# Patient Record
Sex: MALE | Race: ASIAN | Employment: UNEMPLOYED | URBAN - METROPOLITAN AREA
[De-identification: names, ages, dates, MRNs, and addresses within clinical notes are randomized per-mention and may not be internally consistent; named-entity substitution may affect disease eponyms.]

---

## 2017-08-13 ENCOUNTER — HOSPITAL ENCOUNTER (EMERGENCY)
Facility: CLINIC | Age: 52
Discharge: HOME | End: 2017-08-13
Attending: EMERGENCY MEDICINE | Admitting: EMERGENCY MEDICINE
Payer: MEDICAID

## 2017-08-13 VITALS
RESPIRATION RATE: 12 BRPM | DIASTOLIC BLOOD PRESSURE: 75 MMHG | OXYGEN SATURATION: 100 % | TEMPERATURE: 98.3 F | HEART RATE: 75 BPM | SYSTOLIC BLOOD PRESSURE: 117 MMHG

## 2017-08-13 DIAGNOSIS — R01.1 HEART MURMUR: ICD-10-CM

## 2017-08-13 DIAGNOSIS — M62.81 GENERALIZED MUSCLE WEAKNESS: ICD-10-CM

## 2017-08-13 LAB
ALBUMIN UR-MCNC: NEGATIVE MG/DL
ANION GAP SERPL CALCULATED.3IONS-SCNC: 7 MMOL/L (ref 3–14)
APPEARANCE UR: CLEAR
BASOPHILS # BLD AUTO: 0.1 10E9/L (ref 0–0.2)
BASOPHILS NFR BLD AUTO: 0.7 %
BILIRUB UR QL STRIP: NEGATIVE
BUN SERPL-MCNC: 35 MG/DL (ref 7–30)
CALCIUM SERPL-MCNC: 8.4 MG/DL (ref 8.5–10.1)
CHLORIDE SERPL-SCNC: 111 MMOL/L (ref 94–109)
CO2 SERPL-SCNC: 28 MMOL/L (ref 20–32)
COLOR UR AUTO: ABNORMAL
CREAT SERPL-MCNC: 0.99 MG/DL (ref 0.66–1.25)
DIFFERENTIAL METHOD BLD: ABNORMAL
EOSINOPHIL # BLD AUTO: 0.2 10E9/L (ref 0–0.7)
EOSINOPHIL NFR BLD AUTO: 1.9 %
ERYTHROCYTE [DISTWIDTH] IN BLOOD BY AUTOMATED COUNT: 21.1 % (ref 10–15)
GFR SERPL CREATININE-BSD FRML MDRD: 80 ML/MIN/1.7M2
GLUCOSE SERPL-MCNC: 156 MG/DL (ref 70–99)
GLUCOSE UR STRIP-MCNC: NEGATIVE MG/DL
HCT VFR BLD AUTO: 29.8 % (ref 40–53)
HGB BLD-MCNC: 9.2 G/DL (ref 13.3–17.7)
HGB UR QL STRIP: NEGATIVE
IMM GRANULOCYTES # BLD: 0.1 10E9/L (ref 0–0.4)
IMM GRANULOCYTES NFR BLD: 1 %
KETONES UR STRIP-MCNC: NEGATIVE MG/DL
LACTATE BLD-SCNC: 1.2 MMOL/L (ref 0.7–2.1)
LEUKOCYTE ESTERASE UR QL STRIP: NEGATIVE
LYMPHOCYTES # BLD AUTO: 1.3 10E9/L (ref 0.8–5.3)
LYMPHOCYTES NFR BLD AUTO: 15.6 %
MCH RBC QN AUTO: 25.1 PG (ref 26.5–33)
MCHC RBC AUTO-ENTMCNC: 30.9 G/DL (ref 31.5–36.5)
MCV RBC AUTO: 81 FL (ref 78–100)
MONOCYTES # BLD AUTO: 0.3 10E9/L (ref 0–1.3)
MONOCYTES NFR BLD AUTO: 3.8 %
MUCOUS THREADS #/AREA URNS LPF: PRESENT /LPF
NEUTROPHILS # BLD AUTO: 6.3 10E9/L (ref 1.6–8.3)
NEUTROPHILS NFR BLD AUTO: 77 %
NITRATE UR QL: NEGATIVE
NRBC # BLD AUTO: 0 10*3/UL
NRBC BLD AUTO-RTO: 0 /100
PH UR STRIP: 5 PH (ref 5–7)
PLATELET # BLD AUTO: 283 10E9/L (ref 150–450)
POTASSIUM SERPL-SCNC: 3.7 MMOL/L (ref 3.4–5.3)
RBC # BLD AUTO: 3.67 10E12/L (ref 4.4–5.9)
RBC #/AREA URNS AUTO: 1 /HPF (ref 0–2)
SODIUM SERPL-SCNC: 146 MMOL/L (ref 133–144)
SP GR UR STRIP: 1.02 (ref 1–1.03)
SQUAMOUS #/AREA URNS AUTO: <1 /HPF (ref 0–1)
TROPONIN I SERPL-MCNC: NORMAL UG/L (ref 0–0.04)
TSH SERPL DL<=0.005 MIU/L-ACNC: 0.75 MU/L (ref 0.4–4)
URN SPEC COLLECT METH UR: ABNORMAL
UROBILINOGEN UR STRIP-MCNC: 0 MG/DL (ref 0–2)
VALPROATE SERPL-MCNC: <3 MG/L (ref 50–100)
WBC # BLD AUTO: 8.2 10E9/L (ref 4–11)
WBC #/AREA URNS AUTO: 1 /HPF (ref 0–2)

## 2017-08-13 PROCEDURE — 83605 ASSAY OF LACTIC ACID: CPT | Performed by: PHYSICIAN ASSISTANT

## 2017-08-13 PROCEDURE — 96361 HYDRATE IV INFUSION ADD-ON: CPT

## 2017-08-13 PROCEDURE — 84443 ASSAY THYROID STIM HORMONE: CPT | Performed by: PHYSICIAN ASSISTANT

## 2017-08-13 PROCEDURE — 99284 EMERGENCY DEPT VISIT MOD MDM: CPT | Mod: 25

## 2017-08-13 PROCEDURE — 81001 URINALYSIS AUTO W/SCOPE: CPT | Performed by: EMERGENCY MEDICINE

## 2017-08-13 PROCEDURE — 80164 ASSAY DIPROPYLACETIC ACD TOT: CPT | Performed by: PHYSICIAN ASSISTANT

## 2017-08-13 PROCEDURE — 25000128 H RX IP 250 OP 636: Performed by: EMERGENCY MEDICINE

## 2017-08-13 PROCEDURE — 80048 BASIC METABOLIC PNL TOTAL CA: CPT | Performed by: PHYSICIAN ASSISTANT

## 2017-08-13 PROCEDURE — 84484 ASSAY OF TROPONIN QUANT: CPT | Performed by: PHYSICIAN ASSISTANT

## 2017-08-13 PROCEDURE — 85025 COMPLETE CBC W/AUTO DIFF WBC: CPT | Performed by: PHYSICIAN ASSISTANT

## 2017-08-13 PROCEDURE — 96360 HYDRATION IV INFUSION INIT: CPT

## 2017-08-13 RX ADMIN — SODIUM CHLORIDE 1000 ML: 9 INJECTION, SOLUTION INTRAVENOUS at 19:51

## 2017-08-13 ASSESSMENT — ENCOUNTER SYMPTOMS
FEVER: 0
DIZZINESS: 0
ABDOMINAL PAIN: 0
VOMITING: 1
COUGH: 0
ACTIVITY CHANGE: 0
DIARRHEA: 0
SHORTNESS OF BREATH: 0
SPEECH DIFFICULTY: 0
WEAKNESS: 1
NAUSEA: 1
HEADACHES: 0
APPETITE CHANGE: 0

## 2017-08-13 NOTE — ED AVS SNAPSHOT
[facility] Emergency Department    [address]    [facility] [address]    Phone:  [phone]    Fax:  [phone]                                       Luz Marina Diallo   MRN: [id number]    Department:  [facility] Emergency Department   Date of Visit:  8/13/2017           Patient Information     Date Of Birth          1965        Your diagnoses for this visit were:     Generalized muscle weakness     Heart murmur        You were seen by Tay Patino MD.      Follow-up Information     Follow up with [facility] Emergency Department.    Specialty:  EMERGENCY MEDICINE    Why:  If symptoms worsen    Contact information:    [address]  [facility] [address] [phone]        Follow up with your doctor. Schedule an appointment as soon as possible for a visit in 3 days.    Why:  recheck of weakness and heart murmur        Discharge Instructions           Understanding a Heart Murmur    The heart makes sounds as it beats. These sounds occur as the heart valves open and close to allow blood to flow through the heart. A heart murmur is an extra noise heard during a heartbeat. The noise is caused when blood does not flow smoothly through the heart. Heart murmurs can be innocent (harmless) or abnormal (caused by a heart problem).  What causes a heart murmur?  An innocent heart murmur can be a normal finding for many people. It may also be caused by:    Fever    Exercise    Pregnancy    Anemia    Overactive thyroid gland  An abnormal heart murmur can be caused by heart problems such as:    A damaged or diseased valve. The valve may be too narrow for blood to flow through easily. Or it may have problems opening or closing, and may leak blood backward.    A hole in the heart (septal defect). This is a problem with the heart s structure that a person is born with (congenital). It causes blood to leak through the wall that normally  divides the left and right sides of the heart.  What are the symptoms of a heart murmur?  Heart murmurs do not usually cause symptoms. They tend to be found when your healthcare provider is listening to your heart for another reason. People with an abnormal heart murmur may have symptoms of the problem causing the murmur. Symptoms can include:    Feeling weak or tired    Shortness of breath, especially with exercise    Chest pain    Fast, pounding, or skipping heartbeat    Swollen ankles, feet, abdomen    Feeling dizzy or faint    Poor feeding and failing to grow normally (babies only)  How is a heart murmur treated?  An innocent heart murmur does not usually need treatment unless there is a clear cause, such as anemia. In such cases, treating the underlying cause should cure the murmur. In some cases, an innocent heart murmur may go away on its own.  Treatment for an abnormal heart murmur depends on the cause. Options may include:    Medicines to help relieve symptoms    Procedures or surgery to fix or replace a diseased or damaged heart valve    Procedures or surgery to fix a hole in the heart  What are the complications of a heart murmur?  An innocent heart murmur has no complications. Complications of an abnormal heart murmur will vary depending on the cause. Possible complications include:    Heart failure. This problem occurs when the heart is so weak it no longer pumps blood well.    Infection of the heart s valves or inner lining (infective endocarditis)    Blood clots and stroke    Fainting    Heart attack    Sudden cardiac arrest. This problem occurs when the heart suddenly stops beating.  When should I call my healthcare provider?  Call your healthcare provider right away if you have any of these:    Chest pain    Shortness of breath    Fever of 100.4 F (38 C) or higher, or as directed    Symptoms that don t get better with treatment, or symptoms that get worse    New symptoms   Date Last Reviewed:  5/1/2016 2000-2017 [employer]. [address] [place] [address]. All rights reserved. This information is not intended as a substitute for professional medical care. Always follow your healthcare professional's instructions.        Weakness (Uncertain Cause)  Based on your exam today, the exact cause of your weakness is not certain. However, your weakness does not seem to be a sign of a serious illness at this time. Keep an eye on your symptoms and get medical advice as instructed below.  Home care    Rest at home today. Do not over-exert yourself.    Take any medicine as prescribed.    For the next few days, drink extra fluids (unless your healthcare provider wants you to restrict fluids for other reasons). Do not skip meals.  Follow-up care  Follow up with your healthcare provider or as advised.  When to seek medical advice  Call your healthcare provider for any of the following    Worsening of your symptoms    Symptoms don't start getting better within 2 days    Fever of 100.4  F (38  C) or higher, or as directed by your healthcare provider     Call 911  Get emergency medical care for any of these:    Chest, arm, neck, jaw or upper back pain    Trouble breathing    Numbness or weakness of the face, one arm or one leg    Slurred speech, confusion, trouble speaking, walking or seeing    Blood in vomit or stool (black or red color)    Loss of consciousness  Date Last Reviewed: 6/10/2015    6796-2421 [employer]. [address] [place] [address]. All rights reserved. This information is not intended as a substitute for professional medical care. Always follow your healthcare professional's instructions.          24 Hour Appointment Hotline       To make an appointment at any [facility], call [phone] ([phone]). If you don't have a family doctor or clinic, we will help you find one. [facility] are conveniently located to serve the needs of you  and your family.             Review of your medicines      Notice     You have not been prescribed any medications.            Procedures and tests performed during your visit     Basic metabolic panel    CBC with platelets differential    Lactic acid whole blood    TSH with free T4 reflex    Troponin I    UA with Microscopic reflex to Culture    Valproic acid      Orders Needing Specimen Collection     None      Pending Results     No orders found from 8/11/2017 to 8/14/2017.            Pending Culture Results     No orders found from 8/11/2017 to 8/14/2017.            Pending Results Instructions     If you had any lab results that were not finalized at the time of your Discharge, you can call the ED Lab Result RN at [phone]. You will be contacted by this team for any positive Lab results or changes in treatment. The nurses are available 7 days a week from 10A to 6:30P.  You can leave a message 24 hours per day and they will return your call.        Test Results From Your Hospital Stay        8/13/2017  7:43 PM      Component Results     Component Value Ref Range & Units Status    Lactic Acid 1.2 0.7 - 2.1 mmol/L Final         8/13/2017  8:02 PM      Component Results     Component Value Ref Range & Units Status    Troponin I ES  0.000 - 0.045 ug/L Final    <0.015  The 99th percentile for upper reference range is 0.045 ug/L.  Troponin values in   the range of 0.045 - 0.120 ug/L may be associated with risks of adverse   clinical events.           8/13/2017  7:41 PM      Component Results     Component Value Ref Range & Units Status    WBC 8.2 4.0 - 11.0 10e9/L Final    RBC Count 3.67 (L) 4.4 - 5.9 10e12/L Final    Hemoglobin 9.2 (L) 13.3 - 17.7 g/dL Final    Hematocrit 29.8 (L) 40.0 - 53.0 % Final    MCV 81 78 - 100 fl Final    MCH 25.1 (L) 26.5 - 33.0 pg Final    MCHC 30.9 (L) 31.5 - 36.5 g/dL Final    RDW 21.1 (H) 10.0 - 15.0 % Final    Platelet Count 283 150 - 450 10e9/L Final    Diff Method Automated  Method  Final    % Neutrophils 77.0 % Final    % Lymphocytes 15.6 % Final    % Monocytes 3.8 % Final    % Eosinophils 1.9 % Final    % Basophils 0.7 % Final    % Immature Granulocytes 1.0 % Final    Nucleated RBCs 0 0 /100 Final    Absolute Neutrophil 6.3 1.6 - 8.3 10e9/L Final    Absolute Lymphocytes 1.3 0.8 - 5.3 10e9/L Final    Absolute Monocytes 0.3 0.0 - 1.3 10e9/L Final    Absolute Eosinophils 0.2 0.0 - 0.7 10e9/L Final    Absolute Basophils 0.1 0.0 - 0.2 10e9/L Final    Abs Immature Granulocytes 0.1 0 - 0.4 10e9/L Final    Absolute Nucleated RBC 0.0  Final         8/13/2017  8:02 PM      Component Results     Component Value Ref Range & Units Status    Sodium 146 (H) 133 - 144 mmol/L Final    Potassium 3.7 3.4 - 5.3 mmol/L Final    Chloride 111 (H) 94 - 109 mmol/L Final    Carbon Dioxide 28 20 - 32 mmol/L Final    Anion Gap 7 3 - 14 mmol/L Final    Glucose 156 (H) 70 - 99 mg/dL Final    Urea Nitrogen 35 (H) 7 - 30 mg/dL Final    Creatinine 0.99 0.66 - 1.25 mg/dL Final    GFR Estimate 80 >60 mL/min/1.7m2 Final    Non  GFR Calc    GFR Estimate If Black >90   GFR Calc   >60 mL/min/1.7m2 Final    Calcium 8.4 (L) 8.5 - 10.1 mg/dL Final         8/13/2017  9:05 PM      Component Results     Component Value Ref Range & Units Status    Color Urine Straw  Final    Appearance Urine Clear  Final    Glucose Urine Negative NEG mg/dL Final    Bilirubin Urine Negative NEG Final    Ketones Urine Negative NEG mg/dL Final    Specific Gravity Urine 1.016 1.003 - 1.035 Final    Blood Urine Negative NEG Final    pH Urine 5.0 5.0 - 7.0 pH Final    Protein Albumin Urine Negative NEG mg/dL Final    Urobilinogen mg/dL 0.0 0.0 - 2.0 mg/dL Final    Nitrite Urine Negative NEG Final    Leukocyte Esterase Urine Negative NEG Final    Source Midstream Urine  Final    WBC Urine 1 0 - 2 /HPF Final    RBC Urine 1 0 - 2 /HPF Final    Squamous Epithelial /HPF Urine <1 0 - 1 /HPF Final    Mucous Urine Present (A)  NEG /LPF Final         8/13/2017  9:15 PM      Component Results     Component Value Ref Range & Units Status    TSH 0.75 0.40 - 4.00 mU/L Final         8/13/2017  9:12 PM      Component Results     Component Value Ref Range & Units Status    Valproic Acid Level <3 (L) 50 - 100 mg/L Final                Clinical Quality Measure: Blood Pressure Screening     Your blood pressure was checked while you were in the emergency department today. The last reading we obtained was  BP: 117/75 . Please read the guidelines below about what these numbers mean and what you should do about them.  If your systolic blood pressure (the top number) is less than 120 and your diastolic blood pressure (the bottom number) is less than 80, then your blood pressure is normal. There is nothing more that you need to do about it.  If your systolic blood pressure (the top number) is 120-139 or your diastolic blood pressure (the bottom number) is 80-89, your blood pressure may be higher than it should be. You should have your blood pressure rechecked within a year by a primary care provider.  If your systolic blood pressure (the top number) is 140 or greater or your diastolic blood pressure (the bottom number) is 90 or greater, you may have high blood pressure. High blood pressure is treatable, but if left untreated over time it can put you at risk for heart attack, stroke, or kidney failure. You should have your blood pressure rechecked by a primary care provider within the next 4 weeks.  If your provider in the emergency department today gave you specific instructions to follow-up with your doctor or provider even sooner than that, you should follow that instruction and not wait for up to 4 weeks for your follow-up visit.        Thank you for choosing [facility]       Thank you for choosing [facility] for your care. Our goal is always to provide you with excellent care. Hearing back from our patients is one way we can continue to improve our  "services. Please take a few minutes to complete the written survey that you may receive in the mail after you visit with us. Thank you!        [employer] Information     [employer] lets you send messages to your doctor, view your test results, renew your prescriptions, schedule appointments and more. To sign up, go to [email or web address] . Click on \"Log in\" on the left side of the screen, which will take you to the Welcome page. Then click on \"Sign up Now\" on the right side of the page.     You will be asked to enter the access code listed below, as well as some personal information. Please follow the directions to create your username and password.     Your access code is: [email or web address]  Expires: 2017 10:27 PM     Your access code will  in 90 days. If you need help or a new code, please call your [facility] clinic or [phone].        Care EveryWhere ID     This is your Care EveryWhere ID. This could be used by other organizations to access your [facility] medical records  [id number]        Equal Access to Services     [facility] Center[facility] : Hadii jackson Hernandez, waangela henderson, qaekaterina medrano, checo palmer . So [facility] [phone].    ATENCIÓN: Si habla español, tiene a zepeda disposición servicios gratuitos de asistencia lingüística. Llame al [phone].    We comply with applicable federal civil rights laws and [place] laws. We do not discriminate on the basis of race, color, national origin, age, disability sex, sexual orientation or gender identity.            After Visit Summary       This is your record. Keep this with you and show to your community pharmacist(s) and doctor(s) at your next visit.                  "

## 2017-08-13 NOTE — ED AVS SNAPSHOT
[facility] Emergency Department    [address]    [facility] [address]    Phone:  [phone]    Fax:  [phone]                                       Luz Marina Diallo   MRN: [id number]    Department:  [facility] Emergency Department   Date of Visit:  8/13/2017           After Visit Summary Signature Page     I have received my discharge instructions, and my questions have been answered. I have discussed any challenges I see with this plan with the nurse or doctor.    ..........................................................................................................................................  Patient/Patient Representative Signature      ..........................................................................................................................................  Patient Representative Print Name and Relationship to Patient    ..................................................               ................................................  Date                                            Time    ..........................................................................................................................................  Reviewed by Signature/Title    ...................................................              ..............................................  Date                                                            Time

## 2017-08-14 LAB — INTERPRETATION ECG - MUSE: NORMAL

## 2017-08-14 NOTE — ED NOTES
Weakness x 1 hour. Difficulty walking because of weakness. ABC intact alert and no distress. Nausea.

## 2017-08-14 NOTE — ED PROVIDER NOTES
"  History     Chief Complaint:  Generalized Weakness    HPI   HPI limited secondary to language barrier. Information obtained through sister as :    Luz Marina Diallo is a 52 year old male with a history of mental retardation who presents to the Emergency Department for evaluation of generalized weakness. The patient presents after three episodes of vomiting this morning. Approximately one hour prior to arrival he noted the onset generalized weakness. He denies any one-sided weakness. Per sisters report, the patient is not able to ambulate more than a few steps without \"feeling very tired/weak\" and needing to sit down and rest. He reports feeling normal this morning and has been hydrating adequately. He was prescribed Depakote for behavior issues in the past, but the sister says he is no longer on Depakote. The patient denies any fever, cough, abdominal pain, chest pain, shortness of breath, headache, dizziness, urinary symptoms, speech difficulty, recent falls or head trauma. No cardiac history. No use of anticoagulants.    Allergies:  No known drug allergies    Medications:    The patient is not currently taking any prescribed medications.    Past Medical History:    Mental retardation    Past Surgical History:    The patient does not have any pertinent past surgical history.    Family History:    No past pertinent family history.    Social History:  The patient was accompanied to the ED by sister.  Smoking Status: never smoker  Smokeless Tobacco: never used  Alcohol Use: no  Marital Status:  Single [1]     Review of Systems   Constitutional: Negative for activity change, appetite change and fever.   Respiratory: Negative for cough and shortness of breath.    Cardiovascular: Negative for chest pain.   Gastrointestinal: Positive for nausea and vomiting. Negative for abdominal pain and diarrhea.   Genitourinary: Negative.    Neurological: Positive for weakness. Negative for dizziness, speech " difficulty and headaches.   All other systems reviewed and are negative.    Physical Exam   First Vitals:  Patient Vitals for the past 24 hrs:   BP Temp Temp src Pulse Resp SpO2   08/13/17 1908 126/80 98.3  F (36.8  C) Oral 79 18 100 %     Physical Exam  General: Resting comfortably.  Alert and oriented. Speaks in one work sentences.   Head:  The scalp, face, and head appear normal   Eyes:  The pupils are equal, round, and reactive to light     Extraocular muscles are intact    Conjunctivae and sclerae are normal    ENT:    The oropharynx is normal    Uvula is in the midline    CV:  Regular rate and rhythm     Normal S1/S2    Systolic ejection murmur.  Resp:  Lungs are clear to auscultation    Non-labored    No rales or wheezing   GI:  Abdomen is soft, non-distended    No rebound tenderness     Normal bowel sounds     No tenderness  MS:  Normal muscular tone   Skin:  No rash or acute skin lesions noted   Neuro: Speech is normal and fluent. Cranial nerves II through XII grossly intact.   strength is normal.  The patient is able to move all four extremities.  Finger-nose-finger      Emergency Department Course   ECG:  Indication: generalized weakness  Time: 1916  Vent. Rate 81 bpm. SD interval 156. QRS duration 92. QT/QTc 338/392. P-R-T axis 71 13 45.   Normal sinus rhythm. Abnormal ECG. Incomplete RBBB. Nonspecific STD's. Read time: 1720.    Laboratory:  CBC: WBC: 8.2, HGB: 9.2(L), PLT: 283  BMP: Glucose 156(H), BUN 35(H), (H), Chloride 111(H), Calcium 8.4(L), o/w WNL  Lactic acid: 1.2  Troponin: <0.015  TSH with free T4 reflex: 0.75  Valproic acid: <3 (L)    UA with Microscopic reflex to culture: mucous present, o/w WNL.    Interventions:  1951 NS 1 L IV    Emergency Department Course:  Nursing notes and vitals reviewed. I performed an exam of the patient as documented above.     EKG obtained in the ED, see results above.     Blood drawn. This was sent to the lab for further testing, results above.    The  patient provided a urine sample here in the emergency department. This was sent for laboratory testing, findings above.    2112 I reassessed the patient.     2150 I reassessed the patient. He was able to ambulate without difficulty here in the ED.     Findings and plan explained to the Patient. Patient discharged home with instructions regarding supportive care, medications, and reasons to return. The importance of close follow-up was reviewed.     Impression & Plan      Medical Decision Making:  Luz Marina Diallo is a 52 year old male with a history of mental retardation who presents to the Emergency Department for evaluation of generalized weakness. The patient presents vitally stable and afebrile. EKG demonstrated non-specific ST depressions, but troponin is normal. CBC remarkable for anemia, but I doubt this is contributing to his acute symptoms. BMP unremarkable. Lactic acid is normal. UA is normal. TSH is WNL. Depakote level is <3. The patient received 1 L of normal saline and felt much improved. He was ambulated in the hallways and was completely asymptomatic.  He would like to go home at this time. I think this is appropriate given his negative workup here and asymptomatic state.  He was asked to follow up with his primary doctor in 2-3 days. Return precautions were given and discussed with the patient and sister including development of fever, chest pain, shortness of breath, syncope, or one-sided weakness. All questions were answered prior to discharge. The patient understands and agrees to this plan.    Diagnosis:    ICD-10-CM    1. Generalized muscle weakness M62.81    2. Heart murmur R01.1      Disposition:  discharged to home    I, Serene Galaviz, am serving as a scribe on 8/13/2017 at 7:04 PM to personally document services performed by EPI Johnson  based on my observations and the provider's statements to me.   Serene Galaviz  8/13/2017   [facility] EMERGENCY  DEPARTMENT       Geeta Bailey PA-C  08/13/17 [id number]

## 2017-08-14 NOTE — DISCHARGE INSTRUCTIONS
Understanding a Heart Murmur    The heart makes sounds as it beats. These sounds occur as the heart valves open and close to allow blood to flow through the heart. A heart murmur is an extra noise heard during a heartbeat. The noise is caused when blood does not flow smoothly through the heart. Heart murmurs can be innocent (harmless) or abnormal (caused by a heart problem).  What causes a heart murmur?  An innocent heart murmur can be a normal finding for many people. It may also be caused by:    Fever    Exercise    Pregnancy    Anemia    Overactive thyroid gland  An abnormal heart murmur can be caused by heart problems such as:    A damaged or diseased valve. The valve may be too narrow for blood to flow through easily. Or it may have problems opening or closing, and may leak blood backward.    A hole in the heart (septal defect). This is a problem with the heart s structure that a person is born with (congenital). It causes blood to leak through the wall that normally divides the left and right sides of the heart.  What are the symptoms of a heart murmur?  Heart murmurs do not usually cause symptoms. They tend to be found when your healthcare provider is listening to your heart for another reason. People with an abnormal heart murmur may have symptoms of the problem causing the murmur. Symptoms can include:    Feeling weak or tired    Shortness of breath, especially with exercise    Chest pain    Fast, pounding, or skipping heartbeat    Swollen ankles, feet, abdomen    Feeling dizzy or faint    Poor feeding and failing to grow normally (babies only)  How is a heart murmur treated?  An innocent heart murmur does not usually need treatment unless there is a clear cause, such as anemia. In such cases, treating the underlying cause should cure the murmur. In some cases, an innocent heart murmur may go away on its own.  Treatment for an abnormal heart murmur depends on the cause. Options may include:    Medicines  to help relieve symptoms    Procedures or surgery to fix or replace a diseased or damaged heart valve    Procedures or surgery to fix a hole in the heart  What are the complications of a heart murmur?  An innocent heart murmur has no complications. Complications of an abnormal heart murmur will vary depending on the cause. Possible complications include:    Heart failure. This problem occurs when the heart is so weak it no longer pumps blood well.    Infection of the heart s valves or inner lining (infective endocarditis)    Blood clots and stroke    Fainting    Heart attack    Sudden cardiac arrest. This problem occurs when the heart suddenly stops beating.  When should I call my healthcare provider?  Call your healthcare provider right away if you have any of these:    Chest pain    Shortness of breath    Fever of 100.4 F (38 C) or higher, or as directed    Symptoms that don t get better with treatment, or symptoms that get worse    New symptoms   Date Last Reviewed: 5/1/2016 2000-2017 [employer]. [address] [place] [address]. All rights reserved. This information is not intended as a substitute for professional medical care. Always follow your healthcare professional's instructions.        Weakness (Uncertain Cause)  Based on your exam today, the exact cause of your weakness is not certain. However, your weakness does not seem to be a sign of a serious illness at this time. Keep an eye on your symptoms and get medical advice as instructed below.  Home care    Rest at home today. Do not over-exert yourself.    Take any medicine as prescribed.    For the next few days, drink extra fluids (unless your healthcare provider wants you to restrict fluids for other reasons). Do not skip meals.  Follow-up care  Follow up with your healthcare provider or as advised.  When to seek medical advice  Call your healthcare provider for any of the following    Worsening of your symptoms    Symptoms  don't start getting better within 2 days    Fever of 100.4  F (38  C) or higher, or as directed by your healthcare provider     Call 911  Get emergency medical care for any of these:    Chest, arm, neck, jaw or upper back pain    Trouble breathing    Numbness or weakness of the face, one arm or one leg    Slurred speech, confusion, trouble speaking, walking or seeing    Blood in vomit or stool (black or red color)    Loss of consciousness  Date Last Reviewed: 6/10/2015    8787-0952 [employer]. [address] [place], [place] [address]. All rights reserved. This information is not intended as a substitute for professional medical care. Always follow your healthcare professional's instructions.

## 2017-08-14 NOTE — ED NOTES
States feeling weak. Vomited x3 during the day. States unable to walk very far due top feeling weak.

## 2017-08-23 ENCOUNTER — OFFICE VISIT (OUTPATIENT)
Dept: INTERNAL MEDICINE | Facility: CLINIC | Age: 52
End: 2017-08-23
Payer: MEDICAID

## 2017-08-23 VITALS
BODY MASS INDEX: 18 KG/M2 | HEIGHT: 62 IN | OXYGEN SATURATION: 100 % | HEART RATE: 92 BPM | SYSTOLIC BLOOD PRESSURE: 90 MMHG | TEMPERATURE: 98.3 F | DIASTOLIC BLOOD PRESSURE: 50 MMHG | WEIGHT: 97.8 LBS

## 2017-08-23 DIAGNOSIS — D64.9 ANEMIA, UNSPECIFIED TYPE: Primary | ICD-10-CM

## 2017-08-23 DIAGNOSIS — R01.1 HEART MURMUR: ICD-10-CM

## 2017-08-23 PROCEDURE — 36415 COLL VENOUS BLD VENIPUNCTURE: CPT | Performed by: INTERNAL MEDICINE

## 2017-08-23 PROCEDURE — 99203 OFFICE O/P NEW LOW 30 MIN: CPT | Performed by: INTERNAL MEDICINE

## 2017-08-23 PROCEDURE — 83540 ASSAY OF IRON: CPT | Performed by: INTERNAL MEDICINE

## 2017-08-23 PROCEDURE — 83550 IRON BINDING TEST: CPT | Performed by: INTERNAL MEDICINE

## 2017-08-23 RX ORDER — FERROUS SULFATE 325(65) MG
325 TABLET ORAL 2 TIMES DAILY
Qty: 60 TABLET | Refills: 2 | Status: SHIPPED | OUTPATIENT
Start: 2017-08-23 | End: 2019-02-28

## 2017-08-23 NOTE — PROGRESS NOTES
SUBJECTIVE:   Luz Marina Diallo is a 52 year old male who presents to clinic today for the following health issues:      ED/UC Followup:    Facility:  Replaced by [facility]   Date of visit: 8/13/17  Reason for visit: Weakness, vomiting   Current Status: Not feeling much better.        Pt is a 52 year old male who is seen here to day to follow up on ER visit along with his sister. Patient was seen in ER on 8/13/2017 for nausea, vomiting and weakness, patient was hydrated with saline, had blood work done which showed hemoglobin of 9.2. Patient reports nausea and vomiting has resolved, no fever, no abdominal pain. But  the weakness continues. Pt has history of mental retardation and resides at home along with his family who cares for him. No shortness of breath, no chest pain, no blood in the stool. No family history of for colon cancer.      Patient Active Problem List   Diagnosis     Mental retardation     Anemia, unspecified type     History reviewed. No pertinent surgical history.    Social History   Substance Use Topics     Smoking status: Never Smoker     Smokeless tobacco: Never Used     Alcohol use No     Family History   Problem Relation Age of Onset     DIABETES Father          Current Outpatient Prescriptions   Medication Sig Dispense Refill     ferrous sulfate (IRON) 325 (65 FE) MG tablet Take 1 tablet (325 mg) by mouth 2 times daily 60 tablet 2         Reviewed and updated as needed this visit by clinical staffTobacco  Allergies  Meds  Med Hx  Surg Hx  Fam Hx  Soc Hx      Reviewed and updated as needed this visit by Provider         ROS:  C: has weakness, NEGATIVE for fever, chills,    E/M: NEGATIVE for ear, mouth and throat problems  R: NEGATIVE for significant cough or SOB  CV: NEGATIVE for chest pain, palpitations or peripheral edema  GI: NEGATIVE for nausea, abdominal pain, heartburn, or change in bowel habits at this time  MUSCULOSKELETAL: NEGATIVE for significant arthralgias or myalgia  Heme anemia  "  PSYCHIATRIC: mental retardation    OBJECTIVE:                                                    BP 90/50 (BP Location: Right arm, Patient Position: Sitting, Cuff Size: Adult Regular)  Pulse 92  Temp 98.3  F (36.8  C) (Oral)  Ht 5' 1.75\" (1.568 m)  Wt 97 lb 12.8 oz (44.4 kg)  SpO2 100%  BMI 18.03 kg/m2  Body mass index is 18.03 kg/(m^2).   GENERAL:   no distress  EYES: Eyes grossly normal to inspection, extraocular movements - intact, and PERRL  HENT:  Mouth- no ulcers, no lesions  NECK: no tenderness, no adenopathy, no asymmetry, no masses, no stiffness  RESP: lungs clear to auscultation - no rales, no rhonchi, no wheezes  CV: regular rates and rhythm, normal S1 S2, ? Systolic murmur at apex.-  ABDOMEN: soft, no tenderness, no  hepatosplenomegaly, no masses, normal bowel sounds  MS: extremities- no gross deformities noted, no edema         ASSESSMENT/PLAN:                                                      1. Anemia, unspecified type  - Iron and iron binding capacity  -- Fecal colorectal cancer screen FIT; Future  -- started on  ferrous sulfate (IRON) 325 (65 FE) MG tablet; Take 1 tablet (325 mg) by mouth 2 times daily explained clearly about the medication,insructions and side effects. Rpt CBC in 3 mths.      2. Heart murmur  - Echocardiogram Complete; Future.pt was told I will contact after results and proceed accordingly.       Jennyfer Cruz MD  [facility]    "

## 2017-08-23 NOTE — MR AVS SNAPSHOT
"              After Visit Summary   8/23/2017    Luz Marina Diallo    MRN: [id number]           Patient Information     Date Of Birth          1965        Visit Information        Provider Department      8/23/2017 10:40 AM Jennyfer Cruz MD [facility]        Today's Diagnoses     Anemia, unspecified type    -  1    Heart murmur           Follow-ups after your visit        Future tests that were ordered for you today     Open Future Orders        Priority Expected Expires Ordered    Echocardiogram Complete Routine  8/23/2018 8/23/2017    Fecal colorectal cancer screen FIT Routine 9/13/2017 11/15/2017 8/23/2017            Who to contact     If you have questions or need follow up information about today's clinic visit or your schedule please contact [facility] directly at [phone].  Normal or non-critical lab and imaging results will be communicated to you by MyChart, letter or phone within 4 business days after the clinic has received the results. If you do not hear from us within 7 days, please contact the clinic through MyChart or phone. If you have a critical or abnormal lab result, we will notify you by phone as soon as possible.  Submit refill requests through [employer] or call your pharmacy and they will forward the refill request to us. Please allow 3 business days for your refill to be completed.          Additional Information About Your Visit        MyChart Information     [employer] lets you send messages to your doctor, view your test results, renew your prescriptions, schedule appointments and more. To sign up, go to [email or web address] . Click on \"Log in\" on the left side of the screen, which will take you to the Welcome page. Then click on \"Sign up Now\" on the right side of the page.     You will be asked to enter the access code listed below, as well as some personal information. Please follow the directions to create your username and " "password.     Your access code is: [email or web address]  Expires: 2017 10:27 PM     Your access code will  in 90 days. If you need help or a new code, please call your [facility] or [phone].        Care EveryWhere ID     This is your Care EveryWhere ID. This could be used by other organizations to access your [facility] medical records  [id number]        Your Vitals Were     Pulse Temperature Height Pulse Oximetry BMI (Body Mass Index)       92 98.3  F (36.8  C) (Oral) 5' 1.75\" (1.568 m) 100% 18.03 kg/m2        Blood Pressure from Last 3 Encounters:   17 90/50   17 117/75    Weight from Last 3 Encounters:   17 97 lb 12.8 oz (44.4 kg)              We Performed the Following     Iron and iron binding capacity          Today's Medication Changes          These changes are accurate as of: 17 12:20 PM.  If you have any questions, ask your nurse or doctor.               Start taking these medicines.        Dose/Directions    ferrous sulfate 325 (65 FE) MG tablet   Commonly known as:  IRON   Used for:  Anemia, unspecified type   Started by:  Jennyfer Cruz MD        Dose:  325 mg   Take 1 tablet (325 mg) by mouth 2 times daily   Quantity:  60 tablet   Refills:  2            Where to get your medicines      These medications were sent to [place] [address] [place] [address] [address]     Phone:  [phone]     ferrous sulfate 325 (65 FE) MG tablet                Primary Care Provider Office Phone # Fax #    Jennyfer Cruz [phone]       [address] [facility] [address]        Equal Access to Services     NICK SANTIAGO : Bradley Hernandez, monisha henderson, elizabeth kaalcheco knight. So [facility] [phone].    ATENCIÓN: Si habla español, tiene a zepeda disposición servicios gratuitos de asistencia lingüística. Llame al " [phone].    We comply with applicable federal civil rights laws and [place] laws. We do not discriminate on the basis of race, color, national origin, age, disability sex, sexual orientation or gender identity.            Thank you!     Thank you for choosing [facility]  for your care. Our goal is always to provide you with excellent care. Hearing back from our patients is one way we can continue to improve our services. Please take a few minutes to complete the written survey that you may receive in the mail after your visit with us. Thank you!             Your Updated Medication List - Protect others around you: Learn how to safely use, store and throw away your medicines at www.disposemymeds.org.          This list is accurate as of: 8/23/17 12:20 PM.  Always use your most recent med list.                   Brand Name Dispense Instructions for use Diagnosis    ferrous sulfate 325 (65 FE) MG tablet    IRON    60 tablet    Take 1 tablet (325 mg) by mouth 2 times daily    Anemia, unspecified type

## 2017-08-23 NOTE — NURSING NOTE
"Chief Complaint   Patient presents with     RECHECK     [facility] ED 8/13/17 for feeling weak.       Initial BP 90/50 (BP Location: Right arm, Patient Position: Sitting, Cuff Size: Adult Regular)  Pulse 92  Temp 98.3  F (36.8  C) (Oral)  Ht 5' 1.75\" (1.568 m)  Wt 97 lb 12.8 oz (44.4 kg)  SpO2 100%  BMI 18.03 kg/m2 Estimated body mass index is 18.03 kg/(m^2) as calculated from the following:    Height as of this encounter: 5' 1.75\" (1.568 m).    Weight as of this encounter: 97 lb 12.8 oz (44.4 kg).  Medication Reconciliation: complete   Becki Mendez MA      "

## 2017-08-24 LAB
IRON SATN MFR SERPL: 4 % (ref 15–46)
IRON SERPL-MCNC: 12 UG/DL (ref 35–180)
TIBC SERPL-MCNC: 309 UG/DL (ref 240–430)

## 2017-08-31 DIAGNOSIS — D64.9 ANEMIA, UNSPECIFIED TYPE: Primary | ICD-10-CM

## 2018-02-13 ENCOUNTER — APPOINTMENT (OUTPATIENT)
Dept: OPTOMETRY | Facility: CLINIC | Age: 53
End: 2018-02-13
Payer: MEDICAID

## 2018-02-13 ENCOUNTER — OFFICE VISIT (OUTPATIENT)
Dept: OPTOMETRY | Facility: CLINIC | Age: 53
End: 2018-02-13
Payer: MEDICAID

## 2018-02-13 DIAGNOSIS — H52.03 HYPEROPIA OF BOTH EYES WITH ASTIGMATISM: Primary | ICD-10-CM

## 2018-02-13 DIAGNOSIS — H52.4 PRESBYOPIA: ICD-10-CM

## 2018-02-13 DIAGNOSIS — H52.203 HYPEROPIA OF BOTH EYES WITH ASTIGMATISM: Primary | ICD-10-CM

## 2018-02-13 PROCEDURE — 92341 FIT SPECTACLES BIFOCAL: CPT | Performed by: OPTOMETRIST

## 2018-02-13 PROCEDURE — 92015 DETERMINE REFRACTIVE STATE: CPT | Performed by: OPTOMETRIST

## 2018-02-13 PROCEDURE — 92004 COMPRE OPH EXAM NEW PT 1/>: CPT | Performed by: OPTOMETRIST

## 2018-02-13 ASSESSMENT — REFRACTION_MANIFEST
OD_SPHERE: -2.50
OD_ADD: +2.00
OS_SPHERE: -0.75
OD_AXIS: 015
OS_ADD: +2.00
OS_CYLINDER: +2.50
OD_CYLINDER: +3.50
OD_CYLINDER: +3.50
OS_SPHERE: -1.00
OS_CYLINDER: +2.75
METHOD_AUTOREFRACTION: 1
OS_AXIS: 167
OS_AXIS: 165
OD_AXIS: 016
OD_SPHERE: -2.25

## 2018-02-13 ASSESSMENT — CUP TO DISC RATIO
OS_RATIO: 0.25
OD_RATIO: 0.3

## 2018-02-13 ASSESSMENT — EXTERNAL EXAM - LEFT EYE: OS_EXAM: NORMAL

## 2018-02-13 ASSESSMENT — VISUAL ACUITY
OD_SC: 20/125
OD_SC: 20/125
OD_SC+: -1
METHOD: SNELLEN - LINEAR
OS_SC: 20/125
OS_SC: 20/150

## 2018-02-13 ASSESSMENT — CONF VISUAL FIELD
OD_NORMAL: 1
OS_NORMAL: 1

## 2018-02-13 ASSESSMENT — TONOMETRY
OS_IOP_MMHG: 16
OD_IOP_MMHG: 16
IOP_METHOD: APPLANATION

## 2018-02-13 ASSESSMENT — EXTERNAL EXAM - RIGHT EYE: OD_EXAM: NORMAL

## 2018-02-13 NOTE — MR AVS SNAPSHOT
"              After Visit Summary   2/13/2018    Luz Marina Diallo    MRN: [id number]           Patient Information     Date Of Birth          1965        Visit Information        Provider Department      2/13/2018 8:00 AM Laurie Alfred OD [facility]        Today's Diagnoses     Hyperopia of both eyes with astigmatism    -  1    Presbyopia          Care Instructions    Bifocal prescription   Good ocular health          Follow-ups after your visit        Follow-up notes from your care team     Return in about 1 year (around 2/13/2019).      Who to contact     If you have questions or need follow up information about today's clinic visit or your schedule please contact [facility] directly at [phone].  Normal or non-critical lab and imaging results will be communicated to you by MyChart, letter or phone within 4 business days after the clinic has received the results. If you do not hear from us within 7 days, please contact the clinic through MyChart or phone. If you have a critical or abnormal lab result, we will notify you by phone as soon as possible.  Submit refill requests through "[employer] or call your pharmacy and they will forward the refill request to us. Please allow 3 business days for your refill to be completed.          Additional Information About Your Visit        MyChart Information     "[employer] lets you send messages to your doctor, view your test results, renew your prescriptions, schedule appointments and more. To sign up, go to [email or web address] . Click on \"Log in\" on the left side of the screen, which will take you to the Welcome page. Then click on \"Sign up Now\" on the right side of the page.     You will be asked to enter the access code listed below, as well as some personal information. Please follow the directions to create your username and password.     Your access code is: [email or web address]  Expires: 5/14/2018  8:52 AM     Your access code " will  in 90 days. If you need help or a new code, please call your [place] clinic or [phone].        Care EveryWhere ID     This is your Care EveryWhere ID. This could be used by other organizations to access your [place] [facility] medical records  [id number]         Blood Pressure from Last 3 Encounters:   17 90/50   17 117/75    Weight from Last 3 Encounters:   17 44.4 kg (97 lb 12.8 oz)              We Performed the Following     EYE EXAM (SIMPLE-NONBILLABLE)     REFRACTION        Primary Care Provider Office Phone # Fax #    Jennyfer ASHTON MD Nancy [phone]       [address]  [facility] [address]        Equal Access to Services     NICK SANTIAGO : Hadii jackson ku hadasho Soomaali, waaxda luqadaha, qaybta kaalmada adeegyada, waxreina palmer . So [facility] [phone].    ATENCIÓN: Si habla español, tiene a zepeda disposición servicios gratuitos de asistencia lingüística. Llame al [phone].    We comply with applicable federal civil rights laws and [place] laws. We do not discriminate on the basis of race, color, national origin, age, disability, sex, sexual orientation, or gender identity.            Thank you!     Thank you for choosing [facility]] [facility]  for your care. Our goal is always to provide you with excellent care. Hearing back from our patients is one way we can continue to improve our services. Please take a few minutes to complete the written survey that you may receive in the mail after your visit with us. Thank you!             Your Updated Medication List - Protect others around you: Learn how to safely use, store and throw away your medicines at www.disposemymeds.org.          This list is accurate as of 18  8:52 AM.  Always use your most recent med list.                   Brand Name Dispense Instructions for use Diagnosis    ferrous sulfate 325 (65 FE) MG tablet    IRON    60 tablet    Take 1 tablet (325 mg) by  mouth 2 times daily    Anemia, unspecified type

## 2018-02-13 NOTE — LETTER
2/13/2018         RE: Luz Marina Diallo  [address] [place] [address]        Dear Colleague,    Thank you [facility] your patient[facility]lo[facility] Please see a copy of my visit note below.    Chief Complaint   Patient presents with     COMPREHENSIVE EYE EXAM     Lost Glasses        Last Eye Exam: 1yr  Dilated Previously: Yes    What are you currently using to see?  Glasses, glasses are broken, did not bring       Distance Vision Acuity: Noticed gradual change in both eyes    Near Vision Acuity: Not satisfied     Eye Comfort: good  Do you use eye drops? 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       OBJECTIVE: See Ophthalmology exam    ASSESSMENT:    ICD-10-CM    1. Hyperopia of both eyes with astigmatism H52.03 EYE EXAM (SIMPLE-NONBILLABLE)    H52.203 REFRACTION   2. Presbyopia H52.4       PLAN:     Laurie Alfred OD     Again, thank you for allowing me to participate in the care of your patient.        Sincerely,        Laurie Alfred, OD

## 2018-02-13 NOTE — PROGRESS NOTES
Chief Complaint   Patient presents with     COMPREHENSIVE EYE EXAM     Lost Glasses        Last Eye Exam: 1yr  Dilated Previously: Yes    What are you currently using to see?  Glasses, glasses are broken, did not bring       Distance Vision Acuity: Noticed gradual change in both eyes    Near Vision Acuity: Not satisfied     Eye Comfort: good  Do you use eye drops? 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       OBJECTIVE: See Ophthalmology exam    ASSESSMENT:    ICD-10-CM    1. Hyperopia of both eyes with astigmatism H52.03 EYE EXAM (SIMPLE-NONBILLABLE)    H52.203 REFRACTION   2. Presbyopia H52.4       PLAN:     Laurie Alfred OD

## 2019-02-28 ENCOUNTER — TRANSFERRED RECORDS (OUTPATIENT)
Dept: HEALTH INFORMATION MANAGEMENT | Facility: CLINIC | Age: 54
End: 2019-02-28

## 2019-02-28 ENCOUNTER — HOSPITAL ENCOUNTER (OUTPATIENT)
Facility: CLINIC | Age: 54
Setting detail: OBSERVATION
Discharge: HOME | End: 2019-03-01
Attending: EMERGENCY MEDICINE | Admitting: INTERNAL MEDICINE
Payer: MEDICAID

## 2019-02-28 DIAGNOSIS — F70 MILD MENTAL RETARDATION (I.Q. 50-70): ICD-10-CM

## 2019-02-28 DIAGNOSIS — K26.9 DUODENAL ULCER WITHOUT HEMORRHAGE OR PERFORATION AND WITHOUT OBSTRUCTION: Primary | ICD-10-CM

## 2019-02-28 DIAGNOSIS — K27.9 PEPTIC ULCER DISEASE: ICD-10-CM

## 2019-02-28 DIAGNOSIS — R19.5 STOOL GUAIAC POSITIVE: ICD-10-CM

## 2019-02-28 DIAGNOSIS — D64.9 SYMPTOMATIC ANEMIA: ICD-10-CM

## 2019-02-28 LAB
ABSTRACT IFOB-NO CHARGE: POSITIVE
ANION GAP SERPL CALCULATED.3IONS-SCNC: 5 MMOL/L (ref 3–14)
ANISOCYTOSIS BLD QL SMEAR: SLIGHT
BASOPHILS # BLD AUTO: 0 10E9/L (ref 0–0.2)
BASOPHILS NFR BLD AUTO: 0.3 %
BLD PROD TYP BPU: NORMAL
BLD PROD TYP BPU: NORMAL
BLD UNIT ID BPU: 0
BLD UNIT ID BPU: 0
BLOOD PRODUCT CODE: NORMAL
BLOOD PRODUCT CODE: NORMAL
BPU ID: NORMAL
BPU ID: NORMAL
BUN SERPL-MCNC: 22 MG/DL (ref 7–30)
CALCIUM SERPL-MCNC: 7.5 MG/DL (ref 8.5–10.1)
CHLORIDE SERPL-SCNC: 111 MMOL/L (ref 94–109)
CO2 SERPL-SCNC: 28 MMOL/L (ref 20–32)
CREAT SERPL-MCNC: 0.9 MG/DL (ref 0.66–1.25)
DIFFERENTIAL METHOD BLD: ABNORMAL
EOSINOPHIL # BLD AUTO: 0.1 10E9/L (ref 0–0.7)
EOSINOPHIL NFR BLD AUTO: 0.8 %
ERYTHROCYTE [DISTWIDTH] IN BLOOD BY AUTOMATED COUNT: 17.5 % (ref 10–15)
FERRITIN SERPL-MCNC: 8 NG/ML (ref 26–388)
GFR SERPL CREATININE-BSD FRML MDRD: >90 ML/MIN/{1.73_M2}
GLUCOSE SERPL-MCNC: 109 MG/DL (ref 70–99)
HCT VFR BLD AUTO: 14.3 % (ref 40–53)
HGB BLD-MCNC: 4.1 G/DL (ref 13.3–17.7)
HGB BLD-MCNC: 5.1 G/DL (ref 13.3–17.7)
HYPOCHROMIA BLD QL: PRESENT
IMM GRANULOCYTES # BLD: 0.1 10E9/L (ref 0–0.4)
IMM GRANULOCYTES NFR BLD: 0.9 %
IRON SATN MFR SERPL: 48 % (ref 15–46)
IRON SERPL-MCNC: 113 UG/DL (ref 35–180)
LYMPHOCYTES # BLD AUTO: 1 10E9/L (ref 0.8–5.3)
LYMPHOCYTES NFR BLD AUTO: 11.9 %
MACROCYTES BLD QL SMEAR: PRESENT
MCH RBC QN AUTO: 26.6 PG (ref 26.5–33)
MCHC RBC AUTO-ENTMCNC: 28.7 G/DL (ref 31.5–36.5)
MCV RBC AUTO: 93 FL (ref 78–100)
MICROCYTES BLD QL SMEAR: PRESENT
MONOCYTES # BLD AUTO: 0.8 10E9/L (ref 0–1.3)
MONOCYTES NFR BLD AUTO: 9.3 %
NEUTROPHILS # BLD AUTO: 6.6 10E9/L (ref 1.6–8.3)
NEUTROPHILS NFR BLD AUTO: 75.8 %
NRBC # BLD AUTO: 0.1 10*3/UL
NRBC BLD AUTO-RTO: 1 /100
PLATELET # BLD AUTO: 428 10E9/L (ref 150–450)
PLATELET # BLD EST: ABNORMAL 10*3/UL
POLYCHROMASIA BLD QL SMEAR: SLIGHT
POTASSIUM SERPL-SCNC: 3.7 MMOL/L (ref 3.4–5.3)
RBC # BLD AUTO: 1.54 10E12/L (ref 4.4–5.9)
SODIUM SERPL-SCNC: 144 MMOL/L (ref 133–144)
TIBC SERPL-MCNC: 237 UG/DL (ref 240–430)
TRANSFUSION STATUS PATIENT QL: NORMAL
TROPONIN I SERPL-MCNC: 0.02 UG/L (ref 0–0.04)
WBC # BLD AUTO: 8.6 10E9/L (ref 4–11)

## 2019-02-28 PROCEDURE — 25800030 ZZH RX IP 258 OP 636: Performed by: EMERGENCY MEDICINE

## 2019-02-28 PROCEDURE — 96374 THER/PROPH/DIAG INJ IV PUSH: CPT

## 2019-02-28 PROCEDURE — G0378 HOSPITAL OBSERVATION PER HR: HCPCS

## 2019-02-28 PROCEDURE — 80048 BASIC METABOLIC PNL TOTAL CA: CPT | Performed by: EMERGENCY MEDICINE

## 2019-02-28 PROCEDURE — 86923 COMPATIBILITY TEST ELECTRIC: CPT | Performed by: EMERGENCY MEDICINE

## 2019-02-28 PROCEDURE — C9113 INJ PANTOPRAZOLE SODIUM, VIA: HCPCS | Performed by: EMERGENCY MEDICINE

## 2019-02-28 PROCEDURE — 83550 IRON BINDING TEST: CPT | Performed by: EMERGENCY MEDICINE

## 2019-02-28 PROCEDURE — 82728 ASSAY OF FERRITIN: CPT | Performed by: EMERGENCY MEDICINE

## 2019-02-28 PROCEDURE — 99285 EMERGENCY DEPT VISIT HI MDM: CPT | Mod: 25

## 2019-02-28 PROCEDURE — 86901 BLOOD TYPING SEROLOGIC RH(D): CPT | Performed by: EMERGENCY MEDICINE

## 2019-02-28 PROCEDURE — 86900 BLOOD TYPING SEROLOGIC ABO: CPT | Performed by: EMERGENCY MEDICINE

## 2019-02-28 PROCEDURE — 85018 HEMOGLOBIN: CPT | Performed by: INTERNAL MEDICINE

## 2019-02-28 PROCEDURE — 99220 ZZC INITIAL OBSERVATION CARE,LEVL III: CPT | Performed by: INTERNAL MEDICINE

## 2019-02-28 PROCEDURE — 85025 COMPLETE CBC W/AUTO DIFF WBC: CPT | Performed by: EMERGENCY MEDICINE

## 2019-02-28 PROCEDURE — 86850 RBC ANTIBODY SCREEN: CPT | Performed by: EMERGENCY MEDICINE

## 2019-02-28 PROCEDURE — 25000128 H RX IP 250 OP 636: Performed by: EMERGENCY MEDICINE

## 2019-02-28 PROCEDURE — 36415 COLL VENOUS BLD VENIPUNCTURE: CPT | Performed by: INTERNAL MEDICINE

## 2019-02-28 PROCEDURE — 93005 ELECTROCARDIOGRAM TRACING: CPT

## 2019-02-28 PROCEDURE — 84484 ASSAY OF TROPONIN QUANT: CPT | Performed by: EMERGENCY MEDICINE

## 2019-02-28 PROCEDURE — P9016 RBC LEUKOCYTES REDUCED: HCPCS | Performed by: EMERGENCY MEDICINE

## 2019-02-28 PROCEDURE — 36430 TRANSFUSION BLD/BLD COMPNT: CPT

## 2019-02-28 PROCEDURE — 83540 ASSAY OF IRON: CPT | Performed by: EMERGENCY MEDICINE

## 2019-02-28 RX ORDER — NALOXONE HYDROCHLORIDE 0.4 MG/ML
.1-.4 INJECTION, SOLUTION INTRAMUSCULAR; INTRAVENOUS; SUBCUTANEOUS
Status: DISCONTINUED | OUTPATIENT
Start: 2019-02-28 | End: 2019-03-01 | Stop reason: HOSPADM

## 2019-02-28 RX ORDER — ONDANSETRON 4 MG/1
4 TABLET, ORALLY DISINTEGRATING ORAL EVERY 6 HOURS PRN
Status: DISCONTINUED | OUTPATIENT
Start: 2019-02-28 | End: 2019-03-01 | Stop reason: HOSPADM

## 2019-02-28 RX ORDER — ONDANSETRON 2 MG/ML
4 INJECTION INTRAMUSCULAR; INTRAVENOUS EVERY 6 HOURS PRN
Status: DISCONTINUED | OUTPATIENT
Start: 2019-02-28 | End: 2019-03-01 | Stop reason: HOSPADM

## 2019-02-28 RX ORDER — ACETAMINOPHEN 325 MG/1
650 TABLET ORAL EVERY 4 HOURS PRN
Status: DISCONTINUED | OUTPATIENT
Start: 2019-02-28 | End: 2019-03-01 | Stop reason: HOSPADM

## 2019-02-28 RX ADMIN — SODIUM CHLORIDE 80 MG: 9 INJECTION, SOLUTION INTRAVENOUS at 19:03

## 2019-02-28 ASSESSMENT — ENCOUNTER SYMPTOMS
DYSURIA: 0
HEADACHES: 0
FATIGUE: 1
NAUSEA: 0
RHINORRHEA: 0
SHORTNESS OF BREATH: 0
ABDOMINAL PAIN: 0
HEMATURIA: 0
DIARRHEA: 0
VOMITING: 0
BLOOD IN STOOL: 0

## 2019-02-28 ASSESSMENT — MIFFLIN-ST. JEOR: SCORE: 1300.79

## 2019-02-28 NOTE — ED PROVIDER NOTES
History     Chief Complaint:  Fatigue      HPI     His sister was used obtain the below history as well as to explain diagnosis, plan of treatment, reasons to return to the emergency department and appropriate follow up.    Luz Marina Diallo is a 53 year old male with history of anemia and mental retardation who presents with fatigue. Per sister, the patient has been complaining of feeling tired and generalized weakness for the past four days. This has been persistent until today so he was taken to urgent care where his hemoglobin was 4.1 so he was sent to this ED for further evaluation and potential blood transfusion. The patient notes he has been having light yellow stools which is unusual for him. Of note, the patient has not been taken iron medication because the sister states that he was put on this for short term use. He denies chest pain, shortness of breath, headache, abdominal pain, nausea, vomiting, diarrhea, black or bloody stools, dysuria, or hematuria. No recent fall or traumas. Of note, the patient has not had blood transfusion in the past.    Laboratories for urgent care 2/28/2018  Influenza A/B: Negative/Negative  Hemoglobin: 4.1(LL)  Stool Occult: Positive (A)    Allergies:  NKDA     Medications:    Iron     Past Medical History:    Mental Retardation  Anemia    Past Surgical History:    History reviewed. No pertinent past surgical history.    Family History:    Diabetes    Social History:  Marital Status:  Single [1]  Negative for tobacco use.  Alcohol use.      Review of Systems   Constitutional: Positive for fatigue.   HENT: Negative for congestion and rhinorrhea.    Respiratory: Negative for shortness of breath.    Cardiovascular: Negative for chest pain.   Gastrointestinal: Negative for abdominal pain, blood in stool, diarrhea, nausea and vomiting.   Genitourinary: Negative for dysuria and hematuria.   Neurological: Negative for headaches.   All other systems reviewed and are  "negative.      Physical Exam     Patient Vitals for the past 24 hrs:   BP Temp Temp src Pulse Heart Rate Resp SpO2 Height Weight   02/28/19 2331 94/58 98.2  F (36.8  C) Oral 61 -- 16 99 % -- --   02/28/19 2219 91/52 98.2  F (36.8  C) Oral 66 -- 20 99 % -- --   02/28/19 2142 96/54 99.2  F (37.3  C) Oral 71 -- 20 99 % -- --   02/28/19 2112 99/55 97.8  F (36.6  C) Oral 67 -- 18 100 % -- --   02/28/19 2054 98/54 99.2  F (37.3  C) Oral 68 -- 16 100 % -- --   02/28/19 1857 98/51 96.1  F (35.6  C) Axillary -- 81 22 98 % -- --   02/28/19 1839 99/58 95.9  F (35.5  C) Axillary -- 73 24 98 % 1.626 m (5' 4\") 53.5 kg (117 lb 14.4 oz)   02/28/19 1800 107/68 -- -- 77 84 26 100 % -- --   02/28/19 1750 -- -- -- -- 88 18 100 % -- --   02/28/19 1740 103/55 -- -- 78 76 21 100 % -- --   02/28/19 1735 114/56 99  F (37.2  C) -- -- -- -- -- -- --   02/28/19 1715 114/56 99.5  F (37.5  C) -- 90 -- 23 -- -- --   02/28/19 1610 123/62 98.3  F (36.8  C) Oral 75 -- -- 100 % -- --           Physical Exam  General: Alert, no acute distress; nontoxic appearing  Neuro:  PERRL.  EOMI.  no focal deficits  HEENT:  Moist mucous membranes.  Posterior oropharynx clear, no exudates.  Conjunctiva pale.   CV:  RRR, no m/r/g, skin warm and well perfused  Pulm:  CTAB, no wheezes/ronchi/rales.  No acute distress, breathing comfortably  GI:  Soft, nontender, nondistended.  No rebound or guarding.  Normal bowel sounds  MSK:  Moving all extremities.  No focal areas of edema, erythema, or tenderness  Skin:  WWP, no rashes, no lower extremity edema, skin color pale, no diaphoresis  Psych:  Well-appearing, normal affect, regular speech      Emergency Department Course   ECG:  Indication: Fatigue  Time: 1636  Vent. Rate 76 bpm. VT interval 150. QRS duration 94. QT/QTc 368/414. P-R-T axis 46 20 51.  Normal sinus rhythm. Normal ECG. Read time: 1640    Laboratory:  CBC: WBC: 8.6, HGB: 4.1(LL), PLT: 428  BMP: Glucose 109(H), Chloride 111(H), Calcium 7.5(L)o/w WNL " (Creatinine: 0.90)    1627 Troponin: 0.016    ABO/Rh Type: O Positive    Interventions:    1737 Protonix 80 mg IV    Please see MAR for full list of medications administered in the ED.    Emergency Department Course:  Nursing notes and vitals reviewed. (1609) I performed an exam of the patient as documented above.     IV inserted. Medicine administered as documented above. Blood drawn. This was sent to the lab for further testing, results above.    EKG was done, interpretation as above.    (1715) I rechecked the patient and discussed the results of his workup thus far.     (1722)  I consulted with Dr. Williamson of the hospitalist services. They are in agreement to accept the patient for admission.    Findings and plan explained to the Patient and sister who consents to admission. Discussed the patient with Dr. Williamson, who will admit the patient to a OBS bed for further monitoring, evaluation, and treatment.      Impression & Plan    Medical Decision Making:  Luz Marina Diallo is a 53 year old male who has history of mild MR and iron deficiency anemia present with sister who is helping provide history for evalaution of one week of fatigue and shortness of breath with exeriton. Was seen at urgent care and transferred to ED for evaluation fo hemoglobin of 4. The patient denies pain or fever. His physical exam was unremarkable expect for pale mucous membranes. He did have a positive stool occult test at urgent care. He is otherwise hemodynamically stable. Repeat blood work confirms hemoglobin of 4. The rest of the labs were unremarkable. EKG shows no signs of ischemia, troponin is detectable but within normal limits. The patient otherwise denies chest pain. After discussion of risk and benefits the sister consented to blood transfusion. We will start with one unit PRBC here. Will bring the patient for observation for evaluation of this severe anemia with positive stool occult. He did get one dose of IV Protonix  given positive stool occult for possibility of upper GI bleed. The patient admitted to medicine in stable condition.       Diagnosis:    ICD-10-CM    1. Symptomatic anemia D64.9 ABO/Rh type and screen     Blood component     Blood component     Hemoglobin     Iron and iron binding capacity     Iron and iron binding capacity     Ferritin     Ferritin     Blood component     Blood component     CANCELED: Iron and iron binding capacity     CANCELED: Ferritin   2. 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 Stool guaiac positive R19.5        Disposition:  Admitted to Dr. Williamson    Scribe Disclosure:  I, Baylee Brown, am serving as a scribe on 2/28/2019 at 4:09 PM to personally document services performed by Cecil Colón MD based on my observations and the provider's statements to me.       Baylee Brown  2/28/2019   [facility] EMERGENCY DEPARTMENT       Cecil Colón MD  03/01/19 0030

## 2019-02-28 NOTE — ED TRIAGE NOTES
Pt ambulatory with sister from urgent care, c/o fatigue for the last few days. Was sent here with HgB result of 4.1. Pt denies bleeding issues, states his stool is white/yellow color. Pt denies any pain. Has Iron prescribed but denies any significant medical history.    Sister at bedside, interpreting per pt request. Pt speaks Laotian. Sister states he has been confused since the fatigue started.

## 2019-02-28 NOTE — PHARMACY-ADMISSION MEDICATION HISTORY
Medication Reconciliation is completed.  Patient is currently not taking any medications prior to this admission per sister.                   February 28, 2019                    Shyam Wetzel

## 2019-03-01 VITALS
TEMPERATURE: 98.6 F | DIASTOLIC BLOOD PRESSURE: 62 MMHG | SYSTOLIC BLOOD PRESSURE: 116 MMHG | HEART RATE: 64 BPM | HEIGHT: 64 IN | OXYGEN SATURATION: 100 % | BODY MASS INDEX: 20.13 KG/M2 | WEIGHT: 117.9 LBS | RESPIRATION RATE: 16 BRPM

## 2019-03-01 LAB
ABO + RH BLD: NORMAL
ABO + RH BLD: NORMAL
ANION GAP SERPL CALCULATED.3IONS-SCNC: 3 MMOL/L (ref 3–14)
BLD GP AB SCN SERPL QL: NORMAL
BLD PROD TYP BPU: NORMAL
BLD PROD TYP BPU: NORMAL
BLD UNIT ID BPU: 0
BLOOD BANK CMNT PATIENT-IMP: NORMAL
BLOOD PRODUCT CODE: NORMAL
BPU ID: NORMAL
BUN SERPL-MCNC: 16 MG/DL (ref 7–30)
CALCIUM SERPL-MCNC: 7.4 MG/DL (ref 8.5–10.1)
CHLORIDE SERPL-SCNC: 111 MMOL/L (ref 94–109)
CO2 SERPL-SCNC: 29 MMOL/L (ref 20–32)
CREAT SERPL-MCNC: 0.93 MG/DL (ref 0.66–1.25)
ERYTHROCYTE [DISTWIDTH] IN BLOOD BY AUTOMATED COUNT: 16.1 % (ref 10–15)
GFR SERPL CREATININE-BSD FRML MDRD: >90 ML/MIN/{1.73_M2}
GLUCOSE SERPL-MCNC: 85 MG/DL (ref 70–99)
HCT VFR BLD AUTO: 24.3 % (ref 40–53)
HGB BLD-MCNC: 6.1 G/DL (ref 13.3–17.7)
HGB BLD-MCNC: 7.6 G/DL (ref 13.3–17.7)
INTERPRETATION ECG - MUSE: NORMAL
MCH RBC QN AUTO: 28.7 PG (ref 26.5–33)
MCHC RBC AUTO-ENTMCNC: 31.3 G/DL (ref 31.5–36.5)
MCV RBC AUTO: 92 FL (ref 78–100)
NUM BPU REQUESTED: 3
PLATELET # BLD AUTO: 320 10E9/L (ref 150–450)
POTASSIUM SERPL-SCNC: 3.9 MMOL/L (ref 3.4–5.3)
RBC # BLD AUTO: 2.65 10E12/L (ref 4.4–5.9)
SODIUM SERPL-SCNC: 143 MMOL/L (ref 133–144)
SPECIMEN EXP DATE BLD: NORMAL
TRANSFUSION STATUS PATIENT QL: NORMAL
TRANSFUSION STATUS PATIENT QL: NORMAL
UPPER GI ENDOSCOPY: NORMAL
WBC # BLD AUTO: 7 10E9/L (ref 4–11)

## 2019-03-01 PROCEDURE — 25000132 ZZH RX MED GY IP 250 OP 250 PS 637: Performed by: PHYSICIAN ASSISTANT

## 2019-03-01 PROCEDURE — 85018 HEMOGLOBIN: CPT | Performed by: INTERNAL MEDICINE

## 2019-03-01 PROCEDURE — P9040 RBC LEUKOREDUCED IRRADIATED: HCPCS | Performed by: EMERGENCY MEDICINE

## 2019-03-01 PROCEDURE — 40000104 ZZH STATISTIC MODERATE SEDATION < 10 MIN: Performed by: INTERNAL MEDICINE

## 2019-03-01 PROCEDURE — C9113 INJ PANTOPRAZOLE SODIUM, VIA: HCPCS | Performed by: INTERNAL MEDICINE

## 2019-03-01 PROCEDURE — 25000125 ZZHC RX 250: Performed by: INTERNAL MEDICINE

## 2019-03-01 PROCEDURE — 96376 TX/PRO/DX INJ SAME DRUG ADON: CPT | Mod: 59

## 2019-03-01 PROCEDURE — G0378 HOSPITAL OBSERVATION PER HR: HCPCS

## 2019-03-01 PROCEDURE — 25000128 H RX IP 250 OP 636: Performed by: INTERNAL MEDICINE

## 2019-03-01 PROCEDURE — 88305 TISSUE EXAM BY PATHOLOGIST: CPT | Mod: 26 | Performed by: INTERNAL MEDICINE

## 2019-03-01 PROCEDURE — 36415 COLL VENOUS BLD VENIPUNCTURE: CPT | Performed by: INTERNAL MEDICINE

## 2019-03-01 PROCEDURE — 99217 ZZC OBSERVATION CARE DISCHARGE: CPT | Performed by: PHYSICIAN ASSISTANT

## 2019-03-01 PROCEDURE — 43239 EGD BIOPSY SINGLE/MULTIPLE: CPT | Performed by: INTERNAL MEDICINE

## 2019-03-01 PROCEDURE — 36430 TRANSFUSION BLD/BLD COMPNT: CPT

## 2019-03-01 PROCEDURE — 88305 TISSUE EXAM BY PATHOLOGIST: CPT | Performed by: INTERNAL MEDICINE

## 2019-03-01 PROCEDURE — 85027 COMPLETE CBC AUTOMATED: CPT | Performed by: INTERNAL MEDICINE

## 2019-03-01 PROCEDURE — G0500 MOD SEDAT ENDO SERVICE >5YRS: HCPCS | Performed by: INTERNAL MEDICINE

## 2019-03-01 PROCEDURE — 80048 BASIC METABOLIC PNL TOTAL CA: CPT | Performed by: INTERNAL MEDICINE

## 2019-03-01 RX ORDER — LIDOCAINE 40 MG/G
CREAM TOPICAL
Status: DISCONTINUED | OUTPATIENT
Start: 2019-03-01 | End: 2019-03-01 | Stop reason: HOSPADM

## 2019-03-01 RX ORDER — NALOXONE HYDROCHLORIDE 0.4 MG/ML
.1-.4 INJECTION, SOLUTION INTRAMUSCULAR; INTRAVENOUS; SUBCUTANEOUS
Status: DISCONTINUED | OUTPATIENT
Start: 2019-03-01 | End: 2019-03-01 | Stop reason: HOSPADM

## 2019-03-01 RX ORDER — PANTOPRAZOLE SODIUM 40 MG/1
40 TABLET, DELAYED RELEASE ORAL
Status: DISCONTINUED | OUTPATIENT
Start: 2019-03-01 | End: 2019-03-01 | Stop reason: HOSPADM

## 2019-03-01 RX ORDER — FERROUS SULFATE 325(65) MG
325 TABLET ORAL
Qty: 30 TABLET | Refills: 0 | Status: SHIPPED | OUTPATIENT
Start: 2019-03-01

## 2019-03-01 RX ORDER — FLUMAZENIL 0.1 MG/ML
0.2 INJECTION, SOLUTION INTRAVENOUS
Status: DISCONTINUED | OUTPATIENT
Start: 2019-03-01 | End: 2019-03-01 | Stop reason: HOSPADM

## 2019-03-01 RX ORDER — PANTOPRAZOLE SODIUM 40 MG/1
40 TABLET, DELAYED RELEASE ORAL
Qty: 60 TABLET | Refills: 0 | Status: ON HOLD | OUTPATIENT
Start: 2019-03-01 | End: 2020-01-01

## 2019-03-01 RX ORDER — FENTANYL CITRATE 50 UG/ML
INJECTION, SOLUTION INTRAMUSCULAR; INTRAVENOUS PRN
Status: DISCONTINUED | OUTPATIENT
Start: 2019-03-01 | End: 2019-03-01 | Stop reason: HOSPADM

## 2019-03-01 RX ADMIN — PANTOPRAZOLE SODIUM 40 MG: 40 INJECTION, POWDER, FOR SOLUTION INTRAVENOUS at 08:04

## 2019-03-01 RX ADMIN — PANTOPRAZOLE SODIUM 40 MG: 40 TABLET, DELAYED RELEASE ORAL at 16:41

## 2019-03-01 NOTE — PLAN OF CARE
PRIMARY DIAGNOSIS: GI BLEED    OUTPATIENT/OBSERVATION GOALS TO BE MET BEFORE DISCHARGE  Orthostatic performed: No    Stable Hgb Yes.   Recent Labs   Lab Test 03/01/19  0615 03/01/19  0109 02/28/19 2006   HGB 7.6* 6.1* 5.1*       Appropriate testing complete: Yes    Cleared for discharge by consultants (if involved): Yes    Safe discharge environment identified: Yes    Temp: 97.7  F (36.5  C) Temp src: Oral BP: 114/69 Pulse: 64 Heart Rate: 61 Resp: 14 SpO2: 98 % O2 Device: None (Room air)     Patient was in endoscopy from approximately 11:30 AM to 14:00 today. Upon return to unit, vitals are stable. Denies abdominal pain or nausea. Tolerated clear liquids and sister is assisting him with ordering a regular diet for dinner. Laotian  present until 16:00. Patient may discharge this evening if he tolerates regular diet and does not have GI bleed symptoms.       Discharge Planner Nurse   Safe discharge environment identified: Yes  Barriers to discharge: No       Entered by: Arianna Prajapati 03/01/2019 2:03 PM     Please review provider order for any additional goals.   Nurse to notify provider when observation goals have been met and patient is ready for discharge.

## 2019-03-01 NOTE — PLAN OF CARE
PRIMARY DIAGNOSIS: GI BLEED    OUTPATIENT/OBSERVATION GOALS TO BE MET BEFORE DISCHARGE  Orthostatic performed: No    Stable Hgb: No  Recent Labs   Lab Test 03/01/19  0109 02/28/19 2006 02/28/19  1627   HGB 6.1* 5.1* 4.1*       Resolved or declined bleeding episodes: None observed    Appropriate testing complete: No    Cleared for discharge by consultants (if involved): No    Safe discharge environment identified: Yes    Pt alert, calm, cooperative. Loatian speaking. Sister at bedside interpreting. Per sister, pt denies pain or other complaints; reports to her that he felt improved when he was up to the bathroom last. 0100 hgb 6.1, MD notified, orders received to transfuse an additional unit of blood. NPO for GI consult in AM. Will continue to monitor.    Discharge Planner Nurse   Safe discharge environment identified: Yes  Barriers to discharge: Yes       Entered by: Kristel Dueñas 03/01/2019      Please review provider order for any additional goals.   Nurse to notify provider when observation goals have been met and patient is ready for discharge.

## 2019-03-01 NOTE — PROGRESS NOTES
Call received from lab of critical lab result. Hgb 5.1. MD paged with results. Will continue to monitor

## 2019-03-01 NOTE — DISCHARGE SUMMARY
"[facility]  Hospitalist Discharge Summary       Date of Admission:  2/28/2019  Date of Discharge:  3/1/2019  Discharging Provider: Meche Solano PA-C      Discharge Diagnoses   Esophagitis  Gastric ulcers (nonbleeding)  Duodenal ulcers (nonbleeding)  Large hiatal hernia    Follow-ups Needed After Discharge   Follow-up Appointments     Follow-up and recommended labs and tests       {[facility] will help arrange a repeat EGD in 3 weeks to   re-evaluate after being treated with medication. Make an appointment to   see your primary care doctor within 1 week for hospital follow up.             Unresulted Labs Ordered in the Past 30 Days of this Admission     Date and Time Order Name Status Description    3/1/2019 1250 Surgical pathology exam In process       These results will be followed up by [facility] team.    Discharge Disposition   Discharged to home  Condition at discharge: Stable    Hospital Course   Luz Marina Diallo is a 53 year old male originally from [facility] who immigrated in 1989 who has a PMH including mental retardation and iron deficiency anemia who presented initially to urgent care for fatigue and was found to have a hemoglobin of 4.1 and a positive Hemoccult stool test. He was  then referred to the emergency department.  He has a rather poor memory at baseline. He first said that he felt \"unwell and tired\" 4 days ago.  His family noticed that his skin color seem to be lighter over the past 1 week.  Other than feeling fatigued he did not freely provide any other symptoms.  When asked specifically about lightheadedness, dizziness, chest pain, shortness of breath, hematochezia, melena, abdominal pain, hematuria he denies all of these.  He does not take any medications that includes anything over-the-counter including NSAIDs.  He did have a history of iron deficiency anemia in the past and he was on iron pills for 2 months, but no longer takes these. No prior endoscopy or " colonoscopy.      In the ER, VSS. CBC confirmed hgb of 4.1. BMP unremarkable. Troponin 0.016. ECG showed NSR without ischemic changes. He was started on IV Protonix and 1 unit PRBCs and registered to observation with suspicion for slow GI bleed. GI was consulted and recommended EGD. EGD revealed esophagitis, non-bleeding gastric ulcers with no stigmata of bleeding, multiple non-bleeding duodenal ulcers with no stigmata of bleeding, and a large hiatal hernia. With no prior history of NSAID or ASA use, and no reported melena or hematochezia, gastric and duodenal tissue biopsies were taken to rule out H pylori. GI recommends BID PPI therapy and advancing diet, then ok to discharge if that went well with no evidence of bleeding. GI will arrange repeat outpatient EGD in 3 months for healing of the ulcers. [facility] will contact patient if biopsies return positive for H pylori infection.    Consultations This Hospital Stay   GASTROENTEROLOGY IP CONSULT    Code Status   Full Code    Time Spent on this Encounter   I, Meche Solano, personally saw the patient today and spent greater than 30 minutes discharging this patient.       Meche Solano PA-C  [facility]  ______________________________________________________________________    Physical Exam   Vital Signs: Temp: 97.7  F (36.5  C) Temp src: Oral BP: 114/69 Pulse: 64 Heart Rate: 61 Resp: 14 SpO2: 98 % O2 Device: None (Room air) Oxygen Delivery: 3 LPM  Weight: 117 lbs 14.4 oz     General: alert, oriented, NAD  SKIN: no suspicious lesions, rashes, jaundice, or spider angiomas  HEAD: Normocephalic. No masses, lesions, tenderness or abnormalities  NECK: Neck supple. No adenopathy. Thyroid symmetric, normal size.  EYES: No scleral icterus  ENT: ENT exam normal, no neck nodes or sinus tenderness  RESPIRATORY: negative, Good diaphragmatic excursion. Lungs clear  CARDIOVASCULAR: negative, PMI normal. No lifts, heaves, or thrills. RRR. No murmurs, clicks gallops or  rub  GASTROINTESTINAL: +BS, soft, NT, ND, no HSM, no masses/guarding/rebound  JOINT/EXTREMITIES: extremities normal- no gross deformities noted, gait normal and normal muscle tone  NEURO: Reflexes grossly normal and symmetric. Sensation grossly WNL.  PSYCH: no abnormal anxiety/depression  LYMPH: No anterior cervical, posterior cervical, or supraclavicular adenopathy      Primary Care Physician   [facility]    Immunizations       Discharge Orders      Reason for your hospital stay    You were evaluated in the hospital for generalized weakness, feeling lightheaded, and shortness of breath. Your hemoglobin was quite low at 4.1. You were transfused with 3 packed red blood cell units and your hemoglobin improved to 7.1. Gastroenterology was consulted and did an EGD (scope camera to visualized the esophagus, stomach, and first part of the small intestine). The EGD showed esophagitis (inflammation of the esophagus) as well as multiple stomach and duodenal ulcers, which is likely the cause of the low hemoglobin. It was recommended that you start on a medication to help prevent excessive acidity in the stomach (Protonix/pantoprazole) to take morning and evening daily for the next 3 weeks.  We will also start you on an iron supplement to take daily. [facility] will help arrange a repeat EGD in 3 weeks to re-evaluate after being treated with medication. Make an appointment to see your primary care doctor within 1 week for hospital follow up.     Follow-up and recommended labs and tests     {[facility] will help arrange a repeat EGD in 3 weeks to re-evaluate after being treated with medication. Make an appointment to see your primary care doctor within 1 week for hospital follow up.     Activity    Your activity upon discharge: activity as tolerated     When to contact your care team    Call your primary doctor if you have any of the following: temperature greater than 101 or  increased pain. You should be re-evaluated in the ER if you develop black tarry stools, bright red blood per rectum, bloody vomit, fainting, or return of your lightheadedness/weakness/fatigue/shortness of breath.     Diet    Follow this diet upon discharge: Orders Placed This Encounter      Regular Diet Adult       Significant Results and Procedures   Results for orders placed or performed during the hospital encounter of 02/28/19   UPPER GI ENDOSCOPY   Result Value Ref Range    Upper GI Endoscopy       [facility]  _______________________________________________________________________________  Patient Name: Luz Marina Diallo Procedure Date: 3/1/2019 12:09 PM  MRN: [id number]                       Account Number: [id number]  YOB: 1965              Admit Type: Inpatient  Age: 53                               Gender: Male  Attending MD: Susy Camara MD    Total Sedation Time: 13 minutes  Instrument Name: 209 - Gastroscope      _______________________________________________________________________________     Procedure:                Upper GI endoscopy  Indications:              Iron deficiency anemia  Providers:                Susy Camara MD (Doctor)  Referring MD:               Medicines:                Midazolam 2.5 mg IV, Fentanyl 100 micrograms IV  Complications:            No immediate complications.  _______________________________________________________________________________  Procedure:                Pre-Anesthesia Assessment:                             - Prior to the procedure, a History and Physical                             was performed, and patient medications and                             allergies were reviewed. The patient is competent.                             The risks and benefits of the procedure and the                             sedation options and risks were discussed with the                             patient. All questions were  answered and informed                             consent was obtained. Patient identification and                             proposed procedure were verified by the physician                             and the nurse in the procedure room. Mental Status                             Examination: alert and oriented. Airway                             Examination: normal oropharyngeal airway and neck                             mobility. Respiratory Examination: clear to                             auscultation. CV Examination: normal. Prophylactic                              Antibiotics: The patient does not require                             prophylactic antibiotics. Prior Anticoagulants: The                             patient has taken no previous anticoagulant or                             antiplatelet agents. ASA Grade Assessment: I - A                             normal, healthy patient. After reviewing the risks                             and benefits, the patient was deemed in                             satisfactory condition to undergo the procedure.                             The anesthesia plan was to use moderate sedation /                             analgesia (conscious sedation). Immediately prior                             to administration of medications, the patient was                             re-assessed for adequacy to receive sedatives. The                             heart rate, respiratory rate, oxygen saturations,                             blood pressure, adequacy of pulmonary ventilation,                              and response to care were monitored throughout the                             procedure. The physical status of the patient was                             re-assessed after the procedure.                            After obtaining informed consent, the endoscope was                             passed under direct vision. Throughout the                              procedure, the patient's blood pressure, pulse, and                             oxygen saturations were monitored continuously. The                             Olympus Gastroscope, Model # GIF-H190, [place]ora #                             209, SN # [id number] was introduced through the mouth,                             and advanced to the second part of duodenum. The                             upper GI endoscopy was accomplished without                             difficulty. The patient tolerated the procedure                             well.                                                                                    Findings:       The Z-line was irregular and was found 35 cm from the incisors.       LA Grade A (one or more mucosal breaks less than 5 mm, not extending        between tops of 2 mucosal folds) esophagitis with no bleeding was found.       Few non-bleeding superficial gastric ulcers with no stigmata of bleeding        were found in the gastric antrum. The largest lesion was 3 mm in largest        dimension. Random gastric biopsies were taken with a cold forceps for        Helicobacter pylori testing. Large hiatal hernia noted (z-line 35 cm,        gastric folds 35 cm, hiatus 40 cm).       Two non-bleeding cratered duodenal ulcers with no stigmata of bleeding        were found in the first portion of the duodenum. The folds were very        edematous around these ulcers. There was minimal blood from rubbing of        the scope on the ulcers, but no active bleeding. Both ulcers were 10 mm        in largest dimension. Random duodenal biopsies taken, not of the ulcers.                                                                                    Impression:               - Z-line irregular, 35 cm from the incisors.                            - LA Grade A esophagitis.                            - Non-bleeding gastric ulcers with no stigmata of                             bleeding.                             - Multiple non-bleeding duodenal ulcers with no                             stigmata of bleeding.                            - Large hiatal hernia  Recommendation:           - Await pathology results. The patient has                             cognitive dysfunction and is not a good historian.                             His sister was present and she reports that he does                             not take any NSAIDs. She also said that he myers                             reports his stools to be light brown and no reports                             of red or black color to them, but she is not sure                             if she fully trusts his  report on this. I have                             asked her to look at his stools for the next week                             and if he has large volumes of black or red stools                             that he should come back to the ER right away.                             Otherwise we will treat him with PPI BID x 3                             months. I will repeat his EGD in 3 months for                             healing of the ulcers. I will also do a screening                             colonoscopy at the same time. I took biopsies to                             rule out H.PYlori and I will contact him if that                             comes back positive to put him on treatment. No                             NSAIDs or ASA. Can have general diet today. Signing                             off, but please call with quetions or concerns.                                                                                     Electronically signed by JESUS Camara M.D.  __________ _________  Susy Camara MD  3/1/2019 1:10:43 PM  I was physically present for the entire viewing portion of the exam.  __________________________Susy Camara MD  Number of Addenda: 0    Note Initiated On: 3/1/2019 12:09 PM  MRN:                      [id number]  Procedure  Date:           3/1/2019 12:09:52 PM  Total Procedure Duration: 0 hours 7 minutes 28 seconds   Estimated Blood Loss:       Scope In: 12:39:34 PM  Scope Out: 12:47:02 PM     CBC + differential   Result Value Ref Range    WBC 8.6 4.0 - 11.0 10e9/L    RBC Count 1.54 (L) 4.4 - 5.9 10e12/L    Hemoglobin 4.1 (LL) 13.3 - 17.7 g/dL    Hematocrit 14.3 (L) 40.0 - 53.0 %    MCV 93 78 - 100 fl    MCH 26.6 26.5 - 33.0 pg    MCHC 28.7 (L) 31.5 - 36.5 g/dL    RDW 17.5 (H) 10.0 - 15.0 %    Platelet Count 428 150 - 450 10e9/L    Diff Method Automated Method     % Neutrophils 75.8 %    % Lymphocytes 11.9 %    % Monocytes 9.3 %    % Eosinophils 0.8 %    % Basophils 0.3 %    % Immature Granulocytes 0.9 %    Nucleated RBCs 1 (H) 0 /100    Absolute Neutrophil 6.6 1.6 - 8.3 10e9/L    Absolute Lymphocytes 1.0 0.8 - 5.3 10e9/L    Absolute Monocytes 0.8 0.0 - 1.3 10e9/L    Absolute Eosinophils 0.1 0.0 - 0.7 10e9/L    Absolute Basophils 0.0 0.0 - 0.2 10e9/L    Abs Immature Granulocytes 0.1 0 - 0.4 10e9/L    Absolute Nucleated RBC 0.1     Anisocytosis Slight     Polychromasia Slight     Microcytes Present     Macrocytes Present     Hypochromasia Present     Platelet Estimate       Automated count confirmed.  Platelet morphology is normal.   Basic metabolic panel (BMP)   Result Value Ref Range    Sodium 144 133 - 144 mmol/L    Potassium 3.7 3.4 - 5.3 mmol/L    Chloride 111 (H) 94 - 109 mmol/L    Carbon Dioxide 28 20 - 32 mmol/L    Anion Gap 5 3 - 14 mmol/L    Glucose 109 (H) 70 - 99 mg/dL    Urea Nitrogen 22 7 - 30 mg/dL    Creatinine 0.90 0.66 - 1.25 mg/dL    GFR Estimate >90 >60 mL/min/[1.73_m2]    GFR Estimate If Black >90 >60 mL/min/[1.73_m2]    Calcium 7.5 (L) 8.5 - 10.1 mg/dL   Troponin I   Result Value Ref Range    Troponin I ES 0.016 0.000 - 0.045 ug/L   Hemoglobin   Result Value Ref Range    Hemoglobin 5.1 (LL) 13.3 - 17.7 g/dL   Iron and iron binding capacity   Result Value Ref Range    Iron 113 35 - 180 ug/dL    Iron Binding Cap 237  (L) 240 - 430 ug/dL    Iron Saturation Index 48 (H) 15 - 46 %   Ferritin   Result Value Ref Range    Ferritin 8 (L) 26 - 388 ng/mL   Hemoglobin   Result Value Ref Range    Hemoglobin 6.1 (LL) 13.3 - 17.7 g/dL   Basic metabolic panel   Result Value Ref Range    Sodium 143 133 - 144 mmol/L    Potassium 3.9 3.4 - 5.3 mmol/L    Chloride 111 (H) 94 - 109 mmol/L    Carbon Dioxide 29 20 - 32 mmol/L    Anion Gap 3 3 - 14 mmol/L    Glucose 85 70 - 99 mg/dL    Urea Nitrogen 16 7 - 30 mg/dL    Creatinine 0.93 0.66 - 1.25 mg/dL    GFR Estimate >90 >60 mL/min/[1.73_m2]    GFR Estimate If Black >90 >60 mL/min/[1.73_m2]    Calcium 7.4 (L) 8.5 - 10.1 mg/dL   CBC with platelets   Result Value Ref Range    WBC 7.0 4.0 - 11.0 10e9/L    RBC Count 2.65 (L) 4.4 - 5.9 10e12/L    Hemoglobin 7.6 (L) 13.3 - 17.7 g/dL    Hematocrit 24.3 (L) 40.0 - 53.0 %    MCV 92 78 - 100 fl    MCH 28.7 26.5 - 33.0 pg    MCHC 31.3 (L) 31.5 - 36.5 g/dL    RDW 16.1 (H) 10.0 - 15.0 %    Platelet Count 320 150 - 450 10e9/L   EKG 12 lead   Result Value Ref Range    Interpretation ECG Click View Image link to view waveform and result    Gastroenterology IP Consult: symptomatic anemia, positive hemoccult testing at urgent care.  possible slow GI bleed; Consultant may enter orders: Yes; Patient to be seen: Routine - within 24 hours; Requesting provider? Hospitalist (if different fr...    Susy Patel MD     3/1/2019  1:13 PM  GASTROENTEROLOGY CONSULTATION      [place]  [address] [address] [facility] [place] [facility] [address]  53 year [facility] male     Admission Date/Time: 2/28/2019  Primary Care Provider: [facility], [facility]     We were asked to see the patient in consultation by Dr. Su   for evaluation of anemia.    CC: weakness, shortness of breath     HPI:  Attapeu Souphanthavong is a 53 year old Laotian speaking   male presenting to the hospital with 4 days of worsening   weakness, fatigue, and pallor. Family has declined .    Sister is at bedside and provides translation. Reportedly patient   was in fairly good health up until 4 days ago when he noticeably   was having more difficulty with daily activities due to   significant weakness and shortness of breath. Denies abdominal   pain, nausea, vomiting, rectal bleeding, melena, hematemesis,   weight loss. Patient does not take any NSAIDs and is not on any   blood thinners. Denies smoking or alcohol use. No prior EGD or   colonoscopy. No known family history of any GI related disorders.    He came [place] the [place] years ago from [facility]. Notes   indicate patient has some baseline cognitive dysfunction reported   by the family/poor memory. However, sister reports to me today   that he has only been this way over the last 4 days.     Initially went to Urgent Care where HGB noted to be low at 4.1   and Hemoccult stool testing was positive. He was directed to the   hospital where HGB was 4.1 with normal MCV, platelets, and WBC.   Iron studies showed low ferritin although normal serum iron and   elevated iron sat 48%, and low TIBC 237. Labs from 8/2017 showed   iron sat 4% with serum iron 12. Previously has been on iron   supplements but not taking for a number of months. He has   received 2 units of blood with improving HGB to 7.6. Vitals   stable. No overt active GI bleeding since admission.        PAST MEDICAL HISTORY:  Patient Active Problem List    Diagnosis Date Noted     Anemia 02/28/2019     Priority: Medium     Anemia, unspecified type 08/23/2017     Priority: Medium     Mental retardation      Priority: Medium          ROS: A comprehensive ten point review of systems was negative   aside from those in mentioned in the HPI.       MEDICATIONS:   No outpatient meds     ALLERGIES: No Known Allergies     SOCIAL HISTORY:  Social History     Tobacco Use     Smoking status: Never Smoker     Smokeless tobacco: Never Used   Substance Use Topics     Alcohol use: Yes     Alcohol/week:  "1.2 oz     Types: 1 Glasses of wine, 1 Cans of beer per week     Comment: once every month or two, social functions only     Drug use: No        FAMILY HISTORY:  Family History   Problem Relation Age of Onset     Diabetes Father      Glaucoma No family hx of      Macular Degeneration No family hx of         PHYSICAL EXAM:   BP 94/57 (BP Location: Right arm)   Pulse 60   Temp 97.8  F   (36.6  C) (Oral)   Resp 18   Ht 1.626 m (5' 4\")   Wt 53.5 kg   (117 lb 14.4 oz)   SpO2 99%   BMI 20.24 kg/m       PHYSICAL EXAM:  General: alert, oriented, NAD  SKIN: no suspicious lesions, rashes, jaundice, or spider angiomas  HEAD: Normocephalic. No masses, lesions, tenderness or   abnormalities  NECK: Neck supple. No adenopathy. Thyroid symmetric, normal size.  EYES: No scleral icterus  ENT: ENT exam normal, no neck nodes or sinus tenderness  RESPIRATORY: negative, Good diaphragmatic excursion. Lungs clear  CARDIOVASCULAR: negative, PMI normal. No lifts, heaves, or   thrills. RRR. No murmurs, clicks gallops or rub  GASTROINTESTINAL: +BS, soft, NT, ND, no HSM, no   masses/guarding/rebound  JOINT/EXTREMITIES: extremities normal- no gross deformities   noted, gait normal and normal muscle tone  NEURO: Reflexes grossly normal and symmetric. Sensation grossly   WNL.  PSYCH: no abnormal anxiety/depression  LYMPH: No anterior cervical, posterior cervical, or   supraclavicular adenopathy     LABS:  I reviewed the patient's new clinical lab test results.   Recent Labs   Lab Test 03/01/19 0615 03/01/19 0109 02/28/19 2006 02/28/19 1627 08/13/17 1933   WBC 7.0  --   --  8.6 8.2   HGB 7.6* 6.1* 5.1* 4.1* 9.2*   MCV 92  --   --  93 81     --   --  428 283     Recent Labs   Lab Test 03/01/19 0615 02/28/19 1627 08/13/17 1933    144 146*   POTASSIUM 3.9 3.7 3.7   CHLORIDE 111* 111* 111*   CO2 29 28 28   BUN 16 22 35*   ANIONGAP 3 5 7   IVANIA 7.4* 7.5* 8.4*     Recent Labs   Lab Test 08/13/17 2051   PROTEIN Negative      "   IMAGING  I personally reviewed the patient's new imaging results.  None     CONSULTATION ASSESSMENT AND PLAN:    53 year old Laotian speaking male presenting with weakness,   fatigue, and shortness of breath over the last 4 days. Labs   indicate severe anemia with low ferritin most consistent with   iron deficiency anemia.     1. Anemia, most consistent with iron deficiency. Suspect this is   a slow, chronic process given no active GI bleeding symptoms.   PUD/Hpylori ulcer disease likely possibility although patient   denying NSAID use and is not on any blood thinners. Cannot rule   out malignancy. No prior EGD or colonoscopy.   --EGD today. Working on arranging . Family ok'd for   procedure. Informed this may be difficult to find. Obs unit is   helping arrange Ipad  vs phone  available   in endoscopy. Will review with Dr. Camara.   --NPO for procedure.   --Monitor HGB and transfuse prn.   --Continue BID IV PPI.   --If EGD negative, likely set up outpatient colonoscopy as long   as HGB/Vitals remain stable.       Thank you for asking us to participate in the care of this   patient.    Yazmin Wilcox PA-C  [facility]    ---------------------  I agree with the assessment and plan of Yazmin Wilcox.  Patient   has cognitive delay and it is difficult to get a history from   him.  He denies melena, fresh red blood per rectum, nausea,   vomiting, abd pain.  Abd is benign on exam.  His hgb responded as   expected to 3 units of blood from 4 range to 7 range.  He reports   he feels better now that he received blood.  EGD today, further   recommendations to follow after EGD.    Priscilla Camara MD  [facility]     ABO/Rh type and screen   Result Value Ref Range    Units Ordered 3     ABO O     RH(D) Pos     Antibody Screen Neg     Test Valid Only At [facility]        Specimen Expires 03/03/2019     Crossmatch Red Blood Cells    Blood component   Result Value Ref  Range    Unit Number [id number]     Blood Component Type Red Blood Cells LeukoReduced (Part 2)     Division Number 00     Status of Unit Released to care unit     Blood Product Code [id number]     Unit Status ISS    Blood component   Result Value Ref Range    Unit Number [id number]     Blood Component Type Red Blood Cells Leukocyte Reduced     Division Number 00     Status of Unit Released to care unit     Blood Product Code [id number]     Unit Status ISS    Blood component   Result Value Ref Range    Unit Number [id number]     Blood Component Type Red Blood Cells LeukoReduced Irradiated     Division Number [id number]     Status of Unit Released to care unit 03/01/2019 0206     Blood Product Code [id number]     Unit Status ISS        Discharge Medications   Current Discharge Medication List      START taking these medications    Details   ferrous sulfate (FEROSUL) 325 (65 Fe) MG tablet Take 1 tablet (325 mg) by mouth daily (with breakfast)  Qty: 30 tablet, Refills: 0    Associated Diagnoses: Peptic ulcer disease      pantoprazole (PROTONIX) 40 MG EC tablet Take 1 tablet (40 mg) by mouth 2 times daily (before meals)  Qty: 60 tablet, Refills: 0    Associated Diagnoses: Peptic ulcer disease           Allergies   No Known Allergies

## 2019-03-01 NOTE — PLAN OF CARE
PRIMARY DIAGNOSIS: SYMPTOMATIC ANEMIA     OUTPATIENT/OBSERVATION GOALS TO BE MET BEFORE DISCHARGE  Orthostatic performed: nO     Stable Hgb Yes.   Recent Labs   Lab Test 03/01/19  0615 03/01/19  0109 02/28/19 2006   HGB 7.6* 6.1* 5.1*       Appropriate testing complete: No    Cleared for discharge by consultants (if involved): No    Safe discharge environment identified: Yes    Temp: 97.8  F (36.6  C) Temp src: Oral BP: 94/57 Pulse: 60 Heart Rate: 57 Resp: 18 SpO2: 99 % O2 Device: None (Room air)      Sister assisted with interpretation and assessment due to cognitive delay. Denies pain. Denies dizzy/lightheaded sensation. No appreciable pallor noted. Does not report visible blood in urine or stool when asked. IV Protonix given this AM. Is NPO at this time for GI consult. Possible EGD today.     Discharge Planner Nurse   Safe discharge environment identified: Yes  Barriers to discharge: No       Entered by: Arianna Prajapati 03/01/2019      Please review provider order for any additional goals.   Nurse to notify provider when observation goals have been met and patient is ready for discharge.

## 2019-03-01 NOTE — PROCEDURES
"PRE-PROCEDURE H&P    CHIEF COMPLAINT / REASON FOR PROCEDURE:  anemia    PERTINENT HISTORY :    Past Medical History:   Diagnosis Date     Mental retardation       Past Surgical History:   Procedure Laterality Date     ABDOMEN SURGERY      sister states he had an \"abdominal surgery 20 years ago\" unknown          Bleeding tendencies:  No    Relevant Family History:  NONE     Relevant Social History:  NONE      A relevant review of systems was performed and was negative      ALLERGIES/SENSITIVITIES: No Known Allergies    CURRENT MEDICATIONS:   No current outpatient medications on file.        PRE-SEDATION ASSESSMENT:    Lung Exam:  normal  Heart Exam:  normal  Airway Exam: normal  Previous reaction to anesthesia/sedation:   No  Sedation plan based on assessment: Moderate (conscious) sedation  ASA Classification:  1 - Healthy patient, no medical problems        IMPRESSION:  anemia    PLAN:  EGD    Susy Camara  [facility]  Office: [phone]  "

## 2019-03-01 NOTE — H&P
[facility]  Hospitalist Admission Note  Name: Luz Marina Diallo    MRN: [id number]  YOB: 1965    Age: 53 year old  Date of admission: 2/28/2019  Primary care provider: Rosie Segura    Chief Complaint: Fatigue    Assessment and Plan:   Symptomatic iron deficiency anemia, suspect slow GI bleed: Presenting with symptomatic anemia primarily fatigue and pallor.  Hemoglobin 4.1.  History of iron deficiency for which she took iron pills for 2 months in 2017 and then stopped.  Did not appear to have endoscopy or colonoscopy at that time to determine source.  Poor historian, but he denies any source of blood loss such as hematochezia or melena.  His fecal occult blood testing was positive at urgent care.  PUD certainly possible if he is ever been tested for H. pylori given he immigrated from [facility] although this is over 20 years ago.  Suspect more of a slow bleed he is vitally stable at this time.  Denies any NSAID use.  Rare alcohol use.  There is this history of a abdominal surgery 20 years summary [place], but family does not know what it was.  Certainly if there was any resection of small bowel this would be important to now.  They will work on getting more information on this.  -Add on TIBC and ferritin to confirm iron deficiency  -Receiving 1 unit RBCs now, repeat hemoglobin at 8 PM and then likely transfuse additional 1 unit  -Clear liquid diet and then n.p.o. at midnight.  GI consultation for EGD consideration  -Twice daily IV Protonix for now  -No NSAIDs  -Eventually needs colonoscopy as he is 53    Addendum: Ferritin is 8 consistent with iron deficiency anemia.  Repeat hemoglobin of 5.1.  Transfuse a second unit of RBCs now.  Repeat hemoglobin in 4 hours and in the morning    Mental retardation: He does communicate with family who he lives with.  He speaks minimal English.  Family informs that his recollection of symptoms may not be accurate as his memory is  "poor.          DVT Prophylaxis: Low Risk/Ambulatory with no VTE prophylaxis indicated  Code Status: Full Code  FEN: Clear liquid diet until midnight then n.p.o. for possible EGD  Discharge Dispo: Home with family  Estimated Disch Date / # of Days until Disch: Admit to observation for RBC transfusion and possible EGD tomorrow      History of Present Illness:  Luz Marina Diallo is a 53 year old male originally from [facility] who immigrated in 1989 who has a PMH including mental retardation and iron deficiency anemia who presented initially to urgent care for fatigue and was found to have a hemoglobin of 4.1 and a positive Hemoccult stool test who was then referred to the emergency department.  Patient's English is limited and his family member helps to interpret.  They informed that he has a rather poor memory at baseline so they are unclear what he saying is accurate or not.  He first said that he felt \"unwell and tired\" 4 days ago.  They noticed that his skin color seem to be lighter over the past 1 week.  Other than feeling fatigued he did not freely provide any other symptoms.  When asked specifically about lightheadedness, dizziness, chest pain, shortness of breath, hematochezia, melena, abdominal pain, hematuria he denies all of these.  He does not take any medications that includes anything over-the-counter including NSAIDs.  He did have a history of iron deficiency anemia in the past and he was on iron pills for 2 months, but no longer takes these.  Has not had endoscopy or colonoscopy.  His family member indicates that he did have an abdominal surgery approximately 20 years ago somewhere in [place] and they will find out more information regarding this and get back to us.    History obtained from patient, patient's family member, medical record, and from Dr. Colón in the emergency department.  Blood pressure is a little bit soft at 98/51 with a pulse of 77.  Initial temperature 99 down to 96.1.  He is not " "hypoxic on room air.  His CBC was repeated and he indeed has a hemoglobin of 4.1.  His BMP is unremarkable.  Troponin is 0.016.  EKG shows NSR without ischemic changes.  Suspect a slow GI bleed over time and that he is not having a rapid bleed at this time.  He did receive a dose of IV Protonix and he is receiving 1 unit RBCs transfusion.  As he does not seem to have a rapid bleed I will admit to the observation unit for serial hemoglobin and likely additional 1 unit RBC transfusion.  Will consult GI for likely EGD tomorrow.  Discussed need for procedure and n.p.o. status after midnight with the patient's family member who informed the patient and he was agreeable.     Past Medical History reviewed:  Mental retardation  Iron deficiency anemia    Past Surgical History reviewed:  Past Surgical History:   Procedure Laterality Date     ABDOMEN SURGERY      sister states he had an \"abdominal surgery 20 years ago\" unknown      Social History reviewed:  Rare alcohol  Never smoker  Lives with family    Family History reviewed:  Family History   Problem Relation Age of Onset     Diabetes Father      Glaucoma No family hx of      Macular Degeneration No family hx of      Allergies:  No Known Allergies  Medications:  Prior to Admission medications    Not on File     Review of Systems:  A Comprehensive greater than 10 system review of systems was carried out.  Pertinent positives and negatives are noted above.  Otherwise negative.     Physical Exam:  Blood pressure 98/51, pulse 77, temperature 96.1  F (35.6  C), temperature source Axillary, resp. rate 22, height 1.626 m (5' 4\"), weight 53.5 kg (117 lb 14.4 oz), SpO2 98 %.  Wt Readings from Last 1 Encounters:   02/28/19 53.5 kg (117 lb 14.4 oz)     Exam:  Constitutional: Awake, NAD  Eyes: sclera white   HEENT:   MMM  Respiratory:  lungs cta bilaterally, no crackles or wheeze  Cardiovascular: RRR.  No murmur   GI: non-tender to palpation in all quadrants, not distended, bowel " sounds present  Skin: Pale skin.  No rash  Musculoskeletal/extremities: atraumatic, no major deformities. No edema  Neurologic: Alert, follows commands  Psychiatric: calm, cooperative     Lab and imaging data personally reviewed:  Labs:  Recent Labs   Lab 02/28/19  1627   WBC 8.6   HGB 4.1*   HCT 14.3*   MCV 93        Recent Labs   Lab 02/28/19  1627      POTASSIUM 3.7   CHLORIDE 111*   CO2 28   ANIONGAP 5   *   BUN 22   CR 0.90   GFRESTIMATED >90   GFRESTBLACK >90   IVANIA 7.5*     Recent Labs   Lab 02/28/19  1627   TROPI 0.016       EKG: Without ST elevation or depression    No imaging obtained    Daniel Su MD  Hospitalist  [facility]

## 2019-03-01 NOTE — PROGRESS NOTES
Patient's demographics show no insurance. CM contacted Financial Counselor's office [phone]) to request follow up with patient re: insurance.

## 2019-03-01 NOTE — CONSULTS
GASTROENTEROLOGY CONSULTATION      Luz Marina Diallo  [id number]  [facility] [address]  53 year old male     Admission Date/Time: 2/28/2019  Primary Care Provider: Imelda, Rosie Cardenas     We were asked to see the patient in consultation by Dr. Su for evaluation of anemia.    CC: weakness, shortness of breath     HPI:  Luz Marina Diallo is a 53 year old Laotian speaking male presenting to the hospital with 4 days of worsening weakness, fatigue, and pallor. Family has declined . Sister is at bedside and provides translation. Reportedly patient was in fairly good health up until 4 days ago when he noticeably was having more difficulty with daily activities due to significant weakness and shortness of breath. Denies abdominal pain, nausea, vomiting, rectal bleeding, melena, hematemesis, weight loss. Patient does not take any NSAIDs and is not on any blood thinners. Denies smoking or alcohol use. No prior EGD or colonoscopy. No known family history of any GI related disorders.  He came [place] approximately [place]-30 years ago from [facility]. Notes indicate patient has some baseline cognitive dysfunction reported by the family/poor memory. However, sister reports to me today that he has only been this way over the last 4 days.     Initially went to Urgent Care where HGB noted to be low at 4.1 and Hemoccult stool testing was positive. He was directed to the hospital where HGB was 4.1 with normal MCV, platelets, and WBC. Iron studies showed low ferritin although normal serum iron and elevated iron sat 48%, and low TIBC 237. Labs from 8/2017 showed iron sat 4% with serum iron 12. Previously has been on iron supplements but not taking for a number of months. He has received 2 units of blood with improving HGB to 7.6. Vitals stable. No overt active GI bleeding since admission.        PAST MEDICAL HISTORY:  Patient Active Problem List    Diagnosis Date Noted     Anemia 02/28/2019     Priority:  "Medium     Anemia, unspecified type 08/23/2017     Priority: Medium     Mental retardation      Priority: Medium          ROS: A comprehensive ten point review of systems was negative aside from those in mentioned in the HPI.       MEDICATIONS:   No outpatient meds     ALLERGIES: No Known Allergies     SOCIAL HISTORY:  Social History     Tobacco Use     Smoking status: Never Smoker     Smokeless tobacco: Never Used   Substance Use Topics     Alcohol use: Yes     Alcohol/week: 1.2 oz     Types: 1 Glasses of wine, 1 Cans of beer per week     Comment: once every month or two, social functions only     Drug use: No        FAMILY HISTORY:  Family History   Problem Relation Age of Onset     Diabetes Father      Glaucoma No family hx of      Macular Degeneration No family hx of         PHYSICAL EXAM:   BP 94/57 (BP Location: Right arm)   Pulse 60   Temp 97.8  F (36.6  C) (Oral)   Resp 18   Ht 1.626 m (5' 4\")   Wt 53.5 kg (117 lb 14.4 oz)   SpO2 99%   BMI 20.24 kg/m       PHYSICAL EXAM:  General: alert, oriented, NAD  SKIN: no suspicious lesions, rashes, jaundice, or spider angiomas  HEAD: Normocephalic. No masses, lesions, tenderness or abnormalities  NECK: Neck supple. No adenopathy. Thyroid symmetric, normal size.  EYES: No scleral icterus  ENT: ENT exam normal, no neck nodes or sinus tenderness  RESPIRATORY: negative, Good diaphragmatic excursion. Lungs clear  CARDIOVASCULAR: negative, PMI normal. No lifts, heaves, or thrills. RRR. No murmurs, clicks gallops or rub  GASTROINTESTINAL: +BS, soft, NT, ND, no HSM, no masses/guarding/rebound  JOINT/EXTREMITIES: extremities normal- no gross deformities noted, gait normal and normal muscle tone  NEURO: Reflexes grossly normal and symmetric. Sensation grossly WNL.  PSYCH: no abnormal anxiety/depression  LYMPH: No anterior cervical, posterior cervical, or supraclavicular adenopathy     LABS:  I reviewed the patient's new clinical lab test results.   Recent Labs   Lab Test " 03/01/19  0615 03/01/19  0109 02/28/19 2006 02/28/19 1627 08/13/17 1933   WBC 7.0  --   --  8.6 8.2   HGB 7.6* 6.1* 5.1* 4.1* 9.2*   MCV 92  --   --  93 81     --   --  428 283     Recent Labs   Lab Test 03/01/19  0615 02/28/19 1627 08/13/17 1933    144 146*   POTASSIUM 3.9 3.7 3.7   CHLORIDE 111* 111* 111*   CO2 29 28 28   BUN 16 22 35*   ANIONGAP 3 5 7   IVANIA 7.4* 7.5* 8.4*     Recent Labs   Lab Test 08/13/17 2051   PROTEIN Negative        IMAGING  I personally reviewed the patient's new imaging results.  None     CONSULTATION ASSESSMENT AND PLAN:    53 year old Laotian speaking male presenting with weakness, fatigue, and shortness of breath over the last 4 days. Labs indicate severe anemia with low ferritin most consistent with iron deficiency anemia.     1. Anemia, most consistent with iron deficiency. Suspect this is a slow, chronic process given no active GI bleeding symptoms. PUD/Hpylori ulcer disease likely possibility although patient denying NSAID use and is not on any blood thinners. Cannot rule out malignancy. No prior EGD or colonoscopy.   --EGD today. Working on arranging . Family ok'd for procedure. Informed this may be difficult to find. Obs unit is helping arrange Ipad  vs phone  available in endoscopy. Will review with Dr. Camara.   --NPO for procedure.   --Monitor HGB and transfuse prn.   --Continue BID IV PPI.   --If EGD negative, likely set up outpatient colonoscopy as long as HGB/Vitals remain stable.       Thank you for asking us to participate in the care of this patient.    Yazmin Wilcox PA-C  [facility]    ---------------------  I agree with the assessment and plan of Yazmin Wilcox.  Patient has cognitive delay and it is difficult to get a history from him.  He denies melena, fresh red blood per rectum, nausea, vomiting, abd pain.  Abd is benign on exam.  His hgb responded as expected to 3 units of blood from 4 range to  7 range.  He reports he feels better now that he received blood.  EGD today, further recommendations to follow after EGD.    Priscilla Camara MD  [facility]

## 2019-03-01 NOTE — PLAN OF CARE
ROOM # 226    Living Situation (if not independent, order SW consult): Lives with family in town home  Facility name:N/A  : Jesus- [phone] (decision maker)    Activity level at baseline: Ind  Activity level on admit: SBA      Patient registered to observation; given Patient Bill of Rights; given the opportunity to ask questions about observation status and their plan of care.  Patient has been oriented to the observation room, bathroom and call light is in place.    Discussed discharge goals and expectations with patient/family.

## 2019-03-01 NOTE — PLAN OF CARE
PRIMARY DIAGNOSIS: GI BLEED    OUTPATIENT/OBSERVATION GOALS TO BE MET BEFORE DISCHARGE  Orthostatic performed: No    Stable Hgb No.   Recent Labs   Lab Test 02/28/19  2006 02/28/19  1627 08/13/17  1933   HGB 5.1* 4.1* 9.2*       Resolved or declined bleeding episodes: None observed     Appropriate testing complete: No    Cleared for discharge by consultants (if involved): No    Safe discharge environment identified: Yes    Discharge Planner Nurse   Safe discharge environment identified: Yes  Barriers to discharge: Yes       Entered by: Kristel Dueñas 03/01/2019      Please review provider order for any additional goals.   Nurse to notify provider when observation goals have been met and patient is ready for discharge.

## 2019-03-01 NOTE — DISCHARGE INSTRUCTIONS
"The patient has received a copy of the Provation  report the doctor has written and discharge instructions have been discussed with the patient and responsible adult.  All questions were addressed and answered prior to patient discharge.        Tips to Control Acid Reflux  To control acid reflux, you ll need to make some basic diet and lifestyle changes. The simple steps outlined below may be all you ll need to relieve discomfort.   Watch What You Eat      Avoid fatty foods and spicy foods.    Eat fewer acidic foods, such as citrus and tomato-based foods. These can increase symptoms.     Limit drinking alcohol, caffeine, and fizzy beverages. All increase acid reflux.    Try limiting chocolate, peppermint, and spearmint. These can worsen acid reflux in some people.    Watch When You Eat    Avoid lying down for 3 hours after eating.    Do not snack before going to bed.    Raise Your Head  Raising your head and upper body by 4\" to 6\" helps limit reflux when you re lying down. Put blocks under the head of the bed frame to raise it.                    [id number] [address], [place] [address]. All rights reserved. This information is not intended as a substitute for professional medical care. Always follow your healthcare professional's instructions.      Understanding H. pylori and Ulcers  Traditionally, ulcers, or sores in the lining of your digestive tract, were thought to be caused by too much spicy food, stress, or an anxious personality. We now know that most ulcers are probably due to infection with bacteria known as Helicobacter pylori (H. pylori).     H. pylori invade and disturb the lining of the digestive tract. Acid may weaken the area, causing an ulcer.      Common Ulcer Symptoms  Burning, cramping, or hunger-like pain in the stomach area, often one to three hours after a meal or in the middle of the night  Pain that gets better or worse with eating  Nausea or vomiting  Black, " tarry, or bloody stools (which means the ulcer is bleeding)  Or you may have no symptoms.     An ulcer can form in two areas of the digestive tract; the stomach and the duodenum (where the stomach meets the small intestine).      Your Evaluation  An evaluation by your doctor can show if you have an ulcer and determine whether it was caused by H. pylori. Your doctor may ask you questions, examine you, and possibly do some tests. These may include:  A special X-ray called a barium upper gastrointestinal series, to help locate an ulcer. During the test, you drink a chalky liquid. This liquid helps the ulcer show up on the X-ray.  An endoscopic exam, done with a long tube passed through your mouth into your stomach, to give the doctor a closer look at your ulcer. You will be lightly sedated for this procedure. Your doctor can also take a tissue sample to test for H. pylori.  Blood, stool, and breath tests are also available to show whether you have H. pylori in your digestive tract.  Your Treatment  To kill H. pylori so your ulcer can heal, your doctor will probably prescribe antibiotics. Other ulcer medications that help reduce stomach acid may also be prescribed as well. Testing after treatment is recommended to be sure the H. pylori infection is gone. Usually, killing H. pylori helps keep the ulcer from returning.    [id number] [facility], [address] [place] [address], [place] [address]. All rights reserved. This information is not intended as a substitute for professional medical care. Always follow your healthcare professional's instructions.

## 2019-03-01 NOTE — PLAN OF CARE
PRIMARY DIAGNOSIS: GI BLEED    OUTPATIENT/OBSERVATION GOALS TO BE MET BEFORE DISCHARGE  Orthostatic performed: No    Stable Hgb No.   Recent Labs   Lab Test 02/28/19  2006 02/28/19  1627 08/13/17  1933   HGB 5.1* 4.1* 9.2*         Resolved or declined bleeding episodes: No noted bleeding-occult positive at clinic earlier today    Appropriate testing complete: No    Cleared for discharge by consultants (if involved): No    Safe discharge environment identified: Yes    Discharge Planner Nurse   Safe discharge environment identified: Yes  Barriers to discharge: Yes-hgb       Entered by: Marielena Nicolas 02/28/2019 9:20 PM     Sister at bedside-per sister report, pt is mentally delayed.  Pt. laotian speaking-sister interpreting.  Receiving blood products. NO s/sx of reaction. Plan for hgb draws and GI consult in am. VSS.   Please review provider order for any additional goals.   Nurse to notify provider when observation goals have been met and patient is ready for discharge.

## 2019-03-02 NOTE — PLAN OF CARE
PRIMARY DIAGNOSIS: GI BLEED    OUTPATIENT/OBSERVATION GOALS TO BE MET BEFORE DISCHARGE  Orthostatic performed: No    Stable Hgb Yes.   Recent Labs   Lab Test 03/01/19  0615 03/01/19  0109 02/28/19 2006   HGB 7.6* 6.1* 5.1*         Resolved or declined bleeding episodes: Yes    Appropriate testing complete: Yes    Cleared for discharge by consultants (if involved): Yes    Safe discharge environment identified: Yes    Discharge Planner Nurse   Safe discharge environment identified: Yes  Barriers to discharge: No       Entered by: Rosalie Bailey 03/01/2019     Alert. RA. VSS, Up w/ Ax1. Denies SOB/dizziness w/ ambulation. CMS intact. Denies pain. Tolerating clear liquids. No GI symptoms since returning to floor from endoscopy. Voiding in adequate amts. Plan is to discharge to home at 8:00 PM. Will continue to monitor.      Please review provider order for any additional goals.   Nurse to notify provider when observation goals have been met and patient is ready for discharge.

## 2019-03-02 NOTE — PLAN OF CARE
PRIMARY DIAGNOSIS: GI BLEED     OUTPATIENT/OBSERVATION GOALS TO BE MET BEFORE DISCHARGE  Orthostatic performed: No     Stable Hgb Yes.         Recent Labs   Lab Test 03/01/19  0615 03/01/19  0109 02/28/19 2006   HGB 7.6* 6.1* 5.1*            Resolved or declined bleeding episodes: Yes     Appropriate testing complete: Yes     Cleared for discharge by consultants (if involved): Yes     Safe discharge environment identified: Yes     Discharge Planner Nurse   Safe discharge environment identified: Yes  Barriers to discharge: No       Entered by: Rosalie Bailey 03/01/2019      Alert. RA. VSS, Up w/ Ax1. Denies SOB/dizziness w/ ambulation. CMS intact. Denies pain. Tolerating clear liquids. No GI symptoms since returning to floor from endoscopy. Voiding in adequate amts. Denies nausea. Plan is to discharge to home at 8:00 PM when family arrives. Will continue to monitor.       Please review provider order for any additional goals.   Nurse to notify provider when observation goals have been met and patient is ready for discharge.

## 2019-03-02 NOTE — PLAN OF CARE
Patient's After Visit Summary was reviewed with patient and/or family.   Patient verbalized understanding of After Visit Summary, recommended follow up and was given an opportunity to ask questions.   Discharge medications sent home with patient/family: YES, meds x2    Discharged with sister.    IV access discontinued. Escorted to exit with sister. Declined wheelchair ride, escorted instead with family.       OBSERVATION patient END time: 8:45 PM

## 2019-03-04 LAB — COPATH REPORT: NORMAL

## 2019-03-05 ENCOUNTER — TELEPHONE (OUTPATIENT)
Dept: INTERNAL MEDICINE | Facility: CLINIC | Age: 54
End: 2019-03-05

## 2019-03-05 NOTE — TELEPHONE ENCOUNTER
IP F/U    Date: 03/01/19  Diagnosis: Duodenal Ulcer Without Hemorrhage or Perforation and Without Obstruction, Symptomatic Anemia  Is patient active in care coordination? No  Was patient in TCU? No

## 2019-07-03 ENCOUNTER — OFFICE VISIT (OUTPATIENT)
Dept: FAMILY MEDICINE | Facility: CLINIC | Age: 54
End: 2019-07-03
Payer: COMMERCIAL

## 2019-07-03 VITALS
RESPIRATION RATE: 16 BRPM | HEART RATE: 53 BPM | WEIGHT: 110 LBS | DIASTOLIC BLOOD PRESSURE: 70 MMHG | TEMPERATURE: 98.3 F | HEIGHT: 64 IN | BODY MASS INDEX: 18.78 KG/M2 | SYSTOLIC BLOOD PRESSURE: 127 MMHG | OXYGEN SATURATION: 99 %

## 2019-07-03 DIAGNOSIS — D50.9 IRON DEFICIENCY ANEMIA, UNSPECIFIED IRON DEFICIENCY ANEMIA TYPE: Primary | ICD-10-CM

## 2019-07-03 LAB
BASOPHILS # BLD AUTO: 0.1 10E9/L (ref 0–0.2)
BASOPHILS NFR BLD AUTO: 1.5 %
DIFFERENTIAL METHOD BLD: ABNORMAL
EOSINOPHIL # BLD AUTO: 0.4 10E9/L (ref 0–0.7)
EOSINOPHIL NFR BLD AUTO: 7.8 %
ERYTHROCYTE [DISTWIDTH] IN BLOOD BY AUTOMATED COUNT: 20.4 % (ref 10–15)
FERRITIN SERPL-MCNC: 81 NG/ML (ref 26–388)
HCT VFR BLD AUTO: 43.2 % (ref 40–53)
HGB BLD-MCNC: 13.9 G/DL (ref 13.3–17.7)
IRON SATN MFR SERPL: 27 % (ref 15–46)
IRON SERPL-MCNC: 75 UG/DL (ref 35–180)
LYMPHOCYTES # BLD AUTO: 1.4 10E9/L (ref 0.8–5.3)
LYMPHOCYTES NFR BLD AUTO: 26 %
MCH RBC QN AUTO: 25 PG (ref 26.5–33)
MCHC RBC AUTO-ENTMCNC: 32.2 G/DL (ref 31.5–36.5)
MCV RBC AUTO: 78 FL (ref 78–100)
MONOCYTES # BLD AUTO: 0.5 10E9/L (ref 0–1.3)
MONOCYTES NFR BLD AUTO: 9.9 %
NEUTROPHILS # BLD AUTO: 2.9 10E9/L (ref 1.6–8.3)
NEUTROPHILS NFR BLD AUTO: 54.8 %
PLATELET # BLD AUTO: 291 10E9/L (ref 150–450)
RBC # BLD AUTO: 5.57 10E12/L (ref 4.4–5.9)
TIBC SERPL-MCNC: 282 UG/DL (ref 240–430)
WBC # BLD AUTO: 5.3 10E9/L (ref 4–11)

## 2019-07-03 PROCEDURE — 36415 COLL VENOUS BLD VENIPUNCTURE: CPT | Performed by: FAMILY MEDICINE

## 2019-07-03 PROCEDURE — 99213 OFFICE O/P EST LOW 20 MIN: CPT | Performed by: FAMILY MEDICINE

## 2019-07-03 PROCEDURE — 85025 COMPLETE CBC W/AUTO DIFF WBC: CPT | Performed by: FAMILY MEDICINE

## 2019-07-03 PROCEDURE — 82728 ASSAY OF FERRITIN: CPT | Performed by: FAMILY MEDICINE

## 2019-07-03 PROCEDURE — 83540 ASSAY OF IRON: CPT | Performed by: FAMILY MEDICINE

## 2019-07-03 PROCEDURE — 83550 IRON BINDING TEST: CPT | Performed by: FAMILY MEDICINE

## 2019-07-03 ASSESSMENT — MIFFLIN-ST. JEOR: SCORE: 1259.96

## 2019-07-03 NOTE — LETTER
July 5, 2019      Luz Marina Diallo  [id number] [address] [address] [facility] [address]        Dear ,    We are writing to inform you of your test results.  Results are normal.       Resulted Orders   CBC with platelets and differential   Result Value Ref Range    WBC 5.3 4.0 - 11.0 10e9/L    RBC Count 5.57 4.4 - 5.9 10e12/L    Hemoglobin 13.9 13.3 - 17.7 g/dL    Hematocrit 43.2 40.0 - 53.0 %    MCV 78 78 - 100 fl    MCH 25.0 (L) 26.5 - 33.0 pg      Comment:      Results confirmed by repeat test    MCHC 32.2 31.5 - 36.5 g/dL    RDW 20.4 (H) 10.0 - 15.0 %    Platelet Count 291 150 - 450 10e9/L    % Neutrophils 54.8 %    % Lymphocytes 26.0 %    % Monocytes 9.9 %    % Eosinophils 7.8 %    % Basophils 1.5 %    Absolute Neutrophil 2.9 1.6 - 8.3 10e9/L    Absolute Lymphocytes 1.4 0.8 - 5.3 10e9/L    Absolute Monocytes 0.5 0.0 - 1.3 10e9/L    Absolute Eosinophils 0.4 0.0 - 0.7 10e9/L    Absolute Basophils 0.1 0.0 - 0.2 10e9/L    Diff Method Automated Method    Iron and iron binding capacity   Result Value Ref Range    Iron 75 35 - 180 ug/dL    Iron Binding Cap 282 240 - 430 ug/dL    Iron Saturation Index 27 15 - 46 %   Ferritin   Result Value Ref Range    Ferritin 81 26 - 388 ng/mL       If you have any questions or concerns, please call the clinic at the number listed above.       Sincerely,        Farrah Castillo MD

## 2019-07-03 NOTE — PROGRESS NOTES
"Lisa Diallo is a 54 year old male who presents to clinic today for the following health issues:    History of Present Illness        He eats 2-3 servings of fruits and vegetables daily.He consumes 1 sweetened beverage(s) daily.  He is taking medications regularly.     Patient here today with sister for follow up of anemia. In February he complained of fatigue and was seen at an Urgent care where his hemoglobin was noted to be 4.1. He also had a positive hemoccult stool test. EGD revealed esophagitis and non- bleeding gastric and duodenal ulcers.    No acute concerns at this time. Sister states he is due for lab recheck.     Reviewed and updated as needed this visit by Provider         Review of Systems   ROS COMP: Constitutional, HEENT, cardiovascular, pulmonary, gi and gu systems are negative, except as otherwise noted.      Objective    /70 (BP Location: Right arm, Patient Position: Chair, Cuff Size: Adult Regular)   Pulse 53   Temp 98.3  F (36.8  C) (Oral)   Resp 16   Ht 1.626 m (5' 4\")   Wt 49.9 kg (110 lb)   SpO2 99%   BMI 18.88 kg/m    Body mass index is 18.88 kg/m .  Physical Exam   GENERAL: healthy, alert and no distress  RESP: lungs clear to auscultation - no rales, rhonchi or wheezes  CV: regular rate and rhythm, normal S1 S2, no S3 or S4, no murmur, click or rub  MS: no edema  SKIN: no suspicious lesions or rashes    Diagnostic Test Results:  Labs reviewed in Epic  Results for orders placed or performed in visit on 07/03/19   CBC with platelets and differential   Result Value Ref Range    WBC 5.3 4.0 - 11.0 10e9/L    RBC Count 5.57 4.4 - 5.9 10e12/L    Hemoglobin 13.9 13.3 - 17.7 g/dL    Hematocrit 43.2 40.0 - 53.0 %    MCV 78 78 - 100 fl    MCH 25.0 (L) 26.5 - 33.0 pg    MCHC 32.2 31.5 - 36.5 g/dL    RDW 20.4 (H) 10.0 - 15.0 %    Platelet Count 291 150 - 450 10e9/L    % Neutrophils 54.8 %    % Lymphocytes 26.0 %    % Monocytes 9.9 %    % Eosinophils 7.8 %    % " Basophils 1.5 %    Absolute Neutrophil 2.9 1.6 - 8.3 10e9/L    Absolute Lymphocytes 1.4 0.8 - 5.3 10e9/L    Absolute Monocytes 0.5 0.0 - 1.3 10e9/L    Absolute Eosinophils 0.4 0.0 - 0.7 10e9/L    Absolute Basophils 0.1 0.0 - 0.2 10e9/L    Diff Method Automated Method    Iron and iron binding capacity   Result Value Ref Range    Iron 75 35 - 180 ug/dL    Iron Binding Cap 282 240 - 430 ug/dL    Iron Saturation Index 27 15 - 46 %   Ferritin   Result Value Ref Range    Ferritin 81 26 - 388 ng/mL           Assessment & Plan     1. Iron deficiency anemia, unspecified iron deficiency anemia type  - labs improved. Patient doing well.   - CBC with platelets and differential  - Iron and iron binding capacity  - Ferritin       See Patient Instructions    Return in about 6 months (around 1/3/2020).    Farrah Castillo MD  [facility]

## 2019-07-05 NOTE — RESULT ENCOUNTER NOTE
Please send patient a letter to let them know results are normal.    The rest of your labs are normal.  For further questions or concerns please let us know.      Dr. Nash

## 2020-01-01 ENCOUNTER — APPOINTMENT (OUTPATIENT)
Dept: GENERAL RADIOLOGY | Facility: CLINIC | Age: 55
End: 2020-01-01
Attending: EMERGENCY MEDICINE
Payer: COMMERCIAL

## 2020-01-01 ENCOUNTER — NURSE TRIAGE (OUTPATIENT)
Dept: NURSING | Facility: CLINIC | Age: 55
End: 2020-01-01

## 2020-01-01 ENCOUNTER — INFUSION THERAPY VISIT (OUTPATIENT)
Dept: INFUSION THERAPY | Facility: CLINIC | Age: 55
End: 2020-01-01
Attending: INTERNAL MEDICINE
Payer: COMMERCIAL

## 2020-01-01 ENCOUNTER — APPOINTMENT (OUTPATIENT)
Dept: ULTRASOUND IMAGING | Facility: CLINIC | Age: 55
End: 2020-01-01
Attending: STUDENT IN AN ORGANIZED HEALTH CARE EDUCATION/TRAINING PROGRAM
Payer: COMMERCIAL

## 2020-01-01 ENCOUNTER — APPOINTMENT (OUTPATIENT)
Dept: INTERVENTIONAL RADIOLOGY/VASCULAR | Facility: CLINIC | Age: 55
End: 2020-01-01
Attending: INTERNAL MEDICINE
Payer: COMMERCIAL

## 2020-01-01 ENCOUNTER — ANESTHESIA (OUTPATIENT)
Dept: INTENSIVE CARE | Facility: CLINIC | Age: 55
End: 2020-01-01
Payer: COMMERCIAL

## 2020-01-01 ENCOUNTER — ONCOLOGY VISIT (OUTPATIENT)
Dept: ONCOLOGY | Facility: CLINIC | Age: 55
End: 2020-01-01
Attending: PHYSICIAN ASSISTANT
Payer: COMMERCIAL

## 2020-01-01 ENCOUNTER — INFUSION THERAPY VISIT (OUTPATIENT)
Dept: INFUSION THERAPY | Facility: CLINIC | Age: 55
End: 2020-01-01
Attending: PHYSICIAN ASSISTANT
Payer: COMMERCIAL

## 2020-01-01 ENCOUNTER — APPOINTMENT (OUTPATIENT)
Dept: GENERAL RADIOLOGY | Facility: CLINIC | Age: 55
End: 2020-01-01
Attending: INTERNAL MEDICINE
Payer: COMMERCIAL

## 2020-01-01 ENCOUNTER — PREP FOR PROCEDURE (OUTPATIENT)
Dept: UROLOGY | Facility: CLINIC | Age: 55
End: 2020-01-01

## 2020-01-01 ENCOUNTER — APPOINTMENT (OUTPATIENT)
Dept: CT IMAGING | Facility: CLINIC | Age: 55
End: 2020-01-01
Attending: INTERNAL MEDICINE
Payer: COMMERCIAL

## 2020-01-01 ENCOUNTER — TELEPHONE (OUTPATIENT)
Dept: FAMILY MEDICINE | Facility: CLINIC | Age: 55
End: 2020-01-01

## 2020-01-01 ENCOUNTER — HOSPITAL ENCOUNTER (INPATIENT)
Facility: CLINIC | Age: 55
LOS: 5 days | Discharge: HOME | End: 2020-10-14
Attending: EMERGENCY MEDICINE | Admitting: INTERNAL MEDICINE
Payer: COMMERCIAL

## 2020-01-01 ENCOUNTER — HOSPITAL ENCOUNTER (EMERGENCY)
Facility: CLINIC | Age: 55
Discharge: HOME | End: 2020-10-19
Attending: EMERGENCY MEDICINE | Admitting: EMERGENCY MEDICINE
Payer: COMMERCIAL

## 2020-01-01 ENCOUNTER — ANESTHESIA EVENT (OUTPATIENT)
Dept: SURGERY | Facility: CLINIC | Age: 55
End: 2020-01-01
Payer: COMMERCIAL

## 2020-01-01 ENCOUNTER — HOSPITAL ENCOUNTER (INPATIENT)
Facility: CLINIC | Age: 55
LOS: 6 days | Discharge: HOME | End: 2020-11-01
Attending: EMERGENCY MEDICINE | Admitting: INTERNAL MEDICINE
Payer: COMMERCIAL

## 2020-01-01 ENCOUNTER — HOSPITAL ENCOUNTER (OUTPATIENT)
Facility: CLINIC | Age: 55
Setting detail: SPECIMEN
Discharge: HOME | End: 2020-11-09
Attending: INTERNAL MEDICINE | Admitting: PHYSICIAN ASSISTANT
Payer: COMMERCIAL

## 2020-01-01 ENCOUNTER — OFFICE VISIT (OUTPATIENT)
Dept: UROLOGY | Facility: CLINIC | Age: 55
End: 2020-01-01
Payer: COMMERCIAL

## 2020-01-01 ENCOUNTER — APPOINTMENT (OUTPATIENT)
Dept: CT IMAGING | Facility: CLINIC | Age: 55
End: 2020-01-01
Attending: HOSPITALIST
Payer: COMMERCIAL

## 2020-01-01 ENCOUNTER — HOSPITAL ENCOUNTER (INPATIENT)
Facility: CLINIC | Age: 55
LOS: 15 days | End: 2020-11-27
Attending: EMERGENCY MEDICINE | Admitting: INTERNAL MEDICINE
Payer: COMMERCIAL

## 2020-01-01 ENCOUNTER — ANESTHESIA EVENT (OUTPATIENT)
Dept: INTENSIVE CARE | Facility: CLINIC | Age: 55
End: 2020-01-01
Payer: COMMERCIAL

## 2020-01-01 ENCOUNTER — APPOINTMENT (OUTPATIENT)
Dept: GENERAL RADIOLOGY | Facility: CLINIC | Age: 55
End: 2020-01-01
Attending: HOSPITALIST
Payer: COMMERCIAL

## 2020-01-01 ENCOUNTER — ANESTHESIA (OUTPATIENT)
Dept: SURGERY | Facility: CLINIC | Age: 55
End: 2020-01-01
Payer: COMMERCIAL

## 2020-01-01 ENCOUNTER — APPOINTMENT (OUTPATIENT)
Dept: GENERAL RADIOLOGY | Facility: CLINIC | Age: 55
End: 2020-01-01
Attending: STUDENT IN AN ORGANIZED HEALTH CARE EDUCATION/TRAINING PROGRAM
Payer: COMMERCIAL

## 2020-01-01 ENCOUNTER — APPOINTMENT (OUTPATIENT)
Dept: CT IMAGING | Facility: CLINIC | Age: 55
End: 2020-01-01
Attending: EMERGENCY MEDICINE
Payer: COMMERCIAL

## 2020-01-01 ENCOUNTER — HOSPITAL ENCOUNTER (OUTPATIENT)
Facility: CLINIC | Age: 55
Setting detail: SPECIMEN
End: 2020-10-19
Attending: PHYSICIAN ASSISTANT
Payer: COMMERCIAL

## 2020-01-01 ENCOUNTER — APPOINTMENT (OUTPATIENT)
Dept: ULTRASOUND IMAGING | Facility: CLINIC | Age: 55
End: 2020-01-01
Attending: EMERGENCY MEDICINE
Payer: COMMERCIAL

## 2020-01-01 ENCOUNTER — APPOINTMENT (OUTPATIENT)
Dept: NUCLEAR MEDICINE | Facility: CLINIC | Age: 55
End: 2020-01-01
Attending: INTERNAL MEDICINE
Payer: COMMERCIAL

## 2020-01-01 ENCOUNTER — HOSPITAL ENCOUNTER (OUTPATIENT)
Facility: CLINIC | Age: 55
Setting detail: SPECIMEN
End: 2020-01-01
Attending: PHYSICIAN ASSISTANT
Payer: COMMERCIAL

## 2020-01-01 ENCOUNTER — HOSPITAL ENCOUNTER (OUTPATIENT)
Facility: CLINIC | Age: 55
Setting detail: SPECIMEN
Discharge: HOME | End: 2020-10-26
Attending: PHYSICIAN ASSISTANT | Admitting: PHYSICIAN ASSISTANT
Payer: COMMERCIAL

## 2020-01-01 ENCOUNTER — HOSPITAL ENCOUNTER (INPATIENT)
Facility: CLINIC | Age: 55
LOS: 11 days | Discharge: HOME | End: 2020-10-05
Attending: EMERGENCY MEDICINE | Admitting: INTERNAL MEDICINE
Payer: COMMERCIAL

## 2020-01-01 ENCOUNTER — OFFICE VISIT (OUTPATIENT)
Dept: FAMILY MEDICINE | Facility: CLINIC | Age: 55
End: 2020-01-01
Payer: COMMERCIAL

## 2020-01-01 ENCOUNTER — PATIENT OUTREACH (OUTPATIENT)
Dept: ONCOLOGY | Facility: CLINIC | Age: 55
End: 2020-01-01

## 2020-01-01 ENCOUNTER — APPOINTMENT (OUTPATIENT)
Dept: CARDIOLOGY | Facility: CLINIC | Age: 55
End: 2020-01-01
Attending: INTERNAL MEDICINE
Payer: COMMERCIAL

## 2020-01-01 ENCOUNTER — HOSPITAL ENCOUNTER (OUTPATIENT)
Facility: CLINIC | Age: 55
Setting detail: SPECIMEN
Discharge: HOME | End: 2020-11-02
Attending: PHYSICIAN ASSISTANT | Admitting: INTERNAL MEDICINE
Payer: COMMERCIAL

## 2020-01-01 ENCOUNTER — HOSPITAL ENCOUNTER (OUTPATIENT)
Facility: CLINIC | Age: 55
End: 2020-01-01
Attending: STUDENT IN AN ORGANIZED HEALTH CARE EDUCATION/TRAINING PROGRAM | Admitting: STUDENT IN AN ORGANIZED HEALTH CARE EDUCATION/TRAINING PROGRAM
Payer: COMMERCIAL

## 2020-01-01 ENCOUNTER — TELEPHONE (OUTPATIENT)
Dept: UROLOGY | Facility: CLINIC | Age: 55
End: 2020-01-01

## 2020-01-01 ENCOUNTER — APPOINTMENT (OUTPATIENT)
Dept: CT IMAGING | Facility: CLINIC | Age: 55
End: 2020-01-01
Attending: OBSTETRICS & GYNECOLOGY
Payer: COMMERCIAL

## 2020-01-01 ENCOUNTER — HOSPITAL ENCOUNTER (OUTPATIENT)
Facility: CLINIC | Age: 55
Setting detail: SPECIMEN
Discharge: HOME | End: 2020-11-06
Attending: INTERNAL MEDICINE | Admitting: INTERNAL MEDICINE
Payer: COMMERCIAL

## 2020-01-01 ENCOUNTER — ONCOLOGY VISIT (OUTPATIENT)
Dept: ONCOLOGY | Facility: CLINIC | Age: 55
End: 2020-01-01
Attending: INTERNAL MEDICINE
Payer: COMMERCIAL

## 2020-01-01 ENCOUNTER — APPOINTMENT (OUTPATIENT)
Dept: GENERAL RADIOLOGY | Facility: CLINIC | Age: 55
End: 2020-01-01
Attending: OBSTETRICS & GYNECOLOGY
Payer: COMMERCIAL

## 2020-01-01 VITALS
TEMPERATURE: 100.5 F | BODY MASS INDEX: 17.68 KG/M2 | HEART RATE: 119 BPM | DIASTOLIC BLOOD PRESSURE: 82 MMHG | SYSTOLIC BLOOD PRESSURE: 130 MMHG | RESPIRATION RATE: 26 BRPM | WEIGHT: 103 LBS

## 2020-01-01 VITALS
RESPIRATION RATE: 16 BRPM | DIASTOLIC BLOOD PRESSURE: 78 MMHG | WEIGHT: 122.2 LBS | TEMPERATURE: 97.6 F | OXYGEN SATURATION: 100 % | HEART RATE: 80 BPM | BODY MASS INDEX: 23.07 KG/M2 | SYSTOLIC BLOOD PRESSURE: 116 MMHG

## 2020-01-01 VITALS
HEIGHT: 61 IN | HEART RATE: 69 BPM | SYSTOLIC BLOOD PRESSURE: 106 MMHG | WEIGHT: 125.8 LBS | RESPIRATION RATE: 18 BRPM | TEMPERATURE: 96.5 F | DIASTOLIC BLOOD PRESSURE: 57 MMHG | OXYGEN SATURATION: 97 % | BODY MASS INDEX: 23.75 KG/M2

## 2020-01-01 VITALS
TEMPERATURE: 97 F | HEART RATE: 86 BPM | OXYGEN SATURATION: 100 % | WEIGHT: 112.9 LBS | RESPIRATION RATE: 16 BRPM | DIASTOLIC BLOOD PRESSURE: 73 MMHG | SYSTOLIC BLOOD PRESSURE: 110 MMHG | BODY MASS INDEX: 21.33 KG/M2

## 2020-01-01 VITALS
HEART RATE: 111 BPM | OXYGEN SATURATION: 100 % | RESPIRATION RATE: 16 BRPM | BODY MASS INDEX: 18.89 KG/M2 | WEIGHT: 100 LBS | DIASTOLIC BLOOD PRESSURE: 70 MMHG | SYSTOLIC BLOOD PRESSURE: 110 MMHG | TEMPERATURE: 97.2 F

## 2020-01-01 VITALS
HEART RATE: 79 BPM | DIASTOLIC BLOOD PRESSURE: 66 MMHG | OXYGEN SATURATION: 100 % | SYSTOLIC BLOOD PRESSURE: 106 MMHG | RESPIRATION RATE: 16 BRPM | TEMPERATURE: 97 F

## 2020-01-01 VITALS
RESPIRATION RATE: 16 BRPM | TEMPERATURE: 97.1 F | WEIGHT: 102 LBS | BODY MASS INDEX: 19.27 KG/M2 | SYSTOLIC BLOOD PRESSURE: 101 MMHG | OXYGEN SATURATION: 100 % | DIASTOLIC BLOOD PRESSURE: 66 MMHG | HEART RATE: 114 BPM

## 2020-01-01 VITALS
SYSTOLIC BLOOD PRESSURE: 97 MMHG | TEMPERATURE: 97.6 F | WEIGHT: 105.7 LBS | OXYGEN SATURATION: 98 % | DIASTOLIC BLOOD PRESSURE: 55 MMHG | BODY MASS INDEX: 19.97 KG/M2 | RESPIRATION RATE: 18 BRPM | HEART RATE: 126 BPM

## 2020-01-01 VITALS
OXYGEN SATURATION: 96 % | SYSTOLIC BLOOD PRESSURE: 127 MMHG | RESPIRATION RATE: 24 BRPM | TEMPERATURE: 102.4 F | HEART RATE: 138 BPM | DIASTOLIC BLOOD PRESSURE: 77 MMHG

## 2020-01-01 VITALS
DIASTOLIC BLOOD PRESSURE: 59 MMHG | TEMPERATURE: 97.4 F | OXYGEN SATURATION: 97 % | HEART RATE: 87 BPM | RESPIRATION RATE: 16 BRPM | SYSTOLIC BLOOD PRESSURE: 96 MMHG

## 2020-01-01 VITALS
RESPIRATION RATE: 16 BRPM | DIASTOLIC BLOOD PRESSURE: 60 MMHG | OXYGEN SATURATION: 98 % | TEMPERATURE: 100.2 F | HEART RATE: 115 BPM | SYSTOLIC BLOOD PRESSURE: 110 MMHG

## 2020-01-01 VITALS — TEMPERATURE: 99.7 F | HEART RATE: 104 BPM

## 2020-01-01 VITALS
SYSTOLIC BLOOD PRESSURE: 101 MMHG | RESPIRATION RATE: 16 BRPM | TEMPERATURE: 97 F | DIASTOLIC BLOOD PRESSURE: 65 MMHG | OXYGEN SATURATION: 99 % | HEART RATE: 74 BPM

## 2020-01-01 VITALS
HEART RATE: 111 BPM | WEIGHT: 102 LBS | DIASTOLIC BLOOD PRESSURE: 66 MMHG | SYSTOLIC BLOOD PRESSURE: 101 MMHG | RESPIRATION RATE: 16 BRPM | BODY MASS INDEX: 19.27 KG/M2 | OXYGEN SATURATION: 100 % | TEMPERATURE: 98.4 F

## 2020-01-01 VITALS
OXYGEN SATURATION: 100 % | TEMPERATURE: 99.2 F | WEIGHT: 102.8 LBS | RESPIRATION RATE: 18 BRPM | DIASTOLIC BLOOD PRESSURE: 70 MMHG | SYSTOLIC BLOOD PRESSURE: 118 MMHG | BODY MASS INDEX: 19.42 KG/M2 | HEART RATE: 116 BPM

## 2020-01-01 VITALS
HEART RATE: 96 BPM | TEMPERATURE: 98.2 F | RESPIRATION RATE: 19 BRPM | SYSTOLIC BLOOD PRESSURE: 101 MMHG | OXYGEN SATURATION: 99 % | DIASTOLIC BLOOD PRESSURE: 67 MMHG

## 2020-01-01 VITALS
OXYGEN SATURATION: 100 % | HEIGHT: 61 IN | WEIGHT: 113.1 LBS | BODY MASS INDEX: 21.35 KG/M2 | SYSTOLIC BLOOD PRESSURE: 101 MMHG | TEMPERATURE: 97.6 F | DIASTOLIC BLOOD PRESSURE: 65 MMHG | RESPIRATION RATE: 20 BRPM | HEART RATE: 78 BPM

## 2020-01-01 VITALS
TEMPERATURE: 100 F | HEART RATE: 101 BPM | OXYGEN SATURATION: 97 % | RESPIRATION RATE: 18 BRPM | SYSTOLIC BLOOD PRESSURE: 95 MMHG | DIASTOLIC BLOOD PRESSURE: 55 MMHG

## 2020-01-01 VITALS
OXYGEN SATURATION: 82 % | WEIGHT: 130.73 LBS | SYSTOLIC BLOOD PRESSURE: 90 MMHG | HEIGHT: 61 IN | BODY MASS INDEX: 24.68 KG/M2 | DIASTOLIC BLOOD PRESSURE: 62 MMHG | TEMPERATURE: 99.1 F

## 2020-01-01 DIAGNOSIS — C62.90 SEMINOMA (H): ICD-10-CM

## 2020-01-01 DIAGNOSIS — C62.90 SEMINOMA (H): Primary | ICD-10-CM

## 2020-01-01 DIAGNOSIS — N17.9 ACUTE RENAL FAILURE, UNSPECIFIED ACUTE RENAL FAILURE TYPE (H): Primary | ICD-10-CM

## 2020-01-01 DIAGNOSIS — R19.03 RLQ ABDOMINAL MASS: Primary | ICD-10-CM

## 2020-01-01 DIAGNOSIS — T45.1X5A CHEMOTHERAPY-INDUCED NEUTROPENIA (H): ICD-10-CM

## 2020-01-01 DIAGNOSIS — D70.1 CHEMOTHERAPY-INDUCED NEUTROPENIA (H): ICD-10-CM

## 2020-01-01 DIAGNOSIS — R33.9 URINARY RETENTION: Primary | ICD-10-CM

## 2020-01-01 DIAGNOSIS — N17.9 AKI (ACUTE KIDNEY INJURY) (H): ICD-10-CM

## 2020-01-01 DIAGNOSIS — R50.81 NEUTROPENIC FEVER (H): ICD-10-CM

## 2020-01-01 DIAGNOSIS — R50.81 FEBRILE NEUTROPENIA (H): ICD-10-CM

## 2020-01-01 DIAGNOSIS — A41.9 SEPSIS WITHOUT ACUTE ORGAN DYSFUNCTION, DUE TO UNSPECIFIED ORGANISM (H): ICD-10-CM

## 2020-01-01 DIAGNOSIS — R19.00 ABDOMINAL MASS, UNSPECIFIED ABDOMINAL LOCATION: ICD-10-CM

## 2020-01-01 DIAGNOSIS — D75.839 THROMBOCYTOSIS: ICD-10-CM

## 2020-01-01 DIAGNOSIS — D49.59 TESTICULAR NEOPLASM: Primary | ICD-10-CM

## 2020-01-01 DIAGNOSIS — D49.59 TESTICULAR NEOPLASM: ICD-10-CM

## 2020-01-01 DIAGNOSIS — T45.1X5A CHEMOTHERAPY-INDUCED NEUTROPENIA (H): Primary | ICD-10-CM

## 2020-01-01 DIAGNOSIS — D70.9 FEBRILE NEUTROPENIA (H): ICD-10-CM

## 2020-01-01 DIAGNOSIS — D64.9 ANEMIA, UNSPECIFIED TYPE: ICD-10-CM

## 2020-01-01 DIAGNOSIS — Z91.89 AT HIGH RISK FOR ADVERSE MEDICATION EVENT: ICD-10-CM

## 2020-01-01 DIAGNOSIS — D70.2 NEUTROPENIA, DRUG-INDUCED (H): Primary | ICD-10-CM

## 2020-01-01 DIAGNOSIS — E83.51 HYPOCALCEMIA: ICD-10-CM

## 2020-01-01 DIAGNOSIS — R50.9 FEVER, UNSPECIFIED FEVER CAUSE: ICD-10-CM

## 2020-01-01 DIAGNOSIS — Z11.59 ENCOUNTER FOR SCREENING FOR OTHER VIRAL DISEASES: Primary | ICD-10-CM

## 2020-01-01 DIAGNOSIS — N13.30 BILATERAL HYDRONEPHROSIS: ICD-10-CM

## 2020-01-01 DIAGNOSIS — N17.9 AKI (ACUTE KIDNEY INJURY) (H): Primary | ICD-10-CM

## 2020-01-01 DIAGNOSIS — D64.81 ANEMIA ASSOCIATED WITH CHEMOTHERAPY: ICD-10-CM

## 2020-01-01 DIAGNOSIS — E79.0 HYPERURICEMIA: ICD-10-CM

## 2020-01-01 DIAGNOSIS — E83.52 HYPERCALCEMIA: ICD-10-CM

## 2020-01-01 DIAGNOSIS — T80.92XA BLOOD TRANSFUSION REACTION, INITIAL ENCOUNTER: ICD-10-CM

## 2020-01-01 DIAGNOSIS — R00.0 SINUS TACHYCARDIA: ICD-10-CM

## 2020-01-01 DIAGNOSIS — R00.0 TACHYCARDIA: ICD-10-CM

## 2020-01-01 DIAGNOSIS — R33.9 URINARY RETENTION: ICD-10-CM

## 2020-01-01 DIAGNOSIS — R50.2 DRUG-INDUCED FEVER: ICD-10-CM

## 2020-01-01 DIAGNOSIS — N18.9 CHRONIC RENAL IMPAIRMENT, UNSPECIFIED CKD STAGE: ICD-10-CM

## 2020-01-01 DIAGNOSIS — R79.89 ELEVATED LFTS: ICD-10-CM

## 2020-01-01 DIAGNOSIS — K12.31 MUCOSITIS DUE TO CHEMOTHERAPY: ICD-10-CM

## 2020-01-01 DIAGNOSIS — D70.1 CHEMOTHERAPY-INDUCED NEUTROPENIA (H): Primary | ICD-10-CM

## 2020-01-01 DIAGNOSIS — N13.9 OBSTRUCTIVE UROPATHY: ICD-10-CM

## 2020-01-01 DIAGNOSIS — D70.2 NEUTROPENIA, DRUG-INDUCED (H): ICD-10-CM

## 2020-01-01 DIAGNOSIS — T45.1X5A ANEMIA ASSOCIATED WITH CHEMOTHERAPY: ICD-10-CM

## 2020-01-01 DIAGNOSIS — N13.30 BILATERAL HYDRONEPHROSIS: Primary | ICD-10-CM

## 2020-01-01 DIAGNOSIS — R63.4 WEIGHT LOSS: ICD-10-CM

## 2020-01-01 DIAGNOSIS — D70.9 NEUTROPENIC FEVER (H): ICD-10-CM

## 2020-01-01 DIAGNOSIS — N30.00 ACUTE CYSTITIS WITHOUT HEMATURIA: ICD-10-CM

## 2020-01-01 LAB
1,25(OH)2D SERPL-MCNC: >200 PG/ML (ref 19.9–79.3)
1,3 BETA GLUCAN SER-MCNC: <31 PG/ML
ABO + RH BLD: NORMAL
ACANTHOCYTES BLD QL SMEAR: SLIGHT
ACID FAST STN SPEC QL: NORMAL
ACTH PLAS-MCNC: 33 PG/ML
ACTH PLAS-MCNC: 40 PG/ML
AFP SERPL-MCNC: 1.5 UG/L (ref 0–8)
AFP SERPL-MCNC: 4.4 UG/L (ref 0–8)
ALBUMIN SERPL-MCNC: 1.4 G/DL (ref 3.4–5)
ALBUMIN SERPL-MCNC: 1.5 G/DL (ref 3.4–5)
ALBUMIN SERPL-MCNC: 1.6 G/DL (ref 3.4–5)
ALBUMIN SERPL-MCNC: 2 G/DL (ref 3.4–5)
ALBUMIN SERPL-MCNC: 2.1 G/DL (ref 3.4–5)
ALBUMIN SERPL-MCNC: 2.1 G/DL (ref 3.4–5)
ALBUMIN SERPL-MCNC: 2.2 G/DL (ref 3.4–5)
ALBUMIN SERPL-MCNC: 2.3 G/DL (ref 3.4–5)
ALBUMIN SERPL-MCNC: 2.4 G/DL (ref 3.4–5)
ALBUMIN SERPL-MCNC: 2.5 G/DL (ref 3.4–5)
ALBUMIN SERPL-MCNC: 2.6 G/DL (ref 3.4–5)
ALBUMIN SERPL-MCNC: 2.6 G/DL (ref 3.4–5)
ALBUMIN SERPL-MCNC: 2.7 G/DL (ref 3.4–5)
ALBUMIN SERPL-MCNC: 2.8 G/DL (ref 3.4–5)
ALBUMIN SERPL-MCNC: 2.8 G/DL (ref 3.4–5)
ALBUMIN SERPL-MCNC: 2.9 G/DL (ref 3.4–5)
ALBUMIN SERPL-MCNC: 3.7 G/DL (ref 3.4–5)
ALBUMIN UR-MCNC: 20 MG/DL
ALBUMIN UR-MCNC: 30 MG/DL
ALBUMIN UR-MCNC: 50 MG/DL
ALBUMIN UR-MCNC: 70 MG/DL
ALP BONE CFR SERPL: 125 U/L (ref 0–55)
ALP LIVER SERPL-CCNC: 710 U/L (ref 0–94)
ALP OTHER CFR SERPL: 0 U/L
ALP SERPL-CCNC: 1492 U/L (ref 40–150)
ALP SERPL-CCNC: 205 U/L (ref 40–150)
ALP SERPL-CCNC: 207 U/L (ref 40–150)
ALP SERPL-CCNC: 262 U/L (ref 40–150)
ALP SERPL-CCNC: 278 U/L (ref 40–150)
ALP SERPL-CCNC: 290 U/L (ref 40–150)
ALP SERPL-CCNC: 340 U/L (ref 40–150)
ALP SERPL-CCNC: 345 U/L (ref 40–150)
ALP SERPL-CCNC: 385 U/L (ref 40–150)
ALP SERPL-CCNC: 427 U/L (ref 40–150)
ALP SERPL-CCNC: 429 U/L (ref 40–150)
ALP SERPL-CCNC: 443 U/L (ref 40–150)
ALP SERPL-CCNC: 491 U/L (ref 40–150)
ALP SERPL-CCNC: 528 U/L (ref 40–150)
ALP SERPL-CCNC: 533 U/L (ref 40–150)
ALP SERPL-CCNC: 635 U/L (ref 40–150)
ALP SERPL-CCNC: 643 U/L (ref 40–150)
ALP SERPL-CCNC: 683 U/L (ref 40–150)
ALP SERPL-CCNC: 739 U/L (ref 40–150)
ALP SERPL-CCNC: 835 U/L (ref 40–120)
ALP SERPL-CCNC: 878 U/L (ref 40–150)
ALT SERPL W P-5'-P-CCNC: 10 U/L (ref 0–70)
ALT SERPL W P-5'-P-CCNC: 101 U/L (ref 0–70)
ALT SERPL W P-5'-P-CCNC: 11 U/L (ref 0–70)
ALT SERPL W P-5'-P-CCNC: 12 U/L (ref 0–70)
ALT SERPL W P-5'-P-CCNC: 123 U/L (ref 0–70)
ALT SERPL W P-5'-P-CCNC: 17 U/L (ref 0–70)
ALT SERPL W P-5'-P-CCNC: 19 U/L (ref 0–70)
ALT SERPL W P-5'-P-CCNC: 21 U/L (ref 0–70)
ALT SERPL W P-5'-P-CCNC: 23 U/L (ref 0–70)
ALT SERPL W P-5'-P-CCNC: 233 U/L (ref 0–70)
ALT SERPL W P-5'-P-CCNC: 24 U/L (ref 0–70)
ALT SERPL W P-5'-P-CCNC: 32 U/L (ref 0–70)
ALT SERPL W P-5'-P-CCNC: 33 U/L (ref 0–70)
ALT SERPL W P-5'-P-CCNC: 33 U/L (ref 0–70)
ALT SERPL W P-5'-P-CCNC: 37 U/L (ref 0–70)
ALT SERPL W P-5'-P-CCNC: 38 U/L (ref 0–70)
ALT SERPL W P-5'-P-CCNC: 40 U/L (ref 0–70)
ALT SERPL W P-5'-P-CCNC: 59 U/L (ref 0–70)
ALT SERPL W P-5'-P-CCNC: 68 U/L (ref 0–70)
ALT SERPL W P-5'-P-CCNC: 81 U/L (ref 0–70)
AMORPH CRY #/AREA URNS HPF: ABNORMAL /HPF
ANA SER QL IF: NEGATIVE
ANCA AB PATTERN SER IF-IMP: NORMAL
ANION GAP SERPL CALCULATED.3IONS-SCNC: 10 MMOL/L (ref 3–14)
ANION GAP SERPL CALCULATED.3IONS-SCNC: 3 MMOL/L (ref 3–14)
ANION GAP SERPL CALCULATED.3IONS-SCNC: 3 MMOL/L (ref 3–14)
ANION GAP SERPL CALCULATED.3IONS-SCNC: 4 MMOL/L (ref 3–14)
ANION GAP SERPL CALCULATED.3IONS-SCNC: 5 MMOL/L (ref 3–14)
ANION GAP SERPL CALCULATED.3IONS-SCNC: 6 MMOL/L (ref 3–14)
ANION GAP SERPL CALCULATED.3IONS-SCNC: 7 MMOL/L (ref 3–14)
ANION GAP SERPL CALCULATED.3IONS-SCNC: 8 MMOL/L (ref 3–14)
ANION GAP SERPL CALCULATED.3IONS-SCNC: 9 MMOL/L (ref 3–14)
ANISOCYTOSIS BLD QL SMEAR: ABNORMAL
ANISOCYTOSIS BLD QL SMEAR: SLIGHT
APPEARANCE FLD: CLEAR
APPEARANCE UR: ABNORMAL
APPEARANCE UR: ABNORMAL
APPEARANCE UR: CLEAR
APTT PPP: 35 SEC (ref 22–37)
APTT PPP: 35 SEC (ref 22–37)
APTT PPP: 38 SEC (ref 22–37)
ASPERGILLUS GALACTOMANNAN ANTIGEN BAL: NEGATIVE
AST SERPL W P-5'-P-CCNC: 152 U/L (ref 0–45)
AST SERPL W P-5'-P-CCNC: 172 U/L (ref 0–45)
AST SERPL W P-5'-P-CCNC: 18 U/L (ref 0–45)
AST SERPL W P-5'-P-CCNC: 18 U/L (ref 0–45)
AST SERPL W P-5'-P-CCNC: 20 U/L (ref 0–45)
AST SERPL W P-5'-P-CCNC: 25 U/L (ref 0–45)
AST SERPL W P-5'-P-CCNC: 28 U/L (ref 0–45)
AST SERPL W P-5'-P-CCNC: 35 U/L (ref 0–45)
AST SERPL W P-5'-P-CCNC: 39 U/L (ref 0–45)
AST SERPL W P-5'-P-CCNC: 39 U/L (ref 0–45)
AST SERPL W P-5'-P-CCNC: 40 U/L (ref 0–45)
AST SERPL W P-5'-P-CCNC: 44 U/L (ref 0–45)
AST SERPL W P-5'-P-CCNC: 47 U/L (ref 0–45)
AST SERPL W P-5'-P-CCNC: 48 U/L (ref 0–45)
AST SERPL W P-5'-P-CCNC: 48 U/L (ref 0–45)
AST SERPL W P-5'-P-CCNC: 57 U/L (ref 0–45)
AST SERPL W P-5'-P-CCNC: 65 U/L (ref 0–45)
AST SERPL W P-5'-P-CCNC: 68 U/L (ref 0–45)
B-D GLUCAN INTERPRETATION (1,3): NEGATIVE
BACTERIA #/AREA URNS HPF: ABNORMAL /HPF
BACTERIA SPEC CULT: ABNORMAL
BACTERIA SPEC CULT: NO GROWTH
BACTERIA SPEC CULT: NORMAL
BASE DEFICIT BLDA-SCNC: 0 MMOL/L
BASE DEFICIT BLDA-SCNC: 0.1 MMOL/L
BASE DEFICIT BLDA-SCNC: 0.4 MMOL/L
BASE DEFICIT BLDA-SCNC: 0.5 MMOL/L
BASE DEFICIT BLDA-SCNC: 0.8 MMOL/L
BASE DEFICIT BLDA-SCNC: 1.3 MMOL/L
BASE DEFICIT BLDA-SCNC: 1.6 MMOL/L
BASE DEFICIT BLDA-SCNC: 1.9 MMOL/L
BASE DEFICIT BLDA-SCNC: 1.9 MMOL/L
BASE DEFICIT BLDA-SCNC: 2.4 MMOL/L
BASE DEFICIT BLDA-SCNC: 2.4 MMOL/L
BASE DEFICIT BLDV-SCNC: 1.3 MMOL/L
BASE DEFICIT BLDV-SCNC: 1.5 MMOL/L
BASE DEFICIT BLDV-SCNC: 4.8 MMOL/L
BASE EXCESS BLDA CALC-SCNC: 0.4 MMOL/L
BASE EXCESS BLDA CALC-SCNC: 0.5 MMOL/L
BASE EXCESS BLDA CALC-SCNC: 0.6 MMOL/L
BASE EXCESS BLDA CALC-SCNC: 2.5 MMOL/L
BASO STIPL BLD QL SMEAR: PRESENT
BASOPHILS # BLD AUTO: 0 10E9/L (ref 0–0.2)
BASOPHILS # BLD AUTO: 0.1 10E9/L (ref 0–0.2)
BASOPHILS # BLD AUTO: 0.1 10E9/L (ref 0–0.2)
BASOPHILS # BLD AUTO: 0.4 10E9/L (ref 0–0.2)
BASOPHILS # BLD AUTO: 0.6 10E9/L (ref 0–0.2)
BASOPHILS # BLD AUTO: 0.6 10E9/L (ref 0–0.2)
BASOPHILS # BLD AUTO: 0.7 10E9/L (ref 0–0.2)
BASOPHILS NFR BLD AUTO: 0 %
BASOPHILS NFR BLD AUTO: 0.5 %
BASOPHILS NFR BLD AUTO: 1 %
BASOPHILS NFR BLD AUTO: 1.1 %
BASOPHILS NFR BLD AUTO: 2 %
BILIRUB DIRECT SERPL-MCNC: 0.1 MG/DL (ref 0–0.2)
BILIRUB DIRECT SERPL-MCNC: <0.1 MG/DL (ref 0–0.2)
BILIRUB DIRECT SERPL-MCNC: <0.1 MG/DL (ref 0–0.2)
BILIRUB SERPL-MCNC: 0.2 MG/DL (ref 0.2–1.3)
BILIRUB SERPL-MCNC: 0.3 MG/DL (ref 0.2–1.3)
BILIRUB SERPL-MCNC: 0.4 MG/DL (ref 0.2–1.3)
BILIRUB SERPL-MCNC: 0.5 MG/DL (ref 0.2–1.3)
BILIRUB UR QL STRIP: NEGATIVE
BLASTS # BLD: 0 10E9/L
BLASTS # BLD: 0.7 10E9/L
BLASTS BLD QL SMEAR: 1 %
BLASTS BLD QL SMEAR: 1 %
BLD GP AB SCN SERPL QL: NORMAL
BLD PROD TYP BPU: NORMAL
BLD UNIT ID BPU: 0
BLOOD BANK CMNT PATIENT-IMP: NORMAL
BLOOD PRODUCT CODE: NORMAL
BPU ID: NORMAL
BUN SERPL-MCNC: 11 MG/DL (ref 7–30)
BUN SERPL-MCNC: 12 MG/DL (ref 7–30)
BUN SERPL-MCNC: 14 MG/DL (ref 7–30)
BUN SERPL-MCNC: 14 MG/DL (ref 7–30)
BUN SERPL-MCNC: 16 MG/DL (ref 7–30)
BUN SERPL-MCNC: 16 MG/DL (ref 7–30)
BUN SERPL-MCNC: 18 MG/DL (ref 7–30)
BUN SERPL-MCNC: 18 MG/DL (ref 7–30)
BUN SERPL-MCNC: 19 MG/DL (ref 7–30)
BUN SERPL-MCNC: 19 MG/DL (ref 7–30)
BUN SERPL-MCNC: 21 MG/DL (ref 7–30)
BUN SERPL-MCNC: 24 MG/DL (ref 7–30)
BUN SERPL-MCNC: 25 MG/DL (ref 7–30)
BUN SERPL-MCNC: 26 MG/DL (ref 7–30)
BUN SERPL-MCNC: 27 MG/DL (ref 7–30)
BUN SERPL-MCNC: 28 MG/DL (ref 7–30)
BUN SERPL-MCNC: 29 MG/DL (ref 7–30)
BUN SERPL-MCNC: 29 MG/DL (ref 7–30)
BUN SERPL-MCNC: 31 MG/DL (ref 7–30)
BUN SERPL-MCNC: 31 MG/DL (ref 7–30)
BUN SERPL-MCNC: 32 MG/DL (ref 7–30)
BUN SERPL-MCNC: 33 MG/DL (ref 7–30)
BUN SERPL-MCNC: 34 MG/DL (ref 7–30)
BUN SERPL-MCNC: 35 MG/DL (ref 7–30)
BUN SERPL-MCNC: 36 MG/DL (ref 7–30)
BUN SERPL-MCNC: 37 MG/DL (ref 7–30)
BUN SERPL-MCNC: 39 MG/DL (ref 7–30)
BUN SERPL-MCNC: 42 MG/DL (ref 7–30)
BUN SERPL-MCNC: 43 MG/DL (ref 7–30)
BUN SERPL-MCNC: 43 MG/DL (ref 7–30)
BUN SERPL-MCNC: 47 MG/DL (ref 7–30)
BUN SERPL-MCNC: 47 MG/DL (ref 7–30)
BUN SERPL-MCNC: 48 MG/DL (ref 7–30)
BUN SERPL-MCNC: 50 MG/DL (ref 7–30)
BUN SERPL-MCNC: 60 MG/DL (ref 7–30)
BUN SERPL-MCNC: 63 MG/DL (ref 7–30)
BUN SERPL-MCNC: 66 MG/DL (ref 7–30)
BUN SERPL-MCNC: 67 MG/DL (ref 7–30)
BUN SERPL-MCNC: 69 MG/DL (ref 7–30)
BURR CELLS BLD QL SMEAR: SLIGHT
C DIFF TOX B STL QL: NEGATIVE
C DIFF TOX B STL QL: NEGATIVE
C PNEUM DNA SPEC QL NAA+PROBE: NOT DETECTED
C-ANCA TITR SER IF: NORMAL {TITER}
CA-I BLD-MCNC: 2.9 MG/DL (ref 4.4–5.2)
CA-I BLD-MCNC: 3.2 MG/DL (ref 4.4–5.2)
CA-I BLD-MCNC: 3.3 MG/DL (ref 4.4–5.2)
CA-I BLD-MCNC: 3.4 MG/DL (ref 4.4–5.2)
CA-I BLD-MCNC: 3.5 MG/DL (ref 4.4–5.2)
CA-I BLD-MCNC: 3.7 MG/DL (ref 4.4–5.2)
CA-I BLD-MCNC: 3.8 MG/DL (ref 4.4–5.2)
CA-I BLD-MCNC: 4.1 MG/DL (ref 4.4–5.2)
CA-I BLD-MCNC: 5.3 MG/DL (ref 4.4–5.2)
CA-I BLD-MCNC: 5.5 MG/DL (ref 4.4–5.2)
CA-I BLD-MCNC: 5.8 MG/DL (ref 4.4–5.2)
CA-I BLD-MCNC: 6.3 MG/DL (ref 4.4–5.2)
CA-I BLD-MCNC: 7.8 MG/DL (ref 4.4–5.2)
CA-I SERPL ISE-MCNC: 3.1 MG/DL (ref 4.4–5.2)
CA-I SERPL ISE-MCNC: 3.4 MG/DL (ref 4.4–5.2)
CA-I SERPL ISE-MCNC: 3.6 MG/DL (ref 4.4–5.2)
CA-I SERPL ISE-MCNC: 3.9 MG/DL (ref 4.4–5.2)
CA-I SERPL ISE-MCNC: 4 MG/DL (ref 4.4–5.2)
CA-I SERPL ISE-MCNC: 4 MG/DL (ref 4.4–5.2)
CA-I SERPL ISE-MCNC: 5.4 MG/DL (ref 4.4–5.2)
CALCIUM SERPL-MCNC: 10.1 MG/DL (ref 8.5–10.1)
CALCIUM SERPL-MCNC: 10.7 MG/DL (ref 8.5–10.1)
CALCIUM SERPL-MCNC: 13 MG/DL (ref 8.5–10.1)
CALCIUM SERPL-MCNC: 13.4 MG/DL (ref 8.5–10.1)
CALCIUM SERPL-MCNC: 13.8 MG/DL (ref 8.5–10.1)
CALCIUM SERPL-MCNC: 15.5 MG/DL (ref 8.5–10.1)
CALCIUM SERPL-MCNC: 5 MG/DL (ref 8.5–10.1)
CALCIUM SERPL-MCNC: 5.4 MG/DL (ref 8.5–10.1)
CALCIUM SERPL-MCNC: 5.5 MG/DL (ref 8.5–10.1)
CALCIUM SERPL-MCNC: 5.5 MG/DL (ref 8.5–10.1)
CALCIUM SERPL-MCNC: 5.6 MG/DL (ref 8.5–10.1)
CALCIUM SERPL-MCNC: 5.9 MG/DL (ref 8.5–10.1)
CALCIUM SERPL-MCNC: 6 MG/DL (ref 8.5–10.1)
CALCIUM SERPL-MCNC: 6.1 MG/DL (ref 8.5–10.1)
CALCIUM SERPL-MCNC: 6.2 MG/DL (ref 8.5–10.1)
CALCIUM SERPL-MCNC: 6.2 MG/DL (ref 8.5–10.1)
CALCIUM SERPL-MCNC: 6.3 MG/DL (ref 8.5–10.1)
CALCIUM SERPL-MCNC: 6.6 MG/DL (ref 8.5–10.1)
CALCIUM SERPL-MCNC: 6.8 MG/DL (ref 8.5–10.1)
CALCIUM SERPL-MCNC: 6.8 MG/DL (ref 8.5–10.1)
CALCIUM SERPL-MCNC: 6.9 MG/DL (ref 8.5–10.1)
CALCIUM SERPL-MCNC: 6.9 MG/DL (ref 8.5–10.1)
CALCIUM SERPL-MCNC: 7 MG/DL (ref 8.5–10.1)
CALCIUM SERPL-MCNC: 7.1 MG/DL (ref 8.5–10.1)
CALCIUM SERPL-MCNC: 7.1 MG/DL (ref 8.5–10.1)
CALCIUM SERPL-MCNC: 7.3 MG/DL (ref 8.5–10.1)
CALCIUM SERPL-MCNC: 7.3 MG/DL (ref 8.5–10.1)
CALCIUM SERPL-MCNC: 7.8 MG/DL (ref 8.5–10.1)
CALCIUM SERPL-MCNC: 8 MG/DL (ref 8.5–10.1)
CALCIUM SERPL-MCNC: 8.2 MG/DL (ref 8.5–10.1)
CALCIUM SERPL-MCNC: 8.3 MG/DL (ref 8.5–10.1)
CALCIUM SERPL-MCNC: 8.4 MG/DL (ref 8.5–10.1)
CALCIUM SERPL-MCNC: 8.7 MG/DL (ref 8.5–10.1)
CALCIUM SERPL-MCNC: 8.9 MG/DL (ref 8.5–10.1)
CALCIUM SERPL-MCNC: 9.1 MG/DL (ref 8.5–10.1)
CALCIUM SERPL-MCNC: 9.4 MG/DL (ref 8.5–10.1)
CALCIUM SERPL-MCNC: 9.4 MG/DL (ref 8.5–10.1)
CALCIUM SERPL-MCNC: 9.6 MG/DL (ref 8.5–10.1)
CALCIUM SERPL-MCNC: 9.7 MG/DL (ref 8.5–10.1)
CALCIUM SERPL-MCNC: 9.8 MG/DL (ref 8.5–10.1)
CALCIUM SERPL-MCNC: 9.8 MG/DL (ref 8.5–10.1)
CALCIUM SERPL-MCNC: <5 MG/DL (ref 8.5–10.1)
CD19 CELLS NFR BRONCH: 0 %
CD3 CELLS NFR BRONCH: 91 %
CD3+CD4+ CELLS NFR BRONCH: 16 %
CD3+CD4+ CELLS/CD3+CD8+ CLL BRONCH: 0.21 %
CD3+CD8+ CELLS NFR BRONCH: 75 %
CD3-CD16+CD56+ CELLS NFR SPEC: 9 %
CEA SERPL-MCNC: 0.5 UG/L (ref 0–2.5)
CHLORIDE SERPL-SCNC: 103 MMOL/L (ref 94–109)
CHLORIDE SERPL-SCNC: 105 MMOL/L (ref 94–109)
CHLORIDE SERPL-SCNC: 106 MMOL/L (ref 94–109)
CHLORIDE SERPL-SCNC: 106 MMOL/L (ref 94–109)
CHLORIDE SERPL-SCNC: 107 MMOL/L (ref 94–109)
CHLORIDE SERPL-SCNC: 108 MMOL/L (ref 94–109)
CHLORIDE SERPL-SCNC: 109 MMOL/L (ref 94–109)
CHLORIDE SERPL-SCNC: 110 MMOL/L (ref 94–109)
CHLORIDE SERPL-SCNC: 111 MMOL/L (ref 94–109)
CHLORIDE SERPL-SCNC: 112 MMOL/L (ref 94–109)
CHLORIDE SERPL-SCNC: 113 MMOL/L (ref 94–109)
CHLORIDE SERPL-SCNC: 114 MMOL/L (ref 94–109)
CHLORIDE SERPL-SCNC: 115 MMOL/L (ref 94–109)
CHLORIDE SERPL-SCNC: 116 MMOL/L (ref 94–109)
CHLORIDE SERPL-SCNC: 117 MMOL/L (ref 94–109)
CHLORIDE SERPL-SCNC: 117 MMOL/L (ref 94–109)
CHLORIDE SERPL-SCNC: 118 MMOL/L (ref 94–109)
CHLORIDE SERPL-SCNC: 119 MMOL/L (ref 94–109)
CHLORIDE SERPL-SCNC: 120 MMOL/L (ref 94–109)
CHLORIDE SERPL-SCNC: 121 MMOL/L (ref 94–109)
CK SERPL-CCNC: 53 U/L (ref 30–300)
CK SERPL-CCNC: 88 U/L (ref 30–300)
CMV DNA SPEC NAA+PROBE-ACNC: 1050 [IU]/ML
CMV DNA SPEC NAA+PROBE-LOG#: 3 {LOG_IU}/ML
CMV IGG SERPL QL IA: >8 AI (ref 0–0.8)
CMV IGM SERPL QL IA: <0.2 AI (ref 0–0.8)
CO2 SERPL-SCNC: 17 MMOL/L (ref 20–32)
CO2 SERPL-SCNC: 17 MMOL/L (ref 20–32)
CO2 SERPL-SCNC: 18 MMOL/L (ref 20–32)
CO2 SERPL-SCNC: 19 MMOL/L (ref 20–32)
CO2 SERPL-SCNC: 20 MMOL/L (ref 20–32)
CO2 SERPL-SCNC: 21 MMOL/L (ref 20–32)
CO2 SERPL-SCNC: 22 MMOL/L (ref 20–32)
CO2 SERPL-SCNC: 23 MMOL/L (ref 20–32)
CO2 SERPL-SCNC: 24 MMOL/L (ref 20–32)
CO2 SERPL-SCNC: 25 MMOL/L (ref 20–32)
CO2 SERPL-SCNC: 26 MMOL/L (ref 20–32)
CO2 SERPL-SCNC: 26 MMOL/L (ref 20–32)
CO2 SERPL-SCNC: 28 MMOL/L (ref 20–32)
CO2 SERPL-SCNC: 28 MMOL/L (ref 20–32)
CO2 SERPL-SCNC: 29 MMOL/L (ref 20–32)
COLOR FLD: COLORLESS
COLOR UR AUTO: ABNORMAL
COPATH REPORT: NORMAL
CORTIS SERPL-MCNC: 1.7 UG/DL (ref 4–22)
CORTIS SERPL-MCNC: 17.7 UG/DL (ref 4–22)
CORTIS SERPL-MCNC: 27 UG/DL (ref 4–22)
CREAT SERPL-MCNC: 1.14 MG/DL (ref 0.66–1.25)
CREAT SERPL-MCNC: 1.15 MG/DL (ref 0.66–1.25)
CREAT SERPL-MCNC: 1.26 MG/DL (ref 0.66–1.25)
CREAT SERPL-MCNC: 1.27 MG/DL (ref 0.66–1.25)
CREAT SERPL-MCNC: 1.28 MG/DL (ref 0.66–1.25)
CREAT SERPL-MCNC: 1.3 MG/DL (ref 0.66–1.25)
CREAT SERPL-MCNC: 1.32 MG/DL (ref 0.66–1.25)
CREAT SERPL-MCNC: 1.34 MG/DL (ref 0.66–1.25)
CREAT SERPL-MCNC: 1.35 MG/DL (ref 0.66–1.25)
CREAT SERPL-MCNC: 1.37 MG/DL (ref 0.66–1.25)
CREAT SERPL-MCNC: 1.37 MG/DL (ref 0.66–1.25)
CREAT SERPL-MCNC: 1.4 MG/DL (ref 0.66–1.25)
CREAT SERPL-MCNC: 1.4 MG/DL (ref 0.66–1.25)
CREAT SERPL-MCNC: 1.41 MG/DL (ref 0.66–1.25)
CREAT SERPL-MCNC: 1.42 MG/DL (ref 0.66–1.25)
CREAT SERPL-MCNC: 1.43 MG/DL (ref 0.66–1.25)
CREAT SERPL-MCNC: 1.44 MG/DL (ref 0.66–1.25)
CREAT SERPL-MCNC: 1.52 MG/DL (ref 0.66–1.25)
CREAT SERPL-MCNC: 1.54 MG/DL (ref 0.66–1.25)
CREAT SERPL-MCNC: 1.56 MG/DL (ref 0.66–1.25)
CREAT SERPL-MCNC: 1.57 MG/DL (ref 0.66–1.25)
CREAT SERPL-MCNC: 1.62 MG/DL (ref 0.66–1.25)
CREAT SERPL-MCNC: 1.67 MG/DL (ref 0.66–1.25)
CREAT SERPL-MCNC: 1.7 MG/DL (ref 0.66–1.25)
CREAT SERPL-MCNC: 1.76 MG/DL (ref 0.66–1.25)
CREAT SERPL-MCNC: 1.78 MG/DL (ref 0.66–1.25)
CREAT SERPL-MCNC: 1.82 MG/DL (ref 0.66–1.25)
CREAT SERPL-MCNC: 1.83 MG/DL (ref 0.66–1.25)
CREAT SERPL-MCNC: 1.84 MG/DL (ref 0.66–1.25)
CREAT SERPL-MCNC: 1.86 MG/DL (ref 0.66–1.25)
CREAT SERPL-MCNC: 1.88 MG/DL (ref 0.66–1.25)
CREAT SERPL-MCNC: 1.88 MG/DL (ref 0.66–1.25)
CREAT SERPL-MCNC: 1.93 MG/DL (ref 0.66–1.25)
CREAT SERPL-MCNC: 1.96 MG/DL (ref 0.66–1.25)
CREAT SERPL-MCNC: 2.01 MG/DL (ref 0.66–1.25)
CREAT SERPL-MCNC: 2.03 MG/DL (ref 0.66–1.25)
CREAT SERPL-MCNC: 2.21 MG/DL (ref 0.66–1.25)
CREAT SERPL-MCNC: 2.4 MG/DL (ref 0.66–1.25)
CREAT SERPL-MCNC: 2.56 MG/DL (ref 0.66–1.25)
CREAT SERPL-MCNC: 2.61 MG/DL (ref 0.66–1.25)
CREAT SERPL-MCNC: 3.17 MG/DL (ref 0.66–1.25)
CREAT SERPL-MCNC: 3.63 MG/DL (ref 0.66–1.25)
CREAT SERPL-MCNC: 4 MG/DL (ref 0.66–1.25)
CREAT SERPL-MCNC: 4.23 MG/DL (ref 0.66–1.25)
CREAT SERPL-MCNC: 4.3 MG/DL (ref 0.66–1.25)
CREAT SERPL-MCNC: 4.4 MG/DL (ref 0.66–1.25)
CREAT SERPL-MCNC: 4.45 MG/DL (ref 0.66–1.25)
CREAT SERPL-MCNC: 4.51 MG/DL (ref 0.66–1.25)
CRP SERPL-MCNC: 129 MG/L (ref 0–8)
D DIMER PPP FEU-MCNC: 8.7 UG/ML FEU (ref 0–0.5)
DACRYOCYTES BLD QL SMEAR: SLIGHT
DEPRECATED CALCIDIOL+CALCIFEROL SERPL-MC: 25 UG/L (ref 20–75)
DIFFERENTIAL METHOD BLD: ABNORMAL
DOHLE BOD BLD QL SMEAR: PRESENT
ELLIPTOCYTES BLD QL SMEAR: SLIGHT
EOSINOPHIL # BLD AUTO: 0 10E9/L (ref 0–0.7)
EOSINOPHIL # BLD AUTO: 0.2 10E9/L (ref 0–0.7)
EOSINOPHIL # BLD AUTO: 0.4 10E9/L (ref 0–0.7)
EOSINOPHIL # BLD AUTO: 0.5 10E9/L (ref 0–0.7)
EOSINOPHIL # BLD AUTO: 0.6 10E9/L (ref 0–0.7)
EOSINOPHIL # BLD AUTO: 0.7 10E9/L (ref 0–0.7)
EOSINOPHIL # BLD AUTO: 1.1 10E9/L (ref 0–0.7)
EOSINOPHIL # BLD AUTO: 2.3 10E9/L (ref 0–0.7)
EOSINOPHIL NFR BLD AUTO: 0 %
EOSINOPHIL NFR BLD AUTO: 0.1 %
EOSINOPHIL NFR BLD AUTO: 1 %
EOSINOPHIL NFR BLD AUTO: 2 %
EOSINOPHIL NFR BLD AUTO: 2.6 %
EOSINOPHIL NFR BLD AUTO: 3 %
EOSINOPHIL NFR BLD AUTO: 4 %
EOSINOPHIL NFR BLD AUTO: 4 %
EOSINOPHIL NFR BLD AUTO: 9 %
ERYTHROCYTE [DISTWIDTH] IN BLOOD BY AUTOMATED COUNT: 14.8 % (ref 10–15)
ERYTHROCYTE [DISTWIDTH] IN BLOOD BY AUTOMATED COUNT: 15 % (ref 10–15)
ERYTHROCYTE [DISTWIDTH] IN BLOOD BY AUTOMATED COUNT: 15.1 % (ref 10–15)
ERYTHROCYTE [DISTWIDTH] IN BLOOD BY AUTOMATED COUNT: 15.2 % (ref 10–15)
ERYTHROCYTE [DISTWIDTH] IN BLOOD BY AUTOMATED COUNT: 15.3 % (ref 10–15)
ERYTHROCYTE [DISTWIDTH] IN BLOOD BY AUTOMATED COUNT: 15.3 % (ref 10–15)
ERYTHROCYTE [DISTWIDTH] IN BLOOD BY AUTOMATED COUNT: 15.4 % (ref 10–15)
ERYTHROCYTE [DISTWIDTH] IN BLOOD BY AUTOMATED COUNT: 15.6 % (ref 10–15)
ERYTHROCYTE [DISTWIDTH] IN BLOOD BY AUTOMATED COUNT: 15.7 % (ref 10–15)
ERYTHROCYTE [DISTWIDTH] IN BLOOD BY AUTOMATED COUNT: 15.8 % (ref 10–15)
ERYTHROCYTE [DISTWIDTH] IN BLOOD BY AUTOMATED COUNT: 15.9 % (ref 10–15)
ERYTHROCYTE [DISTWIDTH] IN BLOOD BY AUTOMATED COUNT: 16.2 % (ref 10–15)
ERYTHROCYTE [DISTWIDTH] IN BLOOD BY AUTOMATED COUNT: 16.2 % (ref 10–15)
ERYTHROCYTE [DISTWIDTH] IN BLOOD BY AUTOMATED COUNT: 16.3 % (ref 10–15)
ERYTHROCYTE [DISTWIDTH] IN BLOOD BY AUTOMATED COUNT: 16.4 % (ref 10–15)
ERYTHROCYTE [DISTWIDTH] IN BLOOD BY AUTOMATED COUNT: 16.5 % (ref 10–15)
ERYTHROCYTE [DISTWIDTH] IN BLOOD BY AUTOMATED COUNT: 16.5 % (ref 10–15)
ERYTHROCYTE [DISTWIDTH] IN BLOOD BY AUTOMATED COUNT: 16.6 % (ref 10–15)
ERYTHROCYTE [DISTWIDTH] IN BLOOD BY AUTOMATED COUNT: 16.6 % (ref 10–15)
ERYTHROCYTE [DISTWIDTH] IN BLOOD BY AUTOMATED COUNT: 16.7 % (ref 10–15)
ERYTHROCYTE [DISTWIDTH] IN BLOOD BY AUTOMATED COUNT: 17 % (ref 10–15)
ERYTHROCYTE [DISTWIDTH] IN BLOOD BY AUTOMATED COUNT: 17.2 % (ref 10–15)
ERYTHROCYTE [DISTWIDTH] IN BLOOD BY AUTOMATED COUNT: 17.2 % (ref 10–15)
ERYTHROCYTE [DISTWIDTH] IN BLOOD BY AUTOMATED COUNT: 17.4 % (ref 10–15)
ERYTHROCYTE [DISTWIDTH] IN BLOOD BY AUTOMATED COUNT: 17.5 % (ref 10–15)
ERYTHROCYTE [DISTWIDTH] IN BLOOD BY AUTOMATED COUNT: 17.7 % (ref 10–15)
ERYTHROCYTE [DISTWIDTH] IN BLOOD BY AUTOMATED COUNT: 17.8 % (ref 10–15)
ERYTHROCYTE [DISTWIDTH] IN BLOOD BY AUTOMATED COUNT: 18 % (ref 10–15)
ERYTHROCYTE [DISTWIDTH] IN BLOOD BY AUTOMATED COUNT: 18.1 % (ref 10–15)
ERYTHROCYTE [DISTWIDTH] IN BLOOD BY AUTOMATED COUNT: 18.2 % (ref 10–15)
ERYTHROCYTE [DISTWIDTH] IN BLOOD BY AUTOMATED COUNT: 18.3 % (ref 10–15)
ERYTHROCYTE [DISTWIDTH] IN BLOOD BY AUTOMATED COUNT: 18.6 % (ref 10–15)
ERYTHROCYTE [DISTWIDTH] IN BLOOD BY AUTOMATED COUNT: 18.6 % (ref 10–15)
ERYTHROCYTE [DISTWIDTH] IN BLOOD BY AUTOMATED COUNT: 19.5 % (ref 10–15)
ERYTHROCYTE [DISTWIDTH] IN BLOOD BY AUTOMATED COUNT: 19.6 % (ref 10–15)
ERYTHROCYTE [DISTWIDTH] IN BLOOD BY AUTOMATED COUNT: 20.2 % (ref 10–15)
ERYTHROCYTE [SEDIMENTATION RATE] IN BLOOD BY WESTERGREN METHOD: 18 MM/H (ref 0–20)
EXPTIME-PRE: 0.99 SEC
FEF2575-%PRED-PRE: 158 %
FEF2575-PRE: 4.07 L/SEC
FEF2575-PRED: 2.56 L/SEC
FEFMAX-%PRED-PRE: 86 %
FEFMAX-PRE: 6.75 L/SEC
FEFMAX-PRED: 7.77 L/SEC
FERRITIN SERPL-MCNC: 1005 NG/ML (ref 26–388)
FEV1-%PRED-PRE: 61 %
FEV1-PRE: 1.61 L
FEV1FEV6-PRE: 100 %
FEV1FEV6-PRED: 80 %
FEV1FVC-PRE: 99 %
FEV1FVC-PRED: 81 %
FIFMAX-PRE: 1.27 L/SEC
FLUAV H1 2009 PAND RNA SPEC QL NAA+PROBE: NEGATIVE
FLUAV H1 RNA SPEC QL NAA+PROBE: NEGATIVE
FLUAV H3 RNA SPEC QL NAA+PROBE: NEGATIVE
FLUAV RNA SPEC QL NAA+PROBE: NEGATIVE
FLUAV+FLUBV AG SPEC QL: NEGATIVE
FLUAV+FLUBV AG SPEC QL: NEGATIVE
FLUBV RNA SPEC QL NAA+PROBE: NEGATIVE
FOLATE SERPL-MCNC: 27.8 NG/ML
FOLATE SERPL-MCNC: 37.6 NG/ML
FVC-%PRED-PRE: 50 %
FVC-PRE: 1.63 L
FVC-PRED: 3.23 L
GALACTOMANNAN AG SERPL QL IA: NEGATIVE
GALACTOMANNAN AG SERPL-ACNC: 0.05
GALACTOMANNAN AG SERPL-ACNC: 0.09
GAMMA INTERFERON BACKGROUND BLD IA-ACNC: 0.69 IU/ML
GBM IGG SER IA-ACNC: <0.2 AI (ref 0–0.9)
GFR SERPL CREATININE-BSD FRML MDRD: 14 ML/MIN/{1.73_M2}
GFR SERPL CREATININE-BSD FRML MDRD: 15 ML/MIN/{1.73_M2}
GFR SERPL CREATININE-BSD FRML MDRD: 15 ML/MIN/{1.73_M2}
GFR SERPL CREATININE-BSD FRML MDRD: 16 ML/MIN/{1.73_M2}
GFR SERPL CREATININE-BSD FRML MDRD: 18 ML/MIN/{1.73_M2}
GFR SERPL CREATININE-BSD FRML MDRD: 21 ML/MIN/{1.73_M2}
GFR SERPL CREATININE-BSD FRML MDRD: 27 ML/MIN/{1.73_M2}
GFR SERPL CREATININE-BSD FRML MDRD: 27 ML/MIN/{1.73_M2}
GFR SERPL CREATININE-BSD FRML MDRD: 30 ML/MIN/{1.73_M2}
GFR SERPL CREATININE-BSD FRML MDRD: 33 ML/MIN/{1.73_M2}
GFR SERPL CREATININE-BSD FRML MDRD: 36 ML/MIN/{1.73_M2}
GFR SERPL CREATININE-BSD FRML MDRD: 37 ML/MIN/{1.73_M2}
GFR SERPL CREATININE-BSD FRML MDRD: 38 ML/MIN/{1.73_M2}
GFR SERPL CREATININE-BSD FRML MDRD: 39 ML/MIN/{1.73_M2}
GFR SERPL CREATININE-BSD FRML MDRD: 40 ML/MIN/{1.73_M2}
GFR SERPL CREATININE-BSD FRML MDRD: 41 ML/MIN/{1.73_M2}
GFR SERPL CREATININE-BSD FRML MDRD: 43 ML/MIN/{1.73_M2}
GFR SERPL CREATININE-BSD FRML MDRD: 43 ML/MIN/{1.73_M2}
GFR SERPL CREATININE-BSD FRML MDRD: 45 ML/MIN/{1.73_M2}
GFR SERPL CREATININE-BSD FRML MDRD: 46 ML/MIN/{1.73_M2}
GFR SERPL CREATININE-BSD FRML MDRD: 48 ML/MIN/{1.73_M2}
GFR SERPL CREATININE-BSD FRML MDRD: 49 ML/MIN/{1.73_M2}
GFR SERPL CREATININE-BSD FRML MDRD: 50 ML/MIN/{1.73_M2}
GFR SERPL CREATININE-BSD FRML MDRD: 51 ML/MIN/{1.73_M2}
GFR SERPL CREATININE-BSD FRML MDRD: 51 ML/MIN/{1.73_M2}
GFR SERPL CREATININE-BSD FRML MDRD: 55 ML/MIN/{1.73_M2}
GFR SERPL CREATININE-BSD FRML MDRD: 56 ML/MIN/{1.73_M2}
GFR SERPL CREATININE-BSD FRML MDRD: 56 ML/MIN/{1.73_M2}
GFR SERPL CREATININE-BSD FRML MDRD: 57 ML/MIN/{1.73_M2}
GFR SERPL CREATININE-BSD FRML MDRD: 57 ML/MIN/{1.73_M2}
GFR SERPL CREATININE-BSD FRML MDRD: 58 ML/MIN/{1.73_M2}
GFR SERPL CREATININE-BSD FRML MDRD: 58 ML/MIN/{1.73_M2}
GFR SERPL CREATININE-BSD FRML MDRD: 59 ML/MIN/{1.73_M2}
GFR SERPL CREATININE-BSD FRML MDRD: 60 ML/MIN/{1.73_M2}
GFR SERPL CREATININE-BSD FRML MDRD: 61 ML/MIN/{1.73_M2}
GFR SERPL CREATININE-BSD FRML MDRD: 62 ML/MIN/{1.73_M2}
GFR SERPL CREATININE-BSD FRML MDRD: 63 ML/MIN/{1.73_M2}
GFR SERPL CREATININE-BSD FRML MDRD: 64 ML/MIN/{1.73_M2}
GFR SERPL CREATININE-BSD FRML MDRD: 65 ML/MIN/{1.73_M2}
GFR SERPL CREATININE-BSD FRML MDRD: 72 ML/MIN/{1.73_M2}
GFR SERPL CREATININE-BSD FRML MDRD: 73 ML/MIN/{1.73_M2}
GLUCOSE BLDC GLUCOMTR-MCNC: 100 MG/DL (ref 70–99)
GLUCOSE BLDC GLUCOMTR-MCNC: 103 MG/DL (ref 70–99)
GLUCOSE BLDC GLUCOMTR-MCNC: 104 MG/DL (ref 70–99)
GLUCOSE BLDC GLUCOMTR-MCNC: 106 MG/DL (ref 70–99)
GLUCOSE BLDC GLUCOMTR-MCNC: 108 MG/DL (ref 70–99)
GLUCOSE BLDC GLUCOMTR-MCNC: 108 MG/DL (ref 70–99)
GLUCOSE BLDC GLUCOMTR-MCNC: 111 MG/DL (ref 70–99)
GLUCOSE BLDC GLUCOMTR-MCNC: 112 MG/DL (ref 70–99)
GLUCOSE BLDC GLUCOMTR-MCNC: 114 MG/DL (ref 70–99)
GLUCOSE BLDC GLUCOMTR-MCNC: 115 MG/DL (ref 70–99)
GLUCOSE BLDC GLUCOMTR-MCNC: 116 MG/DL (ref 70–99)
GLUCOSE BLDC GLUCOMTR-MCNC: 117 MG/DL (ref 70–99)
GLUCOSE BLDC GLUCOMTR-MCNC: 118 MG/DL (ref 70–99)
GLUCOSE BLDC GLUCOMTR-MCNC: 118 MG/DL (ref 70–99)
GLUCOSE BLDC GLUCOMTR-MCNC: 120 MG/DL (ref 70–99)
GLUCOSE BLDC GLUCOMTR-MCNC: 120 MG/DL (ref 70–99)
GLUCOSE BLDC GLUCOMTR-MCNC: 121 MG/DL (ref 70–99)
GLUCOSE BLDC GLUCOMTR-MCNC: 122 MG/DL (ref 70–99)
GLUCOSE BLDC GLUCOMTR-MCNC: 124 MG/DL (ref 70–99)
GLUCOSE BLDC GLUCOMTR-MCNC: 125 MG/DL (ref 70–99)
GLUCOSE BLDC GLUCOMTR-MCNC: 126 MG/DL (ref 70–99)
GLUCOSE BLDC GLUCOMTR-MCNC: 127 MG/DL (ref 70–99)
GLUCOSE BLDC GLUCOMTR-MCNC: 127 MG/DL (ref 70–99)
GLUCOSE BLDC GLUCOMTR-MCNC: 128 MG/DL (ref 70–99)
GLUCOSE BLDC GLUCOMTR-MCNC: 129 MG/DL (ref 70–99)
GLUCOSE BLDC GLUCOMTR-MCNC: 130 MG/DL (ref 70–99)
GLUCOSE BLDC GLUCOMTR-MCNC: 131 MG/DL (ref 70–99)
GLUCOSE BLDC GLUCOMTR-MCNC: 131 MG/DL (ref 70–99)
GLUCOSE BLDC GLUCOMTR-MCNC: 133 MG/DL (ref 70–99)
GLUCOSE BLDC GLUCOMTR-MCNC: 133 MG/DL (ref 70–99)
GLUCOSE BLDC GLUCOMTR-MCNC: 136 MG/DL (ref 70–99)
GLUCOSE BLDC GLUCOMTR-MCNC: 137 MG/DL (ref 70–99)
GLUCOSE BLDC GLUCOMTR-MCNC: 138 MG/DL (ref 70–99)
GLUCOSE BLDC GLUCOMTR-MCNC: 139 MG/DL (ref 70–99)
GLUCOSE BLDC GLUCOMTR-MCNC: 140 MG/DL (ref 70–99)
GLUCOSE BLDC GLUCOMTR-MCNC: 141 MG/DL (ref 70–99)
GLUCOSE BLDC GLUCOMTR-MCNC: 142 MG/DL (ref 70–99)
GLUCOSE BLDC GLUCOMTR-MCNC: 144 MG/DL (ref 70–99)
GLUCOSE BLDC GLUCOMTR-MCNC: 145 MG/DL (ref 70–99)
GLUCOSE BLDC GLUCOMTR-MCNC: 148 MG/DL (ref 70–99)
GLUCOSE BLDC GLUCOMTR-MCNC: 149 MG/DL (ref 70–99)
GLUCOSE BLDC GLUCOMTR-MCNC: 152 MG/DL (ref 70–99)
GLUCOSE BLDC GLUCOMTR-MCNC: 153 MG/DL (ref 70–99)
GLUCOSE BLDC GLUCOMTR-MCNC: 155 MG/DL (ref 70–99)
GLUCOSE BLDC GLUCOMTR-MCNC: 157 MG/DL (ref 70–99)
GLUCOSE BLDC GLUCOMTR-MCNC: 158 MG/DL (ref 70–99)
GLUCOSE BLDC GLUCOMTR-MCNC: 161 MG/DL (ref 70–99)
GLUCOSE BLDC GLUCOMTR-MCNC: 169 MG/DL (ref 70–99)
GLUCOSE BLDC GLUCOMTR-MCNC: 169 MG/DL (ref 70–99)
GLUCOSE BLDC GLUCOMTR-MCNC: 170 MG/DL (ref 70–99)
GLUCOSE BLDC GLUCOMTR-MCNC: 171 MG/DL (ref 70–99)
GLUCOSE BLDC GLUCOMTR-MCNC: 173 MG/DL (ref 70–99)
GLUCOSE BLDC GLUCOMTR-MCNC: 174 MG/DL (ref 70–99)
GLUCOSE BLDC GLUCOMTR-MCNC: 174 MG/DL (ref 70–99)
GLUCOSE BLDC GLUCOMTR-MCNC: 175 MG/DL (ref 70–99)
GLUCOSE BLDC GLUCOMTR-MCNC: 176 MG/DL (ref 70–99)
GLUCOSE BLDC GLUCOMTR-MCNC: 180 MG/DL (ref 70–99)
GLUCOSE BLDC GLUCOMTR-MCNC: 181 MG/DL (ref 70–99)
GLUCOSE BLDC GLUCOMTR-MCNC: 182 MG/DL (ref 70–99)
GLUCOSE BLDC GLUCOMTR-MCNC: 62 MG/DL (ref 70–99)
GLUCOSE BLDC GLUCOMTR-MCNC: 66 MG/DL (ref 70–99)
GLUCOSE BLDC GLUCOMTR-MCNC: 72 MG/DL (ref 70–99)
GLUCOSE BLDC GLUCOMTR-MCNC: 85 MG/DL (ref 70–99)
GLUCOSE BLDC GLUCOMTR-MCNC: 87 MG/DL (ref 70–99)
GLUCOSE BLDC GLUCOMTR-MCNC: 87 MG/DL (ref 70–99)
GLUCOSE BLDC GLUCOMTR-MCNC: 88 MG/DL (ref 70–99)
GLUCOSE BLDC GLUCOMTR-MCNC: 91 MG/DL (ref 70–99)
GLUCOSE BLDC GLUCOMTR-MCNC: 92 MG/DL (ref 70–99)
GLUCOSE BLDC GLUCOMTR-MCNC: 94 MG/DL (ref 70–99)
GLUCOSE BLDC GLUCOMTR-MCNC: 95 MG/DL (ref 70–99)
GLUCOSE BLDC GLUCOMTR-MCNC: 96 MG/DL (ref 70–99)
GLUCOSE BLDC GLUCOMTR-MCNC: 96 MG/DL (ref 70–99)
GLUCOSE SERPL-MCNC: 102 MG/DL (ref 70–99)
GLUCOSE SERPL-MCNC: 102 MG/DL (ref 70–99)
GLUCOSE SERPL-MCNC: 108 MG/DL (ref 70–99)
GLUCOSE SERPL-MCNC: 111 MG/DL (ref 70–99)
GLUCOSE SERPL-MCNC: 112 MG/DL (ref 70–99)
GLUCOSE SERPL-MCNC: 114 MG/DL (ref 70–99)
GLUCOSE SERPL-MCNC: 116 MG/DL (ref 70–99)
GLUCOSE SERPL-MCNC: 117 MG/DL (ref 70–99)
GLUCOSE SERPL-MCNC: 119 MG/DL (ref 70–99)
GLUCOSE SERPL-MCNC: 121 MG/DL (ref 70–99)
GLUCOSE SERPL-MCNC: 121 MG/DL (ref 70–99)
GLUCOSE SERPL-MCNC: 125 MG/DL (ref 70–99)
GLUCOSE SERPL-MCNC: 125 MG/DL (ref 70–99)
GLUCOSE SERPL-MCNC: 126 MG/DL (ref 70–99)
GLUCOSE SERPL-MCNC: 126 MG/DL (ref 70–99)
GLUCOSE SERPL-MCNC: 127 MG/DL (ref 70–99)
GLUCOSE SERPL-MCNC: 127 MG/DL (ref 70–99)
GLUCOSE SERPL-MCNC: 129 MG/DL (ref 70–99)
GLUCOSE SERPL-MCNC: 130 MG/DL (ref 70–99)
GLUCOSE SERPL-MCNC: 131 MG/DL (ref 70–99)
GLUCOSE SERPL-MCNC: 132 MG/DL (ref 70–99)
GLUCOSE SERPL-MCNC: 134 MG/DL (ref 70–99)
GLUCOSE SERPL-MCNC: 134 MG/DL (ref 70–99)
GLUCOSE SERPL-MCNC: 136 MG/DL (ref 70–99)
GLUCOSE SERPL-MCNC: 138 MG/DL (ref 70–99)
GLUCOSE SERPL-MCNC: 138 MG/DL (ref 70–99)
GLUCOSE SERPL-MCNC: 147 MG/DL (ref 70–99)
GLUCOSE SERPL-MCNC: 150 MG/DL (ref 70–99)
GLUCOSE SERPL-MCNC: 151 MG/DL (ref 70–99)
GLUCOSE SERPL-MCNC: 152 MG/DL (ref 70–99)
GLUCOSE SERPL-MCNC: 162 MG/DL (ref 70–99)
GLUCOSE SERPL-MCNC: 168 MG/DL (ref 70–99)
GLUCOSE SERPL-MCNC: 184 MG/DL (ref 70–99)
GLUCOSE SERPL-MCNC: 194 MG/DL (ref 70–99)
GLUCOSE SERPL-MCNC: 199 MG/DL (ref 70–99)
GLUCOSE SERPL-MCNC: 204 MG/DL (ref 70–99)
GLUCOSE SERPL-MCNC: 66 MG/DL (ref 70–99)
GLUCOSE SERPL-MCNC: 81 MG/DL (ref 70–99)
GLUCOSE SERPL-MCNC: 82 MG/DL (ref 70–99)
GLUCOSE SERPL-MCNC: 84 MG/DL (ref 70–99)
GLUCOSE SERPL-MCNC: 84 MG/DL (ref 70–99)
GLUCOSE SERPL-MCNC: 85 MG/DL (ref 70–99)
GLUCOSE SERPL-MCNC: 88 MG/DL (ref 70–99)
GLUCOSE SERPL-MCNC: 88 MG/DL (ref 70–99)
GLUCOSE SERPL-MCNC: 89 MG/DL (ref 70–99)
GLUCOSE SERPL-MCNC: 90 MG/DL (ref 70–99)
GLUCOSE SERPL-MCNC: 91 MG/DL (ref 70–99)
GLUCOSE SERPL-MCNC: 91 MG/DL (ref 70–99)
GLUCOSE SERPL-MCNC: 93 MG/DL (ref 70–99)
GLUCOSE SERPL-MCNC: 94 MG/DL (ref 70–99)
GLUCOSE SERPL-MCNC: 94 MG/DL (ref 70–99)
GLUCOSE SERPL-MCNC: 95 MG/DL (ref 70–99)
GLUCOSE SERPL-MCNC: 98 MG/DL (ref 70–99)
GLUCOSE UR STRIP-MCNC: 100 MG/DL
GLUCOSE UR STRIP-MCNC: 100 MG/DL
GLUCOSE UR STRIP-MCNC: 70 MG/DL
GLUCOSE UR STRIP-MCNC: NEGATIVE MG/DL
GRAM STN SPEC: NORMAL
HADV DNA SPEC QL NAA+PROBE: NEGATIVE
HADV DNA SPEC QL NAA+PROBE: NEGATIVE
HADV DNA SPEC QL NAA+PROBE: NOT DETECTED
HBA1C MFR BLD: 5.8 % (ref 0–5.6)
HCG-TM SERPL-ACNC: 6 IU/L
HCG-TM SERPL-ACNC: 700 IU/L
HCO3 BLD-SCNC: 20 MMOL/L (ref 21–28)
HCO3 BLD-SCNC: 22 MMOL/L (ref 21–28)
HCO3 BLD-SCNC: 23 MMOL/L (ref 21–28)
HCO3 BLD-SCNC: 24 MMOL/L (ref 21–28)
HCO3 BLD-SCNC: 24 MMOL/L (ref 21–28)
HCO3 BLD-SCNC: 25 MMOL/L (ref 21–28)
HCO3 BLD-SCNC: 26 MMOL/L (ref 21–28)
HCO3 BLD-SCNC: 27 MMOL/L (ref 21–28)
HCO3 BLDV-SCNC: 22 MMOL/L (ref 21–28)
HCO3 BLDV-SCNC: 23 MMOL/L (ref 21–28)
HCT VFR BLD AUTO: 17.7 % (ref 40–53)
HCT VFR BLD AUTO: 18.3 % (ref 40–53)
HCT VFR BLD AUTO: 18.4 % (ref 40–53)
HCT VFR BLD AUTO: 18.7 % (ref 40–53)
HCT VFR BLD AUTO: 20.8 % (ref 40–53)
HCT VFR BLD AUTO: 21.3 % (ref 40–53)
HCT VFR BLD AUTO: 21.5 % (ref 40–53)
HCT VFR BLD AUTO: 21.7 % (ref 40–53)
HCT VFR BLD AUTO: 21.9 % (ref 40–53)
HCT VFR BLD AUTO: 22.3 % (ref 40–53)
HCT VFR BLD AUTO: 22.3 % (ref 40–53)
HCT VFR BLD AUTO: 23.1 % (ref 40–53)
HCT VFR BLD AUTO: 23.1 % (ref 40–53)
HCT VFR BLD AUTO: 23.3 % (ref 40–53)
HCT VFR BLD AUTO: 23.4 % (ref 40–53)
HCT VFR BLD AUTO: 24 % (ref 40–53)
HCT VFR BLD AUTO: 24.1 % (ref 40–53)
HCT VFR BLD AUTO: 24.3 % (ref 40–53)
HCT VFR BLD AUTO: 24.4 % (ref 40–53)
HCT VFR BLD AUTO: 24.5 % (ref 40–53)
HCT VFR BLD AUTO: 24.5 % (ref 40–53)
HCT VFR BLD AUTO: 24.8 % (ref 40–53)
HCT VFR BLD AUTO: 25.4 % (ref 40–53)
HCT VFR BLD AUTO: 25.5 % (ref 40–53)
HCT VFR BLD AUTO: 25.6 % (ref 40–53)
HCT VFR BLD AUTO: 25.7 % (ref 40–53)
HCT VFR BLD AUTO: 25.8 % (ref 40–53)
HCT VFR BLD AUTO: 25.8 % (ref 40–53)
HCT VFR BLD AUTO: 26.1 % (ref 40–53)
HCT VFR BLD AUTO: 26.1 % (ref 40–53)
HCT VFR BLD AUTO: 26.5 % (ref 40–53)
HCT VFR BLD AUTO: 27.1 % (ref 40–53)
HCT VFR BLD AUTO: 27.4 % (ref 40–53)
HCT VFR BLD AUTO: 27.7 % (ref 40–53)
HCT VFR BLD AUTO: 27.8 % (ref 40–53)
HCT VFR BLD AUTO: 28.4 % (ref 40–53)
HCT VFR BLD AUTO: 28.9 % (ref 40–53)
HCT VFR BLD AUTO: 29.2 % (ref 40–53)
HCT VFR BLD AUTO: 30.3 % (ref 40–53)
HCT VFR BLD AUTO: 30.5 % (ref 40–53)
HCT VFR BLD AUTO: 32.1 % (ref 40–53)
HCT VFR BLD AUTO: 32.5 % (ref 40–53)
HCT VFR BLD AUTO: 34.4 % (ref 40–53)
HGB BLD-MCNC: 10 G/DL (ref 13.3–17.7)
HGB BLD-MCNC: 10.2 G/DL (ref 13.3–17.7)
HGB BLD-MCNC: 10.9 G/DL (ref 13.3–17.7)
HGB BLD-MCNC: 10.9 G/DL (ref 13.3–17.7)
HGB BLD-MCNC: 11.5 G/DL (ref 13.3–17.7)
HGB BLD-MCNC: 5.1 G/DL (ref 13.3–17.7)
HGB BLD-MCNC: 5.3 G/DL (ref 13.3–17.7)
HGB BLD-MCNC: 5.7 G/DL (ref 13.3–17.7)
HGB BLD-MCNC: 5.7 G/DL (ref 13.3–17.7)
HGB BLD-MCNC: 5.9 G/DL (ref 13.3–17.7)
HGB BLD-MCNC: 6.2 G/DL (ref 13.3–17.7)
HGB BLD-MCNC: 6.3 G/DL (ref 13.3–17.7)
HGB BLD-MCNC: 6.6 G/DL (ref 13.3–17.7)
HGB BLD-MCNC: 6.6 G/DL (ref 13.3–17.7)
HGB BLD-MCNC: 6.7 G/DL (ref 13.3–17.7)
HGB BLD-MCNC: 6.9 G/DL (ref 13.3–17.7)
HGB BLD-MCNC: 7 G/DL (ref 13.3–17.7)
HGB BLD-MCNC: 7.1 G/DL (ref 13.3–17.7)
HGB BLD-MCNC: 7.2 G/DL (ref 13.3–17.7)
HGB BLD-MCNC: 7.3 G/DL (ref 13.3–17.7)
HGB BLD-MCNC: 7.3 G/DL (ref 13.3–17.7)
HGB BLD-MCNC: 7.4 G/DL (ref 13.3–17.7)
HGB BLD-MCNC: 7.4 G/DL (ref 13.3–17.7)
HGB BLD-MCNC: 7.5 G/DL (ref 13.3–17.7)
HGB BLD-MCNC: 7.6 G/DL (ref 13.3–17.7)
HGB BLD-MCNC: 7.7 G/DL (ref 13.3–17.7)
HGB BLD-MCNC: 7.8 G/DL (ref 13.3–17.7)
HGB BLD-MCNC: 7.9 G/DL (ref 13.3–17.7)
HGB BLD-MCNC: 8 G/DL (ref 13.3–17.7)
HGB BLD-MCNC: 8.1 G/DL (ref 13.3–17.7)
HGB BLD-MCNC: 8.2 G/DL (ref 13.3–17.7)
HGB BLD-MCNC: 8.3 G/DL (ref 13.3–17.7)
HGB BLD-MCNC: 8.3 G/DL (ref 13.3–17.7)
HGB BLD-MCNC: 8.4 G/DL (ref 13.3–17.7)
HGB BLD-MCNC: 8.4 G/DL (ref 13.3–17.7)
HGB BLD-MCNC: 8.5 G/DL (ref 13.3–17.7)
HGB BLD-MCNC: 8.6 G/DL (ref 13.3–17.7)
HGB BLD-MCNC: 8.7 G/DL (ref 13.3–17.7)
HGB BLD-MCNC: 9 G/DL (ref 13.3–17.7)
HGB BLD-MCNC: 9 G/DL (ref 13.3–17.7)
HGB BLD-MCNC: 9.1 G/DL (ref 13.3–17.7)
HGB BLD-MCNC: 9.2 G/DL (ref 13.3–17.7)
HGB BLD-MCNC: 9.2 G/DL (ref 13.3–17.7)
HGB BLD-MCNC: 9.4 G/DL (ref 13.3–17.7)
HGB BLD-MCNC: 9.8 G/DL (ref 13.3–17.7)
HGB BLD-MCNC: 9.9 G/DL (ref 13.3–17.7)
HGB UR QL STRIP: ABNORMAL
HGB UR QL STRIP: NEGATIVE
HGB UR QL STRIP: NEGATIVE
HMPV RNA SPEC QL NAA+PROBE: NEGATIVE
HPIV1 RNA SPEC QL NAA+PROBE: NEGATIVE
HPIV2 RNA SPEC QL NAA+PROBE: NEGATIVE
HPIV3 RNA SPEC QL NAA+PROBE: NEGATIVE
HSV1 DNA SPEC QL NAA+PROBE: NEGATIVE
HSV1 DNA SPEC QL NAA+PROBE: POSITIVE
HSV2 DNA SPEC QL NAA+PROBE: NEGATIVE
HSV2 DNA SPEC QL NAA+PROBE: NEGATIVE
HYPOCHROMIA BLD QL: PRESENT
IFC SPECIMEN: NORMAL
IMM GRANULOCYTES # BLD: 0.3 10E9/L (ref 0–0.4)
IMM GRANULOCYTES # BLD: 0.4 10E9/L (ref 0–0.4)
IMM GRANULOCYTES NFR BLD: 3.4 %
IMM GRANULOCYTES NFR BLD: 3.8 %
INR PPP: 0.96 (ref 0.86–1.14)
INR PPP: 0.97 (ref 0.86–1.14)
INR PPP: 1 (ref 0.86–1.14)
INR PPP: 1.03 (ref 0.86–1.14)
INR PPP: 1.08 (ref 0.86–1.14)
INR PPP: 1.12 (ref 0.86–1.14)
INR PPP: 1.13 (ref 0.86–1.14)
INTERPRETATION ECG - MUSE: NORMAL
IRON SATN MFR SERPL: 13 % (ref 15–46)
IRON SERPL-MCNC: 18 UG/DL (ref 35–180)
KETONES UR STRIP-MCNC: NEGATIVE MG/DL
KOH PREP SPEC: NORMAL
L PNEUMO DNA SPEC QL NAA+PROBE: NOT DETECTED
L PNEUMO1 AG UR QL IA: NORMAL
LAB SCANNED RESULT: NORMAL
LABORATORY COMMENT REPORT: NORMAL
LACTATE BLD-SCNC: 0.4 MMOL/L (ref 0.7–2)
LACTATE BLD-SCNC: 0.5 MMOL/L (ref 0.7–2)
LACTATE BLD-SCNC: 0.8 MMOL/L (ref 0.7–2)
LACTATE BLD-SCNC: 0.9 MMOL/L (ref 0.7–2)
LACTATE BLD-SCNC: 1 MMOL/L (ref 0.7–2)
LACTATE BLD-SCNC: 1 MMOL/L (ref 0.7–2)
LACTATE BLD-SCNC: 1.1 MMOL/L (ref 0.7–2)
LACTATE BLD-SCNC: 1.1 MMOL/L (ref 0.7–2)
LACTATE BLD-SCNC: 1.2 MMOL/L (ref 0.7–2)
LACTATE BLD-SCNC: 1.3 MMOL/L (ref 0.7–2)
LACTATE BLD-SCNC: 1.8 MMOL/L (ref 0.7–2)
LACTATE BLD-SCNC: 2.4 MMOL/L (ref 0.7–2)
LDH SERPL L TO P-CCNC: 578 U/L (ref 85–227)
LDH SERPL L TO P-CCNC: 710 U/L (ref 85–227)
LEGIONELLA DNA SPEC NAA+PROBE: NOT DETECTED
LEUKOCYTE ESTERASE UR QL STRIP: ABNORMAL
LEUKOCYTE ESTERASE UR QL STRIP: ABNORMAL
LEUKOCYTE ESTERASE UR QL STRIP: NEGATIVE
LIPASE SERPL-CCNC: 115 U/L (ref 73–393)
LYMPHOCYTES # BLD AUTO: 0 10E9/L (ref 0.8–5.3)
LYMPHOCYTES # BLD AUTO: 0 10E9/L (ref 0.8–5.3)
LYMPHOCYTES # BLD AUTO: 0.1 10E9/L (ref 0.8–5.3)
LYMPHOCYTES # BLD AUTO: 0.1 10E9/L (ref 0.8–5.3)
LYMPHOCYTES # BLD AUTO: 0.2 10E9/L (ref 0.8–5.3)
LYMPHOCYTES # BLD AUTO: 0.4 10E9/L (ref 0.8–5.3)
LYMPHOCYTES # BLD AUTO: 0.5 10E9/L (ref 0.8–5.3)
LYMPHOCYTES # BLD AUTO: 0.7 10E9/L (ref 0.8–5.3)
LYMPHOCYTES # BLD AUTO: 1.6 10E9/L (ref 0.8–5.3)
LYMPHOCYTES # BLD AUTO: 1.7 10E9/L (ref 0.8–5.3)
LYMPHOCYTES # BLD AUTO: 1.8 10E9/L (ref 0.8–5.3)
LYMPHOCYTES # BLD AUTO: 2.5 10E9/L (ref 0.8–5.3)
LYMPHOCYTES # BLD AUTO: 2.6 10E9/L (ref 0.8–5.3)
LYMPHOCYTES # BLD AUTO: 2.9 10E9/L (ref 0.8–5.3)
LYMPHOCYTES # BLD AUTO: 3.1 10E9/L (ref 0.8–5.3)
LYMPHOCYTES # BLD AUTO: 3.4 10E9/L (ref 0.8–5.3)
LYMPHOCYTES # BLD AUTO: 4.8 10E9/L (ref 0.8–5.3)
LYMPHOCYTES # BLD AUTO: 7.6 10E9/L (ref 0.8–5.3)
LYMPHOCYTES NFR BLD AUTO: 0 %
LYMPHOCYTES NFR BLD AUTO: 0 %
LYMPHOCYTES NFR BLD AUTO: 1 %
LYMPHOCYTES NFR BLD AUTO: 13 %
LYMPHOCYTES NFR BLD AUTO: 14 %
LYMPHOCYTES NFR BLD AUTO: 19 %
LYMPHOCYTES NFR BLD AUTO: 2 %
LYMPHOCYTES NFR BLD AUTO: 22 %
LYMPHOCYTES NFR BLD AUTO: 3 %
LYMPHOCYTES NFR BLD AUTO: 4 %
LYMPHOCYTES NFR BLD AUTO: 4 %
LYMPHOCYTES NFR BLD AUTO: 5 %
LYMPHOCYTES NFR BLD AUTO: 5 %
LYMPHOCYTES NFR BLD AUTO: 5.3 %
LYMPHOCYTES NFR BLD AUTO: 6 %
LYMPHOCYTES NFR BLD AUTO: 6 %
LYMPHOCYTES NFR BLD AUTO: 7 %
LYMPHOCYTES NFR BLD AUTO: 8.3 %
LYMPHOCYTES NFR FLD MANUAL: 13 %
Lab: NORMAL
M PNEUMO DNA SPEC QL NAA+PROBE: NOT DETECTED
M TB IFN-G CD4+ BCKGRND COR BLD-ACNC: 1.2 IU/ML
M TB TUBERC IFN-G BLD QL: POSITIVE
MACROCYTES BLD QL SMEAR: PRESENT
MAGNESIUM SERPL-MCNC: 1.3 MG/DL (ref 1.6–2.3)
MAGNESIUM SERPL-MCNC: 1.3 MG/DL (ref 1.6–2.3)
MAGNESIUM SERPL-MCNC: 1.5 MG/DL (ref 1.6–2.3)
MAGNESIUM SERPL-MCNC: 1.6 MG/DL (ref 1.6–2.3)
MAGNESIUM SERPL-MCNC: 1.7 MG/DL (ref 1.6–2.3)
MAGNESIUM SERPL-MCNC: 1.8 MG/DL (ref 1.6–2.3)
MAGNESIUM SERPL-MCNC: 1.9 MG/DL (ref 1.6–2.3)
MAGNESIUM SERPL-MCNC: 2 MG/DL (ref 1.6–2.3)
MAGNESIUM SERPL-MCNC: 2 MG/DL (ref 1.6–2.3)
MAGNESIUM SERPL-MCNC: 3.1 MG/DL (ref 1.6–2.3)
MCH RBC QN AUTO: 26.3 PG (ref 26.5–33)
MCH RBC QN AUTO: 26.4 PG (ref 26.5–33)
MCH RBC QN AUTO: 26.5 PG (ref 26.5–33)
MCH RBC QN AUTO: 26.6 PG (ref 26.5–33)
MCH RBC QN AUTO: 26.7 PG (ref 26.5–33)
MCH RBC QN AUTO: 26.7 PG (ref 26.5–33)
MCH RBC QN AUTO: 27 PG (ref 26.5–33)
MCH RBC QN AUTO: 27 PG (ref 26.5–33)
MCH RBC QN AUTO: 27.1 PG (ref 26.5–33)
MCH RBC QN AUTO: 27.1 PG (ref 26.5–33)
MCH RBC QN AUTO: 27.8 PG (ref 26.5–33)
MCH RBC QN AUTO: 27.9 PG (ref 26.5–33)
MCH RBC QN AUTO: 28.3 PG (ref 26.5–33)
MCH RBC QN AUTO: 28.4 PG (ref 26.5–33)
MCH RBC QN AUTO: 28.5 PG (ref 26.5–33)
MCH RBC QN AUTO: 28.6 PG (ref 26.5–33)
MCH RBC QN AUTO: 28.8 PG (ref 26.5–33)
MCH RBC QN AUTO: 28.8 PG (ref 26.5–33)
MCH RBC QN AUTO: 28.9 PG (ref 26.5–33)
MCH RBC QN AUTO: 28.9 PG (ref 26.5–33)
MCH RBC QN AUTO: 29 PG (ref 26.5–33)
MCH RBC QN AUTO: 29.1 PG (ref 26.5–33)
MCH RBC QN AUTO: 29.1 PG (ref 26.5–33)
MCH RBC QN AUTO: 29.2 PG (ref 26.5–33)
MCH RBC QN AUTO: 29.3 PG (ref 26.5–33)
MCH RBC QN AUTO: 29.4 PG (ref 26.5–33)
MCH RBC QN AUTO: 29.5 PG (ref 26.5–33)
MCH RBC QN AUTO: 29.7 PG (ref 26.5–33)
MCH RBC QN AUTO: 30 PG (ref 26.5–33)
MCH RBC QN AUTO: 30.2 PG (ref 26.5–33)
MCH RBC QN AUTO: 30.4 PG (ref 26.5–33)
MCH RBC QN AUTO: 30.4 PG (ref 26.5–33)
MCH RBC QN AUTO: 30.5 PG (ref 26.5–33)
MCH RBC QN AUTO: 30.5 PG (ref 26.5–33)
MCH RBC QN AUTO: 30.6 PG (ref 26.5–33)
MCH RBC QN AUTO: 31 PG (ref 26.5–33)
MCH RBC QN AUTO: 31.1 PG (ref 26.5–33)
MCH RBC QN AUTO: 31.2 PG (ref 26.5–33)
MCH RBC QN AUTO: 32.3 PG (ref 26.5–33)
MCHC RBC AUTO-ENTMCNC: 27.7 G/DL (ref 31.5–36.5)
MCHC RBC AUTO-ENTMCNC: 28.3 G/DL (ref 31.5–36.5)
MCHC RBC AUTO-ENTMCNC: 29.4 G/DL (ref 31.5–36.5)
MCHC RBC AUTO-ENTMCNC: 29.5 G/DL (ref 31.5–36.5)
MCHC RBC AUTO-ENTMCNC: 29.6 G/DL (ref 31.5–36.5)
MCHC RBC AUTO-ENTMCNC: 29.8 G/DL (ref 31.5–36.5)
MCHC RBC AUTO-ENTMCNC: 29.9 G/DL (ref 31.5–36.5)
MCHC RBC AUTO-ENTMCNC: 30 G/DL (ref 31.5–36.5)
MCHC RBC AUTO-ENTMCNC: 30 G/DL (ref 31.5–36.5)
MCHC RBC AUTO-ENTMCNC: 30.3 G/DL (ref 31.5–36.5)
MCHC RBC AUTO-ENTMCNC: 30.6 G/DL (ref 31.5–36.5)
MCHC RBC AUTO-ENTMCNC: 31 G/DL (ref 31.5–36.5)
MCHC RBC AUTO-ENTMCNC: 31.1 G/DL (ref 31.5–36.5)
MCHC RBC AUTO-ENTMCNC: 31.2 G/DL (ref 31.5–36.5)
MCHC RBC AUTO-ENTMCNC: 31.4 G/DL (ref 31.5–36.5)
MCHC RBC AUTO-ENTMCNC: 31.5 G/DL (ref 31.5–36.5)
MCHC RBC AUTO-ENTMCNC: 31.6 G/DL (ref 31.5–36.5)
MCHC RBC AUTO-ENTMCNC: 31.6 G/DL (ref 31.5–36.5)
MCHC RBC AUTO-ENTMCNC: 31.7 G/DL (ref 31.5–36.5)
MCHC RBC AUTO-ENTMCNC: 31.8 G/DL (ref 31.5–36.5)
MCHC RBC AUTO-ENTMCNC: 31.8 G/DL (ref 31.5–36.5)
MCHC RBC AUTO-ENTMCNC: 32.1 G/DL (ref 31.5–36.5)
MCHC RBC AUTO-ENTMCNC: 32.2 G/DL (ref 31.5–36.5)
MCHC RBC AUTO-ENTMCNC: 32.3 G/DL (ref 31.5–36.5)
MCHC RBC AUTO-ENTMCNC: 32.5 G/DL (ref 31.5–36.5)
MCHC RBC AUTO-ENTMCNC: 32.7 G/DL (ref 31.5–36.5)
MCHC RBC AUTO-ENTMCNC: 32.8 G/DL (ref 31.5–36.5)
MCHC RBC AUTO-ENTMCNC: 32.8 G/DL (ref 31.5–36.5)
MCHC RBC AUTO-ENTMCNC: 32.9 G/DL (ref 31.5–36.5)
MCHC RBC AUTO-ENTMCNC: 33.4 G/DL (ref 31.5–36.5)
MCHC RBC AUTO-ENTMCNC: 33.5 G/DL (ref 31.5–36.5)
MCHC RBC AUTO-ENTMCNC: 34 G/DL (ref 31.5–36.5)
MCHC RBC AUTO-ENTMCNC: 34.1 G/DL (ref 31.5–36.5)
MCV RBC AUTO: 86 FL (ref 78–100)
MCV RBC AUTO: 88 FL (ref 78–100)
MCV RBC AUTO: 88 FL (ref 78–100)
MCV RBC AUTO: 89 FL (ref 78–100)
MCV RBC AUTO: 90 FL (ref 78–100)
MCV RBC AUTO: 91 FL (ref 78–100)
MCV RBC AUTO: 92 FL (ref 78–100)
MCV RBC AUTO: 93 FL (ref 78–100)
MCV RBC AUTO: 94 FL (ref 78–100)
MCV RBC AUTO: 95 FL (ref 78–100)
MCV RBC AUTO: 96 FL (ref 78–100)
MCV RBC AUTO: 97 FL (ref 78–100)
METAMYELOCYTES # BLD: 0.2 10E9/L
METAMYELOCYTES # BLD: 0.5 10E9/L
METAMYELOCYTES # BLD: 0.8 10E9/L
METAMYELOCYTES # BLD: 1.1 10E9/L
METAMYELOCYTES # BLD: 2.3 10E9/L
METAMYELOCYTES # BLD: 2.9 10E9/L
METAMYELOCYTES # BLD: 3.1 10E9/L
METAMYELOCYTES # BLD: 3.5 10E9/L
METAMYELOCYTES # BLD: 3.6 10E9/L
METAMYELOCYTES # BLD: 3.7 10E9/L
METAMYELOCYTES # BLD: 4.1 10E9/L
METAMYELOCYTES # BLD: 4.8 10E9/L
METAMYELOCYTES # BLD: 5.4 10E9/L
METAMYELOCYTES # BLD: 5.8 10E9/L
METAMYELOCYTES # BLD: 6.1 10E9/L
METAMYELOCYTES NFR BLD MANUAL: 10 %
METAMYELOCYTES NFR BLD MANUAL: 11 %
METAMYELOCYTES NFR BLD MANUAL: 3 %
METAMYELOCYTES NFR BLD MANUAL: 4 %
METAMYELOCYTES NFR BLD MANUAL: 4 %
METAMYELOCYTES NFR BLD MANUAL: 5 %
METAMYELOCYTES NFR BLD MANUAL: 6 %
METAMYELOCYTES NFR BLD MANUAL: 6 %
METAMYELOCYTES NFR BLD MANUAL: 7 %
METAMYELOCYTES NFR BLD MANUAL: 7 %
METAMYELOCYTES NFR BLD MANUAL: 8 %
METAMYELOCYTES NFR BLD MANUAL: 9 %
METAMYELOCYTES NFR BLD MANUAL: 9 %
MICROBIOLOGIST REVIEW: NORMAL
MICROCYTES BLD QL SMEAR: PRESENT
MITOGEN IGNF BCKGRD COR BLD-ACNC: 0.39 IU/ML
MITOGEN IGNF BCKGRD COR BLD-ACNC: 0.83 IU/ML
MONOCYTES # BLD AUTO: 0 10E9/L (ref 0–1.3)
MONOCYTES # BLD AUTO: 0.1 10E9/L (ref 0–1.3)
MONOCYTES # BLD AUTO: 0.3 10E9/L (ref 0–1.3)
MONOCYTES # BLD AUTO: 0.3 10E9/L (ref 0–1.3)
MONOCYTES # BLD AUTO: 0.4 10E9/L (ref 0–1.3)
MONOCYTES # BLD AUTO: 0.4 10E9/L (ref 0–1.3)
MONOCYTES # BLD AUTO: 0.8 10E9/L (ref 0–1.3)
MONOCYTES # BLD AUTO: 0.8 10E9/L (ref 0–1.3)
MONOCYTES # BLD AUTO: 1 10E9/L (ref 0–1.3)
MONOCYTES # BLD AUTO: 1.1 10E9/L (ref 0–1.3)
MONOCYTES # BLD AUTO: 1.4 10E9/L (ref 0–1.3)
MONOCYTES # BLD AUTO: 1.7 10E9/L (ref 0–1.3)
MONOCYTES # BLD AUTO: 2.3 10E9/L (ref 0–1.3)
MONOCYTES # BLD AUTO: 2.5 10E9/L (ref 0–1.3)
MONOCYTES # BLD AUTO: 2.6 10E9/L (ref 0–1.3)
MONOCYTES # BLD AUTO: 3.7 10E9/L (ref 0–1.3)
MONOCYTES # BLD AUTO: 4.1 10E9/L (ref 0–1.3)
MONOCYTES # BLD AUTO: 4.1 10E9/L (ref 0–1.3)
MONOCYTES # BLD AUTO: 4.4 10E9/L (ref 0–1.3)
MONOCYTES NFR BLD AUTO: 0 %
MONOCYTES NFR BLD AUTO: 1 %
MONOCYTES NFR BLD AUTO: 1 %
MONOCYTES NFR BLD AUTO: 10 %
MONOCYTES NFR BLD AUTO: 18 %
MONOCYTES NFR BLD AUTO: 3 %
MONOCYTES NFR BLD AUTO: 4 %
MONOCYTES NFR BLD AUTO: 5 %
MONOCYTES NFR BLD AUTO: 6 %
MONOCYTES NFR BLD AUTO: 7 %
MONOCYTES NFR BLD AUTO: 8.2 %
MONOCYTES NFR BLD AUTO: 9.5 %
MONOS+MACROS NFR FLD MANUAL: 5 %
MUCOUS THREADS #/AREA URNS LPF: PRESENT /LPF
MYELOCYTES # BLD: 0.7 10E9/L
MYELOCYTES # BLD: 1.5 10E9/L
MYELOCYTES # BLD: 1.6 10E9/L
MYELOCYTES # BLD: 1.6 10E9/L
MYELOCYTES # BLD: 2 10E9/L
MYELOCYTES # BLD: 2.3 10E9/L
MYELOCYTES # BLD: 2.6 10E9/L
MYELOCYTES # BLD: 3.6 10E9/L
MYELOCYTES # BLD: 3.7 10E9/L
MYELOCYTES # BLD: 6.1 10E9/L
MYELOCYTES # BLD: 6.2 10E9/L
MYELOCYTES # BLD: 7.2 10E9/L
MYELOCYTES NFR BLD MANUAL: 11 %
MYELOCYTES NFR BLD MANUAL: 12 %
MYELOCYTES NFR BLD MANUAL: 2 %
MYELOCYTES NFR BLD MANUAL: 3 %
MYELOCYTES NFR BLD MANUAL: 4 %
MYELOCYTES NFR BLD MANUAL: 4 %
MYELOCYTES NFR BLD MANUAL: 5 %
MYELOCYTES NFR BLD MANUAL: 6 %
MYELOCYTES NFR BLD MANUAL: 6 %
MYELOCYTES NFR BLD MANUAL: 7 %
MYELOCYTES NFR BLD MANUAL: 7 %
MYELOCYTES NFR BLD MANUAL: 9 %
NEUTROPHILS # BLD AUTO: 0.3 10E9/L (ref 1.6–8.3)
NEUTROPHILS # BLD AUTO: 0.7 10E9/L (ref 1.6–8.3)
NEUTROPHILS # BLD AUTO: 18.3 10E9/L (ref 1.6–8.3)
NEUTROPHILS # BLD AUTO: 2.6 10E9/L (ref 1.6–8.3)
NEUTROPHILS # BLD AUTO: 2.8 10E9/L (ref 1.6–8.3)
NEUTROPHILS # BLD AUTO: 21.8 10E9/L (ref 1.6–8.3)
NEUTROPHILS # BLD AUTO: 24.7 10E9/L (ref 1.6–8.3)
NEUTROPHILS # BLD AUTO: 28.4 10E9/L (ref 1.6–8.3)
NEUTROPHILS # BLD AUTO: 28.5 10E9/L (ref 1.6–8.3)
NEUTROPHILS # BLD AUTO: 29.1 10E9/L (ref 1.6–8.3)
NEUTROPHILS # BLD AUTO: 31.9 10E9/L (ref 1.6–8.3)
NEUTROPHILS # BLD AUTO: 37.2 10E9/L (ref 1.6–8.3)
NEUTROPHILS # BLD AUTO: 42.5 10E9/L (ref 1.6–8.3)
NEUTROPHILS # BLD AUTO: 44.1 10E9/L (ref 1.6–8.3)
NEUTROPHILS # BLD AUTO: 47 10E9/L (ref 1.6–8.3)
NEUTROPHILS # BLD AUTO: 47.1 10E9/L (ref 1.6–8.3)
NEUTROPHILS # BLD AUTO: 51.2 10E9/L (ref 1.6–8.3)
NEUTROPHILS # BLD AUTO: 54.5 10E9/L (ref 1.6–8.3)
NEUTROPHILS # BLD AUTO: 6.3 10E9/L (ref 1.6–8.3)
NEUTROPHILS # BLD AUTO: 7.1 10E9/L (ref 1.6–8.3)
NEUTROPHILS # BLD AUTO: 8.2 10E9/L (ref 1.6–8.3)
NEUTROPHILS NFR BLD AUTO: 58 %
NEUTROPHILS NFR BLD AUTO: 66 %
NEUTROPHILS NFR BLD AUTO: 68 %
NEUTROPHILS NFR BLD AUTO: 69 %
NEUTROPHILS NFR BLD AUTO: 71 %
NEUTROPHILS NFR BLD AUTO: 71 %
NEUTROPHILS NFR BLD AUTO: 73 %
NEUTROPHILS NFR BLD AUTO: 73 %
NEUTROPHILS NFR BLD AUTO: 75 %
NEUTROPHILS NFR BLD AUTO: 75 %
NEUTROPHILS NFR BLD AUTO: 75.1 %
NEUTROPHILS NFR BLD AUTO: 77 %
NEUTROPHILS NFR BLD AUTO: 78 %
NEUTROPHILS NFR BLD AUTO: 80 %
NEUTROPHILS NFR BLD AUTO: 81 %
NEUTROPHILS NFR BLD AUTO: 82 %
NEUTROPHILS NFR BLD AUTO: 82.1 %
NEUTROPHILS NFR BLD AUTO: 90 %
NEUTROPHILS NFR BLD AUTO: 97 %
NEUTS BAND NFR FLD MANUAL: 82 %
NITRATE UR QL: NEGATIVE
NITRATE UR QL: POSITIVE
NRBC # BLD AUTO: 0 10*3/UL
NRBC # BLD AUTO: 0 10*3/UL
NRBC # BLD AUTO: 0.3 10*3/UL
NRBC # BLD AUTO: 0.4 10*3/UL
NRBC # BLD AUTO: 0.5 10*3/UL
NRBC # BLD AUTO: 0.5 10*3/UL
NRBC # BLD AUTO: 0.6 10*3/UL
NRBC # BLD AUTO: 0.7 10*3/UL
NRBC # BLD AUTO: 0.8 10*3/UL
NRBC # BLD AUTO: 1.1 10*3/UL
NRBC # BLD AUTO: 1.7 10*3/UL
NRBC # BLD AUTO: 2.7 10*3/UL
NRBC # BLD AUTO: 5.5 10*3/UL
NRBC BLD AUTO-RTO: 0 /100
NRBC BLD AUTO-RTO: 0 /100
NRBC BLD AUTO-RTO: 1 /100
NRBC BLD AUTO-RTO: 2 /100
NRBC BLD AUTO-RTO: 2 /100
NRBC BLD AUTO-RTO: 3 /100
NRBC BLD AUTO-RTO: 4 /100
NRBC BLD AUTO-RTO: 8 /100
NT-PROBNP SERPL-MCNC: 276 PG/ML (ref 0–900)
NUM BPU REQUESTED: 1
NUM BPU REQUESTED: 2
NUM BPU REQUESTED: 6
NUM BPU REQUESTED: 9
O2/TOTAL GAS SETTING VFR VENT: ABNORMAL %
O2/TOTAL GAS SETTING VFR VENT: NORMAL %
OVALOCYTES BLD QL SMEAR: SLIGHT
OVALOCYTES BLD QL SMEAR: SLIGHT
OXYHGB MFR BLD: 85 % (ref 92–100)
OXYHGB MFR BLD: 87 % (ref 92–100)
OXYHGB MFR BLD: 88 % (ref 92–100)
OXYHGB MFR BLD: 93 % (ref 92–100)
OXYHGB MFR BLD: 93 % (ref 92–100)
OXYHGB MFR BLD: 94 % (ref 92–100)
OXYHGB MFR BLD: 95 % (ref 92–100)
OXYHGB MFR BLD: 96 % (ref 92–100)
OXYHGB MFR BLD: 97 % (ref 92–100)
OXYHGB MFR BLD: 97 % (ref 92–100)
OXYHGB MFR BLD: 98 % (ref 92–100)
OXYHGB MFR BLD: 98 % (ref 92–100)
OXYHGB MFR BLD: 99 % (ref 92–100)
PCO2 BLD: 29 MM HG (ref 35–45)
PCO2 BLD: 34 MM HG (ref 35–45)
PCO2 BLD: 38 MM HG (ref 35–45)
PCO2 BLD: 39 MM HG (ref 35–45)
PCO2 BLD: 40 MM HG (ref 35–45)
PCO2 BLD: 41 MM HG (ref 35–45)
PCO2 BLD: 43 MM HG (ref 35–45)
PCO2 BLD: 44 MM HG (ref 35–45)
PCO2 BLD: 45 MM HG (ref 35–45)
PCO2 BLD: 45 MM HG (ref 35–45)
PCO2 BLD: 48 MM HG (ref 35–45)
PCO2 BLD: 50 MM HG (ref 35–45)
PCO2 BLD: 50 MM HG (ref 35–45)
PCO2 BLDV: 37 MM HG (ref 40–50)
PCO2 BLDV: 39 MM HG (ref 40–50)
PCO2 BLDV: 43 MM HG (ref 40–50)
PCO2 BLDV: 59 MM HG (ref 40–50)
PH BLD: 7.3 PH (ref 7.35–7.45)
PH BLD: 7.31 PH (ref 7.35–7.45)
PH BLD: 7.32 PH (ref 7.35–7.45)
PH BLD: 7.37 PH (ref 7.35–7.45)
PH BLD: 7.38 PH (ref 7.35–7.45)
PH BLD: 7.39 PH (ref 7.35–7.45)
PH BLD: 7.39 PH (ref 7.35–7.45)
PH BLD: 7.41 PH (ref 7.35–7.45)
PH BLD: 7.41 PH (ref 7.35–7.45)
PH BLD: 7.43 PH (ref 7.35–7.45)
PH BLD: 7.46 PH (ref 7.35–7.45)
PH BLDV: 7.21 PH (ref 7.32–7.43)
PH BLDV: 7.31 PH (ref 7.32–7.43)
PH BLDV: 7.39 PH (ref 7.32–7.43)
PH BLDV: 7.41 PH (ref 7.32–7.43)
PH UR STRIP: 5.5 PH (ref 5–7)
PH UR STRIP: 6 PH (ref 5–7)
PH UR STRIP: 6 PH (ref 5–7)
PH UR STRIP: 7.5 PH (ref 5–7)
PHOSPHATE SERPL-MCNC: 1.2 MG/DL (ref 2.5–4.5)
PHOSPHATE SERPL-MCNC: 1.2 MG/DL (ref 2.5–4.5)
PHOSPHATE SERPL-MCNC: 1.3 MG/DL (ref 2.5–4.5)
PHOSPHATE SERPL-MCNC: 1.5 MG/DL (ref 2.5–4.5)
PHOSPHATE SERPL-MCNC: 1.6 MG/DL (ref 2.5–4.5)
PHOSPHATE SERPL-MCNC: 1.7 MG/DL (ref 2.5–4.5)
PHOSPHATE SERPL-MCNC: 1.8 MG/DL (ref 2.5–4.5)
PHOSPHATE SERPL-MCNC: 1.8 MG/DL (ref 2.5–4.5)
PHOSPHATE SERPL-MCNC: 1.9 MG/DL (ref 2.5–4.5)
PHOSPHATE SERPL-MCNC: 2.1 MG/DL (ref 2.5–4.5)
PHOSPHATE SERPL-MCNC: 2.4 MG/DL (ref 2.5–4.5)
PHOSPHATE SERPL-MCNC: 2.5 MG/DL (ref 2.5–4.5)
PHOSPHATE SERPL-MCNC: 2.6 MG/DL (ref 2.5–4.5)
PHOSPHATE SERPL-MCNC: 2.7 MG/DL (ref 2.5–4.5)
PHOSPHATE SERPL-MCNC: 2.9 MG/DL (ref 2.5–4.5)
PHOSPHATE SERPL-MCNC: 3.1 MG/DL (ref 2.5–4.5)
PHOSPHATE SERPL-MCNC: 3.2 MG/DL (ref 2.5–4.5)
PHOSPHATE SERPL-MCNC: 4.3 MG/DL (ref 2.5–4.5)
PHOSPHATE SERPL-MCNC: 4.6 MG/DL (ref 2.5–4.5)
PHOSPHATE SERPL-MCNC: 5.1 MG/DL (ref 2.5–4.5)
PHOSPHATE SERPL-MCNC: 6.3 MG/DL (ref 2.5–4.5)
PHOSPHATE SERPL-MCNC: 6.8 MG/DL (ref 2.5–4.5)
PLATELET # BLD AUTO: 101 10E9/L (ref 150–450)
PLATELET # BLD AUTO: 105 10E9/L (ref 150–450)
PLATELET # BLD AUTO: 111 10E9/L (ref 150–450)
PLATELET # BLD AUTO: 136 10E9/L (ref 150–450)
PLATELET # BLD AUTO: 176 10E9/L (ref 150–450)
PLATELET # BLD AUTO: 196 10E9/L (ref 150–450)
PLATELET # BLD AUTO: 234 10E9/L (ref 150–450)
PLATELET # BLD AUTO: 262 10E9/L (ref 150–450)
PLATELET # BLD AUTO: 276 10E9/L (ref 150–450)
PLATELET # BLD AUTO: 277 10E9/L (ref 150–450)
PLATELET # BLD AUTO: 284 10E9/L (ref 150–450)
PLATELET # BLD AUTO: 286 10E9/L (ref 150–450)
PLATELET # BLD AUTO: 311 10E9/L (ref 150–450)
PLATELET # BLD AUTO: 311 10E9/L (ref 150–450)
PLATELET # BLD AUTO: 333 10E9/L (ref 150–450)
PLATELET # BLD AUTO: 339 10E9/L (ref 150–450)
PLATELET # BLD AUTO: 345 10E9/L (ref 150–450)
PLATELET # BLD AUTO: 365 10E9/L (ref 150–450)
PLATELET # BLD AUTO: 382 10E9/L (ref 150–450)
PLATELET # BLD AUTO: 386 10E9/L (ref 150–450)
PLATELET # BLD AUTO: 394 10E9/L (ref 150–450)
PLATELET # BLD AUTO: 418 10E9/L (ref 150–450)
PLATELET # BLD AUTO: 427 10E9/L (ref 150–450)
PLATELET # BLD AUTO: 441 10E9/L (ref 150–450)
PLATELET # BLD AUTO: 449 10E9/L (ref 150–450)
PLATELET # BLD AUTO: 46 10E9/L (ref 150–450)
PLATELET # BLD AUTO: 465 10E9/L (ref 150–450)
PLATELET # BLD AUTO: 485 10E9/L (ref 150–450)
PLATELET # BLD AUTO: 519 10E9/L (ref 150–450)
PLATELET # BLD AUTO: 52 10E9/L (ref 150–450)
PLATELET # BLD AUTO: 54 10E9/L (ref 150–450)
PLATELET # BLD AUTO: 551 10E9/L (ref 150–450)
PLATELET # BLD AUTO: 56 10E9/L (ref 150–450)
PLATELET # BLD AUTO: 57 10E9/L (ref 150–450)
PLATELET # BLD AUTO: 58 10E9/L (ref 150–450)
PLATELET # BLD AUTO: 58 10E9/L (ref 150–450)
PLATELET # BLD AUTO: 59 10E9/L (ref 150–450)
PLATELET # BLD AUTO: 61 10E9/L (ref 150–450)
PLATELET # BLD AUTO: 628 10E9/L (ref 150–450)
PLATELET # BLD AUTO: 63 10E9/L (ref 150–450)
PLATELET # BLD AUTO: 63 10E9/L (ref 150–450)
PLATELET # BLD AUTO: 633 10E9/L (ref 150–450)
PLATELET # BLD AUTO: 655 10E9/L (ref 150–450)
PLATELET # BLD AUTO: 667 10E9/L (ref 150–450)
PLATELET # BLD AUTO: 669 10E9/L (ref 150–450)
PLATELET # BLD AUTO: 67 10E9/L (ref 150–450)
PLATELET # BLD AUTO: 68 10E9/L (ref 150–450)
PLATELET # BLD AUTO: 79 10E9/L (ref 150–450)
PLATELET # BLD AUTO: 832 10E9/L (ref 150–450)
PLATELET # BLD AUTO: 88 10E9/L (ref 150–450)
PLATELET # BLD AUTO: 89 10E9/L (ref 150–450)
PLATELET # BLD AUTO: 908 10E9/L (ref 150–450)
PLATELET # BLD EST: ABNORMAL 10*3/UL
PO2 BLD: 109 MM HG (ref 80–105)
PO2 BLD: 119 MM HG (ref 80–105)
PO2 BLD: 130 MM HG (ref 80–105)
PO2 BLD: 133 MM HG (ref 80–105)
PO2 BLD: 313 MM HG (ref 80–105)
PO2 BLD: 47 MM HG (ref 80–105)
PO2 BLD: 48 MM HG (ref 80–105)
PO2 BLD: 51 MM HG (ref 80–105)
PO2 BLD: 52 MM HG (ref 80–105)
PO2 BLD: 64 MM HG (ref 80–105)
PO2 BLD: 66 MM HG (ref 80–105)
PO2 BLD: 68 MM HG (ref 80–105)
PO2 BLD: 74 MM HG (ref 80–105)
PO2 BLD: 76 MM HG (ref 80–105)
PO2 BLD: 90 MM HG (ref 80–105)
PO2 BLDV: 27 MM HG (ref 25–47)
PO2 BLDV: 38 MM HG (ref 25–47)
PO2 BLDV: 48 MM HG (ref 25–47)
PO2 BLDV: 50 MM HG (ref 25–47)
POIKILOCYTOSIS BLD QL SMEAR: SLIGHT
POLYCHROMASIA BLD QL SMEAR: SLIGHT
POTASSIUM SERPL-SCNC: 2.8 MMOL/L (ref 3.4–5.3)
POTASSIUM SERPL-SCNC: 2.9 MMOL/L (ref 3.4–5.3)
POTASSIUM SERPL-SCNC: 2.9 MMOL/L (ref 3.4–5.3)
POTASSIUM SERPL-SCNC: 3.1 MMOL/L (ref 3.4–5.3)
POTASSIUM SERPL-SCNC: 3.2 MMOL/L (ref 3.4–5.3)
POTASSIUM SERPL-SCNC: 3.3 MMOL/L (ref 3.4–5.3)
POTASSIUM SERPL-SCNC: 3.4 MMOL/L (ref 3.4–5.3)
POTASSIUM SERPL-SCNC: 3.4 MMOL/L (ref 3.4–5.3)
POTASSIUM SERPL-SCNC: 3.5 MMOL/L (ref 3.4–5.3)
POTASSIUM SERPL-SCNC: 3.5 MMOL/L (ref 3.4–5.3)
POTASSIUM SERPL-SCNC: 3.7 MMOL/L (ref 3.4–5.3)
POTASSIUM SERPL-SCNC: 3.8 MMOL/L (ref 3.4–5.3)
POTASSIUM SERPL-SCNC: 3.9 MMOL/L (ref 3.4–5.3)
POTASSIUM SERPL-SCNC: 4 MMOL/L (ref 3.4–5.3)
POTASSIUM SERPL-SCNC: 4.1 MMOL/L (ref 3.4–5.3)
POTASSIUM SERPL-SCNC: 4.2 MMOL/L (ref 3.4–5.3)
POTASSIUM SERPL-SCNC: 4.3 MMOL/L (ref 3.4–5.3)
POTASSIUM SERPL-SCNC: 4.4 MMOL/L (ref 3.4–5.3)
POTASSIUM SERPL-SCNC: 4.5 MMOL/L (ref 3.4–5.3)
POTASSIUM SERPL-SCNC: 4.5 MMOL/L (ref 3.4–5.3)
POTASSIUM SERPL-SCNC: 4.6 MMOL/L (ref 3.4–5.3)
POTASSIUM SERPL-SCNC: 4.6 MMOL/L (ref 3.4–5.3)
POTASSIUM SERPL-SCNC: 4.9 MMOL/L (ref 3.4–5.3)
POTASSIUM SERPL-SCNC: 5 MMOL/L (ref 3.4–5.3)
POTASSIUM SERPL-SCNC: 5.1 MMOL/L (ref 3.4–5.3)
POTASSIUM SERPL-SCNC: 5.2 MMOL/L (ref 3.4–5.3)
POTASSIUM SERPL-SCNC: 5.3 MMOL/L (ref 3.4–5.3)
POTASSIUM SERPL-SCNC: 5.5 MMOL/L (ref 3.4–5.3)
POTASSIUM SERPL-SCNC: 5.6 MMOL/L (ref 3.4–5.3)
POTASSIUM SERPL-SCNC: 5.8 MMOL/L (ref 3.4–5.3)
PREALB SERPL IA-MCNC: 13 MG/DL (ref 15–45)
PREALB SERPL IA-MCNC: 7 MG/DL (ref 15–45)
PROCALCITONIN SERPL-MCNC: 3.22 NG/ML
PROCALCITONIN SERPL-MCNC: 96.64 NG/ML
PROMYELOCYTES # BLD MANUAL: 0.5 10E9/L
PROMYELOCYTES # BLD MANUAL: 0.7 10E9/L
PROMYELOCYTES # BLD MANUAL: 1.4 10E9/L
PROMYELOCYTES # BLD MANUAL: 1.5 10E9/L
PROMYELOCYTES NFR BLD MANUAL: 1 %
PROMYELOCYTES NFR BLD MANUAL: 1 %
PROMYELOCYTES NFR BLD MANUAL: 2 %
PROMYELOCYTES NFR BLD MANUAL: 2 %
PROT SERPL-MCNC: 4.3 G/DL (ref 6.8–8.8)
PROT SERPL-MCNC: 4.5 G/DL (ref 6.8–8.8)
PROT SERPL-MCNC: 4.7 G/DL (ref 6.8–8.8)
PROT SERPL-MCNC: 4.8 G/DL (ref 6.8–8.8)
PROT SERPL-MCNC: 5.3 G/DL (ref 6.8–8.8)
PROT SERPL-MCNC: 5.4 G/DL (ref 6.8–8.8)
PROT SERPL-MCNC: 6.2 G/DL (ref 6.8–8.8)
PROT SERPL-MCNC: 6.2 G/DL (ref 6.8–8.8)
PROT SERPL-MCNC: 6.3 G/DL (ref 6.8–8.8)
PROT SERPL-MCNC: 6.8 G/DL (ref 6.8–8.8)
PROT SERPL-MCNC: 6.9 G/DL (ref 6.8–8.8)
PROT SERPL-MCNC: 7 G/DL (ref 6.8–8.8)
PROT SERPL-MCNC: 7.1 G/DL (ref 6.8–8.8)
PROT SERPL-MCNC: 7.3 G/DL (ref 6.8–8.8)
PROT SERPL-MCNC: 7.4 G/DL (ref 6.8–8.8)
PROT SERPL-MCNC: 7.6 G/DL (ref 6.8–8.8)
PROT SERPL-MCNC: 7.8 G/DL (ref 6.8–8.8)
PROT SERPL-MCNC: 9.7 G/DL (ref 6.8–8.8)
PTH RELATED PROT SERPL-SCNC: 13.9 PMOL/L (ref 0–2.3)
PTH-INTACT SERPL-MCNC: <7 PG/ML (ref 18–80)
RBC # BLD AUTO: 1.82 10E12/L (ref 4.4–5.9)
RBC # BLD AUTO: 1.93 10E12/L (ref 4.4–5.9)
RBC # BLD AUTO: 1.93 10E12/L (ref 4.4–5.9)
RBC # BLD AUTO: 2.14 10E12/L (ref 4.4–5.9)
RBC # BLD AUTO: 2.26 10E12/L (ref 4.4–5.9)
RBC # BLD AUTO: 2.28 10E12/L (ref 4.4–5.9)
RBC # BLD AUTO: 2.29 10E12/L (ref 4.4–5.9)
RBC # BLD AUTO: 2.32 10E12/L (ref 4.4–5.9)
RBC # BLD AUTO: 2.33 10E12/L (ref 4.4–5.9)
RBC # BLD AUTO: 2.36 10E12/L (ref 4.4–5.9)
RBC # BLD AUTO: 2.4 10E12/L (ref 4.4–5.9)
RBC # BLD AUTO: 2.49 10E12/L (ref 4.4–5.9)
RBC # BLD AUTO: 2.58 10E12/L (ref 4.4–5.9)
RBC # BLD AUTO: 2.62 10E12/L (ref 4.4–5.9)
RBC # BLD AUTO: 2.63 10E12/L (ref 4.4–5.9)
RBC # BLD AUTO: 2.63 10E12/L (ref 4.4–5.9)
RBC # BLD AUTO: 2.64 10E12/L (ref 4.4–5.9)
RBC # BLD AUTO: 2.64 10E12/L (ref 4.4–5.9)
RBC # BLD AUTO: 2.66 10E12/L (ref 4.4–5.9)
RBC # BLD AUTO: 2.67 10E12/L (ref 4.4–5.9)
RBC # BLD AUTO: 2.67 10E12/L (ref 4.4–5.9)
RBC # BLD AUTO: 2.68 10E12/L (ref 4.4–5.9)
RBC # BLD AUTO: 2.68 10E12/L (ref 4.4–5.9)
RBC # BLD AUTO: 2.69 10E12/L (ref 4.4–5.9)
RBC # BLD AUTO: 2.71 10E12/L (ref 4.4–5.9)
RBC # BLD AUTO: 2.71 10E12/L (ref 4.4–5.9)
RBC # BLD AUTO: 2.72 10E12/L (ref 4.4–5.9)
RBC # BLD AUTO: 2.74 10E12/L (ref 4.4–5.9)
RBC # BLD AUTO: 2.75 10E12/L (ref 4.4–5.9)
RBC # BLD AUTO: 2.76 10E12/L (ref 4.4–5.9)
RBC # BLD AUTO: 2.79 10E12/L (ref 4.4–5.9)
RBC # BLD AUTO: 2.84 10E12/L (ref 4.4–5.9)
RBC # BLD AUTO: 2.84 10E12/L (ref 4.4–5.9)
RBC # BLD AUTO: 2.91 10E12/L (ref 4.4–5.9)
RBC # BLD AUTO: 2.94 10E12/L (ref 4.4–5.9)
RBC # BLD AUTO: 2.95 10E12/L (ref 4.4–5.9)
RBC # BLD AUTO: 2.96 10E12/L (ref 4.4–5.9)
RBC # BLD AUTO: 2.97 10E12/L (ref 4.4–5.9)
RBC # BLD AUTO: 2.99 10E12/L (ref 4.4–5.9)
RBC # BLD AUTO: 3.05 10E12/L (ref 4.4–5.9)
RBC # BLD AUTO: 3.12 10E12/L (ref 4.4–5.9)
RBC # BLD AUTO: 3.24 10E12/L (ref 4.4–5.9)
RBC # BLD AUTO: 3.28 10E12/L (ref 4.4–5.9)
RBC # BLD AUTO: 3.32 10E12/L (ref 4.4–5.9)
RBC # BLD AUTO: 3.51 10E12/L (ref 4.4–5.9)
RBC # BLD AUTO: 3.52 10E12/L (ref 4.4–5.9)
RBC # BLD AUTO: 3.77 10E12/L (ref 4.4–5.9)
RBC # FLD: 400 /UL
RBC #/AREA URNS AUTO: 15 /HPF (ref 0–2)
RBC #/AREA URNS AUTO: 3 /HPF (ref 0–2)
RBC #/AREA URNS AUTO: 4 /HPF (ref 0–2)
RBC #/AREA URNS AUTO: 92 /HPF (ref 0–2)
RBC #/AREA URNS AUTO: <1 /HPF (ref 0–2)
RBC #/AREA URNS AUTO: >182 /HPF (ref 0–2)
RBC INCLUSIONS BLD: SLIGHT
RBC MORPH BLD: ABNORMAL
RHINOVIRUS RNA SPEC QL NAA+PROBE: NEGATIVE
RSV RNA SPEC QL NAA+PROBE: NEGATIVE
RSV RNA SPEC QL NAA+PROBE: NEGATIVE
S PNEUM AG SPEC QL: NORMAL
SARS-COV-2 RNA SPEC QL NAA+PROBE: NEGATIVE
SARS-COV-2 RNA SPEC QL NAA+PROBE: NORMAL
SARS-COV-2 RNA SPEC QL NAA+PROBE: NOT DETECTED
SMUDGE CELLS BLD QL SMEAR: PRESENT
SODIUM SERPL-SCNC: 134 MMOL/L (ref 133–144)
SODIUM SERPL-SCNC: 135 MMOL/L (ref 133–144)
SODIUM SERPL-SCNC: 136 MMOL/L (ref 133–144)
SODIUM SERPL-SCNC: 137 MMOL/L (ref 133–144)
SODIUM SERPL-SCNC: 138 MMOL/L (ref 133–144)
SODIUM SERPL-SCNC: 138 MMOL/L (ref 133–144)
SODIUM SERPL-SCNC: 139 MMOL/L (ref 133–144)
SODIUM SERPL-SCNC: 140 MMOL/L (ref 133–144)
SODIUM SERPL-SCNC: 141 MMOL/L (ref 133–144)
SODIUM SERPL-SCNC: 141 MMOL/L (ref 133–144)
SODIUM SERPL-SCNC: 142 MMOL/L (ref 133–144)
SODIUM SERPL-SCNC: 143 MMOL/L (ref 133–144)
SODIUM SERPL-SCNC: 143 MMOL/L (ref 133–144)
SODIUM SERPL-SCNC: 144 MMOL/L (ref 133–144)
SODIUM SERPL-SCNC: 145 MMOL/L (ref 133–144)
SODIUM SERPL-SCNC: 146 MMOL/L (ref 133–144)
SODIUM SERPL-SCNC: 147 MMOL/L (ref 133–144)
SODIUM SERPL-SCNC: 148 MMOL/L (ref 133–144)
SODIUM SERPL-SCNC: 149 MMOL/L (ref 133–144)
SOURCE: ABNORMAL
SP GR UR STRIP: 1.01 (ref 1–1.03)
SPECIMEN EXP DATE BLD: NORMAL
SPECIMEN SOURCE FLD: NORMAL
SPECIMEN SOURCE: ABNORMAL
SPECIMEN SOURCE: NORMAL
SPHEROCYTES BLD QL SMEAR: SLIGHT
SPHEROCYTES BLD QL SMEAR: SLIGHT
SQUAMOUS #/AREA URNS AUTO: <1 /HPF (ref 0–1)
STRONGYLOIDES IGG SER IA-ACNC: 0.3 IV
T-CELL SUBSET INTERPRETATION: NORMAL
TARGETS BLD QL SMEAR: SLIGHT
TIBC SERPL-MCNC: 135 UG/DL (ref 240–430)
TOXIC GRANULES BLD QL SMEAR: PRESENT
TRANS CELLS #/AREA URNS HPF: 1 /HPF (ref 0–1)
TRANSF REACT PLASRBC-IMP: NORMAL
TRANSF REACT PLASRBC-IMP: NORMAL
TRANSFUSION STATUS PATIENT QL: NORMAL
TRIGL SERPL-MCNC: 198 MG/DL
TRIGL SERPL-MCNC: 258 MG/DL
TRIGL SERPL-MCNC: 442 MG/DL
TROPONIN I SERPL-MCNC: 0.02 UG/L (ref 0–0.04)
TSH SERPL DL<=0.005 MIU/L-ACNC: 2.74 MU/L (ref 0.4–4)
UPPER GI ENDOSCOPY: NORMAL
URATE SERPL-MCNC: 2.5 MG/DL (ref 3.5–7.2)
URATE SERPL-MCNC: 8.3 MG/DL (ref 3.5–7.2)
UROBILINOGEN UR STRIP-MCNC: NORMAL MG/DL (ref 0–2)
VANCOMYCIN SERPL-MCNC: 10.7 MG/L
VANCOMYCIN SERPL-MCNC: 11 MG/L
VANCOMYCIN SERPL-MCNC: 8.1 MG/L
VIT B12 SERPL-MCNC: 2218 PG/ML (ref 193–986)
VIT B12 SERPL-MCNC: 302 PG/ML (ref 193–986)
WBC # BLD AUTO: 0.1 10E9/L (ref 4–11)
WBC # BLD AUTO: 0.2 10E9/L (ref 4–11)
WBC # BLD AUTO: 0.4 10E9/L (ref 4–11)
WBC # BLD AUTO: 0.4 10E9/L (ref 4–11)
WBC # BLD AUTO: 0.6 10E9/L (ref 4–11)
WBC # BLD AUTO: 1.1 10E9/L (ref 4–11)
WBC # BLD AUTO: 10 10E9/L (ref 4–11)
WBC # BLD AUTO: 12.6 10E9/L (ref 4–11)
WBC # BLD AUTO: 14.5 10E9/L (ref 4–11)
WBC # BLD AUTO: 15.7 10E9/L (ref 4–11)
WBC # BLD AUTO: 17.7 10E9/L (ref 4–11)
WBC # BLD AUTO: 18.1 10E9/L (ref 4–11)
WBC # BLD AUTO: 18.8 10E9/L (ref 4–11)
WBC # BLD AUTO: 19.9 10E9/L (ref 4–11)
WBC # BLD AUTO: 2.9 10E9/L (ref 4–11)
WBC # BLD AUTO: 25.1 10E9/L (ref 4–11)
WBC # BLD AUTO: 25.8 10E9/L (ref 4–11)
WBC # BLD AUTO: 25.9 10E9/L (ref 4–11)
WBC # BLD AUTO: 27.2 10E9/L (ref 4–11)
WBC # BLD AUTO: 29.3 10E9/L (ref 4–11)
WBC # BLD AUTO: 3.8 10E9/L (ref 4–11)
WBC # BLD AUTO: 31.4 10E9/L (ref 4–11)
WBC # BLD AUTO: 35.9 10E9/L (ref 4–11)
WBC # BLD AUTO: 36.4 10E9/L (ref 4–11)
WBC # BLD AUTO: 37.4 10E9/L (ref 4–11)
WBC # BLD AUTO: 38.9 10E9/L (ref 4–11)
WBC # BLD AUTO: 49.5 10E9/L (ref 4–11)
WBC # BLD AUTO: 52.4 10E9/L (ref 4–11)
WBC # BLD AUTO: 56.5 10E9/L (ref 4–11)
WBC # BLD AUTO: 58.2 10E9/L (ref 4–11)
WBC # BLD AUTO: 6.1 10E9/L (ref 4–11)
WBC # BLD AUTO: 6.6 10E9/L (ref 4–11)
WBC # BLD AUTO: 60.3 10E9/L (ref 4–11)
WBC # BLD AUTO: 62.1 10E9/L (ref 4–11)
WBC # BLD AUTO: 63.1 10E9/L (ref 4–11)
WBC # BLD AUTO: 68.2 10E9/L (ref 4–11)
WBC # BLD AUTO: 68.3 10E9/L (ref 4–11)
WBC # BLD AUTO: 7.1 10E9/L (ref 4–11)
WBC # BLD AUTO: 7.6 10E9/L (ref 4–11)
WBC # BLD AUTO: 72.7 10E9/L (ref 4–11)
WBC # BLD AUTO: 8.4 10E9/L (ref 4–11)
WBC # BLD AUTO: 8.6 10E9/L (ref 4–11)
WBC # BLD AUTO: 8.9 10E9/L (ref 4–11)
WBC # BLD AUTO: 8.9 10E9/L (ref 4–11)
WBC # FLD AUTO: 463 /UL
WBC #/AREA URNS AUTO: 1 /HPF (ref 0–5)
WBC #/AREA URNS AUTO: 2 /HPF (ref 0–5)
WBC #/AREA URNS AUTO: 3 /HPF (ref 0–5)
WBC #/AREA URNS AUTO: 4 /HPF (ref 0–5)
WBC #/AREA URNS AUTO: 44 /HPF (ref 0–5)
WBC #/AREA URNS AUTO: 50 /HPF (ref 0–5)

## 2020-01-01 PROCEDURE — 258N000003 HC RX IP 258 OP 636: Performed by: RADIOLOGY

## 2020-01-01 PROCEDURE — 250N000011 HC RX IP 250 OP 636: Performed by: INTERNAL MEDICINE

## 2020-01-01 PROCEDURE — 12000000 ZZH R&B MED SURG/OB

## 2020-01-01 PROCEDURE — 84478 ASSAY OF TRIGLYCERIDES: CPT | Performed by: INTERNAL MEDICINE

## 2020-01-01 PROCEDURE — 85730 THROMBOPLASTIN TIME PARTIAL: CPT | Performed by: EMERGENCY MEDICINE

## 2020-01-01 PROCEDURE — 76942 ECHO GUIDE FOR BIOPSY: CPT

## 2020-01-01 PROCEDURE — 87633 RESP VIRUS 12-25 TARGETS: CPT | Performed by: OBSTETRICS & GYNECOLOGY

## 2020-01-01 PROCEDURE — 80053 COMPREHEN METABOLIC PANEL: CPT | Performed by: INTERNAL MEDICINE

## 2020-01-01 PROCEDURE — 85379 FIBRIN DEGRADATION QUANT: CPT | Performed by: HOSPITALIST

## 2020-01-01 PROCEDURE — 99285 EMERGENCY DEPT VISIT HI MDM: CPT | Mod: 25

## 2020-01-01 PROCEDURE — 80053 COMPREHEN METABOLIC PANEL: CPT | Performed by: HOSPITALIST

## 2020-01-01 PROCEDURE — 250N000013 HC RX MED GY IP 250 OP 250 PS 637: Performed by: INTERNAL MEDICINE

## 2020-01-01 PROCEDURE — 96361 HYDRATE IV INFUSION ADD-ON: CPT

## 2020-01-01 PROCEDURE — 99233 SBSQ HOSP IP/OBS HIGH 50: CPT | Performed by: INTERNAL MEDICINE

## 2020-01-01 PROCEDURE — 99232 SBSQ HOSP IP/OBS MODERATE 35: CPT | Performed by: INTERNAL MEDICINE

## 2020-01-01 PROCEDURE — 258N000003 HC RX IP 258 OP 636: Performed by: INTERNAL MEDICINE

## 2020-01-01 PROCEDURE — 250N000011 HC RX IP 250 OP 636: Performed by: NURSE PRACTITIONER

## 2020-01-01 PROCEDURE — 87040 BLOOD CULTURE FOR BACTERIA: CPT | Performed by: EMERGENCY MEDICINE

## 2020-01-01 PROCEDURE — 99291 CRITICAL CARE FIRST HOUR: CPT | Performed by: INTERNAL MEDICINE

## 2020-01-01 PROCEDURE — 250N000011 HC RX IP 250 OP 636: Performed by: ANESTHESIOLOGY

## 2020-01-01 PROCEDURE — 25000128 H RX IP 250 OP 636: Performed by: INTERNAL MEDICINE

## 2020-01-01 PROCEDURE — 250N000009 HC RX 250: Performed by: INTERNAL MEDICINE

## 2020-01-01 PROCEDURE — 83735 ASSAY OF MAGNESIUM: CPT | Performed by: HOSPITALIST

## 2020-01-01 PROCEDURE — 85018 HEMOGLOBIN: CPT

## 2020-01-01 PROCEDURE — 86900 BLOOD TYPING SEROLOGIC ABO: CPT | Performed by: PHYSICIAN ASSISTANT

## 2020-01-01 PROCEDURE — 83970 ASSAY OF PARATHORMONE: CPT | Performed by: INTERNAL MEDICINE

## 2020-01-01 PROCEDURE — 258N000003 HC RX IP 258 OP 636

## 2020-01-01 PROCEDURE — 272N000078 HC NUTRITION PRODUCT INTERMEDIATE LITER

## 2020-01-01 PROCEDURE — 82803 BLOOD GASES ANY COMBINATION: CPT | Performed by: INTERNAL MEDICINE

## 2020-01-01 PROCEDURE — 80048 BASIC METABOLIC PNL TOTAL CA: CPT | Performed by: HOSPITALIST

## 2020-01-01 PROCEDURE — C9113 INJ PANTOPRAZOLE SODIUM, VIA: HCPCS | Performed by: INTERNAL MEDICINE

## 2020-01-01 PROCEDURE — 258N000003 HC RX IP 258 OP 636: Performed by: HOSPITALIST

## 2020-01-01 PROCEDURE — 81001 URINALYSIS AUTO W/SCOPE: CPT | Performed by: EMERGENCY MEDICINE

## 2020-01-01 PROCEDURE — 25800029 ZZH RX IP 258 OP 250: Performed by: INTERNAL MEDICINE

## 2020-01-01 PROCEDURE — 84132 ASSAY OF SERUM POTASSIUM: CPT | Performed by: HOSPITALIST

## 2020-01-01 PROCEDURE — 85025 COMPLETE CBC W/AUTO DIFF WBC: CPT | Performed by: INTERNAL MEDICINE

## 2020-01-01 PROCEDURE — 99239 HOSP IP/OBS DSCHRG MGMT >30: CPT | Performed by: INTERNAL MEDICINE

## 2020-01-01 PROCEDURE — 200N000001 HC R&B ICU

## 2020-01-01 PROCEDURE — 250N000009 HC RX 250: Performed by: HOSPITALIST

## 2020-01-01 PROCEDURE — 99291 CRITICAL CARE FIRST HOUR: CPT | Performed by: OBSTETRICS & GYNECOLOGY

## 2020-01-01 PROCEDURE — 40001005 ZZHCL STATISTIC FLOW >15 ABY TC 88189: Performed by: INTERNAL MEDICINE

## 2020-01-01 PROCEDURE — 99238 HOSP IP/OBS DSCHRG MGMT 30/<: CPT | Performed by: INTERNAL MEDICINE

## 2020-01-01 PROCEDURE — 80048 BASIC METABOLIC PNL TOTAL CA: CPT | Performed by: INTERNAL MEDICINE

## 2020-01-01 PROCEDURE — 999N000040 HC STATISTIC CONSULT NO CHARGE VASC ACCESS

## 2020-01-01 PROCEDURE — 96375 TX/PRO/DX INJ NEW DRUG ADDON: CPT

## 2020-01-01 PROCEDURE — 96413 CHEMO IV INFUSION 1 HR: CPT

## 2020-01-01 PROCEDURE — 93010 ELECTROCARDIOGRAM REPORT: CPT | Performed by: INTERNAL MEDICINE

## 2020-01-01 PROCEDURE — 85049 AUTOMATED PLATELET COUNT: CPT | Performed by: INTERNAL MEDICINE

## 2020-01-01 PROCEDURE — 96367 TX/PROPH/DG ADDL SEQ IV INF: CPT

## 2020-01-01 PROCEDURE — 82330 ASSAY OF CALCIUM: CPT | Performed by: INTERNAL MEDICINE

## 2020-01-01 PROCEDURE — 75726 ARTERY X-RAYS ABDOMEN: CPT

## 2020-01-01 PROCEDURE — 83550 IRON BINDING TEST: CPT | Performed by: INTERNAL MEDICINE

## 2020-01-01 PROCEDURE — 85027 COMPLETE CBC AUTOMATED: CPT

## 2020-01-01 PROCEDURE — 82533 TOTAL CORTISOL: CPT | Performed by: OBSTETRICS & GYNECOLOGY

## 2020-01-01 PROCEDURE — 93005 ELECTROCARDIOGRAM TRACING: CPT

## 2020-01-01 PROCEDURE — 88184 FLOWCYTOMETRY/ TC 1 MARKER: CPT | Mod: TC | Performed by: OBSTETRICS & GYNECOLOGY

## 2020-01-01 PROCEDURE — 86850 RBC ANTIBODY SCREEN: CPT | Performed by: INTERNAL MEDICINE

## 2020-01-01 PROCEDURE — 99221 1ST HOSP IP/OBS SF/LOW 40: CPT | Performed by: STUDENT IN AN ORGANIZED HEALTH CARE EDUCATION/TRAINING PROGRAM

## 2020-01-01 PROCEDURE — 82378 CARCINOEMBRYONIC ANTIGEN: CPT | Performed by: INTERNAL MEDICINE

## 2020-01-01 PROCEDURE — 250N000011 HC RX IP 250 OP 636: Performed by: HOSPITALIST

## 2020-01-01 PROCEDURE — 250N000011 HC RX IP 250 OP 636: Performed by: EMERGENCY MEDICINE

## 2020-01-01 PROCEDURE — 87070 CULTURE OTHR SPECIMN AEROBIC: CPT | Performed by: HOSPITALIST

## 2020-01-01 PROCEDURE — 99222 1ST HOSP IP/OBS MODERATE 55: CPT | Performed by: NURSE PRACTITIONER

## 2020-01-01 PROCEDURE — 84100 ASSAY OF PHOSPHORUS: CPT | Performed by: INTERNAL MEDICINE

## 2020-01-01 PROCEDURE — 87206 SMEAR FLUORESCENT/ACID STAI: CPT | Performed by: HOSPITALIST

## 2020-01-01 PROCEDURE — 36415 COLL VENOUS BLD VENIPUNCTURE: CPT | Performed by: INTERNAL MEDICINE

## 2020-01-01 PROCEDURE — 94640 AIRWAY INHALATION TREATMENT: CPT | Mod: 76

## 2020-01-01 PROCEDURE — 87070 CULTURE OTHR SPECIMN AEROBIC: CPT | Performed by: OBSTETRICS & GYNECOLOGY

## 2020-01-01 PROCEDURE — 85004 AUTOMATED DIFF WBC COUNT: CPT | Performed by: HOSPITALIST

## 2020-01-01 PROCEDURE — 85018 HEMOGLOBIN: CPT | Performed by: HOSPITALIST

## 2020-01-01 PROCEDURE — 85027 COMPLETE CBC AUTOMATED: CPT | Performed by: INTERNAL MEDICINE

## 2020-01-01 PROCEDURE — 272N000433 HC KIT CATH IV 18 OR 20G CM, POWERGLIDE W MAX BARRIER

## 2020-01-01 PROCEDURE — 87040 BLOOD CULTURE FOR BACTERIA: CPT | Performed by: HOSPITALIST

## 2020-01-01 PROCEDURE — 96372 THER/PROPH/DIAG INJ SC/IM: CPT | Performed by: INTERNAL MEDICINE

## 2020-01-01 PROCEDURE — 999N000157 HC STATISTIC RCP TIME EA 10 MIN

## 2020-01-01 PROCEDURE — 83605 ASSAY OF LACTIC ACID: CPT | Performed by: EMERGENCY MEDICINE

## 2020-01-01 PROCEDURE — 999N001017 HC STATISTIC GLUCOSE BY METER IP

## 2020-01-01 PROCEDURE — 86900 BLOOD TYPING SEROLOGIC ABO: CPT | Performed by: INTERNAL MEDICINE

## 2020-01-01 PROCEDURE — 80069 RENAL FUNCTION PANEL: CPT | Performed by: INTERNAL MEDICINE

## 2020-01-01 PROCEDURE — 250N000013 HC RX MED GY IP 250 OP 250 PS 637

## 2020-01-01 PROCEDURE — 25000132 ZZH RX MED GY IP 250 OP 250 PS 637: Performed by: INTERNAL MEDICINE

## 2020-01-01 PROCEDURE — 84080 ASSAY ALKALINE PHOSPHATASES: CPT | Performed by: INTERNAL MEDICINE

## 2020-01-01 PROCEDURE — 85018 HEMOGLOBIN: CPT | Performed by: INTERNAL MEDICINE

## 2020-01-01 PROCEDURE — 82652 VIT D 1 25-DIHYDROXY: CPT | Performed by: INTERNAL MEDICINE

## 2020-01-01 PROCEDURE — 87015 SPECIMEN INFECT AGNT CONCNTJ: CPT | Performed by: OBSTETRICS & GYNECOLOGY

## 2020-01-01 PROCEDURE — 258N000003 HC RX IP 258 OP 636: Performed by: NURSE ANESTHETIST, CERTIFIED REGISTERED

## 2020-01-01 PROCEDURE — 999N000215 HC STATISTIC HFNC ADULT NON-CPAP

## 2020-01-01 PROCEDURE — 25000125 ZZHC RX 250: Performed by: INTERNAL MEDICINE

## 2020-01-01 PROCEDURE — 84155 ASSAY OF PROTEIN SERUM: CPT | Performed by: INTERNAL MEDICINE

## 2020-01-01 PROCEDURE — 87102 FUNGUS ISOLATION CULTURE: CPT | Performed by: OBSTETRICS & GYNECOLOGY

## 2020-01-01 PROCEDURE — 86850 RBC ANTIBODY SCREEN: CPT | Performed by: HOSPITALIST

## 2020-01-01 PROCEDURE — 85610 PROTHROMBIN TIME: CPT | Performed by: INTERNAL MEDICINE

## 2020-01-01 PROCEDURE — 87899 AGENT NOS ASSAY W/OPTIC: CPT | Performed by: HOSPITALIST

## 2020-01-01 PROCEDURE — 87015 SPECIMEN INFECT AGNT CONCNTJ: CPT | Performed by: HOSPITALIST

## 2020-01-01 PROCEDURE — 36000060 ZZH SURGERY LEVEL 3 W FLUORO 1ST 30 MIN: Performed by: STUDENT IN AN ORGANIZED HEALTH CARE EDUCATION/TRAINING PROGRAM

## 2020-01-01 PROCEDURE — 120N000001 HC R&B MED SURG/OB

## 2020-01-01 PROCEDURE — 84132 ASSAY OF SERUM POTASSIUM: CPT | Performed by: INTERNAL MEDICINE

## 2020-01-01 PROCEDURE — G0463 HOSPITAL OUTPT CLINIC VISIT: HCPCS

## 2020-01-01 PROCEDURE — 83735 ASSAY OF MAGNESIUM: CPT | Performed by: INTERNAL MEDICINE

## 2020-01-01 PROCEDURE — 99233 SBSQ HOSP IP/OBS HIGH 50: CPT | Performed by: NURSE PRACTITIONER

## 2020-01-01 PROCEDURE — P9016 RBC LEUKOCYTES REDUCED: HCPCS

## 2020-01-01 PROCEDURE — 25000128 H RX IP 250 OP 636: Performed by: EMERGENCY MEDICINE

## 2020-01-01 PROCEDURE — 71045 X-RAY EXAM CHEST 1 VIEW: CPT

## 2020-01-01 PROCEDURE — 86078 PHYS BLOOD BANK SERV REACTJ: CPT | Performed by: PHYSICIAN ASSISTANT

## 2020-01-01 PROCEDURE — 250N000011 HC RX IP 250 OP 636: Performed by: PHYSICIAN ASSISTANT

## 2020-01-01 PROCEDURE — P9016 RBC LEUKOCYTES REDUCED: HCPCS | Performed by: INTERNAL MEDICINE

## 2020-01-01 PROCEDURE — 99222 1ST HOSP IP/OBS MODERATE 55: CPT | Performed by: INTERNAL MEDICINE

## 2020-01-01 PROCEDURE — 82330 ASSAY OF CALCIUM: CPT | Performed by: EMERGENCY MEDICINE

## 2020-01-01 PROCEDURE — 51702 INSERT TEMP BLADDER CATH: CPT

## 2020-01-01 PROCEDURE — 86923 COMPATIBILITY TEST ELECTRIC: CPT | Performed by: HOSPITALIST

## 2020-01-01 PROCEDURE — 36600 WITHDRAWAL OF ARTERIAL BLOOD: CPT

## 2020-01-01 PROCEDURE — 87186 SC STD MICRODIL/AGAR DIL: CPT | Performed by: EMERGENCY MEDICINE

## 2020-01-01 PROCEDURE — U0003 INFECTIOUS AGENT DETECTION BY NUCLEIC ACID (DNA OR RNA); SEVERE ACUTE RESPIRATORY SYNDROME CORONAVIRUS 2 (SARS-COV-2) (CORONAVIRUS DISEASE [COVID-19]), AMPLIFIED PROBE TECHNIQUE, MAKING USE OF HIGH THROUGHPUT TECHNOLOGIES AS DESCRIBED BY CMS-2020-01-R: HCPCS | Performed by: EMERGENCY MEDICINE

## 2020-01-01 PROCEDURE — 36430 TRANSFUSION BLD/BLD COMPNT: CPT

## 2020-01-01 PROCEDURE — C1769 GUIDE WIRE: HCPCS

## 2020-01-01 PROCEDURE — 88305 TISSUE EXAM BY PATHOLOGIST: CPT | Mod: 26 | Performed by: STUDENT IN AN ORGANIZED HEALTH CARE EDUCATION/TRAINING PROGRAM

## 2020-01-01 PROCEDURE — 99207 PR CDG-CODE INCORRECT PER BILLING BASED ON TIME: CPT | Performed by: INTERNAL MEDICINE

## 2020-01-01 PROCEDURE — G0463 HOSPITAL OUTPT CLINIC VISIT: HCPCS | Mod: 25

## 2020-01-01 PROCEDURE — 36415 COLL VENOUS BLD VENIPUNCTURE: CPT | Performed by: HOSPITALIST

## 2020-01-01 PROCEDURE — 00000160 ZZHCL STATISTIC H-SPECIAL HANDLING: Performed by: STUDENT IN AN ORGANIZED HEALTH CARE EDUCATION/TRAINING PROGRAM

## 2020-01-01 PROCEDURE — 84478 ASSAY OF TRIGLYCERIDES: CPT | Performed by: HOSPITALIST

## 2020-01-01 PROCEDURE — 250N000011 HC RX IP 250 OP 636: Performed by: NURSE ANESTHETIST, CERTIFIED REGISTERED

## 2020-01-01 PROCEDURE — 83516 IMMUNOASSAY NONANTIBODY: CPT | Performed by: HOSPITALIST

## 2020-01-01 PROCEDURE — 83605 ASSAY OF LACTIC ACID: CPT | Performed by: HOSPITALIST

## 2020-01-01 PROCEDURE — 250N000009 HC RX 250: Performed by: NURSE PRACTITIONER

## 2020-01-01 PROCEDURE — 999N000105 HC STATISTIC NO DOCUMENTATION TO SUPPORT CHARGE

## 2020-01-01 PROCEDURE — 99233 SBSQ HOSP IP/OBS HIGH 50: CPT | Performed by: HOSPITALIST

## 2020-01-01 PROCEDURE — 25800030 ZZH RX IP 258 OP 636: Performed by: NURSE ANESTHETIST, CERTIFIED REGISTERED

## 2020-01-01 PROCEDURE — 86901 BLOOD TYPING SEROLOGIC RH(D): CPT | Performed by: PHYSICIAN ASSISTANT

## 2020-01-01 PROCEDURE — 83605 ASSAY OF LACTIC ACID: CPT | Performed by: INTERNAL MEDICINE

## 2020-01-01 PROCEDURE — 86644 CMV ANTIBODY: CPT | Performed by: OBSTETRICS & GYNECOLOGY

## 2020-01-01 PROCEDURE — 2Y41X5Z PACKING OF NASAL REGION USING PACKING MATERIAL: ICD-10-PCS | Performed by: INTERNAL MEDICINE

## 2020-01-01 PROCEDURE — 80076 HEPATIC FUNCTION PANEL: CPT | Performed by: HOSPITALIST

## 2020-01-01 PROCEDURE — 82330 ASSAY OF CALCIUM: CPT | Performed by: HOSPITALIST

## 2020-01-01 PROCEDURE — 99223 1ST HOSP IP/OBS HIGH 75: CPT | Performed by: INTERNAL MEDICINE

## 2020-01-01 PROCEDURE — 86900 BLOOD TYPING SEROLOGIC ABO: CPT | Performed by: ANESTHESIOLOGY

## 2020-01-01 PROCEDURE — 99207 PR CONSULT E&M CHANGED TO INITIAL LEVEL: CPT | Performed by: NURSE PRACTITIONER

## 2020-01-01 PROCEDURE — 82805 BLOOD GASES W/O2 SATURATION: CPT | Performed by: ANESTHESIOLOGY

## 2020-01-01 PROCEDURE — P9016 RBC LEUKOCYTES REDUCED: HCPCS | Performed by: ANESTHESIOLOGY

## 2020-01-01 PROCEDURE — 86901 BLOOD TYPING SEROLOGIC RH(D): CPT | Performed by: INTERNAL MEDICINE

## 2020-01-01 PROCEDURE — 84145 PROCALCITONIN (PCT): CPT | Performed by: HOSPITALIST

## 2020-01-01 PROCEDURE — 272N000054 HC CANNULA HIGH FLOW, ADULT

## 2020-01-01 PROCEDURE — 25800030 ZZH RX IP 258 OP 636: Performed by: EMERGENCY MEDICINE

## 2020-01-01 PROCEDURE — 250N000011 HC RX IP 250 OP 636

## 2020-01-01 PROCEDURE — 87040 BLOOD CULTURE FOR BACTERIA: CPT | Performed by: INTERNAL MEDICINE

## 2020-01-01 PROCEDURE — 84100 ASSAY OF PHOSPHORUS: CPT | Performed by: HOSPITALIST

## 2020-01-01 PROCEDURE — 87086 URINE CULTURE/COLONY COUNT: CPT | Performed by: EMERGENCY MEDICINE

## 2020-01-01 PROCEDURE — 82805 BLOOD GASES W/O2 SATURATION: CPT | Performed by: INTERNAL MEDICINE

## 2020-01-01 PROCEDURE — 36246 INS CATH ABD/L-EXT ART 2ND: CPT

## 2020-01-01 PROCEDURE — 0WBF3ZX EXCISION OF ABDOMINAL WALL, PERCUTANEOUS APPROACH, DIAGNOSTIC: ICD-10-PCS | Performed by: RADIOLOGY

## 2020-01-01 PROCEDURE — 88185 FLOWCYTOMETRY/TC ADD-ON: CPT | Mod: TC | Performed by: OBSTETRICS & GYNECOLOGY

## 2020-01-01 PROCEDURE — 999N001060 HC STATISTIC BB TRANSF RXN INVEST: Performed by: PHYSICIAN ASSISTANT

## 2020-01-01 PROCEDURE — 81001 URINALYSIS AUTO W/SCOPE: CPT

## 2020-01-01 PROCEDURE — 34300033 ZZH RX 343: Performed by: INTERNAL MEDICINE

## 2020-01-01 PROCEDURE — 99223 1ST HOSP IP/OBS HIGH 75: CPT | Mod: AI | Performed by: INTERNAL MEDICINE

## 2020-01-01 PROCEDURE — 99207 PR NO CHARGE LOS: CPT | Performed by: PHYSICIAN ASSISTANT

## 2020-01-01 PROCEDURE — 94003 VENT MGMT INPAT SUBQ DAY: CPT

## 2020-01-01 PROCEDURE — 80202 ASSAY OF VANCOMYCIN: CPT | Performed by: INTERNAL MEDICINE

## 2020-01-01 PROCEDURE — 71250 CT THORAX DX C-: CPT

## 2020-01-01 PROCEDURE — 86850 RBC ANTIBODY SCREEN: CPT | Performed by: ANESTHESIOLOGY

## 2020-01-01 PROCEDURE — 43255 EGD CONTROL BLEEDING ANY: CPT | Performed by: INTERNAL MEDICINE

## 2020-01-01 PROCEDURE — 250N000012 HC RX MED GY IP 250 OP 636 PS 637: Performed by: INTERNAL MEDICINE

## 2020-01-01 PROCEDURE — 96365 THER/PROPH/DIAG IV INF INIT: CPT

## 2020-01-01 PROCEDURE — 250N000013 HC RX MED GY IP 250 OP 250 PS 637: Performed by: EMERGENCY MEDICINE

## 2020-01-01 PROCEDURE — U0003 INFECTIOUS AGENT DETECTION BY NUCLEIC ACID (DNA OR RNA); SEVERE ACUTE RESPIRATORY SYNDROME CORONAVIRUS 2 (SARS-COV-2) (CORONAVIRUS DISEASE [COVID-19]), AMPLIFIED PROBE TECHNIQUE, MAKING USE OF HIGH THROUGHPUT TECHNOLOGIES AS DESCRIBED BY CMS-2020-01-R: HCPCS | Performed by: HOSPITALIST

## 2020-01-01 PROCEDURE — 93976 VASCULAR STUDY: CPT

## 2020-01-01 PROCEDURE — 99207 PR NO CHARGE LOS: CPT

## 2020-01-01 PROCEDURE — 25800030 ZZH RX IP 258 OP 636: Performed by: INTERNAL MEDICINE

## 2020-01-01 PROCEDURE — 999N000185 HC STATISTIC TRANSPORT TIME EA 15 MIN

## 2020-01-01 PROCEDURE — 86850 RBC ANTIBODY SCREEN: CPT

## 2020-01-01 PROCEDURE — 258N000003 HC RX IP 258 OP 636: Performed by: PHYSICIAN ASSISTANT

## 2020-01-01 PROCEDURE — 87800 DETECT AGNT MULT DNA DIREC: CPT | Performed by: EMERGENCY MEDICINE

## 2020-01-01 PROCEDURE — 88312 SPECIAL STAINS GROUP 1: CPT | Mod: 26 | Performed by: PATHOLOGY

## 2020-01-01 PROCEDURE — 45330 DIAGNOSTIC SIGMOIDOSCOPY: CPT | Performed by: INTERNAL MEDICINE

## 2020-01-01 PROCEDURE — 96409 CHEMO IV PUSH SNGL DRUG: CPT

## 2020-01-01 PROCEDURE — 86255 FLUORESCENT ANTIBODY SCREEN: CPT | Performed by: HOSPITALIST

## 2020-01-01 PROCEDURE — 87804 INFLUENZA ASSAY W/OPTIC: CPT | Performed by: HOSPITALIST

## 2020-01-01 PROCEDURE — 25000128 H RX IP 250 OP 636: Performed by: STUDENT IN AN ORGANIZED HEALTH CARE EDUCATION/TRAINING PROGRAM

## 2020-01-01 PROCEDURE — 94645 CONT INHLJ TX EACH ADDL HOUR: CPT

## 2020-01-01 PROCEDURE — 87798 DETECT AGENT NOS DNA AMP: CPT | Performed by: OBSTETRICS & GYNECOLOGY

## 2020-01-01 PROCEDURE — 87081 CULTURE SCREEN ONLY: CPT | Performed by: OBSTETRICS & GYNECOLOGY

## 2020-01-01 PROCEDURE — 86923 COMPATIBILITY TEST ELECTRIC: CPT | Performed by: ANESTHESIOLOGY

## 2020-01-01 PROCEDURE — 86038 ANTINUCLEAR ANTIBODIES: CPT | Performed by: HOSPITALIST

## 2020-01-01 PROCEDURE — 36000058 ZZH SURGERY LEVEL 3 EA 15 ADDTL MIN: Performed by: STUDENT IN AN ORGANIZED HEALTH CARE EDUCATION/TRAINING PROGRAM

## 2020-01-01 PROCEDURE — 85045 AUTOMATED RETICULOCYTE COUNT: CPT | Performed by: EMERGENCY MEDICINE

## 2020-01-01 PROCEDURE — 99232 SBSQ HOSP IP/OBS MODERATE 35: CPT | Performed by: PHYSICIAN ASSISTANT

## 2020-01-01 PROCEDURE — 250N000013 HC RX MED GY IP 250 OP 250 PS 637: Performed by: PHYSICIAN ASSISTANT

## 2020-01-01 PROCEDURE — 37244 VASC EMBOLIZE/OCCLUDE BLEED: CPT

## 2020-01-01 PROCEDURE — 85027 COMPLETE CBC AUTOMATED: CPT | Performed by: HOSPITALIST

## 2020-01-01 PROCEDURE — 86900 BLOOD TYPING SEROLOGIC ABO: CPT

## 2020-01-01 PROCEDURE — 250N000009 HC RX 250: Performed by: NURSE ANESTHETIST, CERTIFIED REGISTERED

## 2020-01-01 PROCEDURE — 0T788DZ DILATION OF BILATERAL URETERS WITH INTRALUMINAL DEVICE, VIA NATURAL OR ARTIFICIAL OPENING ENDOSCOPIC: ICD-10-PCS | Performed by: STUDENT IN AN ORGANIZED HEALTH CARE EDUCATION/TRAINING PROGRAM

## 2020-01-01 PROCEDURE — 80053 COMPREHEN METABOLIC PANEL: CPT | Performed by: EMERGENCY MEDICINE

## 2020-01-01 PROCEDURE — 0W3P4ZZ CONTROL BLEEDING IN GASTROINTESTINAL TRACT, PERCUTANEOUS ENDOSCOPIC APPROACH: ICD-10-PCS | Performed by: INTERNAL MEDICINE

## 2020-01-01 PROCEDURE — 94644 CONT INHLJ TX 1ST HOUR: CPT

## 2020-01-01 PROCEDURE — 84450 TRANSFERASE (AST) (SGOT): CPT | Performed by: INTERNAL MEDICINE

## 2020-01-01 PROCEDURE — 82105 ALPHA-FETOPROTEIN SERUM: CPT | Performed by: INTERNAL MEDICINE

## 2020-01-01 PROCEDURE — 96417 CHEMO IV INFUS EACH ADDL SEQ: CPT

## 2020-01-01 PROCEDURE — 87116 MYCOBACTERIA CULTURE: CPT | Performed by: OBSTETRICS & GYNECOLOGY

## 2020-01-01 PROCEDURE — 272N000272 HC CONTINUOUS NEBULIZER MICRO PUMP

## 2020-01-01 PROCEDURE — 84484 ASSAY OF TROPONIN QUANT: CPT | Performed by: HOSPITALIST

## 2020-01-01 PROCEDURE — P9041 ALBUMIN (HUMAN),5%, 50ML: HCPCS | Performed by: INTERNAL MEDICINE

## 2020-01-01 PROCEDURE — 999N001137 HC STATISTIC FLOW >15 ABY TC 88189: Performed by: OBSTETRICS & GYNECOLOGY

## 2020-01-01 PROCEDURE — 80048 BASIC METABOLIC PNL TOTAL CA: CPT | Performed by: EMERGENCY MEDICINE

## 2020-01-01 PROCEDURE — 99214 OFFICE O/P EST MOD 30 MIN: CPT | Performed by: PHYSICIAN ASSISTANT

## 2020-01-01 PROCEDURE — 86901 BLOOD TYPING SEROLOGIC RH(D): CPT | Performed by: HOSPITALIST

## 2020-01-01 PROCEDURE — 82248 BILIRUBIN DIRECT: CPT | Performed by: INTERNAL MEDICINE

## 2020-01-01 PROCEDURE — 74176 CT ABD & PELVIS W/O CONTRAST: CPT

## 2020-01-01 PROCEDURE — 272N000105 HC DEVICE CLIP QUICK: Performed by: INTERNAL MEDICINE

## 2020-01-01 PROCEDURE — 71275 CT ANGIOGRAPHY CHEST: CPT

## 2020-01-01 PROCEDURE — 99215 OFFICE O/P EST HI 40 MIN: CPT | Performed by: PHYSICIAN ASSISTANT

## 2020-01-01 PROCEDURE — 258N000003 HC RX IP 258 OP 636: Performed by: EMERGENCY MEDICINE

## 2020-01-01 PROCEDURE — 86901 BLOOD TYPING SEROLOGIC RH(D): CPT | Performed by: ANESTHESIOLOGY

## 2020-01-01 PROCEDURE — 86850 RBC ANTIBODY SCREEN: CPT | Performed by: PHYSICIAN ASSISTANT

## 2020-01-01 PROCEDURE — 85025 COMPLETE CBC W/AUTO DIFF WBC: CPT | Performed by: HOSPITALIST

## 2020-01-01 PROCEDURE — 250N000013 HC RX MED GY IP 250 OP 250 PS 637: Performed by: HOSPITALIST

## 2020-01-01 PROCEDURE — 87210 SMEAR WET MOUNT SALINE/INK: CPT | Performed by: OBSTETRICS & GYNECOLOGY

## 2020-01-01 PROCEDURE — 93306 TTE W/DOPPLER COMPLETE: CPT

## 2020-01-01 PROCEDURE — 88185 FLOWCYTOMETRY/TC ADD-ON: CPT | Performed by: INTERNAL MEDICINE

## 2020-01-01 PROCEDURE — 87088 URINE BACTERIA CULTURE: CPT | Performed by: EMERGENCY MEDICINE

## 2020-01-01 PROCEDURE — 99291 CRITICAL CARE FIRST HOUR: CPT | Mod: 25 | Performed by: INTERNAL MEDICINE

## 2020-01-01 PROCEDURE — C9113 INJ PANTOPRAZOLE SODIUM, VIA: HCPCS | Performed by: HOSPITALIST

## 2020-01-01 PROCEDURE — 96411 CHEMO IV PUSH ADDL DRUG: CPT

## 2020-01-01 PROCEDURE — 85025 COMPLETE CBC W/AUTO DIFF WBC: CPT | Performed by: EMERGENCY MEDICINE

## 2020-01-01 PROCEDURE — 88305 TISSUE EXAM BY PATHOLOGIST: CPT | Performed by: STUDENT IN AN ORGANIZED HEALTH CARE EDUCATION/TRAINING PROGRAM

## 2020-01-01 PROCEDURE — 99231 SBSQ HOSP IP/OBS SF/LOW 25: CPT | Performed by: PHYSICIAN ASSISTANT

## 2020-01-01 PROCEDURE — 82746 ASSAY OF FOLIC ACID SERUM: CPT | Performed by: INTERNAL MEDICINE

## 2020-01-01 PROCEDURE — 83615 LACTATE (LD) (LDH) ENZYME: CPT | Performed by: INTERNAL MEDICINE

## 2020-01-01 PROCEDURE — 3E043XZ INTRODUCTION OF VASOPRESSOR INTO CENTRAL VEIN, PERCUTANEOUS APPROACH: ICD-10-PCS | Performed by: OBSTETRICS & GYNECOLOGY

## 2020-01-01 PROCEDURE — 87077 CULTURE AEROBIC IDENTIFY: CPT | Performed by: EMERGENCY MEDICINE

## 2020-01-01 PROCEDURE — 84134 ASSAY OF PREALBUMIN: CPT | Performed by: INTERNAL MEDICINE

## 2020-01-01 PROCEDURE — 99358 PROLONG SERVICE W/O CONTACT: CPT | Performed by: NURSE PRACTITIONER

## 2020-01-01 PROCEDURE — 94640 AIRWAY INHALATION TREATMENT: CPT

## 2020-01-01 PROCEDURE — 80053 COMPREHEN METABOLIC PANEL: CPT | Performed by: PHYSICIAN ASSISTANT

## 2020-01-01 PROCEDURE — 71000014 ZZH RECOVERY PHASE 1 LEVEL 2 FIRST HR: Performed by: STUDENT IN AN ORGANIZED HEALTH CARE EDUCATION/TRAINING PROGRAM

## 2020-01-01 PROCEDURE — 37000009 ZZH ANESTHESIA TECHNICAL FEE, EACH ADDTL 15 MIN: Performed by: STUDENT IN AN ORGANIZED HEALTH CARE EDUCATION/TRAINING PROGRAM

## 2020-01-01 PROCEDURE — 999N000065 XR CHEST PORT 1 VW

## 2020-01-01 PROCEDURE — 999N000127 HC STATISTIC PERIPHERAL IV START W US GUIDANCE

## 2020-01-01 PROCEDURE — 36416 COLLJ CAPILLARY BLOOD SPEC: CPT | Performed by: INTERNAL MEDICINE

## 2020-01-01 PROCEDURE — 96376 TX/PRO/DX INJ SAME DRUG ADON: CPT

## 2020-01-01 PROCEDURE — 88341 IMHCHEM/IMCYTCHM EA ADD ANTB: CPT | Mod: 26 | Performed by: STUDENT IN AN ORGANIZED HEALTH CARE EDUCATION/TRAINING PROGRAM

## 2020-01-01 PROCEDURE — 25000128 H RX IP 250 OP 636: Performed by: ANESTHESIOLOGY

## 2020-01-01 PROCEDURE — 86481 TB AG RESPONSE T-CELL SUSP: CPT | Performed by: INTERNAL MEDICINE

## 2020-01-01 PROCEDURE — 99233 SBSQ HOSP IP/OBS HIGH 50: CPT | Performed by: PHYSICIAN ASSISTANT

## 2020-01-01 PROCEDURE — 36569 INSJ PICC 5 YR+ W/O IMAGING: CPT

## 2020-01-01 PROCEDURE — 83036 HEMOGLOBIN GLYCOSYLATED A1C: CPT | Performed by: INTERNAL MEDICINE

## 2020-01-01 PROCEDURE — 86923 COMPATIBILITY TEST ELECTRIC: CPT | Performed by: PHYSICIAN ASSISTANT

## 2020-01-01 PROCEDURE — 00000159 ZZHCL STATISTIC H-SEND OUTS PREP: Performed by: STUDENT IN AN ORGANIZED HEALTH CARE EDUCATION/TRAINING PROGRAM

## 2020-01-01 PROCEDURE — 258N000003 HC RX IP 258 OP 636: Performed by: NURSE PRACTITIONER

## 2020-01-01 PROCEDURE — 88108 CYTOPATH CONCENTRATE TECH: CPT | Mod: 26 | Performed by: PATHOLOGY

## 2020-01-01 PROCEDURE — 96377 APPLICATON ON-BODY INJECTOR: CPT | Performed by: INTERNAL MEDICINE

## 2020-01-01 PROCEDURE — 82607 VITAMIN B-12: CPT | Performed by: INTERNAL MEDICINE

## 2020-01-01 PROCEDURE — 82947 ASSAY GLUCOSE BLOOD QUANT: CPT | Performed by: HOSPITALIST

## 2020-01-01 PROCEDURE — 99214 OFFICE O/P EST MOD 30 MIN: CPT | Performed by: INTERNAL MEDICINE

## 2020-01-01 PROCEDURE — C9803 HOPD COVID-19 SPEC COLLECT: HCPCS

## 2020-01-01 PROCEDURE — 87493 C DIFF AMPLIFIED PROBE: CPT | Performed by: HOSPITALIST

## 2020-01-01 PROCEDURE — 85610 PROTHROMBIN TIME: CPT | Performed by: EMERGENCY MEDICINE

## 2020-01-01 PROCEDURE — 86140 C-REACTIVE PROTEIN: CPT | Performed by: HOSPITALIST

## 2020-01-01 PROCEDURE — 31624 DX BRONCHOSCOPE/LAVAGE: CPT | Performed by: OBSTETRICS & GYNECOLOGY

## 2020-01-01 PROCEDURE — 87581 M.PNEUMON DNA AMP PROBE: CPT | Performed by: OBSTETRICS & GYNECOLOGY

## 2020-01-01 PROCEDURE — 82542 COL CHROMOTOGRAPHY QUAL/QUAN: CPT | Performed by: INTERNAL MEDICINE

## 2020-01-01 PROCEDURE — C1769 GUIDE WIRE: HCPCS | Performed by: STUDENT IN AN ORGANIZED HEALTH CARE EDUCATION/TRAINING PROGRAM

## 2020-01-01 PROCEDURE — 27210794 ZZH OR GENERAL SUPPLY STERILE: Performed by: STUDENT IN AN ORGANIZED HEALTH CARE EDUCATION/TRAINING PROGRAM

## 2020-01-01 PROCEDURE — 258N000003 HC RX IP 258 OP 636: Performed by: OBSTETRICS & GYNECOLOGY

## 2020-01-01 PROCEDURE — 999N000065 XR ABDOMEN PORT 1 VW

## 2020-01-01 PROCEDURE — 86923 COMPATIBILITY TEST ELECTRIC: CPT | Performed by: INTERNAL MEDICINE

## 2020-01-01 PROCEDURE — 84702 CHORIONIC GONADOTROPIN TEST: CPT | Performed by: INTERNAL MEDICINE

## 2020-01-01 PROCEDURE — 96360 HYDRATION IV INFUSION INIT: CPT

## 2020-01-01 PROCEDURE — 86682 HELMINTH ANTIBODY: CPT | Performed by: INTERNAL MEDICINE

## 2020-01-01 PROCEDURE — 250N000011 HC RX IP 250 OP 636: Performed by: OBSTETRICS & GYNECOLOGY

## 2020-01-01 PROCEDURE — 84145 PROCALCITONIN (PCT): CPT | Performed by: INTERNAL MEDICINE

## 2020-01-01 PROCEDURE — 40000306 ZZH STATISTIC PRE PROC ASSESS II: Performed by: STUDENT IN AN ORGANIZED HEALTH CARE EDUCATION/TRAINING PROGRAM

## 2020-01-01 PROCEDURE — 85018 HEMOGLOBIN: CPT | Performed by: OBSTETRICS & GYNECOLOGY

## 2020-01-01 PROCEDURE — 83540 ASSAY OF IRON: CPT | Performed by: INTERNAL MEDICINE

## 2020-01-01 PROCEDURE — 88184 FLOWCYTOMETRY/ TC 1 MARKER: CPT | Performed by: INTERNAL MEDICINE

## 2020-01-01 PROCEDURE — 82247 BILIRUBIN TOTAL: CPT | Performed by: INTERNAL MEDICINE

## 2020-01-01 PROCEDURE — 0VJDXZZ INSPECTION OF TESTIS, EXTERNAL APPROACH: ICD-10-PCS | Performed by: STUDENT IN AN ORGANIZED HEALTH CARE EDUCATION/TRAINING PROGRAM

## 2020-01-01 PROCEDURE — 84134 ASSAY OF PREALBUMIN: CPT | Performed by: HOSPITALIST

## 2020-01-01 PROCEDURE — 37000008 ZZH ANESTHESIA TECHNICAL FEE, 1ST 30 MIN: Performed by: STUDENT IN AN ORGANIZED HEALTH CARE EDUCATION/TRAINING PROGRAM

## 2020-01-01 PROCEDURE — 25000128 H RX IP 250 OP 636: Performed by: NURSE ANESTHETIST, CERTIFIED REGISTERED

## 2020-01-01 PROCEDURE — P9016 RBC LEUKOCYTES REDUCED: HCPCS | Performed by: HOSPITALIST

## 2020-01-01 PROCEDURE — 86900 BLOOD TYPING SEROLOGIC ABO: CPT | Performed by: HOSPITALIST

## 2020-01-01 PROCEDURE — 51700 IRRIGATION OF BLADDER: CPT

## 2020-01-01 PROCEDURE — 87529 HSV DNA AMP PROBE: CPT | Performed by: OBSTETRICS & GYNECOLOGY

## 2020-01-01 PROCEDURE — 84460 ALANINE AMINO (ALT) (SGPT): CPT | Performed by: INTERNAL MEDICINE

## 2020-01-01 PROCEDURE — 5A1955Z RESPIRATORY VENTILATION, GREATER THAN 96 CONSECUTIVE HOURS: ICD-10-PCS | Performed by: INTERNAL MEDICINE

## 2020-01-01 PROCEDURE — 84550 ASSAY OF BLOOD/URIC ACID: CPT | Performed by: INTERNAL MEDICINE

## 2020-01-01 PROCEDURE — 36620 INSERTION CATHETER ARTERY: CPT | Performed by: INTERNAL MEDICINE

## 2020-01-01 PROCEDURE — 84100 ASSAY OF PHOSPHORUS: CPT | Performed by: OBSTETRICS & GYNECOLOGY

## 2020-01-01 PROCEDURE — 82803 BLOOD GASES ANY COMBINATION: CPT | Performed by: EMERGENCY MEDICINE

## 2020-01-01 PROCEDURE — 82565 ASSAY OF CREATININE: CPT | Performed by: HOSPITALIST

## 2020-01-01 PROCEDURE — 82306 VITAMIN D 25 HYDROXY: CPT | Performed by: INTERNAL MEDICINE

## 2020-01-01 PROCEDURE — 87205 SMEAR GRAM STAIN: CPT | Performed by: OBSTETRICS & GYNECOLOGY

## 2020-01-01 PROCEDURE — 250N000009 HC RX 250

## 2020-01-01 PROCEDURE — 87116 MYCOBACTERIA CULTURE: CPT | Performed by: HOSPITALIST

## 2020-01-01 PROCEDURE — 88312 SPECIAL STAINS GROUP 1: CPT | Mod: TC | Performed by: OBSTETRICS & GYNECOLOGY

## 2020-01-01 PROCEDURE — 82728 ASSAY OF FERRITIN: CPT | Performed by: INTERNAL MEDICINE

## 2020-01-01 PROCEDURE — 99231 SBSQ HOSP IP/OBS SF/LOW 25: CPT | Performed by: INTERNAL MEDICINE

## 2020-01-01 PROCEDURE — 82330 ASSAY OF CALCIUM: CPT | Performed by: OBSTETRICS & GYNECOLOGY

## 2020-01-01 PROCEDURE — 40000277 XR SURGERY CARM FLUORO LESS THAN 5 MIN W STILLS: Mod: TC

## 2020-01-01 PROCEDURE — 272N000116 HC CATH CR1

## 2020-01-01 PROCEDURE — 99291 CRITICAL CARE FIRST HOUR: CPT

## 2020-01-01 PROCEDURE — 84443 ASSAY THYROID STIM HORMONE: CPT | Performed by: HOSPITALIST

## 2020-01-01 PROCEDURE — 40000275 ZZH STATISTIC RCP TIME EA 10 MIN

## 2020-01-01 PROCEDURE — G0500 MOD SEDAT ENDO SERVICE >5YRS: HCPCS | Performed by: OBSTETRICS & GYNECOLOGY

## 2020-01-01 PROCEDURE — 83690 ASSAY OF LIPASE: CPT | Performed by: EMERGENCY MEDICINE

## 2020-01-01 PROCEDURE — 87086 URINE CULTURE/COLONY COUNT: CPT | Performed by: INTERNAL MEDICINE

## 2020-01-01 PROCEDURE — A9561 TC99M OXIDRONATE: HCPCS | Performed by: INTERNAL MEDICINE

## 2020-01-01 PROCEDURE — 99291 CRITICAL CARE FIRST HOUR: CPT | Performed by: HOSPITALIST

## 2020-01-01 PROCEDURE — 81001 URINALYSIS AUTO W/SCOPE: CPT | Performed by: PHYSICIAN ASSISTANT

## 2020-01-01 PROCEDURE — 82248 BILIRUBIN DIRECT: CPT | Performed by: HOSPITALIST

## 2020-01-01 PROCEDURE — 88342 IMHCHEM/IMCYTCHM 1ST ANTB: CPT | Mod: 26 | Performed by: STUDENT IN AN ORGANIZED HEALTH CARE EDUCATION/TRAINING PROGRAM

## 2020-01-01 PROCEDURE — 80202 ASSAY OF VANCOMYCIN: CPT | Performed by: HOSPITALIST

## 2020-01-01 PROCEDURE — 87541 LEGION PNEUMO DNA AMP PROB: CPT | Performed by: OBSTETRICS & GYNECOLOGY

## 2020-01-01 PROCEDURE — 94010 BREATHING CAPACITY TEST: CPT

## 2020-01-01 PROCEDURE — 87529 HSV DNA AMP PROBE: CPT | Performed by: INTERNAL MEDICINE

## 2020-01-01 PROCEDURE — 86901 BLOOD TYPING SEROLOGIC RH(D): CPT

## 2020-01-01 PROCEDURE — 272N000629

## 2020-01-01 PROCEDURE — 93306 TTE W/DOPPLER COMPLETE: CPT | Mod: 26 | Performed by: INTERNAL MEDICINE

## 2020-01-01 PROCEDURE — 25000125 ZZHC RX 250: Performed by: RADIOLOGY

## 2020-01-01 PROCEDURE — 82550 ASSAY OF CK (CPK): CPT | Performed by: INTERNAL MEDICINE

## 2020-01-01 PROCEDURE — 88108 CYTOPATH CONCENTRATE TECH: CPT | Mod: TC | Performed by: OBSTETRICS & GYNECOLOGY

## 2020-01-01 PROCEDURE — 999N000011 HC STATISTIC ANESTHESIA CASE

## 2020-01-01 PROCEDURE — 250N000009 HC RX 250: Performed by: ANESTHESIOLOGY

## 2020-01-01 PROCEDURE — 94002 VENT MGMT INPAT INIT DAY: CPT

## 2020-01-01 PROCEDURE — 0B9C8ZX DRAINAGE OF RIGHT UPPER LUNG LOBE, VIA NATURAL OR ARTIFICIAL OPENING ENDOSCOPIC, DIAGNOSTIC: ICD-10-PCS | Performed by: OBSTETRICS & GYNECOLOGY

## 2020-01-01 PROCEDURE — 82024 ASSAY OF ACTH: CPT | Performed by: OBSTETRICS & GYNECOLOGY

## 2020-01-01 PROCEDURE — 86356 MONONUCLEAR CELL ANTIGEN: CPT | Performed by: OBSTETRICS & GYNECOLOGY

## 2020-01-01 PROCEDURE — 99214 OFFICE O/P EST MOD 30 MIN: CPT | Performed by: FAMILY MEDICINE

## 2020-01-01 PROCEDURE — 86645 CMV ANTIBODY IGM: CPT | Performed by: OBSTETRICS & GYNECOLOGY

## 2020-01-01 PROCEDURE — 88341 IMHCHEM/IMCYTCHM EA ADD ANTB: CPT | Performed by: STUDENT IN AN ORGANIZED HEALTH CARE EDUCATION/TRAINING PROGRAM

## 2020-01-01 PROCEDURE — 71046 X-RAY EXAM CHEST 2 VIEWS: CPT

## 2020-01-01 PROCEDURE — 00000146 ZZHCL STATISTIC GLUCOSE BY METER IP

## 2020-01-01 PROCEDURE — 272N000124 HC CATH CR11

## 2020-01-01 PROCEDURE — 87449 NOS EACH ORGANISM AG IA: CPT | Performed by: HOSPITALIST

## 2020-01-01 PROCEDURE — 87305 ASPERGILLUS AG IA: CPT | Performed by: HOSPITALIST

## 2020-01-01 PROCEDURE — 0VJSXZZ INSPECTION OF PENIS, EXTERNAL APPROACH: ICD-10-PCS | Performed by: STUDENT IN AN ORGANIZED HEALTH CARE EDUCATION/TRAINING PROGRAM

## 2020-01-01 PROCEDURE — 250N000009 HC RX 250: Performed by: OBSTETRICS & GYNECOLOGY

## 2020-01-01 PROCEDURE — 87206 SMEAR FLUORESCENT/ACID STAI: CPT | Performed by: OBSTETRICS & GYNECOLOGY

## 2020-01-01 PROCEDURE — 82805 BLOOD GASES W/O2 SATURATION: CPT | Performed by: HOSPITALIST

## 2020-01-01 PROCEDURE — 87486 CHLMYD PNEUM DNA AMP PROBE: CPT | Performed by: OBSTETRICS & GYNECOLOGY

## 2020-01-01 PROCEDURE — 87493 C DIFF AMPLIFIED PROBE: CPT | Performed by: OBSTETRICS & GYNECOLOGY

## 2020-01-01 PROCEDURE — 75774 ARTERY X-RAY EACH VESSEL: CPT

## 2020-01-01 PROCEDURE — 99207 PR APP CREDIT; MD BILLING SHARED VISIT: CPT | Performed by: INTERNAL MEDICINE

## 2020-01-01 PROCEDURE — 87071 CULTURE AEROBIC QUANT OTHER: CPT | Performed by: OBSTETRICS & GYNECOLOGY

## 2020-01-01 PROCEDURE — 99292 CRITICAL CARE ADDL 30 MIN: CPT

## 2020-01-01 PROCEDURE — 25000125 ZZHC RX 250: Performed by: STUDENT IN AN ORGANIZED HEALTH CARE EDUCATION/TRAINING PROGRAM

## 2020-01-01 PROCEDURE — 87799 DETECT AGENT NOS DNA QUANT: CPT | Performed by: OBSTETRICS & GYNECOLOGY

## 2020-01-01 PROCEDURE — 99221 1ST HOSP IP/OBS SF/LOW 40: CPT | Performed by: INTERNAL MEDICINE

## 2020-01-01 PROCEDURE — 85652 RBC SED RATE AUTOMATED: CPT | Performed by: HOSPITALIST

## 2020-01-01 PROCEDURE — 88189 FLOWCYTOMETRY/READ 16 & >: CPT | Performed by: PATHOLOGY

## 2020-01-01 PROCEDURE — 36415 COLL VENOUS BLD VENIPUNCTURE: CPT

## 2020-01-01 PROCEDURE — 272N000188 HC ACCESSORY CR12

## 2020-01-01 PROCEDURE — 86923 COMPATIBILITY TEST ELECTRIC: CPT

## 2020-01-01 PROCEDURE — 78306 BONE IMAGING WHOLE BODY: CPT

## 2020-01-01 PROCEDURE — 88342 IMHCHEM/IMCYTCHM 1ST ANTB: CPT | Performed by: STUDENT IN AN ORGANIZED HEALTH CARE EDUCATION/TRAINING PROGRAM

## 2020-01-01 PROCEDURE — 83880 ASSAY OF NATRIURETIC PEPTIDE: CPT | Performed by: HOSPITALIST

## 2020-01-01 PROCEDURE — 258N000001 HC RX 258: Performed by: INTERNAL MEDICINE

## 2020-01-01 PROCEDURE — 74019 RADEX ABDOMEN 2 VIEWS: CPT

## 2020-01-01 PROCEDURE — 87205 SMEAR GRAM STAIN: CPT | Performed by: HOSPITALIST

## 2020-01-01 PROCEDURE — 85610 PROTHROMBIN TIME: CPT | Performed by: HOSPITALIST

## 2020-01-01 PROCEDURE — C2617 STENT, NON-COR, TEM W/O DEL: HCPCS | Performed by: STUDENT IN AN ORGANIZED HEALTH CARE EDUCATION/TRAINING PROGRAM

## 2020-01-01 PROCEDURE — 89051 BODY FLUID CELL COUNT: CPT | Performed by: OBSTETRICS & GYNECOLOGY

## 2020-01-01 PROCEDURE — 25000125 ZZHC RX 250: Performed by: NURSE ANESTHETIST, CERTIFIED REGISTERED

## 2020-01-01 PROCEDURE — 31500 INSERT EMERGENCY AIRWAY: CPT

## 2020-01-01 PROCEDURE — 87305 ASPERGILLUS AG IA: CPT | Performed by: OBSTETRICS & GYNECOLOGY

## 2020-01-01 PROCEDURE — 80076 HEPATIC FUNCTION PANEL: CPT | Performed by: EMERGENCY MEDICINE

## 2020-01-01 PROCEDURE — 85025 COMPLETE CBC W/AUTO DIFF WBC: CPT | Performed by: PHYSICIAN ASSISTANT

## 2020-01-01 PROCEDURE — 85049 AUTOMATED PLATELET COUNT: CPT | Performed by: HOSPITALIST

## 2020-01-01 PROCEDURE — 84075 ASSAY ALKALINE PHOSPHATASE: CPT | Performed by: INTERNAL MEDICINE

## 2020-01-01 DEVICE — STENT URETERAL POLARIS ULTRA 7FRX24CM M0061921420: Type: IMPLANTABLE DEVICE | Site: URETER | Status: FUNCTIONAL

## 2020-01-01 DEVICE — STENT URETERAL POLARIS ULTRA 6FRX28CM M0061921340: Type: IMPLANTABLE DEVICE | Site: URETER | Status: FUNCTIONAL

## 2020-01-01 RX ORDER — NALOXONE HYDROCHLORIDE 0.4 MG/ML
0.4 INJECTION, SOLUTION INTRAMUSCULAR; INTRAVENOUS; SUBCUTANEOUS
Status: ACTIVE | OUTPATIENT
Start: 2020-01-01 | End: 2020-01-01

## 2020-01-01 RX ORDER — PROPOFOL 10 MG/ML
5-75 INJECTION, EMULSION INTRAVENOUS CONTINUOUS
Status: DISCONTINUED | OUTPATIENT
Start: 2020-01-01 | End: 2020-01-01

## 2020-01-01 RX ORDER — ALBUTEROL SULFATE 0.83 MG/ML
2.5 SOLUTION RESPIRATORY (INHALATION)
Status: CANCELLED | OUTPATIENT
Start: 2020-01-01

## 2020-01-01 RX ORDER — NALOXONE HYDROCHLORIDE 0.4 MG/ML
.1-.4 INJECTION, SOLUTION INTRAMUSCULAR; INTRAVENOUS; SUBCUTANEOUS
Status: CANCELLED | OUTPATIENT
Start: 2020-01-01

## 2020-01-01 RX ORDER — ALBUTEROL SULFATE 0.83 MG/ML
2.5 SOLUTION RESPIRATORY (INHALATION)
Status: DISCONTINUED | OUTPATIENT
Start: 2020-01-01 | End: 2020-01-01 | Stop reason: HOSPADM

## 2020-01-01 RX ORDER — ALBUTEROL SULFATE 90 UG/1
1-2 AEROSOL, METERED RESPIRATORY (INHALATION)
Status: CANCELLED
Start: 2020-01-01

## 2020-01-01 RX ORDER — VITAMIN B COMPLEX
25 TABLET ORAL 2 TIMES DAILY
Status: DISCONTINUED | OUTPATIENT
Start: 2020-01-01 | End: 2020-01-01

## 2020-01-01 RX ORDER — DEXTROSE MONOHYDRATE 100 MG/ML
INJECTION, SOLUTION INTRAVENOUS CONTINUOUS PRN
Status: DISCONTINUED | OUTPATIENT
Start: 2020-01-01 | End: 2020-01-01

## 2020-01-01 RX ORDER — LIDOCAINE 40 MG/G
CREAM TOPICAL
Status: DISCONTINUED | OUTPATIENT
Start: 2020-01-01 | End: 2020-01-01

## 2020-01-01 RX ORDER — GUAIFENESIN/DEXTROMETHORPHAN 100-10MG/5
5 SYRUP ORAL EVERY 4 HOURS PRN
Status: DISCONTINUED | OUTPATIENT
Start: 2020-01-01 | End: 2020-01-01

## 2020-01-01 RX ORDER — SODIUM CHLORIDE, SODIUM LACTATE, POTASSIUM CHLORIDE, CALCIUM CHLORIDE 600; 310; 30; 20 MG/100ML; MG/100ML; MG/100ML; MG/100ML
INJECTION, SOLUTION INTRAVENOUS CONTINUOUS
Status: DISCONTINUED | OUTPATIENT
Start: 2020-01-01 | End: 2020-01-01 | Stop reason: HOSPADM

## 2020-01-01 RX ORDER — MAGNESIUM SULFATE HEPTAHYDRATE 40 MG/ML
2 INJECTION, SOLUTION INTRAVENOUS ONCE
Status: COMPLETED | OUTPATIENT
Start: 2020-01-01 | End: 2020-01-01

## 2020-01-01 RX ORDER — LORAZEPAM 2 MG/ML
0.5 INJECTION INTRAMUSCULAR EVERY 4 HOURS PRN
Status: CANCELLED
Start: 2020-01-01

## 2020-01-01 RX ORDER — SODIUM CHLORIDE 9 MG/ML
INJECTION, SOLUTION INTRAVENOUS CONTINUOUS
Status: DISCONTINUED | OUTPATIENT
Start: 2020-01-01 | End: 2020-01-01

## 2020-01-01 RX ORDER — PROCHLORPERAZINE 25 MG
25 SUPPOSITORY, RECTAL RECTAL EVERY 12 HOURS PRN
Status: DISCONTINUED | OUTPATIENT
Start: 2020-01-01 | End: 2020-01-01 | Stop reason: HOSPADM

## 2020-01-01 RX ORDER — DIPHENHYDRAMINE HYDROCHLORIDE 50 MG/ML
50 INJECTION INTRAMUSCULAR; INTRAVENOUS
Status: CANCELLED
Start: 2020-01-01

## 2020-01-01 RX ORDER — PROPOFOL 10 MG/ML
INJECTION, EMULSION INTRAVENOUS PRN
Status: DISCONTINUED | OUTPATIENT
Start: 2020-01-01 | End: 2020-01-01

## 2020-01-01 RX ORDER — POTASSIUM CHLORIDE 1500 MG/1
20 TABLET, EXTENDED RELEASE ORAL ONCE
Status: COMPLETED | OUTPATIENT
Start: 2020-01-01 | End: 2020-01-01

## 2020-01-01 RX ORDER — HEPARIN SODIUM (PORCINE) LOCK FLUSH IV SOLN 100 UNIT/ML 100 UNIT/ML
5 SOLUTION INTRAVENOUS
Status: CANCELLED | OUTPATIENT
Start: 2020-01-01

## 2020-01-01 RX ORDER — MEPERIDINE HYDROCHLORIDE 25 MG/ML
25 INJECTION INTRAMUSCULAR; INTRAVENOUS; SUBCUTANEOUS EVERY 30 MIN PRN
Status: CANCELLED | OUTPATIENT
Start: 2020-01-01

## 2020-01-01 RX ORDER — DEXAMETHASONE SODIUM PHOSPHATE 4 MG/ML
INJECTION, SOLUTION INTRA-ARTICULAR; INTRALESIONAL; INTRAMUSCULAR; INTRAVENOUS; SOFT TISSUE PRN
Status: DISCONTINUED | OUTPATIENT
Start: 2020-01-01 | End: 2020-01-01

## 2020-01-01 RX ORDER — HEPARIN SODIUM,PORCINE 10 UNIT/ML
5 VIAL (ML) INTRAVENOUS
Status: CANCELLED | OUTPATIENT
Start: 2020-01-01

## 2020-01-01 RX ORDER — FENTANYL CITRATE 50 UG/ML
50 INJECTION, SOLUTION INTRAMUSCULAR; INTRAVENOUS
Status: ACTIVE | OUTPATIENT
Start: 2020-01-01 | End: 2020-01-01

## 2020-01-01 RX ORDER — FUROSEMIDE 10 MG/ML
40 INJECTION INTRAMUSCULAR; INTRAVENOUS ONCE
Status: COMPLETED | OUTPATIENT
Start: 2020-01-01 | End: 2020-01-01

## 2020-01-01 RX ORDER — EPINEPHRINE 1 MG/ML
0.3 INJECTION, SOLUTION INTRAMUSCULAR; SUBCUTANEOUS EVERY 5 MIN PRN
Status: CANCELLED | OUTPATIENT
Start: 2020-01-01

## 2020-01-01 RX ORDER — LIDOCAINE HYDROCHLORIDE 20 MG/ML
20 JELLY TOPICAL
Status: DISCONTINUED | OUTPATIENT
Start: 2020-01-01 | End: 2020-01-01

## 2020-01-01 RX ORDER — NALOXONE HYDROCHLORIDE 0.4 MG/ML
.1-.4 INJECTION, SOLUTION INTRAMUSCULAR; INTRAVENOUS; SUBCUTANEOUS
Status: CANCELLED | OUTPATIENT
Start: 2020-12-07

## 2020-01-01 RX ORDER — METOCLOPRAMIDE HYDROCHLORIDE 5 MG/ML
10 INJECTION INTRAMUSCULAR; INTRAVENOUS EVERY 6 HOURS PRN
Status: DISCONTINUED | OUTPATIENT
Start: 2020-01-01 | End: 2020-01-01 | Stop reason: HOSPADM

## 2020-01-01 RX ORDER — PANTOPRAZOLE SODIUM 40 MG/1
40 TABLET, DELAYED RELEASE ORAL
Status: DISCONTINUED | OUTPATIENT
Start: 2020-01-01 | End: 2020-01-01 | Stop reason: HOSPADM

## 2020-01-01 RX ORDER — HEPARIN SODIUM,PORCINE 10 UNIT/ML
2 VIAL (ML) INTRAVENOUS
Status: CANCELLED | OUTPATIENT
Start: 2020-01-01

## 2020-01-01 RX ORDER — CARBOXYMETHYLCELLULOSE SODIUM 5 MG/ML
1-2 SOLUTION/ DROPS OPHTHALMIC EVERY 8 HOURS PRN
Status: DISCONTINUED | OUTPATIENT
Start: 2020-01-01 | End: 2020-01-01 | Stop reason: HOSPADM

## 2020-01-01 RX ORDER — MIDAZOLAM (PF) 1 MG/ML IN 0.9 % SODIUM CHLORIDE INTRAVENOUS SOLUTION
1-8 CONTINUOUS
Status: DISPENSED | OUTPATIENT
Start: 2020-01-01 | End: 2020-01-01

## 2020-01-01 RX ORDER — DEXAMETHASONE SODIUM PHOSPHATE 4 MG/ML
0.5 INJECTION, SOLUTION INTRA-ARTICULAR; INTRALESIONAL; INTRAMUSCULAR; INTRAVENOUS; SOFT TISSUE EVERY 8 HOURS
Status: DISCONTINUED | OUTPATIENT
Start: 2020-01-01 | End: 2020-01-01

## 2020-01-01 RX ORDER — EPINEPHRINE 1 MG/ML
0.3 INJECTION, SOLUTION, CONCENTRATE INTRAVENOUS EVERY 5 MIN PRN
Status: DISCONTINUED | OUTPATIENT
Start: 2020-01-01 | End: 2020-01-01 | Stop reason: HOSPADM

## 2020-01-01 RX ORDER — CALCIUM CARBONATE 500(1250)
500 TABLET ORAL 2 TIMES DAILY WITH MEALS
Status: DISCONTINUED | OUTPATIENT
Start: 2020-01-01 | End: 2020-01-01 | Stop reason: HOSPADM

## 2020-01-01 RX ORDER — PROPOFOL 10 MG/ML
35 INJECTION, EMULSION INTRAVENOUS CONTINUOUS
Status: DISCONTINUED | OUTPATIENT
Start: 2020-01-01 | End: 2020-01-01 | Stop reason: HOSPADM

## 2020-01-01 RX ORDER — NALOXONE HYDROCHLORIDE 0.4 MG/ML
.1-.4 INJECTION, SOLUTION INTRAMUSCULAR; INTRAVENOUS; SUBCUTANEOUS
Status: DISCONTINUED | OUTPATIENT
Start: 2020-01-01 | End: 2020-01-01 | Stop reason: HOSPADM

## 2020-01-01 RX ORDER — POLYETHYLENE GLYCOL 3350 17 G/17G
17 POWDER, FOR SOLUTION ORAL DAILY PRN
Status: DISCONTINUED | OUTPATIENT
Start: 2020-01-01 | End: 2020-01-01 | Stop reason: HOSPADM

## 2020-01-01 RX ORDER — HEPARIN SODIUM (PORCINE) LOCK FLUSH IV SOLN 100 UNIT/ML 100 UNIT/ML
5 SOLUTION INTRAVENOUS
Status: CANCELLED | OUTPATIENT
Start: 2020-11-30

## 2020-01-01 RX ORDER — SODIUM CHLORIDE 9 MG/ML
1000 INJECTION, SOLUTION INTRAVENOUS CONTINUOUS PRN
Status: CANCELLED
Start: 2020-01-01

## 2020-01-01 RX ORDER — ALBUTEROL SULFATE 90 UG/1
1-2 AEROSOL, METERED RESPIRATORY (INHALATION)
Status: CANCELLED
Start: 2020-12-07

## 2020-01-01 RX ORDER — ONDANSETRON 2 MG/ML
4 INJECTION INTRAMUSCULAR; INTRAVENOUS EVERY 6 HOURS PRN
Status: DISCONTINUED | OUTPATIENT
Start: 2020-01-01 | End: 2020-01-01 | Stop reason: HOSPADM

## 2020-01-01 RX ORDER — PROCHLORPERAZINE MALEATE 5 MG
10 TABLET ORAL EVERY 6 HOURS PRN
Status: DISCONTINUED | OUTPATIENT
Start: 2020-01-01 | End: 2020-01-01

## 2020-01-01 RX ORDER — LORAZEPAM 0.5 MG/1
.5-1 TABLET ORAL EVERY 6 HOURS PRN
Status: DISCONTINUED | OUTPATIENT
Start: 2020-01-01 | End: 2020-01-01 | Stop reason: HOSPADM

## 2020-01-01 RX ORDER — HEPARIN SODIUM,PORCINE 10 UNIT/ML
5 VIAL (ML) INTRAVENOUS
Status: CANCELLED | OUTPATIENT
Start: 2020-12-07

## 2020-01-01 RX ORDER — IBUPROFEN 200 MG
400 TABLET ORAL ONCE
Status: COMPLETED | OUTPATIENT
Start: 2020-01-01 | End: 2020-01-01

## 2020-01-01 RX ORDER — MEPERIDINE HYDROCHLORIDE 25 MG/ML
25 INJECTION INTRAMUSCULAR; INTRAVENOUS; SUBCUTANEOUS EVERY 30 MIN PRN
Status: CANCELLED | OUTPATIENT
Start: 2020-12-07

## 2020-01-01 RX ORDER — MIDAZOLAM IN 0.9 % SOD.CHLORID 1 MG/ML
1-8 PLASTIC BAG, INJECTION (ML) INTRAVENOUS CONTINUOUS
Status: DISCONTINUED | OUTPATIENT
Start: 2020-01-01 | End: 2020-01-01 | Stop reason: HOSPADM

## 2020-01-01 RX ORDER — MEPERIDINE HYDROCHLORIDE 25 MG/ML
25 INJECTION INTRAMUSCULAR; INTRAVENOUS; SUBCUTANEOUS EVERY 30 MIN PRN
Status: DISCONTINUED | OUTPATIENT
Start: 2020-01-01 | End: 2020-01-01 | Stop reason: HOSPADM

## 2020-01-01 RX ORDER — FENTANYL CITRATE 50 UG/ML
25 INJECTION, SOLUTION INTRAMUSCULAR; INTRAVENOUS EVERY 5 MIN PRN
Status: ACTIVE | OUTPATIENT
Start: 2020-01-01 | End: 2020-01-01

## 2020-01-01 RX ORDER — SODIUM CHLORIDE 9 MG/ML
1000 INJECTION, SOLUTION INTRAVENOUS ONCE
Status: CANCELLED
Start: 2020-01-01

## 2020-01-01 RX ORDER — DEXTROSE MONOHYDRATE 25 G/50ML
25-50 INJECTION, SOLUTION INTRAVENOUS
Status: DISCONTINUED | OUTPATIENT
Start: 2020-01-01 | End: 2020-01-01

## 2020-01-01 RX ORDER — DIPHENHYDRAMINE HYDROCHLORIDE AND LIDOCAINE HYDROCHLORIDE AND ALUMINUM HYDROXIDE AND MAGNESIUM HYDRO
10 KIT EVERY 6 HOURS PRN
Status: DISCONTINUED | OUTPATIENT
Start: 2020-01-01 | End: 2020-01-01 | Stop reason: HOSPADM

## 2020-01-01 RX ORDER — METHYLPREDNISOLONE SODIUM SUCCINATE 125 MG/2ML
125 INJECTION, POWDER, LYOPHILIZED, FOR SOLUTION INTRAMUSCULAR; INTRAVENOUS
Status: CANCELLED
Start: 2020-01-01

## 2020-01-01 RX ORDER — FENTANYL CITRATE 50 UG/ML
50-100 INJECTION, SOLUTION INTRAMUSCULAR; INTRAVENOUS EVERY 30 MIN PRN
Status: DISCONTINUED | OUTPATIENT
Start: 2020-01-01 | End: 2020-01-01

## 2020-01-01 RX ORDER — OXYCODONE HCL 5 MG/5 ML
5 SOLUTION, ORAL ORAL EVERY 4 HOURS PRN
Status: DISCONTINUED | OUTPATIENT
Start: 2020-01-01 | End: 2020-01-01

## 2020-01-01 RX ORDER — ONDANSETRON 2 MG/ML
4 INJECTION INTRAMUSCULAR; INTRAVENOUS EVERY 30 MIN PRN
Status: DISCONTINUED | OUTPATIENT
Start: 2020-01-01 | End: 2020-01-01 | Stop reason: HOSPADM

## 2020-01-01 RX ORDER — PALONOSETRON 0.05 MG/ML
0.25 INJECTION, SOLUTION INTRAVENOUS ONCE
Status: CANCELLED
Start: 2020-01-01

## 2020-01-01 RX ORDER — ACETAMINOPHEN 500 MG
500 TABLET ORAL EVERY 6 HOURS PRN
Status: DISCONTINUED | OUTPATIENT
Start: 2020-01-01 | End: 2020-01-01

## 2020-01-01 RX ORDER — ACETAMINOPHEN 650 MG/1
650 SUPPOSITORY RECTAL EVERY 4 HOURS PRN
Status: DISCONTINUED | OUTPATIENT
Start: 2020-01-01 | End: 2020-01-01

## 2020-01-01 RX ORDER — ACETAMINOPHEN 325 MG/1
650 TABLET ORAL ONCE
Status: COMPLETED | OUTPATIENT
Start: 2020-01-01 | End: 2020-01-01

## 2020-01-01 RX ORDER — NALOXONE HYDROCHLORIDE 0.4 MG/ML
0.4 INJECTION, SOLUTION INTRAMUSCULAR; INTRAVENOUS; SUBCUTANEOUS
Status: DISCONTINUED | OUTPATIENT
Start: 2020-01-01 | End: 2020-01-01

## 2020-01-01 RX ORDER — SODIUM CHLORIDE 9 MG/ML
1000 INJECTION, SOLUTION INTRAVENOUS ONCE
Status: COMPLETED | OUTPATIENT
Start: 2020-01-01 | End: 2020-01-01

## 2020-01-01 RX ORDER — POT SOR/HE-CELLULOS/POV/HYALUR
10 GEL IN PACKET (ML) MUCOUS MEMBRANE EVERY 6 HOURS PRN
Status: DISCONTINUED | OUTPATIENT
Start: 2020-01-01 | End: 2020-01-01

## 2020-01-01 RX ORDER — DIPHENHYDRAMINE HYDROCHLORIDE 50 MG/ML
25 INJECTION INTRAMUSCULAR; INTRAVENOUS EVERY 6 HOURS PRN
Status: DISCONTINUED | OUTPATIENT
Start: 2020-01-01 | End: 2020-01-01 | Stop reason: HOSPADM

## 2020-01-01 RX ORDER — ONDANSETRON 4 MG/1
4 TABLET, ORALLY DISINTEGRATING ORAL EVERY 6 HOURS PRN
Status: DISCONTINUED | OUTPATIENT
Start: 2020-01-01 | End: 2020-01-01 | Stop reason: HOSPADM

## 2020-01-01 RX ORDER — ACETAMINOPHEN 325 MG/1
975 TABLET ORAL ONCE
Status: COMPLETED | OUTPATIENT
Start: 2020-01-01 | End: 2020-01-01

## 2020-01-01 RX ORDER — NALOXONE HYDROCHLORIDE 0.4 MG/ML
0.2 INJECTION, SOLUTION INTRAMUSCULAR; INTRAVENOUS; SUBCUTANEOUS
Status: DISCONTINUED | OUTPATIENT
Start: 2020-01-01 | End: 2020-01-01

## 2020-01-01 RX ORDER — ONDANSETRON 4 MG/1
16 TABLET, ORALLY DISINTEGRATING ORAL EVERY 24 HOURS
Status: COMPLETED | OUTPATIENT
Start: 2020-01-01 | End: 2020-01-01

## 2020-01-01 RX ORDER — ACETAMINOPHEN 325 MG/1
650 TABLET ORAL EVERY 4 HOURS PRN
Status: DISCONTINUED | OUTPATIENT
Start: 2020-01-01 | End: 2020-01-01

## 2020-01-01 RX ORDER — ACETAMINOPHEN 650 MG/1
650 SUPPOSITORY RECTAL EVERY 6 HOURS PRN
Status: DISCONTINUED | OUTPATIENT
Start: 2020-01-01 | End: 2020-01-01 | Stop reason: HOSPADM

## 2020-01-01 RX ORDER — POTASSIUM CHLORIDE 7.45 MG/ML
10 INJECTION INTRAVENOUS
Status: COMPLETED | OUTPATIENT
Start: 2020-01-01 | End: 2020-01-01

## 2020-01-01 RX ORDER — CEFTRIAXONE 1 G/1
1 INJECTION, POWDER, FOR SOLUTION INTRAMUSCULAR; INTRAVENOUS ONCE
Status: DISCONTINUED | OUTPATIENT
Start: 2020-01-01 | End: 2020-01-01

## 2020-01-01 RX ORDER — POTASSIUM CHLORIDE 1500 MG/1
20-40 TABLET, EXTENDED RELEASE ORAL
Status: DISCONTINUED | OUTPATIENT
Start: 2020-01-01 | End: 2020-01-01

## 2020-01-01 RX ORDER — FUROSEMIDE 10 MG/ML
40 INJECTION INTRAMUSCULAR; INTRAVENOUS ONCE
Status: DISCONTINUED | OUTPATIENT
Start: 2020-01-01 | End: 2020-01-01

## 2020-01-01 RX ORDER — FLUMAZENIL 0.1 MG/ML
0.2 INJECTION, SOLUTION INTRAVENOUS
Status: DISCONTINUED | OUTPATIENT
Start: 2020-01-01 | End: 2020-01-01

## 2020-01-01 RX ORDER — ALLOPURINOL 300 MG/1
300 TABLET ORAL DAILY
Status: DISCONTINUED | OUTPATIENT
Start: 2020-01-01 | End: 2020-01-01

## 2020-01-01 RX ORDER — POTASSIUM CHLORIDE 1.5 G/1.58G
40 POWDER, FOR SOLUTION ORAL ONCE
Status: DISCONTINUED | OUTPATIENT
Start: 2020-01-01 | End: 2020-01-01 | Stop reason: CLARIF

## 2020-01-01 RX ORDER — FUROSEMIDE 10 MG/ML
20 INJECTION INTRAMUSCULAR; INTRAVENOUS ONCE
Status: COMPLETED | OUTPATIENT
Start: 2020-01-01 | End: 2020-01-01

## 2020-01-01 RX ORDER — NOREPINEPHRINE BITARTRATE 0.06 MG/ML
0.03-0.4 INJECTION, SOLUTION INTRAVENOUS CONTINUOUS
Status: DISCONTINUED | OUTPATIENT
Start: 2020-01-01 | End: 2020-01-01 | Stop reason: HOSPADM

## 2020-01-01 RX ORDER — POTASSIUM CHLORIDE 20MEQ/15ML
20 LIQUID (ML) ORAL ONCE
Status: COMPLETED | OUTPATIENT
Start: 2020-01-01 | End: 2020-01-01

## 2020-01-01 RX ORDER — POTASSIUM CHLORIDE 7.45 MG/ML
10 INJECTION INTRAVENOUS
Status: DISCONTINUED | OUTPATIENT
Start: 2020-01-01 | End: 2020-01-01

## 2020-01-01 RX ORDER — LORAZEPAM 2 MG/ML
0.5 INJECTION INTRAMUSCULAR EVERY 4 HOURS PRN
Status: CANCELLED
Start: 2020-12-07

## 2020-01-01 RX ORDER — VANCOMYCIN HYDROCHLORIDE 1 G/200ML
1000 INJECTION, SOLUTION INTRAVENOUS ONCE
Status: COMPLETED | OUTPATIENT
Start: 2020-01-01 | End: 2020-01-01

## 2020-01-01 RX ORDER — SODIUM CHLORIDE 9 MG/ML
1000 INJECTION, SOLUTION INTRAVENOUS CONTINUOUS PRN
Status: DISCONTINUED | OUTPATIENT
Start: 2020-01-01 | End: 2020-01-01 | Stop reason: HOSPADM

## 2020-01-01 RX ORDER — DEXTROSE MONOHYDRATE 50 MG/ML
10-20 INJECTION, SOLUTION INTRAVENOUS
Status: CANCELLED | OUTPATIENT
Start: 2020-01-01

## 2020-01-01 RX ORDER — NALOXONE HYDROCHLORIDE 0.4 MG/ML
.1-.4 INJECTION, SOLUTION INTRAMUSCULAR; INTRAVENOUS; SUBCUTANEOUS
Status: CANCELLED | OUTPATIENT
Start: 2020-11-30

## 2020-01-01 RX ORDER — PANTOPRAZOLE SODIUM 40 MG/1
40 TABLET, DELAYED RELEASE ORAL
Status: DISCONTINUED | OUTPATIENT
Start: 2020-01-01 | End: 2020-01-01 | Stop reason: CLARIF

## 2020-01-01 RX ORDER — METHYLPREDNISOLONE SODIUM SUCCINATE 125 MG/2ML
125 INJECTION, POWDER, LYOPHILIZED, FOR SOLUTION INTRAMUSCULAR; INTRAVENOUS
Status: COMPLETED | OUTPATIENT
Start: 2020-01-01 | End: 2020-01-01

## 2020-01-01 RX ORDER — NICOTINE POLACRILEX 4 MG
15-30 LOZENGE BUCCAL
Status: DISCONTINUED | OUTPATIENT
Start: 2020-01-01 | End: 2020-01-01

## 2020-01-01 RX ORDER — HEPARIN SODIUM 5000 [USP'U]/.5ML
5000 INJECTION, SOLUTION INTRAVENOUS; SUBCUTANEOUS EVERY 12 HOURS
Status: DISCONTINUED | OUTPATIENT
Start: 2020-01-01 | End: 2020-01-01 | Stop reason: HOSPADM

## 2020-01-01 RX ORDER — MULTIPLE VITAMINS W/ MINERALS TAB 9MG-400MCG
1 TAB ORAL DAILY
Status: DISCONTINUED | OUTPATIENT
Start: 2020-01-01 | End: 2020-01-01

## 2020-01-01 RX ORDER — FENTANYL CITRATE 50 UG/ML
50-100 INJECTION, SOLUTION INTRAMUSCULAR; INTRAVENOUS EVERY 10 MIN PRN
Status: DISCONTINUED | OUTPATIENT
Start: 2020-01-01 | End: 2020-01-01

## 2020-01-01 RX ORDER — METOCLOPRAMIDE 5 MG/1
10 TABLET ORAL EVERY 6 HOURS PRN
Status: DISCONTINUED | OUTPATIENT
Start: 2020-01-01 | End: 2020-01-01 | Stop reason: HOSPADM

## 2020-01-01 RX ORDER — DEXTROSE MONOHYDRATE 25 G/50ML
25-50 INJECTION, SOLUTION INTRAVENOUS
Status: DISCONTINUED | OUTPATIENT
Start: 2020-01-01 | End: 2020-01-01 | Stop reason: HOSPADM

## 2020-01-01 RX ORDER — AMOXICILLIN 250 MG
2 CAPSULE ORAL 2 TIMES DAILY
Status: DISCONTINUED | OUTPATIENT
Start: 2020-01-01 | End: 2020-01-01

## 2020-01-01 RX ORDER — METHYLPREDNISOLONE SODIUM SUCCINATE 40 MG/ML
40 INJECTION, POWDER, LYOPHILIZED, FOR SOLUTION INTRAMUSCULAR; INTRAVENOUS EVERY 24 HOURS
Status: DISCONTINUED | OUTPATIENT
Start: 2020-01-01 | End: 2020-01-01

## 2020-01-01 RX ORDER — ACETAMINOPHEN 325 MG/1
TABLET ORAL
Status: COMPLETED
Start: 2020-01-01 | End: 2020-01-01

## 2020-01-01 RX ORDER — ACETYLCYSTEINE 100 MG/ML
4 SOLUTION ORAL; RESPIRATORY (INHALATION) EVERY 6 HOURS
Status: DISCONTINUED | OUTPATIENT
Start: 2020-01-01 | End: 2020-01-01

## 2020-01-01 RX ORDER — VITAMIN B COMPLEX
50 TABLET ORAL DAILY
Status: DISCONTINUED | OUTPATIENT
Start: 2020-01-01 | End: 2020-01-01 | Stop reason: HOSPADM

## 2020-01-01 RX ORDER — PROCHLORPERAZINE MALEATE 5 MG
10 TABLET ORAL EVERY 6 HOURS PRN
Status: DISCONTINUED | OUTPATIENT
Start: 2020-01-01 | End: 2020-01-01 | Stop reason: HOSPADM

## 2020-01-01 RX ORDER — LORAZEPAM 2 MG/ML
0.5 INJECTION INTRAMUSCULAR EVERY 4 HOURS PRN
Status: CANCELLED
Start: 2020-11-30

## 2020-01-01 RX ORDER — LIDOCAINE 40 MG/G
CREAM TOPICAL
Status: DISCONTINUED | OUTPATIENT
Start: 2020-01-01 | End: 2020-01-01 | Stop reason: HOSPADM

## 2020-01-01 RX ORDER — HEPARIN SODIUM (PORCINE) LOCK FLUSH IV SOLN 100 UNIT/ML 100 UNIT/ML
5 SOLUTION INTRAVENOUS
Status: CANCELLED | OUTPATIENT
Start: 2020-12-07

## 2020-01-01 RX ORDER — ALLOPURINOL 300 MG/1
300 TABLET ORAL DAILY
Qty: 30 TABLET | Refills: 0 | Status: SHIPPED | OUTPATIENT
Start: 2020-01-01

## 2020-01-01 RX ORDER — ACETAMINOPHEN 650 MG/1
650 SUPPOSITORY RECTAL EVERY 4 HOURS PRN
Status: DISCONTINUED | OUTPATIENT
Start: 2020-01-01 | End: 2020-01-01 | Stop reason: HOSPADM

## 2020-01-01 RX ORDER — HEPARIN SODIUM,PORCINE 10 UNIT/ML
2-5 VIAL (ML) INTRAVENOUS
Status: DISCONTINUED | OUTPATIENT
Start: 2020-01-01 | End: 2020-01-01

## 2020-01-01 RX ORDER — SODIUM CHLORIDE 9 MG/ML
INJECTION, SOLUTION INTRAVENOUS CONTINUOUS
Status: DISCONTINUED | OUTPATIENT
Start: 2020-01-01 | End: 2020-01-01 | Stop reason: HOSPADM

## 2020-01-01 RX ORDER — LORATADINE 10 MG/1
10 TABLET ORAL DAILY
Status: DISCONTINUED | OUTPATIENT
Start: 2020-01-01 | End: 2020-01-01

## 2020-01-01 RX ORDER — ALBUTEROL SULFATE 0.83 MG/ML
2.5 SOLUTION RESPIRATORY (INHALATION)
Status: CANCELLED | OUTPATIENT
Start: 2020-11-30

## 2020-01-01 RX ORDER — VORICONAZOLE 200 MG/1
200 TABLET, FILM COATED ORAL EVERY 12 HOURS SCHEDULED
Status: DISCONTINUED | OUTPATIENT
Start: 2020-01-01 | End: 2020-01-01

## 2020-01-01 RX ORDER — MULTIPLE VITAMINS W/ MINERALS TAB 9MG-400MCG
1 TAB ORAL DAILY
COMMUNITY

## 2020-01-01 RX ORDER — ACETAMINOPHEN 325 MG/1
650 TABLET ORAL EVERY 4 HOURS PRN
Status: DISCONTINUED | OUTPATIENT
Start: 2020-01-01 | End: 2020-01-01 | Stop reason: HOSPADM

## 2020-01-01 RX ORDER — ONDANSETRON 2 MG/ML
4 INJECTION INTRAMUSCULAR; INTRAVENOUS EVERY 30 MIN PRN
Status: DISCONTINUED | OUTPATIENT
Start: 2020-01-01 | End: 2020-01-01

## 2020-01-01 RX ORDER — CEFAZOLIN SODIUM 2 G/100ML
2 INJECTION, SOLUTION INTRAVENOUS
Status: COMPLETED | OUTPATIENT
Start: 2020-01-01 | End: 2020-01-01

## 2020-01-01 RX ORDER — ALLOPURINOL 300 MG/1
300 TABLET ORAL DAILY
Status: DISCONTINUED | OUTPATIENT
Start: 2020-01-01 | End: 2020-01-01 | Stop reason: HOSPADM

## 2020-01-01 RX ORDER — VORICONAZOLE 40 MG/ML
200 POWDER, FOR SUSPENSION ORAL EVERY 12 HOURS SCHEDULED
Status: DISCONTINUED | OUTPATIENT
Start: 2020-01-01 | End: 2020-01-01 | Stop reason: ALTCHOICE

## 2020-01-01 RX ORDER — CEFDINIR 300 MG/1
300 CAPSULE ORAL 2 TIMES DAILY
Qty: 20 CAPSULE | Refills: 0 | Status: SHIPPED | OUTPATIENT
Start: 2020-01-01 | End: 2020-01-01

## 2020-01-01 RX ORDER — VANCOMYCIN HYDROCHLORIDE 1 G/200ML
1000 INJECTION, SOLUTION INTRAVENOUS EVERY 24 HOURS
Status: DISCONTINUED | OUTPATIENT
Start: 2020-01-01 | End: 2020-01-01

## 2020-01-01 RX ORDER — LORATADINE 10 MG/1
10 TABLET ORAL DAILY
Status: DISCONTINUED | OUTPATIENT
Start: 2020-01-01 | End: 2020-01-01 | Stop reason: HOSPADM

## 2020-01-01 RX ORDER — MEPERIDINE HYDROCHLORIDE 25 MG/ML
25 INJECTION INTRAMUSCULAR; INTRAVENOUS; SUBCUTANEOUS EVERY 30 MIN PRN
Status: DISCONTINUED | OUTPATIENT
Start: 2020-01-01 | End: 2020-01-01 | Stop reason: CLARIF

## 2020-01-01 RX ORDER — CIPROFLOXACIN 500 MG/1
500 TABLET, FILM COATED ORAL 2 TIMES DAILY
Qty: 14 TABLET | Refills: 0 | Status: SHIPPED | OUTPATIENT
Start: 2020-01-01 | End: 2020-01-01

## 2020-01-01 RX ORDER — IOPAMIDOL 755 MG/ML
500 INJECTION, SOLUTION INTRAVASCULAR ONCE
Status: COMPLETED | OUTPATIENT
Start: 2020-01-01 | End: 2020-01-01

## 2020-01-01 RX ORDER — FLUCONAZOLE 150 MG/1
150 TABLET ORAL ONCE
Qty: 1 TABLET | Refills: 0 | Status: SHIPPED | OUTPATIENT
Start: 2020-01-01 | End: 2020-01-01

## 2020-01-01 RX ORDER — METHYLPREDNISOLONE SODIUM SUCCINATE 125 MG/2ML
125 INJECTION, POWDER, LYOPHILIZED, FOR SOLUTION INTRAMUSCULAR; INTRAVENOUS
Status: DISCONTINUED | OUTPATIENT
Start: 2020-01-01 | End: 2020-01-01 | Stop reason: HOSPADM

## 2020-01-01 RX ORDER — DEXAMETHASONE 4 MG/1
8 TABLET ORAL DAILY
Status: CANCELLED
Start: 2020-01-01

## 2020-01-01 RX ORDER — MEPERIDINE HYDROCHLORIDE 25 MG/ML
25 INJECTION INTRAMUSCULAR; INTRAVENOUS; SUBCUTANEOUS ONCE
Status: COMPLETED | OUTPATIENT
Start: 2020-01-01 | End: 2020-01-01

## 2020-01-01 RX ORDER — LIDOCAINE HYDROCHLORIDE 10 MG/ML
INJECTION, SOLUTION EPIDURAL; INFILTRATION; INTRACAUDAL; PERINEURAL
Status: COMPLETED
Start: 2020-01-01 | End: 2020-01-01

## 2020-01-01 RX ORDER — FERROUS SULFATE 325(65) MG
325 TABLET ORAL
Status: DISCONTINUED | OUTPATIENT
Start: 2020-01-01 | End: 2020-01-01

## 2020-01-01 RX ORDER — ACETAMINOPHEN 325 MG/1
650 TABLET ORAL EVERY 6 HOURS PRN
Status: DISCONTINUED | OUTPATIENT
Start: 2020-01-01 | End: 2020-01-01 | Stop reason: HOSPADM

## 2020-01-01 RX ORDER — LIDOCAINE HYDROCHLORIDE 10 MG/ML
INJECTION, SOLUTION INFILTRATION; PERINEURAL PRN
Status: DISCONTINUED | OUTPATIENT
Start: 2020-01-01 | End: 2020-01-01

## 2020-01-01 RX ORDER — ACETAMINOPHEN 325 MG/10.15ML
650 LIQUID ORAL EVERY 4 HOURS PRN
Status: DISCONTINUED | OUTPATIENT
Start: 2020-01-01 | End: 2020-01-01

## 2020-01-01 RX ORDER — ACETAMINOPHEN 325 MG/1
650 TABLET ORAL EVERY 4 HOURS PRN
Status: CANCELLED
Start: 2020-01-01

## 2020-01-01 RX ORDER — PHENOL 1.4 %
600 AEROSOL, SPRAY (ML) MUCOUS MEMBRANE 2 TIMES DAILY WITH MEALS
Status: DISCONTINUED | OUTPATIENT
Start: 2020-01-01 | End: 2020-01-01

## 2020-01-01 RX ORDER — NALOXONE HYDROCHLORIDE 0.4 MG/ML
.1-.4 INJECTION, SOLUTION INTRAMUSCULAR; INTRAVENOUS; SUBCUTANEOUS
Status: DISCONTINUED | OUTPATIENT
Start: 2020-01-01 | End: 2020-01-01

## 2020-01-01 RX ORDER — DIPHENHYDRAMINE HYDROCHLORIDE 50 MG/ML
50 INJECTION INTRAMUSCULAR; INTRAVENOUS
Status: CANCELLED
Start: 2020-11-30

## 2020-01-01 RX ORDER — TAMSULOSIN HYDROCHLORIDE 0.4 MG/1
0.4 CAPSULE ORAL DAILY
Status: DISCONTINUED | OUTPATIENT
Start: 2020-01-01 | End: 2020-01-01 | Stop reason: HOSPADM

## 2020-01-01 RX ORDER — MULTIPLE VITAMINS W/ MINERALS TAB 9MG-400MCG
1 TAB ORAL DAILY
Status: DISCONTINUED | OUTPATIENT
Start: 2020-01-01 | End: 2020-01-01 | Stop reason: HOSPADM

## 2020-01-01 RX ORDER — HEPARIN SODIUM (PORCINE) LOCK FLUSH IV SOLN 100 UNIT/ML 100 UNIT/ML
5 SOLUTION INTRAVENOUS
Status: DISCONTINUED | OUTPATIENT
Start: 2020-01-01 | End: 2020-01-01 | Stop reason: HOSPADM

## 2020-01-01 RX ORDER — DEXTROSE MONOHYDRATE, SODIUM CHLORIDE, AND POTASSIUM CHLORIDE 50; 1.49; 4.5 G/1000ML; G/1000ML; G/1000ML
INJECTION, SOLUTION INTRAVENOUS CONTINUOUS
Status: DISCONTINUED | OUTPATIENT
Start: 2020-01-01 | End: 2020-01-01

## 2020-01-01 RX ORDER — POTASSIUM CL/LIDO/0.9 % NACL 10MEQ/0.1L
10 INTRAVENOUS SOLUTION, PIGGYBACK (ML) INTRAVENOUS
Status: DISCONTINUED | OUTPATIENT
Start: 2020-01-01 | End: 2020-01-01

## 2020-01-01 RX ORDER — FERROUS SULFATE 325(65) MG
325 TABLET ORAL
Status: DISCONTINUED | OUTPATIENT
Start: 2020-01-01 | End: 2020-01-01 | Stop reason: HOSPADM

## 2020-01-01 RX ORDER — OXYCODONE HYDROCHLORIDE 5 MG/1
5-10 TABLET ORAL EVERY 4 HOURS PRN
Status: DISCONTINUED | OUTPATIENT
Start: 2020-01-01 | End: 2020-01-01 | Stop reason: HOSPADM

## 2020-01-01 RX ORDER — HYDROMORPHONE HYDROCHLORIDE 1 MG/ML
.3-.5 INJECTION, SOLUTION INTRAMUSCULAR; INTRAVENOUS; SUBCUTANEOUS
Status: DISCONTINUED | OUTPATIENT
Start: 2020-01-01 | End: 2020-01-01 | Stop reason: HOSPADM

## 2020-01-01 RX ORDER — FAMOTIDINE 20 MG/1
20 TABLET, FILM COATED ORAL DAILY
Status: DISCONTINUED | OUTPATIENT
Start: 2020-01-01 | End: 2020-01-01 | Stop reason: HOSPADM

## 2020-01-01 RX ORDER — SODIUM CHLORIDE 9 MG/ML
INJECTION, SOLUTION INTRAVENOUS CONTINUOUS PRN
Status: DISCONTINUED | OUTPATIENT
Start: 2020-01-01 | End: 2020-01-01 | Stop reason: HOSPADM

## 2020-01-01 RX ORDER — FUROSEMIDE 10 MG/ML
10 INJECTION INTRAMUSCULAR; INTRAVENOUS ONCE
Status: COMPLETED | OUTPATIENT
Start: 2020-01-01 | End: 2020-01-01

## 2020-01-01 RX ORDER — POLYETHYLENE GLYCOL 3350 17 G/17G
17 POWDER, FOR SOLUTION ORAL DAILY
Status: DISCONTINUED | OUTPATIENT
Start: 2020-01-01 | End: 2020-01-01

## 2020-01-01 RX ORDER — ALBUTEROL SULFATE 90 UG/1
1-2 AEROSOL, METERED RESPIRATORY (INHALATION)
Status: CANCELLED
Start: 2020-11-30

## 2020-01-01 RX ORDER — CALCIUM CARBONATE 1250 MG/5ML
2500 SUSPENSION ORAL 2 TIMES DAILY
Status: DISCONTINUED | OUTPATIENT
Start: 2020-01-01 | End: 2020-01-01

## 2020-01-01 RX ORDER — CEFAZOLIN SODIUM 1 G/3ML
1 INJECTION, POWDER, FOR SOLUTION INTRAMUSCULAR; INTRAVENOUS SEE ADMIN INSTRUCTIONS
Status: DISCONTINUED | OUTPATIENT
Start: 2020-01-01 | End: 2020-01-01 | Stop reason: HOSPADM

## 2020-01-01 RX ORDER — MEROPENEM 1 G/1
1 INJECTION, POWDER, FOR SOLUTION INTRAVENOUS EVERY 12 HOURS
Status: DISCONTINUED | OUTPATIENT
Start: 2020-01-01 | End: 2020-01-01

## 2020-01-01 RX ORDER — TAMSULOSIN HYDROCHLORIDE 0.4 MG/1
0.4 CAPSULE ORAL DAILY
Qty: 30 CAPSULE | Refills: 0 | Status: SHIPPED | OUTPATIENT
Start: 2020-01-01 | End: 2020-01-01

## 2020-01-01 RX ORDER — ONDANSETRON 4 MG/1
4 TABLET, ORALLY DISINTEGRATING ORAL EVERY 6 HOURS PRN
Status: DISCONTINUED | OUTPATIENT
Start: 2020-01-01 | End: 2020-01-01

## 2020-01-01 RX ORDER — POTASSIUM CHLORIDE 29.8 MG/ML
20 INJECTION INTRAVENOUS
Status: DISCONTINUED | OUTPATIENT
Start: 2020-01-01 | End: 2020-01-01

## 2020-01-01 RX ORDER — DEXAMETHASONE 4 MG/1
8 TABLET ORAL DAILY
Status: DISCONTINUED | OUTPATIENT
Start: 2020-01-01 | End: 2020-01-01 | Stop reason: HOSPADM

## 2020-01-01 RX ORDER — SODIUM CHLORIDE 9 MG/ML
INJECTION, SOLUTION INTRAVENOUS CONTINUOUS
Status: DISCONTINUED | OUTPATIENT
Start: 2020-01-01 | End: 2020-01-01 | Stop reason: CLARIF

## 2020-01-01 RX ORDER — NALOXONE HYDROCHLORIDE 0.4 MG/ML
.1-.4 INJECTION, SOLUTION INTRAMUSCULAR; INTRAVENOUS; SUBCUTANEOUS
Status: ACTIVE | OUTPATIENT
Start: 2020-01-01 | End: 2020-01-01

## 2020-01-01 RX ORDER — GLYCOPYRROLATE 0.2 MG/ML
.1-.2 INJECTION, SOLUTION INTRAMUSCULAR; INTRAVENOUS EVERY 4 HOURS PRN
Status: DISCONTINUED | OUTPATIENT
Start: 2020-01-01 | End: 2020-01-01 | Stop reason: HOSPADM

## 2020-01-01 RX ORDER — PROPOFOL 10 MG/ML
INJECTION, EMULSION INTRAVENOUS
Status: COMPLETED
Start: 2020-01-01 | End: 2020-01-01

## 2020-01-01 RX ORDER — NOREPINEPHRINE BITARTRATE 0.06 MG/ML
0.03-0.4 INJECTION, SOLUTION INTRAVENOUS CONTINUOUS
Status: DISCONTINUED | OUTPATIENT
Start: 2020-01-01 | End: 2020-01-01

## 2020-01-01 RX ORDER — COSYNTROPIN 0.25 MG/ML
250 INJECTION, POWDER, FOR SOLUTION INTRAMUSCULAR; INTRAVENOUS ONCE
Status: COMPLETED | OUTPATIENT
Start: 2020-01-01 | End: 2020-01-01

## 2020-01-01 RX ORDER — ALBUTEROL SULFATE 90 UG/1
1-2 AEROSOL, METERED RESPIRATORY (INHALATION)
Status: DISCONTINUED | OUTPATIENT
Start: 2020-01-01 | End: 2020-01-01 | Stop reason: HOSPADM

## 2020-01-01 RX ORDER — METHYLPREDNISOLONE SODIUM SUCCINATE 125 MG/2ML
125 INJECTION, POWDER, LYOPHILIZED, FOR SOLUTION INTRAMUSCULAR; INTRAVENOUS
Status: CANCELLED
Start: 2020-12-07

## 2020-01-01 RX ORDER — HEPARIN SODIUM 5000 [USP'U]/.5ML
5000 INJECTION, SOLUTION INTRAVENOUS; SUBCUTANEOUS EVERY 12 HOURS
Status: DISCONTINUED | OUTPATIENT
Start: 2020-01-01 | End: 2020-01-01

## 2020-01-01 RX ORDER — AMOXICILLIN 250 MG
2 CAPSULE ORAL 2 TIMES DAILY PRN
Status: DISCONTINUED | OUTPATIENT
Start: 2020-01-01 | End: 2020-01-01 | Stop reason: HOSPADM

## 2020-01-01 RX ORDER — CALCIUM CARBONATE 500(1250)
500 TABLET ORAL 2 TIMES DAILY WITH MEALS
Status: DISCONTINUED | OUTPATIENT
Start: 2020-01-01 | End: 2020-01-01

## 2020-01-01 RX ORDER — SODIUM CHLORIDE, SODIUM LACTATE, POTASSIUM CHLORIDE, CALCIUM CHLORIDE 600; 310; 30; 20 MG/100ML; MG/100ML; MG/100ML; MG/100ML
INJECTION, SOLUTION INTRAVENOUS CONTINUOUS
Status: ACTIVE | OUTPATIENT
Start: 2020-01-01 | End: 2020-01-01

## 2020-01-01 RX ORDER — POTASSIUM CHLORIDE 29.8 MG/ML
20 INJECTION INTRAVENOUS ONCE
Status: COMPLETED | OUTPATIENT
Start: 2020-01-01 | End: 2020-01-01

## 2020-01-01 RX ORDER — LORAZEPAM 0.5 MG/1
0.5 TABLET ORAL EVERY 4 HOURS PRN
Status: DISCONTINUED | OUTPATIENT
Start: 2020-01-01 | End: 2020-01-01

## 2020-01-01 RX ORDER — HEPARIN SODIUM,PORCINE 10 UNIT/ML
5 VIAL (ML) INTRAVENOUS
Status: CANCELLED | OUTPATIENT
Start: 2020-11-30

## 2020-01-01 RX ORDER — LORAZEPAM 2 MG/ML
1 INJECTION INTRAMUSCULAR ONCE
Status: COMPLETED | OUTPATIENT
Start: 2020-01-01 | End: 2020-01-01

## 2020-01-01 RX ORDER — NYSTATIN 100000/ML
500000 SUSPENSION, ORAL (FINAL DOSE FORM) ORAL 4 TIMES DAILY
Status: DISCONTINUED | OUTPATIENT
Start: 2020-01-01 | End: 2020-01-01

## 2020-01-01 RX ORDER — LORAZEPAM 2 MG/ML
.5-1 INJECTION INTRAMUSCULAR EVERY 6 HOURS PRN
Status: DISCONTINUED | OUTPATIENT
Start: 2020-01-01 | End: 2020-01-01 | Stop reason: HOSPADM

## 2020-01-01 RX ORDER — NALOXONE HYDROCHLORIDE 0.4 MG/ML
0.2 INJECTION, SOLUTION INTRAMUSCULAR; INTRAVENOUS; SUBCUTANEOUS
Status: ACTIVE | OUTPATIENT
Start: 2020-01-01 | End: 2020-01-01

## 2020-01-01 RX ORDER — EPINEPHRINE 1 MG/ML
0.3 INJECTION, SOLUTION INTRAMUSCULAR; SUBCUTANEOUS EVERY 5 MIN PRN
Status: CANCELLED | OUTPATIENT
Start: 2020-12-07

## 2020-01-01 RX ORDER — METHYLPREDNISOLONE SODIUM SUCCINATE 125 MG/2ML
125 INJECTION, POWDER, LYOPHILIZED, FOR SOLUTION INTRAMUSCULAR; INTRAVENOUS
Status: CANCELLED
Start: 2020-11-30

## 2020-01-01 RX ORDER — IOPAMIDOL 755 MG/ML
53 INJECTION, SOLUTION INTRAVASCULAR ONCE
Status: COMPLETED | OUTPATIENT
Start: 2020-01-01 | End: 2020-01-01

## 2020-01-01 RX ORDER — DEXTROSE MONOHYDRATE 50 MG/ML
INJECTION, SOLUTION INTRAVENOUS CONTINUOUS
Status: DISCONTINUED | OUTPATIENT
Start: 2020-01-01 | End: 2020-01-01 | Stop reason: CLARIF

## 2020-01-01 RX ORDER — NICOTINE POLACRILEX 4 MG
15-30 LOZENGE BUCCAL
Status: DISCONTINUED | OUTPATIENT
Start: 2020-01-01 | End: 2020-01-01 | Stop reason: HOSPADM

## 2020-01-01 RX ORDER — MEPERIDINE HYDROCHLORIDE 25 MG/ML
25 INJECTION INTRAMUSCULAR; INTRAVENOUS; SUBCUTANEOUS EVERY 30 MIN PRN
Status: CANCELLED | OUTPATIENT
Start: 2020-11-30

## 2020-01-01 RX ORDER — LIDOCAINE HYDROCHLORIDE 20 MG/ML
5 SOLUTION OROPHARYNGEAL ONCE
Status: DISCONTINUED | OUTPATIENT
Start: 2020-01-01 | End: 2020-01-01 | Stop reason: HOSPADM

## 2020-01-01 RX ORDER — DIPHENHYDRAMINE HYDROCHLORIDE 50 MG/ML
50 INJECTION INTRAMUSCULAR; INTRAVENOUS
Status: CANCELLED
Start: 2020-12-07

## 2020-01-01 RX ORDER — ONDANSETRON 2 MG/ML
8 INJECTION INTRAMUSCULAR; INTRAVENOUS ONCE
Status: COMPLETED | OUTPATIENT
Start: 2020-01-01 | End: 2020-01-01

## 2020-01-01 RX ORDER — ALLOPURINOL 100 MG/1
100 TABLET ORAL DAILY
Status: DISCONTINUED | OUTPATIENT
Start: 2020-01-01 | End: 2020-01-01

## 2020-01-01 RX ORDER — FAMOTIDINE 20 MG/1
20 TABLET, FILM COATED ORAL 2 TIMES DAILY
Status: DISCONTINUED | OUTPATIENT
Start: 2020-01-01 | End: 2020-01-01

## 2020-01-01 RX ORDER — METHYLPREDNISOLONE SODIUM SUCCINATE 125 MG/2ML
125 INJECTION, POWDER, LYOPHILIZED, FOR SOLUTION INTRAMUSCULAR; INTRAVENOUS ONCE
Status: COMPLETED | OUTPATIENT
Start: 2020-01-01 | End: 2020-01-01

## 2020-01-01 RX ORDER — TAMSULOSIN HYDROCHLORIDE 0.4 MG/1
0.4 CAPSULE ORAL DAILY
Status: DISCONTINUED | OUTPATIENT
Start: 2020-01-01 | End: 2020-01-01

## 2020-01-01 RX ORDER — ONDANSETRON 4 MG/1
4 TABLET, ORALLY DISINTEGRATING ORAL EVERY 30 MIN PRN
Status: DISCONTINUED | OUTPATIENT
Start: 2020-01-01 | End: 2020-01-01 | Stop reason: HOSPADM

## 2020-01-01 RX ORDER — KETAMINE HYDROCHLORIDE 10 MG/ML
INJECTION INTRAMUSCULAR; INTRAVENOUS PRN
Status: DISCONTINUED | OUTPATIENT
Start: 2020-01-01 | End: 2020-01-01

## 2020-01-01 RX ORDER — ALBUMIN, HUMAN INJ 5% 5 %
500 SOLUTION INTRAVENOUS ONCE
Status: COMPLETED | OUTPATIENT
Start: 2020-01-01 | End: 2020-01-01

## 2020-01-01 RX ORDER — FENTANYL CITRATE 50 UG/ML
25-50 INJECTION, SOLUTION INTRAMUSCULAR; INTRAVENOUS
Status: DISCONTINUED | OUTPATIENT
Start: 2020-01-01 | End: 2020-01-01 | Stop reason: HOSPADM

## 2020-01-01 RX ORDER — CALCIUM CARBONATE 500 MG/1
500 TABLET, CHEWABLE ORAL 2 TIMES DAILY WITH MEALS
Status: DISCONTINUED | OUTPATIENT
Start: 2020-01-01 | End: 2020-01-01 | Stop reason: ALTCHOICE

## 2020-01-01 RX ORDER — ACETAMINOPHEN 500 MG
1000 TABLET ORAL ONCE
Status: COMPLETED | OUTPATIENT
Start: 2020-01-01 | End: 2020-01-01

## 2020-01-01 RX ORDER — GLYCOPYRROLATE 0.2 MG/ML
0.2 INJECTION, SOLUTION INTRAMUSCULAR; INTRAVENOUS ONCE
Status: COMPLETED | OUTPATIENT
Start: 2020-01-01 | End: 2020-01-01

## 2020-01-01 RX ORDER — SODIUM CHLORIDE, SODIUM LACTATE, POTASSIUM CHLORIDE, CALCIUM CHLORIDE 600; 310; 30; 20 MG/100ML; MG/100ML; MG/100ML; MG/100ML
INJECTION, SOLUTION INTRAVENOUS CONTINUOUS
Status: DISCONTINUED | OUTPATIENT
Start: 2020-01-01 | End: 2020-01-01

## 2020-01-01 RX ORDER — DIPHENHYDRAMINE HYDROCHLORIDE 50 MG/ML
50 INJECTION INTRAMUSCULAR; INTRAVENOUS
Status: COMPLETED | OUTPATIENT
Start: 2020-01-01 | End: 2020-01-01

## 2020-01-01 RX ORDER — PROCHLORPERAZINE MALEATE 10 MG
10 TABLET ORAL EVERY 6 HOURS PRN
Status: CANCELLED
Start: 2020-01-01

## 2020-01-01 RX ORDER — ALBUTEROL SULFATE 0.83 MG/ML
2.5 SOLUTION RESPIRATORY (INHALATION)
Status: CANCELLED | OUTPATIENT
Start: 2020-12-07

## 2020-01-01 RX ORDER — POTASSIUM CHLORIDE 1.5 G/1.58G
20-40 POWDER, FOR SOLUTION ORAL
Status: DISCONTINUED | OUTPATIENT
Start: 2020-01-01 | End: 2020-01-01

## 2020-01-01 RX ORDER — EPINEPHRINE 1 MG/ML
0.3 INJECTION, SOLUTION INTRAMUSCULAR; SUBCUTANEOUS EVERY 5 MIN PRN
Status: CANCELLED | OUTPATIENT
Start: 2020-11-30

## 2020-01-01 RX ORDER — HEPARIN SODIUM,PORCINE 10 UNIT/ML
2-5 VIAL (ML) INTRAVENOUS
Status: ACTIVE | OUTPATIENT
Start: 2020-01-01 | End: 2020-01-01

## 2020-01-01 RX ORDER — DEXAMETHASONE 4 MG/1
8 TABLET ORAL
Qty: 6 TABLET | Refills: 0 | Status: ON HOLD | OUTPATIENT
Start: 2020-01-01 | End: 2020-01-01

## 2020-01-01 RX ORDER — CEFAZOLIN SODIUM 1 G
1 VIAL (EA) INJECTION SEE ADMIN INSTRUCTIONS
Status: CANCELLED | OUTPATIENT
Start: 2020-01-01

## 2020-01-01 RX ORDER — LORATADINE 10 MG/1
TABLET ORAL
Qty: 30 TABLET | Refills: 0 | Status: ON HOLD | OUTPATIENT
Start: 2020-01-01 | End: 2020-01-01

## 2020-01-01 RX ORDER — DEXAMETHASONE 4 MG/1
12 TABLET ORAL EVERY 24 HOURS
Status: COMPLETED | OUTPATIENT
Start: 2020-01-01 | End: 2020-01-01

## 2020-01-01 RX ORDER — ALBUTEROL SULFATE 0.83 MG/ML
2.5 SOLUTION RESPIRATORY (INHALATION)
Status: DISCONTINUED | OUTPATIENT
Start: 2020-01-01 | End: 2020-01-01

## 2020-01-01 RX ORDER — SODIUM CHLORIDE 9 MG/ML
1000 INJECTION, SOLUTION INTRAVENOUS CONTINUOUS PRN
Status: CANCELLED
Start: 2020-11-30

## 2020-01-01 RX ORDER — PROCHLORPERAZINE MALEATE 10 MG
10 TABLET ORAL EVERY 6 HOURS PRN
Qty: 30 TABLET | Refills: 3 | Status: SHIPPED | OUTPATIENT
Start: 2020-01-01

## 2020-01-01 RX ORDER — MAGNESIUM SULFATE HEPTAHYDRATE 40 MG/ML
4 INJECTION, SOLUTION INTRAVENOUS EVERY 4 HOURS PRN
Status: DISCONTINUED | OUTPATIENT
Start: 2020-01-01 | End: 2020-01-01

## 2020-01-01 RX ORDER — FLUCONAZOLE 2 MG/ML
200 INJECTION, SOLUTION INTRAVENOUS EVERY 24 HOURS
Status: DISCONTINUED | OUTPATIENT
Start: 2020-01-01 | End: 2020-01-01

## 2020-01-01 RX ORDER — DIPHENHYDRAMINE HYDROCHLORIDE 50 MG/ML
50 INJECTION INTRAMUSCULAR; INTRAVENOUS
Status: DISCONTINUED | OUTPATIENT
Start: 2020-01-01 | End: 2020-01-01 | Stop reason: HOSPADM

## 2020-01-01 RX ORDER — FUROSEMIDE 10 MG/ML
20 INJECTION INTRAMUSCULAR; INTRAVENOUS ONCE
Status: DISCONTINUED | OUTPATIENT
Start: 2020-01-01 | End: 2020-01-01

## 2020-01-01 RX ORDER — LORAZEPAM 2 MG/ML
.5-1 INJECTION INTRAMUSCULAR EVERY 4 HOURS PRN
Status: DISCONTINUED | OUTPATIENT
Start: 2020-01-01 | End: 2020-01-01

## 2020-01-01 RX ORDER — AMOXICILLIN 250 MG
1 CAPSULE ORAL 2 TIMES DAILY PRN
Status: DISCONTINUED | OUTPATIENT
Start: 2020-01-01 | End: 2020-01-01 | Stop reason: HOSPADM

## 2020-01-01 RX ORDER — LORATADINE 10 MG/1
10 TABLET ORAL DAILY
COMMUNITY

## 2020-01-01 RX ORDER — EPINEPHRINE 1 MG/ML
0.3 INJECTION, SOLUTION, CONCENTRATE INTRAVENOUS EVERY 5 MIN PRN
Status: CANCELLED | OUTPATIENT
Start: 2020-01-01

## 2020-01-01 RX ORDER — SODIUM CHLORIDE 9 MG/ML
1000 INJECTION, SOLUTION INTRAVENOUS CONTINUOUS PRN
Status: CANCELLED
Start: 2020-12-07

## 2020-01-01 RX ORDER — DEXAMETHASONE 4 MG/1
12 TABLET ORAL EVERY 24 HOURS
Status: CANCELLED
Start: 2020-01-01

## 2020-01-01 RX ORDER — LORAZEPAM 2 MG/ML
1 INJECTION INTRAMUSCULAR ONCE
Status: CANCELLED | OUTPATIENT
Start: 2020-01-01 | End: 2020-01-01

## 2020-01-01 RX ORDER — AMOXICILLIN 250 MG
1 CAPSULE ORAL 2 TIMES DAILY
Status: DISCONTINUED | OUTPATIENT
Start: 2020-01-01 | End: 2020-01-01

## 2020-01-01 RX ORDER — DOXYCYCLINE 100 MG/10ML
100 INJECTION, POWDER, LYOPHILIZED, FOR SOLUTION INTRAVENOUS EVERY 12 HOURS
Status: DISCONTINUED | OUTPATIENT
Start: 2020-01-01 | End: 2020-01-01

## 2020-01-01 RX ORDER — LORAZEPAM 0.5 MG/1
0.5 TABLET ORAL EVERY 4 HOURS PRN
Qty: 30 TABLET | Refills: 3 | Status: SHIPPED | OUTPATIENT
Start: 2020-01-01

## 2020-01-01 RX ORDER — PALONOSETRON 0.05 MG/ML
0.25 INJECTION, SOLUTION INTRAVENOUS ONCE
Status: COMPLETED | OUTPATIENT
Start: 2020-01-01 | End: 2020-01-01

## 2020-01-01 RX ORDER — LORAZEPAM 0.5 MG/1
.5-1 TABLET ORAL EVERY 6 HOURS PRN
Status: CANCELLED
Start: 2020-01-01

## 2020-01-01 RX ORDER — LORAZEPAM 2 MG/ML
.5-1 INJECTION INTRAMUSCULAR EVERY 6 HOURS PRN
Status: CANCELLED | OUTPATIENT
Start: 2020-01-01

## 2020-01-01 RX ORDER — ONDANSETRON 4 MG/1
16 TABLET, FILM COATED ORAL EVERY 24 HOURS
Status: CANCELLED | OUTPATIENT
Start: 2020-01-01

## 2020-01-01 RX ORDER — ALBUTEROL SULFATE 0.83 MG/ML
SOLUTION RESPIRATORY (INHALATION)
Status: DISPENSED
Start: 2020-01-01 | End: 2020-01-01

## 2020-01-01 RX ORDER — CALCIUM GLUCONATE 94 MG/ML
2 INJECTION, SOLUTION INTRAVENOUS ONCE
Status: DISCONTINUED | OUTPATIENT
Start: 2020-01-01 | End: 2020-01-01

## 2020-01-01 RX ORDER — CIPROFLOXACIN 500 MG/1
500 TABLET, FILM COATED ORAL 2 TIMES DAILY
Qty: 60 TABLET | Refills: 1 | Status: SHIPPED | OUTPATIENT
Start: 2020-01-01 | End: 2021-01-08

## 2020-01-01 RX ADMIN — DEXAMETHASONE 12 MG: 4 TABLET ORAL at 16:01

## 2020-01-01 RX ADMIN — HEPARIN SODIUM 5000 UNITS: 10000 INJECTION, SOLUTION INTRAVENOUS; SUBCUTANEOUS at 05:39

## 2020-01-01 RX ADMIN — ACETAMINOPHEN 650 MG: 650 SUPPOSITORY RECTAL at 18:57

## 2020-01-01 RX ADMIN — DEXMEDETOMIDINE HYDROCHLORIDE 8 MCG: 100 INJECTION, SOLUTION INTRAVENOUS at 02:40

## 2020-01-01 RX ADMIN — SODIUM CHLORIDE: 9 INJECTION, SOLUTION INTRAVENOUS at 20:24

## 2020-01-01 RX ADMIN — Medication 25 MCG: at 20:30

## 2020-01-01 RX ADMIN — SODIUM CHLORIDE 200 MG: 9 INJECTION, SOLUTION INTRAVENOUS at 18:26

## 2020-01-01 RX ADMIN — SODIUM CHLORIDE, POTASSIUM CHLORIDE, SODIUM LACTATE AND CALCIUM CHLORIDE: 600; 310; 30; 20 INJECTION, SOLUTION INTRAVENOUS at 22:12

## 2020-01-01 RX ADMIN — VANCOMYCIN HYDROCHLORIDE 1000 MG: 1 INJECTION, SOLUTION INTRAVENOUS at 18:00

## 2020-01-01 RX ADMIN — METHYLPREDNISOLONE SODIUM SUCCINATE 125 MG: 125 INJECTION, POWDER, FOR SOLUTION INTRAMUSCULAR; INTRAVENOUS at 14:36

## 2020-01-01 RX ADMIN — MINERAL OIL AND PETROLATUM: 150; 830 OINTMENT OPHTHALMIC at 09:59

## 2020-01-01 RX ADMIN — LORAZEPAM 1 MG: 2 INJECTION INTRAMUSCULAR; INTRAVENOUS at 00:57

## 2020-01-01 RX ADMIN — OYSTER SHELL CALCIUM WITH VITAMIN D 1 TABLET: 500; 200 TABLET, FILM COATED ORAL at 08:16

## 2020-01-01 RX ADMIN — SODIUM CHLORIDE 8 MG/HR: 9 INJECTION, SOLUTION INTRAVENOUS at 20:21

## 2020-01-01 RX ADMIN — LORATADINE 10 MG: 10 TABLET ORAL at 08:05

## 2020-01-01 RX ADMIN — DEXAMETHASONE SODIUM PHOSPHATE 4 MG: 4 INJECTION, SOLUTION INTRA-ARTICULAR; INTRALESIONAL; INTRAMUSCULAR; INTRAVENOUS; SOFT TISSUE at 16:38

## 2020-01-01 RX ADMIN — ALLOPURINOL 300 MG: 300 TABLET ORAL at 09:45

## 2020-01-01 RX ADMIN — LORATADINE 10 MG: 10 TABLET ORAL at 09:53

## 2020-01-01 RX ADMIN — MEROPENEM 1 G: 1 INJECTION, POWDER, FOR SOLUTION INTRAVENOUS at 06:29

## 2020-01-01 RX ADMIN — OYSTER SHELL CALCIUM WITH VITAMIN D 1 TABLET: 500; 200 TABLET, FILM COATED ORAL at 08:52

## 2020-01-01 RX ADMIN — MICAFUNGIN SODIUM 100 MG: 100 INJECTION, POWDER, LYOPHILIZED, FOR SOLUTION INTRAVENOUS at 14:18

## 2020-01-01 RX ADMIN — ACYCLOVIR SODIUM 250 MG: 50 INJECTION, SOLUTION INTRAVENOUS at 16:57

## 2020-01-01 RX ADMIN — SODIUM CHLORIDE 1000 ML: 9 INJECTION, SOLUTION INTRAVENOUS at 11:57

## 2020-01-01 RX ADMIN — CEFEPIME HYDROCHLORIDE 2 G: 2 INJECTION, POWDER, FOR SOLUTION INTRAVENOUS at 09:33

## 2020-01-01 RX ADMIN — FERROUS SULFATE TAB 325 MG (65 MG ELEMENTAL FE) 325 MG: 325 (65 FE) TAB at 09:34

## 2020-01-01 RX ADMIN — MAGNESIUM SULFATE HEPTAHYDRATE 2 G: 40 INJECTION, SOLUTION INTRAVENOUS at 05:35

## 2020-01-01 RX ADMIN — MINERAL OIL AND PETROLATUM: 150; 830 OINTMENT OPHTHALMIC at 18:28

## 2020-01-01 RX ADMIN — ONDANSETRON 4 MG: 2 INJECTION INTRAMUSCULAR; INTRAVENOUS at 20:57

## 2020-01-01 RX ADMIN — CEFEPIME 2 G: 2 INJECTION, POWDER, FOR SOLUTION INTRAVENOUS at 01:46

## 2020-01-01 RX ADMIN — SODIUM CHLORIDE: 9 INJECTION, SOLUTION INTRAVENOUS at 01:59

## 2020-01-01 RX ADMIN — DEXTROSE MONOHYDRATE: 50 INJECTION, SOLUTION INTRAVENOUS at 09:41

## 2020-01-01 RX ADMIN — FUROSEMIDE 40 MG: 10 INJECTION, SOLUTION INTRAMUSCULAR; INTRAVENOUS at 00:47

## 2020-01-01 RX ADMIN — LORATADINE 10 MG: 10 TABLET ORAL at 09:55

## 2020-01-01 RX ADMIN — Medication 0.2 MCG/KG/MIN: at 19:32

## 2020-01-01 RX ADMIN — CALCIUM GLUCONATE 1 G: 98 INJECTION, SOLUTION INTRAVENOUS at 12:29

## 2020-01-01 RX ADMIN — OYSTER SHELL CALCIUM WITH VITAMIN D 1 TABLET: 500; 200 TABLET, FILM COATED ORAL at 09:53

## 2020-01-01 RX ADMIN — CEFEPIME 2 G: 2 INJECTION, POWDER, FOR SOLUTION INTRAVENOUS at 02:07

## 2020-01-01 RX ADMIN — SODIUM CHLORIDE: 9 INJECTION, SOLUTION INTRAVENOUS at 02:16

## 2020-01-01 RX ADMIN — DOXYCYCLINE 100 MG: 100 INJECTION, POWDER, LYOPHILIZED, FOR SOLUTION INTRAVENOUS at 05:35

## 2020-01-01 RX ADMIN — FERROUS SULFATE TAB 325 MG (65 MG ELEMENTAL FE) 325 MG: 325 (65 FE) TAB at 09:53

## 2020-01-01 RX ADMIN — FAMOTIDINE 20 MG: 20 TABLET, FILM COATED ORAL at 08:55

## 2020-01-01 RX ADMIN — MULTIPLE VITAMINS W/ MINERALS TAB 1 TABLET: TAB at 09:53

## 2020-01-01 RX ADMIN — OYSTER SHELL CALCIUM WITH VITAMIN D 1 TABLET: 500; 200 TABLET, FILM COATED ORAL at 18:08

## 2020-01-01 RX ADMIN — TAMSULOSIN HYDROCHLORIDE 0.4 MG: 0.4 CAPSULE ORAL at 08:38

## 2020-01-01 RX ADMIN — DIPHENHYDRAMINE HYDROCHLORIDE AND LIDOCAINE HYDROCHLORIDE AND ALUMINUM HYDROXIDE AND MAGNESIUM HYDRO 10 ML: KIT at 05:03

## 2020-01-01 RX ADMIN — EPOPROSTENOL 20 NG/KG/MIN: 1.5 INJECTION, POWDER, LYOPHILIZED, FOR SOLUTION INTRAVENOUS at 18:02

## 2020-01-01 RX ADMIN — CEFEPIME HYDROCHLORIDE 2 G: 2 INJECTION, POWDER, FOR SOLUTION INTRAVENOUS at 05:58

## 2020-01-01 RX ADMIN — SODIUM CHLORIDE 250 MG: 9 INJECTION, SOLUTION INTRAVENOUS at 20:49

## 2020-01-01 RX ADMIN — Medication 0.5 MG: at 23:35

## 2020-01-01 RX ADMIN — CEFEPIME HYDROCHLORIDE 2 G: 2 INJECTION, POWDER, FOR SOLUTION INTRAVENOUS at 17:27

## 2020-01-01 RX ADMIN — VANCOMYCIN HYDROCHLORIDE 1000 MG: 1 INJECTION, SOLUTION INTRAVENOUS at 18:54

## 2020-01-01 RX ADMIN — METHYLPREDNISOLONE SODIUM SUCCINATE 125 MG: 125 INJECTION, POWDER, FOR SOLUTION INTRAMUSCULAR; INTRAVENOUS at 14:29

## 2020-01-01 RX ADMIN — NYSTATIN 500000 UNITS: 100000 SUSPENSION ORAL at 22:53

## 2020-01-01 RX ADMIN — PANTOPRAZOLE SODIUM 40 MG: 40 TABLET, DELAYED RELEASE ORAL at 19:54

## 2020-01-01 RX ADMIN — SODIUM BICARBONATE: 84 INJECTION, SOLUTION INTRAVENOUS at 18:18

## 2020-01-01 RX ADMIN — ACETAMINOPHEN 650 MG: 325 TABLET, FILM COATED ORAL at 02:08

## 2020-01-01 RX ADMIN — ACETYLCYSTEINE 4 ML: 100 INHALANT RESPIRATORY (INHALATION) at 02:36

## 2020-01-01 RX ADMIN — CEFEPIME 2 G: 2 INJECTION, POWDER, FOR SOLUTION INTRAVENOUS at 01:50

## 2020-01-01 RX ADMIN — POTASSIUM CHLORIDE 10 MEQ: 7.46 INJECTION, SOLUTION INTRAVENOUS at 10:35

## 2020-01-01 RX ADMIN — SODIUM CHLORIDE, POTASSIUM CHLORIDE, SODIUM LACTATE AND CALCIUM CHLORIDE 1398 ML: 600; 310; 30; 20 INJECTION, SOLUTION INTRAVENOUS at 15:01

## 2020-01-01 RX ADMIN — OYSTER SHELL CALCIUM WITH VITAMIN D 1 TABLET: 500; 200 TABLET, FILM COATED ORAL at 18:29

## 2020-01-01 RX ADMIN — ACETAMINOPHEN 650 MG: 325 TABLET, FILM COATED ORAL at 19:26

## 2020-01-01 RX ADMIN — SODIUM CHLORIDE 1000 ML: 9 INJECTION, SOLUTION INTRAVENOUS at 20:20

## 2020-01-01 RX ADMIN — ONDANSETRON 16 MG: 4 TABLET, ORALLY DISINTEGRATING ORAL at 11:11

## 2020-01-01 RX ADMIN — ACETAMINOPHEN 650 MG: 325 TABLET, FILM COATED ORAL at 12:47

## 2020-01-01 RX ADMIN — CALCIUM GLUCONATE 1 G: 98 INJECTION, SOLUTION INTRAVENOUS at 11:40

## 2020-01-01 RX ADMIN — PROPOFOL 35 MCG/KG/MIN: 10 INJECTION, EMULSION INTRAVENOUS at 23:04

## 2020-01-01 RX ADMIN — DOXYCYCLINE 100 MG: 100 INJECTION, POWDER, LYOPHILIZED, FOR SOLUTION INTRAVENOUS at 05:38

## 2020-01-01 RX ADMIN — VORICONAZOLE 200 MG: 40 POWDER, FOR SUSPENSION ORAL at 08:55

## 2020-01-01 RX ADMIN — ACETAMINOPHEN 650 MG: 325 TABLET, FILM COATED ORAL at 22:52

## 2020-01-01 RX ADMIN — Medication 25 MCG: at 08:16

## 2020-01-01 RX ADMIN — EPOPROSTENOL 20 NG/KG/MIN: 1.5 INJECTION, POWDER, LYOPHILIZED, FOR SOLUTION INTRAVENOUS at 22:59

## 2020-01-01 RX ADMIN — Medication 0.5 MG: at 12:24

## 2020-01-01 RX ADMIN — NYSTATIN 500000 UNITS: 100000 SUSPENSION ORAL at 13:53

## 2020-01-01 RX ADMIN — Medication 25 MCG: at 20:12

## 2020-01-01 RX ADMIN — ACYCLOVIR SODIUM 250 MG: 50 INJECTION, SOLUTION INTRAVENOUS at 06:31

## 2020-01-01 RX ADMIN — ALLOPURINOL 300 MG: 300 TABLET ORAL at 09:24

## 2020-01-01 RX ADMIN — SODIUM CHLORIDE 250 MG: 9 INJECTION, SOLUTION INTRAVENOUS at 09:38

## 2020-01-01 RX ADMIN — SODIUM PHOSPHATE, MONOBASIC, MONOHYDRATE 9 MMOL: 276; 142 INJECTION, SOLUTION INTRAVENOUS at 05:13

## 2020-01-01 RX ADMIN — MEROPENEM 1 G: 1 INJECTION, POWDER, FOR SOLUTION INTRAVENOUS at 18:17

## 2020-01-01 RX ADMIN — ALLOPURINOL 300 MG: 300 TABLET ORAL at 08:28

## 2020-01-01 RX ADMIN — ACYCLOVIR SODIUM 250 MG: 50 INJECTION, SOLUTION INTRAVENOUS at 18:50

## 2020-01-01 RX ADMIN — SODIUM CHLORIDE: 9 INJECTION, SOLUTION INTRAVENOUS at 15:00

## 2020-01-01 RX ADMIN — MINERAL OIL AND PETROLATUM: 150; 830 OINTMENT OPHTHALMIC at 01:49

## 2020-01-01 RX ADMIN — NYSTATIN 500000 UNITS: 100000 SUSPENSION ORAL at 21:34

## 2020-01-01 RX ADMIN — ACETAMINOPHEN 650 MG: 325 TABLET, FILM COATED ORAL at 18:07

## 2020-01-01 RX ADMIN — SODIUM CHLORIDE, POTASSIUM CHLORIDE, SODIUM LACTATE AND CALCIUM CHLORIDE 1000 ML: 600; 310; 30; 20 INJECTION, SOLUTION INTRAVENOUS at 00:47

## 2020-01-01 RX ADMIN — Medication 0.1 MCG/KG/MIN: at 12:24

## 2020-01-01 RX ADMIN — ACYCLOVIR SODIUM 250 MG: 50 INJECTION, SOLUTION INTRAVENOUS at 16:18

## 2020-01-01 RX ADMIN — ALLOPURINOL 300 MG: 300 TABLET ORAL at 09:51

## 2020-01-01 RX ADMIN — TAZOBACTAM SODIUM AND PIPERACILLIN SODIUM 2.25 G: 250; 2 INJECTION, SOLUTION INTRAVENOUS at 11:12

## 2020-01-01 RX ADMIN — PEGFILGRASTIM-CBQV 6 MG: 6 INJECTION, SOLUTION SUBCUTANEOUS at 14:42

## 2020-01-01 RX ADMIN — ACETAMINOPHEN 650 MG: 325 TABLET, FILM COATED ORAL at 22:53

## 2020-01-01 RX ADMIN — SODIUM CHLORIDE 1000 ML: 9 INJECTION, SOLUTION INTRAVENOUS at 21:25

## 2020-01-01 RX ADMIN — VANCOMYCIN HYDROCHLORIDE 1000 MG: 1 INJECTION, SOLUTION INTRAVENOUS at 17:24

## 2020-01-01 RX ADMIN — DIPHENHYDRAMINE HYDROCHLORIDE AND LIDOCAINE HYDROCHLORIDE AND ALUMINUM HYDROXIDE AND MAGNESIUM HYDRO 10 ML: KIT at 13:52

## 2020-01-01 RX ADMIN — DEXTROSE 15 G: 15 GEL ORAL at 09:28

## 2020-01-01 RX ADMIN — ALLOPURINOL 300 MG: 300 TABLET ORAL at 09:53

## 2020-01-01 RX ADMIN — MULTIPLE VITAMINS W/ MINERALS TAB 1 TABLET: TAB at 08:38

## 2020-01-01 RX ADMIN — Medication 100 MCG/HR: at 04:06

## 2020-01-01 RX ADMIN — ETOPOSIDE 150 MG: 20 INJECTION INTRAVENOUS at 12:27

## 2020-01-01 RX ADMIN — ACETAMINOPHEN 650 MG: 325 SOLUTION ORAL at 02:26

## 2020-01-01 RX ADMIN — DEXAMETHASONE 12 MG: 4 TABLET ORAL at 13:15

## 2020-01-01 RX ADMIN — CALCIUM GLUCONATE 1 G: 98 INJECTION, SOLUTION INTRAVENOUS at 11:22

## 2020-01-01 RX ADMIN — GANCICLOVIR SODIUM 125 MG: 500 INJECTION, POWDER, LYOPHILIZED, FOR SOLUTION INTRAVENOUS at 17:47

## 2020-01-01 RX ADMIN — POTASSIUM CHLORIDE 10 MEQ: 7.46 INJECTION, SOLUTION INTRAVENOUS at 02:45

## 2020-01-01 RX ADMIN — ONDANSETRON 4 MG: 2 INJECTION INTRAMUSCULAR; INTRAVENOUS at 17:07

## 2020-01-01 RX ADMIN — ONDANSETRON 4 MG: 2 INJECTION INTRAMUSCULAR; INTRAVENOUS at 09:16

## 2020-01-01 RX ADMIN — CALCIUM GLUCONATE 1 G: 94 INJECTION, SOLUTION INTRAVENOUS at 05:04

## 2020-01-01 RX ADMIN — ONDANSETRON 16 MG: 4 TABLET, ORALLY DISINTEGRATING ORAL at 13:15

## 2020-01-01 RX ADMIN — CEFEPIME HYDROCHLORIDE 2 G: 2 INJECTION, POWDER, FOR SOLUTION INTRAVENOUS at 11:26

## 2020-01-01 RX ADMIN — INSULIN GLARGINE 40 UNITS: 100 INJECTION, SOLUTION SUBCUTANEOUS at 14:04

## 2020-01-01 RX ADMIN — FILGRASTIM 300 MCG: 300 INJECTION, SOLUTION INTRAVENOUS; SUBCUTANEOUS at 20:08

## 2020-01-01 RX ADMIN — VORICONAZOLE 200 MG: 40 POWDER, FOR SUSPENSION ORAL at 20:35

## 2020-01-01 RX ADMIN — DOXYCYCLINE 100 MG: 100 INJECTION, POWDER, LYOPHILIZED, FOR SOLUTION INTRAVENOUS at 19:01

## 2020-01-01 RX ADMIN — CEFEPIME HYDROCHLORIDE 2 G: 2 INJECTION, POWDER, FOR SOLUTION INTRAVENOUS at 11:17

## 2020-01-01 RX ADMIN — Medication 0.5 MG: at 11:17

## 2020-01-01 RX ADMIN — NYSTATIN 500000 UNITS: 100000 SUSPENSION ORAL at 13:22

## 2020-01-01 RX ADMIN — SODIUM CHLORIDE 8 MG/HR: 9 INJECTION, SOLUTION INTRAVENOUS at 14:56

## 2020-01-01 RX ADMIN — PANTOPRAZOLE SODIUM 40 MG: 40 INJECTION, POWDER, FOR SOLUTION INTRAVENOUS at 08:13

## 2020-01-01 RX ADMIN — ACYCLOVIR SODIUM 250 MG: 50 INJECTION, SOLUTION INTRAVENOUS at 19:36

## 2020-01-01 RX ADMIN — ACETAMINOPHEN 1000 MG: 500 TABLET, FILM COATED ORAL at 17:53

## 2020-01-01 RX ADMIN — TAMSULOSIN HYDROCHLORIDE 0.4 MG: 0.4 CAPSULE ORAL at 08:16

## 2020-01-01 RX ADMIN — ACYCLOVIR SODIUM 250 MG: 50 INJECTION, SOLUTION INTRAVENOUS at 03:21

## 2020-01-01 RX ADMIN — DEXTROSE 15 G: 15 GEL ORAL at 09:01

## 2020-01-01 RX ADMIN — ACETAMINOPHEN 650 MG: 325 TABLET ORAL at 14:36

## 2020-01-01 RX ADMIN — VASOPRESSIN 2.4 UNITS/HR: 20 INJECTION INTRAVENOUS at 23:32

## 2020-01-01 RX ADMIN — TAMSULOSIN HYDROCHLORIDE 0.4 MG: 0.4 CAPSULE ORAL at 18:25

## 2020-01-01 RX ADMIN — DEXAMETHASONE SODIUM PHOSPHATE 0.5 MG: 4 INJECTION, SOLUTION INTRAMUSCULAR; INTRAVENOUS at 00:06

## 2020-01-01 RX ADMIN — SODIUM CHLORIDE 1000 ML: 9 INJECTION, SOLUTION INTRAVENOUS at 17:53

## 2020-01-01 RX ADMIN — DEXTROSE 15 G: 15 GEL ORAL at 08:25

## 2020-01-01 RX ADMIN — SODIUM CHLORIDE: 9 INJECTION, SOLUTION INTRAVENOUS at 11:48

## 2020-01-01 RX ADMIN — I.V. FAT EMULSION 250 ML: 20 EMULSION INTRAVENOUS at 20:17

## 2020-01-01 RX ADMIN — ALLOPURINOL 300 MG: 300 TABLET ORAL at 09:55

## 2020-01-01 RX ADMIN — SODIUM CHLORIDE 8 MG/HR: 9 INJECTION, SOLUTION INTRAVENOUS at 05:35

## 2020-01-01 RX ADMIN — DIPHENHYDRAMINE HYDROCHLORIDE AND LIDOCAINE HYDROCHLORIDE AND ALUMINUM HYDROXIDE AND MAGNESIUM HYDRO 10 ML: KIT at 09:57

## 2020-01-01 RX ADMIN — FAMOTIDINE 20 MG: 20 TABLET, FILM COATED ORAL at 20:22

## 2020-01-01 RX ADMIN — FAMOTIDINE 20 MG: 20 TABLET, FILM COATED ORAL at 10:34

## 2020-01-01 RX ADMIN — HEPARIN SODIUM 5000 UNITS: 10000 INJECTION, SOLUTION INTRAVENOUS; SUBCUTANEOUS at 09:13

## 2020-01-01 RX ADMIN — I.V. FAT EMULSION 250 ML: 20 EMULSION INTRAVENOUS at 20:24

## 2020-01-01 RX ADMIN — CALCIUM CARBONATE (ANTACID) CHEW TAB 500 MG 500 MG: 500 CHEW TAB at 08:38

## 2020-01-01 RX ADMIN — SODIUM CHLORIDE 200 MG: 9 INJECTION, SOLUTION INTRAVENOUS at 02:45

## 2020-01-01 RX ADMIN — HEPARIN SODIUM 5000 UNITS: 10000 INJECTION, SOLUTION INTRAVENOUS; SUBCUTANEOUS at 18:48

## 2020-01-01 RX ADMIN — SODIUM CHLORIDE: 9 INJECTION, SOLUTION INTRAVENOUS at 10:54

## 2020-01-01 RX ADMIN — I.V. FAT EMULSION 250 ML: 20 EMULSION INTRAVENOUS at 19:55

## 2020-01-01 RX ADMIN — ONDANSETRON 4 MG: 2 INJECTION INTRAMUSCULAR; INTRAVENOUS at 20:59

## 2020-01-01 RX ADMIN — CALCIUM GLUCONATE 1 G: 98 INJECTION, SOLUTION INTRAVENOUS at 08:03

## 2020-01-01 RX ADMIN — FERROUS SULFATE TAB 325 MG (65 MG ELEMENTAL FE) 325 MG: 325 (65 FE) TAB at 09:45

## 2020-01-01 RX ADMIN — GANCICLOVIR SODIUM 125 MG: 500 INJECTION, POWDER, LYOPHILIZED, FOR SOLUTION INTRAVENOUS at 21:00

## 2020-01-01 RX ADMIN — PROPOFOL 10 MCG/KG/MIN: 10 INJECTION, EMULSION INTRAVENOUS at 02:51

## 2020-01-01 RX ADMIN — IBUPROFEN 400 MG: 200 TABLET, FILM COATED ORAL at 16:22

## 2020-01-01 RX ADMIN — INSULIN ASPART 1 UNITS: 100 INJECTION, SOLUTION INTRAVENOUS; SUBCUTANEOUS at 07:40

## 2020-01-01 RX ADMIN — ASCORBIC ACID, VITAMIN A PALMITATE, CHOLECALCIFEROL, THIAMINE HYDROCHLORIDE, RIBOFLAVIN-5 PHOSPHATE SODIUM, PYRIDOXINE HYDROCHLORIDE, NIACINAMIDE, DEXPANTHENOL, ALPHA-TOCOPHEROL ACETATE, VITAMIN K1, FOLIC ACID, BIOTIN, CYANOCOBALAMIN: 200; 3300; 200; 6; 3.6; 6; 40; 15; 10; 150; 600; 60; 5 INJECTION, SOLUTION INTRAVENOUS at 17:24

## 2020-01-01 RX ADMIN — NYSTATIN 500000 UNITS: 100000 SUSPENSION ORAL at 14:34

## 2020-01-01 RX ADMIN — TAMSULOSIN HYDROCHLORIDE 0.4 MG: 0.4 CAPSULE ORAL at 09:48

## 2020-01-01 RX ADMIN — VORICONAZOLE 200 MG: 40 POWDER, FOR SUSPENSION ORAL at 08:06

## 2020-01-01 RX ADMIN — ONDANSETRON 16 MG: 4 TABLET, ORALLY DISINTEGRATING ORAL at 16:02

## 2020-01-01 RX ADMIN — SODIUM CHLORIDE: 9 INJECTION, SOLUTION INTRAVENOUS at 03:43

## 2020-01-01 RX ADMIN — ALLOPURINOL 300 MG: 300 TABLET ORAL at 09:48

## 2020-01-01 RX ADMIN — SODIUM CHLORIDE, POTASSIUM CHLORIDE, SODIUM LACTATE AND CALCIUM CHLORIDE: 600; 310; 30; 20 INJECTION, SOLUTION INTRAVENOUS at 09:42

## 2020-01-01 RX ADMIN — SODIUM CHLORIDE 1000 ML: 9 INJECTION, SOLUTION INTRAVENOUS at 04:49

## 2020-01-01 RX ADMIN — DEXAMETHASONE SODIUM PHOSPHATE: 10 INJECTION, SOLUTION INTRAMUSCULAR; INTRAVENOUS at 12:53

## 2020-01-01 RX ADMIN — MEROPENEM 1 G: 1 INJECTION, POWDER, FOR SOLUTION INTRAVENOUS at 16:35

## 2020-01-01 RX ADMIN — MEROPENEM 1 G: 1 INJECTION, POWDER, FOR SOLUTION INTRAVENOUS at 17:48

## 2020-01-01 RX ADMIN — HEPARIN SODIUM 5000 UNITS: 10000 INJECTION, SOLUTION INTRAVENOUS; SUBCUTANEOUS at 09:51

## 2020-01-01 RX ADMIN — BLEOMYCIN 30 UNITS: 30 INJECTION, POWDER, LYOPHILIZED, FOR SOLUTION INTRAMUSCULAR; INTRAPLEURAL; INTRAVENOUS; SUBCUTANEOUS at 10:46

## 2020-01-01 RX ADMIN — Medication 6 MG/HR: at 11:39

## 2020-01-01 RX ADMIN — PROPOFOL 15 MCG/KG/MIN: 10 INJECTION, EMULSION INTRAVENOUS at 04:03

## 2020-01-01 RX ADMIN — POTASSIUM CHLORIDE 20 MEQ: 29.8 INJECTION, SOLUTION INTRAVENOUS at 21:56

## 2020-01-01 RX ADMIN — SODIUM CHLORIDE, POTASSIUM CHLORIDE, SODIUM LACTATE AND CALCIUM CHLORIDE: 600; 310; 30; 20 INJECTION, SOLUTION INTRAVENOUS at 09:32

## 2020-01-01 RX ADMIN — MEROPENEM 1 G: 1 INJECTION, POWDER, FOR SOLUTION INTRAVENOUS at 04:18

## 2020-01-01 RX ADMIN — FAMOTIDINE 20 MG: 20 TABLET, FILM COATED ORAL at 09:01

## 2020-01-01 RX ADMIN — DOXYCYCLINE 100 MG: 100 INJECTION, POWDER, LYOPHILIZED, FOR SOLUTION INTRAVENOUS at 05:50

## 2020-01-01 RX ADMIN — LORATADINE 10 MG: 10 TABLET ORAL at 09:51

## 2020-01-01 RX ADMIN — Medication 500 MG: at 08:54

## 2020-01-01 RX ADMIN — SODIUM CHLORIDE: 9 INJECTION, SOLUTION INTRAVENOUS at 07:44

## 2020-01-01 RX ADMIN — Medication 500 MG: at 08:05

## 2020-01-01 RX ADMIN — ACETAMINOPHEN 650 MG: 325 TABLET, FILM COATED ORAL at 20:08

## 2020-01-01 RX ADMIN — DIPHENHYDRAMINE HYDROCHLORIDE 25 MG: 50 INJECTION, SOLUTION INTRAMUSCULAR; INTRAVENOUS at 20:49

## 2020-01-01 RX ADMIN — EPOPROSTENOL 20 NG/KG/MIN: 1.5 INJECTION, POWDER, LYOPHILIZED, FOR SOLUTION INTRAVENOUS at 10:41

## 2020-01-01 RX ADMIN — ACETAMINOPHEN 650 MG: 325 TABLET, FILM COATED ORAL at 06:47

## 2020-01-01 RX ADMIN — Medication 50 MCG: at 08:05

## 2020-01-01 RX ADMIN — Medication 0.5 MG: at 03:58

## 2020-01-01 RX ADMIN — EPOPROSTENOL 20 NG/KG/MIN: 1.5 INJECTION, POWDER, LYOPHILIZED, FOR SOLUTION INTRAVENOUS at 10:11

## 2020-01-01 RX ADMIN — MAGNESIUM SULFATE HEPTAHYDRATE 2 G: 40 INJECTION, SOLUTION INTRAVENOUS at 09:00

## 2020-01-01 RX ADMIN — PANTOPRAZOLE SODIUM 40 MG: 40 INJECTION, POWDER, FOR SOLUTION INTRAVENOUS at 08:23

## 2020-01-01 RX ADMIN — ACETAMINOPHEN 650 MG: 325 TABLET, FILM COATED ORAL at 20:52

## 2020-01-01 RX ADMIN — FERROUS SULFATE TAB 325 MG (65 MG ELEMENTAL FE) 325 MG: 325 (65 FE) TAB at 09:51

## 2020-01-01 RX ADMIN — ONDANSETRON 4 MG: 2 INJECTION INTRAMUSCULAR; INTRAVENOUS at 02:26

## 2020-01-01 RX ADMIN — FUROSEMIDE 20 MG: 10 INJECTION, SOLUTION INTRAMUSCULAR; INTRAVENOUS at 09:30

## 2020-01-01 RX ADMIN — CISPLATIN 30 MG: 100 INJECTION, SOLUTION INTRAVENOUS at 13:32

## 2020-01-01 RX ADMIN — Medication 25 MCG: at 08:53

## 2020-01-01 RX ADMIN — ACYCLOVIR SODIUM 250 MG: 50 INJECTION, SOLUTION INTRAVENOUS at 07:02

## 2020-01-01 RX ADMIN — PROPOFOL 45 MCG/KG/MIN: 10 INJECTION, EMULSION INTRAVENOUS at 14:59

## 2020-01-01 RX ADMIN — ALBUTEROL SULFATE 2.5 MG: 2.5 SOLUTION RESPIRATORY (INHALATION) at 02:35

## 2020-01-01 RX ADMIN — CALCIUM CARBONATE 2500 MG: 1250 SUSPENSION ORAL at 08:06

## 2020-01-01 RX ADMIN — ACETAMINOPHEN 650 MG: 325 TABLET, FILM COATED ORAL at 14:21

## 2020-01-01 RX ADMIN — FERROUS SULFATE TAB 325 MG (65 MG ELEMENTAL FE) 325 MG: 325 (65 FE) TAB at 09:01

## 2020-01-01 RX ADMIN — LORATADINE 10 MG: 10 TABLET ORAL at 08:16

## 2020-01-01 RX ADMIN — POTASSIUM PHOSPHATE, MONOBASIC AND POTASSIUM PHOSPHATE, DIBASIC 9 MMOL: 224; 236 INJECTION, SOLUTION, CONCENTRATE INTRAVENOUS at 09:53

## 2020-01-01 RX ADMIN — SODIUM CHLORIDE 1000 ML: 9 INJECTION, SOLUTION INTRAVENOUS at 11:46

## 2020-01-01 RX ADMIN — ALLOPURINOL 100 MG: 100 TABLET ORAL at 10:34

## 2020-01-01 RX ADMIN — FILGRASTIM 300 MCG: 300 INJECTION, SOLUTION INTRAVENOUS; SUBCUTANEOUS at 20:15

## 2020-01-01 RX ADMIN — MEROPENEM 1 G: 1 INJECTION, POWDER, FOR SOLUTION INTRAVENOUS at 16:52

## 2020-01-01 RX ADMIN — HEPARIN SODIUM 5000 UNITS: 10000 INJECTION, SOLUTION INTRAVENOUS; SUBCUTANEOUS at 05:37

## 2020-01-01 RX ADMIN — PANTOPRAZOLE SODIUM 40 MG: 40 INJECTION, POWDER, FOR SOLUTION INTRAVENOUS at 10:48

## 2020-01-01 RX ADMIN — ONDANSETRON 16 MG: 4 TABLET, ORALLY DISINTEGRATING ORAL at 09:33

## 2020-01-01 RX ADMIN — CISPLATIN 30 MG: 100 INJECTION, SOLUTION INTRAVENOUS at 13:40

## 2020-01-01 RX ADMIN — ACETAMINOPHEN 975 MG: 325 TABLET, FILM COATED ORAL at 18:03

## 2020-01-01 RX ADMIN — MEROPENEM 1 G: 1 INJECTION, POWDER, FOR SOLUTION INTRAVENOUS at 05:06

## 2020-01-01 RX ADMIN — ACETAMINOPHEN 650 MG: 325 TABLET, FILM COATED ORAL at 17:54

## 2020-01-01 RX ADMIN — TAMSULOSIN HYDROCHLORIDE 0.4 MG: 0.4 CAPSULE ORAL at 08:39

## 2020-01-01 RX ADMIN — FAMOTIDINE 20 MG: 20 TABLET, FILM COATED ORAL at 10:30

## 2020-01-01 RX ADMIN — CEFEPIME 2 G: 2 INJECTION, POWDER, FOR SOLUTION INTRAVENOUS at 14:35

## 2020-01-01 RX ADMIN — SODIUM CHLORIDE, POTASSIUM CHLORIDE, SODIUM LACTATE AND CALCIUM CHLORIDE: 600; 310; 30; 20 INJECTION, SOLUTION INTRAVENOUS at 02:13

## 2020-01-01 RX ADMIN — Medication 50 MCG: at 18:01

## 2020-01-01 RX ADMIN — FERROUS SULFATE TAB 325 MG (65 MG ELEMENTAL FE) 325 MG: 325 (65 FE) TAB at 09:07

## 2020-01-01 RX ADMIN — EPOPROSTENOL 20 NG/KG/MIN: 1.5 INJECTION, POWDER, LYOPHILIZED, FOR SOLUTION INTRAVENOUS at 21:59

## 2020-01-01 RX ADMIN — DIPHENHYDRAMINE HYDROCHLORIDE AND LIDOCAINE HYDROCHLORIDE AND ALUMINUM HYDROXIDE AND MAGNESIUM HYDRO 10 ML: KIT at 17:02

## 2020-01-01 RX ADMIN — TAMSULOSIN HYDROCHLORIDE 0.4 MG: 0.4 CAPSULE ORAL at 09:54

## 2020-01-01 RX ADMIN — CEFEPIME HYDROCHLORIDE 2 G: 2 INJECTION, POWDER, FOR SOLUTION INTRAVENOUS at 06:48

## 2020-01-01 RX ADMIN — MEROPENEM 1 G: 1 INJECTION, POWDER, FOR SOLUTION INTRAVENOUS at 04:02

## 2020-01-01 RX ADMIN — LORATADINE 10 MG: 10 TABLET ORAL at 09:07

## 2020-01-01 RX ADMIN — ACETAMINOPHEN 650 MG: 325 TABLET, FILM COATED ORAL at 14:32

## 2020-01-01 RX ADMIN — EPOPROSTENOL 20 NG/KG/MIN: 1.5 INJECTION, POWDER, LYOPHILIZED, FOR SOLUTION INTRAVENOUS at 02:25

## 2020-01-01 RX ADMIN — COSYNTROPIN 250 MCG: 0.25 INJECTION, POWDER, LYOPHILIZED, FOR SOLUTION INTRAMUSCULAR; INTRAVENOUS at 07:09

## 2020-01-01 RX ADMIN — SODIUM CHLORIDE 1000 ML: 9 INJECTION, SOLUTION INTRAVENOUS at 11:25

## 2020-01-01 RX ADMIN — Medication 6 MG/HR: at 03:29

## 2020-01-01 RX ADMIN — TAMSULOSIN HYDROCHLORIDE 0.4 MG: 0.4 CAPSULE ORAL at 09:53

## 2020-01-01 RX ADMIN — NYSTATIN 500000 UNITS: 100000 SUSPENSION ORAL at 09:54

## 2020-01-01 RX ADMIN — GLYCOPYRROLATE 0.2 MG: 0.2 INJECTION, SOLUTION INTRAMUSCULAR; INTRAVENOUS at 14:01

## 2020-01-01 RX ADMIN — SODIUM CHLORIDE 250 ML: 9 INJECTION, SOLUTION INTRAVENOUS at 10:45

## 2020-01-01 RX ADMIN — MAGNESIUM SULFATE HEPTAHYDRATE 2 G: 40 INJECTION, SOLUTION INTRAVENOUS at 13:13

## 2020-01-01 RX ADMIN — Medication 500 MG: at 18:29

## 2020-01-01 RX ADMIN — Medication 0.5 MG: at 16:54

## 2020-01-01 RX ADMIN — ACETAMINOPHEN 650 MG: 325 TABLET, FILM COATED ORAL at 08:38

## 2020-01-01 RX ADMIN — VORICONAZOLE 200 MG: 200 TABLET, COATED ORAL at 09:53

## 2020-01-01 RX ADMIN — PROPOFOL 50 MCG/KG/MIN: 10 INJECTION, EMULSION INTRAVENOUS at 06:48

## 2020-01-01 RX ADMIN — CALCIUM CARBONATE 2500 MG: 1250 SUSPENSION ORAL at 12:29

## 2020-01-01 RX ADMIN — PROPOFOL 45 MCG/KG/MIN: 10 INJECTION, EMULSION INTRAVENOUS at 04:18

## 2020-01-01 RX ADMIN — ONDANSETRON 8 MG: 2 INJECTION INTRAMUSCULAR; INTRAVENOUS at 10:37

## 2020-01-01 RX ADMIN — Medication 100 MCG/HR: at 06:11

## 2020-01-01 RX ADMIN — MEROPENEM 1 G: 1 INJECTION, POWDER, FOR SOLUTION INTRAVENOUS at 05:32

## 2020-01-01 RX ADMIN — SODIUM CHLORIDE: 9 INJECTION, SOLUTION INTRAVENOUS at 08:20

## 2020-01-01 RX ADMIN — PANTOPRAZOLE SODIUM 40 MG: 40 TABLET, DELAYED RELEASE ORAL at 20:22

## 2020-01-01 RX ADMIN — ACETAMINOPHEN 650 MG: 325 SOLUTION ORAL at 07:01

## 2020-01-01 RX ADMIN — SODIUM CHLORIDE 1000 ML: 9 INJECTION, SOLUTION INTRAVENOUS at 00:53

## 2020-01-01 RX ADMIN — SODIUM CHLORIDE, POTASSIUM CHLORIDE, SODIUM LACTATE AND CALCIUM CHLORIDE 1000 ML: 600; 310; 30; 20 INJECTION, SOLUTION INTRAVENOUS at 16:05

## 2020-01-01 RX ADMIN — ETOPOSIDE 115 MG: 20 INJECTION INTRAVENOUS at 14:19

## 2020-01-01 RX ADMIN — POTASSIUM CHLORIDE: 2 INJECTION, SOLUTION, CONCENTRATE INTRAVENOUS at 04:24

## 2020-01-01 RX ADMIN — NYSTATIN 500000 UNITS: 100000 SUSPENSION ORAL at 14:18

## 2020-01-01 RX ADMIN — DIPHENHYDRAMINE HYDROCHLORIDE AND LIDOCAINE HYDROCHLORIDE AND ALUMINUM HYDROXIDE AND MAGNESIUM HYDRO 10 ML: KIT at 06:19

## 2020-01-01 RX ADMIN — ACYCLOVIR SODIUM 250 MG: 50 INJECTION, SOLUTION INTRAVENOUS at 18:09

## 2020-01-01 RX ADMIN — DOXYCYCLINE 100 MG: 100 INJECTION, POWDER, LYOPHILIZED, FOR SOLUTION INTRAVENOUS at 18:56

## 2020-01-01 RX ADMIN — TAZOBACTAM SODIUM AND PIPERACILLIN SODIUM 2.25 G: 250; 2 INJECTION, SOLUTION INTRAVENOUS at 09:34

## 2020-01-01 RX ADMIN — FILGRASTIM 300 MCG: 300 INJECTION, SOLUTION INTRAVENOUS; SUBCUTANEOUS at 20:17

## 2020-01-01 RX ADMIN — Medication 100 MCG/HR: at 04:02

## 2020-01-01 RX ADMIN — MULTIPLE VITAMINS W/ MINERALS TAB 1 TABLET: TAB at 09:45

## 2020-01-01 RX ADMIN — HEPARIN SODIUM 5000 UNITS: 10000 INJECTION, SOLUTION INTRAVENOUS; SUBCUTANEOUS at 10:10

## 2020-01-01 RX ADMIN — SODIUM PHOSPHATE, MONOBASIC, MONOHYDRATE AND SODIUM PHOSPHATE, DIBASIC, ANHYDROUS 9 MMOL: 276; 142 INJECTION, SOLUTION INTRAVENOUS at 23:56

## 2020-01-01 RX ADMIN — FAMOTIDINE 20 MG: 20 TABLET, FILM COATED ORAL at 09:10

## 2020-01-01 RX ADMIN — HEPARIN SODIUM 5000 UNITS: 10000 INJECTION, SOLUTION INTRAVENOUS; SUBCUTANEOUS at 18:58

## 2020-01-01 RX ADMIN — ACETAMINOPHEN 650 MG: 325 TABLET ORAL at 13:39

## 2020-01-01 RX ADMIN — MULTIPLE VITAMINS W/ MINERALS TAB 1 TABLET: TAB at 08:29

## 2020-01-01 RX ADMIN — CEFEPIME HYDROCHLORIDE 2 G: 2 INJECTION, POWDER, FOR SOLUTION INTRAVENOUS at 09:32

## 2020-01-01 RX ADMIN — CEFEPIME HYDROCHLORIDE 2 G: 2 INJECTION, POWDER, FOR SOLUTION INTRAVENOUS at 19:59

## 2020-01-01 RX ADMIN — DEXAMETHASONE 12 MG: 4 TABLET ORAL at 11:11

## 2020-01-01 RX ADMIN — Medication 0.5 MG: at 03:28

## 2020-01-01 RX ADMIN — DIPHENHYDRAMINE HYDROCHLORIDE 50 MG: 50 INJECTION INTRAMUSCULAR; INTRAVENOUS at 14:36

## 2020-01-01 RX ADMIN — SODIUM CHLORIDE 150 MG: 9 INJECTION, SOLUTION INTRAVENOUS at 11:09

## 2020-01-01 RX ADMIN — SODIUM CHLORIDE 1000 ML: 9 INJECTION, SOLUTION INTRAVENOUS at 22:49

## 2020-01-01 RX ADMIN — ACYCLOVIR SODIUM 250 MG: 50 INJECTION, SOLUTION INTRAVENOUS at 16:49

## 2020-01-01 RX ADMIN — SODIUM CHLORIDE: 9 INJECTION, SOLUTION INTRAVENOUS at 19:51

## 2020-01-01 RX ADMIN — POTASSIUM CHLORIDE 20 MEQ: 1500 TABLET, EXTENDED RELEASE ORAL at 20:15

## 2020-01-01 RX ADMIN — SODIUM CHLORIDE 150 MG: 9 INJECTION, SOLUTION INTRAVENOUS at 16:05

## 2020-01-01 RX ADMIN — CEFEPIME HYDROCHLORIDE 2 G: 2 INJECTION, POWDER, FOR SOLUTION INTRAVENOUS at 07:31

## 2020-01-01 RX ADMIN — SODIUM CHLORIDE 1000 ML: 9 INJECTION, SOLUTION INTRAVENOUS at 11:51

## 2020-01-01 RX ADMIN — PROPOFOL 55 MCG/KG/MIN: 10 INJECTION, EMULSION INTRAVENOUS at 14:01

## 2020-01-01 RX ADMIN — Medication 500 MG: at 09:53

## 2020-01-01 RX ADMIN — ALLOPURINOL 300 MG: 300 TABLET ORAL at 09:32

## 2020-01-01 RX ADMIN — Medication 500 MG: at 18:44

## 2020-01-01 RX ADMIN — SODIUM CHLORIDE: 9 INJECTION, SOLUTION INTRAVENOUS at 12:55

## 2020-01-01 RX ADMIN — MULTIPLE VITAMINS W/ MINERALS TAB 1 TABLET: TAB at 09:07

## 2020-01-01 RX ADMIN — Medication 0.5 MG: at 16:47

## 2020-01-01 RX ADMIN — ACYCLOVIR SODIUM 250 MG: 50 INJECTION, SOLUTION INTRAVENOUS at 13:50

## 2020-01-01 RX ADMIN — FERROUS SULFATE TAB 325 MG (65 MG ELEMENTAL FE) 325 MG: 325 (65 FE) TAB at 09:55

## 2020-01-01 RX ADMIN — OYSTER SHELL CALCIUM WITH VITAMIN D 1 TABLET: 500; 200 TABLET, FILM COATED ORAL at 18:04

## 2020-01-01 RX ADMIN — CALCIUM CARBONATE 2500 MG: 1250 SUSPENSION ORAL at 19:54

## 2020-01-01 RX ADMIN — HEPARIN SODIUM 5000 UNITS: 10000 INJECTION, SOLUTION INTRAVENOUS; SUBCUTANEOUS at 02:53

## 2020-01-01 RX ADMIN — SODIUM CHLORIDE: 9 INJECTION, SOLUTION INTRAVENOUS at 02:54

## 2020-01-01 RX ADMIN — DIPHENHYDRAMINE HYDROCHLORIDE AND LIDOCAINE HYDROCHLORIDE AND ALUMINUM HYDROXIDE AND MAGNESIUM HYDRO 10 ML: KIT at 20:16

## 2020-01-01 RX ADMIN — DOCUSATE SODIUM 50 MG AND SENNOSIDES 8.6 MG 1 TABLET: 8.6; 5 TABLET, FILM COATED ORAL at 23:46

## 2020-01-01 RX ADMIN — HEPARIN SODIUM 5000 UNITS: 10000 INJECTION, SOLUTION INTRAVENOUS; SUBCUTANEOUS at 11:34

## 2020-01-01 RX ADMIN — SODIUM BICARBONATE: 84 INJECTION, SOLUTION INTRAVENOUS at 00:51

## 2020-01-01 RX ADMIN — Medication 100 MCG: at 14:15

## 2020-01-01 RX ADMIN — ZOLEDRONIC ACID 4 MG: 4 INJECTION, SOLUTION, CONCENTRATE INTRAVENOUS at 05:00

## 2020-01-01 RX ADMIN — PANTOPRAZOLE SODIUM 40 MG: 40 TABLET, DELAYED RELEASE ORAL at 20:35

## 2020-01-01 RX ADMIN — CALCIUM GLUCONATE 1 G: 98 INJECTION, SOLUTION INTRAVENOUS at 17:11

## 2020-01-01 RX ADMIN — NYSTATIN 500000 UNITS: 100000 SUSPENSION ORAL at 21:58

## 2020-01-01 RX ADMIN — HEPARIN SODIUM 5000 UNITS: 10000 INJECTION, SOLUTION INTRAVENOUS; SUBCUTANEOUS at 22:14

## 2020-01-01 RX ADMIN — HEPARIN SODIUM 5000 UNITS: 10000 INJECTION, SOLUTION INTRAVENOUS; SUBCUTANEOUS at 19:58

## 2020-01-01 RX ADMIN — SODIUM BICARBONATE: 84 INJECTION, SOLUTION INTRAVENOUS at 08:01

## 2020-01-01 RX ADMIN — POTASSIUM CHLORIDE 10 MEQ: 7.46 INJECTION, SOLUTION INTRAVENOUS at 06:11

## 2020-01-01 RX ADMIN — MAGNESIUM SULFATE 2 G: 2 INJECTION INTRAVENOUS at 20:46

## 2020-01-01 RX ADMIN — METHYLPREDNISOLONE SODIUM SUCCINATE 40 MG: 40 INJECTION, POWDER, FOR SOLUTION INTRAMUSCULAR; INTRAVENOUS at 16:31

## 2020-01-01 RX ADMIN — CEFTAZIDIME 2 G: 2 INJECTION, POWDER, FOR SOLUTION INTRAVENOUS at 23:06

## 2020-01-01 RX ADMIN — GUAIFENESIN AND DEXTROMETHORPHAN 5 ML: 100; 10 SYRUP ORAL at 01:56

## 2020-01-01 RX ADMIN — FENTANYL CITRATE 50 MCG: 50 INJECTION, SOLUTION INTRAMUSCULAR; INTRAVENOUS at 16:59

## 2020-01-01 RX ADMIN — ACETAMINOPHEN 650 MG: 325 TABLET, FILM COATED ORAL at 15:28

## 2020-01-01 RX ADMIN — HEPARIN SODIUM 5000 UNITS: 10000 INJECTION, SOLUTION INTRAVENOUS; SUBCUTANEOUS at 18:23

## 2020-01-01 RX ADMIN — FERROUS SULFATE TAB 325 MG (65 MG ELEMENTAL FE) 325 MG: 325 (65 FE) TAB at 10:14

## 2020-01-01 RX ADMIN — Medication 25 MCG: at 09:54

## 2020-01-01 RX ADMIN — ASCORBIC ACID, VITAMIN A PALMITATE, CHOLECALCIFEROL, THIAMINE HYDROCHLORIDE, RIBOFLAVIN-5 PHOSPHATE SODIUM, PYRIDOXINE HYDROCHLORIDE, NIACINAMIDE, DEXPANTHENOL, ALPHA-TOCOPHEROL ACETATE, VITAMIN K1, FOLIC ACID, BIOTIN, CYANOCOBALAMIN: 200; 3300; 200; 6; 3.6; 6; 40; 15; 10; 150; 600; 60; 5 INJECTION, SOLUTION INTRAVENOUS at 16:27

## 2020-01-01 RX ADMIN — DOXYCYCLINE 100 MG: 100 INJECTION, POWDER, LYOPHILIZED, FOR SOLUTION INTRAVENOUS at 17:22

## 2020-01-01 RX ADMIN — HEPARIN SODIUM 5000 UNITS: 10000 INJECTION, SOLUTION INTRAVENOUS; SUBCUTANEOUS at 15:03

## 2020-01-01 RX ADMIN — Medication 100 MCG/HR: at 05:10

## 2020-01-01 RX ADMIN — HEPARIN SODIUM 5000 UNITS: 10000 INJECTION, SOLUTION INTRAVENOUS; SUBCUTANEOUS at 07:02

## 2020-01-01 RX ADMIN — ACETAMINOPHEN 650 MG: 325 TABLET, FILM COATED ORAL at 14:35

## 2020-01-01 RX ADMIN — FERROUS SULFATE TAB 325 MG (65 MG ELEMENTAL FE) 325 MG: 325 (65 FE) TAB at 10:04

## 2020-01-01 RX ADMIN — MAGNESIUM SULFATE HEPTAHYDRATE 4 G: 40 INJECTION, SOLUTION INTRAVENOUS at 06:15

## 2020-01-01 RX ADMIN — MEPERIDINE HYDROCHLORIDE 25 MG: 25 INJECTION INTRAMUSCULAR; INTRAVENOUS; SUBCUTANEOUS at 13:31

## 2020-01-01 RX ADMIN — Medication 10 ML: at 20:31

## 2020-01-01 RX ADMIN — PROPOFOL 45 MCG/KG/MIN: 10 INJECTION, EMULSION INTRAVENOUS at 22:43

## 2020-01-01 RX ADMIN — FERROUS SULFATE TAB 325 MG (65 MG ELEMENTAL FE) 325 MG: 325 (65 FE) TAB at 08:38

## 2020-01-01 RX ADMIN — PROPOFOL 50 MCG/KG/MIN: 10 INJECTION, EMULSION INTRAVENOUS at 19:31

## 2020-01-01 RX ADMIN — FENTANYL CITRATE 100 MCG: 50 INJECTION, SOLUTION INTRAMUSCULAR; INTRAVENOUS at 16:38

## 2020-01-01 RX ADMIN — Medication 0.5 MG: at 20:22

## 2020-01-01 RX ADMIN — VANCOMYCIN HYDROCHLORIDE 1000 MG: 1 INJECTION, SOLUTION INTRAVENOUS at 16:22

## 2020-01-01 RX ADMIN — LORATADINE 10 MG: 10 TABLET ORAL at 09:45

## 2020-01-01 RX ADMIN — METHYLPREDNISOLONE SODIUM SUCCINATE 40 MG: 40 INJECTION, POWDER, FOR SOLUTION INTRAMUSCULAR; INTRAVENOUS at 16:30

## 2020-01-01 RX ADMIN — Medication 500 MG: at 18:04

## 2020-01-01 RX ADMIN — LORATADINE 10 MG: 10 TABLET ORAL at 08:28

## 2020-01-01 RX ADMIN — TAMSULOSIN HYDROCHLORIDE 0.4 MG: 0.4 CAPSULE ORAL at 09:07

## 2020-01-01 RX ADMIN — Medication 500 MG: at 18:10

## 2020-01-01 RX ADMIN — GANCICLOVIR SODIUM 125 MG: 500 INJECTION, POWDER, LYOPHILIZED, FOR SOLUTION INTRAVENOUS at 19:07

## 2020-01-01 RX ADMIN — POTASSIUM CHLORIDE 10 MEQ: 7.46 INJECTION, SOLUTION INTRAVENOUS at 07:26

## 2020-01-01 RX ADMIN — PROPOFOL 45 MCG/KG/MIN: 10 INJECTION, EMULSION INTRAVENOUS at 12:17

## 2020-01-01 RX ADMIN — Medication 500 MG: at 09:55

## 2020-01-01 RX ADMIN — MULTIPLE VITAMINS W/ MINERALS TAB 1 TABLET: TAB at 08:39

## 2020-01-01 RX ADMIN — FAMOTIDINE 20 MG: 10 INJECTION INTRAVENOUS at 14:40

## 2020-01-01 RX ADMIN — Medication 26 MILLICURIE: at 12:05

## 2020-01-01 RX ADMIN — MIDAZOLAM (PF) 1 MG/ML IN 0.9 % SODIUM CHLORIDE INTRAVENOUS SOLUTION 1 MG/HR: at 20:29

## 2020-01-01 RX ADMIN — CARBOPLATIN 315 MG: 10 INJECTION, SOLUTION INTRAVENOUS at 16:48

## 2020-01-01 RX ADMIN — LIDOCAINE HYDROCHLORIDE 30 MG: 10 INJECTION, SOLUTION INFILTRATION; PERINEURAL at 16:38

## 2020-01-01 RX ADMIN — PROCHLORPERAZINE EDISYLATE 5 MG: 5 INJECTION INTRAMUSCULAR; INTRAVENOUS at 18:55

## 2020-01-01 RX ADMIN — SODIUM CHLORIDE: 9 INJECTION, SOLUTION INTRAVENOUS at 16:34

## 2020-01-01 RX ADMIN — ACYCLOVIR SODIUM 250 MG: 50 INJECTION, SOLUTION INTRAVENOUS at 03:59

## 2020-01-01 RX ADMIN — DOXYCYCLINE 100 MG: 100 INJECTION, POWDER, LYOPHILIZED, FOR SOLUTION INTRAVENOUS at 18:25

## 2020-01-01 RX ADMIN — FLUCONAZOLE, SODIUM CHLORIDE 200 MG: 2 INJECTION INTRAVENOUS at 18:30

## 2020-01-01 RX ADMIN — ALLOPURINOL 300 MG: 300 TABLET ORAL at 09:01

## 2020-01-01 RX ADMIN — LORATADINE 10 MG: 10 TABLET ORAL at 08:40

## 2020-01-01 RX ADMIN — CALCIUM GLUCONATE 2 G: 98 INJECTION, SOLUTION INTRAVENOUS at 04:17

## 2020-01-01 RX ADMIN — FERROUS SULFATE TAB 325 MG (65 MG ELEMENTAL FE) 325 MG: 325 (65 FE) TAB at 09:02

## 2020-01-01 RX ADMIN — Medication 50 MCG/HR: at 03:08

## 2020-01-01 RX ADMIN — FERROUS SULFATE TAB 325 MG (65 MG ELEMENTAL FE) 325 MG: 325 (65 FE) TAB at 11:11

## 2020-01-01 RX ADMIN — CEFEPIME 2 G: 2 INJECTION, POWDER, FOR SOLUTION INTRAVENOUS at 02:05

## 2020-01-01 RX ADMIN — PANTOPRAZOLE SODIUM 40 MG: 40 INJECTION, POWDER, FOR SOLUTION INTRAVENOUS at 09:46

## 2020-01-01 RX ADMIN — ETOPOSIDE 115 MG: 20 INJECTION INTRAVENOUS at 18:28

## 2020-01-01 RX ADMIN — PANTOPRAZOLE SODIUM 40 MG: 40 TABLET, DELAYED RELEASE ORAL at 08:21

## 2020-01-01 RX ADMIN — LIDOCAINE HYDROCHLORIDE 1 ML: 10 INJECTION, SOLUTION EPIDURAL; INFILTRATION; INTRACAUDAL; PERINEURAL at 01:55

## 2020-01-01 RX ADMIN — PANTOPRAZOLE SODIUM 40 MG: 40 INJECTION, POWDER, FOR SOLUTION INTRAVENOUS at 20:19

## 2020-01-01 RX ADMIN — ONDANSETRON 4 MG: 2 INJECTION INTRAMUSCULAR; INTRAVENOUS at 13:45

## 2020-01-01 RX ADMIN — SODIUM CHLORIDE: 9 INJECTION, SOLUTION INTRAVENOUS at 19:59

## 2020-01-01 RX ADMIN — PANTOPRAZOLE SODIUM 40 MG: 40 INJECTION, POWDER, FOR SOLUTION INTRAVENOUS at 21:16

## 2020-01-01 RX ADMIN — ACETYLCYSTEINE 4 ML: 100 INHALANT RESPIRATORY (INHALATION) at 21:07

## 2020-01-01 RX ADMIN — ACETAMINOPHEN 650 MG: 325 TABLET, FILM COATED ORAL at 07:24

## 2020-01-01 RX ADMIN — CALCIUM GLUCONATE 1 G: 98 INJECTION, SOLUTION INTRAVENOUS at 01:01

## 2020-01-01 RX ADMIN — Medication 500 MG: at 18:08

## 2020-01-01 RX ADMIN — SODIUM CHLORIDE 1000 ML: 9 INJECTION, SOLUTION INTRAVENOUS at 13:00

## 2020-01-01 RX ADMIN — ALLOPURINOL 300 MG: 300 TABLET ORAL at 08:06

## 2020-01-01 RX ADMIN — VORICONAZOLE 200 MG: 40 POWDER, FOR SUSPENSION ORAL at 21:09

## 2020-01-01 RX ADMIN — Medication 7 UNITS/HR: at 09:11

## 2020-01-01 RX ADMIN — SODIUM CHLORIDE 500 ML: 4.5 INJECTION, SOLUTION INTRAVENOUS at 05:44

## 2020-01-01 RX ADMIN — DOXYCYCLINE 100 MG: 100 INJECTION, POWDER, LYOPHILIZED, FOR SOLUTION INTRAVENOUS at 19:58

## 2020-01-01 RX ADMIN — CALCIUM GLUCONATE 1 G: 98 INJECTION, SOLUTION INTRAVENOUS at 11:48

## 2020-01-01 RX ADMIN — FUROSEMIDE 40 MG: 10 INJECTION, SOLUTION INTRAMUSCULAR; INTRAVENOUS at 14:51

## 2020-01-01 RX ADMIN — PROPOFOL 30 MCG/KG/MIN: 10 INJECTION, EMULSION INTRAVENOUS at 12:54

## 2020-01-01 RX ADMIN — Medication 0.5 MG: at 03:26

## 2020-01-01 RX ADMIN — MEROPENEM 1 G: 1 INJECTION, POWDER, FOR SOLUTION INTRAVENOUS at 16:32

## 2020-01-01 RX ADMIN — Medication 500 MG: at 17:53

## 2020-01-01 RX ADMIN — ONDANSETRON 4 MG: 2 INJECTION INTRAMUSCULAR; INTRAVENOUS at 08:09

## 2020-01-01 RX ADMIN — DEXAMETHASONE SODIUM PHOSPHATE: 10 INJECTION, SOLUTION INTRAMUSCULAR; INTRAVENOUS at 11:47

## 2020-01-01 RX ADMIN — FERROUS SULFATE TAB 325 MG (65 MG ELEMENTAL FE) 325 MG: 325 (65 FE) TAB at 08:55

## 2020-01-01 RX ADMIN — NYSTATIN 500000 UNITS: 100000 SUSPENSION ORAL at 17:53

## 2020-01-01 RX ADMIN — SODIUM CHLORIDE, POTASSIUM CHLORIDE, SODIUM LACTATE AND CALCIUM CHLORIDE: 600; 310; 30; 20 INJECTION, SOLUTION INTRAVENOUS at 17:21

## 2020-01-01 RX ADMIN — ONDANSETRON 4 MG: 2 INJECTION INTRAMUSCULAR; INTRAVENOUS at 18:15

## 2020-01-01 RX ADMIN — FOSAPREPITANT 150 MG: 150 INJECTION, POWDER, LYOPHILIZED, FOR SOLUTION INTRAVENOUS at 12:07

## 2020-01-01 RX ADMIN — POTASSIUM CHLORIDE: 2 INJECTION, SOLUTION, CONCENTRATE INTRAVENOUS at 03:33

## 2020-01-01 RX ADMIN — Medication 0.5 MG: at 20:16

## 2020-01-01 RX ADMIN — CEFEPIME 2 G: 2 INJECTION, POWDER, FOR SOLUTION INTRAVENOUS at 14:32

## 2020-01-01 RX ADMIN — PROPOFOL 50 MCG/KG/MIN: 10 INJECTION, EMULSION INTRAVENOUS at 01:19

## 2020-01-01 RX ADMIN — ACYCLOVIR SODIUM 250 MG: 50 INJECTION, SOLUTION INTRAVENOUS at 08:29

## 2020-01-01 RX ADMIN — SODIUM CHLORIDE 250 ML: 9 INJECTION, SOLUTION INTRAVENOUS at 11:00

## 2020-01-01 RX ADMIN — Medication 0.12 MCG/KG/MIN: at 20:23

## 2020-01-01 RX ADMIN — FOSAPREPITANT 150 MG: 150 INJECTION, POWDER, LYOPHILIZED, FOR SOLUTION INTRAVENOUS at 10:40

## 2020-01-01 RX ADMIN — SODIUM CHLORIDE 1000 ML: 9 INJECTION, SOLUTION INTRAVENOUS at 15:29

## 2020-01-01 RX ADMIN — HEPARIN SODIUM 5000 UNITS: 10000 INJECTION, SOLUTION INTRAVENOUS; SUBCUTANEOUS at 21:35

## 2020-01-01 RX ADMIN — GUAIFENESIN AND DEXTROMETHORPHAN 5 ML: 100; 10 SYRUP ORAL at 13:38

## 2020-01-01 RX ADMIN — ACETAMINOPHEN 650 MG: 325 TABLET, FILM COATED ORAL at 01:38

## 2020-01-01 RX ADMIN — CEFEPIME HYDROCHLORIDE 2 G: 2 INJECTION, POWDER, FOR SOLUTION INTRAVENOUS at 23:01

## 2020-01-01 RX ADMIN — Medication 500 MG: at 08:16

## 2020-01-01 RX ADMIN — Medication 500 MG: at 18:30

## 2020-01-01 RX ADMIN — Medication 500 MG: at 08:39

## 2020-01-01 RX ADMIN — Medication 0.1 MCG/KG/MIN: at 19:55

## 2020-01-01 RX ADMIN — OYSTER SHELL CALCIUM WITH VITAMIN D 1 TABLET: 500; 200 TABLET, FILM COATED ORAL at 09:45

## 2020-01-01 RX ADMIN — POTASSIUM CHLORIDE 10 MEQ: 7.46 INJECTION, SOLUTION INTRAVENOUS at 06:44

## 2020-01-01 RX ADMIN — FUROSEMIDE 20 MG: 10 INJECTION, SOLUTION INTRAMUSCULAR; INTRAVENOUS at 16:50

## 2020-01-01 RX ADMIN — LORAZEPAM 0.5 MG: 2 INJECTION INTRAMUSCULAR; INTRAVENOUS at 23:38

## 2020-01-01 RX ADMIN — ENOXAPARIN SODIUM 40 MG: 40 INJECTION SUBCUTANEOUS at 13:50

## 2020-01-01 RX ADMIN — Medication 100 MCG/HR: at 05:25

## 2020-01-01 RX ADMIN — NYSTATIN 500000 UNITS: 100000 SUSPENSION ORAL at 16:31

## 2020-01-01 RX ADMIN — SODIUM CHLORIDE 8 MG/HR: 9 INJECTION, SOLUTION INTRAVENOUS at 09:55

## 2020-01-01 RX ADMIN — LORATADINE 10 MG: 10 TABLET ORAL at 08:38

## 2020-01-01 RX ADMIN — CEFEPIME HYDROCHLORIDE 2 G: 2 INJECTION, POWDER, FOR SOLUTION INTRAVENOUS at 22:29

## 2020-01-01 RX ADMIN — MEROPENEM 1 G: 1 INJECTION, POWDER, FOR SOLUTION INTRAVENOUS at 05:23

## 2020-01-01 RX ADMIN — POTASSIUM CHLORIDE: 2 INJECTION, SOLUTION, CONCENTRATE INTRAVENOUS at 16:44

## 2020-01-01 RX ADMIN — OYSTER SHELL CALCIUM WITH VITAMIN D 1 TABLET: 500; 200 TABLET, FILM COATED ORAL at 17:53

## 2020-01-01 RX ADMIN — CEFEPIME 2 G: 2 INJECTION, POWDER, FOR SOLUTION INTRAVENOUS at 13:44

## 2020-01-01 RX ADMIN — EPOPROSTENOL 20 NG/KG/MIN: 1.5 INJECTION, POWDER, LYOPHILIZED, FOR SOLUTION INTRAVENOUS at 01:25

## 2020-01-01 RX ADMIN — TAMSULOSIN HYDROCHLORIDE 0.4 MG: 0.4 CAPSULE ORAL at 08:04

## 2020-01-01 RX ADMIN — EPOPROSTENOL 20 NG/KG/MIN: 1.5 INJECTION, POWDER, LYOPHILIZED, FOR SOLUTION INTRAVENOUS at 15:17

## 2020-01-01 RX ADMIN — VANCOMYCIN HYDROCHLORIDE 1000 MG: 1 INJECTION, SOLUTION INTRAVENOUS at 18:15

## 2020-01-01 RX ADMIN — Medication 0.5 MG: at 19:30

## 2020-01-01 RX ADMIN — HEPARIN SODIUM 5000 UNITS: 10000 INJECTION, SOLUTION INTRAVENOUS; SUBCUTANEOUS at 06:26

## 2020-01-01 RX ADMIN — POTASSIUM CHLORIDE 20 MEQ: 29.8 INJECTION, SOLUTION INTRAVENOUS at 20:59

## 2020-01-01 RX ADMIN — ETOPOSIDE 115 MG: 20 INJECTION INTRAVENOUS at 12:22

## 2020-01-01 RX ADMIN — MEROPENEM 1 G: 1 INJECTION, POWDER, FOR SOLUTION INTRAVENOUS at 17:40

## 2020-01-01 RX ADMIN — MEPERIDINE HYDROCHLORIDE 25 MG: 25 INJECTION INTRAMUSCULAR; INTRAVENOUS; SUBCUTANEOUS at 14:56

## 2020-01-01 RX ADMIN — TAZOBACTAM SODIUM AND PIPERACILLIN SODIUM 2.25 G: 250; 2 INJECTION, SOLUTION INTRAVENOUS at 05:23

## 2020-01-01 RX ADMIN — MEROPENEM 1 G: 1 INJECTION, POWDER, FOR SOLUTION INTRAVENOUS at 04:43

## 2020-01-01 RX ADMIN — CALCIUM CHLORIDE 1 G: 100 INJECTION, SOLUTION INTRAVENOUS at 10:34

## 2020-01-01 RX ADMIN — ACETAMINOPHEN 650 MG: 325 TABLET, FILM COATED ORAL at 11:11

## 2020-01-01 RX ADMIN — EPOPROSTENOL 20 NG/KG/MIN: 1.5 INJECTION, POWDER, LYOPHILIZED, FOR SOLUTION INTRAVENOUS at 06:55

## 2020-01-01 RX ADMIN — PANTOPRAZOLE SODIUM 40 MG: 40 TABLET, DELAYED RELEASE ORAL at 21:09

## 2020-01-01 RX ADMIN — POTASSIUM CHLORIDE: 2 INJECTION, SOLUTION, CONCENTRATE INTRAVENOUS at 02:07

## 2020-01-01 RX ADMIN — Medication 100 MG: at 02:28

## 2020-01-01 RX ADMIN — CALCIUM GLUCONATE 1 G: 98 INJECTION, SOLUTION INTRAVENOUS at 09:38

## 2020-01-01 RX ADMIN — CEFEPIME HYDROCHLORIDE 2 G: 2 INJECTION, POWDER, FOR SOLUTION INTRAVENOUS at 15:40

## 2020-01-01 RX ADMIN — Medication 0.5 MG: at 09:56

## 2020-01-01 RX ADMIN — PROPOFOL 35 MCG/KG/MIN: 10 INJECTION, EMULSION INTRAVENOUS at 08:40

## 2020-01-01 RX ADMIN — CALCIUM CARBONATE 2500 MG: 1250 SUSPENSION ORAL at 21:08

## 2020-01-01 RX ADMIN — DEXMEDETOMIDINE HYDROCHLORIDE 12 MCG: 100 INJECTION, SOLUTION INTRAVENOUS at 02:35

## 2020-01-01 RX ADMIN — Medication 25 MCG: at 20:15

## 2020-01-01 RX ADMIN — Medication 4 UNITS/HR: at 14:12

## 2020-01-01 RX ADMIN — PANTOPRAZOLE SODIUM 40 MG: 40 INJECTION, POWDER, FOR SOLUTION INTRAVENOUS at 20:30

## 2020-01-01 RX ADMIN — MINERAL OIL AND PETROLATUM: 150; 830 OINTMENT OPHTHALMIC at 19:07

## 2020-01-01 RX ADMIN — ACETAMINOPHEN 650 MG: 650 SUPPOSITORY RECTAL at 09:52

## 2020-01-01 RX ADMIN — DIPHENHYDRAMINE HYDROCHLORIDE AND LIDOCAINE HYDROCHLORIDE AND ALUMINUM HYDROXIDE AND MAGNESIUM HYDRO 10 ML: KIT at 22:11

## 2020-01-01 RX ADMIN — ALLOPURINOL 300 MG: 300 TABLET ORAL at 08:16

## 2020-01-01 RX ADMIN — VANCOMYCIN HYDROCHLORIDE 1000 MG: 1 INJECTION, SOLUTION INTRAVENOUS at 18:08

## 2020-01-01 RX ADMIN — CISPLATIN 30 MG: 100 INJECTION, SOLUTION INTRAVENOUS at 12:16

## 2020-01-01 RX ADMIN — ACETAMINOPHEN 650 MG: 325 TABLET, FILM COATED ORAL at 19:32

## 2020-01-01 RX ADMIN — CEFEPIME HYDROCHLORIDE 2 G: 2 INJECTION, POWDER, FOR SOLUTION INTRAVENOUS at 22:49

## 2020-01-01 RX ADMIN — DOXYCYCLINE 100 MG: 100 INJECTION, POWDER, LYOPHILIZED, FOR SOLUTION INTRAVENOUS at 17:43

## 2020-01-01 RX ADMIN — VASOPRESSIN 2.4 UNITS/HR: 20 INJECTION INTRAVENOUS at 13:50

## 2020-01-01 RX ADMIN — Medication 6 MG/HR: at 12:34

## 2020-01-01 RX ADMIN — ALLOPURINOL 300 MG: 300 TABLET ORAL at 08:23

## 2020-01-01 RX ADMIN — Medication 30 MG: at 02:32

## 2020-01-01 RX ADMIN — GUAIFENESIN AND DEXTROMETHORPHAN 5 ML: 100; 10 SYRUP ORAL at 18:37

## 2020-01-01 RX ADMIN — LORAZEPAM 0.5 MG: 2 INJECTION INTRAMUSCULAR; INTRAVENOUS at 00:56

## 2020-01-01 RX ADMIN — FERROUS SULFATE TAB 325 MG (65 MG ELEMENTAL FE) 325 MG: 325 (65 FE) TAB at 08:05

## 2020-01-01 RX ADMIN — ACETAMINOPHEN 650 MG: 325 TABLET, FILM COATED ORAL at 09:01

## 2020-01-01 RX ADMIN — PANTOPRAZOLE SODIUM 40 MG: 40 TABLET, DELAYED RELEASE ORAL at 08:39

## 2020-01-01 RX ADMIN — MULTIPLE VITAMINS W/ MINERALS TAB 1 TABLET: TAB at 09:55

## 2020-01-01 RX ADMIN — Medication 6 MG/HR: at 04:36

## 2020-01-01 RX ADMIN — MINERAL OIL AND PETROLATUM: 150; 830 OINTMENT OPHTHALMIC at 02:32

## 2020-01-01 RX ADMIN — FERROUS SULFATE TAB 325 MG (65 MG ELEMENTAL FE) 325 MG: 325 (65 FE) TAB at 08:16

## 2020-01-01 RX ADMIN — NYSTATIN 500000 UNITS: 100000 SUSPENSION ORAL at 18:31

## 2020-01-01 RX ADMIN — PROPOFOL 65 MCG/KG/MIN: 10 INJECTION, EMULSION INTRAVENOUS at 00:26

## 2020-01-01 RX ADMIN — VANCOMYCIN HYDROCHLORIDE 1000 MG: 1 INJECTION, SOLUTION INTRAVENOUS at 15:42

## 2020-01-01 RX ADMIN — Medication 100 MCG/HR: at 01:43

## 2020-01-01 RX ADMIN — FUROSEMIDE 10 MG: 10 INJECTION, SOLUTION INTRAMUSCULAR; INTRAVENOUS at 22:39

## 2020-01-01 RX ADMIN — NYSTATIN 500000 UNITS: 100000 SUSPENSION ORAL at 09:52

## 2020-01-01 RX ADMIN — HEPARIN SODIUM 5000 UNITS: 10000 INJECTION, SOLUTION INTRAVENOUS; SUBCUTANEOUS at 22:22

## 2020-01-01 RX ADMIN — EPOPROSTENOL 20 NG/KG/MIN: 1.5 INJECTION, POWDER, LYOPHILIZED, FOR SOLUTION INTRAVENOUS at 17:38

## 2020-01-01 RX ADMIN — CALCIUM CARBONATE 2500 MG: 1250 SUSPENSION ORAL at 09:03

## 2020-01-01 RX ADMIN — ALLOPURINOL 300 MG: 300 TABLET ORAL at 08:53

## 2020-01-01 RX ADMIN — ALLOPURINOL 300 MG: 300 TABLET ORAL at 08:39

## 2020-01-01 RX ADMIN — FAMOTIDINE 20 MG: 20 TABLET, FILM COATED ORAL at 20:03

## 2020-01-01 RX ADMIN — FERROUS SULFATE TAB 325 MG (65 MG ELEMENTAL FE) 325 MG: 325 (65 FE) TAB at 10:31

## 2020-01-01 RX ADMIN — POTASSIUM CHLORIDE 20 MEQ: 20 SOLUTION ORAL at 05:35

## 2020-01-01 RX ADMIN — Medication 0.27 MCG/KG/MIN: at 09:49

## 2020-01-01 RX ADMIN — ONDANSETRON 8 MG: 2 INJECTION INTRAMUSCULAR; INTRAVENOUS at 15:33

## 2020-01-01 RX ADMIN — VECURONIUM BROMIDE 0.8 MCG/KG/MIN: 1 INJECTION, POWDER, LYOPHILIZED, FOR SOLUTION INTRAVENOUS at 23:20

## 2020-01-01 RX ADMIN — IOPAMIDOL 50 ML: 755 INJECTION, SOLUTION INTRAVENOUS at 19:20

## 2020-01-01 RX ADMIN — VORICONAZOLE 200 MG: 40 POWDER, FOR SUSPENSION ORAL at 09:03

## 2020-01-01 RX ADMIN — EPOPROSTENOL 20 NG/KG/MIN: 1.5 INJECTION, POWDER, LYOPHILIZED, FOR SOLUTION INTRAVENOUS at 10:06

## 2020-01-01 RX ADMIN — ALBUTEROL SULFATE 2.5 MG: 2.5 SOLUTION RESPIRATORY (INHALATION) at 21:06

## 2020-01-01 RX ADMIN — SODIUM CHLORIDE 1000 ML: 9 INJECTION, SOLUTION INTRAVENOUS at 09:45

## 2020-01-01 RX ADMIN — GUAIFENESIN AND DEXTROMETHORPHAN 5 ML: 100; 10 SYRUP ORAL at 10:19

## 2020-01-01 RX ADMIN — PANTOPRAZOLE SODIUM 40 MG: 40 TABLET, DELAYED RELEASE ORAL at 09:03

## 2020-01-01 RX ADMIN — Medication 25 MCG: at 22:32

## 2020-01-01 RX ADMIN — ACETAMINOPHEN 975 MG: 325 TABLET ORAL at 18:03

## 2020-01-01 RX ADMIN — CALCIUM GLUCONATE 3 G: 98 INJECTION, SOLUTION INTRAVENOUS at 09:57

## 2020-01-01 RX ADMIN — DOXYCYCLINE 100 MG: 100 INJECTION, POWDER, LYOPHILIZED, FOR SOLUTION INTRAVENOUS at 08:53

## 2020-01-01 RX ADMIN — SODIUM CHLORIDE 500 ML: 9 INJECTION, SOLUTION INTRAVENOUS at 14:17

## 2020-01-01 RX ADMIN — MINERAL OIL AND PETROLATUM: 150; 830 OINTMENT OPHTHALMIC at 09:45

## 2020-01-01 RX ADMIN — CALCIUM GLUCONATE 3 G: 98 INJECTION, SOLUTION INTRAVENOUS at 08:46

## 2020-01-01 RX ADMIN — PANTOPRAZOLE SODIUM 40 MG: 40 TABLET, DELAYED RELEASE ORAL at 08:54

## 2020-01-01 RX ADMIN — FAMOTIDINE 20 MG: 20 TABLET, FILM COATED ORAL at 09:38

## 2020-01-01 RX ADMIN — MINERAL OIL AND PETROLATUM: 150; 830 OINTMENT OPHTHALMIC at 09:55

## 2020-01-01 RX ADMIN — Medication 6 MG/HR: at 20:03

## 2020-01-01 RX ADMIN — GUAIFENESIN AND DEXTROMETHORPHAN 5 ML: 100; 10 SYRUP ORAL at 18:04

## 2020-01-01 RX ADMIN — TAMSULOSIN HYDROCHLORIDE 0.4 MG: 0.4 CAPSULE ORAL at 09:51

## 2020-01-01 RX ADMIN — MINERAL OIL AND PETROLATUM: 150; 830 OINTMENT OPHTHALMIC at 03:15

## 2020-01-01 RX ADMIN — DEXAMETHASONE SODIUM PHOSPHATE 0.5 MG: 4 INJECTION, SOLUTION INTRAMUSCULAR; INTRAVENOUS at 08:53

## 2020-01-01 RX ADMIN — PANTOPRAZOLE SODIUM 40 MG: 40 TABLET, DELAYED RELEASE ORAL at 08:06

## 2020-01-01 RX ADMIN — MINERAL OIL AND PETROLATUM: 150; 830 OINTMENT OPHTHALMIC at 18:53

## 2020-01-01 RX ADMIN — CEFEPIME HYDROCHLORIDE 2 G: 2 INJECTION, POWDER, FOR SOLUTION INTRAVENOUS at 23:19

## 2020-01-01 RX ADMIN — HEPARIN SODIUM 5000 UNITS: 10000 INJECTION, SOLUTION INTRAVENOUS; SUBCUTANEOUS at 21:33

## 2020-01-01 RX ADMIN — MINERAL OIL AND PETROLATUM: 150; 830 OINTMENT OPHTHALMIC at 02:08

## 2020-01-01 RX ADMIN — POTASSIUM CHLORIDE 20 MEQ: 1500 TABLET, EXTENDED RELEASE ORAL at 13:20

## 2020-01-01 RX ADMIN — PROCHLORPERAZINE EDISYLATE 5 MG: 5 INJECTION INTRAMUSCULAR; INTRAVENOUS at 18:34

## 2020-01-01 RX ADMIN — BLEOMYCIN SULFATE 30 UNITS: 30 INJECTION, POWDER, LYOPHILIZED, FOR SOLUTION INTRAMUSCULAR; INTRAPLEURAL; INTRAVENOUS; SUBCUTANEOUS at 11:01

## 2020-01-01 RX ADMIN — SODIUM CHLORIDE: 9 INJECTION, SOLUTION INTRAVENOUS at 15:08

## 2020-01-01 RX ADMIN — ACYCLOVIR SODIUM 250 MG: 50 INJECTION, SOLUTION INTRAVENOUS at 15:17

## 2020-01-01 RX ADMIN — CALCIUM CARBONATE 2500 MG: 1250 SUSPENSION ORAL at 20:22

## 2020-01-01 RX ADMIN — TAZOBACTAM SODIUM AND PIPERACILLIN SODIUM 2.25 G: 250; 2 INJECTION, SOLUTION INTRAVENOUS at 01:37

## 2020-01-01 RX ADMIN — SODIUM CHLORIDE: 9 INJECTION, SOLUTION INTRAVENOUS at 21:32

## 2020-01-01 RX ADMIN — SODIUM CHLORIDE: 9 INJECTION, SOLUTION INTRAVENOUS at 09:10

## 2020-01-01 RX ADMIN — ALLOPURINOL 300 MG: 300 TABLET ORAL at 08:38

## 2020-01-01 RX ADMIN — Medication 0.5 MG: at 20:40

## 2020-01-01 RX ADMIN — FAMOTIDINE 20 MG: 20 TABLET, FILM COATED ORAL at 09:02

## 2020-01-01 RX ADMIN — VECURONIUM BROMIDE 1.1 MCG/KG/MIN: 1 INJECTION, POWDER, LYOPHILIZED, FOR SOLUTION INTRAVENOUS at 09:03

## 2020-01-01 RX ADMIN — PROPOFOL 60 MG: 10 INJECTION, EMULSION INTRAVENOUS at 02:28

## 2020-01-01 RX ADMIN — CALCIUM CARBONATE 2500 MG: 1250 SUSPENSION ORAL at 20:35

## 2020-01-01 RX ADMIN — HEPARIN SODIUM 5000 UNITS: 10000 INJECTION, SOLUTION INTRAVENOUS; SUBCUTANEOUS at 02:08

## 2020-01-01 RX ADMIN — CISPLATIN 30 MG: 100 INJECTION, SOLUTION INTRAVENOUS at 14:15

## 2020-01-01 RX ADMIN — ACETAMINOPHEN 650 MG: 325 TABLET, FILM COATED ORAL at 17:03

## 2020-01-01 RX ADMIN — FLUCONAZOLE, SODIUM CHLORIDE 200 MG: 2 INJECTION INTRAVENOUS at 17:53

## 2020-01-01 RX ADMIN — OYSTER SHELL CALCIUM WITH VITAMIN D 1 TABLET: 500; 200 TABLET, FILM COATED ORAL at 18:30

## 2020-01-01 RX ADMIN — SODIUM CHLORIDE: 9 INJECTION, SOLUTION INTRAVENOUS at 21:34

## 2020-01-01 RX ADMIN — PANTOPRAZOLE SODIUM 40 MG: 40 INJECTION, POWDER, FOR SOLUTION INTRAVENOUS at 09:25

## 2020-01-01 RX ADMIN — CEFEPIME HYDROCHLORIDE 2 G: 2 INJECTION, POWDER, FOR SOLUTION INTRAVENOUS at 19:43

## 2020-01-01 RX ADMIN — FAMOTIDINE 20 MG: 20 TABLET, FILM COATED ORAL at 09:55

## 2020-01-01 RX ADMIN — CEFEPIME 2 G: 2 INJECTION, POWDER, FOR SOLUTION INTRAVENOUS at 14:12

## 2020-01-01 RX ADMIN — POLYETHYLENE GLYCOL 3350 17 G: 17 POWDER, FOR SOLUTION ORAL at 09:45

## 2020-01-01 RX ADMIN — LORATADINE 10 MG: 10 TABLET ORAL at 08:53

## 2020-01-01 RX ADMIN — ONDANSETRON 4 MG: 2 INJECTION INTRAMUSCULAR; INTRAVENOUS at 09:37

## 2020-01-01 RX ADMIN — PROPOFOL 40 MG: 10 INJECTION, EMULSION INTRAVENOUS at 17:09

## 2020-01-01 RX ADMIN — PALONOSETRON HYDROCHLORIDE 0.25 MG: 0.25 INJECTION, SOLUTION INTRAVENOUS at 12:04

## 2020-01-01 RX ADMIN — ONDANSETRON 4 MG: 2 INJECTION INTRAMUSCULAR; INTRAVENOUS at 20:06

## 2020-01-01 RX ADMIN — PANTOPRAZOLE SODIUM 40 MG: 40 TABLET, DELAYED RELEASE ORAL at 06:12

## 2020-01-01 RX ADMIN — METHYLPREDNISOLONE SODIUM SUCCINATE 40 MG: 40 INJECTION, POWDER, FOR SOLUTION INTRAMUSCULAR; INTRAVENOUS at 18:10

## 2020-01-01 RX ADMIN — VORICONAZOLE 200 MG: 40 POWDER, FOR SUSPENSION ORAL at 21:25

## 2020-01-01 RX ADMIN — ACETAMINOPHEN 650 MG: 650 SUPPOSITORY RECTAL at 09:54

## 2020-01-01 RX ADMIN — SODIUM CHLORIDE, POTASSIUM CHLORIDE, SODIUM LACTATE AND CALCIUM CHLORIDE 500 ML: 600; 310; 30; 20 INJECTION, SOLUTION INTRAVENOUS at 14:52

## 2020-01-01 RX ADMIN — PROPOFOL 45 MCG/KG/MIN: 10 INJECTION, EMULSION INTRAVENOUS at 15:50

## 2020-01-01 RX ADMIN — DIPHENHYDRAMINE HYDROCHLORIDE AND LIDOCAINE HYDROCHLORIDE AND ALUMINUM HYDROXIDE AND MAGNESIUM HYDRO 10 ML: KIT at 21:56

## 2020-01-01 RX ADMIN — VORICONAZOLE 200 MG: 200 TABLET, COATED ORAL at 08:54

## 2020-01-01 RX ADMIN — ACYCLOVIR SODIUM 250 MG: 50 INJECTION, SOLUTION INTRAVENOUS at 02:14

## 2020-01-01 RX ADMIN — PANTOPRAZOLE SODIUM 40 MG: 40 TABLET, DELAYED RELEASE ORAL at 08:38

## 2020-01-01 RX ADMIN — ACYCLOVIR SODIUM 250 MG: 50 INJECTION, SOLUTION INTRAVENOUS at 16:38

## 2020-01-01 RX ADMIN — SODIUM CHLORIDE: 9 INJECTION, SOLUTION INTRAVENOUS at 17:28

## 2020-01-01 RX ADMIN — FAMOTIDINE 20 MG: 20 TABLET, FILM COATED ORAL at 10:14

## 2020-01-01 RX ADMIN — DOXYCYCLINE 100 MG: 100 INJECTION, POWDER, LYOPHILIZED, FOR SOLUTION INTRAVENOUS at 05:24

## 2020-01-01 RX ADMIN — PANTOPRAZOLE SODIUM 40 MG: 40 TABLET, DELAYED RELEASE ORAL at 06:10

## 2020-01-01 RX ADMIN — POTASSIUM CHLORIDE: 2 INJECTION, SOLUTION, CONCENTRATE INTRAVENOUS at 19:48

## 2020-01-01 RX ADMIN — PANTOPRAZOLE SODIUM 40 MG: 40 TABLET, DELAYED RELEASE ORAL at 09:48

## 2020-01-01 RX ADMIN — FAMOTIDINE 20 MG: 20 TABLET, FILM COATED ORAL at 10:04

## 2020-01-01 RX ADMIN — DOXYCYCLINE 100 MG: 100 INJECTION, POWDER, LYOPHILIZED, FOR SOLUTION INTRAVENOUS at 05:27

## 2020-01-01 RX ADMIN — SODIUM CHLORIDE: 9 INJECTION, SOLUTION INTRAVENOUS at 16:02

## 2020-01-01 RX ADMIN — ALLOPURINOL 300 MG: 300 TABLET ORAL at 08:05

## 2020-01-01 RX ADMIN — POTASSIUM CHLORIDE, DEXTROSE MONOHYDRATE AND SODIUM CHLORIDE: 150; 5; 450 INJECTION, SOLUTION INTRAVENOUS at 08:23

## 2020-01-01 RX ADMIN — CEFEPIME HYDROCHLORIDE 2 G: 2 INJECTION, POWDER, FOR SOLUTION INTRAVENOUS at 18:38

## 2020-01-01 RX ADMIN — IOPAMIDOL 53 ML: 755 INJECTION, SOLUTION INTRAVENOUS at 15:12

## 2020-01-01 RX ADMIN — PROPOFOL 120 MG: 10 INJECTION, EMULSION INTRAVENOUS at 16:38

## 2020-01-01 RX ADMIN — PHENYLEPHRINE HYDROCHLORIDE 100 MCG: 10 INJECTION INTRAVENOUS at 16:54

## 2020-01-01 RX ADMIN — TAZOBACTAM SODIUM AND PIPERACILLIN SODIUM 2.25 G: 250; 2 INJECTION, SOLUTION INTRAVENOUS at 01:59

## 2020-01-01 RX ADMIN — POTASSIUM CHLORIDE 10 MEQ: 7.46 INJECTION, SOLUTION INTRAVENOUS at 01:11

## 2020-01-01 RX ADMIN — VANCOMYCIN HYDROCHLORIDE 1000 MG: 1 INJECTION, SOLUTION INTRAVENOUS at 16:21

## 2020-01-01 RX ADMIN — CALCIUM GLUCONATE 3 G: 98 INJECTION, SOLUTION INTRAVENOUS at 12:43

## 2020-01-01 RX ADMIN — SODIUM CHLORIDE: 9 INJECTION, SOLUTION INTRAVENOUS at 20:09

## 2020-01-01 RX ADMIN — NYSTATIN 500000 UNITS: 100000 SUSPENSION ORAL at 18:13

## 2020-01-01 RX ADMIN — SODIUM CHLORIDE: 9 INJECTION, SOLUTION INTRAVENOUS at 07:04

## 2020-01-01 RX ADMIN — LIDOCAINE HYDROCHLORIDE 6 ML: 10 INJECTION, SOLUTION EPIDURAL; INFILTRATION; INTRACAUDAL; PERINEURAL at 15:59

## 2020-01-01 RX ADMIN — SODIUM PHOSPHATE, MONOBASIC, MONOHYDRATE AND SODIUM PHOSPHATE, DIBASIC, ANHYDROUS 9 MMOL: 276; 142 INJECTION, SOLUTION INTRAVENOUS at 12:36

## 2020-01-01 RX ADMIN — PANTOPRAZOLE SODIUM 40 MG: 40 INJECTION, POWDER, FOR SOLUTION INTRAVENOUS at 08:53

## 2020-01-01 RX ADMIN — SODIUM CHLORIDE: 9 INJECTION, SOLUTION INTRAVENOUS at 08:21

## 2020-01-01 RX ADMIN — PANTOPRAZOLE SODIUM 40 MG: 40 TABLET, DELAYED RELEASE ORAL at 06:03

## 2020-01-01 RX ADMIN — GUAIFENESIN AND DEXTROMETHORPHAN 5 ML: 100; 10 SYRUP ORAL at 09:56

## 2020-01-01 RX ADMIN — GANCICLOVIR SODIUM 125 MG: 500 INJECTION, POWDER, LYOPHILIZED, FOR SOLUTION INTRAVENOUS at 17:20

## 2020-01-01 RX ADMIN — PROPOFOL 40 MCG/KG/MIN: 10 INJECTION, EMULSION INTRAVENOUS at 07:26

## 2020-01-01 RX ADMIN — ALBUTEROL SULFATE 2.5 MG: 2.5 SOLUTION RESPIRATORY (INHALATION) at 15:40

## 2020-01-01 RX ADMIN — MAGNESIUM SULFATE 2 G: 2 INJECTION INTRAVENOUS at 10:57

## 2020-01-01 RX ADMIN — SODIUM PHOSPHATE, MONOBASIC, MONOHYDRATE 9 MMOL: 276; 142 INJECTION, SOLUTION INTRAVENOUS at 09:55

## 2020-01-01 RX ADMIN — POTASSIUM PHOSPHATE, MONOBASIC AND POTASSIUM PHOSPHATE, DIBASIC 9 MMOL: 224; 236 INJECTION, SOLUTION, CONCENTRATE INTRAVENOUS at 10:02

## 2020-01-01 RX ADMIN — GANCICLOVIR SODIUM 125 MG: 500 INJECTION, POWDER, LYOPHILIZED, FOR SOLUTION INTRAVENOUS at 18:30

## 2020-01-01 RX ADMIN — ALLOPURINOL 300 MG: 300 TABLET ORAL at 09:08

## 2020-01-01 RX ADMIN — DIPHENHYDRAMINE HYDROCHLORIDE 25 MG: 50 INJECTION, SOLUTION INTRAMUSCULAR; INTRAVENOUS at 14:19

## 2020-01-01 RX ADMIN — ALLOPURINOL 300 MG: 300 TABLET ORAL at 09:54

## 2020-01-01 RX ADMIN — CEFEPIME HYDROCHLORIDE 2 G: 2 INJECTION, POWDER, FOR SOLUTION INTRAVENOUS at 05:43

## 2020-01-01 RX ADMIN — PROPOFOL 40 MG: 10 INJECTION, EMULSION INTRAVENOUS at 17:06

## 2020-01-01 RX ADMIN — CALCIUM GLUCONATE 1 G: 98 INJECTION, SOLUTION INTRAVENOUS at 15:25

## 2020-01-01 RX ADMIN — LORAZEPAM 0.5 MG: 2 INJECTION INTRAMUSCULAR; INTRAVENOUS at 23:58

## 2020-01-01 RX ADMIN — Medication 500 MG: at 09:07

## 2020-01-01 RX ADMIN — CALCIUM GLUCONATE 2 G: 98 INJECTION, SOLUTION INTRAVENOUS at 07:49

## 2020-01-01 RX ADMIN — DEXAMETHASONE SODIUM PHOSPHATE 12 MG: 10 INJECTION, SOLUTION INTRAMUSCULAR; INTRAVENOUS at 11:57

## 2020-01-01 RX ADMIN — NYSTATIN 500000 UNITS: 100000 SUSPENSION ORAL at 22:00

## 2020-01-01 RX ADMIN — DEXAMETHASONE 12 MG: 4 TABLET ORAL at 09:34

## 2020-01-01 RX ADMIN — SODIUM CHLORIDE 250 ML: 9 INJECTION, SOLUTION INTRAVENOUS at 12:52

## 2020-01-01 RX ADMIN — HEPARIN SODIUM 5000 UNITS: 10000 INJECTION, SOLUTION INTRAVENOUS; SUBCUTANEOUS at 17:27

## 2020-01-01 RX ADMIN — MULTIPLE VITAMINS W/ MINERALS TAB 1 TABLET: TAB at 08:16

## 2020-01-01 RX ADMIN — CALCIUM CHLORIDE 1 G: 100 INJECTION, SOLUTION INTRAVENOUS at 07:46

## 2020-01-01 RX ADMIN — PANTOPRAZOLE SODIUM 40 MG: 40 INJECTION, POWDER, FOR SOLUTION INTRAVENOUS at 22:27

## 2020-01-01 RX ADMIN — TAMSULOSIN HYDROCHLORIDE 0.4 MG: 0.4 CAPSULE ORAL at 09:24

## 2020-01-01 RX ADMIN — DOXYCYCLINE 100 MG: 100 INJECTION, POWDER, LYOPHILIZED, FOR SOLUTION INTRAVENOUS at 18:52

## 2020-01-01 RX ADMIN — I.V. FAT EMULSION 250 ML: 20 EMULSION INTRAVENOUS at 20:30

## 2020-01-01 RX ADMIN — HEPARIN SODIUM 5000 UNITS: 10000 INJECTION, SOLUTION INTRAVENOUS; SUBCUTANEOUS at 22:15

## 2020-01-01 RX ADMIN — TAMSULOSIN HYDROCHLORIDE 0.4 MG: 0.4 CAPSULE ORAL at 09:55

## 2020-01-01 RX ADMIN — SODIUM CHLORIDE: 9 INJECTION, SOLUTION INTRAVENOUS at 09:23

## 2020-01-01 RX ADMIN — ROCURONIUM BROMIDE 30 MG: 10 INJECTION INTRAVENOUS at 02:40

## 2020-01-01 RX ADMIN — MIDAZOLAM (PF) 1 MG/ML IN 0.9 % SODIUM CHLORIDE INTRAVENOUS SOLUTION 6 MG/HR: at 12:01

## 2020-01-01 RX ADMIN — FAMOTIDINE 20 MG: 20 TABLET, FILM COATED ORAL at 21:03

## 2020-01-01 RX ADMIN — HEPARIN SODIUM 5000 UNITS: 10000 INJECTION, SOLUTION INTRAVENOUS; SUBCUTANEOUS at 11:39

## 2020-01-01 RX ADMIN — ACETAMINOPHEN 650 MG: 325 SOLUTION ORAL at 19:53

## 2020-01-01 RX ADMIN — Medication 500 MG: at 08:28

## 2020-01-01 RX ADMIN — ETOPOSIDE 115 MG: 20 INJECTION INTRAVENOUS at 16:27

## 2020-01-01 RX ADMIN — MULTIPLE VITAMINS W/ MINERALS TAB 1 TABLET: TAB at 09:51

## 2020-01-01 RX ADMIN — DIPHENHYDRAMINE HYDROCHLORIDE 25 MG: 50 INJECTION, SOLUTION INTRAMUSCULAR; INTRAVENOUS at 02:21

## 2020-01-01 RX ADMIN — Medication 6 MG/HR: at 19:31

## 2020-01-01 RX ADMIN — ACETAMINOPHEN 650 MG: 325 TABLET, FILM COATED ORAL at 01:58

## 2020-01-01 RX ADMIN — PROPOFOL 45 MCG/KG/MIN: 10 INJECTION, EMULSION INTRAVENOUS at 01:43

## 2020-01-01 RX ADMIN — FAMOTIDINE 20 MG: 20 TABLET, FILM COATED ORAL at 09:34

## 2020-01-01 RX ADMIN — CEFEPIME 2 G: 2 INJECTION, POWDER, FOR SOLUTION INTRAVENOUS at 14:13

## 2020-01-01 RX ADMIN — Medication 500 MG: at 18:01

## 2020-01-01 RX ADMIN — TAMSULOSIN HYDROCHLORIDE 0.4 MG: 0.4 CAPSULE ORAL at 08:28

## 2020-01-01 RX ADMIN — SODIUM CHLORIDE 1000 ML: 9 INJECTION, SOLUTION INTRAVENOUS at 14:29

## 2020-01-01 RX ADMIN — HEPARIN SODIUM 5000 UNITS: 10000 INJECTION, SOLUTION INTRAVENOUS; SUBCUTANEOUS at 19:34

## 2020-01-01 RX ADMIN — BLEOMYCIN SULFATE 20 UNITS: 30 INJECTION, POWDER, LYOPHILIZED, FOR SOLUTION INTRAMUSCULAR; INTRAPLEURAL; INTRAVENOUS; SUBCUTANEOUS at 17:53

## 2020-01-01 RX ADMIN — MEROPENEM 1 G: 1 INJECTION, POWDER, FOR SOLUTION INTRAVENOUS at 04:27

## 2020-01-01 RX ADMIN — Medication 25 MCG: at 20:59

## 2020-01-01 RX ADMIN — CEFEPIME HYDROCHLORIDE 2 G: 2 INJECTION, POWDER, FOR SOLUTION INTRAVENOUS at 20:00

## 2020-01-01 RX ADMIN — ACETAMINOPHEN 650 MG: 325 TABLET, FILM COATED ORAL at 15:35

## 2020-01-01 RX ADMIN — POTASSIUM CHLORIDE 10 MEQ: 7.46 INJECTION, SOLUTION INTRAVENOUS at 05:50

## 2020-01-01 RX ADMIN — DOXYCYCLINE 100 MG: 100 INJECTION, POWDER, LYOPHILIZED, FOR SOLUTION INTRAVENOUS at 19:31

## 2020-01-01 RX ADMIN — FERROUS SULFATE TAB 325 MG (65 MG ELEMENTAL FE) 325 MG: 325 (65 FE) TAB at 08:29

## 2020-01-01 RX ADMIN — VORICONAZOLE 200 MG: 40 POWDER, FOR SUSPENSION ORAL at 08:22

## 2020-01-01 RX ADMIN — ETOPOSIDE 150 MG: 20 INJECTION INTRAVENOUS at 12:47

## 2020-01-01 RX ADMIN — SODIUM CHLORIDE 150 MG: 9 INJECTION, SOLUTION INTRAVENOUS at 13:07

## 2020-01-01 RX ADMIN — FUROSEMIDE 10 MG: 10 INJECTION, SOLUTION INTRAMUSCULAR; INTRAVENOUS at 21:34

## 2020-01-01 RX ADMIN — DIPHENHYDRAMINE HYDROCHLORIDE 25 MG: 50 INJECTION, SOLUTION INTRAMUSCULAR; INTRAVENOUS at 21:56

## 2020-01-01 RX ADMIN — TAZOBACTAM SODIUM AND PIPERACILLIN SODIUM 2.25 G: 250; 2 INJECTION, SOLUTION INTRAVENOUS at 23:20

## 2020-01-01 RX ADMIN — SODIUM CHLORIDE 78 ML: 9 INJECTION, SOLUTION INTRAVENOUS at 15:12

## 2020-01-01 RX ADMIN — ACETAMINOPHEN 650 MG: 650 SUPPOSITORY RECTAL at 03:42

## 2020-01-01 RX ADMIN — PANTOPRAZOLE SODIUM 40 MG: 40 INJECTION, POWDER, FOR SOLUTION INTRAVENOUS at 20:57

## 2020-01-01 RX ADMIN — OYSTER SHELL CALCIUM WITH VITAMIN D 1 TABLET: 500; 200 TABLET, FILM COATED ORAL at 09:55

## 2020-01-01 RX ADMIN — NYSTATIN 500000 UNITS: 100000 SUSPENSION ORAL at 09:46

## 2020-01-01 RX ADMIN — NYSTATIN 500000 UNITS: 100000 SUSPENSION ORAL at 22:33

## 2020-01-01 RX ADMIN — TAMSULOSIN HYDROCHLORIDE 0.4 MG: 0.4 CAPSULE ORAL at 09:45

## 2020-01-01 RX ADMIN — SODIUM CHLORIDE 1000 ML: 9 INJECTION, SOLUTION INTRAVENOUS at 18:05

## 2020-01-01 RX ADMIN — ACYCLOVIR SODIUM 250 MG: 50 INJECTION, SOLUTION INTRAVENOUS at 04:12

## 2020-01-01 RX ADMIN — ETOPOSIDE 115 MG: 20 INJECTION INTRAVENOUS at 11:05

## 2020-01-01 RX ADMIN — SODIUM CHLORIDE, POTASSIUM CHLORIDE, SODIUM LACTATE AND CALCIUM CHLORIDE 500 ML: 600; 310; 30; 20 INJECTION, SOLUTION INTRAVENOUS at 04:51

## 2020-01-01 RX ADMIN — MIDAZOLAM 2 MG: 1 INJECTION INTRAMUSCULAR; INTRAVENOUS at 16:34

## 2020-01-01 RX ADMIN — FERROUS SULFATE TAB 325 MG (65 MG ELEMENTAL FE) 325 MG: 325 (65 FE) TAB at 09:22

## 2020-01-01 RX ADMIN — VORICONAZOLE 200 MG: 200 TABLET, COATED ORAL at 20:30

## 2020-01-01 RX ADMIN — DEXAMETHASONE 12 MG: 4 TABLET ORAL at 14:56

## 2020-01-01 RX ADMIN — SODIUM CHLORIDE: 9 INJECTION, SOLUTION INTRAVENOUS at 05:36

## 2020-01-01 RX ADMIN — MIDAZOLAM (PF) 1 MG/ML IN 0.9 % SODIUM CHLORIDE INTRAVENOUS SOLUTION 6 MG/HR: at 19:29

## 2020-01-01 RX ADMIN — METHYLPREDNISOLONE SODIUM SUCCINATE 40 MG: 40 INJECTION, POWDER, FOR SOLUTION INTRAMUSCULAR; INTRAVENOUS at 16:54

## 2020-01-01 RX ADMIN — CISPLATIN 30 MG: 100 INJECTION, SOLUTION INTRAVENOUS at 13:53

## 2020-01-01 RX ADMIN — CEFEPIME HYDROCHLORIDE 2 G: 2 INJECTION, POWDER, FOR SOLUTION INTRAVENOUS at 11:48

## 2020-01-01 RX ADMIN — Medication 6 MG/HR: at 04:06

## 2020-01-01 RX ADMIN — HEPARIN SODIUM 5000 UNITS: 10000 INJECTION, SOLUTION INTRAVENOUS; SUBCUTANEOUS at 06:42

## 2020-01-01 RX ADMIN — FAMOTIDINE 20 MG: 20 TABLET, FILM COATED ORAL at 21:12

## 2020-01-01 RX ADMIN — MINERAL OIL AND PETROLATUM: 150; 830 OINTMENT OPHTHALMIC at 18:21

## 2020-01-01 RX ADMIN — Medication 0.5 MG: at 10:02

## 2020-01-01 RX ADMIN — EPOPROSTENOL 20 NG/KG/MIN: 1.5 INJECTION, POWDER, LYOPHILIZED, FOR SOLUTION INTRAVENOUS at 07:51

## 2020-01-01 RX ADMIN — PROPOFOL 40 MG: 10 INJECTION, EMULSION INTRAVENOUS at 17:22

## 2020-01-01 RX ADMIN — PROPOFOL 45 MCG/KG/MIN: 10 INJECTION, EMULSION INTRAVENOUS at 18:30

## 2020-01-01 RX ADMIN — ACETAMINOPHEN 650 MG: 325 TABLET, FILM COATED ORAL at 08:28

## 2020-01-01 RX ADMIN — FLUCONAZOLE, SODIUM CHLORIDE 200 MG: 2 INJECTION INTRAVENOUS at 16:35

## 2020-01-01 RX ADMIN — SODIUM CHLORIDE: 9 INJECTION, SOLUTION INTRAVENOUS at 14:38

## 2020-01-01 RX ADMIN — ACYCLOVIR SODIUM 250 MG: 50 INJECTION, SOLUTION INTRAVENOUS at 01:52

## 2020-01-01 RX ADMIN — NYSTATIN 500000 UNITS: 100000 SUSPENSION ORAL at 18:10

## 2020-01-01 RX ADMIN — CALCIUM GLUCONATE 2 G: 98 INJECTION, SOLUTION INTRAVENOUS at 02:38

## 2020-01-01 RX ADMIN — DOXYCYCLINE 100 MG: 100 INJECTION, POWDER, LYOPHILIZED, FOR SOLUTION INTRAVENOUS at 05:55

## 2020-01-01 RX ADMIN — INSULIN ASPART 1 UNITS: 100 INJECTION, SOLUTION INTRAVENOUS; SUBCUTANEOUS at 04:12

## 2020-01-01 RX ADMIN — ACYCLOVIR SODIUM 250 MG: 50 INJECTION, SOLUTION INTRAVENOUS at 17:02

## 2020-01-01 RX ADMIN — DOXYCYCLINE 100 MG: 100 INJECTION, POWDER, LYOPHILIZED, FOR SOLUTION INTRAVENOUS at 18:39

## 2020-01-01 RX ADMIN — SODIUM PHOSPHATE, MONOBASIC, MONOHYDRATE AND SODIUM PHOSPHATE, DIBASIC, ANHYDROUS 9 MMOL: 276; 142 INJECTION, SOLUTION INTRAVENOUS at 22:51

## 2020-01-01 RX ADMIN — SODIUM PHOSPHATE, MONOBASIC, MONOHYDRATE 9 MMOL: 276; 142 INJECTION, SOLUTION INTRAVENOUS at 22:48

## 2020-01-01 RX ADMIN — PROPOFOL 50 MCG/KG/MIN: 10 INJECTION, EMULSION INTRAVENOUS at 12:36

## 2020-01-01 RX ADMIN — HEPARIN SODIUM 5000 UNITS: 10000 INJECTION, SOLUTION INTRAVENOUS; SUBCUTANEOUS at 06:37

## 2020-01-01 RX ADMIN — SODIUM CHLORIDE: 9 INJECTION, SOLUTION INTRAVENOUS at 23:55

## 2020-01-01 RX ADMIN — POTASSIUM CHLORIDE 10 MEQ: 7.46 INJECTION, SOLUTION INTRAVENOUS at 12:38

## 2020-01-01 RX ADMIN — TAMSULOSIN HYDROCHLORIDE 0.4 MG: 0.4 CAPSULE ORAL at 09:43

## 2020-01-01 RX ADMIN — Medication 1.5 UNITS/HR: at 02:46

## 2020-01-01 RX ADMIN — DIPHENHYDRAMINE HYDROCHLORIDE 25 MG: 50 INJECTION, SOLUTION INTRAMUSCULAR; INTRAVENOUS at 20:06

## 2020-01-01 RX ADMIN — ALLOPURINOL 300 MG: 300 TABLET ORAL at 08:22

## 2020-01-01 RX ADMIN — ACYCLOVIR SODIUM 250 MG: 50 INJECTION, SOLUTION INTRAVENOUS at 06:57

## 2020-01-01 RX ADMIN — CALCIUM CARBONATE 2500 MG: 1250 SUSPENSION ORAL at 08:54

## 2020-01-01 RX ADMIN — CEFEPIME HYDROCHLORIDE 2 G: 2 INJECTION, POWDER, FOR SOLUTION INTRAVENOUS at 11:09

## 2020-01-01 RX ADMIN — VECURONIUM BROMIDE 0.8 MCG/KG/MIN: 1 INJECTION, POWDER, LYOPHILIZED, FOR SOLUTION INTRAVENOUS at 04:05

## 2020-01-01 RX ADMIN — PROPOFOL 65 MCG/KG/MIN: 10 INJECTION, EMULSION INTRAVENOUS at 20:28

## 2020-01-01 RX ADMIN — LIDOCAINE HYDROCHLORIDE 4 ML: 10 INJECTION, SOLUTION EPIDURAL; INFILTRATION; INTRACAUDAL; PERINEURAL at 01:56

## 2020-01-01 RX ADMIN — ACETYLCYSTEINE 4 ML: 100 INHALANT RESPIRATORY (INHALATION) at 15:40

## 2020-01-01 RX ADMIN — OXYCODONE HYDROCHLORIDE 5 MG: 5 SOLUTION ORAL at 11:55

## 2020-01-01 RX ADMIN — SODIUM CHLORIDE: 9 INJECTION, SOLUTION INTRAVENOUS at 18:55

## 2020-01-01 RX ADMIN — Medication 25 MCG: at 09:45

## 2020-01-01 RX ADMIN — BLEOMYCIN SULFATE 30 UNITS: 30 INJECTION, POWDER, LYOPHILIZED, FOR SOLUTION INTRAMUSCULAR; INTRAPLEURAL; INTRAVENOUS; SUBCUTANEOUS at 13:20

## 2020-01-01 RX ADMIN — Medication 10 ML: at 00:10

## 2020-01-01 RX ADMIN — FLUCONAZOLE, SODIUM CHLORIDE 200 MG: 2 INJECTION INTRAVENOUS at 15:50

## 2020-01-01 RX ADMIN — FERROUS SULFATE TAB 325 MG (65 MG ELEMENTAL FE) 325 MG: 325 (65 FE) TAB at 10:34

## 2020-01-01 RX ADMIN — Medication 50 MCG: at 09:08

## 2020-01-01 RX ADMIN — FAMOTIDINE 20 MG: 20 TABLET, FILM COATED ORAL at 09:22

## 2020-01-01 RX ADMIN — ACETAMINOPHEN 650 MG: 325 TABLET, FILM COATED ORAL at 20:46

## 2020-01-01 RX ADMIN — NYSTATIN 500000 UNITS: 100000 SUSPENSION ORAL at 18:39

## 2020-01-01 RX ADMIN — BLEOMYCIN SULFATE 30 UNITS: 30 INJECTION, POWDER, LYOPHILIZED, FOR SOLUTION INTRAMUSCULAR; INTRAPLEURAL; INTRAVENOUS; SUBCUTANEOUS at 11:28

## 2020-01-01 RX ADMIN — MEROPENEM 1 G: 1 INJECTION, POWDER, FOR SOLUTION INTRAVENOUS at 04:07

## 2020-01-01 RX ADMIN — POTASSIUM CHLORIDE 10 MEQ: 7.46 INJECTION, SOLUTION INTRAVENOUS at 22:47

## 2020-01-01 RX ADMIN — FERROUS SULFATE TAB 325 MG (65 MG ELEMENTAL FE) 325 MG: 325 (65 FE) TAB at 09:10

## 2020-01-01 RX ADMIN — CALCIUM CARBONATE 2500 MG: 1250 SUSPENSION ORAL at 08:21

## 2020-01-01 RX ADMIN — MEROPENEM 1 G: 1 INJECTION, POWDER, FOR SOLUTION INTRAVENOUS at 17:01

## 2020-01-01 RX ADMIN — CALCIUM CHLORIDE 2 G: 100 INJECTION, SOLUTION INTRAVENOUS at 14:49

## 2020-01-01 RX ADMIN — FERROUS SULFATE TAB 325 MG (65 MG ELEMENTAL FE) 325 MG: 325 (65 FE) TAB at 08:39

## 2020-01-01 RX ADMIN — CEFAZOLIN SODIUM 2 G: 2 INJECTION, SOLUTION INTRAVENOUS at 16:34

## 2020-01-01 RX ADMIN — CALCIUM CHLORIDE 2 G: 100 INJECTION, SOLUTION INTRAVENOUS at 07:27

## 2020-01-01 RX ADMIN — MULTIPLE VITAMINS W/ MINERALS TAB 1 TABLET: TAB at 08:05

## 2020-01-01 RX ADMIN — ACETAMINOPHEN 650 MG: 325 TABLET, FILM COATED ORAL at 20:35

## 2020-01-01 RX ADMIN — VASOPRESSIN 2.4 UNITS/HR: 20 INJECTION INTRAVENOUS at 05:27

## 2020-01-01 RX ADMIN — ASCORBIC ACID, VITAMIN A PALMITATE, CHOLECALCIFEROL, THIAMINE HYDROCHLORIDE, RIBOFLAVIN-5 PHOSPHATE SODIUM, PYRIDOXINE HYDROCHLORIDE, NIACINAMIDE, DEXPANTHENOL, ALPHA-TOCOPHEROL ACETATE, VITAMIN K1, FOLIC ACID, BIOTIN, CYANOCOBALAMIN: 200; 3300; 200; 6; 3.6; 6; 40; 15; 10; 150; 600; 60; 5 INJECTION, SOLUTION INTRAVENOUS at 16:25

## 2020-01-01 RX ADMIN — ALLOPURINOL 300 MG: 300 TABLET ORAL at 09:03

## 2020-01-01 RX ADMIN — TAZOBACTAM SODIUM AND PIPERACILLIN SODIUM 2.25 G: 250; 2 INJECTION, SOLUTION INTRAVENOUS at 17:55

## 2020-01-01 RX ADMIN — VORICONAZOLE 200 MG: 200 TABLET, COATED ORAL at 19:55

## 2020-01-01 RX ADMIN — PEGFILGRASTIM 6 MG: KIT SUBCUTANEOUS at 14:51

## 2020-01-01 RX ADMIN — PANTOPRAZOLE SODIUM 40 MG: 40 INJECTION, POWDER, FOR SOLUTION INTRAVENOUS at 20:14

## 2020-01-01 RX ADMIN — MINERAL OIL AND PETROLATUM: 150; 830 OINTMENT OPHTHALMIC at 10:13

## 2020-01-01 RX ADMIN — CEFEPIME HYDROCHLORIDE 2 G: 2 INJECTION, POWDER, FOR SOLUTION INTRAVENOUS at 22:28

## 2020-01-01 RX ADMIN — ONDANSETRON 16 MG: 4 TABLET, ORALLY DISINTEGRATING ORAL at 14:56

## 2020-01-01 RX ADMIN — PROPOFOL 10 MCG/KG/MIN: 10 INJECTION, EMULSION INTRAVENOUS at 18:25

## 2020-01-01 RX ADMIN — SODIUM CHLORIDE: 9 INJECTION, SOLUTION INTRAVENOUS at 19:46

## 2020-01-01 RX ADMIN — HEPARIN SODIUM 5000 UNITS: 10000 INJECTION, SOLUTION INTRAVENOUS; SUBCUTANEOUS at 14:30

## 2020-01-01 RX ADMIN — PANTOPRAZOLE SODIUM 40 MG: 40 INJECTION, POWDER, FOR SOLUTION INTRAVENOUS at 09:53

## 2020-01-01 RX ADMIN — SODIUM CHLORIDE 150 MG: 9 INJECTION, SOLUTION INTRAVENOUS at 12:29

## 2020-01-01 RX ADMIN — Medication 500 MG: at 17:57

## 2020-01-01 RX ADMIN — ALLOPURINOL 300 MG: 300 TABLET ORAL at 15:48

## 2020-01-01 RX ADMIN — ALBUMIN HUMAN 500 ML: 0.05 INJECTION, SOLUTION INTRAVENOUS at 01:08

## 2020-01-01 RX ADMIN — PANTOPRAZOLE SODIUM 40 MG: 40 INJECTION, POWDER, FOR SOLUTION INTRAVENOUS at 13:50

## 2020-01-01 RX ADMIN — ACYCLOVIR SODIUM 250 MG: 50 INJECTION, SOLUTION INTRAVENOUS at 03:27

## 2020-01-01 RX ADMIN — SODIUM CHLORIDE 250 ML: 9 INJECTION, SOLUTION INTRAVENOUS at 11:28

## 2020-01-01 RX ADMIN — PROPOFOL 45 MCG/KG/MIN: 10 INJECTION, EMULSION INTRAVENOUS at 09:08

## 2020-01-01 RX ADMIN — MULTIPLE VITAMINS W/ MINERALS TAB 1 TABLET: TAB at 09:54

## 2020-01-01 RX ADMIN — PANTOPRAZOLE SODIUM 40 MG: 40 INJECTION, POWDER, FOR SOLUTION INTRAVENOUS at 20:17

## 2020-01-01 RX ADMIN — POTASSIUM CHLORIDE: 2 INJECTION, SOLUTION, CONCENTRATE INTRAVENOUS at 13:15

## 2020-01-01 RX ADMIN — CALCIUM GLUCONATE 2 G: 98 INJECTION, SOLUTION INTRAVENOUS at 10:48

## 2020-01-01 RX ADMIN — LIDOCAINE HYDROCHLORIDE 10 ML: 10 INJECTION, SOLUTION EPIDURAL; INFILTRATION; INTRACAUDAL; PERINEURAL at 13:26

## 2020-01-01 RX ADMIN — CEFEPIME 2 G: 2 INJECTION, POWDER, FOR SOLUTION INTRAVENOUS at 14:38

## 2020-01-01 RX ADMIN — DOXYCYCLINE 100 MG: 100 INJECTION, POWDER, LYOPHILIZED, FOR SOLUTION INTRAVENOUS at 05:02

## 2020-01-01 RX ADMIN — NYSTATIN 500000 UNITS: 100000 SUSPENSION ORAL at 09:48

## 2020-01-01 RX ADMIN — PANTOPRAZOLE SODIUM 40 MG: 40 TABLET, DELAYED RELEASE ORAL at 09:24

## 2020-01-01 RX ADMIN — Medication 20 MG: at 02:25

## 2020-01-01 RX ADMIN — HEPARIN SODIUM 5000 UNITS: 10000 INJECTION, SOLUTION INTRAVENOUS; SUBCUTANEOUS at 09:57

## 2020-01-01 RX ADMIN — MEROPENEM 1 G: 1 INJECTION, POWDER, FOR SOLUTION INTRAVENOUS at 18:20

## 2020-01-01 RX ADMIN — Medication 0.16 MCG/KG/MIN: at 00:18

## 2020-01-01 RX ADMIN — Medication 500 MG: at 09:45

## 2020-01-01 RX ADMIN — SODIUM CHLORIDE 8 MG/HR: 9 INJECTION, SOLUTION INTRAVENOUS at 01:02

## 2020-01-01 RX ADMIN — CEFEPIME 2 G: 2 INJECTION, POWDER, FOR SOLUTION INTRAVENOUS at 01:44

## 2020-01-01 RX ADMIN — Medication 25 MCG: at 09:55

## 2020-01-01 RX ADMIN — VORICONAZOLE 200 MG: 40 POWDER, FOR SUSPENSION ORAL at 19:54

## 2020-01-01 RX ADMIN — Medication 25 MCG: at 09:53

## 2020-01-01 RX ADMIN — MIDAZOLAM 2 MG: 1 INJECTION INTRAMUSCULAR; INTRAVENOUS at 13:08

## 2020-01-01 RX ADMIN — POTASSIUM CHLORIDE 10 MEQ: 7.46 INJECTION, SOLUTION INTRAVENOUS at 23:56

## 2020-01-01 RX ADMIN — Medication 0.5 MG: at 11:00

## 2020-01-01 RX ADMIN — CALCIUM GLUCONATE 1 G: 98 INJECTION, SOLUTION INTRAVENOUS at 06:06

## 2020-01-01 ASSESSMENT — ACTIVITIES OF DAILY LIVING (ADL)
ADLS_ACUITY_SCORE: 14
ADLS_ACUITY_SCORE: 21
ADLS_ACUITY_SCORE: 19
ADLS_ACUITY_SCORE: 19
ADLS_ACUITY_SCORE: 14
ADLS_ACUITY_SCORE: 14
ADLS_ACUITY_SCORE: 19
FALL_HISTORY_WITHIN_LAST_SIX_MONTHS: NO
COGNITION: 1 - ATTENTION OR MEMORY DEFICITS
ADLS_ACUITY_SCORE: 15
ADLS_ACUITY_SCORE: 17
ADLS_ACUITY_SCORE: 14
ADLS_ACUITY_SCORE: 21
ADLS_ACUITY_SCORE: 15
ADLS_ACUITY_SCORE: 14
ADLS_ACUITY_SCORE: 23
BATHING: 0-->INDEPENDENT
ADLS_ACUITY_SCORE: 14
ADLS_ACUITY_SCORE: 14
ADLS_ACUITY_SCORE: 16
ADLS_ACUITY_SCORE: 19
ADLS_ACUITY_SCORE: 19
RETIRED_COMMUNICATION: 0-->UNDERSTANDS/COMMUNICATES WITHOUT DIFFICULTY
ADLS_ACUITY_SCORE: 16
ADLS_ACUITY_SCORE: 14
ADLS_ACUITY_SCORE: 16
ADLS_ACUITY_SCORE: 19
ADLS_ACUITY_SCORE: 16
ADLS_ACUITY_SCORE: 19
ADLS_ACUITY_SCORE: 21
ADLS_ACUITY_SCORE: 16
ADLS_ACUITY_SCORE: 21
ADLS_ACUITY_SCORE: 14
ADLS_ACUITY_SCORE: 21
ADLS_ACUITY_SCORE: 16
ADLS_ACUITY_SCORE: 23
ADLS_ACUITY_SCORE: 15
ADLS_ACUITY_SCORE: 16
ADLS_ACUITY_SCORE: 14
ADLS_ACUITY_SCORE: 15
ADLS_ACUITY_SCORE: 15
ADLS_ACUITY_SCORE: 16
ADLS_ACUITY_SCORE: 15
ADLS_ACUITY_SCORE: 14
ADLS_ACUITY_SCORE: 14
ADLS_ACUITY_SCORE: 15
ADLS_ACUITY_SCORE: 16
ADLS_ACUITY_SCORE: 15
ADLS_ACUITY_SCORE: 17
ADLS_ACUITY_SCORE: 19
ADLS_ACUITY_SCORE: 23
ADLS_ACUITY_SCORE: 19
ADLS_ACUITY_SCORE: 14
ADLS_ACUITY_SCORE: 16
ADLS_ACUITY_SCORE: 15
ADLS_ACUITY_SCORE: 19
ADLS_ACUITY_SCORE: 16
ADLS_ACUITY_SCORE: 16
AMBULATION: 0-->INDEPENDENT
ADLS_ACUITY_SCORE: 14
ADLS_ACUITY_SCORE: 14
ADLS_ACUITY_SCORE: 19
ADLS_ACUITY_SCORE: 19
ADLS_ACUITY_SCORE: 21
ADLS_ACUITY_SCORE: 17
ADLS_ACUITY_SCORE: 16
ADLS_ACUITY_SCORE: 18
ADLS_ACUITY_SCORE: 16
ADLS_ACUITY_SCORE: 16
ADLS_ACUITY_SCORE: 15
ADLS_ACUITY_SCORE: 19
ADLS_ACUITY_SCORE: 16
ADLS_ACUITY_SCORE: 15
ADLS_ACUITY_SCORE: 16
ADLS_ACUITY_SCORE: 17
ADLS_ACUITY_SCORE: 16
ADLS_ACUITY_SCORE: 16
SWALLOWING: 2-->DIFFICULTY SWALLOWING LIQUIDS
ADLS_ACUITY_SCORE: 15
ADLS_ACUITY_SCORE: 18
ADLS_ACUITY_SCORE: 14
ADLS_ACUITY_SCORE: 16
ADLS_ACUITY_SCORE: 15
ADLS_ACUITY_SCORE: 21
ADLS_ACUITY_SCORE: 15
ADLS_ACUITY_SCORE: 16
ADLS_ACUITY_SCORE: 16
ADLS_ACUITY_SCORE: 15
ADLS_ACUITY_SCORE: 16
ADLS_ACUITY_SCORE: 16
ADLS_ACUITY_SCORE: 15
ADLS_ACUITY_SCORE: 16
ADLS_ACUITY_SCORE: 15
ADLS_ACUITY_SCORE: 21
ADLS_ACUITY_SCORE: 17
ADLS_ACUITY_SCORE: 19
ADLS_ACUITY_SCORE: 19
ADLS_ACUITY_SCORE: 16
ADLS_ACUITY_SCORE: 15
ADLS_ACUITY_SCORE: 14
ADLS_ACUITY_SCORE: 21
ADLS_ACUITY_SCORE: 14
ADLS_ACUITY_SCORE: 16
ADLS_ACUITY_SCORE: 19
ADLS_ACUITY_SCORE: 15
ADLS_ACUITY_SCORE: 15
ADLS_ACUITY_SCORE: 14
ADLS_ACUITY_SCORE: 19
ADLS_ACUITY_SCORE: 16
ADLS_ACUITY_SCORE: 15
ADLS_ACUITY_SCORE: 14
ADLS_ACUITY_SCORE: 16
ADLS_ACUITY_SCORE: 23
ADLS_ACUITY_SCORE: 14
ADLS_ACUITY_SCORE: 16
ADLS_ACUITY_SCORE: 16
ADLS_ACUITY_SCORE: 15
ADLS_ACUITY_SCORE: 18
ADLS_ACUITY_SCORE: 17
ADLS_ACUITY_SCORE: 19
ADLS_ACUITY_SCORE: 16
ADLS_ACUITY_SCORE: 16
ADLS_ACUITY_SCORE: 15
ADLS_ACUITY_SCORE: 14
ADLS_ACUITY_SCORE: 14
ADLS_ACUITY_SCORE: 19
ADLS_ACUITY_SCORE: 16
ADLS_ACUITY_SCORE: 16
ADLS_ACUITY_SCORE: 21
ADLS_ACUITY_SCORE: 14
ADLS_ACUITY_SCORE: 14
ADLS_ACUITY_SCORE: 17
ADLS_ACUITY_SCORE: 15
ADLS_ACUITY_SCORE: 21
RETIRED_EATING: 0-->INDEPENDENT
ADLS_ACUITY_SCORE: 16
ADLS_ACUITY_SCORE: 18
ADLS_ACUITY_SCORE: 16
ADLS_ACUITY_SCORE: 16
ADLS_ACUITY_SCORE: 14
ADLS_ACUITY_SCORE: 14
ADLS_ACUITY_SCORE: 16
ADLS_ACUITY_SCORE: 14
ADLS_ACUITY_SCORE: 21
ADLS_ACUITY_SCORE: 16
ADLS_ACUITY_SCORE: 16
ADLS_ACUITY_SCORE: 15
ADLS_ACUITY_SCORE: 16
ADLS_ACUITY_SCORE: 15
DRESS: 0-->INDEPENDENT
ADLS_ACUITY_SCORE: 17
ADLS_ACUITY_SCORE: 19
ADLS_ACUITY_SCORE: 16
ADLS_ACUITY_SCORE: 14
ADLS_ACUITY_SCORE: 16
ADLS_ACUITY_SCORE: 19
ADLS_ACUITY_SCORE: 14
ADLS_ACUITY_SCORE: 16
TOILETING: 0-->INDEPENDENT
ADLS_ACUITY_SCORE: 17
ADLS_ACUITY_SCORE: 16
ADLS_ACUITY_SCORE: 15
ADLS_ACUITY_SCORE: 14
ADLS_ACUITY_SCORE: 21
ADLS_ACUITY_SCORE: 21
ADLS_ACUITY_SCORE: 16
ADLS_ACUITY_SCORE: 16
ADLS_ACUITY_SCORE: 14
ADLS_ACUITY_SCORE: 19
ADLS_ACUITY_SCORE: 15
ADLS_ACUITY_SCORE: 15
ADLS_ACUITY_SCORE: 16
ADLS_ACUITY_SCORE: 18
ADLS_ACUITY_SCORE: 19
ADLS_ACUITY_SCORE: 17
ADLS_ACUITY_SCORE: 19
ADLS_ACUITY_SCORE: 16
ADLS_ACUITY_SCORE: 14
ADLS_ACUITY_SCORE: 16
ADLS_ACUITY_SCORE: 16
ADLS_ACUITY_SCORE: 14
ADLS_ACUITY_SCORE: 14
ADLS_ACUITY_SCORE: 15
ADLS_ACUITY_SCORE: 14
ADLS_ACUITY_SCORE: 16
ADLS_ACUITY_SCORE: 15
ADLS_ACUITY_SCORE: 19
ADLS_ACUITY_SCORE: 19
ADLS_ACUITY_SCORE: 14
ADLS_ACUITY_SCORE: 14
ADLS_ACUITY_SCORE: 21
ADLS_ACUITY_SCORE: 21
ADLS_ACUITY_SCORE: 19
ADLS_ACUITY_SCORE: 17
ADLS_ACUITY_SCORE: 16
ADLS_ACUITY_SCORE: 17
ADLS_ACUITY_SCORE: 16
ADLS_ACUITY_SCORE: 23
ADLS_ACUITY_SCORE: 21
ADLS_ACUITY_SCORE: 14
ADLS_ACUITY_SCORE: 16
ADLS_ACUITY_SCORE: 14
ADLS_ACUITY_SCORE: 21
ADLS_ACUITY_SCORE: 16
TRANSFERRING: 0-->INDEPENDENT
ADLS_ACUITY_SCORE: 15
ADLS_ACUITY_SCORE: 14
ADLS_ACUITY_SCORE: 16
ADLS_ACUITY_SCORE: 16
ADLS_ACUITY_SCORE: 14
ADLS_ACUITY_SCORE: 21
ADLS_ACUITY_SCORE: 15
ADLS_ACUITY_SCORE: 16
ADLS_ACUITY_SCORE: 14
ADLS_ACUITY_SCORE: 19
ADLS_ACUITY_SCORE: 15
ADLS_ACUITY_SCORE: 17
ADLS_ACUITY_SCORE: 15
ADLS_ACUITY_SCORE: 16
ADLS_ACUITY_SCORE: 14
ADLS_ACUITY_SCORE: 17
ADLS_ACUITY_SCORE: 14

## 2020-01-01 ASSESSMENT — MIFFLIN-ST. JEOR
SCORE: 1163.79
SCORE: 1248.99
SCORE: 1255.38
SCORE: 1205.38
SCORE: 1279.39
SCORE: 1276.66
SCORE: 1184.66
SCORE: 1180.5
SCORE: 1237.65
SCORE: 1274.4
SCORE: 1203.38
SCORE: 1266.68
SCORE: 1226.31
SCORE: 1248.54
SCORE: 1259.43
SCORE: 1269.86
SCORE: 1247.63
SCORE: 1183.38
SCORE: 1301.38
SCORE: 1259.43
SCORE: 1221.38

## 2020-01-01 ASSESSMENT — PAIN SCALES - GENERAL
PAINLEVEL: NO PAIN (0)

## 2020-01-01 ASSESSMENT — ENCOUNTER SYMPTOMS
FEVER: 0
DYSRHYTHMIAS: 0
VOMITING: 1
FEVER: 1
DIZZINESS: 1
VOMITING: 0
NUMBNESS: 1
HEADACHES: 0
HEMATURIA: 0
COUGH: 0
SHORTNESS OF BREATH: 0
UNEXPECTED WEIGHT CHANGE: 1
ABDOMINAL PAIN: 0
ABDOMINAL PAIN: 0
COUGH: 0
FREQUENCY: 1
MYALGIAS: 0
FEVER: 1
FATIGUE: 1
DYSURIA: 0
FEVER: 1
DIARRHEA: 0
SHORTNESS OF BREATH: 0
APPETITE CHANGE: 1
CHILLS: 1
FATIGUE: 1
COUGH: 0
NECK PAIN: 0

## 2020-01-01 ASSESSMENT — LIFESTYLE VARIABLES: TOBACCO_USE: 0

## 2020-02-04 NOTE — LETTER
[facility]  [address] [address] [id number] [phone]  February 4, 2020    Luz Marina Diallo  [address] [address] [facility] [address]    Dear Luz Marina,    I care about your health and have reviewed your health plan. I have reviewed your medical conditions, medication list, and lab results and am making recommendations based on this review, to better manage your health.    You are in particular need of attention regarding:  -Colon Cancer Screening  -Wellness (Physical) Visit     I am recommending that you:  {recommendations:-schedule a WELLNESS (Physical) APPOINTMENT with me.      -schedule a COLONOSCOPY to look for colon cancer (due every 10 years or 5 years in higher risk situations.)   Colon cancer is now the second leading cause of death in the [place] for both men and women and there are over 130,000 new cases and 50,000 deaths per year from colon cancer.  Colonoscopies can prevent 90-95% of these deaths.  Problem lesions can be removed before they ever become cancer.  This test is not only looking for cancer, but also getting rid of precancerious lesions.  If you do not wish to do a colonoscopy or cannot afford to do one, at this time, there is another option. It is called a FIT test or Fecal Immunochemical Occult Blood Test (take home stool sample kit).  It does not replace the colonoscopy for colorectal cancer screening, but it can detect hidden bleeding in the lower colon.  It does need to be repeated every year and if a positive result is obtained, you would be referred for a colonoscopy.  If you have completed either one of these tests at another facility, please have the records sent to our clinic so that we can best coordinate your care.    Here is a list of Health Maintenance topics that are due now or due soon:  Health Maintenance Due   Topic Date Due     PREVENTIVE CARE VISIT  1965     ANNUAL REVIEW OF HM ORDERS   1965     COLONOSCOPY  06/26/1977     HIV SCREENING  06/26/1982     LIPID  06/26/2002     ZOSTER IMMUNIZATION (1 of 2) 06/26/2017     INFLUENZA VACCINE (1) 09/01/2019     PHQ-2  01/01/2020       Please call us at [phone] (or use [employer]) to address the above recommendations.     Thank you for trusting [facility] and we appreciate the opportunity to serve you.  We look forward to supporting your healthcare needs in the future.    Healthy Regards,    Farrah Castillo MD

## 2020-02-04 NOTE — TELEPHONE ENCOUNTER
Summary:    Patient is due/failing the following:   COLONOSCOPY and PHYSICAL    Reviewed:  [x] CARE EVERYWHERE  [x] LAST OV NOTE INCLUDING ENDO  [x] FYI TAB  [x] LAST PANEL ENCOUNTER  [x] FUTURE APTS  [x] MYCHART STATUS   [x] IMMUNIZATIONS  Action needed:   Patient needs office visit for physical. and Patient needs referral/order: colonoscopy    Type of outreach:    Sent letter.                                                                               Thomas Ingram CMA

## 2020-09-23 NOTE — PATIENT INSTRUCTIONS
Go directly to [facility] emergency department, whom I have contacted and they are expecting your arrival this evening.  I feel evaluation in the hospital is needed because of his fever, rapid heart rate, and prior history of anemia.  We will continue evaluation and management.

## 2020-09-23 NOTE — PROGRESS NOTES
Subjective     Luz Marina Diallo is a 55 year old male who presents to clinic today for the following health issues:    HPI       Abdominal/Flank Pain  Onset/Duration:  X 4 weeks  Description:   Character: Patient states that he has no pain.   Location: mishel-umbilical region pelvic region  Radiation: None  Intensity: Patient states there is no pain  Progression of Symptoms:  improving and constant  Accompanying Signs & Symptoms:  Fever/Chills: YES  Gas/Bloating: YES bloating  Nausea: no  Vomitting: no  Diarrhea: no  Constipation: no  Dysuria or Hematuria: no  History:   Trauma: no  Previous similar pain: no  Previous tests done: none  Precipitating factors:   Does the pain change with:     Food: Patient's sister states that he doesn't have much of an appetite     Bowel Movement: no    Urination: increased urination and some incontinence    Other factors:  no  Therapies tried and outcome: None    Review of Systems   Physician review of systems: Patient is seen for exam, with the assistance of his sister who acts as .    The primary concern of sister is recent weight loss, and increasing lower abdominal distention.    Patient started to seem to loose weight with poor appetite starting 4 weeks ago. No fever noted before today. He seems to be choking more when drinking liquids the past month. No recent shortness of breath. He has had a chronic intermittent cough for years per sister, with no recent change.     Patient had an upper GI evaluation done last year by [facility] at  [facility] which I was able to review.  Per discharge summary which I reviewed, patient had esophagitis, nonbleeding gastric ulcers, nonbleeding duodenal ulcers, and a large hiatal hernia.  Patient has a prior history of significant anemia with a hemoglobin of 4.1 associated with positive Hemoccult positive stools.  Patient sister tell me that her brother had a colonoscopy done last year when  a polyp was removed.  I did not have access to this study report during this exam.        Objective    /82 (BP Location: Right arm, Patient Position: Sitting, Cuff Size: Adult Regular)   Pulse 119   Temp 100.5  F (38.1  C) (Oral)   Resp 26   Wt 46.7 kg (103 lb)   BMI 17.68 kg/m    Body mass index is 17.68 kg/m .  Physical Exam   Vital signs reviewed.  Patient is in no acute appearing distress.  Breathing appears nonlabored.  Patient is alert and oriented to his surroundings.  He is able to get up and down from exam room chair and table without visible difficulty.  He makes good eye contact, and speaks very little, usually to his sister.  He did say goodbye to me.  Eyes: No scleral icterus.  Heart: Heart rate is regular without murmur.  Lungs: Lungs are clear to auscultation with good airflow bilaterally.  Back: No tenderness.  Abdominal exam: Patient has a large firm mass measuring approximately 20 cm diameter in his right lower quadrant, with no associated tenderness.  There is no other areas of abdominal tenderness or distention.  No hepatosplenomegaly palpated.  Bowel sounds are normal.  Skin/extremities: Warm and dry, with no edema noted.    Patient sister was agreeable to my suggestion that he be further evaluated in the hospital this evening.  I spoke with the triage nurse at [facility] about patient's exam findings, and plan was made for patient to get further evaluation in the emergency department this evening.          Assessment & Plan     RLQ abdominal mass  New exam finding for patient.    Tachycardia  Patient's heart rate is significantly above his normal baseline per chart review.    Fever, unspecified fever cause  No clear etiology on exam.    Weight loss  No clear etiology, though with exam finding of right lower abdominal mass, malignancy needs to be ruled out.         Patient Instructions   Go directly to [facility] emergency department, whom I have contacted and they  are expecting your arrival this evening.  I feel evaluation in the hospital is needed because of his fever, rapid heart rate, and prior history of anemia.  We will continue evaluation and management.      Return for Go directly to [facility] ER this evening..    Farshad Fuentes, DO  [facility]

## 2020-09-24 PROBLEM — D49.59 TESTICULAR NEOPLASM: Status: ACTIVE | Noted: 2020-01-01

## 2020-09-24 PROBLEM — N17.9 ARF (ACUTE RENAL FAILURE) (H): Status: ACTIVE | Noted: 2020-01-01

## 2020-09-24 PROBLEM — N17.9 AKI (ACUTE KIDNEY INJURY) (H): Status: ACTIVE | Noted: 2020-01-01

## 2020-09-24 NOTE — CONSULTS
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 surgery 20 years ago\" unknown        ESOPHAGOSCOPY, GASTROSCOPY, DUODENOSCOPY (EGD), COMBINED N/A   3/1/2019    Procedure: ESOPHAGOSCOPY, GASTROSCOPY, DUODENOSCOPY (EGD) Rm   226;  Surgeon: Susy Camara MD;  Location:  GI               Social History:     Social History     Tobacco Use     Smoking status: Never Smoker     Smokeless tobacco: Never Used   Substance Use Topics     Alcohol use: Yes     Alcohol/week: 2.0 standard drinks     Types: 1 Glasses of wine, 1 Cans of beer per week     Comment: once every month or two, social functions only             Family History:     Family History   Problem Relation Age of Onset     Diabetes Father      Glaucoma No family hx of      Macular Degeneration No family hx of              Allergies:   No Known Allergies          Medications:       famotidine  20 mg Oral BID     ferrous sulfate  325 mg Oral Daily with breakfast     pantoprazole  40 mg Oral BID AC     piperacillin-tazobactam  2.25 g Intravenous Q6H     sodium chloride (PF)  3 mL " "Intracatheter Q8H             Review of Systems:   A comprehensive greater than 10 system review of systems was   carried out.  Pertinent positives and negatives are noted above.    Otherwise negative for contributory info.            Physical Exam:     Blood pressure 120/79, pulse 79, temperature 96.6  F (35.9  C),   temperature source Oral, resp. rate 20, height 1.55 m (5' 1.02\"),   weight 47.2 kg (104 lb), SpO2 99 %.    Intake/Output Summary (Last 24 hours) at 9/24/2020 0921  Last data filed at 9/24/2020 0451  Gross per 24 hour   Intake 1000 ml   Output 200 ml   Net 800 ml       EXAM:  GEN: Awake alert and oriented, appears stated age  EYES: pupils equal and round, sclerae anicteric   PULM: Non-labored breathing with normal respiratory effort  CVS: reg rate and rhythm, no peripheral edema  ABD: Soft, non tender, large palpable mass in inferior abdomen   extending above the umbilicus, soft abdomen above the palpable   mass  NEURO: CN II-XII grossly intact  MSK: extremeties with no clubbing, cyanosis or edema  PSYCH: responsive, alert, cooperative  EXT/SKIN: inspection reveals no rashes, lesions or ulcers, normal   coloring         Data Reviewed:     Recent Results (from the past 24 hour(s))   Abd/pelvis CT no contrast - [facility] Protocol    Narrative    EXAM: CT ABDOMEN PELVIS W/O CONTRAST  LOCATION: [facility]  DATE/TIME: 9/23/2020 10:38 PM    INDICATION: Renal failure. Hypercalcemia. Abdominal pain. Fever.   Abdominal mass. History of anemia.  COMPARISON: None.  TECHNIQUE: CT scan of the abdomen and pelvis was performed   without IV contrast. Multiplanar reformats were obtained. Dose   reduction techniques were used.  CONTRAST: None.    FINDINGS:   LOWER CHEST: Normal.    HEPATOBILIARY: Normal.  PANCREAS: Normal.  SPLEEN: Normal.  ADRENAL GLANDS: Normal.  KIDNEYS/BLADDER: Mild bilateral hydronephrosis and hydroureter,   left worse than right secondary to large pelvic mass    BOWEL: There is a large " lobulated low abdominal/pelvic mass with   maximal dimensions of approximately 17 x 17 x 13.5 cm. There are   numerous calcifications distributed throughout the mass. There is   also bulky confluent retroperitoneal adenopathy measuring   up to 12 x 8 cm at the level of the renal rupinder that also   demonstrate the same calcification pattern. Masses along the left   hemipelvis are confluent with the dominant mass though may   represent separate foci of adenopathy. In addition, there are   numerous   smaller intra-abdominal or mesenteric masses which do not   demonstrate calcifications on likely represent metastatic   peritoneal implants or additional sites of adenopathy.    In a male patient mucinous colon carcinoma with be the most to   result in this appearance. Testicular malignancies could also   result in this appearance.    VASCULATURE: Unremarkable.    PELVIC ORGANS: Normal size prostate. Bladder unremarkable.  Ovoid density within left inguinal canal likely relates to a   retracted or possibly undescended testicle. Right testicle is not   identified though the entire scrotum is not included on this   exam.    MUSCULOSKELETAL: No suspicious lesions.      Impression    IMPRESSION:   1.  Huge lobulated, partially calcified mass present within the   low abdominal cavity favored to represent colon carcinoma with   associated carcinomatosis and bulky retroperitoneal/pelvic   adenopathy. Testicular malignancy and adenopathy could've a   similar   appearance. Possible undescended testicles.  2.  Hydronephrosis, left worse than right, secondary to mass   effect upon ureters.     US Testicular & Scrotum w Doppler Ltd    Narrative    EXAM: ULTRASOUND SCROTUM WITH DOPPLER  LOCATION: [facility]  DATE/TIME: 9/24/2020 1:46 AM    INDICATION: Abnormal CT scan. Question undescended testicle.  COMPARISON: 09/23/2020 - CT abdomen and pelvis.    TECHNIQUE: Ultrasound of scrotum with color flow and spectral   Doppler  with waveform analysis performed.    FINDINGS:    RIGHT: The testicle is not visualized. No hydrocele or   varicocele.    LEFT: A mildly lobulated 5 cm mass-like structure containing a   few tiny echogenicities is present in the left inguinal canal.   This surrounds a 3.5 x 1.9 x 1.5 cm relatively homogeneous   structure. This most likely represents a lobulated soft tissue   mass   surrounding a normal left testicle. Blood flow is visualized in   the presumed testicle. No hydrocele or varicocele.      Impression    IMPRESSION:   1. The left testicle is not well-defined. There is a 5 cm   lobulated structure in the left inguinal region that likely   represents a lobulated soft tissue mass surrounding a normal left   testicle. The mass is suspicious for neoplasm.  2. Nonvisualization of the right testicle. The testicle may be   undescended or absent.       Recent Labs   Lab 09/24/20  0454 09/23/20 2112   WBC 6.6 8.4   HGB 7.1* 8.2*   HCT 25.6* 27.8*   MCV 96 89    441     Recent Labs   Lab 09/24/20  0601 09/24/20 0454 09/23/20 2112    140 137   POTASSIUM 4.3 4.4 4.6   CHLORIDE 110* 110* 103   CO2 24 24 28   ANIONGAP 7 6 6   GLC 91 88 121*   BUN 63* 67* 69*   CR 4.45* 4.40* 4.51*   GFRESTIMATED 14* 14* 14*   GFRESTBLACK 16* 17* 16*   IVANIA 13.8* 13.4* 15.5*   PROTTOTAL  --   --  9.7*   ALBUMIN  --   --  3.7   BILITOTAL  --   --  0.2   ALKPHOS  --   --  528*   AST  --   --  40   ALT  --   --  21         Assessment and Plan:   Attvitaly is a 55 year old male with cognitive delay admitted with   a large pelvic mass, concerning for malignancy of   urologic/testicular origin.  He is not obstructed.  There is no   evidence of GI bleeding.  Last colonoscopy was just over a year   ago and revealed a single hamartomatous polyp.  Given that there   is no evidence of intestinal obstruction due to this large   abdominal mass, and concern for a tumor of testicular or urologic   origin, will defer to urology service and  oncology service for   work-up and management of this large abdominal mass.  Labs and   imaging have been reviewed.         Patient specific identified risk factors considered as part of   today s evaluation include: none    Additional history obtained from Care Everywhere.  Time spent on   consultation: 35 minutes, greater than 50 percent of the total   encounter time is spent in counseling and/or coordination of care          Eve Kinsey MD  [facility]  Phone:  [phone]     Basic metabolic panel   Result Value Ref Range    Sodium 140 133 - 144 mmol/L    Potassium 4.4 3.4 - 5.3 mmol/L    Chloride 110 (H) 94 - 109 mmol/L    Carbon Dioxide 24 20 - 32 mmol/L    Anion Gap 6 3 - 14 mmol/L    Glucose 88 70 - 99 mg/dL    Urea Nitrogen 67 (H) 7 - 30 mg/dL    Creatinine 4.40 (H) 0.66 - 1.25 mg/dL    GFR Estimate 14 (L) >60 mL/min/[1.73_m2]    GFR Estimate If Black 17 (L) >60 mL/min/[1.73_m2]    Calcium 13.4 (H) 8.5 - 10.1 mg/dL   CBC with platelets   Result Value Ref Range    WBC 6.6 4.0 - 11.0 10e9/L    RBC Count 2.68 (L) 4.4 - 5.9 10e12/L    Hemoglobin 7.1 (L) 13.3 - 17.7 g/dL    Hematocrit 25.6 (L) 40.0 - 53.0 %    MCV 96 78 - 100 fl    MCH 26.5 26.5 - 33.0 pg    MCHC 27.7 (L) 31.5 - 36.5 g/dL    RDW 16.5 (H) 10.0 - 15.0 %    Platelet Count 345 150 - 450 10e9/L   Parathyroid Hormone Intact   Result Value Ref Range    Parathyroid Hormone Intact <7 (L) 18 - 80 pg/mL   INR   Result Value Ref Range    INR 0.96 0.86 - 1.14   Basic metabolic panel   Result Value Ref Range    Sodium 141 133 - 144 mmol/L    Potassium 4.3 3.4 - 5.3 mmol/L    Chloride 110 (H) 94 - 109 mmol/L    Carbon Dioxide 24 20 - 32 mmol/L    Anion Gap 7 3 - 14 mmol/L    Glucose 91 70 - 99 mg/dL    Urea Nitrogen 63 (H) 7 - 30 mg/dL    Creatinine 4.45 (H) 0.66 - 1.25 mg/dL    GFR Estimate 14 (L) >60 mL/min/[1.73_m2]    GFR Estimate If Black 16 (L) >60 mL/min/[1.73_m2]    Calcium 13.8 (H) 8.5 - 10.1 mg/dL   Lactate Dehydrogenase    Result Value Ref Range    Lactate Dehydrogenase 710 (H) 85 - 227 U/L   ABO/Rh type and screen   Result Value Ref Range    ABO O     RH(D) Pos     Antibody Screen Neg     Test Valid Only At [facility]        Specimen Expires 09/27/2020    AFP tumor marker   Result Value Ref Range    Alpha Fetoprotein 1.5 0 - 8 ug/L   HCG tumor marker   Result Value Ref Range    Beta-HCG Tumor Marker 700 (H) <5 IU/L   Renal panel   Result Value Ref Range    Sodium 140 133 - 144 mmol/L    Potassium 4.5 3.4 - 5.3 mmol/L    Chloride 111 (H) 94 - 109 mmol/L    Carbon Dioxide 22 20 - 32 mmol/L    Anion Gap 7 3 - 14 mmol/L    Glucose 85 70 - 99 mg/dL    Urea Nitrogen 63 (H) 7 - 30 mg/dL    Creatinine 4.30 (H) 0.66 - 1.25 mg/dL    GFR Estimate 15 (L) >60 mL/min/[1.73_m2]    GFR Estimate If Black 17 (L) >60 mL/min/[1.73_m2]    Calcium 13.0 (H) 8.5 - 10.1 mg/dL    Phosphorus 4.6 (H) 2.5 - 4.5 mg/dL    Albumin 2.9 (L) 3.4 - 5.0 g/dL   XR Surgery GLORIA L/T 5 Min Fluoro w Stills    Narrative    This exam was marked as non-reportable because it will not be read by a   radiologist or a [place] non-radiologist provider.           All imaging studies reviewed by me.    CT abd/pelvis with massive abdominal/pelvic mass with huge retroperitoneal adenopathy causing severe bilateral hydro L>R    Testicular ultrasound with solitary left testicle which appears to have a testicular mass     Attestation:  I have reviewed today's vital signs, notes, medications, labs and imaging.  Amount of time performed on this very complicated consult: 60 minutes including counseling and coordination of care between services    Ralf Chan MD

## 2020-09-24 NOTE — ANESTHESIA PREPROCEDURE EVALUATION
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             ROS/MED HX    ENT/Pulmonary:      (-) tobacco use and sleep apnea   Neurologic:     (+)Developmental delay      Cardiovascular:        (-) hypertension, CAD, CHF, arrhythmias, pulmonary hypertension and dyslipidemia   METS/Exercise Tolerance:     Hematologic:     (+) Anemia, -      Musculoskeletal:        (-) arthritis   GI/Hepatic:        (-) GERD and hepatitis   Renal/Genitourinary: Comment: Hypercalcemia due to tumor    (+) chronic renal disease, type: ARF, Pt does not require dialysis, Pt has no history of transplant, Other Renal/ Genitourinary, Testicular mass      Endo:      (-) Type I DM, Type II DM, thyroid disease, chronic steroid usage, other endocrine disorder and obesity   Psychiatric:        (-) psychiatric history   Infectious Disease:  - neg infectious disease ROS       Malignancy:   (+) Malignancy History of Other  Other CA Active status post         Other:    - neg other ROS                      Physical Exam      Airway   Mallampati: II  TM distance: >3 FB  Neck ROM: full    Dental     Cardiovascular   Rhythm and rate: regular and normal  (-) no murmur    Pulmonary    breath sounds clear to auscultation    Other " findings: Lab Test        09/24/20 09/23/20 07/03/19                       0454          2112          1105          WBC          6.6          8.4          5.3           HGB          7.1*         8.2*         13.9          MCV          96           89           78            PLT          345          441          291           INR          0.96          --           --            Lab Test        09/24/20 09/24/20 09/24/20                       1309          0601          0454          NA           140          141          140           POTASSIUM    4.5          4.3          4.4           CHLORIDE     111*         110*         110*          CO2          22 24 24            BUN          63*          63*          67*           CR           4.30*        4.45*        4.40*         ANIONGAP     7            7            6             IVANIA          13.0*        13.8*        13.4*         GLC          85           91           88                  Lab Results   Component Value Date    WBC 6.6 09/24/2020    HGB 7.1 (L) 09/24/2020    HCT 25.6 (L) 09/24/2020     09/24/2020     09/24/2020    POTASSIUM 4.5 09/24/2020    CHLORIDE 111 (H) 09/24/2020    CO2 22 09/24/2020    BUN 63 (H) 09/24/2020    CR 4.30 (H) 09/24/2020    GLC 85 09/24/2020    IVANIA 13.0 (H) 09/24/2020    PHOS 4.6 (H) 09/24/2020    ALBUMIN 2.9 (L) 09/24/2020    PROTTOTAL 9.7 (H) 09/23/2020    ALT 21 09/23/2020    AST 40 09/23/2020    ALKPHOS 528 (H) 09/23/2020    BILITOTAL 0.2 09/23/2020    LIPASE 115 09/23/2020    INR 0.96 09/24/2020    TSH 0.75 08/13/2017       Preop Vitals  BP Readings from Last 3 Encounters:   09/24/20 120/79   09/23/20 130/82   07/03/19 127/70    Pulse Readings from Last 3 Encounters:   09/24/20 79   09/23/20 119   07/03/19 53      Resp Readings from Last 3 Encounters:   09/24/20 20   09/23/20 26   07/03/19 16    SpO2 Readings from Last 3 Encounters:   09/24/20 99%   07/03/19 99%   03/01/19 100%  "     Temp Readings from Last 1 Encounters:   09/24/20 96.6  F (35.9  C) (Oral)    Ht Readings from Last 1 Encounters:   09/23/20 1.55 m (5' 1.02\")      Wt Readings from Last 1 Encounters:   09/24/20 47.2 kg (104 lb)    Estimated body mass index is 19.64 kg/m  as calculated from the following:    Height as of this encounter: 1.55 m (5' 1.02\").    Weight as of this encounter: 47.2 kg (104 lb).       Anesthesia Plan      History & Physical Review  History and physical reviewed and following examination; no interval change.    ASA Status:  4 emergent.    NPO Status:  > 8 hours    Plan for General with Propofol induction. Maintenance will be Balanced.    PONV prophylaxis:  Ondansetron (or other 5HT-3)  Normal Saline IVF. Lasix if stents placed. Consider albumin.        Postoperative Care  Postoperative pain management:  IV analgesics and Oral pain medications.      Consents  Anesthetic plan, risks, benefits and alternatives discussed with:  Patient..                 Shyam Soto MD                    .  "

## 2020-09-24 NOTE — CONSULTS
Nephrology Initial Consult  September 24, 2020      Luz Marina Diallo MRN:[id number] YOB: 1965  Date of Admission:9/23/2020  Primary care provider: Rosie Segura  Requesting physician: Carmen att. providers found    ASSESSMENT AND RECOMMENDATIONS:   1 acute kidney injury-bilateral severe obstructive uropathy likely the main cause.  Further compounded by severe hypercalcemia and hypovolemia.  Baseline creatinine of 0.9.  Nonoliguric.    2 large retroperitoneal mass/testicular mass-being evaluated by colorectal and urology as well as heme-onc.  Concerning for testicular primary with carcinomatosis.    3 critical hypercalcemia-PTH repeatedly suppressed.  Secondary to malignancy.  Got 1 dose of Zometa this morning.  Not PTH dependent hypercalcemia . ? Bony mets. Check PTHrP    4 severe anemia-related to malignancy.  Transfusion PRN per primary.    Recommendations  -increase normal saline to 200 cc an hour.  Target urine volume of at least 100 cc an hour.  -Got 1 dose of Zometa this morning.  -If calcium not trending down, can give couple doses of calcitonin.  - Check PTHrP and 1,25 Vit D.   -Relief of obstructive uropathy per urology today.  -Transfusion PRN.      Thank you for the consult. Will continue to follow along with you .          Zeke Mott MD  [facility] - Nephrology   [phone]        REASON FOR CONSULT: Acute kidney injury, severe hypercalcemia    HISTORY OF PRESENT ILLNESS:  Luz Marina Diallo is a 55 year old [place] gentleman with past medical history of mental retardation, duodenal ulcer only on multivitamins outpatient who presented with 4 months history of progressive weight loss, poor appetite and was found to have severe renal failure with creatinine up to 4.5 compared to baseline of 0.9 and calcium of more than 15.  Further evaluation showed large retroperitoneal mass with bilateral hydronephrosis, left more than right along with solitary left  testicular testicular mass concerning for advanced renal cell tumor from testicular primary.  Overnight, he has been treated with IV fluid.  Currently getting normal saline at 125 cc an hour.  He got 1 dose of Zometa this morning.  Calcium is slightly down to 13.8.  He is nonoliguric.  Significant anemic with hemoglobin of 7.1.  Plan is for urologic intervention in the form of pyelogram and stenting versus bilateral nephrostomy tube today.  Plans for chemotherapy and orchiectomy pending this time.  Discussed with patient and his sister who acted as .  Confirmed the history above.  Currently afebrile, in no distress.  Getting IV fluid.  No nausea vomiting.  Denies oral calcium or vitamin D intake.  Denies NSAID intake.    PAST MEDICAL HISTORY:  Reviewed with patient on 2020  and is as listed in HPI.       MEDICATIONS:  PTA Meds  Prior to Admission medications    Medication Sig Last Dose Taking? Auth Provider   ferrous sulfate (FEROSUL) 325 (65 Fe) MG tablet Take 1 tablet (325 mg) by mouth daily (with breakfast) 2020 at Unknown time Yes Meche Solano PA-C   multivitamin w/minerals (THERA-VIT-M) tablet Take 1 tablet by mouth daily 2020 at Unknown time Yes Unknown, Entered By History      Current Meds    famotidine  20 mg Oral BID     ferrous sulfate  325 mg Oral Daily with breakfast     piperacillin-tazobactam  2.25 g Intravenous Q6H     sodium chloride (PF)  3 mL Intracatheter Q8H     Infusion Meds    sodium chloride 125 mL/hr at 20 0744     sodium chloride         ALLERGIES:    No Known Allergies    REVIEW OF SYSTEMS:  A comprehensive of systems was negative except as noted above.    SOCIAL HISTORY:   Reviewed with patient on 2020     FAMILY MEDICAL HISTORY:   Family History   Problem Relation Age of Onset     Diabetes Father      Glaucoma No family hx of      Macular Degeneration No family hx of      Reviewed with patient on 2020     PHYSICAL EXAM:   Temp  Av  " F (37.2  C)  Min: 96.6  F (35.9  C)  Max: 100.5  F (38.1  C)      Pulse  Av.9  Min: 79  Max: 121 Resp  Av  Min: 18  Max: 32  SpO2  Av.2 %  Min: 96 %  Max: 100 %       /79 (BP Location: Left arm)   Pulse 79   Temp 96.6  F (35.9  C) (Oral)   Resp 20   Ht 1.55 m (5' 1.02\")   Wt 47.2 kg (104 lb)   SpO2 99%   BMI 19.64 kg/m     Date 20 0700 - 20 0659   Shift 8285-0092 4769-8834 9978-0776 24 Hour Total   INTAKE   Shift Total(mL/kg)       OUTPUT   Urine 400   400   Shift Total(mL/kg) 400(8.48)   400(8.48)   Weight (kg) 47.17 47.17 47.17 47.17      Admit Weight: 47.2 kg (104 lb)     GENERAL APPEARANCE: no distress,  awake  EYES: no scleral icterus, pupils equal  HENT: NC/AT,  mouth  without ulcers or lesions  Lymphatics: no cervical or supraclavicular LAD  Endo: no moon facies, no goiter  Pulmonary: lungs clear to auscultation with equal breath sounds bilaterally, no clubbing  CV: regular rhythm, normal rate, no rub   - JVP -   - Edema-  GI: hard large mass on rt side of the abdomen  MS: no evidence of inflammation in joints  : no tran  SKIN: no rash, warm, dry, no cyanosis  NEURO: face symmetric, no asterixis     LABS:   CMP  Recent Labs   Lab 20  0601 20  0454 20  2112    140 137   POTASSIUM 4.3 4.4 4.6   CHLORIDE 110* 110* 103   CO2 24 24 28   ANIONGAP 7 6 6   GLC 91 88 121*   BUN 63* 67* 69*   CR 4.45* 4.40* 4.51*   GFRESTIMATED 14* 14* 14*   GFRESTBLACK 16* 17* 16*   IVANIA 13.8* 13.4* 15.5*   PROTTOTAL  --   --  9.7*   ALBUMIN  --   --  3.7   BILITOTAL  --   --  0.2   ALKPHOS  --   --  528*   AST  --   --  40   ALT  --   --  21     CBC  Recent Labs   Lab 20  0454 20  2112   HGB 7.1* 8.2*   WBC 6.6 8.4   RBC 2.68* 3.12*   HCT 25.6* 27.8*   MCV 96 89   MCH 26.5 26.3*   MCHC 27.7* 29.5*   RDW 16.5* 16.2*    441     INR  Recent Labs   Lab 20  0454   INR 0.96     ABGNo lab results found in last 7 days.   URINE STUDIES  Recent Labs "   Lab Test 09/23/20  2228 08/13/17  2051   COLOR Light Yellow Straw   APPEARANCE Clear Clear   URINEGLC Negative Negative   URINEBILI Negative Negative   URINEKETONE Negative Negative   SG 1.012 1.016   UBLD Trace* Negative   URINEPH 6.0 5.0   PROTEIN 30* Negative   NITRITE Negative Negative   LEUKEST Negative Negative   RBCU <1 1   WBCU 1 1     PTH  Recent Labs   Lab Test 09/24/20  0454   PTHI <7*     IRON STUDIES  Recent Labs   Lab Test 07/03/19  1105 02/28/19  1627 08/23/17  1110   IRON 75 113 12*    237* 309   IRONSAT 27 48* 4*   SLIME 81 8*  --        IMAGING:  Personally reviewed the images and findings are as listed in HPI     Zeke Mott MD

## 2020-09-24 NOTE — PHARMACY-ADMISSION MEDICATION HISTORY
Admission medication history interview status for this patient is complete. See [facility] admission navigator for allergy information, prior to admission medications and immunization status.     Medication history interview done via telephone during Covid-19 pandemic, indicate source(s): Family  Medication history resources (including written lists, pill bottles, clinic record):None  Ph[facility]: [facility] [place]    Changes made to PTA medication list:  Added: MVI  Deleted: protonix  Changed:     Actions taken by pharmacist (provider contacted, etc):sticky note for md     Additional medication history information:None    Medication reconciliation/reorder completed by provider prior to medication history?  y   (Y/N)         Prior to Admission medications    Medication Sig Last Dose Taking? Auth Provider   ferrous sulfate (FEROSUL) 325 (65 Fe) MG tablet Take 1 tablet (325 mg) by mouth daily (with breakfast) 9/23/2020 at Unknown time Yes Meche Solano PA-C   multivitamin w/minerals (THERA-VIT-M) tablet Take 1 tablet by mouth daily 9/23/2020 at Unknown time Yes Unknown, Entered By History

## 2020-09-24 NOTE — PROGRESS NOTES
pt seen and examined today.  Agree with admit H&P per Dr. Win from earlier this AM.    Admit for multiple new diagnoses including ARF, hypercalcemia, pelvic mass, and testicular mass.  Presumed diagnosis is metastatic testicular ca leading to obstructive uropathy causing ARF.  Hypercalcemia likely due to suspected malignancy.      I spoke with urology today.  Urology (Dr. Chan) recommending orchiectomy but is interested in oncology input prior to surgery to see if oncology would like orchiectomy put on hold for now prior to starting chemo for suspected metastatic testicular malignancy to avoid delay in starting chemo due to wound healing after surgery. Oncology consult has been placed.    Patient has received a dose of Zometa and calcium has improved with IVF hydration.  If improvement stalls may need to consider starting Calcitonin as well.      Appreciate input from all consultants on this complex patient

## 2020-09-24 NOTE — H&P
Admitted:     09/23/2020      CHIEF COMPLAINT:  Abdominal mass.      History is provided by the patient partly and from ED physician.  No family member available at this point.      HISTORY OF PRESENT ILLNESS:  Luz Marina Diallo is a 55-year-old gentleman from [facility] who immigrated here in 1989 with a past medical history of mental retardation, iron deficiency anemia, gastric and duodenal ulcer and a hiatal hernia, who presents to the emergency room today with abdominal swelling and progressive weight loss, urinary frequency and decreased appetite.  The symptoms have been progressing over the last few months.  The patient lives with his sister and was brought to a clinic and noted he had a low-grade fever without any cough or chills or any other symptoms.  The patient complained of abdominal discomfort  and was sent to the emergency room.  Reported progressively increasing mass in the lower abdomen and he was evaluated in the emergency room.  CT abdomen and pelvis done showed a huge lobulated partially calcified mass presenting within the low abdominal cavity, felt to represent colon carcinoma with associated carcinomatosis or testicular origin, with hydronephrosis left worse than right, and also was found to have renal failure and hypercalcemia.  I discussed with the ED physician, Dr. Cantu, and requested to discuss with his urologist and also with Colorectal Surgery prior to admitting this patient here.  He stated he discussed with nephrologist, Dr. Dr. Sue, Colorectal Surgery, Dr. Bueno and also with urologist Dr. Arvizu.  At this point, per Colorectal Surgery there is no evidence of obstruction or perforation to suggest immediate intervention.  Urology will evaluate the patient this morning for recommendation to relieve obstruction with  ureteral stent placement versus nephrostomy tube placement.      PAST MEDICAL HISTORY:   1. Iron deficiency anemia.   2. Mental retardation.   3. Gastric and duodenal  ulcer.   4. Hiatal hernia.      PAST SURGICAL HISTORY:   Abdominal surgery.      SOCIAL HISTORY:  He lives with his sister.  He does not smoke, does not drink alcohol.  He does not use illicit drugs.      REVIEW OF SYSTEMS:  Tried to review 10 points.  Negative except those mentioned in history of present illness.     FAMILY HISTORY: Reviewed noncontributory    HOME MEDICATIONS:   Prior to Admission Medications   Prescriptions Last Dose Informant Patient Reported? Taking?   ferrous sulfate (FEROSUL) 325 (65 Fe) MG tablet   No No   Sig: Take 1 tablet (325 mg) by mouth daily (with breakfast)   pantoprazole (PROTONIX) 40 MG EC tablet   No No   Sig: Take 1 tablet (40 mg) by mouth 2 times daily (before meals)      Facility-Administered Medications: None      ALLERGIES:  NO KNOWN DRUG ALLERGIES.      PHYSICAL EXAMINATION:   GENERAL:  The patient is awake, alert, oriented, slow to respond to questions.  Chronically sick looking  VITAL SIGNS:  Blood pressure 124/72, pulse rate 83, temperature 99.6, oxygen saturation 100%.   HEENT:  Pale, nonicteric.  Extraocular muscle movement intact.   NECK:  Supple, no JVD, no thyromegaly.   CHEST:  Good air entry bilaterally.  No wheezing, crackles or rales.   CARDIOVASCULAR:  S1 and S2 were heard, no gallop or murmur.   ABDOMEN:  Distended lower abdomen below the umbilicus to the pelvis, firm and somewhat irregular mass noted originating from the pelvic area up to the umbilical area, more to the right side.  Positive bowel sounds.  No organomegaly.  Nontender, no guarding or rebound.  EXTREMITIES:  No edema, cyanosis, or clubbing.   NEUROLOGIC:  No focal neurologic deficits.  Moves all his extremities.  Does not follow full instruction.  PSYCHIATRIC:  Calm and cooperative.      DIAGNOSTIC TESTS OF INTEREST:  Labs:  Sodium 137, potassium 4.6, BUN 69, creatinine 4.51, calcium 15.5, ionized calcium 7.8.  Lactic acid 2.4, lipase 115, anion gap 6, bilirubin 0.2, alkaline phosphatase 528,  ALT 24, AST 40.  Glucose 121.  WBC 8.4, hemoglobin 8.2, platelets 441,000  urinalysis is negative.  Blood culture is done.  Ultrasound of the testicles showed left testicle is not well-defined.  There is a 5 mm lobulated structure in the left inguinal region, likely represents lobulated soft tissue mass surrounding normal left testicle.  This mass is suspicious for neoplasm.  Nonvisualization of the right testicle; may be undescended or absent.  CT scan of the abdomen and pelvis showed a huge lobulated partially calcified mass present within low abdominal cavity, favored to represent colon carcinoma with associated carcinomatosis and bulky retroperitoneal/pelvic adenopathy.  Testicular malignancy and adenopathy could be similar appearance, possible undescended testis, hydronephrosis, left worse than right.      ASSESSMENT AND PLAN:  Luz Marina Diallo is a 55-year-old gentleman with a past medical history of mental retardation, anemia, peptic ulcer disease, hiatal hernia, who came in with progressively worsening weight loss, urinary frequency, decreased appetite, and progressive fatigue and abdominal discomfort over the last few months and found to have abdominal mass, hypercalcemia and acute renal failure and being admitted to the hospital.   1. Abdominal mass:  Per CT report, this could be colon malignancy versus testicular as primary.  There was no bowel obstruction.  Patient is able to move his bowel.  No nausea or vomiting.  Colorectal Surgery is consulted.  This is a new diagnosis and needs a tissue biopsy for definitive diagnosis and, would like Colorectal Surgery to evaluate and recommend if he needs laparoscopic or Interventional Radiology to do a tissue biopsy.     2. Acute renal failure:  Baseline creatinine in 03/2019 was 0.93.  This is likely acute in setting of his malignancy with obstruction and hydronephrosis and hypercalcemia contributing.  Urology is consulted.  Nephrology is consulted.  The  patient is getting IV fluids.  We will check ultrasound of the bladder for postvoid residual volume, if is above 300 we will catheterize her Place Palafox catheter.  The patient is able to urinate, his frequency is increased but not clear if he voids completely due to the mass. We will continue IV fluid hydration.  Check BMP BMP every 6 hours x2 then will order depending on the levels.  .   3. Hypercalcemia:  This could be due to underlying mass and possible malignancy, pending tissue diagnosis.  We will hydrate the patient with IV fluid, getting third liter.  He will get zoledronic acid 4 mg IV after hydration.  We will repeat calcium level.  Nephrology is consulted and will follow up for further recommendations.     4. Chronic anemia:  Hemoglobin was 8.2 earlier.  This is likely hemoconcentrated.  I expect the hemoglobin level to drop with hydration.  Transfuse for hemoglobin less than 7.    5. Lactic acidemia:  Lactic acid is 2.4.  I suspect this is due to dehydration, not infection.   I discussed with the patient the plan of care.  Further discussion needs to be made when the patient's family are available in the morning.  The patient has cognitive delay and does not give detailed history.         MARY ALMONTE MD             D: 2020   T: 2020   MT: SHE      Name:     RAJWINDER OSULLIVAN   MRN:      [id number]        Account:      [id number]   :      1965        Admitted:     2020                   Document: [id number]       cc: [facility]

## 2020-09-24 NOTE — PROGRESS NOTES
Pt admitted to room #355 around 0415 from ED. Pt is AOx4, Croatian speaking, VSS, ambulating SBA. Pt's sister was present in ED and at admission to assist with interpreting, sister went home and will return later today, will need to use Ipad for interpreting.     Pt educated on call-light use and oriented to the room. Denied the need to urinate at this time. Tele placed on pt.     RENE ISRAEL.

## 2020-09-24 NOTE — ANESTHESIA POSTPROCEDURE EVALUATION
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    Anesthesia Type:  General    Note:  Anesthesia Post Evaluation    Patient location during evaluation: PACU  Patient participation: Able to fully participate in evaluation  Level of consciousness: awake  Pain management: adequate  Airway patency: patent  Cardiovascular status: acceptable  Respiratory status: acceptable  Hydration status: euvolemic  PONV: controlled     Anesthetic complications: None          Last vitals:  Vitals:    09/24/20 1745 09/24/20 1750 09/24/20 1755   BP: 110/81 117/81 117/89   Pulse: 78 73 80   Resp: 22 21 29   Temp:      SpO2: 100% 100% 99%         Electronically Signed By: Shyam Soto MD  September 24, 2020  6:01 PM

## 2020-09-24 NOTE — PLAN OF CARE
A/O. Denies pain. [place] speaking. Sister at bedside to interpret. NPO. IVF infusing at 200 ml/hr. Plan for cystoscopy at 1530. Report given to pre-op RN. Up with SBA. Will continue with POC.

## 2020-09-24 NOTE — ANESTHESIA CARE TRANSFER NOTE
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    Anesthesia Type:   General     Note:  Airway :LMA  Patient transferred to:PACU  Comments: VSS. Spontaneous respirations.  QJ=709's. R=12. To PACU with LMA. Report to RN.      Vitals: (Last set prior to Anesthesia Care Transfer)    CRNA VITALS  9/24/2020 1704 - 9/24/2020 1740      9/24/2020             NIBP:  102/66    NIBP Mean:  80                Electronically Signed By: PONCE Tejeda CRNA  September 24, 2020  5:40 PM

## 2020-09-24 NOTE — PROGRESS NOTES
Hematology oncology note.     Unable to see patient at present as he is down in the ER.  Will do a formal consult in the morning.     55-year-old gentleman with history of mental retardation, presented with history of progressive weight loss, abdominal swelling decreased appetite, symptoms have been progressive over the last few months.     CT scan showed large lobulated mass with internal calcification along with bulky abdominal adenopathy.  Differential diagnosis include testicular malignancy.  Along with that patient was found to have hypercalcemia acute kidney injury and anemia.   LDH and beta hCG are elevated, 710 and 700 respectively.  Alpha-fetoprotein was normal.  So far patient has received hydration and 1 dose of Zometa.  Patient is down in the OR to have left orchiectomy, bilateral ureteral stent placement.     Plan:  Agree with aggressive hydration, Zometa may be repeated in 1 week if persistent hypercalcemia.   We will get a CT scan of chest without contrast to rule out pulmonary metastatic disease.  Await tissue diagnosis.    D/w Dr Tristin Navarro  [facility]  [phone]

## 2020-09-24 NOTE — CONSULTS
[facility]  Colon and Rectal Surgery Consult Note  Name: Luz Marina Diallo    MRN: [id number]  YOB: 1965    Age: 55 year old  Date of admission: 9/23/2020  Primary care provider: [facility]     Requesting Physician:  Dr. Win  Reason for consult:  Abdominal mass of unknown origin           History of Present Illness:   Luz Marina Diallo is a 55 year old male, seen at the request of Dr. Win, with past medical history of mental retardation, who presents with weight loss, decreased appetite and large abdominal mass.  His sister helps interpret and give the history.  He has had a poor appetite for the past 4 weeks with associated weight loss.  He has also been having urinary frequency, progressive fatigue, and worsening abdominal distention.  He initially presented to Urgent Care yesterday and was then sent to the ER.  His sister reported a fever of 100.5 yesterday but Tmax in the hospital has been 99.6.  He continues to have bowel movements and denies abdominal pain. No nausea or emesis with PO intake, just poor appetite.     Creatinine was 4.51 yesterday, 4.4 today  Calcium was 15.5; ionized calcium 7.8  Alk phos was 528  Lactate was 2.4  Hemoglobin was 8.2 yesterday, 7.1 this morning    He had a CT of the abdomen and pelvis which showed a large lobulated partially calcified mass measuring 17 x 17 x 13.5cm in the low abdomen with possible carcinomatosis.  Hydronephrosis was also present, left worse than right.     A scrotal ultrasound showed that the left testicle was not well defined and there was a 5cm lobulated structure in the left inguinal region likely representing a soft tissue mass surrounding a normal testicle suspicious for neoplasm.  There was nonvisualization of the right testicle which may be undescended or absent      Colonoscopy History:  7/2019 with [facility] - single hamartomatous polyp in sigmoid colon    Surgical History: A possible abdominal  "surgery 20 years ago            Past Medical History:     Past Medical History:   Diagnosis Date     Mental retardation              Past Surgical History:     Past Surgical History:   Procedure Laterality Date     ABDOMEN SURGERY      sister states he had an \"abdominal surgery 20 years ago\" unknown      ESOPHAGOSCOPY, GASTROSCOPY, DUODENOSCOPY (EGD), COMBINED N/A 3/1/2019    Procedure: ESOPHAGOSCOPY, GASTROSCOPY, DUODENOSCOPY (EGD) Rm 226;  Surgeon: Susy Camara MD;  Location:  GI               Social History:     Social History     Tobacco Use     Smoking status: Never Smoker     Smokeless tobacco: Never Used   Substance Use Topics     Alcohol use: Yes     Alcohol/week: 2.0 standard drinks     Types: 1 Glasses of wine, 1 Cans of beer per week     Comment: once every month or two, social functions only             Family History:     Family History   Problem Relation Age of Onset     Diabetes Father      Glaucoma No family hx of      Macular Degeneration No family hx of              Allergies:   No Known Allergies          Medications:       famotidine  20 mg Oral BID     ferrous sulfate  325 mg Oral Daily with breakfast     pantoprazole  40 mg Oral BID AC     piperacillin-tazobactam  2.25 g Intravenous Q6H     sodium chloride (PF)  3 mL Intracatheter Q8H             Review of Systems:   A comprehensive greater than 10 system review of systems was carried out.  Pertinent positives and negatives are noted above.  Otherwise negative for contributory info.            Physical Exam:     Blood pressure 120/79, pulse 79, temperature 96.6  F (35.9  C), temperature source Oral, resp. rate 20, height 1.55 m (5' 1.02\"), weight 47.2 kg (104 lb), SpO2 99 %.    Intake/Output Summary (Last 24 hours) at 9/24/2020 0921  Last data filed at 9/24/2020 0451  Gross per 24 hour   Intake 1000 ml   Output 200 ml   Net 800 ml       EXAM:  GEN: Awake alert and oriented, appears stated age  EYES: pupils equal and round, sclerae " anicteric   PULM: Non-labored breathing with normal respiratory effort  CVS: reg rate and rhythm, no peripheral edema  ABD: Soft, non tender, large palpable mass in inferior abdomen extending above the umbilicus, soft abdomen above the palpable mass  NEURO: CN II-XII grossly intact  MSK: extremeties with no clubbing, cyanosis or edema  PSYCH: responsive, alert, cooperative  EXT/SKIN: inspection reveals no rashes, lesions or ulcers, normal coloring         Data Reviewed:     Recent Results (from the past 24 hour(s))   Abd/pelvis CT no contrast - [facility] Protocol    Narrative    EXAM: CT ABDOMEN PELVIS W/O CONTRAST  LOCATION: [facility]  DATE/TIME: 9/23/2020 10:38 PM    INDICATION: Renal failure. Hypercalcemia. Abdominal pain. Fever. Abdominal mass. History of anemia.  COMPARISON: None.  TECHNIQUE: CT scan of the abdomen and pelvis was performed without IV contrast. Multiplanar reformats were obtained. Dose reduction techniques were used.  CONTRAST: None.    FINDINGS:   LOWER CHEST: Normal.    HEPATOBILIARY: Normal.  PANCREAS: Normal.  SPLEEN: Normal.  ADRENAL GLANDS: Normal.  KIDNEYS/BLADDER: Mild bilateral hydronephrosis and hydroureter, left worse than right secondary to large pelvic mass    BOWEL: There is a large lobulated low abdominal/pelvic mass with maximal dimensions of approximately 17 x 17 x 13.5 cm. There are numerous calcifications distributed throughout the mass. There is also bulky confluent retroperitoneal adenopathy measuring   up to 12 x 8 cm at the level of the renal rupinder that also demonstrate the same calcification pattern. Masses along the left hemipelvis are confluent with the dominant mass though may represent separate foci of adenopathy. In addition, there are numerous   smaller intra-abdominal or mesenteric masses which do not demonstrate calcifications on likely represent metastatic peritoneal implants or additional sites of adenopathy.    In a male patient mucinous colon  carcinoma with be the most to result in this appearance. Testicular malignancies could also result in this appearance.    VASCULATURE: Unremarkable.    PELVIC ORGANS: Normal size prostate. Bladder unremarkable.  Ovoid density within left inguinal canal likely relates to a retracted or possibly undescended testicle. Right testicle is not identified though the entire scrotum is not included on this exam.    MUSCULOSKELETAL: No suspicious lesions.      Impression    IMPRESSION:   1.  Huge lobulated, partially calcified mass present within the low abdominal cavity favored to represent colon carcinoma with associated carcinomatosis and bulky retroperitoneal/pelvic adenopathy. Testicular malignancy and adenopathy could've a similar   appearance. Possible undescended testicles.  2.  Hydronephrosis, left worse than right, secondary to mass effect upon ureters.     US Testicular & Scrotum w Doppler Ltd    Narrative    EXAM: ULTRASOUND SCROTUM WITH DOPPLER  LOCATION: [facility]  DATE/TIME: 9/24/2020 1:46 AM    INDICATION: Abnormal CT scan. Question undescended testicle.  COMPARISON: 09/23/2020 - CT abdomen and pelvis.    TECHNIQUE: Ultrasound of scrotum with color flow and spectral Doppler with waveform analysis performed.    FINDINGS:    RIGHT: The testicle is not visualized. No hydrocele or varicocele.    LEFT: A mildly lobulated 5 cm mass-like structure containing a few tiny echogenicities is present in the left inguinal canal. This surrounds a 3.5 x 1.9 x 1.5 cm relatively homogeneous structure. This most likely represents a lobulated soft tissue mass   surrounding a normal left testicle. Blood flow is visualized in the presumed testicle. No hydrocele or varicocele.      Impression    IMPRESSION:   1. The left testicle is not well-defined. There is a 5 cm lobulated structure in the left inguinal region that likely represents a lobulated soft tissue mass surrounding a normal left testicle. The mass is  suspicious for neoplasm.  2. Nonvisualization of the right testicle. The testicle may be undescended or absent.       Recent Labs   Lab 09/24/20  0454 09/23/20 2112   WBC 6.6 8.4   HGB 7.1* 8.2*   HCT 25.6* 27.8*   MCV 96 89    441     Recent Labs   Lab 09/24/20  0601 09/24/20  0454 09/23/20 2112    140 137   POTASSIUM 4.3 4.4 4.6   CHLORIDE 110* 110* 103   CO2 24 24 28   ANIONGAP 7 6 6   GLC 91 88 121*   BUN 63* 67* 69*   CR 4.45* 4.40* 4.51*   GFRESTIMATED 14* 14* 14*   GFRESTBLACK 16* 17* 16*   IVANIA 13.8* 13.4* 15.5*   PROTTOTAL  --   --  9.7*   ALBUMIN  --   --  3.7   BILITOTAL  --   --  0.2   ALKPHOS  --   --  528*   AST  --   --  40   ALT  --   --  21         Assessment and Plan:   Attapeu is a 55 year old male with cognitive delay admitted with a large pelvic mass, concerning for malignancy of urologic/testicular origin.  He is not obstructed.  There is no evidence of GI bleeding.  Last colonoscopy was just over a year ago and revealed a single hamartomatous polyp.  Given that there is no evidence of intestinal obstruction due to this large abdominal mass, and concern for a tumor of testicular or urologic origin, will defer to urology service and oncology service for work-up and management of this large abdominal mass.  Labs and imaging have been reviewed.         Patient specific identified risk factors considered as part of today s evaluation include: none    Additional history obtained from Care Everywhere.  Time spent on consultation: 35 minutes, greater than 50 percent of the total encounter time is spent in counseling and/or coordination of care          Eve Kinsey MD  [facility]  Phone:  [phone]

## 2020-09-24 NOTE — PLAN OF CARE
Urology    Formal consult note to follow    Long discussion >50 minutes with [place] phone  and patient's sister Mamta Diallo on the line. Patient with very large retroperitoneal mass and severe YAYA with bilateral hydronephrosis, solitary left testicle with testicular mass. 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  Discussed with patient that he needs renal drainage, options including cystoscopy and bilateral stent placement vs. Bilateral nephrostomy tube placement, and that in this setting I believe that an attempt to place stents is reasonable. I also discussed that the likelihood that this is testicular germ cell tumor, and that usually we would pursue orchiectomy up front for tissue diagnosis. However given his advanced retroperitoneal adenopathy and likely urgent need for chemo, we may have to delay the orchiectomy to start chemo. For now he is consented for cystoscopy, bilateral retrograde pyelogram and stent placement, and left inguinal orchiectomy to happen this afternoon, but we may decide to forgo the orch at this time. Need further discussion with primary and med onc    Ralf Chan MD   [facility]  [phone] clinic phone

## 2020-09-24 NOTE — ED TRIAGE NOTES
Here for abdominal bloating and right lower abdominal mass. Went to clinic and was refer to come to ED due to fever 100.5F, elevated HR. Losing weight last four weeks. Family here for translation. ABCs intact.

## 2020-09-24 NOTE — ED NOTES
"[facility]  ED Nurse Handoff Report    Luz Marina Diallo is a 55 year old male   ED Chief complaint: Abdominal Mass  . ED Diagnosis:   Final diagnoses:   Hypercalcemia   YAYA (acute kidney injury) (H)   Abdominal mass, unspecified abdominal location   Obstructive uropathy     Allergies: No Known Allergies    Code Status: Full Code  Activity level - Baseline/Home:  Independent. Activity Level - Current:   Assist X 1. Lift room needed: No. Bariatric: No   Needed: No   Isolation: No. Infection: Not Applicable.     Vital Signs:   Vitals:    09/23/20 1923 09/23/20 2330 09/23/20 2345 09/24/20 0000   BP: (!) 149/103 132/89 130/89 131/89   Pulse: 121 93 93 93   Resp: 18      Temp: 99.6  F (37.6  C)      TempSrc: Oral      SpO2: 97% 97% 98% 97%   Height: 1.55 m (5' 1.02\")          Cardiac Rhythm:  ,      Pain level: 0-10 Pain Scale: 0  Patient confused: No. Patient Falls Risk: Yes.   Elimination Status: Not in ED  Patient Report - Initial Complaint: Abdominal Mass . Focused Assessment: Luz Marina Diallo is a 55 year old male with a history of cognitive delay who presents for evaluation of progressive weight loss, urinary frequency, decreased appetite, progressive fatigue over the past few months.  Patient lives with his sister who brought him to the clinic today and they noticed that he had a fever 100.5  F.  He has not had cough or vomiting or diarrhea or trouble breathing or abdominal pain.  Family has noticed that he has had a progressive mass in the lower abdomen.  Has a history of anemia per sister but no other known medical problems.   Tests Performed: EKG, Labs, Imaging. Abnormal Results:   Labs Ordered and Resulted from Time of ED Arrival Up to the Time of Departure from the ED   BASIC METABOLIC PANEL - Abnormal; Notable for the following components:       Result Value    Glucose 121 (*)     Urea Nitrogen 69 (*)     Creatinine 4.51 (*)     GFR Estimate 14 (*)     GFR Estimate If " Black 16 (*)     Calcium 15.5 (*)     All other components within normal limits   CBC WITH PLATELETS DIFFERENTIAL - Abnormal; Notable for the following components:    RBC Count 3.12 (*)     Hemoglobin 8.2 (*)     Hematocrit 27.8 (*)     MCH 26.3 (*)     MCHC 29.5 (*)     RDW 16.2 (*)     Absolute Lymphocytes 0.7 (*)     All other components within normal limits   LACTIC ACID WHOLE BLOOD - Abnormal; Notable for the following components:    Lactic Acid 2.4 (*)     All other components within normal limits   ROUTINE UA WITH MICROSCOPIC - Abnormal; Notable for the following components:    Blood Urine Trace (*)     Protein Albumin Urine 30 (*)     All other components within normal limits   HEPATIC PANEL - Abnormal; Notable for the following components:    Protein Total 9.7 (*)     Alkaline Phosphatase 528 (*)     All other components within normal limits   CALCIUM IONIZED WHOLE BLOOD - Abnormal; Notable for the following components:    Calcium Ionized Whole Blood 7.8 (*)     All other components within normal limits   LIPASE   COVID-19 VIRUS (CORONAVIRUS) BY PCR   MAY SALINE LOCK IV   CARDIAC CONTINUOUS MONITORING   BLADDER SCAN   BLOOD CULTURE   BLOOD CULTURE     Abd/pelvis CT no contrast - Stone Protocol   Final Result   IMPRESSION:    1.  Huge lobulated, partially calcified mass present within the low abdominal cavity favored to represent colon carcinoma with associated carcinomatosis and bulky retroperitoneal/pelvic adenopathy. Testicular malignancy and adenopathy could've a similar    appearance. Possible undescended testicles.   2.  Hydronephrosis, left worse than right, secondary to mass effect upon ureters.             Treatments provided: See MAR  Family Comments: N/A  OBS brochure/video discussed/provided to patient:  No  ED Medications:   Medications   0.9% sodium chloride BOLUS (0 mLs Intravenous Stopped 9/24/20 0012)     Followed by   0.9% sodium chloride BOLUS (has no administration in time range)      Followed by   sodium chloride 0.9% infusion (has no administration in time range)   piperacillin-tazobactam (ZOSYN) infusion 2.25 g (0 g Intravenous Stopped 9/24/20 0012)     Drips infusing:  No  For the majority of the shift, the patient's behavior Green. Interventions performed were N/A.    Sepsis treatment initiated: No     Patient tested for COVID 19 prior to admission: YES    ED Nurse Name/Phone Number: Helen Ortega RN,   12:48 AM    RECEIVING UNIT ED HANDOFF REVIEW    Above ED Nurse Handoff Report was reviewed: Yes  Reviewed by: Destinee Martines on September 24, 2020 at 3:45 AM

## 2020-09-24 NOTE — OP NOTE
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]  Bilateral hydronephrosis   Post-operative diagnosis Same as preop   Procedure: Procedure(s):  CYSTOSCOPY, WITH BILATERAL RETROGRADE PYELOGRAM AND BILATERAL URETERAL STENT INSERTION - complex due to severe bilateral hydroureteronephrosis with ureteral tortuosity  Examination under anesthesia  Fluoroscopic interpretation > 1 hour physician time   Surgeon: Ralf Chan MD   Assistants(s): none   Anesthesia: General    Estimated blood loss: None    Total IV fluids: (See anesthesia record)   Blood transfusion: No transfusion was given during surgery   Total urine output: Not measured   Drains: Bilateral ureteral stents  18 Fr Palafox catheter   Specimens: None   Implants: 7 Fr x 24 cm right ureteral stent  6 Fr x 28 cm left ureteral stent     Findings: Severe bilateral hydroureteronephrosis L>R with ureteral tortuosity. Small capacity trabeculated bladder with asymmetrical trigone.   Complications: None.   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  Risk and benefits were discussed and the patient opts to undergo cystoscopy bilateral stent placement for kidney drainage.  There was some thought as to doing a concurrent left orchiectomy at the time of the stent placement for diagnosis and treatment, however for now we will focus on improving his kidney function in anticipation of initiation of chemotherapy.  Extensive discussion was held with the patient and his sister with  the use of [place] language speaking . Informed consent was obtained.    Patient was brought to the OR #4.  Weight and renal appropriate dose of antibiotics was given prior to the procedure.  General anesthesia was induced, patient was placed in dorsolithotomy position. Examination revealed a circumcised phallus with orthotopic and patent meatus. The right hemiscrotum was empty. The left testicle was noted to be very firm, about 4 cm diameter, and located in the upper left hemiscrotum near the inguinal canal. There was a very prominent firm lower abdominal mass corresponding to the 17 cm mass seen on CT scan. He was prepped and draped in standard sterile fashion.  A timeout was performed.    A 22 Panamanian Storz cystoscope was assembled and passed through the urethra into the bladder.  The bladder was noted to have a somewhat small capacity with some moderate trabeculation. No concerning bladder lesions were seen. The trigone was asymmetrical with the left ureteral orifice appearing to be farther from the bladder neck than the right ureteral orifice.  The right ureteral orifice appeared to be wide open and refluxing.  The right ureteral orifice was cannulated with a 5 Panamanian tiger tail catheter and a retrograde pyelogram was performed which showed a tortuous ureter with a cutoff of contrast in the mid ureter.  A 0.035 inch stiff shaft straight Glidewire was not able to navigate this cut off of contrast.  The tiger tail catheter was advanced up to this region and further contrast was instilled which showed a kink in the ureter.  Subsequently a 0.035 inch angled Glidewire was used to pass this obstruction.  The wire was then passed up to the renal pelvis under fluoroscopic guidance.  The tiger tail catheter was passed over the wire up to the renal pelvis and the wire was removed.  Retrograde pyelogram showed moderate to severe hydroureteronephrosis with a moderate amount of ureteral tortuosity.  The stiff shaft  Glidewire was replaced back up to the renal pelvis.  The ureter was measured and the length was noted to be about 24 cm.  The tiger tail catheter was removed.  A 7 Malawian by 24 cm right ureteral stent was placed under fluoroscopic and cystoscopic guidance with good coils noted proximally and distally.    Attention was turned to the left ureteral orifice. The left UO was noted to be tight appearing.  A 5 Malawian tiger tail catheter was passed up the left UO.  A left retrograde pyelogram was performed which showed a cutoff of contrast over the pelvic brim.  5 Malawian tiger tail catheter was advanced up to the cutoff of contrast and an angled Glidewire was used to navigate this kink with significant difficulty and manipulation of the ureter.  Tiger tail catheter was then passed over the wire into the proximal ureter and up into the renal pelvis.  The wire was removed.  A gentle left retrograde pyelogram showed severe left hydroureteronephrosis with significant blunting of the calyces.  The ureter was measured up to the dilated upper pole calyx and was noted to be about 28 cm in length.  A 6 Malawian by 28 cm left ureteral stent was placed in the usual fashion under cystoscopic and fluoroscopic guidance with good coils noted proximally distally.  After placement of the stents, clear urine efflux was seen bilaterally.  The scope was removed.  An 18 Malawian Palafox catheter was placed in left to gravity drainage.  A belladonna opium suppository was placed per rectum, and a digital rectal exam revealed a 40 g prostate, benign, a nodular, symmetric.  Patient was cleaned up, put back in the supine position, awoken from anesthesia and brought to PACU in stable condition    Ralf Chan MD   [facility]  [phone] clinic phone

## 2020-09-24 NOTE — ED PROVIDER NOTES
History     Chief Complaint:  Abdominal Mass       The history is provided by the patient and a relative.     Luz Marina Diallo is a 55 year old male with a history of cognitive delay who presents for evaluation of progressive weight loss, urinary frequency, decreased appetite, progressive fatigue over the past few months.  Patient lives with his sister who brought him to the clinic today and they noticed that he had a fever 100.5  F.  He has not had cough or vomiting or diarrhea or trouble breathing or abdominal pain.  Family has noticed that he has had a progressive mass in the lower abdomen.  Has a history of anemia per sister but no other known medical problems.      Allergies:  No Known Drug Allergies    Medications:   Protonix  Ferrous sulfate    Medical History:   Mental retardation  Anemia    Surgical History   Abdominal surgery  EGD, combined     Family History:   Father -  diabetes    Social History:  The patient was accompanied to the ED by a family member.  Smoking Status: Never  Smokeless Tobacco: Never  Alcohol Use: Yes  Drug Use: No       Review of Systems   Constitutional: Positive for appetite change, fatigue, fever and unexpected weight change.   Respiratory: Negative for cough.         Negative for difficulty breathing   Gastrointestinal: Negative for abdominal pain, diarrhea and vomiting.   Genitourinary: Positive for frequency.   hx limited by cognitive delay      Physical Exam     Patient Vitals for the past 24 hrs:   BP Temp Temp src Pulse Resp SpO2   09/23/20 1923 (!) 149/103 99.6  F (37.6  C) Oral 121 18 97 %          Physical Exam  VS: Reviewed per above  HENT: Mucous membranes moist  EYES: sclera anicteric  CV: Rate as noted, regular rhythm.   RESP: Effort normal. Breath sounds are normal bilaterally.  GI: no tenderness/rebound/guarding, firm mass to the right lower abdomen.  No overlying skin changes.  Mass is not tender.  No scrotal masses appreciated  NEURO: Alert, moving all  extremities  MSK: No deformity of the extremities  SKIN: Warm and dry    Emergency Department Course     ECG:  ECG taken at 2257  Sinus rhythm  Abnormal ECG  When compared with ECG of 28-FEB-2019 16:36,  No significant change was found  Rate 90 bpm. LA interval 158 ms. QRS duration 90 ms. QT/QTc 316/386 ms. P-R-T axes 57 17 47.    Imaging:  Radiology results were communicated with the patient and family who voiced understanding of the findings.    Abd/pelvis CT no contrast - stone protocol:  IMPRESSION:   1.  Huge lobulated, partially calcified mass present within the low abdominal cavity favored to represent colon carcinoma with associated carcinomatosis and bulky retroperitoneal/pelvic adenopathy. Testicular malignancy and adenopathy could have a similar   appearance. Possible undescended testicles.   2.  Hydronephrosis, left worse than right, secondary to mass effect upon ureters.   Reading per radiology.    US Abdomen or Pelvis Doppler limited:  IMPRESSION:   1. The left testicle is not well-defined. There is a 5 cm lobulated structure in the left inguinal region that likely represents a lobulated soft tissue mass surrounding a normal left testicle. The mass is suspicious for neoplasm.   2. Nonvisualization of the right testicle. The testicle may be undescended or absent.   Reading per radiology.    Laboratory:  Laboratory findings were communicated with the patient and family who voiced understanding of the findings.    CBC: WBC 8.4, HGB 8.2 (L),   BMP: Glucose 121 (H), BUN 69 (H), Creatinine 4.51 (H), GFR 14 (L), Calcium 15.5 (HH), o/w WNL   Lactic Acid (Resulted 2120): 2.4 (H)   Lipase: 115   Hepatic Panel: Protein total 9.7 (H), Alkphos 528 (H), o/w WNL  Calcium Ionized whole blood: 7.8 (HH)   Blood Culture x2: Pending     UA with Microscopic: Blood trace (A), Protein albumin 30 (A), o/w WNL     Asymptomatic COVID-19 (Coronavirus) PCR by Nasopharyngeal Swab: Pending     Interventions:   6750 Normal  Saline 1000 mL IV   2320 Zosyn 2.25 g IV  0053 Normal Saline 1000 mL IV      Emergency Department Course:    2112 IV was inserted and blood was drawn for laboratory testing, results above.     2223 Nursing notes and vitals reviewed.    2225 I performed an exam of the patient as documented above.     2228 The patient provided a urine sample here in the emergency department. This was sent for laboratory testing, findings above.     2238 The patient was sent for CT while in the emergency department, results above.     The patient's nose was swabbed and this sample was sent for COVID testing, findings above.      2344 I spoke with Dr. Sue of the nephrology service regarding patient's presentation, findings, and plan of care.     2355 I spoke with the Urology service from [facility] regarding patient's presentation, findings, and plan of care.    0118 I spoke with Dr. Elmore of [facility] regarding patient's presentation, findings, and plan of care.      0130  I consulted with Dr. Win of the hospitalist services. They are in agreement to accept the patient for admission. Findings and plan explained to the Patient who consents to admission. Discussed the patient with Dr. Win, who will admit the patient to a cardiac telemetry bed for further monitoring, evaluation, and treatment.    Impression & Plan       Medical Decision Making:  Patient presents to the ER for evaluation of progressive weight loss, decreased appetite, abdominal mass, urinary frequency.  Apparently had a fever of 100.5  F in the clinic today and was referred to the ER.  Here he is afebrile but tachycardic.  He has an abdominal mass that is nontender.    Initial labs show evidence of acute kidney injury with a creatinine of 4.5, hypercalcemia of 15.5 with ionized calcium of 7.8, lactic acidosis of 2.4 but no leukocytosis.  Given reports of fever and tachycardia and lactic acidosis, he was given Zosyn empirically.    There is no  evidence of UTI, no infectious respiratory symptoms, no signs of skin or soft tissue infection, no clinical signs of meningitis.  I suspect his lactic acidosis is due to dehydration and kidney injury.  CT of the abdomen suggests no infectious process but there is a large calcified pelvic abdominal mass with bilateral hydronephrosis secondary to compression from this mass.  No evidence of urinary retention on bedside ultrasound or CT.    I spoke with nephrology, Dr Sue, who recommended fluids and if hypercalcemia is not improving, could consider other adjunctive treatments once hospitalized.  I spoke with urology, Dr Arvizu, who recommended patient be n.p.o. for possible percutaneous nephrostomy versus ureteral stent placement tomorrow.  They also recommended scrotal ultrasound due to concern that this abdominal mass could represent testicular neoplasm.  I spoke with colorectal surgery, Dr Bueno, who reviewed the images and did not see evidence of obstruction or perforation to suggest patient will need emergent laparoscopy; however she mentioned that patient should not have definitive surgery here at [facility], rather somewhere with surgical oncology.  Unfortunately there was no bed availability at [facility].  Plan to admit to [facility] for treatment of hypercalcemia, ureteral obstruction and then consider more definitive management of this abdominal mass later on.    Covid-19  Luz Marina Diallo was evaluated during a global COVID-19 pandemic, which necessitated consideration that the patient might be at risk for infection with the SARS-CoV-2 virus that causes COVID-19.   Applicable protocols for evaluation were followed during the patient's care.   COVID-19 was considered as part of the patient's evaluation. The plan for testing is:  a test was obtained during this visit.    Diagnosis:     ICD-10-CM    1. Hypercalcemia  E83.52    2. YAYA (acute kidney injury) (H)  N17.9    3. Abdominal mass, unspecified  abdominal location  R19.00    4. Obstructive uropathy  N13.9         Disposition:  Admitted to Dr. Win.     Scribe Disclosure:  I, Manjula Ly, am serving as a scribe at 10:26 PM on 9/23/2020 to document services personally performed by Cl Cantu MD based on my observations and the provider's statements to me.      Cl Cantu MD  09/24/20 0427

## 2020-09-24 NOTE — PLAN OF CARE
AOx4 (per  assistance), VSS on room air, up SBA, steady on feet, tele on; sinus rhythm. Azeri speaking, needs  if family not present.     Denies pain/nausea/SOB. Afebrile.     Abdomen has an obvious RLQ firm mass protrusion. Colorectal surgery, nephrology, and urology consults in place for today. Hospitalist reports likelihood of surgery to remove mass.     Pt was able to void 200 ml for me (bright yellow, frothy) with a post-void bladder scan of 25 ml.     Multiple IV antibiotics started per orders as well as IV bolus of 1,000ml.     Pt is comfortable, has yet to use call-light but appears to be understanding of how to use.

## 2020-09-25 NOTE — PROGRESS NOTES
[facility]  Hospitalist Progress Note  Jameson Medina MD 09/25/2020    Reason for Stay        Abdominal mass    Acute renal failure    Hyperglycemia    Lactic acidemia    Testicular mass         Assessment and Plan:        Summary of Stay:     Luz Marina Diallo is a 55-year-old gentleman with a past medical history of mental retardation, anemia, peptic ulcer disease, hiatal hernia, who came in with progressively worsening weight loss, urinary frequency, decreased appetite, and progressive fatigue and abdominal discomfort over the last few months and found to have abdominal mass, hypercalcemia and acute renal failure and being admitted to the hospital.        Patient progress during stay:    Patient was found to have very large retroperitoneal mass, pelvic mass along with a left testicular mass on the imaging study.  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  He also has bilateral severe hydronephrosis with acute kidney injury and hypercalcemia.  Consultations obtained with urology and patient had cystoscopy and bilateral ureteral stent placement on September 24, 2020, Palafox catheter was placed to gravity drainage.  Nephrology was consulted for monitoring of acute kidney injury.    Patient was treated with intravenous fluids as well as a dose of Zometa.  Oncology service consulted to assist with further management recommendations as well.      Problem List with Assessment and Plan      1. Acute kidney injury: Secondary to obstructive uropathy    S/p stent placement.  Nephrology and urology following.    Serum creatinine 4.23 trending down from 4.3 yesterday.    Continue intravenous fluid per nephrology recommendation, continue to monitor intake and output, daily weight.    2. Abdominal mass with large retroperitoneal mass, pelvic mass and left testicular mass concerning for testicular malignancy with metastatic  "disease.      Hematology oncology and urology is following.  Patient understands placed yesterday and Palafox catheter is in place.    CT directed biopsy is pending and further evaluation per oncology, urology and nephrology service.  Input from consultants highly appreciated    3. Hypercalcemia: Patient's calcium is 10.7, down from 13 yesterday.      Patient had is admitted, IV fluid with normal saline at 200 cc an hour, nephrology following.    Care discussed with nephrology.    4.  Anemia: Hemoglobin 7.2    Suspect anemia chronic disease, continue to monitor and transfuse as needed      Plan for today :    Continue IV fluid    CT-guided biopsy    Reevaluate if he needs Zosyn    Continue to monitor        LDA     Access : Peripheral     Palafox Catheter: in place, indication: /GI/GYN Pelvic Procedure        FEN :    Orders Placed This Encounter      NPO per Anesthesia Guidelines for Procedure/Surgery Except for: Meds           Intake/Output Summary (Last 24 hours) at 9/25/2020 1135  Last data filed at 9/25/2020 0957  Gross per 24 hour   Intake 3901 ml   Output 1300 ml   Net 2601 ml          DVT Prophylaxis: Pneumatic Compression Devices    Code Status:  Full Code    Estimated discharge  disposition and plan: Likely in the next 2 days if okay with consultants        Interval History (Subjective):        Patient is seen and examined by me today and medical record reviewed.Overnight events noted and care discussed with nursing staff.  Patient caesar  and sister is at bedside and helping with communication with the patient.  He denies any pain, fever or discomfort.                  Physical Exam:        Last Vital Signs:  /74 (BP Location: Left arm)   Pulse 82   Temp 98.6  F (37  C) (Oral)   Resp 25   Ht 1.55 m (5' 1.02\")   Wt 52.9 kg (116 lb 9.6 oz)   SpO2 97%   BMI 22.01 kg/m      I/O last 3 completed shifts:  In: 3781 [P.O.:60; I.V.:3721]  Out: 1900 [Urine:1900]  Vitals:    09/24/20 0425 09/24/20 1855 " 09/25/20 0514   Weight: 47.2 kg (104 lb) 51.8 kg (114 lb 1.6 oz) 52.9 kg (116 lb 9.6 oz)       Wt Readings from Last 5 Encounters:   09/25/20 52.9 kg (116 lb 9.6 oz)   09/23/20 46.7 kg (103 lb)   07/03/19 49.9 kg (110 lb)   02/28/19 53.5 kg (117 lb 14.4 oz)   08/23/17 44.4 kg (97 lb 12.8 oz)        Constitutional: Awake, alert, cooperative, no apparent distress     Respiratory: Clear to auscultation bilaterally, no crackles or wheezing   Cardiovascular: Regular rate and rhythm, normal S1 and S2, and no murmur noted   Abdomen:  Large palpable abdominal mass   Skin: No rashes, no cyanosis, dry to touch   Neuro: Alert with  no weakness,   Extremities: No edema, normal range of motion   Other(s):        All other systems: Negative          Medications:        All current medications were reviewed with changes reflected in problem list.         Data:      All new lab and imaging data was reviewed.      Data reviewed today: I reviewed all new labs and imaging results over the last 24 hours. I personally reviewed no images or EKG's today      Recent Labs   Lab 09/25/20  0705 09/24/20  0454 09/23/20  2112   WBC 10.0 6.6 8.4   HGB 7.2* 7.1* 8.2*   HCT 25.4* 25.6* 27.8*   MCV 93 96 89    345 441     Recent Labs   Lab 09/23/20  2329   CULT No growth after 1 day  No growth after 1 day     Recent Labs   Lab 09/25/20  0705 09/24/20  1309 09/24/20  0601  09/23/20  2112    140 141   < > 137   POTASSIUM 5.3 4.5 4.3   < > 4.6   CHLORIDE 115* 111* 110*   < > 103   CO2 18* 22 24   < > 28   ANIONGAP 9 7 7   < > 6   GLC 66* 85 91   < > 121*   BUN 63* 63* 63*   < > 69*   CR 4.23* 4.30* 4.45*   < > 4.51*   GFRESTIMATED 15* 15* 14*   < > 14*   GFRESTBLACK 17* 17* 16*   < > 16*   IVANIA 10.7* 13.0* 13.8*   < > 15.5*   PHOS 6.8* 4.6*  --   --   --    PROTTOTAL 7.4  --   --   --  9.7*   ALBUMIN 2.6* 2.9*  --   --  3.7   BILITOTAL 0.2  --   --   --  0.2   ALKPHOS 491*  --   --   --  528*   AST 57*  --   --   --  40   ALT 17  --   --    --  21    < > = values in this interval not displayed.       Recent Labs   Lab 09/25/20  0946 09/25/20  0921 09/25/20  0846 09/25/20  0821 09/25/20  0705 09/24/20  1309 09/24/20  0601 09/24/20  0454 09/23/20  2112   GLC  --   --   --   --  66* 85 91 88 121*   * 85 66* 62*  --   --   --   --   --        Recent Labs   Lab 09/24/20  0454   INR 0.96         No results for input(s): TROPONIN, TROPI, TROPR in the last 168 hours.    Invalid input(s): TROP, TROPONINIES    Recent Results (from the past 48 hour(s))   Abd/pelvis CT no contrast - Stone Protocol    Narrative    EXAM: CT ABDOMEN PELVIS W/O CONTRAST  LOCATION: [facility]  DATE/TIME: 9/23/2020 10:38 PM    INDICATION: Renal failure. Hypercalcemia. Abdominal pain. Fever. Abdominal mass. History of anemia.  COMPARISON: None.  TECHNIQUE: CT scan of the abdomen and pelvis was performed without IV contrast. Multiplanar reformats were obtained. Dose reduction techniques were used.  CONTRAST: None.    FINDINGS:   LOWER CHEST: Normal.    HEPATOBILIARY: Normal.  PANCREAS: Normal.  SPLEEN: Normal.  ADRENAL GLANDS: Normal.  KIDNEYS/BLADDER: Mild bilateral hydronephrosis and hydroureter, left worse than right secondary to large pelvic mass    BOWEL: There is a large lobulated low abdominal/pelvic mass with maximal dimensions of approximately 17 x 17 x 13.5 cm. There are numerous calcifications distributed throughout the mass. There is also bulky confluent retroperitoneal adenopathy measuring   up to 12 x 8 cm at the level of the renal rupinder that also demonstrate the same calcification pattern. Masses along the left hemipelvis are confluent with the dominant mass though may represent separate foci of adenopathy. In addition, there are numerous   smaller intra-abdominal or mesenteric masses which do not demonstrate calcifications on likely represent metastatic peritoneal implants or additional sites of adenopathy.    In a male patient mucinous colon carcinoma  with be the most to result in this appearance. Testicular malignancies could also result in this appearance.    VASCULATURE: Unremarkable.    PELVIC ORGANS: Normal size prostate. Bladder unremarkable.  Ovoid density within left inguinal canal likely relates to a retracted or possibly undescended testicle. Right testicle is not identified though the entire scrotum is not included on this exam.    MUSCULOSKELETAL: No suspicious lesions.      Impression    IMPRESSION:   1.  Huge lobulated, partially calcified mass present within the low abdominal cavity favored to represent colon carcinoma with associated carcinomatosis and bulky retroperitoneal/pelvic adenopathy. Testicular malignancy and adenopathy could've a similar   appearance. Possible undescended testicles.  2.  Hydronephrosis, left worse than right, secondary to mass effect upon ureters.     US Testicular & Scrotum w Doppler Ltd    Narrative    EXAM: ULTRASOUND SCROTUM WITH DOPPLER  LOCATION: [facility]  DATE/TIME: 9/24/2020 1:46 AM    INDICATION: Abnormal CT scan. Question undescended testicle.  COMPARISON: 09/23/2020 - CT abdomen and pelvis.    TECHNIQUE: Ultrasound of scrotum with color flow and spectral Doppler with waveform analysis performed.    FINDINGS:    RIGHT: The testicle is not visualized. No hydrocele or varicocele.    LEFT: A mildly lobulated 5 cm mass-like structure containing a few tiny echogenicities is present in the left inguinal canal. This surrounds a 3.5 x 1.9 x 1.5 cm relatively homogeneous structure. This most likely represents a lobulated soft tissue mass   surrounding a normal left testicle. Blood flow is visualized in the presumed testicle. No hydrocele or varicocele.      Impression    IMPRESSION:   1. The left testicle is not well-defined. There is a 5 cm lobulated structure in the left inguinal region that likely represents a lobulated soft tissue mass surrounding a normal left testicle. The mass is suspicious for  neoplasm.  2. Nonvisualization of the right testicle. The testicle may be undescended or absent.   XR Surgery GLORIA L/T 5 Min Fluoro w Stills    Narrative    This exam was marked as non-reportable because it will not be read by a   radiologist or a [facility] non-radiologist provider.               Disclaimer: This note consists of symbols derived from keyboarding, dictation and/or voice recognition software. As a result, there may be errors in the script that have gone undetected. Please consider this when interpreting information found in this chart.

## 2020-09-25 NOTE — PROGRESS NOTES
Urology    POD#1 s/p cystoscopy and bilateral ureteral stent placement    Clear yellow urine no clots. Underwent percutaneous biopsy today, oncology following    SCr stable from yesterday, calcium downtrending.    - leave tran catheter in place until SCr nadirs     Ralf Chan MD   [facility]  [phone] clinic phone

## 2020-09-25 NOTE — SIGNIFICANT EVENT
Bss 66 on labs, check sugar at 0825, 62, 15 grams of glucose gel provided.  0846: Recheck of 66, additional 15 grams of glucose gel provided.  0921- BS of 85, Additional 15 grams of glucose gel provided.  0946- BS of 116

## 2020-09-25 NOTE — PLAN OF CARE
End of Shift Summary  For vital signs and complete assessments, please see documentation flowsheets.     Pertinent assessments: Sister interpreting. A&Ox4. Up ax1. Denies pain. Only complains of tran burning. Urine clear light pink, no clots. Adequate output. VSS. RA. Abdomen distended. BS hypo.     Major Shift Events: Cysto with stent placement.   Triggered sepsis, lactic 1.2 Uneventful night.    Treatment Plan: IVF, tran, zosyn, abdominal biopsy in the am.     Bedside Nurse: Lolly Eden RN

## 2020-09-25 NOTE — CONSULTS
[facility]    Hematology / Oncology Consultation     Date of Admission:  9/23/2020  Date of Consult (When I saw the patient): 09/25/20    Assessment & Plan   Luz Marina Diallo is a 55 year old male who was admitted on 9/23/2020. 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     Luz Marina was in the room with his sister who did the translation and also provided with most of the history. She notes that Luz Marina expressed of some abdominal discomfort for more than a month though he had been downplaying it. He has lost weight over the past few months. He eventually agreed that to have it checked and was referred to ED for evaluation. He has a large mass on CT scan which is covering most of his abdomen. He was noted to be in acute renal failure with bilateral hydronephrosis and hypercalcemia.     I have discussed his case with Dr. Dowling and suspect this to be testicular germ cell tumor. His bHCG has come back elevated at 700 international unit(s)/L and LDH at 710. His AFP is normal.     I did discuss the need for a biopsy of the retroperitoneal mass which has been done this afternoon. We should have at least preliminary results by Monday or Tuesday. I reviewed chemotherapy with BLEOMYCIN, ETOPOSIDE AND CISPLATINUM (BEP) as the next step. We would have to wait for his creatinine to improve before we can administer cisplatin which is the most important part of this regimen.  I did review side effects of the individual agents in the Cisplatin, Etoposide, Bleomycin regimen and also combined toxicity. Cisplatin causes, nausea, vomiting, tinnitus, hearing loss, renal dysfunction, peripheral neuropathy. Etoposide can cause secondary leukemias though very rarely. Bleomycin can cause fevers with infusion, skin rash, pulmonary fibrosis. The  combination can cause mucositis, alopecia, marked myelosuppression and risk for neutropenic fevers. Most of these side effects are rare or can be easily managed with current supportive care strategies and majority of patients do not have a lot of difficulty with this chemotherapy regimen (this is also because most patients are very young). I have reviewed the regimen where cisplatin and etoposide are given every 3 weeks, with the dose of both these drugs split over 5 days to decrease toxicity. Bleomycin is given weekly throughout the course. I will use neulasta on day 6 to limit duration and severity of neutropenia.    We would get a PFT and audiogram done soon - if possible this admission.     Recommendations:  - Agree with management of acute renal failure and hypercalcemia as per Dr. Mott. I have reviewed Stevierayshawn's case with him.   - Large abdominal mass biopsied earlier today and path pending  - Likely testicular germ cell tumor  - Recommend chemotherapy with BLEOMYCIN, ETOPOSIDE AND CISPLATINUM (BEP) regimen for 4 cycles. He at least has intermediate risk disease with LDH greater than 1.5 times the normal value. He likely could have poor risk disease if there is bony metastasis    (he has elevated alkaline phosphatase and hypercalcemia suggestive of bony metastasis).    - I will try to get a bone scan, PFT and audiogram soon.   - We will continue to follow during this admission, though he would not be able to start chemotherapy at least until Monday or Tuesday due to his current renal function.     Over 60 min of time spent with more than 50% time spent in counseling and coordinating care.      Jarvis Ramos MD    Code Status    Full Code    Reason for Consult   Reason for consult: I was asked by Dr. Crow to evaluate this patient for abdominal mass.    Primary Care Physician   [facility]    Chief Complaint   Abdominal pain    History is obtained from the patient and mostly his  "sister    History of Present Illness   Luz Marina Diallo is a 55 year old male who presents with abdominal discomfort as above.     Luz Marina lives with his sister who notes that he has been doing poorly over a month though he refuses to admit that he has a problem. She noticed that he had abdminal discomfort. He had been losing weight. He finally allowed her to examine his belly and she noted that it was hard. She took him to urgent care on 9/23/20 from where he was referred to ED and has been admitted.     Past Medical History   I have reviewed this patient's medical history and updated it with pertinent information if needed.   Past Medical History:   Diagnosis Date     Mental retardation        Past Surgical History   I have reviewed this patient's surgical history and updated it with pertinent information if needed.  Past Surgical History:   Procedure Laterality Date     ABDOMEN SURGERY      sister states he had an \"abdominal surgery 20 years ago\" unknown      CYSTOSCOPY, RETROGRADES, INSERT STENT URETER(S), COMBINED Bilateral 9/24/2020    Procedure: Cystoscopy with bilateral retrograde pyelogram and bilateral ureteral stent insertiona-complex due to severe bilateral hydroureteronephrosis with ureteral tortuosity, examination under anesthesia and fluoroscopic interpretation > 1 hour physician time;  Surgeon: Ralf Chan MD;  Location:  OR     ESOPHAGOSCOPY, GASTROSCOPY, DUODENOSCOPY (EGD), COMBINED N/A 3/1/2019    Procedure: ESOPHAGOSCOPY, GASTROSCOPY, DUODENOSCOPY (EGD) Rm 226;  Surgeon: Suys Camara MD;  Location:  GI       Prior to Admission Medications   Prior to Admission Medications   Prescriptions Last Dose Informant Patient Reported? Taking?   ferrous sulfate (FEROSUL) 325 (65 Fe) MG tablet 9/23/2020 at Unknown time  No Yes   Sig: Take 1 tablet (325 mg) by mouth daily (with breakfast)   multivitamin w/minerals (THERA-VIT-M) tablet 9/23/2020 at Unknown time  Yes Yes   Sig: Take 1 " tablet by mouth daily      Facility-Administered Medications: None     Allergies   No Known Allergies    Social History   I have reviewed this patient's social history and updated it with pertinent information if needed. Luz Marina Diallo  reports that he has never smoked. He has never used smokeless tobacco. He reports current alcohol use of about 2.0 standard drinks of alcohol per week. He reports that he does not use drugs.    Family History   I have reviewed this patient's family history and updated it with pertinent information if needed.   Family History   Problem Relation Age of Onset     Diabetes Father      Glaucoma No family hx of      Macular Degeneration No family hx of        Review of Systems   The 10 point Review of Systems is negative other than noted in the HPI or here.      Physical Exam   Temp: 98.6  F (37  C) Temp src: Oral BP: 125/79 Pulse: 82   Resp: 14 SpO2: 100 % O2 Device: None (Room air) Oxygen Delivery: 10 LPM  Vital Signs with Ranges  Temp:  [97.1  F (36.2  C)-98.6  F (37  C)] 98.6  F (37  C)  Pulse:  [73-97] 82  Resp:  [14-29] 14  BP: (105-140)/(72-93) 125/79  SpO2:  [96 %-100 %] 100 %  116 lbs 9.6 oz    Middle aged male in no acute distress  Detailed physical exam not done.     Data   Recent Labs   Lab Test 09/25/20  0705 09/24/20  1309 09/24/20  0601 09/24/20  0454 09/23/20 2112    140 141 140 137   POTASSIUM 5.3 4.5 4.3 4.4 4.6   CHLORIDE 115* 111* 110* 110* 103   CO2 18* 22 24 24 28   ANIONGAP 9 7 7 6 6   BUN 63* 63* 63* 67* 69*   CR 4.23* 4.30* 4.45* 4.40* 4.51*   GLC 66* 85 91 88 121*   IVANIA 10.7* 13.0* 13.8* 13.4* 15.5*     Recent Labs   Lab Test 09/25/20  0705 09/24/20  1309   PHOS 6.8* 4.6*     Recent Labs   Lab Test 09/25/20  0705 09/24/20  0454 09/23/20  2112 07/03/19  1105 03/01/19  0615  02/28/19  1627 08/13/17  1933   WBC 10.0 6.6 8.4 5.3 7.0  --  8.6 8.2   HGB 7.2* 7.1* 8.2* 13.9 7.6*   < > 4.1* 9.2*    345 441 291 320  --  428 283   MCV 93 96 89 78 92   --  93 81   NEUTROPHIL 82.1  --  75.1 54.8  --   --  75.8 77.0    < > = values in this interval not displayed.     Recent Labs   Lab Test 09/25/20  0705 09/24/20  1309 09/24/20  0601 09/23/20  2112   BILITOTAL 0.2  --   --  0.2   ALKPHOS 491*  --   --  528*   ALT 17  --   --  21   AST 57*  --   --  40   ALBUMIN 2.6* 2.9*  --  3.7   LDH  --   --  710*  --      TSH   Date Value Ref Range Status   08/13/2017 0.75 0.40 - 4.00 mU/L Final     Recent Labs   Lab Test 09/25/20  0705   CEA 0.5     Results for orders placed or performed during the hospital encounter of 09/23/20   Abd/pelvis CT no contrast - Stone Protocol    Narrative    EXAM: CT ABDOMEN PELVIS W/O CONTRAST  LOCATION: [facility]  DATE/TIME: 9/23/2020 10:38 PM    INDICATION: Renal failure. Hypercalcemia. Abdominal pain. Fever. Abdominal mass. History of anemia.  COMPARISON: None.  TECHNIQUE: CT scan of the abdomen and pelvis was performed without IV contrast. Multiplanar reformats were obtained. Dose reduction techniques were used.  CONTRAST: None.    FINDINGS:   LOWER CHEST: Normal.    HEPATOBILIARY: Normal.  PANCREAS: Normal.  SPLEEN: Normal.  ADRENAL GLANDS: Normal.  KIDNEYS/BLADDER: Mild bilateral hydronephrosis and hydroureter, left worse than right secondary to large pelvic mass    BOWEL: There is a large lobulated low abdominal/pelvic mass with maximal dimensions of approximately 17 x 17 x 13.5 cm. There are numerous calcifications distributed throughout the mass. There is also bulky confluent retroperitoneal adenopathy measuring   up to 12 x 8 cm at the level of the renal rupinder that also demonstrate the same calcification pattern. Masses along the left hemipelvis are confluent with the dominant mass though may represent separate foci of adenopathy. In addition, there are numerous   smaller intra-abdominal or mesenteric masses which do not demonstrate calcifications on likely represent metastatic peritoneal implants or additional sites of  adenopathy.    In a male patient mucinous colon carcinoma with be the most to result in this appearance. Testicular malignancies could also result in this appearance.    VASCULATURE: Unremarkable.    PELVIC ORGANS: Normal size prostate. Bladder unremarkable.  Ovoid density within left inguinal canal likely relates to a retracted or possibly undescended testicle. Right testicle is not identified though the entire scrotum is not included on this exam.    MUSCULOSKELETAL: No suspicious lesions.      Impression    IMPRESSION:   1.  Huge lobulated, partially calcified mass present within the low abdominal cavity favored to represent colon carcinoma with associated carcinomatosis and bulky retroperitoneal/pelvic adenopathy. Testicular malignancy and adenopathy could've a similar   appearance. Possible undescended testicles.  2.  Hydronephrosis, left worse than right, secondary to mass effect upon ureters.     US Testicular & Scrotum w Doppler Ltd    Narrative    EXAM: ULTRASOUND SCROTUM WITH DOPPLER  LOCATION: [facility]  DATE/TIME: 9/24/2020 1:46 AM    INDICATION: Abnormal CT scan. Question undescended testicle.  COMPARISON: 09/23/2020 - CT abdomen and pelvis.    TECHNIQUE: Ultrasound of scrotum with color flow and spectral Doppler with waveform analysis performed.    FINDINGS:    RIGHT: The testicle is not visualized. No hydrocele or varicocele.    LEFT: A mildly lobulated 5 cm mass-like structure containing a few tiny echogenicities is present in the left inguinal canal. This surrounds a 3.5 x 1.9 x 1.5 cm relatively homogeneous structure. This most likely represents a lobulated soft tissue mass   surrounding a normal left testicle. Blood flow is visualized in the presumed testicle. No hydrocele or varicocele.      Impression    IMPRESSION:   1. The left testicle is not well-defined. There is a 5 cm lobulated structure in the left inguinal region that likely represents a lobulated soft tissue mass  surrounding a normal left testicle. The mass is suspicious for neoplasm.  2. Nonvisualization of the right testicle. The testicle may be undescended or absent.   XR Surgery GLORIA L/T 5 Min Fluoro w Stills    Narrative    This exam was marked as non-reportable because it will not be read by a   radiologist or a [facility] non-radiologist provider.           Recent Labs   Lab Test 09/24/20  0830 09/24/20  0601   FETO 1.5  --    HCGTM 700*  --    LDH  --  710*

## 2020-09-25 NOTE — PROVIDER NOTIFICATION
Pt's bs was 66 w/ labs, not diabetic, currently NPO w/ no PRN orders . Requesting prn orders or IV fluids w/ dextrose.

## 2020-09-25 NOTE — PROGRESS NOTES
Patient tolerated biopsy of abdomen/ retoperitonal mass well by Dr Brooks.  NO sedation was used.  Samples obtained were brought to lab.  Dressing clean dry and intact at time of transfer to CT.  Report called to receiving RN.  Ton Javed RN, BSN

## 2020-09-25 NOTE — PROGRESS NOTES
Nephrology Progress Note  09/25/2020         ASSESSMENT AND RECOMMENDATIONS:   1 acute kidney injury-bilateral severe obstructive uropathy likely the main cause.  Further compounded by severe hypercalcemia and hypovolemia.  Baseline creatinine of 0.9.  Nonoliguric.     2 large retroperitoneal mass/testicular mass-being evaluated by colorectal and urology as well as heme-onc.  Concerning for testicular primary with carcinomatosis.  -Plan for biopsy today.     3 critical hypercalcemia-PTH repeatedly suppressed.  Secondary to malignancy.  Got 1 dose of Zometa 9/24.  Non PTH dependent hypercalcemia . ? Bony mets. PTHrP and 1.25 Vit D pending.      4 severe anemia-related to malignancy.  Transfusion PRN per primary.     Recommendations  -Continue IV isotonic fluid at 150 cc an hour.  Target urine volume of at least 100 cc an hour.  -If any signs of volume overload appears, can give Lasix 40 mg IV.  --Transfusion PRN.  -Monitor bicarb.  If continues to worsen, switch fluid to half-normal saline +75 mEq of bicarb.  Expect it to improve with improvement of kidney function and resolution of obstruction.    Zeke Mott MD  [facility] - Nephrology   [phone]      Interval History :   Seen / examined.   Feels okay.  No nausea vomiting.  No shortness of breath.  Laying flat.  N.p.o. for abdominal mass biopsy today.  Status post bilateral stenting to his ureters yesterday.  2 L urine output.  Creatinine trending down.  Calcium slightly down to 10.7.    Review of Systems:   A 4 point review of systems was negative except as noted above.  Notably: poor appetite. no nausea or vomiting or diarrhea.  no confusion,  no fever or chills    Physical Exam:   I/O last 3 completed shifts:  In: 3781 [P.O.:60; I.V.:3721]  Out: 1900 [Urine:1900]    GENERAL APPEARANCE: no distress,  awake  Pulmonary: lungs clear to auscultation with equal breath sounds bilaterally, no clubbing  CV: regular rhythm, normal rate, no rub   - JVP -    - Edema-  GI: hard large mass on rt side of the abdomen  MS: no evidence of inflammation in joints  : no tran  SKIN: no rash, warm, dry, no cyanosis  NEURO: face symmetric, no asterixis     Labs:   All labs reviewed by me  Electrolytes/Renal -   Recent Labs   Lab Test 09/25/20  0705 09/24/20  1309 09/24/20  0601    140 141   POTASSIUM 5.3 4.5 4.3   CHLORIDE 115* 111* 110*   CO2 18* 22 24   BUN 63* 63* 63*   CR 4.23* 4.30* 4.45*   GLC 66* 85 91   IVANIA 10.7* 13.0* 13.8*   PHOS 6.8* 4.6*  --        CBC -   Recent Labs   Lab Test 09/25/20  0705 09/24/20  0454 09/23/20  2112   WBC 10.0 6.6 8.4   HGB 7.2* 7.1* 8.2*    345 441       LFTs -   Recent Labs   Lab Test 09/25/20  0705 09/24/20  1309 09/23/20  2112   ALKPHOS 491*  --  528*   BILITOTAL 0.2  --  0.2   ALT 17  --  21   AST 57*  --  40   PROTTOTAL 7.4  --  9.7*   ALBUMIN 2.6* 2.9* 3.7       Iron Panel -   Recent Labs   Lab Test 07/03/19  1105 02/28/19  1627  08/23/17  1110   IRON 75 113  --  12*   IRONSAT 27 48*  --  4*   SLIME 81 8*   < >  --     < > = values in this interval not displayed.         Current Medications:    famotidine  20 mg Oral BID     ferrous sulfate  325 mg Oral Daily with breakfast     piperacillin-tazobactam  2.25 g Intravenous Q8H     sodium chloride (PF)  3 mL Intracatheter Q8H       sodium chloride 150 mL/hr at 09/25/20 1255     Zeke Mott MD

## 2020-09-26 NOTE — PROGRESS NOTES
[facility]  Hospitalist Progress Note  Jameson Medina MD 09/26/2020    Reason for Stay        Abdominal mass    Acute renal failure    Hyperglycemia    Lactic acidemia    Testicular mass         Assessment and Plan:        Summary of Stay:     Luz Marina Diallo is a 55-year-old gentleman with a past medical history of mental retardation, anemia, peptic ulcer disease, hiatal hernia, who came in with progressively worsening weight loss, urinary frequency, decreased appetite, and progressive fatigue and abdominal discomfort over the last few months and found to have abdominal mass, hypercalcemia and acute renal failure and being admitted to the hospital.        Patient progress during stay:    Patient was found to have very large retroperitoneal mass, pelvic mass along with a left testicular mass on the imaging study.  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  He also has bilateral severe hydronephrosis with acute kidney injury and hypercalcemia.  Consultations obtained with urology and patient had cystoscopy and bilateral ureteral stent placement on September 24, 2020, Tran catheter was placed to gravity drainage.  Nephrology was consulted for monitoring of acute kidney injury.    Patient was treated with intravenous fluids as well as a dose of Zometa.  Oncology service consulted to assist with further management recommendations as well.      Problem List with Assessment and Plan      1. Acute kidney injury: Secondary to obstructive uropathy    S/p stent placement.  Nephrology and urology following.    Serum creatinine 4.0 trending down from 4.23 yesterday.    Continue  IVF at lower rate     POD#1 s/p cystoscopy and bilateral ureteral stent placement- plan to leave tran catheter in place until SCr nadirs     2. Abdominal mass with large retroperitoneal mass, pelvic mass and left testicular mass concerning for testicular  "malignancy with metastatic disease.      Hematology oncology and urology is following.     Palafox catheter is in place.    CT directed biopsy  Done and path pending   Input from consultants highly appreciated    3. Hypercalcemia: Patient's calcium is 9.8 .      Patient had is admitted, IV fluid with normal saline at 150 cc an hour, nephrology following.    Will decrease IVF     4.  Anemia: Hemoglobin 7.1    Suspect anemia chronic disease, continue to monitor and transfuse as needed      Plan for today :    Reduce  IV fluid    discharge IV zosyn         LDA     Access : Peripheral     Palafox Catheter: in place, indication: /GI/GYN Pelvic Procedure        FEN :    Orders Placed This Encounter      Regular Diet Adult           Intake/Output Summary (Last 24 hours) at 9/25/2020 1135  Last data filed at 9/25/2020 0957  Gross per 24 hour   Intake 3901 ml   Output 1300 ml   Net 2601 ml          DVT Prophylaxis: Pneumatic Compression Devices    Code Status:  Full Code    Estimated discharge  disposition and plan: Likely in the next 1-2 days if okay with consultants        Interval History (Subjective):        Patient is seen and examined by me today and medical record reviewed.Overnight events noted and care discussed with nursing staff.  Patient caesar  and sister is at bedside and helping with communication with the patient.  He denies any pain, fever or discomfort.                  Physical Exam:        Last Vital Signs:  /78 (BP Location: Left arm)   Pulse 87   Temp 99  F (37.2  C) (Oral)   Resp 16   Ht 1.55 m (5' 1.02\")   Wt 54 kg (119 lb 1.6 oz)   SpO2 100%   BMI 22.49 kg/m      I/O last 3 completed shifts:  In: 4284 [P.O.:1200; I.V.:3084]  Out: 4075 [Urine:4075]  Vitals:    09/24/20 0425 09/24/20 1855 09/25/20 0514 09/26/20 0546   Weight: 47.2 kg (104 lb) 51.8 kg (114 lb 1.6 oz) 52.9 kg (116 lb 9.6 oz) 54 kg (119 lb 1.6 oz)       Wt Readings from Last 5 Encounters:   09/26/20 54 kg (119 lb 1.6 oz) "   09/23/20 46.7 kg (103 lb)   07/03/19 49.9 kg (110 lb)   02/28/19 53.5 kg (117 lb 14.4 oz)   08/23/17 44.4 kg (97 lb 12.8 oz)        Constitutional: Awake, alert, cooperative, no apparent distress     Respiratory: Clear to auscultation bilaterally, no crackles or wheezing   Cardiovascular: Regular rate and rhythm, normal S1 and S2, and no murmur noted   Abdomen:  Large palpable abdominal mass   Skin: No rashes, no cyanosis, dry to touch   Neuro: Alert with  no weakness,   Extremities: No edema, normal range of motion   Other(s):        All other systems: Negative          Medications:        All current medications were reviewed with changes reflected in problem list.         Data:      All new lab and imaging data was reviewed.      Data reviewed today: I reviewed all new labs and imaging results over the last 24 hours. I personally reviewed no images or EKG's today      Recent Labs   Lab 09/26/20  0756 09/25/20  0705 09/24/20  0454   WBC 8.9 10.0 6.6   HGB 7.1* 7.2* 7.1*   HCT 24.0* 25.4* 25.6*   MCV 91 93 96    382 345     Recent Labs   Lab 09/23/20  2329   CULT No growth after 2 days  No growth after 2 days     Recent Labs   Lab 09/26/20  0756 09/25/20  0705 09/24/20  1309  09/23/20  2112    142 140   < > 137   POTASSIUM 4.4 5.3 4.5   < > 4.6   CHLORIDE 111* 115* 111*   < > 103   CO2 18* 18* 22   < > 28   ANIONGAP 7 9 7   < > 6   GLC 81 66* 85   < > 121*   BUN 60* 63* 63*   < > 69*   CR 4.00* 4.23* 4.30*   < > 4.51*   GFRESTIMATED 16* 15* 15*   < > 14*   GFRESTBLACK 19* 17* 17*   < > 16*   IVANIA 9.8 10.7* 13.0*   < > 15.5*   PHOS 3.2 6.8* 4.6*  --   --    PROTTOTAL 7.3 7.4  --   --  9.7*   ALBUMIN 2.5* 2.6* 2.9*  --  3.7   BILITOTAL 0.4 0.2  --   --  0.2   ALKPHOS 739* 491*  --   --  528*   AST 68* 57*  --   --  40   ALT 11 17  --   --  21    < > = values in this interval not displayed.       Recent Labs   Lab 09/26/20  0756 09/25/20  0946 09/25/20  0921 09/25/20  0846 09/25/20  0821 09/25/20  0705  09/24/20  1309 09/24/20  0601 09/24/20  0454   GLC 81  --   --   --   --  66* 85 91 88   BGM  --  116* 85 66* 62*  --   --   --   --        Recent Labs   Lab 09/24/20  0454   INR 0.96         No results for input(s): TROPONIN, TROPI, TROPR in the last 168 hours.    Invalid input(s): TROP, TROPONINIES    Recent Results (from the past 48 hour(s))   Abd/pelvis CT no contrast - Stone Protocol    Narrative    EXAM: CT ABDOMEN PELVIS W/O CONTRAST  LOCATION: [facility]  DATE/TIME: 9/23/2020 10:38 PM    INDICATION: Renal failure. Hypercalcemia. Abdominal pain. Fever. Abdominal mass. History of anemia.  COMPARISON: None.  TECHNIQUE: CT scan of the abdomen and pelvis was performed without IV contrast. Multiplanar reformats were obtained. Dose reduction techniques were used.  CONTRAST: None.    FINDINGS:   LOWER CHEST: Normal.    HEPATOBILIARY: Normal.  PANCREAS: Normal.  SPLEEN: Normal.  ADRENAL GLANDS: Normal.  KIDNEYS/BLADDER: Mild bilateral hydronephrosis and hydroureter, left worse than right secondary to large pelvic mass    BOWEL: There is a large lobulated low abdominal/pelvic mass with maximal dimensions of approximately 17 x 17 x 13.5 cm. There are numerous calcifications distributed throughout the mass. There is also bulky confluent retroperitoneal adenopathy measuring   up to 12 x 8 cm at the level of the renal rupinder that also demonstrate the same calcification pattern. Masses along the left hemipelvis are confluent with the dominant mass though may represent separate foci of adenopathy. In addition, there are numerous   smaller intra-abdominal or mesenteric masses which do not demonstrate calcifications on likely represent metastatic peritoneal implants or additional sites of adenopathy.    In a male patient mucinous colon carcinoma with be the most to result in this appearance. Testicular malignancies could also result in this appearance.    VASCULATURE: Unremarkable.    PELVIC ORGANS: Normal size  prostate. Bladder unremarkable.  Ovoid density within left inguinal canal likely relates to a retracted or possibly undescended testicle. Right testicle is not identified though the entire scrotum is not included on this exam.    MUSCULOSKELETAL: No suspicious lesions.      Impression    IMPRESSION:   1.  Huge lobulated, partially calcified mass present within the low abdominal cavity favored to represent colon carcinoma with associated carcinomatosis and bulky retroperitoneal/pelvic adenopathy. Testicular malignancy and adenopathy could've a similar   appearance. Possible undescended testicles.  2.  Hydronephrosis, left worse than right, secondary to mass effect upon ureters.     US Testicular & Scrotum w Doppler Ltd    Narrative    EXAM: ULTRASOUND SCROTUM WITH DOPPLER  LOCATION: [facility]  DATE/TIME: 9/24/2020 1:46 AM    INDICATION: Abnormal CT scan. Question undescended testicle.  COMPARISON: 09/23/2020 - CT abdomen and pelvis.    TECHNIQUE: Ultrasound of scrotum with color flow and spectral Doppler with waveform analysis performed.    FINDINGS:    RIGHT: The testicle is not visualized. No hydrocele or varicocele.    LEFT: A mildly lobulated 5 cm mass-like structure containing a few tiny echogenicities is present in the left inguinal canal. This surrounds a 3.5 x 1.9 x 1.5 cm relatively homogeneous structure. This most likely represents a lobulated soft tissue mass   surrounding a normal left testicle. Blood flow is visualized in the presumed testicle. No hydrocele or varicocele.      Impression    IMPRESSION:   1. The left testicle is not well-defined. There is a 5 cm lobulated structure in the left inguinal region that likely represents a lobulated soft tissue mass surrounding a normal left testicle. The mass is suspicious for neoplasm.  2. Nonvisualization of the right testicle. The testicle may be undescended or absent.   XR Surgery GLORIA L/T 5 Min Fluoro w Stills    Narrative    This exam was  marked as non-reportable because it will not be read by a   radiologist or a [facility] non-radiologist provider.               Disclaimer: This note consists of symbols derived from keyboarding, dictation and/or voice recognition software. As a result, there may be errors in the script that have gone undetected. Please consider this when interpreting information found in this chart.

## 2020-09-26 NOTE — PLAN OF CARE
To Do:  End of Shift Summary  For vital signs and complete assessments, please see documentation flowsheets.     Pertinent assessments: A & O x4. T-max 99.6. Denies pain and nausea. Up SBA. Palafox in place. Biopsy site CDI. Abdomen firm. BS+, passing flatus.    Major Shift Events: No significant changes.     Treatment Plan: IVF hydration, IV zosyn, and monitor labs. Start chemo pending labs. Bone scan to be completed.     Bedside Nurse: Eileen Cosby RN

## 2020-09-26 NOTE — PLAN OF CARE
To Do:  End of Shift Summary  For vital signs and complete assessments, please see documentation flowsheets.     Pertinent assessments: A & O x4. Denies pain and nausea. Up SBA. Palafox in place, good output. Biopsy site CDI. Abdomen firm. BS hyperactive, passing flatus.     Major Shift Events: No significant changes.     Treatment Plan: IVF hydration, IV zosyn, and monitor labs. Start chemo pending labs. Bone scan to be completed.     Bedside Nurse: Eileen Cosby RN

## 2020-09-26 NOTE — PROGRESS NOTES
[facility] [place]    Nephrology Progress Note     Assessment & Plan     1 acute kidney injury-bilateral severe obstructive uropathy likely the main cause.  Further compounded by severe hypercalcemia and hypovolemia.  Baseline creatinine of 0.9.  Nonoliguric.     2 large retroperitoneal mass/testicular mass-being evaluated by colorectal and urology as well as heme-onc.  Concerning for testicular primary with carcinomatosis.  -S/P biopsy.     3 critical hypercalcemia-PTH repeatedly suppressed.  Secondary to malignancy.  Got 1 dose of Zometa 9/24.  Non PTH dependent hypercalcemia . ? Bony mets. PTHrP and 1.25 Vit D pending.      4 severe anemia-related to malignancy.  Transfusion PRN per primary.    5. Met acidosis with hyperchloremia     Recommendations  -change IV fluid to one containing bicarb    Following.          Thomas Valle MD  [facility] - Nephrology  [phone]    Interval History   Feeling OK.  Not SOB.  Pain controlled.    Physical Exam   Temp: 99  F (37.2  C) Temp src: Oral BP: 133/78 Pulse: 87   Resp: 16 SpO2: 100 % O2 Device: None (Room air)    Vitals:    09/24/20 1855 09/25/20 0514 09/26/20 0546   Weight: 51.8 kg (114 lb 1.6 oz) 52.9 kg (116 lb 9.6 oz) 54 kg (119 lb 1.6 oz)     Vital Signs with Ranges  Temp:  [98.7  F (37.1  C)-102.2  F (39  C)] 99  F (37.2  C)  Pulse:  [83-87] 87  Resp:  [16] 16  BP: (116-133)/(77-78) 133/78  SpO2:  [96 %-100 %] 100 %  I/O last 3 completed shifts:  In: 4284 [P.O.:1200; I.V.:3084]  Out: 4075 [Urine:4075]    GENERAL APPEARANCE: pleasant, NAD, a & o  HEENT:  Eyes/ears/nose/neck grossly normal  RESP: lungs cta b c good efforts, no crackles, rhonchi or wheezes  CV: RRR, nl S1/S2, no m/r/g   ABDOMEN: distended, mass R abdomen.    EXTREMITIES/SKIN: no rashes/lesions; tr BLE edema    Medications     sodium chloride 150 mL/hr at 09/26/20 1602       famotidine  20 mg Oral BID     ferrous sulfate  325 mg Oral Daily with breakfast     sodium chloride (PF)  3  mL Intracatheter Q8H       Data   BMP  Recent Labs   Lab 09/26/20  0756 09/25/20  0705 09/24/20  1309 09/24/20  0601    142 140 141   POTASSIUM 4.4 5.3 4.5 4.3   CHLORIDE 111* 115* 111* 110*   IVANIA 9.8 10.7* 13.0* 13.8*   CO2 18* 18* 22 24   BUN 60* 63* 63* 63*   CR 4.00* 4.23* 4.30* 4.45*   GLC 81 66* 85 91     Phos@[email or web address](phos:4)  CBC)  Recent Labs   Lab 09/26/20  0756 09/25/20  0705 09/24/20  0454 09/23/20  2112   WBC 8.9 10.0 6.6 8.4   HGB 7.1* 7.2* 7.1* 8.2*   HCT 24.0* 25.4* 25.6* 27.8*   MCV 91 93 96 89    382 345 441     Recent Labs   Lab 09/26/20  0756   AST 68*   ALT 11   ALKPHOS 739*   BILITOTAL 0.4     Recent Labs   Lab 09/24/20  0454   INR 0.96     No results found for: D2VIT, D3VIT, DTOT  Recent Labs   Lab 09/26/20  0756   HGB 7.1*   HCT 24.0*   MCV 91     Recent Labs   Lab 09/24/20  0454   PTHI <7*       Attestation:   I have reviewed today's relevant vital signs, notes, medications, labs and imaging.

## 2020-09-26 NOTE — PLAN OF CARE
To Do:  End of Shift Summary  For vital signs and complete assessments, please see documentation flowsheets.     Pertinent assessments: A&O. Tmax 102.2, prn tylenol provided-99.5, otherwise vss. Denied Pain. Up W/ SBA. Sister interpreted for assessment and shift. LS clear, bowel sounds cative, passing gas w/ BM. Biopsy completed, progressed to regular diet, tolerated. BS of 66 beginning of shift, see notes, last rechek of 114.  Major Shift Events: Biopsy      Treatment Plan: IVF, tran, zosyn, Await biospy result, Chemo pending improved creat.    Bedside Nurse: Alphonse Corrales RN

## 2020-09-27 NOTE — PROGRESS NOTES
09/26/20 1932   Vitals   Temp 102.2  F (39  C)   625mg PO Acetaminophen given at 7:32pm. Will check Pt's temperature in 30 minutes.

## 2020-09-27 NOTE — PROVIDER NOTIFICATION
Hospitalist paged at 8011:  Pt's Temperature 102.2 Oral at 1932. 625mg Acetaminophen PO given. Will recheck in 30 minutes. Please call if there are any questions or new recommendations.  Temperature at 2000 99.9

## 2020-09-27 NOTE — PHARMACY-PHARMACOTHERAPY NOTE
September 27, 2020  In consultation with kelly-amy FAUST, Dr. John it was recommended that Zometa ordered today was to soon after initial dose given on 9/24/20 per order note stating repeat dose in dose 1 week if needed.

## 2020-09-27 NOTE — PROGRESS NOTES
Service Date: 09/27/2020      SUBJECTIVE:  Sister was in the room.      Mr. Diallo is a gentleman with cognitive delay.  The patient has abdominal mass and abdominal with pelvic/retroperitoneal lymphadenopathy.  This is either lymphoma or testicular tumor.  Biopsy has been done.  Results are awaited.  He had bilateral obstructive uropathy.  He had ureteral stent placed.      The patient is doing well.  No headache or dizziness.  No chest pain or shortness of breath.  Occasional cough.  No coughing of blood.  No vomiting.  Appetite is fairly good. Abdominal discomfort has improved after stent placement.  No abnormal bleeding.      He has been having intermittent fever.  This is likely tumor fever.  The patient is feeling good.  He is not septic.  He is not neutropenic.  UA has not revealed any infection.  At this time, plan is to monitor him and not start on any antibiotics as it is likely tumor fever.  All the cultures are negative.      PHYSICAL EXAMINATION:   GENERAL:  He is alert.  Not in any distress.     VITALS:  Reviewed.   The rest of the systems not examined.      LABORATORY DATA:  Reviewed.      ASSESSMENT:   1.  A 55-year-old gentleman with abdominal mass and bulky retroperitoneal/pelvic lymphadenopathy.   2.  Bilateral hydronephrosis, status post stent placement.   3.  Intermittent fever.  Likely tumor fever.   4.  Iron deficiency.   5.  Normocytic anemia secondary to renal disease and iron deficiency.   6.  Hypercalcemia, improving.      PLAN:   1.  The patient clinically is stable.  Biopsy report is awaited.  Further management once pathology is available.   2.  He has obstructive nephropathy.  Creatinine is improving with stent placement.   3.  He has iron deficiency.  He is on oral iron.  No clinical suspicion of any GI bleed.  He had EGD done in 03/2019, which had revealed multiple nonbleeding duodenal ulcers, nonbleeding gastric ulcer and esophagitis.  If his anemia gets worse, we will  consider EGD. As per the sister, he had colonoscopy in 2018.   4.  His hypercalcemia is improving.  He is on IV hydration, which will be continued.  He received Zometa on .   5.  CT chest reveals a 0.5 cm right lung nodule.  That will be monitored.  Could be metastatic disease.    6.  Sister had a few questions, which were all answered.  Oncology will continue to follow him.      Total time spent, 25 minutes.         MOSHE PELAYO MD             D: 2020   T: 2020   MT: SHE      Name:     RAJWINDER OSULLIVAN   MRN:      [id number]        Account:      [id number]   :      1965           Service Date: 2020      Document: [id number]

## 2020-09-27 NOTE — PROGRESS NOTES
[facility]  Hospitalist Progress Note  Jameson Medina MD 09/27/2020    Reason for Stay        Abdominal mass    Acute renal failure    Hyperglycemia    Lactic acidemia    Testicular Patient care is assumed and I have seen patient and reviewed pe           Assessment and Plan:        Summary of Stay:     Luz Marina Diallo is a 55-year-old gentleman with a past medical history of mental retardation, anemia, peptic ulcer disease, hiatal hernia, who came in with progressively worsening weight loss, urinary frequency, decreased appetite, and progressive fatigue and abdominal discomfort over the last few months and found to have abdominal mass, hypercalcemia and acute renal failure and being admitted to the hospital.        Patient progress during stay:    Patient was found to have very large retroperitoneal mass, pelvic mass along with a left testicular mass on the imaging study.  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  He also has bilateral severe hydronephrosis with acute kidney injury and hypercalcemia.  Consultations obtained with urology and patient had cystoscopy and bilateral ureteral stent placement on September 24, 2020, Tran catheter was placed to gravity drainage.  Nephrology was consulted for monitoring of acute kidney injury.    Patient was treated with intravenous fluids as well as a dose of Zometa.  Oncology service consulted to assist with further management recommendations as well.      Problem List with Assessment and Plan      1. Acute kidney injury: Secondary to obstructive uropathy    S/p stent placement.  Nephrology and urology following.    Serum creatinine improving.  Much better 3.63 down from 4 yesterday    Continue  IVF at lower rate     POD#2 s/p cystoscopy and bilateral ureteral stent placement- plan to leave tran catheter in place until SCr nadirs     2. Abdominal mass with large retroperitoneal  mass, pelvic mass and left testicular mass concerning for testicular malignancy with metastatic disease.      Hematology oncology and urology is following.     Palafox catheter is in place.    CT directed biopsy  Done and path pending   Input from consultants highly appreciated    3. Hypercalcemia: Patient's calcium is 9.6.  Ionized calcium is 5.5 down from 5.8 yesterday.      Patient had is admitted, IV fluid with normal saline at 150 cc an hour, nephrology following.    Will decrease IVF     4.  Anemia: Hemoglobin 7.1 on September 26.  Will check another one  today.    Suspect anemia chronic disease, continue to monitor and transfuse as needed    5.  Lower extremity edema: Suspect due to fluid, decrease fluid.  No evidence of respiratory distress.    6.  Fever: Temperature 102.2 on 9/26/2020.  No cough or shortness of breath.  Patient was on Zosyn initially which was discontinued due to lack of infection focus.  Will get 2 sets of blood culture, urine culture and chest x-ray ,.  Hold off antibiotic for now.    Plan for today :    Blood culture, urine culture, chest x-ray    Decrease IV fluid rate per nephrology team    Monitor weight daily    Continue to monitor patient.        LDA     Access : Peripheral     Palafox Catheter: in place, indication: /GI/GYN Pelvic Procedure        FEN :    Orders Placed This Encounter      Regular Diet Adult           Intake/Output Summary (Last 24 hours) at 9/25/2020 1135  Last data filed at 9/25/2020 0957  Gross per 24 hour   Intake 3901 ml   Output 1300 ml   Net 2601 ml          DVT Prophylaxis: Pneumatic Compression Devices    Code Status:  Full Code    Estimated discharge  disposition and plan: May need to stay in the hospital for the next few days        Interval History (Subjective):        Patient is seen and examined by me today and medical record reviewed.Overnight events noted and care discussed with nursing staff.  Patient feels comfortable without any complaints.  No  "fever this morning.  No chills.  No shortness of breath.  Scrotal and lower extremity swelling noted.                  Physical Exam:        Last Vital Signs:  /77 (BP Location: Left arm)   Pulse 72   Temp 99  F (37.2  C) (Oral)   Resp 16   Ht 1.55 m (5' 1.02\")   Wt 56.1 kg (123 lb 11.2 oz)   SpO2 100%   BMI 23.35 kg/m      I/O last 3 completed shifts:  In: 2702.5 [P.O.:2000; I.V.:702.5]  Out: 3475 [Urine:3475]  Vitals:    09/24/20 0425 09/24/20 1855 09/25/20 0514 09/26/20 0546   Weight: 47.2 kg (104 lb) 51.8 kg (114 lb 1.6 oz) 52.9 kg (116 lb 9.6 oz) 54 kg (119 lb 1.6 oz)    09/27/20 0614   Weight: 56.1 kg (123 lb 11.2 oz)       Wt Readings from Last 5 Encounters:   09/27/20 56.1 kg (123 lb 11.2 oz)   09/23/20 46.7 kg (103 lb)   07/03/19 49.9 kg (110 lb)   02/28/19 53.5 kg (117 lb 14.4 oz)   08/23/17 44.4 kg (97 lb 12.8 oz)        Constitutional: Awake, alert, cooperative, no apparent distress     Respiratory: Clear to auscultation bilaterally, no crackles or wheezing   Cardiovascular: Regular rate and rhythm, normal S1 and S2, and no murmur noted   Abdomen:  Large palpable abdominal mass   Skin: No rashes, no cyanosis, dry to touch   Neuro: Alert with  no weakness,   Extremities:  bilateral lower extremity swelling, scrotal swelling.  Palafox catheter in place   Other(s):        All other systems: Negative          Medications:        All current medications were reviewed with changes reflected in problem list.         Data:      All new lab and imaging data was reviewed.      Data reviewed today: I reviewed all new labs and imaging results over the last 24 hours. I personally reviewed no images or EKG's today      Recent Labs   Lab 09/26/20  0756 09/25/20  0705 09/24/20  0454   WBC 8.9 10.0 6.6   HGB 7.1* 7.2* 7.1*   HCT 24.0* 25.4* 25.6*   MCV 91 93 96    382 345     Recent Labs   Lab 09/23/20  2329   CULT No growth after 3 days  No growth after 3 days     Recent Labs   Lab 09/27/20  0719 " 09/26/20  0756 09/25/20  0705  09/23/20  2112    136 142   < > 137   POTASSIUM 4.0 4.4 5.3   < > 4.6   CHLORIDE 108 111* 115*   < > 103   CO2 21 18* 18*   < > 28   ANIONGAP 8 7 9   < > 6   GLC 82 81 66*   < > 121*   BUN 43* 60* 63*   < > 69*   CR 3.63* 4.00* 4.23*   < > 4.51*   GFRESTIMATED 18* 16* 15*   < > 14*   GFRESTBLACK 21* 19* 17*   < > 16*   IVANIA 9.6 9.8 10.7*   < > 15.5*   PHOS 2.9 3.2 6.8*   < >  --    PROTTOTAL  --  7.3 7.4  --  9.7*   ALBUMIN 2.3* 2.5* 2.6*   < > 3.7   BILITOTAL  --  0.4 0.2  --  0.2   ALKPHOS  --  739* 491*  --  528*   AST  --  68* 57*  --  40   ALT  --  11 17  --  21    < > = values in this interval not displayed.       Recent Labs   Lab 09/27/20  0719 09/26/20  0756 09/25/20  0946 09/25/20  0921 09/25/20  0846 09/25/20  0821 09/25/20  0705 09/24/20  1309 09/24/20  0601   GLC 82 81  --   --   --   --  66* 85 91   BGM  --   --  116* 85 66* 62*  --   --   --        Recent Labs   Lab 09/24/20  0454   INR 0.96         No results for input(s): TROPONIN, TROPI, TROPR in the last 168 hours.    Invalid input(s): TROP, TROPONINIES    Recent Results (from the past 48 hour(s))   Abd/pelvis CT no contrast - Stone Protocol    Narrative    EXAM: CT ABDOMEN PELVIS W/O CONTRAST  LOCATION: [facility]  DATE/TIME: 9/23/2020 10:38 PM    INDICATION: Renal failure. Hypercalcemia. Abdominal pain. Fever. Abdominal mass. History of anemia.  COMPARISON: None.  TECHNIQUE: CT scan of the abdomen and pelvis was performed without IV contrast. Multiplanar reformats were obtained. Dose reduction techniques were used.  CONTRAST: None.    FINDINGS:   LOWER CHEST: Normal.    HEPATOBILIARY: Normal.  PANCREAS: Normal.  SPLEEN: Normal.  ADRENAL GLANDS: Normal.  KIDNEYS/BLADDER: Mild bilateral hydronephrosis and hydroureter, left worse than right secondary to large pelvic mass    BOWEL: There is a large lobulated low abdominal/pelvic mass with maximal dimensions of approximately 17 x 17 x 13.5 cm. There  are numerous calcifications distributed throughout the mass. There is also bulky confluent retroperitoneal adenopathy measuring   up to 12 x 8 cm at the level of the renal rupinder that also demonstrate the same calcification pattern. Masses along the left hemipelvis are confluent with the dominant mass though may represent separate foci of adenopathy. In addition, there are numerous   smaller intra-abdominal or mesenteric masses which do not demonstrate calcifications on likely represent metastatic peritoneal implants or additional sites of adenopathy.    In a male patient mucinous colon carcinoma with be the most to result in this appearance. Testicular malignancies could also result in this appearance.    VASCULATURE: Unremarkable.    PELVIC ORGANS: Normal size prostate. Bladder unremarkable.  Ovoid density within left inguinal canal likely relates to a retracted or possibly undescended testicle. Right testicle is not identified though the entire scrotum is not included on this exam.    MUSCULOSKELETAL: No suspicious lesions.      Impression    IMPRESSION:   1.  Huge lobulated, partially calcified mass present within the low abdominal cavity favored to represent colon carcinoma with associated carcinomatosis and bulky retroperitoneal/pelvic adenopathy. Testicular malignancy and adenopathy could've a similar   appearance. Possible undescended testicles.  2.  Hydronephrosis, left worse than right, secondary to mass effect upon ureters.     US Testicular & Scrotum w Doppler Ltd    Narrative    EXAM: ULTRASOUND SCROTUM WITH DOPPLER  LOCATION: [facility]  DATE/TIME: 9/24/2020 1:46 AM    INDICATION: Abnormal CT scan. Question undescended testicle.  COMPARISON: 09/23/2020 - CT abdomen and pelvis.    TECHNIQUE: Ultrasound of scrotum with color flow and spectral Doppler with waveform analysis performed.    FINDINGS:    RIGHT: The testicle is not visualized. No hydrocele or varicocele.    LEFT: A mildly lobulated  5 cm mass-like structure containing a few tiny echogenicities is present in the left inguinal canal. This surrounds a 3.5 x 1.9 x 1.5 cm relatively homogeneous structure. This most likely represents a lobulated soft tissue mass   surrounding a normal left testicle. Blood flow is visualized in the presumed testicle. No hydrocele or varicocele.      Impression    IMPRESSION:   1. The left testicle is not well-defined. There is a 5 cm lobulated structure in the left inguinal region that likely represents a lobulated soft tissue mass surrounding a normal left testicle. The mass is suspicious for neoplasm.  2. Nonvisualization of the right testicle. The testicle may be undescended or absent.   XR Surgery GLORIA L/T 5 Min Fluoro w Stills    Narrative    This exam was marked as non-reportable because it will not be read by a   radiologist or a [facility] non-radiologist provider.               Disclaimer: This note consists of symbols derived from keyboarding, dictation and/or voice recognition software. As a result, there may be errors in the script that have gone undetected. Please consider this when interpreting information found in this chart.

## 2020-09-27 NOTE — PROGRESS NOTES
[facility]     Renal Progress Note       SHORTHAND KEY FOR MY NOTES:  c = with, s = without, p = after, a = before, x = except, asx = asymptomatic, tx = transplant or treatment, sx = symptoms or symptomatic, cx = canceled or culture, rxn = reaction, yday = yesterday, nl = normal, abx = antibiotics, fxn = function, dx = diagnosis, dz = disease, m/h = melena/hematochezia, c/d/l/ha = cramping/dizziness/lightheadedness/headache, d/c = discharge or diarrhea/constipation, f/c/n/v = fevers/chills/nausea/vomiting, cp/sob = chest pain/shortness of breath, tbv = total body volume, rxn = reaction, tdc = tunneled dialysis catheter, pta = prior to admission, hd = hemodialysis, pd = peritoneal dialysis, hhd = home hemodialysis         Assessment/Plan:     1.  YAYA 2 obst.  Pt's cr continues to improve steadily c fluids and he is non-oliguric.  No uremic sx.  TBV is ok.  A.  Continue fluids.  B.  Follow labs, uo, sx.    2.  Hypercalcemia.  Pt's Ca is declining steadily, but is still corrects high for the low alb.  He is on IVF and rec'd zoledronic acid.  A.  Continue IVF.  B.  Follow ICa.    3.  Abd mass c obst s/p stents.  H/O is following, as well as Urology.  Bx pending.  A.  Per H/O, Urology.  B.  Keep tran in place.    4.  Fe def anemia.  Pt's fe sats are low, but ferritin is high. He is on oral feso4.  Hb is in the low 7s.    A.  H/O following.  B.  Transfuse prn.    5.  FEN.  Electrolytes are ok x phos is steadily trending down.  A.  Check electrolytes in AM and replace prn.        Interval History:     Pt is feeling better.  He is urinating quite a bit via tran.  No f/c/n/v.  Breathing ok, no cp.  No significant abd pain.  No d/l.              Medications and Allergies:       famotidine  20 mg Oral BID     ferrous sulfate  325 mg Oral Daily with breakfast     sodium chloride (PF)  3 mL Intracatheter Q8H     zoledronic acid  4 mg Intravenous Once     No Known Allergies       Physical Exam:     Vitals were  "reviewed     , Blood pressure 126/77, pulse 72, temperature 99  F (37.2  C), temperature source Oral, resp. rate 16, height 1.55 m (5' 1.02\"), weight 56.1 kg (123 lb 11.2 oz), SpO2 100 %.  Wt Readings from Last 3 Encounters:   09/27/20 56.1 kg (123 lb 11.2 oz)   09/23/20 46.7 kg (103 lb)   07/03/19 49.9 kg (110 lb)     Intake/Output Summary (Last 24 hours) at 9/27/2020 1406  Last data filed at 9/27/2020 1054  Gross per 24 hour   Intake 2902.5 ml   Output 2825 ml   Net 77.5 ml     GENERAL APPEARANCE: pleasant, NAD, alert  HEENT:  eyes/ears/nose/neck grossly normal  RESP: lungs cta b c good efforts, no crackles  CV: RRR, nl S1/S2  ABDOMEN: o/s/nt/nd  EXTREMITIES/SKIN: no ble edema  OTHER:  + tran c clear urine         Data:     CBC RESULTS:     Recent Labs   Lab 09/26/20  0756 09/25/20  0705 09/24/20  0454 09/23/20  2112   WBC 8.9 10.0 6.6 8.4   RBC 2.63* 2.72* 2.68* 3.12*   HGB 7.1* 7.2* 7.1* 8.2*   HCT 24.0* 25.4* 25.6* 27.8*    382 345 441     Basic Metabolic Panel:  Recent Labs   Lab 09/27/20  0719 09/26/20  0756 09/25/20  0705 09/24/20  1309 09/24/20  0601 09/24/20  0454    136 142 140 141 140   POTASSIUM 4.0 4.4 5.3 4.5 4.3 4.4   CHLORIDE 108 111* 115* 111* 110* 110*   CO2 21 18* 18* 22 24 24   BUN 43* 60* 63* 63* 63* 67*   CR 3.63* 4.00* 4.23* 4.30* 4.45* 4.40*   GLC 82 81 66* 85 91 88   IVANIA 9.6 9.8 10.7* 13.0* 13.8* 13.4*     INR  Recent Labs   Lab 09/24/20  0454   INR 0.96      Attestation:   I have reviewed today's relevant vital signs, notes, medications, labs and imaging.    Navjot Vargas MD  [facility] - Nephrology  [phone]  "

## 2020-09-27 NOTE — PLAN OF CARE
Pertinent assessments: Pt A&O to self and situation. Pt's sister available for translation.   Major Shift Events Pt's temp at 1932 was 102.2. After giving PO Tylenol and waiting 30 min, temp down to 99.9. Will continue to monitor. Palafox draining with adequate output, cath cares completed.  Treatment Plan: IV fluids, chemo to start pending bone scan, complete bone scan Monday.

## 2020-09-27 NOTE — PLAN OF CARE
To Do:  End of Shift Summary  For vital signs and complete assessments, please see documentation flowsheets.     Pertinent assessments: A&O, VSS. Denied pain. Sister utilized for interpreting. Up SBA, ambulated in suresh. Tolerating regular diet. Bowel sounds hyperactive, passing gas, abd distention due to mass. Palafox patent w/ good op.    Major Shift Events: Bone scan pending, will occur on Monday 9/28.     Treatment Plan: IVF hydration, IV zosyn, and monitor labs. Start chemo pending labs. Bone scan to be completed.     Bedside Nurse: Alphonse Corrales RN

## 2020-09-27 NOTE — PLAN OF CARE
End of Shift Summary  For vital signs and complete assessments, please see documentation flowsheets.     Pertinent assessments: VSS, denies pain. Palafox catheter patent. Foreskin on penis was found to be swollen during catheter cares and in the retracted position. Penis elevated, ice applied, and foreskin lubricated in order to attempt to pull skin forward. Oncoming staff aware and will followup.      Treatment Plan: IVF NaCl 0.45% with NaBicarb, monitor labs, bone scan Monday, possible start chemo Mon/Tues if renal function improved.

## 2020-09-27 NOTE — PROGRESS NOTES
Service Date: 2020      SUBJECTIVE:  Sister was at the bedside.      Mr. Diallo is a 55-year-old gentleman with a large abdominal mass with bulky retroperitoneal/pelvic lymphadenopathy.  This is likely lymphoma or testicular malignancy.  There is also hydronephrosis.  The patient had bilateral ureteral stents placed.  The patient had ultrasound-guided abdominal mass biopsy.      The patient has cognitive delay.  The patient overall is better.  He is not in pain.  As per the sister also, patient is better since the stent placement.  He is not in pain.  Appetite is improving.  No fever or chills.  No nausea or vomiting.      PHYSICAL EXAMINATION:   GENERAL:  The patient is alert.  He is not in any distress.  No cough.  No labored breathing.   VITAL SIGNS:  Reviewed.   The rest of the systems not examined.      LABORATORY DATA:  Labs reviewed.      ASSESSMENT:   1.  A 55-year-old gentleman with abdominal mass and bulky retroperitoneal/pelvic lymphadenopathy.   2.  Bilateral hydronephrosis, status post stent placement.   3.  Obstructive uropathy.   4.  Normocytic anemia.   5.  Hypercalcemia of malignancy.      PLAN:   1.  The patient clinically is improving.  Stent placement helps. We will wait for the biopsy result.  Once malignancy is confirmed, patient will be started on chemotherapy.   2.  The patient's calcium is high from malignancy.  Alkaline phosphatase is also elevated.  Bone scan is pending.   3.  For hypercalcemia, will give him 1 dose of Zometa.   4.  The patient is anemic.  For workup we will get some labs tomorrow, including iron studies.   5.  Sister had a few questions, which were all answered.  Oncology will continue to follow.  Case discussed with Dr. Medina.         MOSHE PELAYO MD             D: 2020   T: 2020   MT: RAMONA      Name:     RAJWINDER DIALLO   MRN:      [id number]        Account:      [id number]   :      1965           Service Date:  09/26/2020      Document: [id number]

## 2020-09-28 NOTE — PLAN OF CARE
To Do:  End of Shift Summary  For vital signs and complete assessments, please see documentation flowsheets.     Pertinent assessments: translation provided by sister. Denies pain. Penis swollen, massaged/ice/elevation. No improvement noted to area. Palafox with good UO, clear/yellow. Amb in hallway. +BM. Chronic dry cough.  Major Shift Events: Bone scan. PFTs scheduled for 17:30  Treatment Plan: monitoring swollen penis, intermittent icing, elevating penis. IVF, Biopsy pending.  Bedside Nurse: Ana Perales RN

## 2020-09-28 NOTE — PLAN OF CARE
End of Shift Summary  For vital signs and complete assessments, please see documentation flowsheets.     Pertinent assessments: + 2 BLE edema, occasional cough. Lungs clear. Up SBA to BR. Palafox patent. A&O. Penile edema (foreskin) unchanged overnight.   Major Shift Events none  Treatment Plan: monitoring swollen penis, intermittent icing and elevating penis. IVF, monitor labs.

## 2020-09-28 NOTE — PLAN OF CARE
To Do:  End of Shift Summary  For vital signs and complete assessments, please see documentation flowsheets.     Pertinent assessments: VSS, denies pain. Palafox catheter patent. Foreskin in place, Swelling continues, Urology aware, plan to Ice elevate, and apply pressure, see pt care order. A&O. Up w/ SBA, incontinent of sm BM. Bowel sounds active, tolerating diet. 2+ edema BLE.     Major Shift Events: IV fluid changed, Monitoring of Swollen penis.  Treatment Plan: IVF NS at 100, monitor labs, bone scan Monday, possible start chemo Mon/Tues if renal function improved.  Bedside Nurse: Alphonse Corrales RN

## 2020-09-28 NOTE — PROGRESS NOTES
PFT Note:        Pt completed spirometry testing;  tablet used. Good Pt effort and cooperation but was made more difficult due to mental disability. Testing meets all ATS recommendations except expiration did not last 6 seconds and no obvious plateau on all volume time curves.     September 28, 2020.5:46 PM  [place], RT

## 2020-09-28 NOTE — PROGRESS NOTES
[facility]  Hospitalist Progress Note  Jameson Medina MD 09/28/2020    Reason for Stay        Abdominal mass    Acute renal failure due to obstructive uropathy    Hypercalcemia    Lactic acidemia    Testicular mass             Assessment and Plan:        Summary of Stay:     Luz Marina Diallo is a 55-year-old gentleman with a past medical history of mental retardation, anemia, peptic ulcer disease, hiatal hernia, who came in with progressively worsening weight loss, urinary frequency, decreased appetite, and progressive fatigue and abdominal discomfort over the last few months and found to have abdominal mass, hypercalcemia and acute renal failure and being admitted to the hospital.    Patient progress during stay:    Patient was found to have very large retroperitoneal mass, pelvic mass along with a left testicular mass on the imaging study.  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  He also has bilateral severe hydronephrosis with acute kidney injury and hypercalcemia.  Consultations obtained with urology and patient had cystoscopy and bilateral ureteral stent placement on September 24, 2020, Palafox catheter was placed to gravity drainage.  Nephrology was consulted for monitoring of acute kidney injury.    Patient was treated with intravenous fluids as well as a dose of Zometa.  Oncology service consulted to assist with further management recommendations as well.  Nephrology service is also following.    Overall, his kidney function is gradually improving and he is hypercalcemia is improving as well  CT-guided biopsy was obtained and pathology is pending but it is suspected that patient has testicular malignancy which needs further work-up and treatment with chemotherapy this admission.      Problem List with Assessment and Plan      1. Acute kidney injury: Secondary to obstructive uropathy    S/p stent placement.   Nephrology and urology following.    Serum creatinine improving.  Much better at 3.17 (4.51 on admission)    Continue  IVF per nephrology service     POD#3 s/p cystoscopy and bilateral ureteral stent placement- plan to leave tran catheter in place until SCr nadirs     2. Abdominal mass with large retroperitoneal mass, pelvic mass and left testicular mass concerning for testicular malignancy with metastatic disease.      Hematology oncology and urology is following.     Tran catheter is in place.    CT directed biopsy  Done and path pending   Input from consultants highly appreciated    Bladder scan was done today and further care plan per oncology team    3. Hypercalcemia: Patient's calcium is 9.4.  Ionized calcium is 5.3 down from 5.5 yesterday.      Patient h is on  IV fluid with normal saline at 100 cc an hour, nephrology following.    Monitor serum calcium    4.  Anemia: Hemoglobin 7.0 today.  He has iron deficiency anemia as well as anemia chronic disease.  Monitor and transfuse as needed    5.  Lower extremity and scrotal edema: Suspect due to fluid, decrease fluid.  No evidence of respiratory distress.    6.  Fever: Temperature 102.2 on 9/26/2020.  No cough or shortness of breath.  Patient was on Zosyn initially which was discontinued due to lack of infection focus.  Blood culture and urine culture negative so far.  We will continue to hold off antibiotic for now.    Plan for today :    Continue IV fluid, monitor culture and calcium.  Oncology service directing the care for further treatment.      LDA     Access : Peripheral     Tran Catheter: in place, indication: /GI/GYN Pelvic Procedure        FEN :    Orders Placed This Encounter      Regular Diet Adult           Intake/Output Summary (Last 24 hours) at 9/25/2020 1135  Last data filed at 9/25/2020 0957  Gross per 24 hour   Intake 3901 ml   Output 1300 ml   Net 2601 ml          DVT Prophylaxis: Pneumatic Compression Devices    Code Status:  Full  "Code    Estimated discharge  disposition and plan: May likely stay in the hospital for the next few days per oncology team        Interval History (Subjective):        Patient is seen and examined by me today and medical record reviewed.Overnight events noted and care discussed with nursing staff.  Patient has no new complaints.  No fever or chills.  No shortness of breath or chest pain.  His sister is always at his bedside and helps with communication since he has limited English fluency                  Physical Exam:        Last Vital Signs:  BP (!) 147/87 (BP Location: Left arm)   Pulse 69   Temp 98.8  F (37.1  C) (Oral)   Resp 16   Ht 1.55 m (5' 1.02\")   Wt 55.8 kg (123 lb)   SpO2 100%   BMI 23.22 kg/m      I/O last 3 completed shifts:  In: 5159 [P.O.:1070; I.V.:4089]  Out: 3950 [Urine:3950]  Vitals:    09/24/20 1855 09/25/20 0514 09/26/20 0546 09/27/20 0614   Weight: 51.8 kg (114 lb 1.6 oz) 52.9 kg (116 lb 9.6 oz) 54 kg (119 lb 1.6 oz) 56.1 kg (123 lb 11.2 oz)    09/28/20 0610   Weight: 55.8 kg (123 lb)       Wt Readings from Last 5 Encounters:   09/28/20 55.8 kg (123 lb)   09/23/20 46.7 kg (103 lb)   07/03/19 49.9 kg (110 lb)   02/28/19 53.5 kg (117 lb 14.4 oz)   08/23/17 44.4 kg (97 lb 12.8 oz)        Constitutional: Awake, alert, cooperative, no apparent distress     Respiratory: Clear to auscultation bilaterally, no crackles or wheezing   Cardiovascular: Regular rate and rhythm, normal S1 and S2, and no murmur noted   Abdomen:  Large palpable abdominal mass   Skin: No rashes, no cyanosis, dry to touch   Neuro: Alert with  no weakness,   Extremities:  bilateral lower extremity swelling, scrotal swelling.  Palafox catheter in place   Other(s):        All other systems: Negative          Medications:        All current medications were reviewed with changes reflected in problem list.         Data:      All new lab and imaging data was reviewed.      Data reviewed today: I reviewed all new labs and " imaging results over the last 24 hours. I personally reviewed no images or EKG's today      Recent Labs   Lab 09/28/20  0650 09/26/20  0756 09/25/20  0705   WBC 6.1 8.9 10.0   HGB 7.0* 7.1* 7.2*   HCT 23.4* 24.0* 25.4*   MCV 89 91 93    333 382     Recent Labs   Lab 09/27/20  1335 09/27/20  1130 09/27/20  1124 09/23/20  2329   CULT No growth No growth after 1 day No growth after 1 day No growth after 4 days  No growth after 4 days     Recent Labs   Lab 09/28/20  0650 09/27/20  0719 09/26/20  0756 09/25/20  0705    137 136 142   POTASSIUM 3.8 4.0 4.4 5.3   CHLORIDE 110* 108 111* 115*   CO2 21 21 18* 18*   ANIONGAP 8 8 7 9   GLC 94 82 81 66*   BUN 39* 43* 60* 63*   CR 3.17* 3.63* 4.00* 4.23*   GFRESTIMATED 21* 18* 16* 15*   GFRESTBLACK 25* 21* 19* 17*   IVANIA 9.4 9.6 9.8 10.7*   MAG 1.7  --   --   --    PHOS 3.1 2.9 3.2 6.8*   PROTTOTAL 7.0  --  7.3 7.4   ALBUMIN 2.3* 2.3* 2.5* 2.6*   BILITOTAL 0.2  --  0.4 0.2   ALKPHOS 683*  --  739* 491*   AST 48*  --  68* 57*   ALT 10  --  11 17       Recent Labs   Lab 09/28/20  0650 09/27/20  0719 09/26/20  0756 09/25/20  0946 09/25/20  0921 09/25/20  0846 09/25/20  0821 09/25/20  0705 09/24/20  1309   GLC 94 82 81  --   --   --   --  66* 85   BGM  --   --   --  116* 85 66* 62*  --   --        Recent Labs   Lab 09/24/20  0454   INR 0.96         No results for input(s): TROPONIN, TROPI, TROPR in the last 168 hours.    Invalid input(s): TROP, TROPONINIES    Recent Results (from the past 48 hour(s))   Abd/pelvis CT no contrast - Stone Protocol    Narrative    EXAM: CT ABDOMEN PELVIS W/O CONTRAST  LOCATION: [facility]  DATE/TIME: 9/23/2020 10:38 PM    INDICATION: Renal failure. Hypercalcemia. Abdominal pain. Fever. Abdominal mass. History of anemia.  COMPARISON: None.  TECHNIQUE: CT scan of the abdomen and pelvis was performed without IV contrast. Multiplanar reformats were obtained. Dose reduction techniques were used.  CONTRAST: None.    FINDINGS:   LOWER  CHEST: Normal.    HEPATOBILIARY: Normal.  PANCREAS: Normal.  SPLEEN: Normal.  ADRENAL GLANDS: Normal.  KIDNEYS/BLADDER: Mild bilateral hydronephrosis and hydroureter, left worse than right secondary to large pelvic mass    BOWEL: There is a large lobulated low abdominal/pelvic mass with maximal dimensions of approximately 17 x 17 x 13.5 cm. There are numerous calcifications distributed throughout the mass. There is also bulky confluent retroperitoneal adenopathy measuring   up to 12 x 8 cm at the level of the renal rupinder that also demonstrate the same calcification pattern. Masses along the left hemipelvis are confluent with the dominant mass though may represent separate foci of adenopathy. In addition, there are numerous   smaller intra-abdominal or mesenteric masses which do not demonstrate calcifications on likely represent metastatic peritoneal implants or additional sites of adenopathy.    In a male patient mucinous colon carcinoma with be the most to result in this appearance. Testicular malignancies could also result in this appearance.    VASCULATURE: Unremarkable.    PELVIC ORGANS: Normal size prostate. Bladder unremarkable.  Ovoid density within left inguinal canal likely relates to a retracted or possibly undescended testicle. Right testicle is not identified though the entire scrotum is not included on this exam.    MUSCULOSKELETAL: No suspicious lesions.      Impression    IMPRESSION:   1.  Huge lobulated, partially calcified mass present within the low abdominal cavity favored to represent colon carcinoma with associated carcinomatosis and bulky retroperitoneal/pelvic adenopathy. Testicular malignancy and adenopathy could've a similar   appearance. Possible undescended testicles.  2.  Hydronephrosis, left worse than right, secondary to mass effect upon ureters.     US Testicular & Scrotum w Doppler Ltd    Narrative    EXAM: ULTRASOUND SCROTUM WITH DOPPLER  LOCATION: [facility]  DATE/TIME:  9/24/2020 1:46 AM    INDICATION: Abnormal CT scan. Question undescended testicle.  COMPARISON: 09/23/2020 - CT abdomen and pelvis.    TECHNIQUE: Ultrasound of scrotum with color flow and spectral Doppler with waveform analysis performed.    FINDINGS:    RIGHT: The testicle is not visualized. No hydrocele or varicocele.    LEFT: A mildly lobulated 5 cm mass-like structure containing a few tiny echogenicities is present in the left inguinal canal. This surrounds a 3.5 x 1.9 x 1.5 cm relatively homogeneous structure. This most likely represents a lobulated soft tissue mass   surrounding a normal left testicle. Blood flow is visualized in the presumed testicle. No hydrocele or varicocele.      Impression    IMPRESSION:   1. The left testicle is not well-defined. There is a 5 cm lobulated structure in the left inguinal region that likely represents a lobulated soft tissue mass surrounding a normal left testicle. The mass is suspicious for neoplasm.  2. Nonvisualization of the right testicle. The testicle may be undescended or absent.   XR Surgery GLORIA L/T 5 Min Fluoro w Stills    Narrative    This exam was marked as non-reportable because it will not be read by a   radiologist or a [place] non-radiologist provider.               Disclaimer: This note consists of symbols derived from keyboarding, dictation and/or voice recognition software. As a result, there may be errors in the script that have gone undetected. Please consider this when interpreting information found in this chart.

## 2020-09-28 NOTE — PROGRESS NOTES
[facility]     Renal Progress Note       SHORTHAND KEY FOR MY NOTES:  c = with, s = without, p = after, a = before, x = except, asx = asymptomatic, tx = transplant or treatment, sx = symptoms or symptomatic, cx = canceled or culture, rxn = reaction, yday = yesterday, nl = normal, abx = antibiotics, fxn = function, dx = diagnosis, dz = disease, m/h = melena/hematochezia, c/d/l/ha = cramping/dizziness/lightheadedness/headache, d/c = discharge or diarrhea/constipation, f/c/n/v = fevers/chills/nausea/vomiting, cp/sob = chest pain/shortness of breath, tbv = total body volume, rxn = reaction, tdc = tunneled dialysis catheter, pta = prior to admission, hd = hemodialysis, pd = peritoneal dialysis, hhd = home hemodialysis         Assessment/Plan:     1.  YAYA 2 obst.  Pt's cr is declining steadily.  No uremic sx.  TBV is fine.  He has good uo.  A.  Follow labs, uo, sx.  B.  Continue fluids.      2.  Hypercalcemia.  Pt's Ca is going down, but is still high.  He rec'd zoledronic acid and is still on IVF.    A.  Continue IVF.  B.  Follow ICa.    3.  Abd mass c obst s/p stents.  H/O and Urology are following.  Bx pending.  A.  Per H/O, Urology.  B.  Keep tran in place.    4.  Fe def anemia.  Pt's hb remains in the low 7s.  No ongoing bleeding.  He is fe def, but ferritin is high.  Taking oral fe.      A.  Follow hb, clinically.  B.  Transfuse prn.    5.  FEN.  Electrolytes are ok today.  Phos is stable/nl.  Ca high (as above).  A.  Check ICa, Mg, K in AM and replace prn.        Interval History:     Pt is feeling ok.  Good uo via tran.  No f/c/n/v.  Breathing ok, no cp.  No issues c abd pain.  .              Medications and Allergies:       [START ON 9/29/2020] famotidine  20 mg Oral Daily     ferrous sulfate  325 mg Oral Daily with breakfast     sodium chloride (PF)  3 mL Intracatheter Q8H     No Known Allergies       Physical Exam:     Vitals were reviewed     , Blood pressure (!) 147/87, pulse 69, temperature  "98.8  F (37.1  C), temperature source Oral, resp. rate 16, height 1.55 m (5' 1.02\"), weight 55.8 kg (123 lb), SpO2 100 %.  Wt Readings from Last 3 Encounters:   09/28/20 55.8 kg (123 lb)   09/23/20 46.7 kg (103 lb)   07/03/19 49.9 kg (110 lb)     Intake/Output Summary (Last 24 hours) at 9/28/2020 1510  Last data filed at 9/28/2020 1317  Gross per 24 hour   Intake 5159 ml   Output 3950 ml   Net 1209 ml     GENERAL APPEARANCE: pleasant, NAD, alert  HEENT:  eyes/ears/nose/neck grossly normal  RESP: lungs cta b c good efforts, no crackles  CV: RRR, nl S1/S2  ABDOMEN: distended, soft, nt  EXTREMITIES/SKIN: 1+ ble edema  OTHER:  + tran c clear urine         Data:     CBC RESULTS:     Recent Labs   Lab 09/28/20  0650 09/26/20  0756 09/25/20  0705 09/24/20  0454 09/23/20  2112   WBC 6.1 8.9 10.0 6.6 8.4   RBC 2.62* 2.63* 2.72* 2.68* 3.12*   HGB 7.0* 7.1* 7.2* 7.1* 8.2*   HCT 23.4* 24.0* 25.4* 25.6* 27.8*    333 382 345 441     Basic Metabolic Panel:  Recent Labs   Lab 09/28/20  0650 09/27/20  0719 09/26/20  0756 09/25/20  0705 09/24/20  1309 09/24/20  0601    137 136 142 140 141   POTASSIUM 3.8 4.0 4.4 5.3 4.5 4.3   CHLORIDE 110* 108 111* 115* 111* 110*   CO2 21 21 18* 18* 22 24   BUN 39* 43* 60* 63* 63* 63*   CR 3.17* 3.63* 4.00* 4.23* 4.30* 4.45*   GLC 94 82 81 66* 85 91   IVANIA 9.4 9.6 9.8 10.7* 13.0* 13.8*     INR  Recent Labs   Lab 09/24/20  0454   INR 0.96      Attestation:   I have reviewed today's relevant vital signs, notes, medications, labs and imaging.    Navjot Vargas MD  [facility] - Nephrology  [phone]  "

## 2020-09-28 NOTE — PROGRESS NOTES
Oncology:     Chart check: Biopsy still pending and further management when path results are available. Creatinine improving.       Araceli Pacheco PA-C  Hematology/Oncology  [facility]

## 2020-09-29 NOTE — PROGRESS NOTES
[facility]  Hospitalist Progress Note  Rosalba Flor MD 09/29/2020    Reason for Stay (Diagnosis): unexplained weight loss         Assessment and Plan:      Summary of Stay: Luz Marina Diallo is a 55 year old Laotian speaking male with a history of developmental delay,PUD, and hiatal hernia.     He lives with his sister and is independent in all ADLs.  He was brought in for evaluation with abdominal swelling, weight loss with decreased appetite, and urinary frequency which have been accelerating over the past few months.  He went to the clinic with these sx and was sent over to the ED ultimately admitted on 9/23/2020 for further evaluation.      eval notable for YAYA with creat 4.5, hypercalcemia of 15.5, CT showing large abdominal mass complicated by extrinsic compression of his ureter.      He underwent bilateral ureteral stent placement on 9/24.  His abdominal mass has been biopsied with results pending, although based on the information at hand seems most likely of testicular origin.  Awaiting path report to determine course of action re: orchiectomy/chemo although sounds like neoadjuvant chemo would be recommended.    His renal function has been improving with IVF/bilateral ureteral stent/and tran placement.  He now, however, appears to have significant volume overload with le edema/penile swelling.     Hypercalcemia has normalized with zoledronic acid and IVF      Problem List:   Abdominal mass  Likely testicular in origin.  Tentative plan is neoadjuvant chemo to be started in house followed by orchiectomy    YAYA  2/2 obstructive uropathy/hypercalcemia.  Improving.  -volume overloaded now so will stop IVF, trial a dose of furosemide  -appreciate Dr Vargas's input    Hypercalcemia of malignancy   Resolved with IVF and Zoledronic acid x 1.  -? If his tumor may be high risk for tumor lysis, does he need allopurinol        DVT Prophylaxis: Pneumatic Compression Devices, will add in heparin as I would  "imagine he's high risk for clot formation   Code Status: Full Code  Functional Status:  Diet:  Palafox:  Access    Disposition Plan   Expected discharge in tbd days to prior living arrangement once above sorted through .     Entered: Rosalba Flor 09/29/2020, 4:48 PM               Interval History (Subjective):      Feeling well, no pain. No sob. No n/v                  Physical Exam:      Last Vital Signs:  /68 (BP Location: Left arm)   Pulse 84   Temp 98.8  F (37.1  C) (Oral)   Resp 16   Ht 1.55 m (5' 1.02\")   Wt 56.6 kg (124 lb 11.2 oz)   SpO2 100%   BMI 23.54 kg/m      I/O:  Net positive 4 L, UO good at 2400    Pleasant nad looks younger than stated age head nc/at sclera clear lungs ctab nl effort rrr no mrg 2 + le edema belly is moderately large with firm mass palpable non tender affect is pleasant nad cooperative.  He's alert.  Skin w/d no c/c            Medications:      All current medications were reviewed with changes reflected in problem list.         Data:      All new lab and imaging data was reviewed.   Labs:  Recent Labs   Lab 09/29/20  0626      POTASSIUM 4.1   CHLORIDE 110*   CO2 21   ANIONGAP 6   GLC 93   BUN 31*   CR 2.61*   GFRESTIMATED 27*   GFRESTBLACK 31*   IVANIA 8.9     Recent Labs   Lab 09/29/20  0626   WBC 7.1   HGB 6.9*   HCT 23.3*   MCV 90         Imaging:     CT abdomen and pelvis 9/26/20  IMPRESSION:   1.  Huge lobulated, partially calcified mass present within the low abdominal cavity favored to represent colon carcinoma with associated carcinomatosis and bulky retroperitoneal/pelvic adenopathy. Testicular malignancy and adenopathy could've a similar   appearance. Possible undescended testicles.  2.  Hydronephrosis, left worse than right, secondary to mass effect upon ureters.    "

## 2020-09-29 NOTE — PLAN OF CARE
To Do:  End of Shift Summary  For vital signs and complete assessments, please see documentation flowsheets.     Pertinent assessments: Good UO from tran, denies pain, VSS, afebrile. Low hgb - asymptomatic. Ambulated in hallway and room. Elevated, ice penis due to swelling. (denies pain, no redness) Swelling in BLE/penis worsening. MD made aware - IVF decreased. Good appetite. +BM today. Dry cough.   Major Shift Events: none  Treatment Plan:  IVFs, monitor of swollen penis. Pending biopsy results for further plan.     Bedside Nurse: Ana Perales RN

## 2020-09-29 NOTE — PLAN OF CARE
Patient alert and oriented x4. Palafox in place, good output. Penis grossly swollen, elevated and ice applied. Denies pain. Discharge pending.

## 2020-09-29 NOTE — PROGRESS NOTES
Cr is lower.  Good uo.  D/w Dr. Flor.  Will stop fluids and give 1 dose of furos 20 mg iv and assess response.

## 2020-09-29 NOTE — PLAN OF CARE
To Do:  End of Shift Summary  For vital signs and complete assessments, please see documentation flowsheets.     Pertinent assessments: Patient denies having any pain or discomfort. Penis swollen, assessed Q4. Ice and elevation utilized. Palafox has good urine output, clear yellow in color. Abdomen is firm. Chronic dry cough noted.     Major Shift Events: Pulmonary function test done.     Treatment Plan: Continue with IVFs and monitoring of swollen penis. Pending biopsy results for further plan.     Bedside Nurse: Eve Mederos RN

## 2020-09-30 PROBLEM — C62.90 SEMINOMA (H): Status: ACTIVE | Noted: 2020-01-01

## 2020-09-30 NOTE — PROGRESS NOTES
[facility]  Hospitalist Progress Note  Rosalba Flor MD 09/30/2020    Reason for Stay (Diagnosis): unexplained weight loss         Assessment and Plan:      Summary of Stay: Luz Marina Diallo is a 55 year old Laotian speaking male with a history of developmental delay,PUD, and hiatal hernia.     He lives with his sister and is independent in all ADLs.  He was brought in for evaluation with abdominal swelling, weight loss with decreased appetite, and urinary frequency which have been accelerating over the past few months.  He went to the clinic with these sx and was sent over to the ED ultimately admitted on 9/23/2020 for further evaluation.      eval notable for YAYA with creat 4.5, hypercalcemia of 15.5, CT showing large abdominal mass complicated by extrinsic compression of his ureters.      He underwent bilateral ureteral stent placement on 9/24.  His abdominal mass has been biopsied with results consistent with a seminoma, defer to oncology/urology course of action re: orchiectomy/chemo although sounds like neoadjuvant chemo would be recommended.    His renal function has been improving with IVF/bilateral ureteral stent/and tran placement.  This complicated by volume overload which responded well to single dose of IV furosemide and renal function tolerated it.     Hypercalcemia has normalized with zoledronic acid and IVF      Problem List:   Abdominal mass  Seminoma, await onc/urology recommendations re: timing of chemo/orchiectomy     YAYA  2/2 obstructive uropathy/hypercalcemia.  Improving.  -will live off IVF and no more furosemide today after 5.6 L out yesterday     Hypercalcemia of malignancy   Resolved with IVF and Zoledronic acid x 1.  -? If his tumor may be high risk for tumor lysis, does he need allopurinol    Right knee pain:  No erythema/swelling/warmth.  No limitation in movement.  ? MSK.  Able to bear weight so will just monitor for now.     DVT Prophylaxis: Pneumatic Compression  "Devices, added in heparin as I would imagine he's high risk for clot formation   Code Status: Full Code  Functional Status: independent  Diet: regular texture  Palafox:present  AccessPIV    Disposition Plan   Expected discharge in tbd days to prior living arrangement once above sorted through .     Entered: Rosalba Flor 09/30/2020, 4:21 PM               Interval History (Subjective):      Feeling well, no pain. No sob. No n/v.  Complains of right knee pain, denies trauma, feels tight.  But can bear weight                   Physical Exam:      Last Vital Signs:  /61 (BP Location: Left arm)   Pulse 89   Temp 98.4  F (36.9  C) (Oral)   Resp 16   Ht 1.55 m (5' 1.02\")   Wt 55.1 kg (121 lb 6.4 oz)   SpO2 100%   BMI 22.92 kg/m      I/O:  Net positive 4 L, UO 5.6 L after single dose of furosemide yesterday     Pleasant nad looks younger than stated age head nc/at sclera clear lungs ctab nl effort rrr no mrg 2 + le edema belly is moderately large with firm mass palpable non tender affect is pleasant nad cooperative.  He's alert.  Skin w/d no c/c            Medications:      All current medications were reviewed with changes reflected in problem list.         Data:      All new lab and imaging data was reviewed.   Labs:  Recent Labs   Lab 09/30/20  0625      POTASSIUM 4.1   CHLORIDE 108   CO2 22   ANIONGAP 6   GLC 90   BUN 28   CR 2.56*   GFRESTIMATED 27*   GFRESTBLACK 32*   IVANIA 10.1     Recent Labs   Lab 09/30/20  0625   WBC 7.6   HGB 7.2*   HCT 24.3*   MCV 90   *      Imaging:     CT abdomen and pelvis 9/26/20  IMPRESSION:   1.  Huge lobulated, partially calcified mass present within the low abdominal cavity favored to represent colon carcinoma with associated carcinomatosis and bulky retroperitoneal/pelvic adenopathy. Testicular malignancy and adenopathy could've a similar   appearance. Possible undescended testicles.  2.  Hydronephrosis, left worse than right, secondary to mass effect upon " ureters.

## 2020-09-30 NOTE — PROGRESS NOTES
" Renal Medicine Progress Note            Assessment/Plan:     # Acute kidney injury 2/2 obstructive uropathy due to cancer s/p ureteral stents. Improved but not back to baseline.   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 Oncology to discuss next step with patient.   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 2+ pitting edema.     Plan:  # Cont to monitor kidney function daily. Excellent urine output.         Interval History:     Afebrile. VSS. He had 5 liters of urine last 24 hrs. He does not have any particular complaints. No SOB, chest or abdominal pain. No nausea or vomting.           Medications and Allergies:       famotidine  20 mg Oral Daily     ferrous sulfate  325 mg Oral Daily with breakfast     heparin ANTICOAGULANT  5,000 Units Subcutaneous Q12H     sodium chloride (PF)  3 mL Intracatheter Q8H      No Known Allergies         Physical Exam:   Vitals were reviewed   , Blood pressure 122/74, pulse 76, temperature 98.6  F (37  C), temperature source Oral, resp. rate 16, height 1.55 m (5' 1.02\"), weight 55.1 kg (121 lb 6.4 oz), SpO2 100 %.    Wt Readings from Last 3 Encounters:   09/30/20 55.1 kg (121 lb 6.4 oz)   09/23/20 46.7 kg (103 lb)   07/03/19 49.9 kg (110 lb)       Intake/Output Summary (Last 24 hours) at 9/30/2020 1148  Last data filed at 9/30/2020 1013  Gross per 24 hour   Intake 1212 ml   Output 5600 ml   Net -4388 ml       GENERAL APPEARANCE: pleasant, NAD, alert  HEENT:  Eyes/ears/nose/neck grossly normal  RESP: lungs cta b c good efforts, no crackles, rhonchi or wheezes  CV: RRR, nl S1/S2,  m/r/g   ABDOMEN: Soft, NT  EXTREMITIES/SKIN: no rashes/lesions on observed skin; 2+ edema  NEURO: A\O x 3. Nonfocal  PSYCH: Does not speak English  Palafox in place           Data:     CBC RESULTS:     Recent Labs   Lab 09/30/20  0625 09/29/20  0626 09/28/20  0650 09/26/20  0756 09/25/20  0705 09/24/20  0454   WBC 7.6 7.1 6.1 8.9 " 10.0 6.6   RBC 2.71* 2.58* 2.62* 2.63* 2.72* 2.68*   HGB 7.2* 6.9* 7.0* 7.1* 7.2* 7.1*   HCT 24.3* 23.3* 23.4* 24.0* 25.4* 25.6*   * 427 339 333 382 345       Basic Metabolic Panel:  Recent Labs   Lab 09/30/20  0625 09/29/20  0626 09/28/20  0650 09/27/20  0719 09/26/20  0756 09/25/20  0705    137 139 137 136 142   POTASSIUM 4.1 4.1 3.8 4.0 4.4 5.3   CHLORIDE 108 110* 110* 108 111* 115*   CO2 22 21 21 21 18* 18*   BUN 28 31* 39* 43* 60* 63*   CR 2.56* 2.61* 3.17* 3.63* 4.00* 4.23*   GLC 90 93 94 82 81 66*   IVANIA 10.1 8.9 9.4 9.6 9.8 10.7*       INR  Recent Labs   Lab 09/24/20  0454   INR 0.96      Attestation:   I have reviewed today's relevant vital signs, notes, medications, labs and imaging.    Chay Sue MD  [facility] - Nephrology  Office phone :[phone]  Pager: [phone]

## 2020-09-30 NOTE — CONSULTS
"CLINICAL NUTRITION SERVICES  -  ASSESSMENT NOTE      MALNUTRITION:  % Weight Loss:  > 5% in 1 month (severe malnutrition) --> potential PTA based on sister's reported wt trending  % Intake:  Decreased intake does not meet criteria for malnutrition   Subcutaneous Fat Loss:  None observed   Muscle Loss:  Temporal region mild to moderate depletion, Clavicle bone region moderate depletion and Acromion bone region moderate to severe depletion --> LEs masked by fluid  Fluid Retention: Trace to mild, LEs --> potential to mask true wt trending    Malnutrition Diagnosis: Non-Severe malnutrition  In Context of:  Acute illness or injury on chronic illness or disease        REASON FOR ASSESSMENT  Luz Marina Diallo is a 55 year old male seen by Registered Dietitian for LOS.    PMH of: Developmental delay, gastric and duodenal ulcer, hiatal hernia.      Admit 2/2: New retroperitoneal, pelvic, and testicular masses.      NUTRITION HISTORY  - Information obtained from patient's sister in room and chart.  - Resides with sister.    - Diet at home: Regular.  - Usual intakes: Meals TID + snacks in-between.  - Barriers to PO intakes: Sister describes poor appetite/intakes with skipping of snacks and smaller meals for a period of months PTA (\"since July\").   - Use of oral supplements: None.  - Chewing/swallowing issues: Denied.  - Meal preparation/grocery shopping: Family.  - Allergies: NKFA.      CURRENT NUTRITION ORDERS  Diet Order:     Regular    Current Intake/Tolerance:  Diet advancement 9/25/2020.  % intakes with diet advancement.  Ordering/receiving meals BID per meal system review.        NUTRITION FOCUSED PHYSICAL ASSESSMENT FOR DIAGNOSING MALNUTRITION)  Yes          Observed:    Muscle wasting (refer to documentation in Malnutrition section)    Obtained from Chart/Interdisciplinary Team:  - Urology previously following with BL ureteral stent insertion 9/24/2020  - Oncology following with biopsy pending, testicular or " "lymphoma primary  - Nephrology following for ARF   - No documentation of PI  - Stooling patterns reviewed    ANTHROPOMETRICS  Height: 5' 1.024\"  Weight: 121 lbs 6.4 oz  Body mass index is 22.92 kg/m .  Weight Status:  Normal BMI  Weight History:  Wt Readings from Last 10 Encounters:   09/30/20 55.1 kg (121 lb 6.4 oz)   09/23/20 46.7 kg (103 lb)   07/03/19 49.9 kg (110 lb)   02/28/19 53.5 kg (117 lb 14.4 oz)   08/23/17 44.4 kg (97 lb 12.8 oz)     - Mild to trace, generalized edema.  Potential to impact true wt trending. Sister had reported UBW of 110# and noticed a severe decline in wt since August 2020.  Potential for 6% wt loss in a period of ~1 month with current masking by fluid retention.      LABS  Labs reviewed:  Electrolytes  Potassium (mmol/L)   Date Value   09/30/2020 4.1   09/29/2020 4.1   09/28/2020 3.8     Phosphorus (mg/dL)   Date Value   09/28/2020 3.1   09/27/2020 2.9   09/26/2020 3.2   09/25/2020 6.8 (H)   09/24/2020 4.6 (H)    Blood Glucose  Glucose (mg/dL)   Date Value   09/30/2020 90   09/29/2020 93   09/28/2020 94   09/27/2020 82   09/26/2020 81    Inflammatory Markers  WBC (10e9/L)   Date Value   09/30/2020 7.6   09/29/2020 7.1   09/28/2020 6.1     Albumin (g/dL)   Date Value   09/29/2020 2.2 (L)   09/28/2020 2.3 (L)   09/27/2020 2.3 (L)      Magnesium (mg/dL)   Date Value   09/29/2020 1.6   09/28/2020 1.7     Sodium (mmol/L)   Date Value   09/30/2020 136   09/29/2020 137   09/28/2020 139    Renal  Urea Nitrogen (mg/dL)   Date Value   09/30/2020 28   09/29/2020 31 (H)   09/28/2020 39 (H)     Creatinine (mg/dL)   Date Value   09/30/2020 2.56 (H)   09/29/2020 2.61 (H)   09/28/2020 3.17 (H)     Additional  Ketones Urine (mg/dL)   Date Value   09/23/2020 Negative        B/P: 124/73, T: 98.5, P: 82, R: 16      MEDICATIONS  Medications reviewed:    famotidine  20 mg Oral Daily     ferrous sulfate  325 mg Oral Daily with breakfast     heparin ANTICOAGULANT  5,000 Units Subcutaneous Q12H     sodium " chloride (PF)  3 mL Intracatheter Q8H             ASSESSED NUTRITION NEEDS PER APPROVED PRACTICE GUIDELINES:    Dosing Weight 53 kg - driest wt during admit (47 kg reported?)  Estimated Energy Needs: 2273-8894 kcals (25-30 Kcal/Kg)  Justification: maintenance  Estimated Protein Needs: >/=1.2 grams protein (>/=1.2 g pro/Kg)  Justification: preservation of lean body mass  Estimated Fluid Needs: per MD      NUTRITION DIAGNOSIS:  Predicted inadequate nutrient intake (energy/protein) related to previous decline in PO intakes and reported wt loss, coding of malnutrition with overt muscle loss, though has since improved during admit and now consuming 100% of meals, potential for decline pending LOS and treatment options pursued.     NUTRITION INTERVENTIONS  Recommendations / Nutrition Prescription  Diet per MD.      Offer strawberry Boost at 10 am.        Implementation  Nutrition education: Provided education on Boost option with sister/patient.     Medical Food Supplement: As above.    Collaboration and Referral of Nutrition care: Discussed POC with team during rounds.     Nutrition Goals  Patient to consume 75% of meals or supplements TID.       MONITORING AND EVALUATION:  Progress towards goals will be monitored and evaluated per protocol and Practice Guidelines          Razia Aranda RDN, LD  Clinical Dietitian  3rd floor/ICU: [phone]  All other floors: [phone]  Weekend/holiday: [phone]

## 2020-09-30 NOTE — PLAN OF CARE
End of Shift Summary  For vital signs and complete assessments, please see documentation flowsheets.     Pertinent assessments: Patient is Aox4, denies having any pain, nausea and SOB. VSS. Elevation and ice utilized for penile swelling. Palafox in place.  Abdomen rigid, active bowel sounds. Mild edema to BLE, penis, and hips.     Major Shift Events: uneventful night    Treatment Plan: Continue to monitor swelling in penis. Waiting for biopsy results to decide further plan.     Bedside Nurse: Mamta Fernandez RN

## 2020-09-30 NOTE — PROGRESS NOTES
Med Onc       Patient seen and examined     Discussion re findings     Path shows seminoma   Will start chemo inpatient per Dr Ramos   Bone scan negative     abd pain is less now     Explained rationale for chemo and what to expect      Present with family member who speaks english        All questions answered       Time spent 25 mins       Ovidio Dennis MD

## 2020-09-30 NOTE — PLAN OF CARE
To Do:  End of Shift Summary  For vital signs and complete assessments, please see documentation flowsheets.     Pertinent assessments: Patient is Aox4, denies having any pain. Ambulating through room. VSS. Elevation and ice utilized for penile swelling. Abdomen rigid, active bowel sounds. Frequent non productive dry cough. Mild edema to BLE, penis, and hips. Urinary catheter in place with good output.     Major Shift Events: IV fluids discontinued due to edema. 20 mg of Lasix IVP given. Output for shift 2,850. Started on subQ heparin.     Treatment Plan: Continue to monitor swelling in penis. Waiting for biopsy results to decide further plan.     Bedside Nurse: Eve Mederos RN

## 2020-09-30 NOTE — PLAN OF CARE
To Do:  End of Shift Summary  For vital signs and complete assessments, please see documentation flowsheets.     Pertinent assessments: good UOP, AAOx4, Guicho speaking. Up ambulating in hallway. Ice/elevate penis. +2 edema hips down. Good oral intake.  Major Shift Events: BX results back. Pt and family aware    Treatment Plan: Continue to monitor swelling in penis. Await current onco plan.     Bedside Nurse: Ana Perales RN

## 2020-09-30 NOTE — RESULT ENCOUNTER NOTE
Percutaneous biopsy of mass consistent with metastatic germ cell tumor as expected (seminoma). Appreciate oncology management with plan for chemotherapy once kidney function improves. Will refer to Dr. Stanton after completion of chemotherapy for consideration of further surgical management.     Ralf Chan MD   [facility]  [phone] clinic phone

## 2020-10-01 PROBLEM — D70.1 CHEMOTHERAPY-INDUCED NEUTROPENIA (H): Status: ACTIVE | Noted: 2020-01-01

## 2020-10-01 PROBLEM — T45.1X5A CHEMOTHERAPY-INDUCED NEUTROPENIA (H): Status: ACTIVE | Noted: 2020-01-01

## 2020-10-01 PROBLEM — D70.2 NEUTROPENIA, DRUG-INDUCED (H): Status: ACTIVE | Noted: 2020-01-01

## 2020-10-01 NOTE — DOWNTIME EVENT NOTE
The EMR was down for 2 hours on 10/1/2020.    Padma Hutchison RN was responsible for completing the paper charting during this time period.     The following information was re-entered into the system by Padma Hutchison RN: N/A    The following information will remain in the paper chart: N/A    Padma Hutchison RN  10/1/2020

## 2020-10-01 NOTE — PLAN OF CARE
Pertinent assessments: VSS on room air. Afebrile. Up SBA Denies pain. Tolerating a regular diet. Ice applied to penis for swelling. Palafox patent with good urine output. Hgb: 7.2. Oncology following.  Major Shift Events: None  Treatment Plan: Palafox, Heparin, Labs, Ice and elevate to penis. Awaiting plan from oncology team.

## 2020-10-01 NOTE — PROGRESS NOTES
[facility]  Hospitalist Progress Note  Rosalba Flor MD 10/01/2020    Reason for Stay (Diagnosis): unexplained weight loss         Assessment and Plan:      Summary of Stay: Luz Marina Diallo is a 55 year old Laotian speaking male with a history of developmental delay,PUD, and hiatal hernia.     He lives with his sister and is independent in all ADLs.  He was brought in for evaluation with abdominal swelling, weight loss with decreased appetite, and urinary frequency which have been accelerating over the past few months.  He went to the clinic with these sx and was sent over to the ED ultimately admitted on 9/23/2020 for further evaluation.      eval notable for YAYA with creat 4.5, hypercalcemia of 15.5, CT showing large abdominal mass complicated by extrinsic compression of his ureters.      He underwent bilateral ureteral stent placement on 9/24.  His abdominal mass has been biopsied with results consistent with a seminoma, defer to oncology/urology course of action re: orchiectomy/chemo although sounds like neoadjuvant chemo would be recommended.    His renal function has been improving with IVF/bilateral ureteral stent/and tran placement.  This complicated by volume overload which responded well to single dose of IV furosemide and renal function tolerated it.     Hypercalcemia has normalized with zoledronic acid and IVF      Problem List:   Abdominal mass  Seminoma  -appreciate oncology input, plan to start chemo today.    -he'll been inpatient for the first 5 days  -still unclear to me if and when orchiectomy to be performed    YAYA  2/2 obstructive uropathy/hypercalcemia.  Improving.  -will live off IVF and no more furosemide today after 5.6 L out yesterday     Hypercalcemia of malignancy   Resolved with IVF and Zoledronic acid x 1.  -? If his tumor may be high risk for tumor lysis, does he need allopurinol    Right knee pain:  No erythema/swelling/warmth.  No limitation in movement.  ? MSK.   "Able to bear weight so will just monitor for now. Feels better today     DVT Prophylaxis: Pneumatic Compression Devices, added in heparin as I would imagine he's high risk for clot formation   Code Status: Full Code  Functional Status: independent  Diet: regular texture  Palafox:present  AccessPIV    Disposition Plan   Expected discharge in tbd days to prior living arrangement once above sorted through .     Entered: Rosalba Flor 10/01/2020, 5:10 PM               Interval History (Subjective):      Feeling well, no pain. No sob. No n/v.  Right knee pain is better today   But can bear weight                   Physical Exam:      Last Vital Signs:  /80 (BP Location: Right arm)   Pulse 105   Temp 98.9  F (37.2  C) (Oral)   Resp 20   Ht 1.55 m (5' 1.02\")   Wt 54 kg (119 lb)   SpO2 100%   BMI 22.47 kg/m      I/O:  Net positive 4 L, UO 5.6 L after single dose of furosemide yesterday     Pleasant nad looks younger than stated age head nc/at sclera clear lungs ctab nl effort rrr no mrg 2 + le edema belly is moderately large with firm mass palpable non tender affect is pleasant nad cooperative.  He's alert.  Skin w/d no c/c            Medications:      All current medications were reviewed with changes reflected in problem list.         Data:      All new lab and imaging data was reviewed.   Labs:  Recent Labs   Lab 10/01/20  0631      POTASSIUM 4.6   CHLORIDE 106   CO2 20   ANIONGAP 8   GLC 84   BUN 32*   CR 2.40*   GFRESTIMATED 30*   GFRESTBLACK 34*   IVANIA 9.8     Recent Labs   Lab 10/01/20  0631   WBC 8.6   HGB 7.2*   HCT 24.4*   MCV 90   *      Imaging:     CT abdomen and pelvis 9/26/20  IMPRESSION:   1.  Huge lobulated, partially calcified mass present within the low abdominal cavity favored to represent colon carcinoma with associated carcinomatosis and bulky retroperitoneal/pelvic adenopathy. Testicular malignancy and adenopathy could've a similar   appearance. Possible undescended " testicles.  2.  Hydronephrosis, left worse than right, secondary to mass effect upon ureters.

## 2020-10-01 NOTE — PROGRESS NOTES
Notes, labs, vitals reviewed.    Fluids stopped.  Good uo perisists.  Cr is down to 2.4.    1.  YAYA.  Follow renal fxn.  If it does NOT return to nl, then he should f/u in our clinic for a hosp f/u visit with Argelia Blake CNP or Meche Barbosa PA-C in 1 month.    Thank you for this consultation.  I will sign off.  Please call if any questions.

## 2020-10-01 NOTE — PLAN OF CARE
Pertinent assessments: VSS on room air. Afebrile. Up independently. Denies pain. Tolerating a regular diet. Sister is at bedside. Ice applied to penis for swelling. Palafox patent with good urine output. Hgb: 7.2. Oncology following.  Major Shift Events: None  Treatment Plan: Palafox, Heparin, Labs, Ice and elevate to penis. Awaiting plan from oncology team.

## 2020-10-01 NOTE — PROGRESS NOTES
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  Bone scan negative for bony metastasis. Spoke to Dr. Ramos yesterday and plan for chemotherapy with bleomycin, etoposide and carboplatin (due to renal function).  PFTs were normal and plan for cycle 1, day 1 inpatient today.  Discussed with the patient and his sister, who is interpreting.  All other questions were answered.  Plan for inpatient days 1-5 with Neulasta on day 6 (outpatient) on 10/6. Chemotherapy every 3 weeks for total of 4 cycles.    --We will follow him closely.    YAYA: Likely due to obstructive uropathy with testicular mass and retroperitoneal adenopathy.  Creatinine as high as 4.5 on 9/23 and urinating normally. Nephrology was closely following creatinine slowly improving.      INTERIM HISTORY: Patient was in his room, resting in bed.  Continues to have some abdominal pain and lower extremity swelling, but urinary output is normal.  No fever chills, vomiting or diarrhea.  Tonya cummings is doing good review great       MEDICATIONS:  Current Facility-Administered Medications   Medication     0.9% sodium chloride BOLUS     acetaminophen (TYLENOL) tablet 650 mg     albuterol (PROAIR HFA/PROVENTIL HFA/VENTOLIN HFA) 108 (90 Base) MCG/ACT inhaler 1-2 puff     albuterol (PROVENTIL) neb solution 2.5 mg     bleomycin (BLENOXANE) 20 Units in sodium chloride 0.9 % 56.67 mL infusion     CARBOplatin 315 mg in sodium chloride 0.9 % 31.5 mL infusion     dexamethasone (DECADRON) tablet 12 mg     [START ON 10/6/2020] dexamethasone (DECADRON) tablet 8 mg     glucose gel 15-30 g    Or     dextrose 50 % injection 25-50 mL    Or     glucagon injection 1  "mg     diphenhydrAMINE (BENADRYL) injection 50 mg     EPINEPHrine PF (ADRENALIN) injection 0.3 mg     etoposide (TOPOSAR) 115 mg in sodium chloride 0.9 % 505.75 mL infusion     famotidine (PEPCID) tablet 20 mg     ferrous sulfate (FEROSUL) tablet 325 mg     fosaprepitant (EMEND) 150 mg in sodium chloride 0.9 % intermittent infusion     heparin ANTICOAGULANT injection 5,000 Units     lidocaine (LMX4) cream     lidocaine 1 % 0.1-1 mL     LORazepam (ATIVAN) injection 0.5-1 mg     LORazepam (ATIVAN) tablet 0.5-1 mg     MEDICATION INSTRUCTION     melatonin tablet 1 mg     meperidine (DEMEROL) injection 25 mg     methylPREDNISolone sodium succinate (solu-MEDROL) injection 125 mg     naloxone (NARCAN) injection 0.1-0.4 mg     naloxone (NARCAN) injection 0.1-0.4 mg     ondansetron (ZOFRAN-ODT) ODT tab 4 mg    Or     ondansetron (ZOFRAN) injection 4 mg     ondansetron (ZOFRAN) tablet 16 mg     oxyCODONE (ROXICODONE) tablet 5-10 mg     polyethylene glycol (MIRALAX) Packet 17 g     prochlorperazine (COMPAZINE) injection 10 mg     prochlorperazine (COMPAZINE) injection 10 mg    Or     prochlorperazine (COMPAZINE) tablet 10 mg    Or     prochlorperazine (COMPAZINE) suppository 25 mg     prochlorperazine (COMPAZINE) tablet 10 mg     senna-docusate (SENOKOT-S/PERICOLACE) 8.6-50 MG per tablet 1 tablet    Or     senna-docusate (SENOKOT-S/PERICOLACE) 8.6-50 MG per tablet 2 tablet     sodium chloride (PF) 0.9% PF flush 3 mL     sodium chloride (PF) 0.9% PF flush 3 mL     sodium chloride 0.9 % 1,000 mL with potassium chloride 20 mEq/L, magnesium sulfate 8 mEq/L infusion     sodium chloride 0.9% infusion       ALLERGIES:  No Known Allergies      PHYSICAL EXAM:  Vital signs:  Temp: 98.6  F (37  C) Temp src: Oral BP: 114/76 Pulse: 74   Resp: 16 SpO2: 98 % O2 Device: None (Room air) Oxygen Delivery: 10 LPM Height: 155 cm (5' 1.02\") Weight: 54 kg (119 lb)  Estimated body mass index is 22.47 kg/m  as calculated from the following:    Height " "as of this encounter: 1.55 m (5' 1.02\").    Weight as of this encounter: 54 kg (119 lb).    ECOG 1-2  GENERAL:  Male, in no acute distress.  Alert and oriented x3.   HEENT:  Normocephalic, atraumatic.   LUNGS:   Nonlabored breathing  ABDOMEN:   Firm, tender right lower quadrant.  EXTREMITIES:   2+ pitting edema lower extremities bilaterally.  SKIN: No rash on exposed skin  PSYCH: Withdrawn      LABS:  CBC RESULTS:   Recent Labs   Lab Test 10/01/20  0631   WBC 8.6   RBC 2.71*   HGB 7.2*   HCT 24.4*   MCV 90   MCH 26.6   MCHC 29.5*   RDW 16.6*   *       Recent Labs   Lab Test 10/01/20  0631 09/30/20  0625    136   POTASSIUM 4.6 4.1   CHLORIDE 106 108   CO2 20 22   ANIONGAP 8 6   GLC 84 90   BUN 32* 28   CR 2.40* 2.56*   IVANIA 9.8 10.1           Araceli Pacheco PA-C  Hematology/Oncology  [facility]      "

## 2020-10-02 NOTE — PLAN OF CARE
Pertinent assessments: alert and  oriented.no complaints of pain. Lung sounds diminished in bases. Dependent edema.  Palafox catheter patent, good  urine output. Up with  SBA.  Major Shift Events Chemo  Treatment Plan: Chemo, Monitor pain in right knee. Pain meds prn.   Bedside Nurse: Padma Hutchison

## 2020-10-02 NOTE — PROGRESS NOTES
[facility]  Hospitalist Progress Note  Daniel Su MD 10/02/20    Reason for Stay (Diagnosis): new seminoma, hypercalcemia         Assessment and Plan:      Summary of Stay: Luz Marina Diallo is a 55 year old Laotian speaking male with a history of developmental delay, PUD, and hiatal hernia who presented with abdominal swelling, weight loss, urinary frequency, and decreased appetite.  In the ED he was found to have an YAYA with creatinine 4.5 and hypercalcemia to 15.5.  He was admitted on 9/23/2020 for treatment of this and also a large abdominal mass seen on CT that is compressing his extrinsic ureters.  He was started on IV fluids and given a dose of zoledronic acid.     He underwent bilateral ureteral stent placement on 9/24.  His abdominal mass has been biopsied with results consistent with a seminoma.  Hematology/oncology were consulted and he is starting chemotherapy here.     His renal function has been improving with IVF/bilateral ureteral stent/and tran placement.  This complicated by volume overload which responded well to single dose of IV furosemide and renal function tolerated it.  Hypercalcemia has normalized with zoledronic acid and IVF    Problem List/Assessment and Plan:   Abdominal mass, seminoma: Presenting with a large abdominal mass along with weight loss and decreased oral intake.  Beta-hCG is 700 AFP is 1.5.  Pathology positive for seminoma.   -[facility] oncology consulted, planning 5 days of inpatient chemotherapy with bleomycin, etoposide, and carboplatin started 10/1 followed by Neulasta on day 6  -Urology consulted while here, I am unclear on timing of orchiectomy if this is needed-will need to discuss further with oncology    YAYA: Secondary to obstructive uropathy from pelvic mass.  Urology consulted.  Tran catheter and bilateral ureteral stents in place now.  -Nephrology consulted  -Slowly improving.  IV fluids stopped due to volume overload.  Did  "receive 1 dose IV Lasix with good urine output    Hypercalcemia of malignancy: Resolved with IV fluids and 1 dose of zoledronic acid.  Nephrology consulted-  -Follow with chemotherapy initiation    Anemia: Hemoglobin remains relatively stable in the 7 range.  -CBC daily    Right knee pain: No erythema, swelling, or warmth to suggest infection.  Range of motion is okay.  He is bearing weight okay and it feels better.    Developmental delay: Lives with his sister.  Independent with ADLs at baseline.    COVID19 PCR negative  DVT Prophylaxis: Heparin SQ  Code Status: Full Code  FEN: Regular diet  Discharge Dispo: Home with sister  Estimated Disch Date / # of Days until Disch: Late Monday after chemotherapy day 5 or Tuesday    Sister updated at bedside.  She assisted with interpreting for the patient per preference.        Interval History (Subjective):      Assumed care today.  Patient feels good.  He denies any pain or shortness of breath.  No nausea after starting chemotherapy.  Urine output has been good.                  Physical Exam:      Last Vital Signs:  /70 (BP Location: Left arm)   Pulse 76   Temp 97.4  F (36.3  C) (Oral)   Resp 20   Ht 1.55 m (5' 1.02\")   Wt 53.9 kg (118 lb 12.8 oz)   SpO2 100%   BMI 22.43 kg/m        Intake/Output Summary (Last 24 hours) at 10/2/2020 1455  Last data filed at 10/2/2020 1037  Gross per 24 hour   Intake 3107.92 ml   Output 4125 ml   Net -1017.08 ml       Constitutional: Awake, NAD   Eyes: sclera white   HEENT:  MMM  Respiratory:  lungs cta bilaterally, no crackles or wheeze  Cardiovascular: RRR.  No murmur   GI: Abdomen is at least moderately distended and he did have some tenderness to palpation without guarding, bowel sounds present  Genitourinary: Palafox catheter in place draining mostly yellow urine with a little bit of red sediment in the tubing  Skin: no rash    Musculoskeletal/extremities: Trace-1+ bilateral lower extremity edema  Neurologic: " Alert  Psychiatric: calm, cooperative          Medications:      All current medications were reviewed with changes reflected in problem list.         Data:      All new lab and imaging data was reviewed.   Labs:  Recent Labs   Lab 10/02/20  0632 10/01/20  0631 09/30/20  0625    134 136   POTASSIUM 5.2 4.6 4.1   CHLORIDE 109 106 108   CO2 20 20 22   ANIONGAP 8 8 6   * 84 90   BUN 33* 32* 28   CR 2.21* 2.40* 2.56*   GFRESTIMATED 33* 30* 27*   GFRESTBLACK 38* 34* 32*   IVANIA 9.7 9.8 10.1     Recent Labs   Lab 10/02/20  0632 10/01/20  0631 09/30/20  0625   WBC 12.6* 8.6 7.6   HGB 7.2* 7.2* 7.2*   HCT 24.3* 24.4* 24.3*   MCV 91 90 90   * 519* 485*      Imaging:   None today      Daniel Su MD

## 2020-10-02 NOTE — PROGRESS NOTES
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  Bone scan negative for bony metastasis.  Plan for neoadjuvant chemotherapy with bleomycin, etoposide, carboplatin, C1 D1 yesterday.  He tolerated this extremely well. Continue with chemotherapy today and through the weekend.  Orchiectomy is definitely still an option, but we need to complete 4 cycles of chemotherapy first.  We can discuss in more details as an outpatient.  Plan for Neulasta outpatient on 10/6.  He has become more edematous, discussed with nurse to decrease IV fluids since he is receiving carboplatin, not cisplatin. Plan for chemotherapy every 3 weeks for total of 4 cycles.    --We will follow him closely.     YAYA: Likely due to obstructive uropathy with testicular mass and retroperitoneal adenopathy.  Creatinine as high as 4.5 on 9/23 and urinating normally.   Creatinine proved to 2.21 today. Nephrology was closely following creatinine slowly improving.    Heme: Anemia with hemoglobin 7.2, stable, likely secondary to disease.  Platelets stable.  WBC 12.6, likely from dex, no infectious symptoms    INTERIM HISTORY: Patient was seen in his room, sister at bedside.  He tolerated chemotherapy extremely well yesterday.  No fever or chills, vomiting or diarrhea today.  Appetite has improved today.  No other new complaints.       MEDICATIONS:  Current Facility-Administered Medications   Medication     acetaminophen (TYLENOL) tablet 650 mg     albuterol (PROAIR HFA/PROVENTIL HFA/VENTOLIN HFA) 108 (90 Base) MCG/ACT inhaler 1-2 puff     albuterol (PROVENTIL) neb solution 2.5 mg     bleomycin (BLENOXANE) 20 Units in sodium chloride 0.9 % 56.67 mL  infusion     dexamethasone (DECADRON) tablet 12 mg     [START ON 10/6/2020] dexamethasone (DECADRON) tablet 8 mg     glucose gel 15-30 g    Or     dextrose 50 % injection 25-50 mL    Or     glucagon injection 1 mg     diphenhydrAMINE (BENADRYL) injection 50 mg     EPINEPHrine PF (ADRENALIN) injection 0.3 mg     etoposide (TOPOSAR) 115 mg in sodium chloride 0.9 % 505.75 mL infusion     famotidine (PEPCID) tablet 20 mg     ferrous sulfate (FEROSUL) tablet 325 mg     heparin ANTICOAGULANT injection 5,000 Units     lidocaine (LMX4) cream     lidocaine 1 % 0.1-1 mL     LORazepam (ATIVAN) injection 0.5-1 mg     LORazepam (ATIVAN) tablet 0.5-1 mg     MEDICATION INSTRUCTION     melatonin tablet 1 mg     meperidine (DEMEROL) injection 25 mg     methylPREDNISolone sodium succinate (solu-MEDROL) injection 125 mg     naloxone (NARCAN) injection 0.1-0.4 mg     naloxone (NARCAN) injection 0.1-0.4 mg     ondansetron (ZOFRAN-ODT) ODT tab 4 mg    Or     ondansetron (ZOFRAN) injection 4 mg     ondansetron (ZOFRAN-ODT) ODT tab 16 mg     oxyCODONE (ROXICODONE) tablet 5-10 mg     polyethylene glycol (MIRALAX) Packet 17 g     prochlorperazine (COMPAZINE) injection 10 mg     prochlorperazine (COMPAZINE) injection 10 mg    Or     prochlorperazine (COMPAZINE) tablet 10 mg    Or     prochlorperazine (COMPAZINE) suppository 25 mg     prochlorperazine (COMPAZINE) tablet 10 mg     senna-docusate (SENOKOT-S/PERICOLACE) 8.6-50 MG per tablet 1 tablet    Or     senna-docusate (SENOKOT-S/PERICOLACE) 8.6-50 MG per tablet 2 tablet     sodium chloride (PF) 0.9% PF flush 3 mL     sodium chloride (PF) 0.9% PF flush 3 mL     sodium chloride 0.9 % 1,000 mL with potassium chloride 20 mEq/L, magnesium sulfate 8 mEq/L infusion     sodium chloride 0.9% infusion       ALLERGIES:  No Known Allergies      PHYSICAL EXAM:  Vital signs:  Temp: 97.4  F (36.3  C) Temp src: Oral BP: 106/70 Pulse: 76   Resp: 20 SpO2: 100 % O2 Device: None (Room air) Oxygen Delivery: 10  "LPM Height: 155 cm (5' 1.02\") Weight: 53.9 kg (118 lb 12.8 oz)  Estimated body mass index is 22.43 kg/m  as calculated from the following:    Height as of this encounter: 1.55 m (5' 1.02\").    Weight as of this encounter: 53.9 kg (118 lb 12.8 oz).    GENERAL:  Male, in no acute distress.  Alert and oriented x3.   HEENT:  Normocephalic, atraumatic.    CV:  RRR, No murmurs, gallops, or rubs.   EXTREMITIES:  2+ pitting edema bilaterally  SKIN: No rash on exposed skin  PSYCH: Mood stable      LABS:  CBC RESULTS:   Recent Labs   Lab Test 10/02/20  0632   WBC 12.6*   RBC 2.67*   HGB 7.2*   HCT 24.3*   MCV 91   MCH 27.0   MCHC 29.6*   RDW 16.7*   *       Recent Labs   Lab Test 10/02/20  0632 10/01/20  0631    134   POTASSIUM 5.2 4.6   CHLORIDE 109 106   CO2 20 20   ANIONGAP 8 8   * 84   BUN 33* 32*   CR 2.21* 2.40*   IVANIA 9.7 9.8           Araceli Pacheco PA-C  Hematology/Oncology  [facility]      "

## 2020-10-02 NOTE — PLAN OF CARE
End of Shift Summary  For vital signs and complete assessments, please see documentation flowsheets.     Pertinent assessments: Pt tolerated chemo infusions this shift. No adverse reactions. Good blood return from PIV. Educated patient that he should be doing normal saline rinses four times daily, and brushing teeth with soft toothbrush. Supplies placed in bathroom. Pt doing his own oral cares. Lung sounds with crackles RLL and diminished in bases. Dependent edema.    Major Shift Events Chemo  Treatment Plan: Chemo, Monitor pain in right knee. Pain meds prn.

## 2020-10-03 NOTE — PLAN OF CARE
End of Shift Summary  For vital signs and complete assessments, please see documentation flowsheets.     Pertinent assessments: Alert and oriented x3 with mentation at baseline per sister who was at bedside all day for patient. Denies pain. No nausea noted. Abdomen distended, firm/taut. Tolerating regular diet without issue, good appetite. Generalized mild edema to lower body from hips down. Penile edema remains, tran in place with large urine output. Refused OOB today due to fatigue. Denies shortness of breath. No cough noted.   Major Shift Events: Etoposide administered without immediate issue.   Treatment Plan: Daily Etoposide through Monday. Supportive cares. Encourage OOB and ambulation.

## 2020-10-03 NOTE — PROGRESS NOTES
[facility]  Hospitalist Progress Note  Daniel Su MD 10/03/20    Reason for Stay (Diagnosis): new seminoma, hypercalcemia         Assessment and Plan:      Summary of Stay: Luz Marina Diallo is a 55 year old Laotian speaking male with a history of developmental delay, PUD, and hiatal hernia who presented with abdominal swelling, weight loss, urinary frequency, and decreased appetite.  In the ED he was found to have an YAYA with creatinine 4.5 and hypercalcemia to 15.5.  He was admitted on 9/23/2020 for treatment of this and also a large abdominal mass seen on CT that is compressing his extrinsic ureters.  He was started on IV fluids and given a dose of zoledronic acid.     He underwent bilateral ureteral stent placement on 9/24.  His abdominal mass has been biopsied with results consistent with a seminoma.  Hematology/oncology were consulted and he is starting chemotherapy here.     His renal function has been improving with IVF/bilateral ureteral stent/and tran placement.  This complicated by volume overload which responded well to single dose of IV furosemide and renal function tolerated it.  Hypercalcemia has normalized with zoledronic acid and IVF.    Problem List/Assessment and Plan:   Abdominal mass, seminoma: Presenting with a large abdominal mass along with weight loss and decreased oral intake.  Beta-hCG is 700 AFP is 1.5.  Pathology positive for seminoma.   -[facility] oncology consulted, planning 5 days of inpatient chemotherapy with bleomycin, etoposide, and carboplatin started 10/1 followed by Neulasta on day 6   -Receiving IV fluids for right to prevent carboplatin toxicity, reduced rate to 75 ml/hr due to edema  -Urology consulted while here.  Orchiectomy is an option moving forward, but needs to complete 4 cycles of chemotherapy first, this will be discussed as outpatient.    YAYA: Secondary to obstructive uropathy from pelvic mass.  Urology consulted.  Trna catheter  and bilateral ureteral stents in place now.  -Nephrology consulted  -Slowly improving.  Was on IV fluids then received 1 dose of Lasix for overload.  Now back on some IV fluids to prevent carboplatin toxicity    Hypercalcemia of malignancy: Resolved with IV fluids and 1 dose of zoledronic acid.  Nephrology consulted-  -Follow with chemotherapy initiation    Anemia: Hemoglobin remains relatively stable in the 7 range.  -CBC daily    Leukocytosis: White blood cell count rising up to 25 which is suspect is from dexamethasone he is receiving with chemotherapy.  He does not have fevers.  The right knee is tender medially and swollen, but doubt infected as below.  -Trend CBC, if fevers right knee arthrocentesis and blood cultures    Right knee pain:  new right medial knee pain.  Both knees are swollen as he is edematous throughout both legs, but there is no erythema or warmth to suggest infection.  He does not have pain with range of motion and is focally tender over the medial right knee.  Suspect related to his lower extremity edema.  He is bearing weight okay.  -Acetaminophen as needed, ice packs, and add topical menthol as needed  -If develops fevers, redness or warmth to the knee then would ask orthopedics for arthrocentesis otherwise no empiric antibiotics or imaging    Developmental delay: Lives with his sister.  Independent with ADLs at baseline.    COVID19 PCR negative  DVT Prophylaxis: Heparin SQ  Code Status: Full Code  FEN: Regular diet, decrease IV fluids to 75 ml/hr  Discharge Dispo: Home with sister  Estimated Disch Date / # of Days until Disch: Late Monday after chemotherapy day 5 or Tuesday    Sister updated at bedside.  She assisted with interpreting for the patient per preference.        Interval History (Subjective):      Reports tolerable abdominal pain, not really taking any medication for this.  Is having ongoing right medial knee pain worse when he presses in the area and lifts that off the bed.   "No fevers or chills.  The nausea.  Tolerating p.o.                  Physical Exam:      Last Vital Signs:  /73 (BP Location: Left arm)   Pulse 64   Temp 98.2  F (36.8  C) (Oral)   Resp 20   Ht 1.55 m (5' 1.02\")   Wt 55.1 kg (121 lb 6.4 oz)   SpO2 100%   BMI 22.92 kg/m      Intake/Output Summary (Last 24 hours) at 10/3/2020 1257  Last data filed at 10/3/2020 0627  Gross per 24 hour   Intake 1970 ml   Output 3175 ml   Net -1205 ml       Constitutional: Awake, NAD   Eyes: sclera white   HEENT:  MMM  Respiratory:    no crackles or wheeze  Cardiovascular: RRR.  No murmur   GI: Abdomen is moderately distended, mild tenderness to palpation without guarding, bowel sounds present  Genitourinary: Palafox catheter in place draining mostly yellow urine with a little bit of red sediment in the tubing  Skin: no rash    Musculoskeletal/extremities: 1+ bilateral LE edema.  Swollen bilateral knees.  Tender medial R knee without any erythema or warmth.  ROM normal without pain  Neurologic: Alert  Psychiatric: calm, cooperative          Medications:      All current medications were reviewed with changes reflected in problem list.         Data:      All new lab and imaging data was reviewed.   Labs:  Recent Labs   Lab 10/03/20  0703 10/02/20  0632 10/01/20  0631    137 134   POTASSIUM 5.1 5.2 4.6   CHLORIDE 110* 109 106   CO2 20 20 20   ANIONGAP 8 8 8   * 138* 84   BUN 39* 33* 32*   CR 2.01* 2.21* 2.40*   GFRESTIMATED 37* 33* 30*   GFRESTBLACK 43* 38* 34*   IVANIA 9.4 9.7 9.8     Recent Labs   Lab 10/03/20  0703 10/02/20  0632 10/01/20  0631   WBC 25.9* 12.6* 8.6   HGB 7.7* 7.2* 7.2*   HCT 25.7* 24.3* 24.4*   MCV 91 91 90   * 551* 519*      Imaging:   None today      Daniel Su MD        "

## 2020-10-03 NOTE — PLAN OF CARE
End of Shift Summary  For vital signs and complete assessments, please see documentation flowsheets.     Pertinent assessments:VSS.  Denies pain, nausea and SOB.  Abdomen distended and firm. Tolerating regular diet without issue. Generalized mild edema to lower body from hips down. Penile edema remains, tran in place.     Major Shift Events: uneventful   Treatment Plan: Daily Etoposide. Supportive cares.   Bedside Nurse: Mamta Fernandez RN

## 2020-10-03 NOTE — PROGRESS NOTES
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  Bone scan negative for bony metastasis.  Plan for neoadjuvant chemotherapy with bleomycin, etoposide, carboplatin, C1 D1 on 10/1.  Orchiectomy is definitely still an option, but we need to complete 4 cycles of chemotherapy first.  We can discuss in more details as an outpatient.  Plan for Neulasta outpatient on 10/6.  He is doing well. Sister is by bedside she is pleased with how he is doing. No n/v/d/c. Eating well.  Continue chemotherapy     YAYA: Likely due to obstructive uropathy with testicular mass and retroperitoneal adenopathy.  Creatinine as high as 4.5 on 9/23 and urinating normally.   Creatinine imporved  today. Nephrology was closely following creatinine slowly improving.    Heme: Anemia with hemoglobin 7.2, stable, likely secondary to disease.  Platelets stable.  WBC 12.6, likely from dex, no infectious symptoms    INTERIM HISTORY: Patient was seen in his room, sister at bedside. He is tolerating therapy very well. No n/v/d/     MEDICATIONS:  Current Facility-Administered Medications   Medication     acetaminophen (TYLENOL) tablet 650 mg     albuterol (PROAIR HFA/PROVENTIL HFA/VENTOLIN HFA) 108 (90 Base) MCG/ACT inhaler 1-2 puff     albuterol (PROVENTIL) neb solution 2.5 mg     bleomycin (BLENOXANE) 20 Units in sodium chloride 0.9 % 56.67 mL infusion     dexamethasone (DECADRON) tablet 12 mg     [START ON 10/6/2020] dexamethasone (DECADRON) tablet 8 mg     glucose gel 15-30 g    Or     dextrose 50 % injection 25-50 mL    Or     glucagon injection 1 mg     glucose gel 15-30 g    Or     dextrose 50 % injection 25-50 mL    Or     glucagon  injection 1 mg     diphenhydrAMINE (BENADRYL) injection 50 mg     EPINEPHrine PF (ADRENALIN) injection 0.3 mg     etoposide (TOPOSAR) 115 mg in sodium chloride 0.9 % 505.75 mL infusion     famotidine (PEPCID) tablet 20 mg     ferrous sulfate (FEROSUL) tablet 325 mg     heparin ANTICOAGULANT injection 5,000 Units     insulin aspart (NovoLOG) injection (RAPID ACTING)     insulin aspart (NovoLOG) injection (RAPID ACTING)     lidocaine (LMX4) cream     lidocaine 1 % 0.1-1 mL     LORazepam (ATIVAN) injection 0.5-1 mg     LORazepam (ATIVAN) tablet 0.5-1 mg     MEDICATION INSTRUCTION     melatonin tablet 1 mg     menthol (Topical Analgesic) 2.5% (BENGAY VANISHIN SCENT) 2.5 % topical gel     meperidine (DEMEROL) injection 25 mg     methylPREDNISolone sodium succinate (solu-MEDROL) injection 125 mg     naloxone (NARCAN) injection 0.1-0.4 mg     naloxone (NARCAN) injection 0.1-0.4 mg     ondansetron (ZOFRAN-ODT) ODT tab 4 mg    Or     ondansetron (ZOFRAN) injection 4 mg     ondansetron (ZOFRAN-ODT) ODT tab 16 mg     oxyCODONE (ROXICODONE) tablet 5-10 mg     polyethylene glycol (MIRALAX) Packet 17 g     prochlorperazine (COMPAZINE) injection 10 mg     prochlorperazine (COMPAZINE) injection 10 mg    Or     prochlorperazine (COMPAZINE) tablet 10 mg    Or     prochlorperazine (COMPAZINE) suppository 25 mg     prochlorperazine (COMPAZINE) tablet 10 mg     senna-docusate (SENOKOT-S/PERICOLACE) 8.6-50 MG per tablet 1 tablet    Or     senna-docusate (SENOKOT-S/PERICOLACE) 8.6-50 MG per tablet 2 tablet     sodium chloride (PF) 0.9% PF flush 3 mL     sodium chloride (PF) 0.9% PF flush 3 mL     sodium chloride 0.9 % 1,000 mL with potassium chloride 20 mEq/L, magnesium sulfate 8 mEq/L infusion     sodium chloride 0.9% infusion       ALLERGIES:  No Known Allergies      PHYSICAL EXAM:  Vital signs:  Temp: 98.2  F (36.8  C) Temp src: Oral BP: 123/73 Pulse: 64   Resp: 20 SpO2: 100 % O2 Device: None (Room air) Oxygen Delivery: 10 LPM Height:  "155 cm (5' 1.02\") Weight: 55.1 kg (121 lb 6.4 oz)  Estimated body mass index is 22.92 kg/m  as calculated from the following:    Height as of this encounter: 1.55 m (5' 1.02\").    Weight as of this encounter: 55.1 kg (121 lb 6.4 oz).    GENERAL:  Male, in no acute distress.  Alert and oriented x3.   HEENT:  Normocephalic, atraumatic.    CV:  RRR, No murmurs, gallops, or rubs.   EXTREMITIES:  2+ pitting edema bilaterally  SKIN: No rash on exposed skin  PSYCH: Mood stable      LABS:  CBC RESULTS:   Recent Labs     Recent Labs   Lab Test 10/03/20  0703 10/02/20  0632 10/01/20  0631 09/30/20  0625 09/29/20  0626    137 134 136 137   POTASSIUM 5.1 5.2 4.6 4.1 4.1   CHLORIDE 110* 109 106 108 110*   CO2 20 20 20 22 21   ANIONGAP 8 8 8 6 6   BUN 39* 33* 32* 28 31*   CR 2.01* 2.21* 2.40* 2.56* 2.61*   * 138* 84 90 93   IVANIA 9.4 9.7 9.8 10.1 8.9     Recent Labs   Lab Test 10/03/20  0703 09/29/20  0626 09/28/20  0650 09/27/20  0719 09/26/20  0756 09/25/20  0705   MAG  --  1.6 1.7  --   --   --    PHOS 4.3  --  3.1 2.9 3.2 6.8*     Recent Labs   Lab Test 10/03/20  0703 10/02/20  0632 10/01/20  0631 09/30/20  0625 09/29/20  0626 09/25/20  0705 09/25/20  0705 09/23/20 2112 09/23/20 2112 07/03/19  1105 02/28/19  1627 02/28/19  1627 08/13/17  1933   WBC 25.9* 12.6* 8.6 7.6 7.1   < > 10.0   < > 8.4 5.3   < > 8.6 8.2   HGB 7.7* 7.2* 7.2* 7.2* 6.9*   < > 7.2*   < > 8.2* 13.9   < > 4.1* 9.2*   * 551* 519* 485* 427   < > 382   < > 441 291   < > 428 283   MCV 91 91 90 90 90   < > 93   < > 89 78   < > 93 81   NEUTROPHIL  --   --   --   --   --   --  82.1  --  75.1 54.8  --  75.8 77.0    < > = values in this interval not displayed.     Recent Labs   Lab Test 09/29/20  0626 09/28/20  0650 09/27/20  0719 09/26/20  0756 09/24/20  0601 09/24/20  0601   BILITOTAL 0.3 0.2  --  0.4   < >  --    ALKPHOS 643* 683*  --  739*   < >  --    ALT 12 10  --  11   < >  --    AST 44 48*  --  68*   < >  --    ALBUMIN 2.2* 2.3* 2.3* 2.5*  "  < >  --    LDH  --   --   --   --   --  710*    < > = values in this interval not displayed.     TSH   Date Value Ref Range Status   08/13/2017 0.75 0.40 - 4.00 mU/L Final     Recent Labs   Lab Test 09/25/20  0705   CEA 0.5     Results for orders placed or performed during the hospital encounter of 09/23/20   Abd/pelvis CT no contrast - [facility] Protocol    Narrative    EXAM: CT ABDOMEN PELVIS W/O CONTRAST  LOCATION: [facility]  DATE/TIME: 9/23/2020 10:38 PM    INDICATION: Renal failure. Hypercalcemia. Abdominal pain. Fever. Abdominal mass. History of anemia.  COMPARISON: None.  TECHNIQUE: CT scan of the abdomen and pelvis was performed without IV contrast. Multiplanar reformats were obtained. Dose reduction techniques were used.  CONTRAST: None.    FINDINGS:   LOWER CHEST: Normal.    HEPATOBILIARY: Normal.  PANCREAS: Normal.  SPLEEN: Normal.  ADRENAL GLANDS: Normal.  KIDNEYS/BLADDER: Mild bilateral hydronephrosis and hydroureter, left worse than right secondary to large pelvic mass    BOWEL: There is a large lobulated low abdominal/pelvic mass with maximal dimensions of approximately 17 x 17 x 13.5 cm. There are numerous calcifications distributed throughout the mass. There is also bulky confluent retroperitoneal adenopathy measuring   up to 12 x 8 cm at the level of the renal rupinder that also demonstrate the same calcification pattern. Masses along the left hemipelvis are confluent with the dominant mass though may represent separate foci of adenopathy. In addition, there are numerous   smaller intra-abdominal or mesenteric masses which do not demonstrate calcifications on likely represent metastatic peritoneal implants or additional sites of adenopathy.    In a male patient mucinous colon carcinoma with be the most to result in this appearance. Testicular malignancies could also result in this appearance.    VASCULATURE: Unremarkable.    PELVIC ORGANS: Normal size prostate. Bladder unremarkable.  Ovoid  density within left inguinal canal likely relates to a retracted or possibly undescended testicle. Right testicle is not identified though the entire scrotum is not included on this exam.    MUSCULOSKELETAL: No suspicious lesions.      Impression    IMPRESSION:   1.  Huge lobulated, partially calcified mass present within the low abdominal cavity favored to represent colon carcinoma with associated carcinomatosis and bulky retroperitoneal/pelvic adenopathy. Testicular malignancy and adenopathy could've a similar   appearance. Possible undescended testicles.  2.  Hydronephrosis, left worse than right, secondary to mass effect upon ureters.     US Testicular & Scrotum w Doppler Ltd    Narrative    EXAM: ULTRASOUND SCROTUM WITH DOPPLER  LOCATION: [facility]  DATE/TIME: 9/24/2020 1:46 AM    INDICATION: Abnormal CT scan. Question undescended testicle.  COMPARISON: 09/23/2020 - CT abdomen and pelvis.    TECHNIQUE: Ultrasound of scrotum with color flow and spectral Doppler with waveform analysis performed.    FINDINGS:    RIGHT: The testicle is not visualized. No hydrocele or varicocele.    LEFT: A mildly lobulated 5 cm mass-like structure containing a few tiny echogenicities is present in the left inguinal canal. This surrounds a 3.5 x 1.9 x 1.5 cm relatively homogeneous structure. This most likely represents a lobulated soft tissue mass   surrounding a normal left testicle. Blood flow is visualized in the presumed testicle. No hydrocele or varicocele.      Impression    IMPRESSION:   1. The left testicle is not well-defined. There is a 5 cm lobulated structure in the left inguinal region that likely represents a lobulated soft tissue mass surrounding a normal left testicle. The mass is suspicious for neoplasm.  2. Nonvisualization of the right testicle. The testicle may be undescended or absent.   XR Surgery GLORIA L/T 5 Min Fluoro w Stills    Narrative    This exam was marked as non-reportable because it  will not be read by a   radiologist or a [place] non-radiologist provider.         CT Chest w/o Contrast    Narrative    CT CHEST WITHOUT CONTRAST  9/25/2020 1:39 PM    CLINICAL HISTORY: Abdominal mass noted on CT. Evaluate for malignancy  in the chest.    TECHNIQUE: CT chest without IV contrast. Multiplanar reformats were  obtained. Dose reduction techniques were used.  CONTRAST: None.    COMPARISON: CT of the abdomen and pelvis without IV contrast performed  9/23/2020.    FINDINGS:   LUNGS AND PLEURA: Multiple images through the chest are degraded by  patient motion artifact. Trace amount of pleural fluid on the left is  new since the previous exam. There is mild atelectasis at both lung  bases posteriorly. Indeterminate 0.5 cm pulmonary nodule in the right  upper lobe (series 4 image 61). The lungs are otherwise clear.    MEDIASTINUM/AXILLAE: No enlarged lymph nodes are identified in the  chest.    UPPER ABDOMEN: There is been interval placement of a left ureteral  stent. A 3.3 cm masslike structure in the left upper quadrant (series  2 image 45) is unchanged, and may represent a gastric diverticulum.  Limited noncontrast views of the upper abdomen are otherwise  unremarkable.    MUSCULOSKELETAL: Unremarkable.      Impression    IMPRESSION:   1.  Indeterminate 0.5 cm right upper lobe pulmonary nodule.  2.  Trace amount of pleural fluid is noted on the left.  3.  Interval placement of a left ureteral stent.    YEMI GUERRA MD   US Bx Abd Or Retroperi Mass Perc Needle    Narrative    US BIOPSY ABDOMINAL OR RETROPERITONEAL MASS PERCUTANEOUS NEEDLE   9/25/2020 1:30 PM     HISTORY:  Patient has a large abdominal mass.    COMPARISON: CT scan of the abdomen pelvis dated 9/23/2020    FINDINGS: After obtaining informed consent, the patient was placed in  a supine position on the ultrasound table. The anterior lower abdomen  was prepped and draped in the usual sterile manner. 1% lidocaine was  injected for local  anesthesia. Under ultrasound guidance, using a  17/18 gauge coaxial biopsy needle system, 9 core biopsies were  obtained from a large abdominal mass. The samples were submitted to  pathology.    The patient tolerated the procedure well. There were no immediate post  procedure complications.    Patient's vital signs were monitored and remained stable.      Impression    IMPRESSION: Ultrasound-guided abdominal mass biopsy performed as  above.    MAYRA VIERA MD   NM Bone Scan Whole Body    Narrative    EXAMINATION: NM BONE SCAN WHOLE BODY  Whole-body bone scan, 9/28/2020 2:35 PM     HISTORY: Testicular neoplasm, elevated alkaline phosphatase,  hypercalcemia.    ADDITIONAL INFORMATION: none    COMPARISON: 9/23/2020.    TECHNIQUE: The patient received 26.0 mCi of Tc-99m MDP intravenously.  Whole body bone images were obtained at 3 hours.    FINDINGS:     Heterogeneous uptake within the soft tissues of the low central  abdomen and pelvis, is consistent with uptake within partially  calcified lymph teena metastatic disease in this location.     There is a tubular structure of increased tracer uptake in the  epigastrium; this has an appearance and location of the stomach, but  is an unusual distribution for technetium 99 MDP. This is of uncertain  significance, as to whether it may represent uptake within the wall of  the stomach, or excretion of technetium 99 by the gastric mucosa.    Asymmetrically decreased uptake within the left kidney, likely  represents sequela of known left renal obstruction.    No focal metastatic lesions within the axial or proximal appendicular  skeleton.    Ill-defined increased radiotracer uptake in the region of the right  lower leg, including the knee and ankle, of uncertain clinical  significance. This could represent sequela of prior trauma or  metabolic bone process. Recommend correlation with right knee,  tibia/fibula and ankle radiographs.      Impression    IMPRESSION:   1.  No  definite metastatic bone lesions identified.  2.  Ill-defined generalized increased radiotracer uptake in the right  knee and the right lower leg/ankle. This is of uncertain significance;  would recommend correlation with right knee and right ankle  radiographs to evaluate for evidence of a prior trauma or metabolic  process in these areas.  3.  Increased soft tissue uptake within the lower abdomen and pelvis,  corresponding to the known, partially calcified large soft tissue  mass/metastasis in this area.  4.  Asymmetrically decreased uptake in the left kidney, likely  secondary to known left renal obstruction.  5.  Increased uptake in the region of the stomach of uncertain  significance, an uncommon appearance. Potential explanations may be  for technetium uptake or excretion by the gastric mucosa. It is  doubtful to represent gastric metastatic malignancy.    LANRE DILLARD MD   XR Chest 2 Views    Narrative    CHEST TWO VIEWS    9/27/2020 1:50 PM     HISTORY: Fever    COMPARISON: CT chest dated 9/25/2020.    FINDINGS:  Cardiac silhouette is enlarged. Thoracic aortic arch is  mildly prominent, similar to the prior CT dated 9/25/2020. Lungs are  grossly clear. Mediastinum and pulmonary vascularity are within normal  limits. No pneumothorax or significant pleural fluid collection.  Chronic left lateral seventh rib fracture is noted. No acute fracture.      Impression    IMPRESSION:   1. Cardiomegaly.  2. No other evidence of acute cardiopulmonary disease is seen.    MD Leonor EMANUEL MD on 10/3/2020 at 1:33 PM

## 2020-10-04 NOTE — PROGRESS NOTES
[facility]  Hospitalist Progress Note  Daniel Su MD 10/04/20    Reason for Stay (Diagnosis): new seminoma, hypercalcemia         Assessment and Plan:      Summary of Stay: Luz Marina Diallo is a 55 year old Laotian speaking male with a history of developmental delay, PUD, and hiatal hernia who presented with abdominal swelling, weight loss, urinary frequency, and decreased appetite.  In the ED he was found to have an YAYA with creatinine 4.5 and hypercalcemia to 15.5.  He was admitted on 9/23/2020 for treatment of this and also a large abdominal mass seen on CT that is compressing his extrinsic ureters.  He was started on IV fluids and given a dose of zoledronic acid.     He underwent bilateral ureteral stent placement on 9/24.  His abdominal mass has been biopsied with results consistent with a seminoma.  Hematology/oncology were consulted and he is starting chemotherapy here.     His renal function has been improving with IVF/bilateral ureteral stent/and tran placement.  This complicated by volume overload which responded well to single dose of IV furosemide and renal function tolerated it.  Hypercalcemia has normalized with zoledronic acid and IVF.    Problem List/Assessment and Plan:   Abdominal mass, seminoma: Presenting with a very large abdominal mass along with weight loss and decreased oral intake.  Beta-hCG is 700 AFP is 1.5.  Pathology positive for seminoma.   -[facility] oncology consulted, planning 5 days of inpatient chemotherapy with bleomycin, etoposide, and carboplatin started 10/1 followed by Neulasta on day 6   -Receiving IV fluids for right to prevent carboplatin toxicity, reduced rate to 75 ml/hr due to edema  -Urology consulted while here.  Orchiectomy is an option moving forward, but needs to complete 4 cycles of chemotherapy first, this will be discussed as outpatient.  -Uric acid elevated 8.3, some risk for worsening with tumor lysis.  Start allopurinol 100 mg  daily based on current renal function    YAYA: Baseline creatinine 0.9.  Creatinine 4.5 on admission.  Secondary to obstructive uropathy from pelvic mass.  Urology consulted.  Palafox catheter and bilateral ureteral stents in place now.  -Nephrology consulted  -Slowly improving but may be reaching sukhdeep at 2, currently on IV fluids to prevent renal toxicity from carboplatin.  He is having excellent urine output.   -Has significant penile edema.  Additionally certainly at risk for compression of bladder and bladder outlet from the large pelvic mass if Palafox catheter removed.  Best option may be to leave Palafox catheter in place until tumor size shrinking, on the other hand certainly risk for infection while receiving chemotherapy.  RN to ask urology to reevaluate the patient's significant penile edema today and possibly leaving and Palafox catheter on discharge.  He is receiving IV fluids for renal protection from carboplatin although the rate has been decreased.    Hyperkalemia: Potassium at 5.6 today, perhaps from cellular turnover.  Was receiving potassium and magnesium in the IV fluids that was ordered for renal protection with carboplatin.  Has excellent urine output.  -Changed IV fluids to normal saline  -Recheck potassium at 1pm    Hypercalcemia of malignancy: Resolved with IV fluids and 1 dose of zoledronic acid.  Nephrology consulted-  -Following with chemotherapy initiation    Anemia: Hemoglobin remains relatively stable in the 7 range.  -CBC daily    Leukocytosis: White blood cell count rising up to 25 which is suspect is from dexamethasone he is receiving with chemotherapy.  He does not have fevers.  The right knee is tender medially and swollen, but doubt infected as below.  -Trend CBC, if fevers right knee arthrocentesis and blood cultures    Right knee pain:  new right medial knee pain.  Both knees are swollen as he is edematous throughout both legs, but there is no erythema or warmth to suggest infection.   "He does not have pain with range of motion and is focally tender over the medial right knee.  Suspect related to his lower extremity edema.  He is bearing weight okay and actually feels better walking in the halls.  -Acetaminophen as needed, ice packs, and added topical menthol as needed  -If develops fevers, redness or warmth to the knee then would ask orthopedics for arthrocentesis otherwise no empiric antibiotics or imaging  -No history of gout, but uric acid elevated likely from tumor cell turnover.  He is getting high-dose dexamethasone, so I would suspect if this was from gout pain it would be improving and is not.  Starting allopurinol as above.    Developmental delay: Lives with his sister.  Independent with ADLs at baseline.    COVID19 PCR negative  DVT Prophylaxis: Heparin SQ  Code Status: Full Code  FEN: Regular diet, changed to IV NS at 75 ml/hr  Discharge Dispo: Home with sister  Estimated Disch Date / # of Days until Disch: Late Monday after chemotherapy day 5 or Tuesday    Sister updated in person this morning.  She assisted with interpreting for the patient per preference.        Interval History (Subjective):      Ongoing significant penile edema.  Excellent urine output.  Still having similar right knee pain without any change such as redness or warmth.  The right knee pain is better when walking in the halls.  No shortness of breath.  No nausea or vomiting.                  Physical Exam:      Last Vital Signs:  /70   Pulse 64   Temp 97.1  F (36.2  C) (Oral)   Resp 20   Ht 1.55 m (5' 1.02\")   Wt 56.8 kg (125 lb 3.2 oz)   SpO2 100%   BMI 23.64 kg/m      Intake/Output Summary (Last 24 hours) at 10/4/2020 1235  Last data filed at 10/4/2020 1220  Gross per 24 hour   Intake 2704 ml   Output 5075 ml   Net -2371 ml       Constitutional: Awake, NAD   Eyes: sclera white   HEENT:  MMM  Respiratory:    no crackles or wheeze  Cardiovascular: RRR.  No murmur   GI: Abdomen is moderately distended, " mild tenderness to palpation without guarding, bowel sounds present  Genitourinary: Significant penile edema.  Palafox catheter in place draining yellow urine  Skin: no rash    Musculoskeletal/extremities: 1+ bilateral LE edema.  Swollen bilateral knees.  Tender medial R knee without any erythema or warmth.  ROM normal without pain  Neurologic: Alert  Psychiatric: calm, cooperative          Medications:      All current medications were reviewed with changes reflected in problem list.         Data:      All new lab and imaging data was reviewed.   Labs:  Recent Labs   Lab 10/04/20  0715 10/03/20  0703 10/02/20  0632    138 137   POTASSIUM 5.6* 5.1 5.2   CHLORIDE 110* 110* 109   CO2 21 20 20   ANIONGAP 4 8 8   * 168* 138*   BUN 47* 39* 33*   CR 2.03* 2.01* 2.21*   GFRESTIMATED 36* 37* 33*   GFRESTBLACK 42* 43* 38*   IVANIA 8.7 9.4 9.7     Recent Labs   Lab 10/04/20  0715 10/03/20  0703 10/02/20  0632   WBC 25.1* 25.9* 12.6*   HGB 7.9* 7.7* 7.2*   HCT 26.5* 25.7* 24.3*   MCV 91 91 91   * 628* 551*      Imaging:   None today      Daniel Su MD

## 2020-10-04 NOTE — PLAN OF CARE
End of Shift Summary  For vital signs and complete assessments, please see documentation flowsheets.     Pertinent assessments: Continued to complain of right knee pain, but improved today after tylenol and ice packs. Ambulated in halls x2. Large urine output from tran. Penile edema remains but addressed by urology MD with writer at bedside. Allopurionol started by hospitalist today. Sister at bedside all day. Continues to have good PO intake and appetite.   Major Shift Events: Urology MD up to bedside at request of writer to assess penile edema, per MD continue to monitor for proper foreskin placement and elevate penis/scrotum as able. Completed dose 4/5 of Etoposide without immediate complication.   Treatment Plan: Likely discharge home tomorrow with sister after completion of last dose of Etoposide.

## 2020-10-04 NOTE — PLAN OF CARE
End of Shift Summary  For vital signs and complete assessments, please see documentation flowsheets.     Pertinent assessments: Denies pain and nausea. Tolerating diet with good appetite. Up ambulating halls today with his sister who has been at bedside all day. Continues to have moderate to severe penile edema, foreskin in proper position. Large urine outputs.   Major Shift Events: New PIV established and Etoposide administered without complication.   Treatment Plan: Daily Etoposide through Monday, with planned discharge to home on Monday evening.

## 2020-10-04 NOTE — PLAN OF CARE
End of Shift Summary  For vital signs and complete assessments, please see documentation flowsheets.     Pertinent assessments: Denies pain and nausea. moderate to severe penile edema, foreskin pulled forward but does not stay in proper position for long. See flow sheet for UOP. Major Shift Events: denies pain or nausea, abd very taut & distended, edema from flank to feet. Penile edema remains same, slight pain w/ assessment.   Treatment Plan: Daily Etoposide through Monday, with planned discharge to home on Monday evening. Cont to assess penile edema as ordered.

## 2020-10-04 NOTE — PLAN OF CARE
End of Shift Summary  For vital signs and complete assessments, please see documentation flowsheets.     Pertinent assessments: VSS.  Denies pain, nausea  and SOB.  LE edema present, Penile edema remains as well.  Palafox in place with good output.       Major Shift Events: uneventful shift  Treatment Plan: Daily Etoposide through Monday, with planned discharge to home on Monday evening. Cont to assess penile edema as ordered.     Bedside Nurse: Mamta Fernandez RN

## 2020-10-04 NOTE — PROGRESS NOTES
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  Bone scan negative for bony metastasis.  Plan for neoadjuvant chemotherapy with bleomycin, etoposide, carboplatin, C1 D1 on 10/1.  Orchiectomy is definitely still an option, but we need to complete 4 cycles of chemotherapy first.  We can discuss in more details as an outpatient.  Last chemotherapy planning for 10/5 for cycle 1. Plan for Neulasta outpatient on 10/6.  Continue chemotherapy     YAYA: Likely due to obstructive uropathy with testicular mass and retroperitoneal adenopathy.  Creatinine as high as 4.5 on 9/23 and urinating normally.   Creatinine imporved  today. Nephrology was closely following creatinine stable.    Heme: elevated wbc likely from decadron, elevated platelets, will monitor, presumably reactive.     INTERIM HISTORY: Patient was seen in his room. Sister present. Patient continues to do well. RN present. No n/v/d/c. No other new problems.     MEDICATIONS:  Current Facility-Administered Medications   Medication     acetaminophen (TYLENOL) tablet 650 mg     albuterol (PROAIR HFA/PROVENTIL HFA/VENTOLIN HFA) 108 (90 Base) MCG/ACT inhaler 1-2 puff     albuterol (PROVENTIL) neb solution 2.5 mg     allopurinol (ZYLOPRIM) tablet 100 mg     bleomycin (BLENOXANE) 20 Units in sodium chloride 0.9 % 56.67 mL infusion     dexamethasone (DECADRON) tablet 12 mg     [START ON 10/6/2020] dexamethasone (DECADRON) tablet 8 mg     glucose gel 15-30 g    Or     dextrose 50 % injection 25-50 mL    Or     glucagon injection 1 mg     diphenhydrAMINE (BENADRYL) injection 50 mg     EPINEPHrine PF (ADRENALIN) injection 0.3 mg     etoposide (TOPOSAR) 115 mg in  "sodium chloride 0.9 % 505.75 mL infusion     famotidine (PEPCID) tablet 20 mg     ferrous sulfate (FEROSUL) tablet 325 mg     heparin ANTICOAGULANT injection 5,000 Units     insulin aspart (NovoLOG) injection (RAPID ACTING)     insulin aspart (NovoLOG) injection (RAPID ACTING)     lidocaine (LMX4) cream     lidocaine 1 % 0.1-1 mL     LORazepam (ATIVAN) injection 0.5-1 mg     LORazepam (ATIVAN) tablet 0.5-1 mg     MEDICATION INSTRUCTION     melatonin tablet 1 mg     menthol (Topical Analgesic) 2.5% (BENGAY VANISHIN SCENT) 2.5 % topical gel     meperidine (DEMEROL) injection 25 mg     methylPREDNISolone sodium succinate (solu-MEDROL) injection 125 mg     naloxone (NARCAN) injection 0.1-0.4 mg     naloxone (NARCAN) injection 0.1-0.4 mg     ondansetron (ZOFRAN-ODT) ODT tab 4 mg    Or     ondansetron (ZOFRAN) injection 4 mg     ondansetron (ZOFRAN-ODT) ODT tab 16 mg     oxyCODONE (ROXICODONE) tablet 5-10 mg     polyethylene glycol (MIRALAX) Packet 17 g     prochlorperazine (COMPAZINE) injection 10 mg     prochlorperazine (COMPAZINE) injection 10 mg    Or     prochlorperazine (COMPAZINE) tablet 10 mg    Or     prochlorperazine (COMPAZINE) suppository 25 mg     prochlorperazine (COMPAZINE) tablet 10 mg     senna-docusate (SENOKOT-S/PERICOLACE) 8.6-50 MG per tablet 1 tablet    Or     senna-docusate (SENOKOT-S/PERICOLACE) 8.6-50 MG per tablet 2 tablet     sodium chloride (PF) 0.9% PF flush 3 mL     sodium chloride (PF) 0.9% PF flush 3 mL     sodium chloride 0.9% infusion     sodium chloride 0.9% infusion       ALLERGIES:  No Known Allergies      PHYSICAL EXAM:  Vital signs:  Temp: 97.1  F (36.2  C) Temp src: Oral BP: 117/70 Pulse: 64   Resp: 20 SpO2: 100 % O2 Device: None (Room air) Oxygen Delivery: 10 LPM Height: 155 cm (5' 1.02\") Weight: 56.8 kg (125 lb 3.2 oz)  Estimated body mass index is 23.64 kg/m  as calculated from the following:    Height as of this encounter: 1.55 m (5' 1.02\").    Weight as of this encounter: 56.8 " kg (125 lb 3.2 oz).    GENERAL:  Male, in no acute distress.  Alert and oriented x3.   HEENT:  Normocephalic, atraumatic.    CV:  RRR, No murmurs, gallops, or rubs.   EXTREMITIES:  2+ pitting edema bilaterally  SKIN: No rash on exposed skin  PSYCH: Mood stable      LABS:  CBC RESULTS:   Recent Labs   Lab Test 10/04/20  0715 10/03/20  0703 10/02/20  0632 10/01/20  0631 09/30/20  0625    138 137 134 136   POTASSIUM 5.6* 5.1 5.2 4.6 4.1   CHLORIDE 110* 110* 109 106 108   CO2 21 20 20 20 22   ANIONGAP 4 8 8 8 6   BUN 47* 39* 33* 32* 28   CR 2.03* 2.01* 2.21* 2.40* 2.56*   * 168* 138* 84 90   IVANIA 8.7 9.4 9.7 9.8 10.1     Recent Labs   Lab Test 10/03/20  0703 09/29/20  0626 09/28/20  0650 09/27/20  0719 09/26/20  0756 09/25/20  0705   MAG  --  1.6 1.7  --   --   --    PHOS 4.3  --  3.1 2.9 3.2 6.8*     Recent Labs   Lab Test 10/04/20  0715 10/03/20  0703 10/02/20  0632 10/01/20  0631 09/30/20  0625 09/25/20  0705 09/25/20  0705 09/23/20  2112 09/23/20  2112 07/03/19  1105 02/28/19  1627 02/28/19  1627 08/13/17  1933   WBC 25.1* 25.9* 12.6* 8.6 7.6   < > 10.0   < > 8.4 5.3   < > 8.6 8.2   HGB 7.9* 7.7* 7.2* 7.2* 7.2*   < > 7.2*   < > 8.2* 13.9   < > 4.1* 9.2*   * 628* 551* 519* 485*   < > 382   < > 441 291   < > 428 283   MCV 91 91 91 90 90   < > 93   < > 89 78   < > 93 81   NEUTROPHIL  --   --   --   --   --   --  82.1  --  75.1 54.8  --  75.8 77.0    < > = values in this interval not displayed.     Recent Labs   Lab Test 09/29/20  0626 09/28/20  0650 09/27/20  0719 09/26/20  0756 09/24/20  0601 09/24/20  0601   BILITOTAL 0.3 0.2  --  0.4   < >  --    ALKPHOS 643* 683*  --  739*   < >  --    ALT 12 10  --  11   < >  --    AST 44 48*  --  68*   < >  --    ALBUMIN 2.2* 2.3* 2.3* 2.5*   < >  --    LDH  --   --   --   --   --  710*    < > = values in this interval not displayed.     TSH   Date Value Ref Range Status   08/13/2017 0.75 0.40 - 4.00 mU/L Final     Recent Labs   Lab Test 09/25/20  0705   CEA  0.5     Results for orders placed or performed during the hospital encounter of 09/23/20   Abd/pelvis CT no contrast - [facility] Protocol    Narrative    EXAM: CT ABDOMEN PELVIS W/O CONTRAST  LOCATION: [facility]  DATE/TIME: 9/23/2020 10:38 PM    INDICATION: Renal failure. Hypercalcemia. Abdominal pain. Fever. Abdominal mass. History of anemia.  COMPARISON: None.  TECHNIQUE: CT scan of the abdomen and pelvis was performed without IV contrast. Multiplanar reformats were obtained. Dose reduction techniques were used.  CONTRAST: None.    FINDINGS:   LOWER CHEST: Normal.    HEPATOBILIARY: Normal.  PANCREAS: Normal.  SPLEEN: Normal.  ADRENAL GLANDS: Normal.  KIDNEYS/BLADDER: Mild bilateral hydronephrosis and hydroureter, left worse than right secondary to large pelvic mass    BOWEL: There is a large lobulated low abdominal/pelvic mass with maximal dimensions of approximately 17 x 17 x 13.5 cm. There are numerous calcifications distributed throughout the mass. There is also bulky confluent retroperitoneal adenopathy measuring   up to 12 x 8 cm at the level of the renal rupinder that also demonstrate the same calcification pattern. Masses along the left hemipelvis are confluent with the dominant mass though may represent separate foci of adenopathy. In addition, there are numerous   smaller intra-abdominal or mesenteric masses which do not demonstrate calcifications on likely represent metastatic peritoneal implants or additional sites of adenopathy.    In a male patient mucinous colon carcinoma with be the most to result in this appearance. Testicular malignancies could also result in this appearance.    VASCULATURE: Unremarkable.    PELVIC ORGANS: Normal size prostate. Bladder unremarkable.  Ovoid density within left inguinal canal likely relates to a retracted or possibly undescended testicle. Right testicle is not identified though the entire scrotum is not included on this exam.    MUSCULOSKELETAL: No suspicious  lesions.      Impression    IMPRESSION:   1.  Huge lobulated, partially calcified mass present within the low abdominal cavity favored to represent colon carcinoma with associated carcinomatosis and bulky retroperitoneal/pelvic adenopathy. Testicular malignancy and adenopathy could've a similar   appearance. Possible undescended testicles.  2.  Hydronephrosis, left worse than right, secondary to mass effect upon ureters.     US Testicular & Scrotum w Doppler Ltd    Narrative    EXAM: ULTRASOUND SCROTUM WITH DOPPLER  LOCATION: [facility]  DATE/TIME: 9/24/2020 1:46 AM    INDICATION: Abnormal CT scan. Question undescended testicle.  COMPARISON: 09/23/2020 - CT abdomen and pelvis.    TECHNIQUE: Ultrasound of scrotum with color flow and spectral Doppler with waveform analysis performed.    FINDINGS:    RIGHT: The testicle is not visualized. No hydrocele or varicocele.    LEFT: A mildly lobulated 5 cm mass-like structure containing a few tiny echogenicities is present in the left inguinal canal. This surrounds a 3.5 x 1.9 x 1.5 cm relatively homogeneous structure. This most likely represents a lobulated soft tissue mass   surrounding a normal left testicle. Blood flow is visualized in the presumed testicle. No hydrocele or varicocele.      Impression    IMPRESSION:   1. The left testicle is not well-defined. There is a 5 cm lobulated structure in the left inguinal region that likely represents a lobulated soft tissue mass surrounding a normal left testicle. The mass is suspicious for neoplasm.  2. Nonvisualization of the right testicle. The testicle may be undescended or absent.   XR Surgery GLORIA L/T 5 Min Fluoro w Stills    Narrative    This exam was marked as non-reportable because it will not be read by a   radiologist or a [place] non-radiologist provider.         CT Chest w/o Contrast    Narrative    CT CHEST WITHOUT CONTRAST  9/25/2020 1:39 PM    CLINICAL HISTORY: Abdominal mass noted on CT. Evaluate  for malignancy  in the chest.    TECHNIQUE: CT chest without IV contrast. Multiplanar reformats were  obtained. Dose reduction techniques were used.  CONTRAST: None.    COMPARISON: CT of the abdomen and pelvis without IV contrast performed  9/23/2020.    FINDINGS:   LUNGS AND PLEURA: Multiple images through the chest are degraded by  patient motion artifact. Trace amount of pleural fluid on the left is  new since the previous exam. There is mild atelectasis at both lung  bases posteriorly. Indeterminate 0.5 cm pulmonary nodule in the right  upper lobe (series 4 image 61). The lungs are otherwise clear.    MEDIASTINUM/AXILLAE: No enlarged lymph nodes are identified in the  chest.    UPPER ABDOMEN: There is been interval placement of a left ureteral  stent. A 3.3 cm masslike structure in the left upper quadrant (series  2 image 45) is unchanged, and may represent a gastric diverticulum.  Limited noncontrast views of the upper abdomen are otherwise  unremarkable.    MUSCULOSKELETAL: Unremarkable.      Impression    IMPRESSION:   1.  Indeterminate 0.5 cm right upper lobe pulmonary nodule.  2.  Trace amount of pleural fluid is noted on the left.  3.  Interval placement of a left ureteral stent.    YEMI GUERRA MD   US Bx Abd Or Retroperi Mass Perc Needle    Narrative    US BIOPSY ABDOMINAL OR RETROPERITONEAL MASS PERCUTANEOUS NEEDLE   9/25/2020 1:30 PM     HISTORY:  Patient has a large abdominal mass.    COMPARISON: CT scan of the abdomen pelvis dated 9/23/2020    FINDINGS: After obtaining informed consent, the patient was placed in  a supine position on the ultrasound table. The anterior lower abdomen  was prepped and draped in the usual sterile manner. 1% lidocaine was  injected for local anesthesia. Under ultrasound guidance, using a  17/18 gauge coaxial biopsy needle system, 9 core biopsies were  obtained from a large abdominal mass. The samples were submitted to  pathology.    The patient tolerated the procedure  well. There were no immediate post  procedure complications.    Patient's vital signs were monitored and remained stable.      Impression    IMPRESSION: Ultrasound-guided abdominal mass biopsy performed as  above.    MD MAYI OBRIEN Bone Scan Whole Body    Narrative    EXAMINATION: NM BONE SCAN WHOLE BODY  Whole-body bone scan, 9/28/2020 2:35 PM     HISTORY: Testicular neoplasm, elevated alkaline phosphatase,  hypercalcemia.    ADDITIONAL INFORMATION: none    COMPARISON: 9/23/2020.    TECHNIQUE: The patient received 26.0 mCi of Tc-99m MDP intravenously.  Whole body bone images were obtained at 3 hours.    FINDINGS:     Heterogeneous uptake within the soft tissues of the low central  abdomen and pelvis, is consistent with uptake within partially  calcified lymph teena metastatic disease in this location.     There is a tubular structure of increased tracer uptake in the  epigastrium; this has an appearance and location of the stomach, but  is an unusual distribution for technetium 99 MDP. This is of uncertain  significance, as to whether it may represent uptake within the wall of  the stomach, or excretion of technetium 99 by the gastric mucosa.    Asymmetrically decreased uptake within the left kidney, likely  represents sequela of known left renal obstruction.    No focal metastatic lesions within the axial or proximal appendicular  skeleton.    Ill-defined increased radiotracer uptake in the region of the right  lower leg, including the knee and ankle, of uncertain clinical  significance. This could represent sequela of prior trauma or  metabolic bone process. Recommend correlation with right knee,  tibia/fibula and ankle radiographs.      Impression    IMPRESSION:   1.  No definite metastatic bone lesions identified.  2.  Ill-defined generalized increased radiotracer uptake in the right  knee and the right lower leg/ankle. This is of uncertain significance;  would recommend correlation with right knee  and right ankle  radiographs to evaluate for evidence of a prior trauma or metabolic  process in these areas.  3.  Increased soft tissue uptake within the lower abdomen and pelvis,  corresponding to the known, partially calcified large soft tissue  mass/metastasis in this area.  4.  Asymmetrically decreased uptake in the left kidney, likely  secondary to known left renal obstruction.  5.  Increased uptake in the region of the stomach of uncertain  significance, an uncommon appearance. Potential explanations may be  for technetium uptake or excretion by the gastric mucosa. It is  doubtful to represent gastric metastatic malignancy.    LANRE DILLARD MD   XR Chest 2 Views    Narrative    CHEST TWO VIEWS    9/27/2020 1:50 PM     HISTORY: Fever    COMPARISON: CT chest dated 9/25/2020.    FINDINGS:  Cardiac silhouette is enlarged. Thoracic aortic arch is  mildly prominent, similar to the prior CT dated 9/25/2020. Lungs are  grossly clear. Mediastinum and pulmonary vascularity are within normal  limits. No pneumothorax or significant pleural fluid collection.  Chronic left lateral seventh rib fracture is noted. No acute fracture.      Impression    IMPRESSION:   1. Cardiomegaly.  2. No other evidence of acute cardiopulmonary disease is seen.    MD Leonor EMANUEL MD on 10/4/2020 at 12:37 PM

## 2020-10-05 NOTE — PROGRESS NOTES
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  Bone scan negative for bony metastasis.  Started neoadjuvant chemotherapy with bleomycin, etoposide, carboplatin and C1 D1 on 10/1.  He completed day 5 today and tolerated this extremely well.  Orchiectomy will be discussed outpatient setting. He has been getting dexamethasone inpatient and new prescription for dexamethasone 8 mg/day 6, 7 and 8 sent to [facility] pharmacy.   He will be discharged today and plan to return to clinic tomorrow to see me and then get Neulasta on day 6.    : Ongoing penile and scrotal swelling, not examined today.  Palafox catheter in place.  He will need to set up urology follow-up as an outpatient as well.     YAYA: Likely due to obstructive uropathy with testicular mass and retroperitoneal adenopathy.  Creatinine as high as 4.5 on 9/23 and urinating normally.    Nephrology consulted and creatinine slowly improving, 1.86 today.     Heme:  Leukocytosis likely secondary to dexamethasone.  18.1 today and improved from yesterday.  Anemia with hemoglobin 7.3, likely secondary to disease process and chemotherapy.  No bleeding.      Hyperuricemia: Uric acid level 8.3 on 10/3, he will start on allopurinol.  We will recheck again in follow-up this week.          Discussed with Dr Ramos and hhe agreed with the plan above.       INTERIM HISTORY: Patient was seen in his room, resting comfortably in bed.  Sister at bedside and interpreting.  Patient is feeling really great.  Continues to have pain and swelling, but urinating normally.  No pain today.  No fever chills, vomiting or diarrhea.  Appetite is better.       MEDICATIONS:  Current  Facility-Administered Medications   Medication     acetaminophen (TYLENOL) tablet 650 mg     albuterol (PROAIR HFA/PROVENTIL HFA/VENTOLIN HFA) 108 (90 Base) MCG/ACT inhaler 1-2 puff     albuterol (PROVENTIL) neb solution 2.5 mg     allopurinol (ZYLOPRIM) tablet 300 mg     bleomycin (BLENOXANE) 20 Units in sodium chloride 0.9 % 56.67 mL infusion     [START ON 10/6/2020] dexamethasone (DECADRON) tablet 8 mg     glucose gel 15-30 g    Or     dextrose 50 % injection 25-50 mL    Or     glucagon injection 1 mg     diphenhydrAMINE (BENADRYL) injection 50 mg     EPINEPHrine PF (ADRENALIN) injection 0.3 mg     famotidine (PEPCID) tablet 20 mg     ferrous sulfate (FEROSUL) tablet 325 mg     heparin ANTICOAGULANT injection 5,000 Units     insulin aspart (NovoLOG) injection (RAPID ACTING)     insulin aspart (NovoLOG) injection (RAPID ACTING)     lidocaine (LMX4) cream     lidocaine 1 % 0.1-1 mL     LORazepam (ATIVAN) injection 0.5-1 mg     LORazepam (ATIVAN) tablet 0.5-1 mg     MEDICATION INSTRUCTION     melatonin tablet 1 mg     menthol (Topical Analgesic) 2.5% (BENGAY VANISHIN SCENT) 2.5 % topical gel     meperidine (DEMEROL) injection 25 mg     methylPREDNISolone sodium succinate (solu-MEDROL) injection 125 mg     naloxone (NARCAN) injection 0.1-0.4 mg     naloxone (NARCAN) injection 0.1-0.4 mg     ondansetron (ZOFRAN-ODT) ODT tab 4 mg    Or     ondansetron (ZOFRAN) injection 4 mg     oxyCODONE (ROXICODONE) tablet 5-10 mg     polyethylene glycol (MIRALAX) Packet 17 g     prochlorperazine (COMPAZINE) injection 10 mg     prochlorperazine (COMPAZINE) injection 10 mg    Or     prochlorperazine (COMPAZINE) tablet 10 mg    Or     prochlorperazine (COMPAZINE) suppository 25 mg     prochlorperazine (COMPAZINE) tablet 10 mg     senna-docusate (SENOKOT-S/PERICOLACE) 8.6-50 MG per tablet 1 tablet    Or     senna-docusate (SENOKOT-S/PERICOLACE) 8.6-50 MG per tablet 2 tablet     sodium chloride (PF) 0.9% PF flush 3 mL     sodium chloride  "(PF) 0.9% PF flush 3 mL     sodium chloride 0.9% infusion     sodium chloride 0.9% infusion         ALLERGIES:  No Known Allergies      PHYSICAL EXAM:  Vital signs:  Temp: 96.5  F (35.8  C) Temp src: Oral BP: 106/57 Pulse: 69   Resp: 18 SpO2: 97 % O2 Device: None (Room air) Oxygen Delivery: 10 LPM Height: 155 cm (5' 1.02\") Weight: 57.1 kg (125 lb 12.8 oz)  Estimated body mass index is 23.75 kg/m  as calculated from the following:    Height as of this encounter: 1.55 m (5' 1.02\").    Weight as of this encounter: 57.1 kg (125 lb 12.8 oz).    GENERAL:  Male, in no acute distress.  Alert and oriented x3. Well groomed.   HEENT:  Normocephalic, atraumatic. No conjunctival injection or eye swelling.   LUNGS:  Nonlabored breathing, no cough or audible wheezing.  MSK: Full ROM UE.    SKIN: No rash on exposed skin.     LABS:  CBC RESULTS:   Recent Labs   Lab Test 10/05/20  0627   WBC 18.1*   RBC 2.76*   HGB 7.3*   HCT 24.8*   MCV 90   MCH 26.4*   MCHC 29.4*   RDW 16.7*   *       Recent Labs   Lab Test 10/05/20  0627 10/04/20  1314 10/04/20  0715     --  135   POTASSIUM 5.5* 5.2 5.6*   CHLORIDE 111*  --  110*   CO2 20  --  21   ANIONGAP 6  --  4   *  --  125*   BUN 47*  --  47*   CR 1.86*  --  2.03*   IVANIA 8.4*  --  8.7         Araceli Pacheco PA-C  Hematology/Oncology  [facility]      "

## 2020-10-05 NOTE — DISCHARGE INSTRUCTIONS
Ronald-Call to schedule an appointment for tran catheter removal.   [facility] [address]   [address]  [address] [place] [address]   Appointments: [phone]   Information: [phone]

## 2020-10-05 NOTE — CONSULTS
Care Management Discharge Note    Discharge Planning:  Expected Discharge Date: 10/05/20  Concerns to be Addressed: all concerns addressed in this encounter       Anticipated Discharge Disposition: home with sister  Anticipated Discharge Services:  none  Anticipated Discharge DME:  none      Patient/Family in Agreement with the Plan:    Disposition Comments:  Unplanned Readmission Risk elevated.  Pt does have a new cancer diagnosis.  Pt will be discharging home with family and will need to f/u with oncology.  No gap in care noted.  No hand off to primary care clinic will be given.    Elizabeth Ji RN BSN   Inpatient Care Coordination  [facility]  [phone]        Helen Ji RN    Pt needs to f/u with urology for tran removal.  Discharge avs updated bedside RN updated.     [facility] [address]   [address]  [address] [place] [address]   Appointments: [phone]   Information: [phone]

## 2020-10-05 NOTE — DISCHARGE SUMMARY
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  In the ED he was found to have an YAYA with creatinine 4.5 and hypercalcemia to 15.5.  He was admitted on 9/23/2020 for treatment of this and also a large abdominal mass seen on CT that is compressing his extrinsic ureters.  He was started on IV fluids and given a dose of zoledronic acid.     He underwent bilateral ureteral stent placement on 9/24.  His abdominal mass has been biopsied with results consistent with a seminoma.  Hematology/oncology were consulted and he received 5 days of chemotherapy here which he tolerated very well.     His renal function has been improving gradually with IVF/bilateral ureteral stent/and tran placement along with IV fluids.  This is resulted in lower extremity edema and significant penile edema.  Hypercalcemia has normalized with zoledronic acid and IVF.    Renal function improving, however significant penile edema and risk for pelvic mass causing urethral obstruction so decision to leave Tran catheter in place after discussion with the patient and his daughter.  Recommend close follow-up in urology clinic in 1 to 2 weeks for possible voiding trial if renal function improves to normal.  Prescribed allopurinol due to  hyperuricemia and mild tumor lysis.  He will have very close follow-up with oncology clinic, consideration for Neulasta tomorrow although has leukocytosis from his dexamethasone here.  He can follow-up with his primary care doctor next week if his right knee pain is not improving with improvement in edema.  Discharging later today after final chemotherapy dose.     Problem List/Assessment and Plan:   Abdominal mass, seminoma: Presenting with a very large abdominal mass along with weight loss and decreased oral intake.  Beta-hCG is 700 AFP is 1.5.  Pathology positive for seminoma.   -[facility] oncology consulted, finished 5 days of inpatient chemotherapy with bleomycin, etoposide, and carboplatin today.  Oncology possibly planning for Neulasta on day 6, although still has leukocytosis from dexamethasone  -He was receiving IV fluids for right to prevent carboplatin toxicity, but at reduced rate due to lower extremity edema  -Urology consulted while here.  Orchiectomy is an option moving forward, but needs to complete 4 cycles of chemotherapy first, this will be discussed as outpatient.  -Uric acid elevated 8.3, some risk for worsening with tumor lysis.    Increased allopurinol to 300 mg daily and prescribed this on discharge.     YAYA: Baseline creatinine 0.9.  Creatinine 4.5 on admission.  Secondary to obstructive uropathy from pelvic mass.  Urology consulted.  Palafox catheter and bilateral ureteral stents in place now.  -Nephrology consulted while here  -Slowly improved but not back to previous baseline, 1.8 on discharge.  He is having excellent urine output which may be from some postobstructive diuresis.  -He continues to have significant penile edema.  Additionally, certainly at risk for compression of bladder and bladder outlet from the large pelvic mass if Palafox catheter removed.    Given renal function not back to normal I think the best option is to leave the Palafox catheter in place initially on  discharge.  With his developmental delay he does not often complain of any symptoms and I think he is at significant risk for ongoing retention issues for the above reasons.  Discussed risk of infection with immunosuppression from chemotherapy and catheter in place with the patient and his sister and what to look out for.  -Recommend close follow-up in urology clinic in 1 to 2 weeks for potential voiding trial if penile edema improved    Hyperkalemia: Potassium at 5.6 today, perhaps from cellular turnover.  Was receiving potassium and magnesium in the IV fluids that was since discontinued.  Has excellent urine output.  -Recheck potassium early afternoon and if not increasing okay for discharge.  Recommend BMP be checked within 2 days of discharge with oncology clinic     Hypercalcemia of malignancy: Resolved with IV fluids and 1 dose of zoledronic acid.     Anemia: Hemoglobin remains relatively stable in the 7 range.  Recommend CBC within 2 days at discharge when getting a BMP.     Leukocytosis: White blood cell count rising up to 25 which is suspect is from dexamethasone he is receiving with chemotherapy.  He does not have fevers.  The right knee is tender medially and swollen, but doubt infected as below.  Trending down.     Right knee pain:  new right medial knee pain.  Both knees are swollen as he is edematous throughout both legs, but there is no erythema or warmth to suggest infection.  He does not have pain with range of motion and is focally tender over the medial right knee.  Suspect related to his lower extremity edema.  He is bearing weight okay and actually feels better walking in the halls.  -Acetaminophen as needed, ice packs, and added topical menthol as needed while here  -Follow-up with primary care doctor next week if symptoms persisting as edema is improving  -If develops fevers, redness or warmth to the knee he will return for evaluation  -No history of gout, but uric acid elevated likely from  "tumor cell turnover.  He received high-dose dexamethasone here with his chemotherapy which should have improved gout pain if this was the cause.  He will be on allopurinol as above.     Developmental delay: Lives with his sister.  Independent with ADLs at baseline.        Discharge Disposition:  Discharged to home     Allergies:  No Known Allergies     Discharge Medications:   Current Discharge Medication List      START taking these medications    Details   allopurinol (ZYLOPRIM) 300 MG tablet Take 1 tablet (300 mg) by mouth daily  Qty: 30 tablet, Refills: 0    Associated Diagnoses: Hyperuricemia         CONTINUE these medications which have NOT CHANGED    Details   ferrous sulfate (FEROSUL) 325 (65 Fe) MG tablet Take 1 tablet (325 mg) by mouth daily (with breakfast)  Qty: 30 tablet, Refills: 0    Associated Diagnoses: Peptic ulcer disease      multivitamin w/minerals (THERA-VIT-M) tablet Take 1 tablet by mouth daily              Condition on Discharge:  Discharge condition: Stable   Discharge vitals: Blood pressure 106/57, pulse 69, temperature 96.5  F (35.8  C), resp. rate 18, height 1.55 m (5' 1.02\"), weight 57.1 kg (125 lb 12.8 oz), SpO2 97 %.   Code status on discharge: Full Code     History of Illness:  See detailed admission note for full details.    Physical Exam:  Blood pressure 106/57, pulse 69, temperature 96.5  F (35.8  C), resp. rate 18, height 1.55 m (5' 1.02\"), weight 57.1 kg (125 lb 12.8 oz), SpO2 97 %.  Wt Readings from Last 1 Encounters:   10/05/20 57.1 kg (125 lb 12.8 oz)     Constitutional: Awake, NAD   Eyes: sclera white   HEENT:  MMM  Respiratory:    no crackles or wheeze  Cardiovascular: RRR.  No murmur   GI: Abdomen is moderately distended, slight tenderness to palpation without guarding, bowel sounds present  Genitourinary: Significant penile edema.  Palafox catheter in place draining yellow urine  Skin: no rash    Musculoskeletal/extremities: 1+ bilateral LE edema.  Swollen bilateral knees.  " Focal tenderness to medial R knee without any erythema or warmth.  ROM normal without pain  Neurologic: Alert  Psychiatric: calm, cooperative     Procedures other than Imaging:  Abdominal mass biopsy  Palafox catheter placement     Imaging:  Results for orders placed or performed during the hospital encounter of 09/23/20   Abd/pelvis CT no contrast - Stone Protocol    Narrative    EXAM: CT ABDOMEN PELVIS W/O CONTRAST  LOCATION: [facility]  DATE/TIME: 9/23/2020 10:38 PM    INDICATION: Renal failure. Hypercalcemia. Abdominal pain. Fever. Abdominal mass. History of anemia.  COMPARISON: None.  TECHNIQUE: CT scan of the abdomen and pelvis was performed without IV contrast. Multiplanar reformats were obtained. Dose reduction techniques were used.  CONTRAST: None.    FINDINGS:   LOWER CHEST: Normal.    HEPATOBILIARY: Normal.  PANCREAS: Normal.  SPLEEN: Normal.  ADRENAL GLANDS: Normal.  KIDNEYS/BLADDER: Mild bilateral hydronephrosis and hydroureter, left worse than right secondary to large pelvic mass    BOWEL: There is a large lobulated low abdominal/pelvic mass with maximal dimensions of approximately 17 x 17 x 13.5 cm. There are numerous calcifications distributed throughout the mass. There is also bulky confluent retroperitoneal adenopathy measuring   up to 12 x 8 cm at the level of the renal rupinder that also demonstrate the same calcification pattern. Masses along the left hemipelvis are confluent with the dominant mass though may represent separate foci of adenopathy. In addition, there are numerous   smaller intra-abdominal or mesenteric masses which do not demonstrate calcifications on likely represent metastatic peritoneal implants or additional sites of adenopathy.    In a male patient mucinous colon carcinoma with be the most to result in this appearance. Testicular malignancies could also result in this appearance.    VASCULATURE: Unremarkable.    PELVIC ORGANS: Normal size prostate. Bladder  unremarkable.  Ovoid density within left inguinal canal likely relates to a retracted or possibly undescended testicle. Right testicle is not identified though the entire scrotum is not included on this exam.    MUSCULOSKELETAL: No suspicious lesions.      Impression    IMPRESSION:   1.  Huge lobulated, partially calcified mass present within the low abdominal cavity favored to represent colon carcinoma with associated carcinomatosis and bulky retroperitoneal/pelvic adenopathy. Testicular malignancy and adenopathy could've a similar   appearance. Possible undescended testicles.  2.  Hydronephrosis, left worse than right, secondary to mass effect upon ureters.     US Testicular & Scrotum w Doppler Ltd    Narrative    EXAM: ULTRASOUND SCROTUM WITH DOPPLER  LOCATION: Health system  DATE/TIME: 9/24/2020 1:46 AM    INDICATION: Abnormal CT scan. Question undescended testicle.  COMPARISON: 09/23/2020 - CT abdomen and pelvis.    TECHNIQUE: Ultrasound of scrotum with color flow and spectral Doppler with waveform analysis performed.    FINDINGS:    RIGHT: The testicle is not visualized. No hydrocele or varicocele.    LEFT: A mildly lobulated 5 cm mass-like structure containing a few tiny echogenicities is present in the left inguinal canal. This surrounds a 3.5 x 1.9 x 1.5 cm relatively homogeneous structure. This most likely represents a lobulated soft tissue mass   surrounding a normal left testicle. Blood flow is visualized in the presumed testicle. No hydrocele or varicocele.      Impression    IMPRESSION:   1. The left testicle is not well-defined. There is a 5 cm lobulated structure in the left inguinal region that likely represents a lobulated soft tissue mass surrounding a normal left testicle. The mass is suspicious for neoplasm.  2. Nonvisualization of the right testicle. The testicle may be undescended or absent.   XR Surgery GLORIA L/T 5 Min Fluoro w Stills    Narrative    This exam was marked as  non-reportable because it will not be read by a   radiologist or a [place] non-radiologist provider.         CT Chest w/o Contrast    Narrative    CT CHEST WITHOUT CONTRAST  9/25/2020 1:39 PM    CLINICAL HISTORY: Abdominal mass noted on CT. Evaluate for malignancy  in the chest.    TECHNIQUE: CT chest without IV contrast. Multiplanar reformats were  obtained. Dose reduction techniques were used.  CONTRAST: None.    COMPARISON: CT of the abdomen and pelvis without IV contrast performed  9/23/2020.    FINDINGS:   LUNGS AND PLEURA: Multiple images through the chest are degraded by  patient motion artifact. Trace amount of pleural fluid on the left is  new since the previous exam. There is mild atelectasis at both lung  bases posteriorly. Indeterminate 0.5 cm pulmonary nodule in the right  upper lobe (series 4 image 61). The lungs are otherwise clear.    MEDIASTINUM/AXILLAE: No enlarged lymph nodes are identified in the  chest.    UPPER ABDOMEN: There is been interval placement of a left ureteral  stent. A 3.3 cm masslike structure in the left upper quadrant (series  2 image 45) is unchanged, and may represent a gastric diverticulum.  Limited noncontrast views of the upper abdomen are otherwise  unremarkable.    MUSCULOSKELETAL: Unremarkable.      Impression    IMPRESSION:   1.  Indeterminate 0.5 cm right upper lobe pulmonary nodule.  2.  Trace amount of pleural fluid is noted on the left.  3.  Interval placement of a left ureteral stent.    YEMI GUERRA MD   US Bx Abd Or Retroperi Mass Perc Needle    Narrative    US BIOPSY ABDOMINAL OR RETROPERITONEAL MASS PERCUTANEOUS NEEDLE   9/25/2020 1:30 PM     HISTORY:  Patient has a large abdominal mass.    COMPARISON: CT scan of the abdomen pelvis dated 9/23/2020    FINDINGS: After obtaining informed consent, the patient was placed in  a supine position on the ultrasound table. The anterior lower abdomen  was prepped and draped in the usual sterile manner. 1% lidocaine  was  injected for local anesthesia. Under ultrasound guidance, using a  17/18 gauge coaxial biopsy needle system, 9 core biopsies were  obtained from a large abdominal mass. The samples were submitted to  pathology.    The patient tolerated the procedure well. There were no immediate post  procedure complications.    Patient's vital signs were monitored and remained stable.      Impression    IMPRESSION: Ultrasound-guided abdominal mass biopsy performed as  above.    MAYRA VIERA MD   NM Bone Scan Whole Body    Narrative    EXAMINATION: NM BONE SCAN WHOLE BODY  Whole-body bone scan, 9/28/2020 2:35 PM     HISTORY: Testicular neoplasm, elevated alkaline phosphatase,  hypercalcemia.    ADDITIONAL INFORMATION: none    COMPARISON: 9/23/2020.    TECHNIQUE: The patient received 26.0 mCi of Tc-99m MDP intravenously.  Whole body bone images were obtained at 3 hours.    FINDINGS:     Heterogeneous uptake within the soft tissues of the low central  abdomen and pelvis, is consistent with uptake within partially  calcified lymph teena metastatic disease in this location.     There is a tubular structure of increased tracer uptake in the  epigastrium; this has an appearance and location of the stomach, but  is an unusual distribution for technetium 99 MDP. This is of uncertain  significance, as to whether it may represent uptake within the wall of  the stomach, or excretion of technetium 99 by the gastric mucosa.    Asymmetrically decreased uptake within the left kidney, likely  represents sequela of known left renal obstruction.    No focal metastatic lesions within the axial or proximal appendicular  skeleton.    Ill-defined increased radiotracer uptake in the region of the right  lower leg, including the knee and ankle, of uncertain clinical  significance. This could represent sequela of prior trauma or  metabolic bone process. Recommend correlation with right knee,  tibia/fibula and ankle radiographs.      Impression     IMPRESSION:   1.  No definite metastatic bone lesions identified.  2.  Ill-defined generalized increased radiotracer uptake in the right  knee and the right lower leg/ankle. This is of uncertain significance;  would recommend correlation with right knee and right ankle  radiographs to evaluate for evidence of a prior trauma or metabolic  process in these areas.  3.  Increased soft tissue uptake within the lower abdomen and pelvis,  corresponding to the known, partially calcified large soft tissue  mass/metastasis in this area.  4.  Asymmetrically decreased uptake in the left kidney, likely  secondary to known left renal obstruction.  5.  Increased uptake in the region of the stomach of uncertain  significance, an uncommon appearance. Potential explanations may be  for technetium uptake or excretion by the gastric mucosa. It is  doubtful to represent gastric metastatic malignancy.    LANRE DILLARD MD   XR Chest 2 Views    Narrative    CHEST TWO VIEWS    9/27/2020 1:50 PM     HISTORY: Fever    COMPARISON: CT chest dated 9/25/2020.    FINDINGS:  Cardiac silhouette is enlarged. Thoracic aortic arch is  mildly prominent, similar to the prior CT dated 9/25/2020. Lungs are  grossly clear. Mediastinum and pulmonary vascularity are within normal  limits. No pneumothorax or significant pleural fluid collection.  Chronic left lateral seventh rib fracture is noted. No acute fracture.      Impression    IMPRESSION:   1. Cardiomegaly.  2. No other evidence of acute cardiopulmonary disease is seen.    ELHAM ROLLINS MD        Consultations:  Consultation during this admission received from nephrology, oncology and urology.       Recent Lab Results:  Recent Labs   Lab 10/05/20  0627 10/04/20  0715 10/03/20  0703   WBC 18.1* 25.1* 25.9*   HGB 7.3* 7.9* 7.7*   HCT 24.8* 26.5* 25.7*   MCV 90 91 91   * 667* 628*     Recent Labs   Lab 10/05/20  0627 10/04/20  1314 10/04/20  0715 10/03/20  0703     --  135 138    POTASSIUM 5.5* 5.2 5.6* 5.1   CHLORIDE 111*  --  110* 110*   CO2 20  --  21 20   ANIONGAP 6  --  4 8   *  --  125* 168*   BUN 47*  --  47* 39*   CR 1.86*  --  2.03* 2.01*   GFRESTIMATED 40*  --  36* 37*   GFRESTBLACK 47*  --  42* 43*   IVANIA 8.4*  --  8.7 9.4     No results for input(s): CULT in the last 168 hours.  No results for input(s): COLOR, APPEARANCE, URINEGLC, URINEBILI, URINEKETONE, SG, UBLD, URINEPH, PROTEIN, UROBILINOGEN, NITRITE, LEUKEST, RBCU, WBCU in the last 168 hours.    Abdominal mass biopsy:  SPECIMEN(S):   Biopsy, abdominal/retroperitoneal mass     FINAL DIAGNOSIS:   Retroperitoneal soft tissue, mass, ultrasound-guided needle biopsy-   -Consistent with metastatic germ cell tumor (seminoma). See comment.     COMMENT:   Non-sampled nonseminomatous tumor component cannot be ruled out.     Flow cytometric analysis was reportedly limited due to low tissue   viability; see separate report for details   ([id number]).      Pending Results:    Unresulted Labs Ordered in the Past 30 Days of this Admission     No orders found from 8/24/2020 to 9/24/2020.         These results will be followed up by patient's primary care provider.    Discharge Instructions and Follow-Up:   Discharge Procedure Orders   Reason for your hospital stay   Order Comments: You were hospitalized for a large pelvic mass and found to have a seminoma malignancy.  You started on chemotherapy while here.  The mass was compressing on your ureters and bladder so you had stents put in your ureters.  I am concerned about obstruction  if the Palafox catheter is removed so it will remain in place with close follow-up with urology for possible voiding trial in clinic.  You will have very close follow-up with the oncology team.     Follow-up and recommended labs and tests    Order Comments: Very close follow-up with the oncology team to be arranged.  Recommend BMP and CBC be checked in 2-3 days.    Follow-up with urology in clinic in 1 to 2  weeks for possible catheter removal and voiding trial    Follow up with primary care provider, [facility], next week if your knee pain is not improving as your leg swelling improves     Activity   Order Comments: Your activity upon discharge: activity as tolerated     Order Specific Question Answer Comments   Is discharge order? Yes      Tubes and drains   Order Comments: You are going home with the following tubes or drains: tran catheter.  Tube cares per hospital or home care instructions     Full Code     Order Specific Question Answer Comments   Code status determined by: Discussion with patient/ legal decision maker      Diet   Order Comments: Follow this diet upon discharge: Orders Placed This Encounter      Snacks/Supplements Adult: Boost Plus; Between Meals      Regular Diet Adult     Order Specific Question Answer Comments   Is discharge order? Yes          I, Daniel Su MD, personally saw the patient today and spent greater than 30 minutes discharging this patient.    Daniel Su MD

## 2020-10-05 NOTE — PLAN OF CARE
End of Shift Summary  For vital signs and complete assessments, please see documentation flowsheets.     Pertinent assessments: Denies pain and nausea. severe penile edema, foreskin pulled forward but does not stay in proper position for long, elevated on pillowcase. See flow sheet for UOP. Major Shift Events: none  Treatment Plan: Daily Etoposide through Monday, with planned discharge to home on Monday evening. Cont to assess penile edema as ordered.

## 2020-10-05 NOTE — PLAN OF CARE
"/57 (BP Location: Right arm)   Pulse 69   Temp 96.5  F (35.8  C)   Resp 18   Ht 1.55 m (5' 1.02\")   Wt 57.1 kg (125 lb 12.8 oz)   SpO2 97%   BMI 23.75 kg/m       Pt A/Ox4. SBA. Pain rated 5/10 in right knee, PRN Tylenol given. Effective.  Continues to have BLE edema and penile edema which is being addressed by urology. B- OJ and meal given- recheck: 120, 129. Chemo given this shift. Palafox in place with good output. Plan to discharge home today with sister.   "

## 2020-10-05 NOTE — PLAN OF CARE
End of Shift Summary  For vital signs and complete assessments, please see documentation flowsheets.     Pertinent assessments: VSS.  Denies pain, SOB and nausea.   Continues to have BLE edema and penile edema which is being addressed by urology.  Palafox in place with good output.  Blood sugar 131 tonight    Major Shift Events: uneventful    Treatment Plan: Likely discharge home today with sister after completion of last dose of Etoposide.   Bedside Nurse: Mamta Fernandez RN

## 2020-10-06 NOTE — LETTER
"    10/6/2020         RE: Luz Marina Diallo  [address] [facility] [address]        Dear Colleague,    Thank you for referring your patient, Luz Marina Diallo, to the [facility]. Please see a copy of my visit note below.    Oncology Rooming Note    October 6, 2020 1:38 PM   Luz Marina Diallo is a 55 year old male who presents for:    Chief Complaint   Patient presents with     Oncology Clinic Visit     Testicular neoplasm     Initial Vitals: /78   Pulse 80   Temp 97.6  F (36.4  C) (Tympanic)   Resp 16   Wt 55.4 kg (122 lb 3.2 oz)   SpO2 100%   BMI 23.07 kg/m   Estimated body mass index is 23.07 kg/m  as calculated from the following:    Height as of 10/5/20: 1.55 m (5' 1.02\").    Weight as of this encounter: 55.4 kg (122 lb 3.2 oz). Body surface area is 1.54 meters squared.  No Pain (0) Comment: Data Unavailable   No LMP for male patient.  Allergies reviewed: Yes  Medications reviewed: Yes    Medications: Medication refills not needed today.  Pharmacy name entered into [email or web address]:    [facility] [id number] [place] [facility] [address] [facility] [address]  [facility] [address] [place] [facility] [address] STU Pacheco PA-C      Oncology/Hematology Visit Note    Oct 6, 2020    Reason for visit: Follow up seminoma    Oncology HPI: Luz Marina Diallo is a 55 year old male with a history of developmental delay with testicular cancer.  He was hospitalized at [facility] on 9/23/2020 for large abdominal mass, weight loss, decreased appetite and was found to have renal failure with creatinine of 4.5 with hypercalcemia 15.5.  CT confirmed a large abdominal mass compressing the ureters and he underwent bilateral ureteral stent placement on 9/24/2020.  Abdominal mass concern for malignancy and biopsy revealed germ cell tumor (seminoma).  Palafox was placed and he was given Zometa for hypercalcemia with IV fluids.  He was started on neoadjuvant " "chemotherapy with bleomycin, etoposide and carboplatin (cisplatin was not used due to renal function), C1 D1 completed on 10/1/2020 and he received the first 5 days inpatient.  He tolerated this extremely well.    He is here today with his sister for close follow-up and Neulasta.    Interval History: Luz Marina is doing well today. His nickname is \"Rigoberto\" and we can address him as such. His appetite is better and his abd pain and swelling has improved since starting chemo. No n/v/d or bleeding. No fever or chills.  Continues to have Palafox catheter in place and urine output has been great.  Continues to have some penile swelling, slowly improving.  No hematuria, fever, vomiting, diarrhea, headache or vision changes, cough or shortness of breath.    Review of Systems: See interval hx. Denies fevers, chills, HA, dizziness, n/t, changes in vision, cough, sore throat, CP, SOB, abdominal pain, N/V, diarrhea, changes in urination, bleeding, bruising, rash.     PMHx and Social Hx reviewed per EPIC.      Medications:  Current Outpatient Medications   Medication Sig Dispense Refill     allopurinol (ZYLOPRIM) 300 MG tablet Take 1 tablet (300 mg) by mouth daily 30 tablet 0     dexamethasone (DECADRON) 4 MG tablet Take 2 tablets (8 mg) by mouth daily (with breakfast) for 3 days 6 tablet 0     ferrous sulfate (FEROSUL) 325 (65 Fe) MG tablet Take 1 tablet (325 mg) by mouth daily (with breakfast) 30 tablet 0     multivitamin w/minerals (THERA-VIT-M) tablet Take 1 tablet by mouth daily         No Known Allergies      EXAM:    /78   Pulse 80   Temp 97.6  F (36.4  C) (Tympanic)   Resp 16   Wt 55.4 kg (122 lb 3.2 oz)   SpO2 100%   BMI 23.07 kg/m      GENERAL:  Male, in no acute distress.  Alert and oriented x3.   HEENT:  Normocephalic, atraumatic.  PERRL, oropharynx clear with no sores or thrush.   LYMPH NODES:  No palpable cervical lyphadenopathy appreciated.  LUNGS: Nonlabored breathing  ABDOMEN:   Large area of firmness to " lower abdomen, actually softer than when hospitalized.  No tenderness  EXTREMITIES:  2+ pitting edema bilaterally.   SKIN: No rash  PSYCH: Mood stable      Labs: n/a    Imaging: n/a    Impression/Plan: Luz Marina Diallo is a 55 year old male with metastatic seminoma currently undergoing neoadjuvant BEP with Neulasta support.    Seminoma: Beta , metastasis to the lymph nodes, currently undergoing BEP with Neulasta support, C1 D1 done inpatient on 10/1/2020. He tolerated this extremely well and plan for Neulasta today.  Due to clinic schedule, we will need to schedule day 8 on a Monday and will need to schedule 10/12.  May need to be at [place] and will try to get a provider visit.  I will be following closely and seeing them pretty regularly on Mondays.  Plan for BEP every 21 days x 4 cycles.  They will call the clinic with any questions or concerns.  --10/12 BEP C1D8, provider visit tentative per scheduling  --10/19 Araceli BEP C1D15    YAYA: Creatinine 4.511 hospitalized on 9/23/2020.  He is status post bilateral ureteral stents and Palafox placement.  Urinary output has been excellent and creatinine is slowly improving, 1.86 on 10/5.  I have placed a urology referral for Palafox removal in about 1 to 2 weeks.    Heme: Hemoglobin 8.2 on recent admission, previously admitted in February 2019 for GI bleeding.  Hemoglobin 7.3, possibly due to IV fluids and disease process.  We will need to monitor closely when giving chemotherapy.  We may need a GI referral if anemia persistent given his Feb 2019 admission. We will need to transfuse if hemoglobin less than 7.0.    Developmental Delay: Lives with his sister, Mamta, who accompanies him with every visit.      Chart documentation with Dragon Voice recognition Software. Although reviewed after completion, some words and grammatical errors may remain.      Araceli Pacheco PA-C  Hematology/Oncology  [facility]                    Again, thank  NPO awaiting return of bowel function/other (specify) you for allowing me to participate in the care of your patient.        Sincerely,        Araceli Pacheco PA-C

## 2020-10-06 NOTE — PROGRESS NOTES
"Oncology Rooming Note    October 6, 2020 1:38 PM   Luz Marina Diallo is a 55 year old male who presents for:    Chief Complaint   Patient presents with     Oncology Clinic Visit     Testicular neoplasm     Initial Vitals: /78   Pulse 80   Temp 97.6  F (36.4  C) (Tympanic)   Resp 16   Wt 55.4 kg (122 lb 3.2 oz)   SpO2 100%   BMI 23.07 kg/m   Estimated body mass index is 23.07 kg/m  as calculated from the following:    Height as of 10/5/20: 1.55 m (5' 1.02\").    Weight as of this encounter: 55.4 kg (122 lb 3.2 oz). Body surface area is 1.54 meters squared.  No Pain (0) Comment: Data Unavailable   No LMP for male patient.  Allergies reviewed: Yes  Medications reviewed: Yes    Medications: Medication refills not needed today.  Pharmacy name entered into [employer]:    [facility] [id number] [place] [address]  [facility] [address] [place] [address] STU Pacheco PA-C      Oncology/Hematology Visit Note    Oct 6, 2020    Reason for visit: Follow up seminoma    Oncology HPI: Luz Marina Diallo is a 55 year old male with a history of developmental delay with testicular cancer.  He was hospitalized at [facility] on 9/23/2020 for large abdominal mass, weight loss, decreased appetite and was found to have renal failure with creatinine of 4.5 with hypercalcemia 15.5.  CT confirmed a large abdominal mass compressing the ureters and he underwent bilateral ureteral stent placement on 9/24/2020.  Abdominal mass concern for malignancy and biopsy revealed germ cell tumor (seminoma).  Palafox was placed and he was given Zometa for hypercalcemia with IV fluids.  He was started on neoadjuvant chemotherapy with bleomycin, etoposide and carboplatin (cisplatin was not used due to renal function), C1 D1 completed on 10/1/2020 and he received the first 5 days inpatient.  He tolerated this extremely well.    He is here today with his sister for close follow-up and " "Neulasta.    Interval History: Luz Marina is doing well today. His nickname is \"Rigoberto\" and we can address him as such. His appetite is better and his abd pain and swelling has improved since starting chemo. No n/v/d or bleeding. No fever or chills.  Continues to have Palafox catheter in place and urine output has been great.  Continues to have some penile swelling, slowly improving.  No hematuria, fever, vomiting, diarrhea, headache or vision changes, cough or shortness of breath.    Review of Systems: See interval hx. Denies fevers, chills, HA, dizziness, n/t, changes in vision, cough, sore throat, CP, SOB, abdominal pain, N/V, diarrhea, changes in urination, bleeding, bruising, rash.     PMHx and Social Hx reviewed per EPIC.      Medications:  Current Outpatient Medications   Medication Sig Dispense Refill     allopurinol (ZYLOPRIM) 300 MG tablet Take 1 tablet (300 mg) by mouth daily 30 tablet 0     dexamethasone (DECADRON) 4 MG tablet Take 2 tablets (8 mg) by mouth daily (with breakfast) for 3 days 6 tablet 0     ferrous sulfate (FEROSUL) 325 (65 Fe) MG tablet Take 1 tablet (325 mg) by mouth daily (with breakfast) 30 tablet 0     multivitamin w/minerals (THERA-VIT-M) tablet Take 1 tablet by mouth daily         No Known Allergies      EXAM:    /78   Pulse 80   Temp 97.6  F (36.4  C) (Tympanic)   Resp 16   Wt 55.4 kg (122 lb 3.2 oz)   SpO2 100%   BMI 23.07 kg/m      GENERAL:  Male, in no acute distress.  Alert and oriented x3.   HEENT:  Normocephalic, atraumatic.  PERRL, oropharynx clear with no sores or thrush.   LYMPH NODES:  No palpable cervical lyphadenopathy appreciated.  LUNGS: Nonlabored breathing  ABDOMEN:   Large area of firmness to lower abdomen, actually softer than when hospitalized.  No tenderness  EXTREMITIES:  2+ pitting edema bilaterally.   SKIN: No rash  PSYCH: Mood stable      Labs: n/a    Imaging: n/a    Impression/Plan: Luz Marina Diallo is a 55 year old male with metastatic seminoma " currently undergoing neoadjuvant BEP with Neulasta support.    Seminoma: Beta , metastasis to the lymph nodes, currently undergoing BEP with Neulasta support, C1 D1 done inpatient on 10/1/2020. He tolerated this extremely well and plan for Neulasta today.  Due to clinic schedule, we will need to schedule day 8 on a Monday and will need to schedule 10/12.  May need to be at [place] and will try to get a provider visit.  I will be following closely and seeing them pretty regularly on Mondays.  Plan for BEP every 21 days x 4 cycles.  They will call the clinic with any questions or concerns.  --10/12 BEP C1D8, provider visit tentative per scheduling  --10/19 Araceli, BEP C1D15    YAYA: Creatinine 4.511 hospitalized on 9/23/2020.  He is status post bilateral ureteral stents and Palafox placement.  Urinary output has been excellent and creatinine is slowly improving, 1.86 on 10/5.  I have placed a urology referral for Palafox removal in about 1 to 2 weeks.    Heme: Hemoglobin 8.2 on recent admission, previously admitted in February 2019 for GI bleeding.  Hemoglobin 7.3, possibly due to IV fluids and disease process.  We will need to monitor closely when giving chemotherapy.  We may need a GI referral if anemia persistent given his Feb 2019 admission. We will need to transfuse if hemoglobin less than 7.0.    Developmental Delay: Lives with his sister, Mamta, who accompanies him with every visit.      Chart documentation with Dragon Voice recognition Software. Although reviewed after completion, some words and grammatical errors may remain.      Araceli Pacheco PA-C  Hematology/Oncology  [facility]

## 2020-10-06 NOTE — PROGRESS NOTES
Nursing Note:  Luz Marina Diallo presents today for Neulasta.    Patient seen by provider today: Yes: Junior   present during visit today: Yes, sister    Note: N/A.    Intravenous Access:  No Intravenous access/labs at this visit.    Discharge Plan:   Patient was sent to [facility] for scheduling    Stefanie Haskins RN

## 2020-10-07 NOTE — TELEPHONE ENCOUNTER
Pt has [facility] as primary clinic/PCP, see f/u oncology visit  Dorinda Flood RN, BSN  Message handled by CLINIC NURSE.

## 2020-10-07 NOTE — PATIENT INSTRUCTIONS
10/7/20; Spoke with Urology, Dr. Chan's office, his first available is in December,  is sending pt coordinator a message and they will contact Anitra Oleary's sister to schedule. Yoanna PAZ

## 2020-10-07 NOTE — TELEPHONE ENCOUNTER
[facility] Call Center    Phone Message    May a detailed message be left on voicemail: yes     Reason for Call: Other: Yoanna calling from the [facility] to schedule Attapeu for a tran catheter evaulation with Dr. Chan. Yoanna says that Attapeu needs an appointment for this next week. There were no available in clinic appointments until December. Yoanna requests that the care team gives Luz Marina's sister a call to discuss possible options for next week. Attvitaly's sister's name is Mamta and her number is [phone]. Please give Mamta a call back at [facility] earliest convenience.     Action Taken: Message routed to:  Other: [facility]    Travel Screening: Not Applicable

## 2020-10-09 PROBLEM — D70.9 NEUTROPENIC FEVER (H): Status: ACTIVE | Noted: 2020-01-01

## 2020-10-09 PROBLEM — N30.00 ACUTE CYSTITIS WITHOUT HEMATURIA: Status: ACTIVE | Noted: 2020-01-01

## 2020-10-09 PROBLEM — R50.81 NEUTROPENIC FEVER (H): Status: ACTIVE | Noted: 2020-01-01

## 2020-10-09 NOTE — LETTER
Key Recommendations:  Pt is a laotian speaking male with a known history of developmental delay.      Pt is readmitted after 4 days with a new diagnosis of neutropenic fever.  Pt did receive chemotherapy during his last hospitalization for his new cancer diagnosis of Seminoma.  He follows with Dr Ramos and Araceli CHOWDARY with [facility].  He is also connected with Dr Chan with urology for his tran that is still in.  He is scheduled with Dr Chan on 10/14 to address this.      Recommendations are ***    Pt was discharged home with his sister *** and Tran ??? ***    Helen Ji RN    AVS/Discharge Summary is the source of truth; this is a helpful guide for improved communication of patient story

## 2020-10-09 NOTE — LETTER
Transition Communication Hand-off for Care Transitions to Next Level of Care Provider    Name: Luz Marina Diallo  : 1965  MRN #: [id number]  Primary Care Provider: Rosie Segura     Primary Clinic: [address] [place] [address]     Reason for Hospitalization:  Neutropenic fever (H) [D70.9, R50.81]  Acute cystitis without hematuria [N30.00]  Admit Date/Time: 10/9/2020  9:08 PM  Discharge Date: 10/14/20  Payor Source: Payor: [employer] / Plan: [employer] CONNECT [place] ONLY / Product Type: HMO /     Readmission Assessment Measure (JAMIN) Risk Score/category: ELEVATED         Reason for Communication Hand-off Referral: Fragility  Difficulty understanding plan of care    Discharge Plan:       Concern for non-adherence with plan of care:   NO  Discharge Needs Assessment:  Needs      Most Recent Value   Equipment Currently Used at Home  none          Already enrolled in Tele-monitoring program and name of program:  na  Follow-up specialty is recommended: Yes    Follow-up plan:    Future Appointments   Date Time Provider Department Center   10/19/2020  8:15 AM [facility] LAB DRAW 1 [facility]   10/19/2020  9:00 AM Araceli Riley PA-C [facility]   10/19/2020  9:30 AM [facility] INFUSION CHAIR 1 [facility]   10/26/2020  8:15 AM [facility] LAB DRAW 1 [facility]   10/26/2020  9:00 AM Araceli Riley PA-C [facility]   10/26/2020  9:30 AM RH INFUSION CHAIR 2 [facility]   2020  8:30 AM RH FAST TRACK PLUS 2 [facility]   2020  9:00 AM Araceli Riley PA-C [facility]   2020  9:30 AM RH INFUSION CHAIR 6 [facility]   11/3/2020 11:30 AM RH INFUSION CHAIR 7 [facility]   2020 11:30 AM RH INFUSION CHAIR 10 [facility]   2020 11:30 AM [facility] INFUSION CHAIR 3 [facility]   2020  8:30 AM RH FAST TRACK PLUS 2 [facility]   2020  9:00 AM Jarvis Ramos,  MD [facility]   11/6/2020  9:30 AM  [facility]USION CHAIR 1 [facility]   11/7/2020 12:00 PM [facility] INFUSION CHAIR 8 [facility]       Any outstanding tests or procedures:            Neutropenic fever. UTI. Klebsiella bacteremia      Key Recommendations:  Pt is a laotian speaking male with a known history of developmental delay.      Pt is readmitted after 4 days with a new diagnosis of neutropenic fever.  Pt did receive chemotherapy during his last hospitalization for his new cancer diagnosis of Seminoma.  He follows with Dr Ramos and Araceli CHOWDARY with [facility].   He is scheduled for a zoom meeting with [facility] to get connected with PCA support, which his sister would do.      Recommendations are to f/u with oncology.      Pt was discharged home with his sister .    Helen Ji RN    AVS/Discharge Summary is the source of truth; this is a helpful guide for improved communication of patient story

## 2020-10-10 NOTE — H&P
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  Reportedly also received Neulasta and was also on dexamethasone and attributed this to increasing leukocytosis at that time.  He was subsequently discharged last October 5, 2020 and has been fairly doing well at home with increasing appetite, denies any nausea, vomiting, abdominal pain, mental status changes however has to come into the hospital by emergency room due to recurrent fever spikes, chills and rigors.    1.  Febrile neutropenia, found with urinary tract infection  2.  Neutropenia secondary to chemotherapy  3.  Large abdominal mass secondary to recent diagnosis of seminoma  4.  Recent YAYA with obstructive uropathy  5.  Anemia of chronic illness  6.  Physical deconditioning  7.  History of developmental delay with prior diagnosis of mental retardation    Admit as inpatient.  He remain high risk for clinical deterioration  Continue with broad-spectrum antibiotics.  I have ordered cefepime for now.  -Failed initiation of vancomycin in the setting likely obvious source of fever with underlying UTI and given patient's already elevated creatinine levels with recovering kidney function from recent YAYA  -However low threshold in initiating systemic vancomycin if not improving or directed by cultures.  -Need to follow-up multiple culture sent  earlier  -COVID-19 testing in place with pending results  -Will defer to oncology service of whom I'm requesting for formal evaluation regarding initiation G-CSF  -Chart review showed patient received Neulasta during inpatient administration of chemotherapy  -Low threshold for packed RBC transfusion if hemoglobin less than 7   -May have regular diet  -As needed antiemetics and APAP  -Mechanical PCD's for DVT prophylaxis, if no risk of bleeding or hemoglobin remained stable then initiate heparin subcu for chemo DVT prophylaxis  -Current working diagnosis, plan of care, indication for hospitalization were discussed in detail with the patient and his sister in attendance at bedside.  They were provided opportunity to ask questions that I have answered the best my ability.      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  Reportedly also received Neulasta and was also on dexamethasone and attributed this to increasing leukocytosis at that time.  He was subsequently discharged last October 5, 2020 and has been fairly doing well at home with increasing appetite, denies any nausea, vomiting, abdominal pain, mental status changes however has to come into the hospital by emergency room due to  "recurrent fever spikes, chills and rigors.  Upon evaluation he was found with baseline anemia, however significant for neutropenia, and evidence of accompanying urinary tract infection.  Due to underlying immunosuppressed state, neutropenic fever he was provided with broad-spectrum antibiotics and subsequently referred to us for further management and care hence this hospitalization.    Fortunately he is hemodynamics remained stable, mental state remained at baseline and no hypoxia was seen.    During the time my exam I mostly confer his history with patient's primary caregiver who is his sister.  She thinks that her patient's condition is improving and endorses no other new complaints or concerns.              Past Medical History:     Past Medical History:   Diagnosis Date     Mental retardation              Past Surgical History:     Past Surgical History:   Procedure Laterality Date     ABDOMEN SURGERY      sister states he had an \"abdominal surgery 20 years ago\" unknown      CYSTOSCOPY, RETROGRADES, INSERT STENT URETER(S), COMBINED Bilateral 9/24/2020    Procedure: Cystoscopy with bilateral retrograde pyelogram and bilateral ureteral stent insertiona-complex due to severe bilateral hydroureteronephrosis with ureteral tortuosity, examination under anesthesia and fluoroscopic interpretation > 1 hour physician time;  Surgeon: Ralf Chan MD;  Location:  OR     ESOPHAGOSCOPY, GASTROSCOPY, DUODENOSCOPY (EGD), COMBINED N/A 3/1/2019    Procedure: ESOPHAGOSCOPY, GASTROSCOPY, DUODENOSCOPY (EGD) Rm 226;  Surgeon: Susy Camara MD;  Location:  GI             Social History:     Social History     Tobacco Use     Smoking status: Never Smoker     Smokeless tobacco: Never Used   Substance Use Topics     Alcohol use: Yes     Alcohol/week: 2.0 standard drinks     Types: 1 Glasses of wine, 1 Cans of beer per week     Comment: once every month or two, social functions only             Family History:   Family " "history was fully reviewed and non-contributory in this case.         Allergies:   No Known Allergies          Medications:     Prior to Admission medications    Medication Sig Last Dose Taking? Auth Provider   allopurinol (ZYLOPRIM) 300 MG tablet Take 1 tablet (300 mg) by mouth daily   Daniel Su MD   dexamethasone (DECADRON) 4 MG tablet Take 2 tablets (8 mg) by mouth daily (with breakfast) for 3 days   Araceli Riley PA-C   ferrous sulfate (FEROSUL) 325 (65 Fe) MG tablet Take 1 tablet (325 mg) by mouth daily (with breakfast)   Meche Solano PA-C   loratadine (CLARITIN) 10 MG tablet Take daily x 5 days the day before Neulasta.   Araceli Riley PA-C   multivitamin w/minerals (THERA-VIT-M) tablet Take 1 tablet by mouth daily   Unknown, Entered By History             Review of Systems:   A Comprehensive greater than 10 system review of systems was carried out.  Pertinent positives and negatives are noted above.  Otherwise negative for contributory information.           Physical Exam:   Blood pressure 120/79, pulse 123, temperature 103  F (39.4  C), temperature source Oral, resp. rate 20, height 1.549 m (5' 1\"), weight 51.3 kg (113 lb 1.5 oz), SpO2 98 %.  Wt Readings from Last 1 Encounters:   10/09/20 51.3 kg (113 lb 1.5 oz)     Exam:  GENERAL: No apparent distress. Awake, alert, and fully oriented.  HEENT: Normocephalic, atraumatic. Extraocular movements intact.  CARDIOVASCULAR: Regular rate and rhythm without murmurs or rubs. No JVD  Bilateral lower extremity edema  PULMONARY: Clear to auscultation, no wheezes, crackles  ABDOMINAL: Soft, non-tender, mild distended. Bowel sounds normoactive.   EXTREMITIES: No cyanosis or clubbing.   NEUROLOGICAL: CN 2-12 grossly intact, awake and alert at baseline mental state,   spontaneous and coherent speech. no focal neurological deficits.  DERMATOLOGICAL: No rash, ulcer, ecchymoses, jaundice.  Psych: not agitation, not combative, " laith mood    Palafox catheter in place       Data:   EKG:    No new EKG seen  Imaging:  Results for orders placed or performed during the hospital encounter of 10/09/20   XR Chest Port 1 View    Narrative    EXAM: XR CHEST PORT 1 VW  LOCATION: [facility]  DATE/TIME: 10/9/2020 9:28 PM    INDICATION: Fever.  COMPARISON: 09/27/2020      Impression    IMPRESSION: Negative chest.       Labs:  No results for input(s): CULT in the last 168 hours.  Recent Labs   Lab 10/09/20  2124 10/05/20  0627 10/04/20  0715   WBC 0.4* 18.1* 25.1*   HGB 7.0* 7.3* 7.9*   HCT 23.1* 24.8* 26.5*   MCV 88 90 91    669* 667*     Recent Labs   Lab 10/09/20  2124 10/05/20  1314 10/05/20  0627 10/04/20  0715 10/04/20  0715 10/03/20  0703     --  137  --  135 138   POTASSIUM 4.9 5.2 5.5*   < > 5.6* 5.1   CHLORIDE 103  --  111*  --  110* 110*   CO2 26  --  20  --  21 20   ANIONGAP 6  --  6  --  4 8   *  --  112*  --  125* 168*   BUN 48*  --  47*  --  47* 39*   CR 1.82*  --  1.86*  --  2.03* 2.01*   GFRESTIMATED 41*  --  40*  --  36* 37*   GFRESTBLACK 48*  --  47*  --  42* 43*   IVANIA 9.1  --  8.4*  --  8.7 9.4   PHOS  --   --   --   --   --  4.3   PROTTOTAL 7.8  --   --   --   --   --    ALBUMIN 2.8*  --   --   --   --   --    BILITOTAL 0.5  --   --   --   --   --    ALKPHOS 429*  --   --   --   --   --    AST 39  --   --   --   --   --    ALT 81*  --   --   --   --   --     < > = values in this interval not displayed.     No results for input(s): SED, CRP in the last 168 hours.  Recent Labs   Lab 10/09/20  2124 10/05/20  1313 10/05/20  0924 10/05/20  0844 10/05/20  0627 10/05/20  0141 10/04/20  2117 10/04/20  0715 10/04/20  0715 10/03/20  0703 10/03/20  0703   *  --   --   --  112*  --   --   --  125*  --  168*   BGM  --  129* 120* 87  --  131* 169*   < >  --    < >  --     < > = values in this interval not displayed.     No results for input(s): LIPASE in the last 168 hours.  No results for input(s):  TROPONIN, TROPI, TROPR in the last 168 hours.    Invalid input(s): TROP, TROPONINIES  Recent Labs   Lab 10/09/20  4774   COLOR Light Yellow   APPEARANCE Slightly Cloudy   URINEGLC 100*   URINEBILI Negative   URINEKETONE Negative   SG 1.010   UBLD Large*   URINEPH 7.5*   PROTEIN 70*   NITRITE Positive*   LEUKEST Large*   RBCU >182*   WBCU 50*

## 2020-10-10 NOTE — PROGRESS NOTES
Pt seen and examined.  I agree with the H&P per Dr. Alvarado from earlier this AM.      Pt admitted with neutropenic fever; suspect urinary source.      Urine cx pending but blood cx now positive for GNR.  Being treated with cefepime currently    Does not appear toxic    A/P  - neutropenic fever  - sepsis, suspect secondary to urinary source.  Blood cx pos for GNR; urine cx pending.  Continue cefepime  - recent dx of metastatic testicular ca; followed by Dr. Ramos of oncology.      I called and updated sister Mamta today

## 2020-10-10 NOTE — CONSULTS
[facility]    Hematology/Oncology Consult Note      Date of Admission:  10/9/2020  Date of Consult:  10/10/20  Reason for Consult: testicular cancer, admitted with neutropenic fever      ASSESSMENT/PLAN:  Luz Marina Diallo is a 55 year old male with:    1) Neutropenic fever: Found to have UTI, cultures pending.    -on antibiotics  -follow cultures  -he received Neulasta on 10/6/20, so will not give additional Neupogen for now  -follow daily CBC/diff  -we will continue to follow during this hospitalization    2) Anemia: secondary to chemotherapy, malignancy  -transfuse as needed for hemoglobin <7 or bleeding    3) Testicular cancer: s/p cycle 1 of bleomycin, etoposide, carboplatin with Neulasta support.  -due to C1D8 of bleomycin on 10/12/20, will hold while hospitalized for neutropenic fever  -follow-up with Dr. Ramos after discharge    We will continue to follow.      HISTORY OF PRESENT ILLNESS: Luz Marina Diallo is a 55 year old male with PMHx of developmental delay who presents with neutropenic fever.   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  He was started on chemotherapy with bleomycin, etoposide, carboplatin (cisplatin not used due to renal function), C1D1 was given on 10/1/20,and he received the 5 days of treatment inpatient.  He reportedly tolerated it well.  He received Neulasta on 10/6/20.      He presented to the ED with fever/chills and rigors.  He was found to have UTI.  He was started on broad-spectrum antibiotics.      Currently, he says that he feels fine and denies any complaints.      REVIEW OF SYSTEMS:   14 point ROS was reviewed and is negative other than as noted above in HPI.       MEDICATIONS:  Current Facility-Administered Medications   Medication     acetaminophen (TYLENOL) Suppository 650 mg     acetaminophen (TYLENOL) tablet 650 mg     ceFEPIme (MAXIPIME) 2 g vial to  "attach to  ml bag for ADULTS or 50 ml bag for PEDS     lidocaine (LMX4) cream     lidocaine 1 % 0.1-1 mL     melatonin tablet 1 mg     naloxone (NARCAN) injection 0.1-0.4 mg     ondansetron (ZOFRAN-ODT) ODT tab 4 mg    Or     ondansetron (ZOFRAN) injection 4 mg     senna-docusate (SENOKOT-S/PERICOLACE) 8.6-50 MG per tablet 1 tablet    Or     senna-docusate (SENOKOT-S/PERICOLACE) 8.6-50 MG per tablet 2 tablet     sodium chloride (PF) 0.9% PF flush 3 mL     sodium chloride (PF) 0.9% PF flush 3 mL     sodium chloride 0.9% infusion     sodium chloride 0.9% infusion         ALLERGIES:  No Known Allergies      PAST MEDICAL HISTORY:  Past Medical History:   Diagnosis Date     Mental retardation          PAST SURGICAL HISTORY:  Past Surgical History:   Procedure Laterality Date     ABDOMEN SURGERY      sister states he had an \"abdominal surgery 20 years ago\" unknown      CYSTOSCOPY, RETROGRADES, INSERT STENT URETER(S), COMBINED Bilateral 9/24/2020    Procedure: Cystoscopy with bilateral retrograde pyelogram and bilateral ureteral stent insertiona-complex due to severe bilateral hydroureteronephrosis with ureteral tortuosity, examination under anesthesia and fluoroscopic interpretation > 1 hour physician time;  Surgeon: Ralf Chan MD;  Location:  OR     ESOPHAGOSCOPY, GASTROSCOPY, DUODENOSCOPY (EGD), COMBINED N/A 3/1/2019    Procedure: ESOPHAGOSCOPY, GASTROSCOPY, DUODENOSCOPY (EGD) Rm 226;  Surgeon: Susy Camara MD;  Location:  GI         SOCIAL HISTORY:  Social History     Socioeconomic History     Marital status: Single     Spouse name: Not on file     Number of children: Not on file     Years of education: Not on file     Highest education level: Not on file   Occupational History     Not on file   Social Needs     Financial resource strain: Not on file     Food insecurity     Worry: Not on file     Inability: Not on file     Transportation needs     Medical: Not on file     Non-medical: Not on " "file   Tobacco Use     Smoking status: Never Smoker     Smokeless tobacco: Never Used   Substance and Sexual Activity     Alcohol use: Yes     Alcohol/week: 2.0 standard drinks     Types: 1 Glasses of wine, 1 Cans of beer per week     Comment: once every month or two, social functions only     Drug use: No     Sexual activity: Not Currently   Lifestyle     Physical activity     Days per week: Not on file     Minutes per session: Not on file     Stress: Not on file   Relationships     Social connections     Talks on phone: Not on file     Gets together: Not on file     Attends Jainism service: Not on file     Active member of club or organization: Not on file     Attends meetings of clubs or organizations: Not on file     Relationship status: Not on file     Intimate partner violence     Fear of current or ex partner: Not on file     Emotionally abused: Not on file     Physically abused: Not on file     Forced sexual activity: Not on file   Other Topics Concern     Parent/sibling w/ CABG, MI or angioplasty before 65F 55M? Not Asked   Social History Narrative     Not on file         FAMILY HISTORY:  Family History   Problem Relation Age of Onset     Diabetes Father      Glaucoma No family hx of      Macular Degeneration No family hx of          PHYSICAL EXAM:  Vital signs:  Temp: 97.4  F (36.3  C) Temp src: Oral BP: 98/61 Pulse: 76   Resp: 16 SpO2: 98 % O2 Device: None (Room air)   Height: 154.9 cm (5' 1\") Weight: 51.3 kg (113 lb 1.5 oz)  Estimated body mass index is 21.37 kg/m  as calculated from the following:    Height as of this encounter: 1.549 m (5' 1\").    Weight as of this encounter: 51.3 kg (113 lb 1.5 oz).    GENERAL/CONSTITUTIONAL: No acute distress.  EYES: No scleral icterus.  RESPIRATORY: Clear to auscultation bilaterally. No crackles or wheezing.   CARDIOVASCULAR: Regular rate and rhythm without murmurs, gallops, or rubs.  GASTROINTESTINAL: No tenderness. No guarding.    MUSCULOSKELETAL: Warm and " well-perfused.  NEUROLOGIC: Alert, oriented, answers questions appropriately.      LABS:  CBC RESULTS:   Recent Labs   Lab Test 10/09/20  2124   WBC 0.4*   RBC 2.64*   HGB 7.0*   HCT 23.1*   MCV 88   MCH 26.5   MCHC 30.3*   RDW 15.3*          Recent Labs   Lab Test 10/09/20  2124 10/05/20  1314 10/05/20  0627     --  137   POTASSIUM 4.9 5.2 5.5*   CHLORIDE 103  --  111*   CO2 26  --  20   ANIONGAP 6  --  6   *  --  112*   BUN 48*  --  47*   CR 1.82*  --  1.86*   IVANIA 9.1  --  8.4*         IMAGING:  CXR 10/9/20:  Negative chest.        Thank you for the opportunity to participate in this patient's care.  Please call with any questions.    Virgie Fan MD  Hematology/Oncology  [facility]

## 2020-10-10 NOTE — ED PROVIDER NOTES
History     Chief Complaint:  Fever    HPI    offered, refuse. Patient wants to use family.    Luz Marina Diallo is a 55 year old male with a history of developmental delay, PUD, and hiatal hernia who presents with fever. The patient was recently discharged from the hospital and diagnosed with seminoma and had bilateral ureteral stents placed, please see discharge summary. The patient's daughter reports, yesterday she noticed the patient developed shakes, chills, and a temperature of 100.4. The patient is actively getting chemo and was instructed to come to the ED for any fever, prompting him to come today. The patient denies headache, neck pain, chest pain, cough, dysuria, hematuria, leg swelling, rash, and myalgias.     Allergies:  No known drug allergies      Medications:    Zyloprim  Decadron   Ferosul     Past Medical History:    Chemotherapy-induced neutropenia   Seminoma  ARF   YAYA   Testicular neoplasm  Anemia  Mental retardation    Past Surgical History:    Abdomen surgery  Cystoscopy, retrogrades, insert stent ureter, combined  EGD, combined    Family History:    Diabetes     Social History:  Smoking status- never smoker  Alcohol use- yes  Drug use- no   Marital Status:  Single     Review of Systems   Constitutional: Positive for chills and fever.   Respiratory: Negative for cough.    Cardiovascular: Negative for chest pain and leg swelling.   Genitourinary: Negative for dysuria and hematuria.   Musculoskeletal: Negative for myalgias and neck pain.   Skin: Negative for rash.   Neurological: Negative for headaches.   All other systems reviewed and are negative.      Physical Exam     Patient Vitals for the past 24 hrs:   BP Temp Temp src Pulse Resp SpO2 Height Weight   10/09/20 2215 -- -- -- -- -- 100 % -- --   10/09/20 2200 129/87 -- -- 119 -- 100 % -- --   10/09/20 2145 -- -- -- -- -- 100 % -- --   10/09/20 2130 (!) 120/92 -- -- 136 -- 100 % -- --   10/09/20 2115 124/79 -- -- 133 -- 98 %  "-- --   10/09/20 2106 129/71 103  F (39.4  C) Oral 136 20 97 % 1.549 m (5' 1\") 51.3 kg (113 lb 1.5 oz)     Physical Exam    Constitutional: Patient is well appearing. No distress.  Head: Atraumatic.  Eyes: Conjunctivae and EOM are normal. No scleral icterus.  Neck: Normal range of motion. Neck supple.   Cardiovascular: Normal rate, regular rhythm, normal heart sounds and intact distal pulses.   Pulmonary/Chest: Breath sounds normal. No respiratory distress.  Abdominal: Soft. Bowel sounds are normal. No distension. No tenderness. No rebound or guarding.   Musculoskeletal: Normal range of motion. No edema or tenderness.   Neurological: Alert and orientated to person, place, and time. No observable focal neuro deficit  Skin: Warm and dry. No rash noted. Not diaphoretic.    : tran catheter draining clear.    Emergency Department Course     Imaging:  Radiology findings were communicated with the patient who voiced understanding of the findings.    XR Chest Port 1 View:  Negative chest.  As read by Radiology.     Laboratory:  Laboratory findings were communicated with the patient who voiced understanding of the findings.    CBC: WBC 0.4 (LL), RBC Count 2.64 (L), HGB 7.0 (L), Hematocrit 23.1 (L), MCHC 30.3 (L), RDW 15.3 (H), o/w WNL ()  CMP: Glucose 108 (H), Urea Nitrogen: 48 (H), GFR: 41 (L), Albumin: 2.8 (L), Alkaline Phosphatase: 429 (H), ALT: 81 (H), o/w WNL (Creatinine: 1.82 (H))    Lactic Acid: 1.2   Blood Culture x2: Pending     UA with Microscopic: Glucose 100, Blood - large, pH 7.5 (H), Protein Albumin 70, Nitrite - positive, Leukocyte esterase - large, WBC 50 (H), RBC >182 (H), Bacteria - few, Mucous - present, o/w negative.     Symptomatic COVID-19 Virus by PCR: pending    Interventions:  2125 NS 1L IV Bolus     2306 Fortaz 2 g IV    Emergency Department Course:  Past medical records, nursing notes, and vitals reviewed.    2112 I performed an exam of the patient as documented above.     2124 IV was " inserted and blood was drawn for laboratory testing, results above.    2147 The patient was sent for a XR chest while in the emergency department, results above.     2154 The patient provided a urine sample here in the emergency department. This was sent for laboratory testing, findings above.    2239 I spoke to Dr. Alvarado of the hospitalist service who accepts care of the patient.      Findings and plan explained to the Patient who consents to admission. Discussed the patient with Dr. Alvarado, who will admit the patient to a general medicine bed for further monitoring, evaluation, and treatment.    Impression & Plan     Covid-19  Luz Marina Diallo was evaluated during a global COVID-19 pandemic, which necessitated consideration that the patient might be at risk for infection with the SARS-CoV-2 virus that causes COVID-19.   Applicable protocols for evaluation were followed during the patient's care.   COVID-19 was considered as part of the patient's evaluation. The plan for testing is:  a test was obtained during this visit.         Medical Decision Making:  Outwardly and vitals stable as is lactate.  Neutropenic fever with likely urinary source.  Empiric abx and admit for further workup and mgmt.     Diagnosis:    ICD-10-CM    1. Neutropenic fever (H)  D70.9 UA with Microscopic    R50.81 Urine Culture     Blood culture     Blood culture   2. Acute cystitis without hematuria  N30.00      Disposition:  Admitted to Dr. Alvarado.    Scribe Disclosure:  I, Nisha Silvestre, am serving as a scribe at 9:19 PM on 10/9/2020 to document services personally performed by Ralf Angulo MD based on my observations and the provider's statements to me.        Ralf Angulo MD  10/09/20 [id number]

## 2020-10-10 NOTE — ED NOTES
DATE:  10/9/2020   TIME OF RECEIPT FROM LAB:  2153  LAB TEST:  WBC  LAB VALUE:  0.4  RESULTS GIVEN WITH READ-BACK TO (PROVIDER): Dr. Angulo  TIME LAB VALUE REPORTED TO PROVIDER:   2153    Maral Archibald RN on 10/9/2020 at 9:54 PM

## 2020-10-10 NOTE — ED NOTES
[facility]  ED Nurse Handoff Report    Luz Marina Diallo is a 55 year old male   ED Chief complaint: Fever  . ED Diagnosis:   Final diagnoses:   Neutropenic fever (H)   Acute cystitis without hematuria     Allergies: No Known Allergies    Code Status: Full Code  Activity level - Baseline/Home:  Independent. Activity Level - Current:   Stand by Assist. Lift room needed: No. Bariatric: No   Needed: Yes - Laotian  Isolation: Yes. Infection: Not Applicable  COVID r/o and special precautions. Neutropenic.    Vital Signs:   Vitals:    10/09/20 2130 10/09/20 2145 10/09/20 2200 10/09/20 2215   BP: (!) 120/92  129/87    Pulse: 136  119    Resp:       Temp:       TempSrc:       SpO2: 100% 100% 100% 100%   Weight:       Height:           Cardiac Rhythm:  ,      Pain level:    Patient confused: No. Patient Falls Risk: Yes.   Elimination Status: Urethral catheter (tran) in place; orders for patient to discharge with tran    Patient Report - Initial Complaint: Patient was discharged on Monday due to treatment of acute renal failure, and testicular cancer. Today patient spiked a fever at home. Patient's denies pain. Patient has a tran catheter that was placed on September 25th. Patient currently on chemotherapy. Temp 103 in triage.. Focused Assessment: Genitourinary - Genitourinary WDL: .WDL except; urine; voiding ability/characteristics  Voiding Characteristics: ureteral catheter  Urine Characteristics: cloudy; sediment   Tests Performed: Lab, CXR, Cultures, UA. Abnormal Results:   Labs Ordered and Resulted from Time of ED Arrival Up to the Time of Departure from the ED   CBC WITH PLATELETS DIFFERENTIAL - Abnormal; Notable for the following components:       Result Value    WBC 0.4 (*)     RBC Count 2.64 (*)     Hemoglobin 7.0 (*)     Hematocrit 23.1 (*)     MCHC 30.3 (*)     RDW 15.3 (*)     All other components within normal limits   ROUTINE UA WITH MICROSCOPIC - Abnormal; Notable for the  following components:    Glucose Urine 100 (*)     Blood Urine Large (*)     pH Urine 7.5 (*)     Protein Albumin Urine 70 (*)     Nitrite Urine Positive (*)     Leukocyte Esterase Urine Large (*)     WBC Urine 50 (*)     RBC Urine >182 (*)     Bacteria Urine Few (*)     Mucous Urine Present (*)     All other components within normal limits   COMPREHENSIVE METABOLIC PANEL - Abnormal; Notable for the following components:    Glucose 108 (*)     Urea Nitrogen 48 (*)     Creatinine 1.82 (*)     GFR Estimate 41 (*)     GFR Estimate If Black 48 (*)     Albumin 2.8 (*)     Alkaline Phosphatase 429 (*)     ALT 81 (*)     All other components within normal limits   LACTIC ACID WHOLE BLOOD   COVID-19 VIRUS (CORONAVIRUS) BY PCR   URINE CULTURE AEROBIC BACTERIAL   BLOOD CULTURE   BLOOD CULTURE     XR Chest Port 1 View   Final Result   IMPRESSION: Negative chest.        .   Treatments provided: See MAR.  Blood cultures, urine culture and COVID swab completed in Er.  1 L bolus provided.  Antibiotics initiated.  Family Comments: Daughter at bedside.  OBS brochure/video discussed/provided to patient:  N/A  ED Medications:   Medications   0.9% sodium chloride BOLUS (0 mLs Intravenous Stopped 10/9/20 2225)     Followed by   sodium chloride 0.9% infusion (has no administration in time range)   cefTAZidime (FORTAZ) 2 g vial to attach to  ml bag for ADULTS or NS 50 ml bag for PEDS (has no administration in time range)     Drips infusing:  Yes  For the majority of the shift, the patient's behavior Green. Interventions performed were none.    Sepsis treatment initiated: No     Patient tested for COVID 19 prior to admission: YES    ED Nurse Name/Phone Number: Rolly Ramirez RN,   10:50 PM    RECEIVING UNIT ED HANDOFF REVIEW    Above ED Nurse Handoff Report was reviewed: Yes  Reviewed by: Claudia Medina RN on October 9, 2020 at 11:56 PM

## 2020-10-10 NOTE — PLAN OF CARE
Laotian primary language  Maintain Special precautions- COVID pending  Up with A x 1, gait belt, and walker. Palafox with adequate output. T zackery 100.9- tylenol given and resolved. NS at 100 ml/hr. Continue ABX

## 2020-10-10 NOTE — TELEPHONE ENCOUNTER
Sister reports pt discharged on 10/05 from a hospital admission. New fever x 2 days, 101.7F with constant shivering.      Disposition:  ED per protocol. She verbalized understanding and had no further questions.     COVID 19 Nurse Triage Plan/Patient Instructions    Please be aware that novel coronavirus (COVID-19) may be circulating in the community. If you develop symptoms such as fever, cough, or SOB or if you have concerns about the presence of another infection including coronavirus (COVID-19), please contact your health care provider or visit [email or web address].     Disposition/Instructions    ED Visit recommended. Follow protocol based instructions.     Bring Your Own Device:  Please also bring your smart device(s) (smart phones, tablets, laptops) and their charging cables for your personal use and to communicate with your care team during your visit.    Thank you for taking steps to prevent the spread of this virus.  o Limit your contact with others.  o Wear a simple mask to cover your cough.  o Wash your hands well and often.    Resources    [facility]: About COVID-19: [email or web address]    CDC: What to Do If You're Sick: www.cdc.gov/coronavirus/2019-ncov/about/steps-when-sick.html    CDC: Ending Home Isolation: www.cdc.gov/coronavirus/2019-ncov/hcp/disposition-in-home-patients.html     CDC: Caring for Someone: www.cdc.gov/coronavirus/2019-ncov/if-you-are-sick/care-for-someone.html     [facility]: Interim Guidance for Hospital Discharge to Home: www.health.[facility].mn.us/diseases/coronavirus/hcp/hospdischarge.pdf    [facility] clinical trials (COVID-19 research studies): clinicalaffairs.[facility]-clinical-trials     Below are the COVID-19 hotlines at the [facility] of Health ([facility]). Interpreters are available.   o For health questions: Call 656-105-3893 or 1-989.630.3240 (7 a.m. to 7 p.m.)  o For questions about schools and childcare: Call 205-399-4239 or 1-812.229.9576 (3  a.m. to 7 p.m.)         Kamilah Newman RN/FNA            Reason for Disposition    [1] Neutropenia known or suspected (e.g., recent cancer chemotherapy) AND [2] fever > 100.4 F (38.0 C)    Additional Information    Negative: Difficult to awaken or acting confused (e.g., disoriented, slurred speech)    Negative: Shock suspected (e.g., cold/pale/clammy skin, too weak to stand, low BP, rapid pulse)    Negative: Bluish (or gray) lips or face now    Negative: New onset rash with many purple (or blood-colored) spots or dots    Negative: Sounds like a life-threatening emergency to the triager    Negative: Fever > 104 F (40 C)    Protocols used: CANCER - FEVER-A-AH

## 2020-10-10 NOTE — ED TRIAGE NOTES
Patient was discharged on Monday due to treatment of acute renal failure, and testicular cancer. Today patient spiked a fever at home. Patient's denies pain. Patient has a tran catheter that was placed on September 25th. Patient currently on chemotherapy. Temp 103 in triage.

## 2020-10-10 NOTE — PHARMACY-ADMISSION MEDICATION HISTORY
Admission medication history interview status for this patient is complete. See [facility] admission navigator for allergy information, prior to admission medications and immunization status.     Medication history interview done via telephone during Covid-19 pandemic, indicate source(s): Family - Mamta (sister)  Medication history resources (including written lists, pill bottles, clinic record):[facility]  Pharmacy: [facility] [id number] [facility]    Changes made to [facility] medication list:  Added: none  Deleted: none  Changed: loratadine    Actions taken by pharmacist (provider contacted, etc):None     Additional medication history information:None    Medication reconciliation/reorder completed by provider prior to medication history?  N    Prior to Admission medications    Medication Sig Last Dose Taking? Auth Provider   allopurinol (ZYLOPRIM) 300 MG tablet Take 1 tablet (300 mg) by mouth daily 10/9/2020 at Unknown time Yes Daniel Su MD   ferrous sulfate (FEROSUL) 325 (65 Fe) MG tablet Take 1 tablet (325 mg) by mouth daily (with breakfast) 10/9/2020 at Unknown time Yes Meche Solano PA-C   loratadine (CLARITIN) 10 MG tablet Take 10 mg by mouth daily For 5 days 10/9/2020 at (3rd day) Yes Unknown, Entered By History   multivitamin w/minerals (THERA-VIT-M) tablet Take 1 tablet by mouth daily 10/9/2020 at Unknown time Yes Unknown, Entered By History

## 2020-10-10 NOTE — PLAN OF CARE
To Do:  End of Shift Summary  For vital signs and complete assessments, please see documentation flowsheets.     Pertinent assessments: Pt alert, laotian speaking. Palafox patent and draining, output cloudy with sediment. Abnormal UC results. No pressure ulcer present. Pt SBA. Lung sounds wheezes and rhonci present. No c/o sob or nausea. Tachycardic. Edema in bilateral Le's, +1 trace. PIV patent and infusing.    Major Shift Events: Admitted to Unit  Treatment Plan: Reduce fevers  Bedside Nurse: Claudia Medina RN

## 2020-10-11 NOTE — PROGRESS NOTES
MD Page  10/11/20  0021    pt was given Tylenol for fever @ 2300. Fever is now 102.9 with . Pt is shivering cold and sweating.

## 2020-10-11 NOTE — PROGRESS NOTES
Paged re fever 102.9.  Pt tachycardic to 140.        Chart reviewed.  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  He has been subsequently started on chemotherapy with bleomycin, etoposide and carboplatin.  Patient admitted with neutropenic fever secondary to urinary source.  His UA is indicative of infection and his blood is now growing Klebsiella.     Was started on cefepime on 10/10.    Vitals:    10/10/20 1552 10/10/20 2133 10/10/20 2248 10/11/20 0019   BP: 121/75   115/59   BP Location: Left arm   Left arm   Pulse: 114   140   Resp: 16   22   Temp: 102.8  F (39.3  C) 98.5  F (36.9  C) 99.9  F (37.7  C) 102.9  F (39.4  C)   TempSrc: Oral Oral Oral Oral   SpO2: 97%   95%   Weight:       Height:         On exam, patient is tachycardic into the 120s to 130s, he is warm to touch.  He does answer simple yes/no questions and denies pain.  Lungs are clear.  Respiratory rate in the low 20s but no accessory muscle use.    Assessment/Plan  -I do suspect that tachycardia and fever are secondary to his ongoing neutropenic fever.  He is only been started on antibiotics within the last 24 hours.  He does still have normal blood pressure fevers and seems to be mentating appropriately.  -I will give a 1 L LR bolus.  Start LR at 150 cc an hour.  Continue cefepime therapy.  I have ordered a.m. labs to be done now including CBC, CMP.  We will also check a lactic acid and repeat blood culture.  -We will check an EKG and telemetry to monitor heart rate  -Continue tylenol for fever.  Would avoid NSAIDS given kidney injury.  Can also use external cooling mechanisms.    Discussed with nursing.    Tandem: Hemoglobin found to be low at 5.7.  No evidence of ongoing bleeding.  Likely secondary to chemotherapy.  Giving 2 units PRBCs.  Stat type and screen ordered.  Consent obtained.     Aditya Herr MD

## 2020-10-11 NOTE — PLAN OF CARE
Pertinent assessments: VSS on room air. T max: 102.8. Alert. LEO orientation. Laotian speaking; limited English. Lives with his sister. + blood cultures. IV Cefepime. Tran patent; sediment in urine. Chemo outpatient with Dr. Ramos. +1 edema to bilateral LEs. Tylenol given PRN x1.  Major Shift Events: Covid negative  Treatment Plan: Fluids, tran, iv cefepime, monitor temperature.

## 2020-10-11 NOTE — PLAN OF CARE
Pt A/O per sister, Mamta who assists with interpretation. Denies pain. Tele SR, pulses palpable, no edema. RA, no SOB, lungs dim with wheezes occasionally. T max 99.2, Tolerating diet, BS+, BM this shift, denies N/V. Palafox with adequate output. Hgb 9.1 S/P transfusion of 2 U PRBC's, patient tolerated well. Ambulated with GB and assist of 1, steady. IVF infusing. VSS. Plan discontinue home 3-5 days after resolution of fever and neutropenia.

## 2020-10-11 NOTE — PLAN OF CARE
To Do:  End of Shift Summary  For vital signs and complete assessments, please see documentation flowsheets.     Pertinent assessments: pt is laotian speaking with some understanding in english. Tran patent with output clear yellow with sediment. Evening shift pt had temp, evening nurse admin oral tylenol. NOC shift temp retake 102.9, pt shaking & sweating, MD notified. Fever increased post medication administration. New orders obtained. BLOUS LR 1000ml/hr given, continuous fluids changed to 150cc/hr LR. Labs abnormal, Hemoglobin 5.7, PRBC x2 given. No transfusion reaction noted. HR in 140's, new order for Tele. Currently Tele SR in the 70's. EKG SR with arrhythmias. Second PIV placed. Sister updated about patient's status.    Major Shift Events: T Max 102.9, Blood transfusion    Treatment Plan: Fluids, tran, iv cefepime, monitor temperature.    Claudia PERRY RN

## 2020-10-11 NOTE — PROGRESS NOTES
[facility]  Hospitalist Progress Note  Aditya Crow MD 10/11/2020    Reason for Stay (Diagnosis): sepsis, neutropenia, anemia         Assessment and Plan:      Summary of Stay: Luz Marina Diallo is a 55 year old male Laotian speaking male with known history of developmental delay, PUD, hiatal hernia, recently discharged in the hospital for this large abdominal mass found to have a seminoma and underwent bilateral ureteral stent placement last 9/24, improved YAYA (discharge creatinine was 1.8), started on chemotherapy during inpatient course with bleomycin, etoposide and carboplatin.  He was subsequently discharged last October 5, 2020 and has been fairly doing well at home with increasing appetite, denies any nausea, vomiting, abdominal pain, mental status changes however came into the hospital by emergency room on 10/9 due to recurrent fever spikes, chills and rigors.    On presentation pt was febrile with neutropenia and e/o UTI.  Blood cx and urine cx subsequently have grown klebsiella.  Remains on IV antibiotics and appears to be clinically improving     1.  Neutropenic fever and sepsis due to UTI.  Blood and urine cx pos for klebsiella.  Remains neutropenic due to recent chemo.  Continue cefepime for now; await sensitivities from cx results  2.  Neutropenia secondary to chemotherapy.  Appreciate onc assistance  3.  Large abdominal mass secondary to recent diagnosis of seminoma  4.  Recent YAYA with obstructive uropathy  5.  Anemia of chronic illness  6.  Physical deconditioning  7.  History of developmental delay with prior diagnosis of cognitive dysftn  8.  Anemia:  Suspect due to chemo.  S/p 2 units PRBC on 10/11 with good response.         DVT Prophylaxis: Enoxaparin (Lovenox) SQ  Code Status: Full Code  Discharge Dispo: home  Estimated Disch Date / # of Days until Disch: 3-5 days likely.  Discharge after fever and neutropenia resolved        Interval History (Subjective):     "  Challenging historian given cognitive issues.  Denies any pain.  Fever overnight.  Drop in hgb last night likely from hemodilution; s/p 2 unit PRBC overnight with good response                  Physical Exam:      Last Vital Signs:  /68 (BP Location: Left arm)   Pulse 94   Temp 99.2  F (37.3  C) (Oral)   Resp 20   Ht 1.549 m (5' 1\")   Wt 51.3 kg (113 lb 1.5 oz)   SpO2 99%   BMI 21.37 kg/m        Intake/Output Summary (Last 24 hours) at 10/11/2020 1316  Last data filed at 10/11/2020 1248  Gross per 24 hour   Intake 4824 ml   Output 4785 ml   Net 39 ml       Constitutional: Awake, alert, cooperative, no apparent distress   Respiratory: Clear to auscultation bilaterally, no crackles or wheezing   Cardiovascular: Regular rate and rhythm, normal S1 and S2, and no murmur noted   Abdomen: Normal bowel sounds, soft, non-distended, non-tender   Skin: No rashes, no cyanosis, dry to touch   Neuro: Alert and awake, no weakness, numbness, memory loss   Extremities: No edema, normal range of motion   Other(s):        All other systems: Negative          Medications:      All current medications were reviewed with changes reflected in problem list.         Data:      All new lab and imaging data was reviewed.   Labs:  Recent Labs   Lab 10/11/20  1005 10/11/20  0109 10/09/20  2124 10/05/20  0627   WBC  --  0.1* 0.4* 18.1*   HGB 9.1* 5.7* 7.0* 7.3*   HCT  --  18.4* 23.1* 24.8*   MCV  --  86 88 90   PLT  --  101* 196 669*      Imaging:   No results found for this or any previous visit (from the past 24 hour(s)).   "

## 2020-10-11 NOTE — PROGRESS NOTES
[facility]    Hematology/Oncology Follow-up Note      Today's Date: 10/11/20  Date of Admission:  10/9/2020  Reason for Consult: testicular cancer, admitted with neutropenic fever      ASSESSMENT/PLAN:  Luz Marina Diallo is a 55 year old male with:    1) Neutropenic fever: Found to have UTI.   Urine and blood culture growing Klebsiella oxytoca.  He continued to have high fever overnight.    -on antibiotics  -follow cultures  -he received Neulasta on 10/6/20, so will not give additional Neupogen for now  -follow daily CBC/diff  -we will continue to follow during this hospitalization     2) Anemia: secondary to chemotherapy, malignancy. Hemoglobin decreased to 5.7 overnight,  and he received 2 units of PRBCs.  Hemoglobin improved to 9.1 this morning.    -transfuse as needed for hemoglobin <7 or bleeding     3) Testicular cancer: s/p cycle 1 of bleomycin, etoposide, carboplatin with Neulasta support.  -due for C1D8 of bleomycin on 10/12/20, will hold while hospitalized for neutropenic fever  -follow-up with Dr. Ramos after discharge     We will continue to follow.        INTERIM HISTORY: Patient was seen in his room.  He says that he is feeling better.  Overnight events noted.  He had fever of 102.9 overnight and tachycardic.  He was given IV fluids and antibiotics were continued.  Hemoglobin was found to be low at 5.7 and he was given 2 units of PRBC's.  He had no evidence of active bleeding.       MEDICATIONS:  Current Facility-Administered Medications   Medication     acetaminophen (TYLENOL) Suppository 650 mg     acetaminophen (TYLENOL) tablet 650 mg     allopurinol (ZYLOPRIM) tablet 300 mg     ceFEPIme (MAXIPIME) 2 g vial to attach to  ml bag for ADULTS or 50 ml bag for PEDS     lactated ringers infusion     lidocaine (LMX4) cream     lidocaine 1 % 0.1-1 mL     melatonin tablet 1 mg     naloxone (NARCAN) injection 0.1-0.4 mg     ondansetron (ZOFRAN-ODT) ODT tab 4 mg    Or      "ondansetron (ZOFRAN) injection 4 mg     senna-docusate (SENOKOT-S/PERICOLACE) 8.6-50 MG per tablet 1 tablet    Or     senna-docusate (SENOKOT-S/PERICOLACE) 8.6-50 MG per tablet 2 tablet     sodium chloride (PF) 0.9% PF flush 3 mL     sodium chloride (PF) 0.9% PF flush 3 mL           ALLERGIES:  No Known Allergies      PHYSICAL EXAM:  Vital signs:  Temp: 99.2  F (37.3  C) Temp src: Oral BP: 118/68 Pulse: 94   Resp: 20 SpO2: 99 % O2 Device: None (Room air)   Height: 154.9 cm (5' 1\") Weight: 51.3 kg (113 lb 1.5 oz)  Estimated body mass index is 21.37 kg/m  as calculated from the following:    Height as of this encounter: 1.549 m (5' 1\").    Weight as of this encounter: 51.3 kg (113 lb 1.5 oz).    GENERAL/CONSTITUTIONAL: No acute distress. Sitting up in chair.  Sister at bedside.  EYES: No scleral icterus.  INTEGUMENTARY: No rashes.        LABS:  CBC RESULTS:   Recent Labs   Lab Test 10/11/20  1005 10/11/20  0109   WBC  --  0.1*   RBC  --  2.14*   HGB 9.1* 5.7*   HCT  --  18.4*   MCV  --  86   MCH  --  26.6   MCHC  --  31.0*   RDW  --  15.2*   PLT  --  101*       Recent Labs   Lab Test 10/11/20  1005 10/11/20  0109    136   POTASSIUM 3.9 4.5   CHLORIDE 109 109   CO2 21 22   ANIONGAP 6 5   GLC 89 125*   BUN 34* 43*   CR 1.83* 1.96*   IVANIA 8.0* 7.8*       Virgie Fan MD  Hematology/Oncology  [facility]  "

## 2020-10-12 NOTE — PROGRESS NOTES
Paged re nose bleed.  Pt evaluated.  Chart reviewed.    At the bedside, patient is on his computer.  Rhino Rocket is in place in the right nostril.  There is just mild trickling of blood from left nostril noted.  Not severe.  Hemoglobin stable.  Platelet count 68 this a.m.    -We will continue to monitor.  ENT consult placed.  Hold enoxaparin for now.  Recheck CBC with platelets and hemoglobin in the a.m.    Aditya Herr MD

## 2020-10-12 NOTE — CONSULTS
Care Management Initial Consult    General Information  Assessment completed with:: Other, Mamta Sister  Type of CM/SW Visit: Initial Assessment  Primary Care Provider verified and updated as needed?: Yes(maxx)  Readmission Within the Last 30 Days: previous discharge plan unsuccessful  Return Category: New Diagnosis  Reason for Consult: discharge planning    Communication Assessment  Patient's communication style: spoken language (non-English)  Hearing Difficulty or Deaf: no Wear Glasses or Blind: yes    Cognitive  Cognitive/Neuro/Behavioral: WDL  Level of Consciousness: alert  Arousal Level: opens eyes spontaneously  Orientation: oriented x 4  Mood/Behavior: calm, cooperative  Best Language: 0 - No aphasia  Speech: clear(language barrier)    Living Environment:   People in home: sibling(s)  Name(s) of People in Home: Mamta  Current living Arrangements:            Family/Social Support:  Care provided by: other (see comments)(sibling Mamta)  Provides care for:       Who is your support system?: Sibling(s)     Description of Support System: Supportive  Description of Support System: Supportive           Description of Support System: Supportive       Current Resources:   Skilled Home Care Services:    Community Resources: PCA(Pt is scheduled for zoom meeting with SW from [facility] to assess for PCA support that the sister could provide for him.   Equipment currently used at home: none       Socioeconomic History     Marital status: Single     Spouse name: Not on file     Number of children: Not on file     Years of education: Not on file     Highest education level: Not on file     Tobacco Use     Smoking status: Never Smoker     Smokeless tobacco: Never Used   Substance and Sexual Activity     Alcohol use: Yes     Alcohol/week: 2.0 standard drinks     Types: 1 Glasses of wine, 1 Cans of beer per week     Comment: once every month or two, social functions only     Drug use: No     Sexual activity: Not Currently        Mental Health Status:  WDL            Values/Beliefs:  Spiritual, Cultural Beliefs, Taoist Practices, Values that affect care: yes          Cheondoism    Additional Information:   Pt is a laotian speaking male with a known history of developmental delay.    Pt is readmitted after 4 days with a new diagnosis of neutropenic fever.  Pt did receive chemotherapy during his last hospitalization for his new cancer diagnosis of Seminoma.  He follows with Dr Ramos and Araceli CHOWDARY with [facility].  He is also connected with Dr Chan with urology for his tran that is still in.  He is scheduled with Dr Chan on 10/14 to address this.      I will give a hand off to Oncology clinic RN CTS at time of discharge.  Pt would benefit for this service due to his difficulty understanding his POC and lack of support being limited to being only his sister.    Helen Ji RN

## 2020-10-12 NOTE — PROGRESS NOTES
ent consult dictated. Was on lovenox and has low platelets. Stopped with packing would leave 48 hrs and remove if no further bleeding

## 2020-10-12 NOTE — PROVIDER NOTIFICATION
MD notified that HR has been increasing and patient is regularly tachy. Per report from [facility], patient is in the 140's while ambulating. Asymptomatic.     EKG ordered.

## 2020-10-12 NOTE — PROGRESS NOTES
"  Called for epistaxis R nare and coughing up blood. Chart reviewed and pt examined.    /82   Pulse 107   Temp 98.8  F (37.1  C) (Axillary)   Resp 20   Ht 1.549 m (5' 1\")   Wt 51.3 kg (113 lb 1.5 oz)   SpO2 96%   BMI 21.37 kg/m  .    Gen - Alert, awake, in NAD, no active epistaxis with nares with pincher.       A/P - CBC, stable. Will monitor closely, call if recurrent bleeding.    Addendum.  - called for continuous epistaxis despite nasal punch apparatus, Oropharynx no visible blood.  - I spoke to his sister, LUDIVINA  by phone and discussed pros and cons of controlling bleeding with rhinorocket, she was agreeable.   - Rhinorocket placed w/o difficulty in R nostril and pt tolerated it well w/o any facial pain.   - already on IV abx.      "

## 2020-10-12 NOTE — PLAN OF CARE
Pertinent assessments: VSS on room air. Afebrile. Continue Cefepime iv. LR @ 75. Tran: clear urine with sediment. Tele: SR. Tolerating a regular diet; poor appetite. Up with stand by assist. Experienced clots to Right nostril and expectorating clots as well. MD evaluated the pt at the bedside. Labs collected. See results review. Contact sister, Mamta, with any concerns.  Major Shift Events: Bloody nose and coughing up blood/clots.  Treatment Plan: Fluids, tran, iv cefepime, monitor temperature. Monitor bleeding.

## 2020-10-12 NOTE — PROGRESS NOTES
[facility]    Hematology/Oncology Follow-up Note      Today's Date: 10/12/20  Date of Admission:  10/9/2020  Reason for Consult: testicular cancer, admitted with neutropenic fever        ASSESSMENT/PLAN:  Luz Marina Diallo is a 55 year old male with:     1) Neutropenic fever: Found to have UTI.   Urine and blood culture growing Klebsiella oxytoca. No fever in the last 24 hours.   -on antibiotics  -follow cultures  -he received Neulasta on 10/6/20, so will not give additional Neupogen for now  -follow daily CBC/diff  -we will continue to follow during this hospitalization     2) Anemia: secondary to chemotherapy, malignancy. Hemoglobin decreased to 5.7 on 10/11, and he received 2 units of PRBCs.  Hemoglobin 8.2 today.  -transfuse as needed for hemoglobin <7 or bleeding     3) Testicular cancer: s/p cycle 1 of bleomycin, etoposide, carboplatin with Neulasta support.  -was due for C1D8 of bleomycin today, will continue to hold while hospitalized for neutropenic fever  -follow-up with me after discharge and we will reschedule C1 D8 to 10/19       INTERIM HISTORY: Patient was seen in his room, sister at bedside.  He is feeling better today.  No new complaints.       MEDICATIONS:  Current Facility-Administered Medications   Medication     acetaminophen (TYLENOL) Suppository 650 mg     acetaminophen (TYLENOL) tablet 650 mg     allopurinol (ZYLOPRIM) tablet 300 mg     ceFEPIme (MAXIPIME) 2 g vial to attach to  ml bag for ADULTS or 50 ml bag for PEDS     lidocaine (LMX4) cream     lidocaine 1 % 0.1-1 mL     melatonin tablet 1 mg     naloxone (NARCAN) injection 0.1-0.4 mg     ondansetron (ZOFRAN-ODT) ODT tab 4 mg    Or     ondansetron (ZOFRAN) injection 4 mg     [START ON 10/13/2020] pantoprazole (PROTONIX) EC tablet 40 mg     senna-docusate (SENOKOT-S/PERICOLACE) 8.6-50 MG per tablet 1 tablet    Or     senna-docusate (SENOKOT-S/PERICOLACE) 8.6-50 MG per tablet 2 tablet     sodium chloride  "(PF) 0.9% PF flush 3 mL     sodium chloride (PF) 0.9% PF flush 3 mL           ALLERGIES:  No Known Allergies      PHYSICAL EXAM:  Vital signs:  Temp: 98.6  F (37  C) Temp src: Oral BP: 117/74 Pulse: 109   Resp: 20 SpO2: 99 % O2 Device: None (Room air)   Height: 154.9 cm (5' 1\") Weight: 51.3 kg (113 lb 1.5 oz)  Estimated body mass index is 21.37 kg/m  as calculated from the following:    Height as of this encounter: 1.549 m (5' 1\").    Weight as of this encounter: 51.3 kg (113 lb 1.5 oz).    GENERAL:  Male, in no acute distress.  Alert and oriented x3. Well groomed.   HEENT:  Normocephalic, atraumatic. No conjunctival injection or eye swelling.   LUNGS:  Nonlabored breathing, no cough or audible wheezing, able to speak full sentences.  MSK: Full ROM UE.    SKIN: No rash on exposed skin        LABS:  CBC RESULTS:   Recent Labs   Lab Test 10/12/20  0705   WBC 0.2*   RBC 2.95*   HGB 8.2*   HCT 26.1*   MCV 89   MCH 27.8   MCHC 31.4*   RDW 15.3*   PLT 52*       Recent Labs   Lab Test 10/12/20  0705 10/11/20  1005    136   POTASSIUM 4.0 3.9   CHLORIDE 110* 109   CO2 20 21   ANIONGAP 7 6   GLC 94 89   BUN 42* 34*   CR 1.88* 1.83*   IVANIA 8.2* 8.0*         PATHOLOGY:      IMAGING:            Araceli Pacheco PA-C  Hematology/Oncology  [facility]      "

## 2020-10-12 NOTE — PLAN OF CARE
To Do:  End of Shift Summary  For vital signs and complete assessments, please see documentation flowsheets.     Pertinent assessments: Pt had episode of epistaxis, attempted to clamp, no decrease in blood. MD placed rhino rocket in right nostril. During the night, left nostril starting bleeding. MD updated. MD discontinued Lovenox and added ENT consult. Tele: ST . Palafox patent and clear. Max temp 99.1. Pt continues on neutropenic precautions. IV fluids discharge, saline locked PIV x2. No c/o pain.     Treatment Plan: Decrease Fevers

## 2020-10-12 NOTE — PROGRESS NOTES
2138: Dr. Leos to bedside to evaluate patient. Nasal clip applied to stop bleeding. VSS on room air. Afebrile. Zofran and Protonix given iv. RN spoke with pt's sister to assist in informing the patient of the plan of care. Pt has experienced large clots from the right nostril and has expectorated further clots. Labs being processed now.

## 2020-10-12 NOTE — PLAN OF CARE
To Do:  End of Shift Summary  For vital signs and complete assessments, please see documentation flowsheets.     Pertinent assessments: ENT consult. Tele: . Palafox patent and clear. Max temp 98.9. Pt continues on neutropenic precautions. IV SL, (PIV x2). No c/o pain or nausea. Pt looks like he feels unwell. Lethargic. Medium bm today per Mamta.     Treatment Plan: Maxipime

## 2020-10-12 NOTE — PROGRESS NOTES
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  He was subsequently discharged last October 5, 2020 and has been fairly doing well at home with increasing appetite, denies any nausea, vomiting, abdominal pain, mental status changes however came into the hospital by emergency room on 10/9 due to recurrent fever spikes, chills and rigors.     On presentation pt was febrile with neutropenia and e/o UTI.  Blood cx and urine cx subsequently have grown klebsiella.  Remains on IV antibiotics and appears to be clinically improving    Course complicated by epistaxis on 10/11 requiring rhinorocket placement.  ENT recommends to remove this in 2 days if no further bleeding.       1.  Neutropenic fever and sepsis due to UTI.  Blood and urine cx pos for klebsiella.  Remains neutropenic due to recent chemo.  Continue cefepime.   2.  Pancytopenia secondary to chemotherapy.  Appreciate onc assistance.  Received dose of Neulasta after recent chemo  3.  Large abdominal mass secondary to recent diagnosis of seminoma  4.  Recent YAYA with obstructive uropathy.  Creatinine stable near 1.8  5.  Anemia of chronic illness with acute worsening due to bone marrow suppression from recent chemo - s/p 2 units PRBC on 10/11  6.  Physical deconditioning  7.  History of developmental delay with prior diagnosis of cognitive dysftn  8.  Epistaxis:  rhinorocket placed 10/11; ENT recommends to  "remove in 2 days  9.  UTI due to indwelling catheter.  Pt was discharged with tran catheter in place after last hospitalization given concerns about penile edema and difficulty urinating.  Given UTI this admission will remove tran catheter to minimize infection risk going forward.  Start Flomax to minimize chance of urine retention         I updated sister Mamta at bedside today      Addendum:  Notified by nurse that pt has been tachycardic most of the day.  Will check ECG to r/o arrythmia     DVT Prophylaxis: no systemic AC given low plt count and epistaxis.  PCDs  Code Status: Full Code  Discharge Dispo: home  Estimated Disch Date / # of Days until Disch: 3-5 days likely.  Discharge after fever and neutropenia resolved      Interval History (Subjective):      No complaints, though hx limited given cognitive dysftn.  Denies pain                  Physical Exam:      Last Vital Signs:  /74 (BP Location: Left arm)   Pulse 109   Temp 98.6  F (37  C) (Oral)   Resp 20   Ht 1.549 m (5' 1\")   Wt 51.3 kg (113 lb 1.5 oz)   SpO2 99%   BMI 21.37 kg/m        Intake/Output Summary (Last 24 hours) at 10/12/2020 1341  Last data filed at 10/12/2020 1045  Gross per 24 hour   Intake 2302 ml   Output 3875 ml   Net -1573 ml       Constitutional: Awake, alert, cooperative, no apparent distress.  Sister present assisting with communication   Respiratory: Clear to auscultation bilaterally, no crackles or wheezing   Cardiovascular: Regular rate and rhythm, normal S1 and S2, and no murmur noted   Abdomen: Normal bowel sounds, soft, non-distended, non-tender   Skin: No rashes, no cyanosis, dry to touch   Neuro: Alert and oriented x3, no weakness, numbness, memory loss   Extremities: No edema, normal range of motion   Other(s): rhinorocket in L nare       All other systems: Negative          Medications:      All current medications were reviewed with changes reflected in problem list.         Data:      All new lab and imaging " data was reviewed.   Labs:  Recent Labs   Lab 10/12/20  0705   WBC 0.2*   HGB 8.2*   HCT 26.1*   MCV 89   PLT 52*      Imaging:   No results found for this or any previous visit (from the past 24 hour(s)).

## 2020-10-12 NOTE — PROGRESS NOTES
10/12/20  [id number]  MD page      pt bleeding out of right nostril, rhino rocket in place. currently bleeding out of left and right nostril. rhino rocket still inflated and in place. interventions?

## 2020-10-12 NOTE — CONSULTS
Consult Date:  10/12/2020      ENT CONSULTATION      REQUESTING PHYSICIAN:  Aditya Herr MD      HISTORY OF PRESENT ILLNESS:  The patient is a 55-year-old male who was admitted with sepsis, neutropenia and anemia.  He has been on chemotherapy for a seminoma.  The patient developed onset of epistaxis this a.m. and had a Rhino Rocket placed with air.  This essentially stopped the bleeding on the right.  He has a small amount of blood on the left.  He is not currently having any significant epistaxis or spitting any bloody drainage.      The remainder of the past medical history, social history, family history and review of systems is as noted on his electronic medical record.      PHYSICAL EXAMINATION:  Examination today revealed a right ear to be clear.  Left ear had a cerumen impaction.  Right nostril had a Rhino Rocket in place without active bleeding.  The left nostril had some dried blood.  Oral cavity did not reveal any significant bleeding in the oropharynx.      IMPRESSION:  Epistaxis, controlled with balloon packing.      PLAN:  At this point, we would leave it in for 48 hours and if no further bleeding, may consider removal.  In the meantime, if he can stay off his Lovenox this would be helpful as well.        We would be happy to see him back should he have any further bleeding.         CARRIE RODARTE MD             D: 10/12/2020   T: 10/12/2020   MT: SANTA      Name:     RAJWINDER OSULLIVAN   MRN:      [id number]        Account:       [id number]   :      1965           Consult Date:  10/12/2020      Document: [id number]

## 2020-10-13 NOTE — PLAN OF CARE
To Do:  End of Shift Summary  For vital signs and complete assessments, please see documentation flowsheets.     Pertinent assessments: No new bleeding noted from R nare, rhinorocket intact. Tele: 's-140's at times with activity, then back to 100's at rest.  Max temp 98.8. Pt continues on neutropenic precautions. IV's SL, (PIV x2). No c/o pain or nausea. Voiding well after tran removed. Lethargy continues. Sister at bedside.    Treatment Plan: Maxipime

## 2020-10-13 NOTE — PROGRESS NOTES
[facility]    Hematology/Oncology Follow-up Note      Today's Date: 10/13/20  Date of Admission:  10/9/2020  Reason for Consult: testicular cancer, admitted with neutropenic fever        ASSESSMENT/PLAN:  Luz Marina Diallo is a 55 year old male with:     1) Neutropenic fever: Found to have UTI.   Urine and blood culture growing Klebsiella oxytoca. No fever in the last 24 hours. COVID negative, blood cultures NGTD  -WBC/ANC improving   -on antibiotics  -follow cultures  -he received Neulasta on 10/6/20, so will not give additional Neupogen for now  -follow daily CBC/diff  -we will continue to follow during this hospitalization     2) Anemia: secondary to chemotherapy, malignancy. Hemoglobin decreased to 5.7 on 10/11, and he received 2 units of PRBCs. Hemoglobin 7.8  today.  -transfuse as needed for hemoglobin <7 or bleeding    3) Thrombocytopenia: Platelets 46, secondary to chemotherapy.  Rate nare epistaxis, currently packed, see below.  -Transfuse if platelets <20 or bleeding.  Since packing in place and no epistaxis, will hold on platelets for now.  If bleeding returns, would recommend 1 unit of platelets     4) Testicular cancer: s/p cycle 1 of bleomycin, etoposide, carboplatin with Neulasta support.  -was due for C1D8 of bleomycin on 10/12, will continue to hold while hospitalized for neutropenic fever  -Scheduled to see me for BEP C1 D8 to 10/19, discussed with pt and sister today.    5) Epistaxis: Right nare, rhino rocket in place.  -ENT following        INTERIM HISTORY: Patient seen in his room, feeling better today.  Appetite is good.  No fever in the last 24 hours.  Overall no complaints.       MEDICATIONS:  Current Facility-Administered Medications   Medication     acetaminophen (TYLENOL) Suppository 650 mg     acetaminophen (TYLENOL) tablet 650 mg     allopurinol (ZYLOPRIM) tablet 300 mg     ceFEPIme (MAXIPIME) 2 g vial to attach to  ml bag for ADULTS or 50 ml bag for  "PEDS     lidocaine (LMX4) cream     lidocaine 1 % 0.1-1 mL     melatonin tablet 1 mg     naloxone (NARCAN) injection 0.1-0.4 mg     ondansetron (ZOFRAN-ODT) ODT tab 4 mg    Or     ondansetron (ZOFRAN) injection 4 mg     pantoprazole (PROTONIX) EC tablet 40 mg     senna-docusate (SENOKOT-S/PERICOLACE) 8.6-50 MG per tablet 1 tablet    Or     senna-docusate (SENOKOT-S/PERICOLACE) 8.6-50 MG per tablet 2 tablet     sodium chloride (PF) 0.9% PF flush 3 mL     sodium chloride (PF) 0.9% PF flush 3 mL     tamsulosin (FLOMAX) capsule 0.4 mg         ALLERGIES:  No Known Allergies      PHYSICAL EXAM:  Vital signs:  Temp: 98.2  F (36.8  C) Temp src: Oral BP: 118/65 Pulse: 101   Resp: 24 SpO2: 98 % O2 Device: None (Room air)   Height: 154.9 cm (5' 1\") Weight: 51.3 kg (113 lb 1.5 oz)  Estimated body mass index is 21.37 kg/m  as calculated from the following:    Height as of this encounter: 1.549 m (5' 1\").    Weight as of this encounter: 51.3 kg (113 lb 1.5 oz).    GENERAL:  Male, in no acute distress.  Alert and oriented x3. Well groomed.   HEENT:  Normocephalic, atraumatic. No conjunctival injection or eye swelling. Right nare packed, no bleeding.  LUNGS:  Nonlabored breathing, no cough or audible wheezing, able to speak full sentences.  MSK: Full ROM UE.    SKIN: No rash on exposed skin.       LABS:  CBC RESULTS:   Recent Labs   Lab Test 10/13/20  0649   WBC 0.6*   RBC 2.75*   HGB 7.8*   HCT 24.5*   MCV 89   MCH 28.4   MCHC 31.8   RDW 15.8*   PLT 46*       Recent Labs   Lab Test 10/13/20  0649 10/12/20  0705    137   POTASSIUM 3.7 4.0   CHLORIDE 110* 110*   CO2 18* 20   ANIONGAP 9 7   * 94   BUN 29 42*   CR 1.78* 1.88*   IVANIA 8.3* 8.2*             Araceli Pacheco PA-C  Hematology/Oncology  [facility]      "

## 2020-10-13 NOTE — PLAN OF CARE
Pt at baseline mentation per sister. Denies pain. RT nare with rhino rocket. Tele ST, EKG completed for tachycardia, patient asymptomatic, bleeding precautions in place, platelets 52. RA, no SOB, lungs clear/dim, provided I.S. BS hyperactive, no N/V, tolerating diet, LBM in AM. Palafox with clear and yellow urine. Orders to discontinue verified. Patient tolerated well and due to void. Flomax given. Up with SBA, ambulating in halls. SL IV. AM labs. VSS.

## 2020-10-13 NOTE — PLAN OF CARE
End of Shift Summary  For vital signs and complete assessments, please see documentation flowsheets.     Pertinent assessments: ENT consult. No bleeding noted from R nare, rhinorocket intact. Tele:  at times  Max temp 98.2 Pt continues on neutropenic precautions. IV SL, (PIV x2). No c/o pain or nausea. Voiding well after tran removed.     Treatment Plan: Maxipime

## 2020-10-13 NOTE — PROGRESS NOTES
[facility]    Hospitalist Progress Note      Assessment & Plan   Luz Marina Diallo is a 55 year old male who was admitted on 10/9/2020.    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  He was subsequently discharged last October 5, 2020 and has been fairly doing well at home with increasing appetite, denies any nausea, vomiting, abdominal pain, mental status changes however came into the hospital by emergency room on 10/9 due to recurrent fever spikes, chills and rigors.     On presentation pt was febrile with neutropenia and e/o UTI.  Blood cx and urine cx subsequently have grown klebsiella.  Remains on IV antibiotics and appears to be clinically improving     Course complicated by epistaxis on 10/11 requiring rhinorocket placement, which was placed on the night of 10/11/2020.  ENT recommends to remove this in 2 days if no further bleeding.    Plan:    Neutropenic fever.  Klebsiella UTI.  Klebsiella bacteremia.  - Continue IV cefepime.  - Repeat blood culture is negative.  -Remains afebrile.    Leukopenia with neutropenia.  Pancytopenia.  - White cell count is up to 0.6.  Absolute neutrophil count of 0.3.  - Continue monitoring.  -Continue IV antibiotic.    CKD  -Creatinine at baseline.    Anemia  - No active blood loss.  Likely in the setting of bone marrow suppression.  Probably worsened by nosebleed.  -Status post 2 units of PRBC transfusion.  -Transfuse for hemoglobin less than 7.    Epistaxis  - No further evidence of bleeding.  - Rhino Rocket in place which can be removed tomorrow morning.  Seen by  ENT.    Testicular cancer  -s/p cycle 1 of bleomycin, etoposide, carboplatin with Neulasta support.  -Oncology input appreciated.    Thrombocytopenia  - Platelets have decreased, from 52-46 today.  -Monitor.  No evidence of bleeding.      DVT Prophylaxis: Pneumatic Compression Devices  Code Status: Full Code  Expected discharge: 1-2 days    Logan Bunch MD  Text Page (7am - 6pm, M-F)    Interval History   Patient was evaluated with nursing staff. Overnight issues discussed.    Review of systems:  No nausea or vomiting.  No abdominal pain.  No diarrhea.  No chest pain/palpitations.  No new cough/shortness of breath.  No headache/visual disturbance/new weakness.    -Data reviewed today: Labs and medications.    Physical Exam   Temp: 98.2  F (36.8  C) Temp src: Oral BP: 118/65 Pulse: 101   Resp: 24 SpO2: 98 % O2 Device: None (Room air)    Vitals:    10/09/20 2106   Weight: 51.3 kg (113 lb 1.5 oz)     Vital Signs with Ranges  Temp:  [97.9  F (36.6  C)-98.8  F (37.1  C)] 98.2  F (36.8  C)  Pulse:  [101-127] 101  Resp:  [16-24] 24  BP: (104-130)/(65-73) 118/65  SpO2:  [98 %-100 %] 98 %  I/O last 3 completed shifts:  In: 360 [P.O.:360]  Out: 2150 [Urine:2150]    Constitutional: Awake, alert, cooperative, no apparent distress  HEENT: Trachea midline, sclera is clear.  Rhino Rocket in place.  Clear discharge.  Respiratory: No crackles. No wheezing. Equal breath sounds bilaterally.  Cardiovascular: Regular rate and rhythm, normal S1 and S2, and no murmur noted  GI: Normal bowel sounds, soft, non-distended, non-tender    Medications       allopurinol  300 mg Oral Daily     ceFEPIme (MAXIPIME) IV  2 g Intravenous Q12H     pantoprazole  40 mg Oral QAM AC     sodium chloride (PF)  3 mL Intracatheter Q8H     tamsulosin  0.4 mg Oral Daily       Data   Recent Labs   Lab 10/13/20  0649 10/12/20  0705 10/12/20  0052 10/11/20  2118 10/11/20  1005 10/11/20  0109 10/09/20  2124   WBC 0.6* 0.2*  --  0.1*  --  0.1* 0.4*   HGB 7.8* 8.2*  9.2* 9.4* 9.1* 5.7* 7.0*   MCV 89 89  --  88  --  86 88   PLT 46* 52*  --  68*  --  101* 196   INR  --   --   --  0.97  --   --   --     137  --   --  136 136 135   POTASSIUM 3.7 4.0  --   --  3.9 4.5 4.9   CHLORIDE 110* 110*  --   --  109 109 103   CO2 18* 20  --   --  21 22 26   BUN 29 42*  --   --  34* 43* 48*   CR 1.78* 1.88*  --   --  1.83* 1.96* 1.82*   ANIONGAP 9 7  --   --  6 5 6   IVANIA 8.3* 8.2*  --   --  8.0* 7.8* 9.1   * 94  --   --  89 125* 108*   ALBUMIN  --   --   --   --   --  2.0* 2.8*   PROTTOTAL  --   --   --   --   --  6.2* 7.8   BILITOTAL  --   --   --   --   --  0.4 0.5   ALKPHOS  --   --   --   --   --  345* 429*   ALT  --   --   --   --   --  40 81*   AST  --   --   --   --   --  18 39       No results found for this or any previous visit (from the past 24 hour(s)).

## 2020-10-14 NOTE — PROGRESS NOTES
Transition Communication Hand-off for Care Transitions to Next Level of Care Provider    Name: Luz Marina Diallo  : 1965  MRN #: [id number]  Primary Care Provider: Rosie Segura     Primary Clinic: [address] [place] [address]     Reason for Hospitalization:  Neutropenic fever (H) [D70.9, R50.81]  Acute cystitis without hematuria [N30.00]  Admit Date/Time: 10/9/2020  9:08 PM  Discharge Date: 10/14/20  Payor Source: Payor: UCARE / Plan: UC[employer] CONNECT MA ONLY / Product Type: HMO /     Readmission Assessment Measure (JAIMN) Risk Score/category: ELEVATED         Reason for Communication Hand-off Referral: Fragility  Difficulty understanding plan of care    Discharge Plan:       Concern for non-adherence with plan of care:   NO  Discharge Needs Assessment:  Needs      Most Recent Value   Equipment Currently Used at Home  none          Already enrolled in Tele-monitoring program and name of program:  na  Follow-up specialty is recommended: Yes    Follow-up plan:    Future Appointments   Date Time Provider Department Center   10/19/2020  8:15 AM [facility] LAB DRAW 1 [facility]   10/19/2020  9:00 AM Araceli Riley PA-C [facility]   10/19/2020  9:30 AM RH INFUSION CHAIR 1 [facility]   10/26/2020  8:15 AM [facility] LAB DRAW 1 [facility]   10/26/2020  9:00 AM Araceli Riley PA-C [facility]   10/26/2020  9:30 AM RH INFUSION CHAIR 2 [facility]   2020  8:30 AM RH FAST TRACK PLUS 2 [facility]   2020  9:00 AM Araceli Riley PA-C [facility]   2020  9:30 AM RH INFUSION CHAIR 6 [facility]   11/3/2020 11:30 AM RH INFUSION CHAIR 7 [facility]   2020 11:30 AM RH INFUSION CHAIR 10 [facility]   2020 11:30 AM RH INFUSION CHAIR 3 [facility]   2020  8:30 AM RH FAST TRACK PLUS 2 [facility]   2020  9:00 AM Jarvis Ramos,  MD [facility]   11/6/2020  9:30 AM  [facility]ION CHAIR 1 [facility]   11/7/2020 12:00 PM [facility] INFUSION CHAIR 8 [facility]       Any outstanding tests or procedures:            Neutropenic fever. UTI. Klebsiella bacteremia      Key Recommendations:  Pt is a laotian speaking male with a known history of developmental delay.      Pt is readmitted after 4 days with a new diagnosis of neutropenic fever.  Pt did receive chemotherapy during his last hospitalization for his new cancer diagnosis of Seminoma.  He follows with Dr Ramos and Araceli CHOWDARY with [facility].   He is scheduled for a zoom meeting with [employer] to get connected with PCA support, which his sister would do.      Recommendations are to f/u with oncology.      Pt was discharged home with his sister .    Helen Ji RN    AVS/Discharge Summary is the source of truth; this is a helpful guide for improved communication of patient story

## 2020-10-14 NOTE — PLAN OF CARE
To Do:  End of Shift Summary  For vital signs and complete assessments, please see documentation flowsheets.     Pertinent assessments: A & O x4. Afebrile. Tachycardic up to 130's w/activity. Denies pain and nausea.  Voiding adequately. No bleeding from right nare. Minimal clear drainage noted, rhino-rocket intact. Pt. remains on neutropenic precautions. Telemetry: SR HR in 90's.     Major Shift Events: Uneventful.     Treatment Plan: IV Maxipime and monitor labs.

## 2020-10-14 NOTE — PLAN OF CARE
To Do:  End of Shift Summary  For vital signs and complete assessments, please see documentation flowsheets.     Pertinent assessments: A & O x4. Afebrile. Tachycardic up to 130's w/activity. Sister present to interpret during beginning of shift. Denies pain and nausea. Appetite is good. Voiding adequately. No bleeding from right nare. Minimal clear drainage noted, rhino-rocket intact. Pt. remains on neutropenic precautions. Telemetry: SR HR in 90's.     Major Shift Events: Uneventful.     Treatment Plan: IV Maxipime and monitor labs.

## 2020-10-14 NOTE — PLAN OF CARE
AVS reviewed with patient and his sister, Mamta. Pt educated on signs and symptoms of chemotherapy side effects. Education handouts given to Mamta on bleeding, neutropenia, new medications, and infection prevention. Educated on when to seek medical care. Pt verbalized understanding that he needs to notify Mamta if he notes any bleeding, has any injury, or feels ill in any way. Pt able to void without issue. Denies pain. Mamta to  new medications that were filled at [facility] and is aware of all follow up appointments. Pt in stable condition and all belongings returned to patient at time of discharge. Pt given wheelchair transport with nursing staff to personal vehicle.

## 2020-10-15 PROBLEM — N13.30 BILATERAL HYDRONEPHROSIS: Status: ACTIVE | Noted: 2020-01-01

## 2020-10-15 NOTE — TELEPHONE ENCOUNTER
Please call patient for Inpatient Discharge f/u 10/14/20. Neutropenic Fever, [place]. Ruth Behrens.

## 2020-10-18 NOTE — DISCHARGE SUMMARY
[facility]    Discharge Summary  Hospitalist    Date of Admission:  10/9/2020  Date of Discharge:  10/14/2020 12:37 PM  Discharging Provider: Logan Bunch MD  Date of Service (when I saw the patient): 10/14/20    Discharge Diagnoses   Neutropenic fever.  Klebsiella UTI.  Klebsiella bacteremia.  Sepsis.  Pancytopenia.  Epistaxis, status post Rhino Rocket placement.  Anemia, status post units of PRBC transfusion.  Testicular cancer, status post cycle 1 of bleomycin, etoposide, carboplatin with Neulasta support.  Followed by [facility] oncology.  Thrombocytopenia.    History of Present Illness   Luz Marina Diallo is a 55 year old male Laotian speaking male with known history of developmental delay, PUD, hiatal hernia, recently discharged in the hospital for this large abdominal mass found to have a seminoma and underwent bilateral ureteral stent placement last 9/24, improved YAYA (discharge creatinine was 1.8), started on chemotherapy during inpatient course with bleomycin, etoposide and carboplatin.  He was subsequently discharged last October 5, 2020 and has been fairly doing well at home with increasing appetite, denies any nausea, vomiting, abdominal pain, mental status changes however came into the hospital by emergency room on 10/9 due to recurrent fever spikes, chills and rigors.    Hospital Course     Neutropenic fever.  UTI.  Bacteremia.  Suspect the UTI and bacteremia related to/secondary to neutropenia.  Patient was admitted to internal medicine service.  Started on IV cefepime for his neutropenic fever.  Urine culture and blood culture came back positive for Klebsiella.  Susceptible to cefepime so it was continued.  Repeat blood cultures remain negative.  Patient recently underwent chemotherapy with Neulasta support.  For that reason the oncology team did not recommend repeating the Neulasta.  He was seen by the oncology team.  His fever has now resolved.  He is being discharged on  oral antibiotic to complete a 14-day course.    Neutropenic fever.  Patient noted to have significant leukopenia with neutropenia at admission.  It has now improved.  The white cell count is up to 3.8 with absolute neutrophil count of 2.8.  Fever is now resolved.  He is doing well and is at baseline.    During the hospital stay, he also noted to have an episode of epistaxis.  Rhino Rocket was placed in the right nostril by Regina Leos MD on the night of 10/11/2020.  An ENT consult was obtained.  Patient was seen by Dr. Taveras from ENT who recommended keeping the Rhino Rocket in place for 2 days.  It was removed this morning without any complication.  No further bleeding noted.  He is already on antibiotics.    During the hospital stay patient required 2 units of packed RBC transfusion for hemoglobin less than 7.  Anemia was likely in the setting of bone marrow suppression with slight worsening due to over the epistaxis.  The epistaxis was not profuse.  Hemoglobin has now stabilized.    Patient was seen by oncology team.  They will do a follow-up on coming Monday.  Patient is being discharged home.  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  It was continued.    I personally evaluated and examined the patient on the day of discharge.    Logan Bunch MD      Pending Results   These results will be followed up by PCP  Unresulted Labs Ordered in the Past 30 Days of this Admission     No orders found from 9/9/2020 to 10/10/2020.               Primary Care Physician   [facility]        Discharge Disposition   Discharged to home  Condition at discharge: Stable    Consultations This Hospital Stay   HEMATOLOGY & ONCOLOGY IP CONSULT  ENT IP CONSULT  CARE MANAGEMENT / SOCIAL WORK IP CONSULT    Time Spent on this Encounter   Discharge time: greater than 30 minutes.    Discharge Orders      Reason for your hospital stay    Neutropenic  fever. UTI. Klebsiella bacteremia     Follow-up and recommended labs and tests     Follow up with oncology on monday     Activity    Your activity upon discharge: activity as tolerated     Diet    Follow this diet upon discharge: Regular     Discharge Medications   Discharge Medication List as of 10/14/2020 11:04 AM      START taking these medications    Details   cefdinir (OMNICEF) 300 MG capsule Take 1 capsule (300 mg) by mouth 2 times daily for 10 days, Disp-20 capsule, R-0, E-Prescribe      tamsulosin (FLOMAX) 0.4 MG capsule Take 1 capsule (0.4 mg) by mouth daily, Disp-30 capsule, R-0, E-Prescribe         CONTINUE these medications which have NOT CHANGED    Details   allopurinol (ZYLOPRIM) 300 MG tablet Take 1 tablet (300 mg) by mouth daily, Disp-30 tablet, R-0, E-Prescribe      ferrous sulfate (FEROSUL) 325 (65 Fe) MG tablet Take 1 tablet (325 mg) by mouth daily (with breakfast), Disp-30 tablet, R-0, E-Prescribe      loratadine (CLARITIN) 10 MG tablet Take 10 mg by mouth daily For 5 days, Historical      multivitamin w/minerals (THERA-VIT-M) tablet Take 1 tablet by mouth daily, Historical           Allergies   No Known Allergies  Data   Most Recent 3 CBC's:  Recent Labs   Lab Test 10/14/20  0718 10/13/20  0649 10/12/20  0705   WBC 3.8* 0.6* 0.2*   HGB 7.5* 7.8* 8.2*   MCV 91 89 89   PLT 57* 46* 52*      Most Recent 3 BMP's:  Recent Labs   Lab Test 10/13/20  0649 10/12/20  0705 10/11/20  1005    137 136   POTASSIUM 3.7 4.0 3.9   CHLORIDE 110* 110* 109   CO2 18* 20 21   BUN 29 42* 34*   CR 1.78* 1.88* 1.83*   ANIONGAP 9 7 6   IVANIA 8.3* 8.2* 8.0*   * 94 89     Most Recent 2 LFT's:  Recent Labs   Lab Test 10/11/20  0109 10/09/20  2124   AST 18 39   ALT 40 81*   ALKPHOS 345* 429*   BILITOTAL 0.4 0.5     Most Recent INR's and Anticoagulation Dosing History:  Anticoagulation Dose History     Recent Dosing and Labs Latest Ref Rng & Units 9/24/2020 10/11/2020    INR 0.86 - 1.14 0.96 0.97        Most Recent 3  Troponin's:  Recent Labs   Lab Test 02/28/19  1627 08/13/17 1933   TROPI 0.016 <0.015  The 99th percentile for upper reference range is 0.045 ug/L.  Troponin values in   the range of 0.045 - 0.120 ug/L may be associated with risks of adverse   clinical events.       Most Recent Cholesterol Panel:No lab results found.  Most Recent 6 Bacteria Isolates From Any Culture (See EPIC Reports for Culture Details):  Recent Labs   Lab Test 10/11/20  0108 10/09/20  2228 10/09/20  2154 09/27/20  1335 09/27/20  1130 09/27/20  1124   CULT No growth Cultured on the 1st day of incubation:  Klebsiella oxytoca  Susceptibility testing done on previous specimen  *  Critical Value/Significant Value, preliminary result only, called to and read back by  Krista Miller RN. 10/10/20 @ 134CANDIE Vázquez    Cultured on the 1st day of incubation:  Klebsiella oxytoca  *  Critical Value/Significant Value, preliminary result only, called to and read back by   Krista Miller RN. 10/10/20 @ 1349CANDIE    (Note)  POSITIVE for KLEBSIELLA OXYTOCA by [employer] multiplex nucleic acid  test. Final identification and antimicrobial susceptibility testing  will be verified by standard methods.    Specimen tested with Verigene multiplex, gram-negative blood culture  nucleic acid test for the following targets: Acinetobacter sp.,  Citrobacter sp., Enterobacter sp., Proteus sp., E. coli, K.  pneumoniae/oxytoca, P. aeruginosa, and the following resistance  markers: CTXM, KPC, NDM, VIM, IMP and OXA.    Critical Value/Significant Value called to and read back by  Toi Palomino RN @ 1605. 10/10/20. AV   >100,000 colonies/mL  Klebsiella oxytoca  * No growth No growth No growth     Most Recent TSH, T4 and A1c Labs:  Recent Labs   Lab Test 08/13/17 1933   TSH 0.75

## 2020-10-19 NOTE — PROGRESS NOTES
Infusion Nursing Note:  Luz Marina Diallo presents today for Bleomycin and 2U PRBC's.    Patient seen by provider today: Yes: Araceli   present during visit today: Yes, Language: sister interpreted.     Note: Assessment done by PA at appointment.  Blood started at 120cc/hr and increased to 270cc/hr after 10 minutes.  Around 20 minutes into the 2nd unit of blood patient developed rigors.  Temp increasing.  Denied itching, SOB, back pain, nausea or chest tightness.  Just has rigors, fevers and elevated BP.  Blood stopped.  Demerol 25mg given IV push.  Tylenol 650mg given PO.  IV hydration started.  Solu-Medrol 125 given and Demerol IV repeated.  Araceli evaluated patient in infusion.  Pulse remained elevated and temp started to increase.  Araceli spoke to Dr. Ramos who though patient should be evaluated in the ED.  Discussed with the patient and sister.  They agreed to be evaluated in ED.      Intravenous Access:  Lab draw site RAC  Labs drawn without difficulty.  Peripheral IV placed LAC.    Treatment Conditions:  Blood transfusion consent signed 10/19/2020.      Post Infusion Assessment:  Patient tolerated infusion poorly due to : Hypersensitivity: Did patient have a hypersensitivity reaction? : Yes  Drug or Product name: blood  Were pre-meds administered?: No     First or Subsequent treatment: Subsequent infusion  Rate of infusion when patient had hypersensitivity reaction: 270  Time the hypersensitivity reaction was first recognized: 1329  Symptoms observed or reported (select all that apply): Rigors;Chills;Hypertension  Interventions/treatment following reaction: Infusion stopped;Hypersensitivity medications administered  What hypersensitivity medications were administered?: Meperidine (Demerol);Other: (Comment)(Tylenol)  Name of provider notified: Araceli Sykes provider notified: [id number]     Was the patient re-challenged today?: No - no re-challenge today  Blood return noted pre and post  infusion.  Site patent and intact, free from redness, edema or discomfort.  No evidence of extravasations.       Discharge Plan:   Discharged to ED.    ELIE LAST RN

## 2020-10-19 NOTE — ED TRIAGE NOTES
Patient sent to the ED from the infusion center. Per report, patient developed a fever , chills and tachycardia while receiving a 2nd unit of blood.

## 2020-10-19 NOTE — NURSING NOTE
"Oncology Rooming Note    October 19, 2020 8:41 AM   Luz Marina Diallo is a 55 year old male who presents for:    Chief Complaint   Patient presents with     Oncology Clinic Visit     Seminoma      Initial Vitals: /70   Pulse 111   Temp 97.2  F (36.2  C) (Tympanic)   Resp 16   Wt 45.4 kg (100 lb)   SpO2 100%   BMI 18.89 kg/m   Estimated body mass index is 18.89 kg/m  as calculated from the following:    Height as of 10/9/20: 1.549 m (5' 1\").    Weight as of this encounter: 45.4 kg (100 lb). Body surface area is 1.4 meters squared.  No Pain (0) Comment: Data Unavailable   No LMP for male patient.  Allergies reviewed: Yes  Medications reviewed: Yes    Medications: Medication refills not needed today.  Pharmacy name entered into [facility]:    [facility] [id number] [place] [facility] [address]  [facility] [address] [place] [id number] [address]    Clinical concerns: follow up       Ana Chin CMA              "

## 2020-10-19 NOTE — ED NOTES
DATE:  10/19/2020   TIME OF RECEIPT FROM LAB:  1828  LAB TEST:  CBC  LAB VALUE:  WBC: 68.2 Hemoglobin: 6.7  RESULTS GIVEN WITH READ-BACK TO (PROVIDER):  Cierra ZUNIGA LAB VALUE REPORTED TO PROVIDER:   1830

## 2020-10-19 NOTE — LETTER
"    10/19/2020         RE: Luz Marina Diallo  [id number] [facility] [address]        Dear Colleague,    Thank you for referring your patient, Luz Marina Diallo, to the [facility]. Please see a copy of my visit note below.    Oncology Rooming Note    October 6, 2020 1:38 PM   Luz Marina Diallo is a 55 year old male who presents for:    Chief Complaint   Patient presents with     Oncology Clinic Visit     Seminoma      Initial Vitals: /70   Pulse 111   Temp 97.2  F (36.2  C) (Tympanic)   Resp 16   Wt 45.4 kg (100 lb)   SpO2 100%   BMI 18.89 kg/m   Estimated body mass index is 18.89 kg/m  as calculated from the following:    Height as of 10/9/20: 1.549 m (5' 1\").    Weight as of this encounter: 45.4 kg (100 lb). Body surface area is 1.4 meters squared.  No Pain (0) Comment: Data Unavailable   No LMP for male patient.  Allergies reviewed: Yes  Medications reviewed: Yes    Medications: Medication refills not needed today.  Pharmacy name entered into [employer]:    [facility] [id number] [place] [facility]  [facility] [address]  [facility] [id number] [place] [id number] STU Pacheco PA-C      Oncology/Hematology Visit Note    Oct 19, 2020    Reason for visit: Follow up seminoma    Oncology HPI: Luz Marina Diallo is a 55 year old male with a history of developmental delay with testicular cancer.  He was hospitalized at [facility] on 9/23/2020 for large abdominal mass, weight loss, decreased appetite and was found to have renal failure with creatinine of 4.5 with hypercalcemia 15.5.  CT confirmed a large abdominal mass compressing the ureters and he underwent bilateral ureteral stent placement on 9/24/2020.  Abdominal mass concern for malignancy and biopsy revealed germ cell tumor (seminoma).  Palafox was placed and he was given Zometa for hypercalcemia with IV fluids.  He was started on neoadjuvant chemotherapy with bleomycin, " etoposide and carboplatin (cisplatin was not used due to renal function), C1 D1 completed on 10/1/2020 and he received the first 5 days inpatient.  He tolerated this well.    Unfortunately, he was admitted for neutropenic fever and urosepsis at [facility] on 10/9/2020. C1D8 was held for this reason and he's here for follow up and possible C1D8.     Interval History: Luz Marina is doing better today.  He continues on antibiotics from his recent hospital stay.  Appetite is great.  No recent fever, chills, vomiting, diarrhea, bleeding.  The abdominal masses smaller.  No urinary issues.    Review of Systems: See interval hx. Denies fevers, chills, HA, dizziness, n/t, changes in vision, cough, sore throat, CP, SOB, abdominal pain, N/V, diarrhea, changes in urination, bleeding, bruising, rash.     PMHx and Social Hx reviewed per EPIC.      Medications:  Current Outpatient Medications   Medication Sig Dispense Refill     allopurinol (ZYLOPRIM) 300 MG tablet Take 1 tablet (300 mg) by mouth daily 30 tablet 0     cefdinir (OMNICEF) 300 MG capsule Take 1 capsule (300 mg) by mouth 2 times daily for 10 days 20 capsule 0     ferrous sulfate (FEROSUL) 325 (65 Fe) MG tablet Take 1 tablet (325 mg) by mouth daily (with breakfast) 30 tablet 0     loratadine (CLARITIN) 10 MG tablet Take 10 mg by mouth daily For 5 days       multivitamin w/minerals (THERA-VIT-M) tablet Take 1 tablet by mouth daily       tamsulosin (FLOMAX) 0.4 MG capsule Take 1 capsule (0.4 mg) by mouth daily 30 capsule 0       No Known Allergies      EXAM:    /70   Pulse 111   Temp 97.2  F (36.2  C) (Tympanic)   Resp 16   Wt 45.4 kg (100 lb)   SpO2 100%   BMI 18.89 kg/m      GENERAL:  Male, in no acute distress.  Alert and oriented x3.   HEENT:  Normocephalic, atraumatic.  PERRL, oropharynx clear with no sores or thrush.   LYMPH NODES:  No palpable cervical lyphadenopathy appreciated.  LUNGS: Nonlabored breathing  ABDOMEN:   Large area of firmness to  lower abdomen, actually softer than when hospitalized.  No tenderness  EXTREMITIES:  2+ pitting edema bilaterally.   SKIN: No rash  PSYCH: Mood stable      Labs:   Results for RAJWINDER OSULLIVAN (MRN [id number]) as of 10/19/2020 16:10   10/19/2020 08:31 10/19/2020 15:15   Sodium 142    Potassium 3.9    Chloride 114 (H)    Carbon Dioxide 18 (L)    Urea Nitrogen 35 (H)    Creatinine 1.93 (H)    GFR Estimate 39 (L)    GFR Estimate If Black 45 (L)    Calcium 7.3 (L)    Anion Gap 10    Albumin 2.6 (L)    Protein Total 7.4    Bilirubin Total 0.2    Alkaline Phosphatase 427 (H)    ALT 68    AST 39    Glucose 102 (H)    WBC 68.3 (HH)    Hemoglobin 5.3 (LL)    Hematocrit 17.7 (L)    Platelet Count 262    RBC Count 1.82 (L)    MCV 97    MCH 29.1    MCHC 29.9 (L)    RDW 18.0 (H)    Diff Method Manual Differential    % Neutrophils 75.0    % Lymphocytes 7.0    % Monocytes 6.0    % Eosinophils 0.0    % Basophils 0.0    % Metamyelocytes 7.0    % Myelocytes 3.0    % Promyelocytes 1.0    % Blasts 1.0    Nucleated RBCs 4 (H)    Absolute Neutrophil 51.2 (H)    Absolute Lymphocytes 4.8    Absolute Monocytes 4.1 (H)    Absolute Eosinophils 0.0    Absolute Basophils 0.0    Absolute Metamyelocytes 4.8 (H)    Absolute Myelocytes 2.0 (H)    Absolute Promyelocytes 0.7 (H)    Absolute Blasts 0.7 (H)    Absolute Nucleated RBC 2.7    Anisocytosis Slight    Microcytes Present    Macrocytes Present    Hypochromasia Present    Platelet Estimate Automated count confirmed.  Giant platelets are present.    Color Urine  Light Yellow   Appearance Urine  Slightly Cloudy   Glucose Urine  100 (A)   Bilirubin Urine  Negative   Ketones Urine  Negative   Specific Gravity Urine  1.012   pH Urine  5.5   Protein Albumin Urine  30 (A)   Urobilinogen mg/dL  Normal   Nitrite Urine  Negative   Blood Urine  Moderate (A)   Leukocyte Esterase Urine  Small (A)   Source  Midstream Urine   WBC Urine  44 (H)   RBC Urine  92 (H)   Bacteria Urine  Few (A)   Squamous  Epithelial /HPF Urine  <1   Transitional Epi  1   Mucous Urine  Present (A)       Imaging: n/a    Impression/Plan: Luz Marina Diallo is a 55 year old male with metastatic seminoma currently undergoing neoadjuvant BEP with Neulasta support.    Seminoma: Beta , metastasis to the lymph nodes, currently undergoing BEP with Neulasta support, C1 D1 done inpatient on 10/1/2020. He tolerated this well, but unfortunately was hospitalized again for neutropenic fever, discharged on antibiotics, presumed UTI.  Labs with WBC 68, likely from Neulasta as he has not had recent fever.  Plan for C1 D8 bleomycin today.  He will also get 2 units of blood with hemoglobin 5.3.  --10/26 Araceli BEP C1D15    While in infusion after bleomycin and 1 unit of blood was given, about 20 minutes into the second unit of blood, he developed fever with a max of 102, rigors, tachycardia in the 130s and hypertension.  He was given Tylenol 650 mg, Demerol, Solu-Medrol, 1.5 L IV fluids and no change in his temperature or tachycardia.  Unlikely to be hypovolemia, for this reason, recommended ED evaluation again.  Discussed with Dr. Ramos.     Leukocytosis: Likely secondary to Neulasta, given on 10/6/2020.  He was afebrile prior to visit today, however T-max of 102 with rigors, tachycardia after chemotherapy and blood transfusion.  UA with negative nitrites, 44 WBC, 92 RBC, unsure if this was a good clean-catch.  Currently being treated for UTI with Omnicef.    Neutropenic fever: Recently hospitalized, secondary chemotherapy.  Suspect UTI, currently on Omnicef, did not receive Neupogen as he had received Neulasta on day 6 of cycle 1.  Afebrile prior to chemotherapy today.    YAYA: Creatinine 4.51 hospitalized on 9/23/2020.  He is status post bilateral ureteral stents and Palafox placement.  Palafox catheter has been removed and urine output is excellent.  Creatinine 1.93, slightly higher than recent hospital stay, but overall still improved.      Heme: Hemoglobin 5.3, secondary to chemotherapy.  He was given 1 unit of blood and plan for 2 units, however 20 minutes then, he developed fever, rigors and unsure if it was chemotherapy or blood related, see above. We will need to transfuse if hemoglobin less than 7.0.    Developmental Delay: Lives with his sister, Mamta, who accompanies him with every visit.      Chart documentation with Dragon Voice recognition Software. Although reviewed after completion, some words and grammatical errors may remain.      Araceli Pacheco PA-C  Hematology/Oncology  [facility]                  Again, thank you for allowing me to participate in the care of your patient.        Sincerely,        Araceli Pacheco PA-C

## 2020-10-19 NOTE — ED AVS SNAPSHOT
[facility]  [address]  [facility] [address]  Phone: [phone]  Fax: [phone]                                    Luz Marina Diallo   MRN: [id number]    Department: [facility]   Date of Visit: 10/19/2020           After Visit Summary Signature Page    I have received my discharge instructions, and my questions have been answered. I have discussed any challenges I see with this plan with the nurse or doctor.    ..........................................................................................................................................  Patient/Patient Representative Signature      ..........................................................................................................................................  Patient Representative Print Name and Relationship to Patient    ..................................................               ................................................  Date                                   Time    ..........................................................................................................................................  Reviewed by Signature/Title    ...................................................              ..............................................  Date                                               Time          22EPIC Rev 08/18

## 2020-10-19 NOTE — PROGRESS NOTES
Nursing Note:  Luz Marina Diallo presents today for labs and IV start.    Patient seen by provider today: Yes   present during visit today: Yes, Language: , daughter was present.     Note: N/A.    Intravenous Access:  Labs drawn without difficulty.  Peripheral IV placed.    Discharge Plan:   Patient was sent to [facility] for provider appointment.    Rosey Quiroz RN

## 2020-10-19 NOTE — ED PROVIDER NOTES
History     Chief Complaint:  Fever and Tachycardia    The history is provided by the patient. No  was used (They refused , daughter translated).      Luz Marina Diallo is a 55 year old male with history of testicular neoplasm, chemotherapy, and blood transfusion who presents with fever and tachycardia. The patient was at the infusion center and 20 minutes into receiving the 2nd unit of blood, the patient developed a fever and tachycardia. The daughter states the patient was transferred to the ED due to persisting tachycardia. During evaluation, the patient admits to fatigue, but denies back pain, low blood pressure after transfusion, epistaxis or bleeding anywhere on the body, shortness of breath, cough, and abdominal pain. The daughter reports the patient has only received acetaminophen and no ibuprofen.    Allergies:  No known drug allergies    Medications:    Zyloprim  Omnicef  Ferosul  Flomax    Past Medical History:    Mental retardation  Anemia  Testicular neoplasm  ARF  YAYA  Bilateral hydronephrosis    Past Surgical History:    Ureter stent  EGD    Family History:    Diabetes    Social History:  Smoking status: Never  Alcohol use: Yes  Drug use: No  The patient presents to the emergency department with daughter.  Marital Status:  Single [1]     Review of Systems   Constitutional: Positive for fatigue and fever.   HENT: Negative for nosebleeds.    Respiratory: Negative for cough and shortness of breath.    Gastrointestinal: Negative for abdominal pain.   All other systems reviewed and are negative.      Physical Exam     Patient Vitals for the past 24 hrs:   BP Temp Pulse Resp SpO2   10/19/20 1700 115/69 -- 133 29 96 %   10/19/20 1650 114/66 -- 133 -- 96 %   10/19/20 1638 121/75 100  F (37.8  C) 128 22 100 %       Physical Exam  General: Patient is alert and interactive when I enter the room  Head:  The scalp, face, and head appear normal  Eyes:  The pupils are equal,  round, and reactive to light    Conjunctivae and sclerae are normal  ENT:    External acoustic canals are normal    The oropharynx is normal without erythema.     Uvula is in the midline  Neck:  Normal range of motion  CV:  Tachycardia. S1/S2. No murmurs.   Resp:  Lungs are clear without wheezes or rales. No distress  GI:  Abdomen is soft, no rigidity, guarding, or rebound    No distension. No tenderness to palpation in any quadrant.     MS:  Normal tone. Joints grossly normal without effusions.     No asymmetric leg swelling, calf or thigh tenderness.      Normal motor assessment of all extremities.  Skin:  No rash or lesions noted. Normal capillary refill noted  Neuro: Speech is normal and fluent. Face is symmetric.     Moving all extremities well.   Psych:  Awake. Alert.  Normal affect.  Appropriate interactions.  Lymph: No anterior cervical lymphadenopathy noted        Emergency Department Course   ECG (16:53:51):  Rate 130 bpm. GA interval 152. QRS duration 82. QT/QTc 302/444. P-R-T axes 42 10 19. Sinus tachycardia. Otherwise normal ECG. Interpreted at 1654 by Aditya Norton MD.    Imaging:  Radiographic findings were communicated with the patient who voiced understanding of the findings.    XR Chest Port 1 View  IMPRESSION: Heart size at upper limits normal, accentuated by portable technique. No focal airspace infiltrate. No evidence of heart failure. No visible pneumothorax or pleural effusion.    As read by Radiology.    Laboratory:  CBC: WBC 68.2 (H), HGB 6.7 (L), o/w WNL ()  BMP: Chloride 117 (H), CO2 17 (L), Glucose 126 (H), Creatinine 1.62 (H), GFR 48 (L), Calcium 6.9 (L), o/w WNL  PTT 35  INR 1.08  Blood culture x2: Pending     Symptomatic COVID-19 Virus PCR: Pending    Interventions:  1753 NS 1L IV Bolus  1753 Tylenol 1000 mg PO    Emergency Department Course:  Past medical records, nursing notes, and vitals reviewed.  1705: I performed an exam of the patient and obtained history, as  documented above.    EKG obtained in the ED, see results above.     IV was inserted and blood was drawn for laboratory testing, results above.    The patient was sent for a chest x-ray while in the emergency department, findings above.     1722: Spoke with .    1732: Discussed patient with [facility], Dr. Bunch.    1738: Discussed patient with blood bank.    : I rechecked the patient.    Findings and plan explained to the Patient and daughter. Patient discharged home with instructions regarding supportive care, medications, and reasons to return. The importance of close follow-up was reviewed.     Impression & Plan    Covid-19  Luz Marina Diallo was evaluated during a global COVID-19 pandemic, which necessitated consideration that the patient might be at risk for infection with the SARS-CoV-2 virus that causes COVID-19.   Applicable protocols for evaluation were followed during the patient's care.   COVID-19 was considered as part of the patient's evaluation. The plan for testing is:  a test was obtained during this visit.    Medical Decision Makin-year-old male presents for evaluation of fever and tachycardia during receiving a second unit of blood.  He also received a chemotherapy agent today.  He did not have any of the other classic transfusion reaction symptoms such as hypotension, bleeding etc.  A broad differential was of course considered.  The work-up is ongoing and a Gram stain and transfusion reaction evaluation is being done by pathology will not be available till tomorrow.  We did blood work including blood cultures and fluids.  At this point I would not give him any more blood as his hemoglobin is close to 7.  He could follow-up with his oncologist at the neck scheduled appointment or earlier if he needs.  The patient here has no definitive signs that he is to be hospitalized.  His tachycardia came down nicely with fluids and Tylenol.  He has no signs of septic shock I  would not give him a prophylactic antibiotic.  He is already on a cephalosporin orally for UTI.  Discussed the patient with oncology.    Diagnosis:    ICD-10-CM    1. Blood transfusion reaction, initial encounter  T80.92XA Blood culture     Blood culture     Symptomatic COVID-19 Virus (Coronavirus) by PCR       Disposition:  Discharged home.    Discharge Medications:  New Prescriptions    No medications on file         Scribe Disclosure:  I, Ileana Bolden, am serving as a scribe at 5:05 PM on 10/19/2020 to document services personally performed by Aditya Norton MD based on my observations and the provider's statements to me.    [facility] EMERGENCY DEPT       Aditya Norton MD  10/19/20 [id number]

## 2020-10-19 NOTE — PROGRESS NOTES
"Oncology Rooming Note    October 6, 2020 1:38 PM   Luz Marina Diallo is a 55 year old male who presents for:    Chief Complaint   Patient presents with     Oncology Clinic Visit     Seminoma      Initial Vitals: /70   Pulse 111   Temp 97.2  F (36.2  C) (Tympanic)   Resp 16   Wt 45.4 kg (100 lb)   SpO2 100%   BMI 18.89 kg/m   Estimated body mass index is 18.89 kg/m  as calculated from the following:    Height as of 10/9/20: 1.549 m (5' 1\").    Weight as of this encounter: 45.4 kg (100 lb). Body surface area is 1.4 meters squared.  No Pain (0) Comment: Data Unavailable   No LMP for male patient.  Allergies reviewed: Yes  Medications reviewed: Yes    Medications: Medication refills not needed today.  Pharmacy name entered into [employer]:    [facility] [id number] [place] [id number] [facility] [address]  [facility] [id number] [place] [id number] STU Pacheco PA-C      Oncology/Hematology Visit Note    Oct 19, 2020    Reason for visit: Follow up seminoma    Oncology HPI: Luz Marina Diallo is a 55 year old male with a history of developmental delay with testicular cancer.  He was hospitalized at [facility] on 9/23/2020 for large abdominal mass, weight loss, decreased appetite and was found to have renal failure with creatinine of 4.5 with hypercalcemia 15.5.  CT confirmed a large abdominal mass compressing the ureters and he underwent bilateral ureteral stent placement on 9/24/2020.  Abdominal mass concern for malignancy and biopsy revealed germ cell tumor (seminoma).  Palafox was placed and he was given Zometa for hypercalcemia with IV fluids.  He was started on neoadjuvant chemotherapy with bleomycin, etoposide and carboplatin (cisplatin was not used due to renal function), C1 D1 completed on 10/1/2020 and he received the first 5 days inpatient.  He tolerated this well.    Unfortunately, he was admitted for neutropenic fever and urosepsis at [facility] on 10/9/2020. C1D8 " was held for this reason and he's here for follow up and possible C1D8.     Interval History: Rajwinder is doing better today.  He continues on antibiotics from his recent hospital stay.  Appetite is great.  No recent fever, chills, vomiting, diarrhea, bleeding.  The abdominal masses smaller.  No urinary issues.    Review of Systems: See interval hx. Denies fevers, chills, HA, dizziness, n/t, changes in vision, cough, sore throat, CP, SOB, abdominal pain, N/V, diarrhea, changes in urination, bleeding, bruising, rash.     PMHx and Social Hx reviewed per EPIC.      Medications:  Current Outpatient Medications   Medication Sig Dispense Refill     allopurinol (ZYLOPRIM) 300 MG tablet Take 1 tablet (300 mg) by mouth daily 30 tablet 0     cefdinir (OMNICEF) 300 MG capsule Take 1 capsule (300 mg) by mouth 2 times daily for 10 days 20 capsule 0     ferrous sulfate (FEROSUL) 325 (65 Fe) MG tablet Take 1 tablet (325 mg) by mouth daily (with breakfast) 30 tablet 0     loratadine (CLARITIN) 10 MG tablet Take 10 mg by mouth daily For 5 days       multivitamin w/minerals (THERA-VIT-M) tablet Take 1 tablet by mouth daily       tamsulosin (FLOMAX) 0.4 MG capsule Take 1 capsule (0.4 mg) by mouth daily 30 capsule 0       No Known Allergies      EXAM:    /70   Pulse 111   Temp 97.2  F (36.2  C) (Tympanic)   Resp 16   Wt 45.4 kg (100 lb)   SpO2 100%   BMI 18.89 kg/m      GENERAL:  Male, in no acute distress.  Alert and oriented x3.   HEENT:  Normocephalic, atraumatic.  PERRL, oropharynx clear with no sores or thrush.   LYMPH NODES:  No palpable cervical lyphadenopathy appreciated.  LUNGS: Nonlabored breathing  ABDOMEN:   Large area of firmness to lower abdomen, actually softer than when hospitalized.  No tenderness  EXTREMITIES:  2+ pitting edema bilaterally.   SKIN: No rash  PSYCH: Mood stable      Labs:   Results for RAJWINDER OSULLIVAN (MRN [id number]) as of 10/19/2020 16:10   10/19/2020 08:31 10/19/2020 15:15   Sodium  142    Potassium 3.9    Chloride 114 (H)    Carbon Dioxide 18 (L)    Urea Nitrogen 35 (H)    Creatinine 1.93 (H)    GFR Estimate 39 (L)    GFR Estimate If Black 45 (L)    Calcium 7.3 (L)    Anion Gap 10    Albumin 2.6 (L)    Protein Total 7.4    Bilirubin Total 0.2    Alkaline Phosphatase 427 (H)    ALT 68    AST 39    Glucose 102 (H)    WBC 68.3 (HH)    Hemoglobin 5.3 (LL)    Hematocrit 17.7 (L)    Platelet Count 262    RBC Count 1.82 (L)    MCV 97    MCH 29.1    MCHC 29.9 (L)    RDW 18.0 (H)    Diff Method Manual Differential    % Neutrophils 75.0    % Lymphocytes 7.0    % Monocytes 6.0    % Eosinophils 0.0    % Basophils 0.0    % Metamyelocytes 7.0    % Myelocytes 3.0    % Promyelocytes 1.0    % Blasts 1.0    Nucleated RBCs 4 (H)    Absolute Neutrophil 51.2 (H)    Absolute Lymphocytes 4.8    Absolute Monocytes 4.1 (H)    Absolute Eosinophils 0.0    Absolute Basophils 0.0    Absolute Metamyelocytes 4.8 (H)    Absolute Myelocytes 2.0 (H)    Absolute Promyelocytes 0.7 (H)    Absolute Blasts 0.7 (H)    Absolute Nucleated RBC 2.7    Anisocytosis Slight    Microcytes Present    Macrocytes Present    Hypochromasia Present    Platelet Estimate Automated count confirmed.  Giant platelets are present.    Color Urine  Light Yellow   Appearance Urine  Slightly Cloudy   Glucose Urine  100 (A)   Bilirubin Urine  Negative   Ketones Urine  Negative   Specific Gravity Urine  1.012   pH Urine  5.5   Protein Albumin Urine  30 (A)   Urobilinogen mg/dL  Normal   Nitrite Urine  Negative   Blood Urine  Moderate (A)   Leukocyte Esterase Urine  Small (A)   Source  Midstream Urine   WBC Urine  44 (H)   RBC Urine  92 (H)   Bacteria Urine  Few (A)   Squamous Epithelial /HPF Urine  <1   Transitional Epi  1   Mucous Urine  Present (A)       Imaging: n/a    Impression/Plan: Luz Marina Diallo is a 55 year old male with metastatic seminoma currently undergoing neoadjuvant BEP with Neulasta support.    Seminoma: Beta , metastasis to  the lymph nodes, currently undergoing BEP with Neulasta support, C1 D1 done inpatient on 10/1/2020. He tolerated this well, but unfortunately was hospitalized again for neutropenic fever, discharged on antibiotics, presumed UTI.  Labs with WBC 68, likely from Neulasta as he has not had recent fever.  Plan for C1 D8 bleomycin today.  He will also get 2 units of blood with hemoglobin 5.3.  --10/26 Araceli BEP C1D15    While in infusion after bleomycin and 1 unit of blood was given, about 20 minutes into the second unit of blood, he developed fever with a max of 102, rigors, tachycardia in the 130s and hypertension.  He was given Tylenol 650 mg, Demerol, Solu-Medrol, 1.5 L IV fluids and no change in his temperature or tachycardia.  Unlikely to be hypovolemia, for this reason, recommended ED evaluation again.  Discussed with Dr. Ramos.     Leukocytosis: Likely secondary to Neulasta, given on 10/6/2020.  He was afebrile prior to visit today, however T-max of 102 with rigors, tachycardia after chemotherapy and blood transfusion.  UA with negative nitrites, 44 WBC, 92 RBC, unsure if this was a good clean-catch.  Currently being treated for UTI with Omnicef.    Neutropenic fever: Recently hospitalized, secondary chemotherapy.  Suspect UTI, currently on Omnicef, did not receive Neupogen as he had received Neulasta on day 6 of cycle 1.  Afebrile prior to chemotherapy today.    YAYA: Creatinine 4.51 hospitalized on 9/23/2020.  He is status post bilateral ureteral stents and Palafox placement.  Palafox catheter has been removed and urine output is excellent.  Creatinine 1.93, slightly higher than recent hospital stay, but overall still improved.     Heme: Hemoglobin 5.3, secondary to chemotherapy.  He was given 1 unit of blood and plan for 2 units, however 20 minutes then, he developed fever, rigors and unsure if it was chemotherapy or blood related, see above. We will need to transfuse if hemoglobin less than 7.0.    Developmental  Delay: Lives with his sister, Mamta, who accompanies him with every visit.      Chart documentation with [employer] Voice recognition Software. Although reviewed after completion, some words and grammatical errors may remain.      Araceli Pacheco PA-C  Hematology/Oncology  [facility]

## 2020-10-20 NOTE — DISCHARGE INSTRUCTIONS
Return to the emergency room if you get another fever more than 101, aching chills, and other new infection symptoms clean abdominal pain or cough, getting dizzy when you stand up, or other new symptoms.

## 2020-10-20 NOTE — TRANSFUSION REACTION (BLOOD)
Laboratory Medicine and Pathology  Transfusion Medicine- Transfusion Reaction Investigation     Luz Marina Diallo MRN# [id number]   YOB: 1965 Age: 55 year old             Chief Complaint:   Possible transfusion reaction.         History:   Luz Marina Diallo is a 55 year old male with a history of testicular neoplasm and chemotherapy.  He received Bleomycin and then was to receive 2 units of RBC's, however, only received 65 cc's of the second unit when he experienced rigors,  fever, blood pressure increase and tachycardia.  There was no significant change in any other vitals signs.The patient was treated with tylenol, Demerol and Solumedrol and transferred to the ED. Labs show marked leukocytosis.  Blood cultures of patient and the second unit are in process and the results are currently negative.  The clinician will be contacted if there is a positive result.  A urine culture is also in process           Vitals:   Current vitals: Blood pressure 95/55, pulse 101, temperature 100  F (37.8  C), resp. rate 18, SpO2 97 %.       Temperature Blood Pressure Heart Rate Respiratory Rate   Pre-Transfusion 96.8 120/76     Post-Transfusion 100.1 150/78 128 22            Assessment and Recommendation:      Assessment:  No evidence of hemolytic reaction or red cell incompatibility.  Febrile non hemolytic reaction.  Cannot rule out associated overload component. Patient with small body mass received several infusions today- chemotherapy and trasnfusions    Recommendation: For 2 unit orders consider giving lasix between units or doing transfusions on consecutive days.  If patient also getting chemotherapy recommend no more than 1 unit be given that day.  Contact transfusion director for care management if needed.      Barbara Clarke MD, MD

## 2020-10-23 NOTE — PROGRESS NOTES
Oncology/Hematology Visit Note    Oct 26, 2020    Reason for visit: Follow up seminoma    Oncology HPI: Luz Marina Diallo is a 55 year old male with a history of developmental delay with testicular cancer.  He was hospitalized at [facility] on 9/23/2020 for large abdominal mass, weight loss, decreased appetite and was found to have renal failure with creatinine of 4.5 with hypercalcemia 15.5.  CT confirmed a large abdominal mass compressing the ureters and he underwent bilateral ureteral stent placement on 9/24/2020.  Abdominal mass concern for malignancy and biopsy revealed germ cell tumor (seminoma).  Palafox was placed and he was given Zometa for hypercalcemia with IV fluids.  He was started on neoadjuvant chemotherapy with bleomycin, etoposide and carboplatin (cisplatin was not used due to renal function), C1 D1 completed on 10/1/2020 and he received the first 5 days inpatient.  He tolerated this well.    Unfortunately, he was admitted for neutropenic fever and urosepsis at [facility] on 10/9/2020. C1D8 was held for this reason and he's here for follow up and possible C1D8.     Interval History: Luz Marina is doing okay.  He is nervous about chemotherapy as he has been hospitalized or in the ED multiple times since starting this regimen.  No fever or chills at home.  No vomiting or diarrhea.  Appetite is been really good.  No abdominal pain or leg swelling.    Review of Systems: See interval hx. Denies fevers, chills, HA, dizziness, n/t, changes in vision, cough, sore throat, CP, SOB, abdominal pain, N/V, diarrhea, changes in urination, bleeding, bruising, rash.     PMHx and Social Hx reviewed per EPIC.      Medications:  Current Outpatient Medications   Medication Sig Dispense Refill     allopurinol (ZYLOPRIM) 300 MG tablet Take 1 tablet (300 mg) by mouth daily 30 tablet 0     ferrous sulfate (FEROSUL) 325 (65 Fe) MG tablet Take 1 tablet (325 mg) by mouth daily (with breakfast) 30 tablet 0      loratadine (CLARITIN) 10 MG tablet Take 10 mg by mouth daily For 5 days       multivitamin w/minerals (THERA-VIT-M) tablet Take 1 tablet by mouth daily       tamsulosin (FLOMAX) 0.4 MG capsule Take 1 capsule (0.4 mg) by mouth daily 30 capsule 0       No Known Allergies      EXAM:    /70   Pulse 116   Temp 99.2  F (37.3  C) (Tympanic)   Resp 18   Wt 46.6 kg (102 lb 12.8 oz)   SpO2 100%   BMI 19.42 kg/m      GENERAL:  Male, in no acute distress.  Alert and oriented x3.   HEENT:  Normocephalic, atraumatic.  PERRL, oropharynx clear with no sores or thrush.   LYMPH NODES:  No palpable cervical lyphadenopathy appreciated.  LUNGS: Nonlabored breathing  ABDOMEN:   Large area of firmness to lower abdomen, actually softer than when hospitalized.  No tenderness  EXTREMITIES:  2+ pitting edema bilaterally.   SKIN: No rash  PSYCH: Mood stable      Labs:   Results for AFSANEH OSULLIVAN (MRN [id number]) as of 10/26/2020 16:26   10/26/2020 08:20   Sodium 137   Potassium 4.2   Chloride 106   Carbon Dioxide 24   Urea Nitrogen 19   Creatinine 1.52 (H)   GFR Estimate 51 (L)   GFR Estimate If Black 60 (L)   Calcium 5.9 (LL)   Anion Gap 7   Albumin 2.7 (L)   Protein Total 7.4   Bilirubin Total 0.2   Alkaline Phosphatase 262 (H)   ALT 33   AST 28   Alpha Fetoprotein 4.4   Lactate Dehydrogenase 578 (H)   Glucose 199 (H)   WBC 37.4 (H)   Hemoglobin 7.6 (L)   Hematocrit 25.4 (L)   Platelet Count 908 (H)   RBC Count 2.64 (L)   MCV 96   MCH 28.8   MCHC 29.9 (L)   RDW 20.2 (H)   Diff Method Manual Differential   % Neutrophils 66.0   % Lymphocytes 7.0   % Monocytes 7.0   % Eosinophils 2.0   % Basophils 0.0   % Metamyelocytes 11.0   % Myelocytes 7.0   Nucleated RBCs 3 (H)   Absolute Neutrophil 24.7 (H)   Absolute Lymphocytes 2.6   Absolute Monocytes 2.6 (H)   Absolute Eosinophils 0.7   Absolute Basophils 0.0   Absolute Metamyelocytes 4.1 (H)   Absolute Myelocytes 2.6 (H)   Absolute Nucleated RBC 1.1   Anisocytosis Moderate    Microcytes Present   Macrocytes Present   Hypochromasia Present   Platelet Estimate Automated count confirmed.  Platelet morphology is normal.       Imaging: n/a    Impression/Plan: Luz Marina Diallo is a 55 year old male with metastatic seminoma currently undergoing neoadjuvant BEP with Neulasta support.    Seminoma: Beta , metastasis to the lymph nodes, currently undergoing BEP with Neulasta support, C1 D1 done inpatient on 10/1/2020. He tolerated this well, but unfortunately was hospitalized again for neutropenic fever, discharged on antibiotics, presumed UTI.  Noted to have leukocytosis, likely from Neulasta.  WBC 37.4 today and improved.  He is feeling really well and is nervous today, but we will proceed with C1 D 15 bleomycin today.  Is certainly possible that he was febrile from the bleomycin.  No indication for blood or platelets today.  --11/2 Araceli, BEP C2 D1    Leukocytosis: Likely secondary to Neulasta, given on 10/6/2020.  He was afebrile prior to visit today and temp was 99.6.  He felt really well.  This is curative intent, therefore we proceeded with chemotherapy.    FEN: Hypocalcemia with calcium of 5.9, he was given 1 g of calcium gluconate IV in infusion.  Electrolytes are otherwise okay and appetite is really good.    Neutropenic fever: Recently hospitalized, secondary chemotherapy.  Suspect UTI, currently on Omnicef, did not receive Neupogen as he had received Neulasta on day 6 of cycle 1.  Afebrile prior to chemotherapy today.    YAYA: Creatinine 4.51 hospitalized on 9/23/2020.  He is status post bilateral ureteral stents and Palafox placement.  Palafox catheter has been removed and urine output is excellent.  Creatinine stable 1.43.    Heme: Hemoglobin 7.6 and no indication for blood.  Platelets 908, likely reactive, no indication for thrombocytosis treatment.    Developmental Delay: Lives with his sister, Mamta, who accompanies him with every visit.      Chart documentation with  [employer] Voice recognition Software. Although reviewed after completion, some words and grammatical errors may remain.      Araceli Pacheco PA-C  Hematology/Oncology  [facility]

## 2020-10-26 PROBLEM — R00.0 SINUS TACHYCARDIA: Status: ACTIVE | Noted: 2020-01-01

## 2020-10-26 PROBLEM — N18.9 CHRONIC RENAL IMPAIRMENT, UNSPECIFIED CKD STAGE: Status: ACTIVE | Noted: 2020-01-01

## 2020-10-26 PROBLEM — A41.9 SEPSIS WITHOUT ACUTE ORGAN DYSFUNCTION, DUE TO UNSPECIFIED ORGANISM (H): Status: ACTIVE | Noted: 2020-01-01

## 2020-10-26 PROBLEM — E83.51 HYPOCALCEMIA: Status: ACTIVE | Noted: 2020-01-01

## 2020-10-26 NOTE — ED NOTES
[facility]  ED Nurse Handoff Report    Luz Marina Diallo is a 55 year old male   ED Chief complaint: Fever, Tachycardia, and Vomiting  . ED Diagnosis:   Final diagnoses:   Sepsis without acute organ dysfunction, due to unspecified organism (H)   Chronic renal impairment, unspecified CKD stage   Sinus tachycardia     Allergies: No Known Allergies    Code Status: Full Code  Activity level - Baseline/Home:  Stand by Assist. Activity Level - Current:   Assist X 1. Lift room needed: No. Bariatric: No   Needed: Yes   Isolation: Yes. Infection: Not Applicable  COVID r/o and special precautions.     Vital Signs:   Vitals:    10/26/20 1615 10/26/20 1630 10/26/20 1645 10/26/20 1700   BP: 98/62 101/59 108/65 103/59   Pulse: 140 135 133 125   Resp: 22 27 22 25   Temp:       TempSrc:       SpO2: 94% 97% 98% 98%       Cardiac Rhythm:  ,   Cardiac  Cardiac Rhythm: Sinus tachycardia  Pain level: 0-10 Pain Scale: 0  Patient confused: Yes. Patient Falls Risk: Yes.   Elimination Status: Has voided   Patient Report - Initial Complaint: Fever. Focused Assessment: The history is provided by the patient. The history is limited by a language barrier and a developmental delay. A  was used.      Luz Marina Diallo is a 55 year old, [place]se-speaking male currently on IV bleomycin, etoposide and carboplatin chemotherapy for testicular cancer and followed by [facility] who presents with his sister for evaluation of a fever and vomiting.  Patient was admitted in September of this year for acute renal failure and found to have an intra-abdominal mass with bilateral ureteral obstruction requiring ureteral stents.  He was then admitted approximately 2 weeks prior to arrival with UTI related sepsis with Klebsiella treated with cefepime.  This morning, the patient underwent chemotherapy; while at the infusion center, his temperature was noted to be 99.7 degrees orally. After returning home, he  developed a fever of 103.5 degrees and vomited once at home and another time in route. Patient reported dizziness and bilateral hand paresthesias to his sister. Patient denies any pain or difficulty breathing this time.   Tests Performed: Labs, Radiology Abnormal Results:   Abnormal Labs Reviewed   CBC WITH PLATELETS DIFFERENTIAL - Abnormal; Notable for the following components:       Result Value    WBC 35.9 (*)     RBC Count 2.67 (*)     Hemoglobin 7.6 (*)     Hematocrit 25.8 (*)     MCHC 29.5 (*)     RDW 19.5 (*)     Platelet Count 832 (*)     Nucleated RBCs 1 (*)     Absolute Neutrophil 29.1 (*)     Absolute Lymphocytes 0.4 (*)     Absolute Monocytes 1.4 (*)     Absolute Basophils 0.7 (*)     Absolute Metamyelocytes 3.6 (*)     Absolute Myelocytes 0.7 (*)     All other components within normal limits   COMPREHENSIVE METABOLIC PANEL - Abnormal; Notable for the following components:    Glucose 117 (*)     Creatinine 1.43 (*)     GFR Estimate 55 (*)     Calcium 6.2 (*)     Albumin 2.8 (*)     Alkaline Phosphatase 278 (*)     All other components within normal limits   BLOOD GAS VENOUS - Abnormal; Notable for the following components:    PCO2 Venous 39 (*)     All other components within normal limits     Treatments provided: see EMAR  Family Comments: Sister at bedside  OBS brochure/video discussed/provided to patient:  N/A  ED Medications:   Medications   sodium chloride (PF) 0.9% PF flush 3 mL (has no administration in time range)   sodium chloride (PF) 0.9% PF flush 3 mL (has no administration in time range)   vancomycin (VANCOCIN) 1000 mg in dextrose 5% 200 mL PREMIX (1,000 mg Intravenous New Bag 10/26/20 1622)   lactated ringers BOLUS 1,398 mL (0 mL/kg × 46.6 kg Intravenous Stopped 10/26/20 1717)   ceFEPIme (MAXIPIME) 2 g vial to attach to  ml bag for ADULTS or 50 ml bag for PEDS (0 g Intravenous Stopped 10/26/20 1610)   ondansetron (ZOFRAN) injection 8 mg (8 mg Intravenous Given 10/26/20 1533)    acetaminophen (TYLENOL) tablet 650 mg (650 mg Oral Given 10/26/20 1535)   ibuprofen (ADVIL/MOTRIN) tablet 400 mg (400 mg Oral Given 10/26/20 1622)     Drips infusing:  Yes  For the majority of the shift, the patient's behavior Green. Interventions performed were NA.    Sepsis treatment initiated:   Yes  Per the ED Provider, Time Zero for severe sepsis or septic shock is:  1420    3 Hour Severe Sepsis Bundle Completion:  1. Initial Lactic Acid Result:   Recent Labs   Lab Test 10/26/20  1453 10/11/20  0109 10/09/20  2124   LACT 1.8 0.5* 1.2     2. Blood Cultures before Antibiotics: Yes  3. Broad Spectrum Antibiotics Administered: Vanc, Cefapime       4. 1400 ml of IV fluids have been given so far      6 Hour Severe Sepsis Bundle Completion:    1. Repeat Lactic Acid Level:   Last result   Lab Results   Component Value Date    LACT 1.8 10/26/2020     2. Patient currently on Vasopressors =  No     Patient tested for COVID 19 prior to admission: YES    ED Nurse Name/Phone Number: Tomeka Jack RN,   5:21 PM    RECEIVING UNIT ED HANDOFF REVIEW    Above ED Nurse Handoff Report was reviewed: Yes  Reviewed by: Eve Mederos RN on October 26, 2020 at 6:00 PM

## 2020-10-26 NOTE — ED PROVIDER NOTES
History   Chief Complaint:  Fever, Tachycardia, and Vomiting    The history is provided by the patient. The history is limited by a language barrier and a developmental delay. A  was used.     Luz Marina Diallo is a 55 year old, Guicho-speaking male currently on IV bleomycin, etoposide and carboplatin chemotherapy for testicular cancer and followed by [facility] who presents with his sister for evaluation of a fever and vomiting.  Patient was admitted in September of this year for acute renal failure and found to have an intra-abdominal mass with bilateral ureteral obstruction requiring ureteral stents.  He was then admitted approximately 2 weeks prior to arrival with UTI related sepsis with Klebsiella treated with cefepime.  This morning, the patient underwent chemotherapy; while at the infusion center, his temperature was noted to be 99.7 degrees orally. After returning home, he developed a fever of 103.5 degrees and vomited once at home and another time in route. Patient reported dizziness and bilateral hand paresthesias to his sister. Patient denies any pain or difficulty breathing this time.    Allergies:  No Known Allergies    Medications:    Allopurinol   Ferrous sulfate   Claritin   Flomax    Past Medical History:    Developmental delay   Anemia  Acute renal failure   Testicular cancer   Seminoma   Chemotherapy induced neutropenia     Past Surgical History:    Abdominal surgery   Cystoscopy, retrogrades, insert bilateral ureteral stents   EGD     Family History:    Diabetes      Social History:  Marital Status: Single.  Presents with sister  Negative for tobacco use.  Positive for alcohol use. Comment: Every month or two.   Negative for drug use.     Review of Systems   Constitutional: Negative for fever.   Respiratory: Negative for shortness of breath.    Cardiovascular: Negative for chest pain.   Gastrointestinal: Positive for vomiting.   Skin: Negative for rash.    Neurological: Positive for dizziness and numbness.   All other systems reviewed and are negative.    Physical Exam     Patient Vitals for the past 24 hrs:   BP Temp Temp src Pulse Resp SpO2   10/26/20 1700 103/59 -- -- 125 25 98 %   10/26/20 1645 108/65 -- -- 133 22 98 %   10/26/20 1630 101/59 -- -- 135 27 97 %   10/26/20 1615 98/62 -- -- 140 22 94 %   10/26/20 1600 123/71 -- -- 141 (!) 32 96 %   10/26/20 1545 128/81 -- -- 138 (!) 33 98 %   10/26/20 1530 -- -- -- 144 (!) 31 99 %   10/26/20 1515 (!) 125/90 -- -- 144 30 99 %   10/26/20 1500 (!) 128/92 -- -- 140 30 97 %   10/26/20 1445 112/84 -- -- 144 16 98 %   10/26/20 1434 109/72 103.1  F (39.5  C) Oral 152 16 97 %        Physical Exam    General:   Pleasant, ill appearing male  HEENT:    Oropharynx is mois  Eyes:    Conjunctiva normal  Neck:    Supple, no meningismus.     CV:     Tachycardic, regular rhythm.      No murmurs, rubs or gallops.       No unilateral leg swelling.       2+ radial pulses bilateral.       No lower extremity edema.  PULM:    Clear to auscultation bilateral.       No respiratory distress.      Good air exchange.     No rales or wheezing.     No stridor.  ABD:    Soft, non-tender, non-distended.       No pulsatile masses.       No rebound, guarding or rigidity.     No CVA tenderness  MSK:     No gross deformity to all four extremities.   LYMPH:   No cervical lymphadenopathy.  NEURO:   Alert. Good muscle tone, no atrophy.     No tremor  Skin:    Warm, dry and intact.       No rash  Psych:    Flat affect      Emergency Department Course     ECG:  Indication: Fever/Palpitations  Time: 1447  Vent. Rate 145 bpm. AR interval 128. QRS duration 78. QT/QTc 278/431. P-R-T axis 64 16 47.    Sinus tachycardia.   Moderate voltage criteria for LVH, may be normal variant.   Borderline ECG. Read time: 1450    Imaging:  Radiologic findings were communicated with the patient and family who voiced understanding of the findings.  XR Chest Port 1 view:   Single  portable AP view of the chest was obtained. Stable   cardiomediastinal silhouette. No suspicious focal pulmonary opacities. No significant pleural effusion or pneumothorax, as per radiology.     Laboratory:  Laboratory findings were communicated with the patient and family who voiced understanding of the findings.  CBC: WBC: 35.9 (H), HGB: 7.6 (L), PLT: 832 (H)  CMP: Glucose 117 (H), Creatinine: 1.43 (H), GFR: 55 (L), Ca: 6.2 (L), Albumin: 2.8 (L), Alkaline phosphatase: 278 (H), o/w WNL     Blood gas venous: pCO2: 39 (L), o/w WNL  Lactic acid: 1.8  INR: 1.03  PTT: 35  Blood cultures x2: Pending    UA with Microscopic: pending  Urine culture: Pending     Symptomatic COVID-19 Virus PCR: Pending    Interventions:  1501 Lactated ringers, 1398 mL, IV bolus  1533 Zofran, 8 mg, IV injection   1535 Tylenol, 650 mg, PO  1540 Cefepime, 2 g, IV infusion  1622 Vancomycin, 1000 mg, IV infusion   Ibuprofen, 400 mg, PO    Emergency Department Course:  Nursing notes and vitals reviewed.   1445: I performed an exam of the patient as documented above.      EKG obtained in the ED, see results above.    IV was inserted and blood was drawn for laboratory testing, results above. Medicine administered as documented above.   Portable chest x-ray obtained in the ED, findings above.   The patient's nose was swabbed and this sample was sent for COVID testing.     I rechecked the patient and discussed the results of his workup and plan for admission.     A catheter was used to obtain a urine sample, findings above.     1652: I consulted with Dr. Medina of the hospitalist services. He is in agreement to accept the patient for admission.    Findings and plan explained to the Patient and sister who consent to admission. Discussed the patient with Dr. Medina, who will admit the patient to a medical bed for further monitoring, evaluation, and treatment.    I personally reviewed the laboratory and imaging results with the Patient and answered all  related questions prior to admission.    Impression & Plan      Covid-19  Luz Marina Diallo was evaluated during a global COVID-19 pandemic, which necessitated consideration that the patient might be at risk for infection with the SARS-CoV-2 virus that causes COVID-19.   Applicable protocols for evaluation were followed during the patient's care.   COVID-19 was considered as part of the patient's evaluation. The plan for testing is:  a test was obtained during this visit.    Medical Decision Making:   Luz Marina Diallo is a 55 year old male who presents with fever and vomiting.  Patient has testicular cancer and undergoing active chemotherapy.  His presentation today is consistent with sepsis.  No evidence of soft tissue infection, pneumonia.  Blood cultures pending.  During his recent hospital stay 2 weeks prior, he had Klebsiella Klebsiella UTI which is the likely recurrent source although urine is currently pending.  Patient given cefepime and vancomycin out of concerns for recurrent Klebsiella UTI and the possibility of neutropenic fever prior to the results of laboratory studies.  Fortunately he is not neutropenic.  No evidence of endorgan damage.  Patient will be admitted to a medical telemetry bed for further management of suspected UTI related sepsis.    Diagnosis:     ICD-10-CM    1. Sepsis without acute organ dysfunction, due to unspecified organism (H)  A41.9 Blood culture     Blood culture   2. Chronic renal impairment, unspecified CKD stage  N18.9    3. Sinus tachycardia  R00.0        Disposition:   Admitted to medicine under the care of Dr. Medina.      Scribe Disclosure:  ALEXUS, Serene Gentile, am serving as a scribe on 10/26/2020 at 3:15 PM to personally document services performed by Antoine Erickson MD based on my observations and the provider's statements to me.       EMERGENCY DEPARTMENT     Antoine Erickson MD  10/26/20 1052

## 2020-10-26 NOTE — PROGRESS NOTES
Infusion Nursing Note:  Luz Marina Diallo presents today for PIV, labs.     present during visit today: Yes, Language: Guicho.     Note: N/A.    Intravenous Access:  Lab draw site *L arm Needle type piv, Gauge 22.  Labs drawn without difficulty.  Peripheral IV placed.    Treatment Conditions:  NA    Post Lab Assessment:  Patient tolerated blood collection   Site patent and intact, free from redness, edema or discomfort.  No evidence of extravasations.  Access remains for infusion use today     Discharge Plan:   Patient and/or family verbalized understanding of  instructions and all questions answered.  Patient  to lobby in stable condition accompanied by: sister.  Patient to see provider today: Yes: Araceli  Departure Mode: Ambulatory.  Minna Harkins, RN, RN

## 2020-10-26 NOTE — NURSING NOTE
"Oncology Rooming Note    October 26, 2020 8:40 AM   Luz Marina Diallo is a 55 year old male who presents for:    Chief Complaint   Patient presents with     Oncology Clinic Visit     Seminoma      Initial Vitals: /70   Pulse 116   Temp 99.2  F (37.3  C) (Tympanic)   Resp 18   Wt 46.6 kg (102 lb 12.8 oz)   SpO2 100%   BMI 19.42 kg/m   Estimated body mass index is 19.42 kg/m  as calculated from the following:    Height as of 10/9/20: 1.549 m (5' 1\").    Weight as of this encounter: 46.6 kg (102 lb 12.8 oz). Body surface area is 1.42 meters squared.  No Pain (0) Comment: Data Unavailable   No LMP for male patient.  Allergies reviewed: Yes  Medications reviewed: Yes    Medications: Medication refills not needed today.  Pharmacy name entered into [facility]:    [facility] [id number] [place] [facility] [address]  [facility] [address] [place] [address] [address]    Clinical concerns: f/u       Shruthi Jean Baptiste CMA              "

## 2020-10-26 NOTE — LETTER
10/26/2020         RE: Luz Marina Diallo  [id number] [facility] [id number]        Dear Colleague,    Thank you for referring your patient, Luz Marina Diallo, to the [facility]. Please see a copy of my visit note below.    Oncology/Hematology Visit Note    Oct 26, 2020    Reason for visit: Follow up seminoma    Oncology HPI: Luz Marina Diallo is a 55 year old male with a history of developmental delay with testicular cancer.  He was hospitalized at [facility] on 9/23/2020 for large abdominal mass, weight loss, decreased appetite and was found to have renal failure with creatinine of 4.5 with hypercalcemia 15.5.  CT confirmed a large abdominal mass compressing the ureters and he underwent bilateral ureteral stent placement on 9/24/2020.  Abdominal mass concern for malignancy and biopsy revealed germ cell tumor (seminoma).  Palafox was placed and he was given Zometa for hypercalcemia with IV fluids.  He was started on neoadjuvant chemotherapy with bleomycin, etoposide and carboplatin (cisplatin was not used due to renal function), C1 D1 completed on 10/1/2020 and he received the first 5 days inpatient.  He tolerated this well.    Unfortunately, he was admitted for neutropenic fever and urosepsis at [facility] on 10/9/2020. C1D8 was held for this reason and he's here for follow up and possible C1D8.     Interval History: Luz Marina is doing okay.  He is nervous about chemotherapy as he has been hospitalized or in the ED multiple times since starting this regimen.  No fever or chills at home.  No vomiting or diarrhea.  Appetite is been really good.  No abdominal pain or leg swelling.    Review of Systems: See interval hx. Denies fevers, chills, HA, dizziness, n/t, changes in vision, cough, sore throat, CP, SOB, abdominal pain, N/V, diarrhea, changes in urination, bleeding, bruising, rash.     PMHx and Social Hx reviewed per EPIC.      Medications:  Current  Outpatient Medications   Medication Sig Dispense Refill     allopurinol (ZYLOPRIM) 300 MG tablet Take 1 tablet (300 mg) by mouth daily 30 tablet 0     ferrous sulfate (FEROSUL) 325 (65 Fe) MG tablet Take 1 tablet (325 mg) by mouth daily (with breakfast) 30 tablet 0     loratadine (CLARITIN) 10 MG tablet Take 10 mg by mouth daily For 5 days       multivitamin w/minerals (THERA-VIT-M) tablet Take 1 tablet by mouth daily       tamsulosin (FLOMAX) 0.4 MG capsule Take 1 capsule (0.4 mg) by mouth daily 30 capsule 0       No Known Allergies      EXAM:    /70   Pulse 116   Temp 99.2  F (37.3  C) (Tympanic)   Resp 18   Wt 46.6 kg (102 lb 12.8 oz)   SpO2 100%   BMI 19.42 kg/m      GENERAL:  Male, in no acute distress.  Alert and oriented x3.   HEENT:  Normocephalic, atraumatic.  PERRL, oropharynx clear with no sores or thrush.   LYMPH NODES:  No palpable cervical lyphadenopathy appreciated.  LUNGS: Nonlabored breathing  ABDOMEN:   Large area of firmness to lower abdomen, actually softer than when hospitalized.  No tenderness  EXTREMITIES:  2+ pitting edema bilaterally.   SKIN: No rash  PSYCH: Mood stable      Labs:   Results for AFSANEH OSULLIVAN (MRN [id number]) as of 10/26/2020 16:26   10/26/2020 08:20   Sodium 137   Potassium 4.2   Chloride 106   Carbon Dioxide 24   Urea Nitrogen 19   Creatinine 1.52 (H)   GFR Estimate 51 (L)   GFR Estimate If Black 60 (L)   Calcium 5.9 (LL)   Anion Gap 7   Albumin 2.7 (L)   Protein Total 7.4   Bilirubin Total 0.2   Alkaline Phosphatase 262 (H)   ALT 33   AST 28   Alpha Fetoprotein 4.4   Lactate Dehydrogenase 578 (H)   Glucose 199 (H)   WBC 37.4 (H)   Hemoglobin 7.6 (L)   Hematocrit 25.4 (L)   Platelet Count 908 (H)   RBC Count 2.64 (L)   MCV 96   MCH 28.8   MCHC 29.9 (L)   RDW 20.2 (H)   Diff Method Manual Differential   % Neutrophils 66.0   % Lymphocytes 7.0   % Monocytes 7.0   % Eosinophils 2.0   % Basophils 0.0   % Metamyelocytes 11.0   % Myelocytes 7.0   Nucleated RBCs 3  (H)   Absolute Neutrophil 24.7 (H)   Absolute Lymphocytes 2.6   Absolute Monocytes 2.6 (H)   Absolute Eosinophils 0.7   Absolute Basophils 0.0   Absolute Metamyelocytes 4.1 (H)   Absolute Myelocytes 2.6 (H)   Absolute Nucleated RBC 1.1   Anisocytosis Moderate   Microcytes Present   Macrocytes Present   Hypochromasia Present   Platelet Estimate Automated count confirmed.  Platelet morphology is normal.       Imaging: n/a    Impression/Plan: Luz Marina Diallo is a 55 year old male with metastatic seminoma currently undergoing neoadjuvant BEP with Neulasta support.    Seminoma: Beta , metastasis to the lymph nodes, currently undergoing BEP with Neulasta support, C1 D1 done inpatient on 10/1/2020. He tolerated this well, but unfortunately was hospitalized again for neutropenic fever, discharged on antibiotics, presumed UTI.  Noted to have leukocytosis, likely from Neulasta.  WBC 37.4 today and improved.  He is feeling really well and is nervous today, but we will proceed with C1 D 15 bleomycin today.  Is certainly possible that he was febrile from the bleomycin.  No indication for blood or platelets today.  --11/2 Araceli, BEP C2 D1    Leukocytosis: Likely secondary to Neulasta, given on 10/6/2020.  He was afebrile prior to visit today and temp was 99.6.  He felt really well.  This is curative intent, therefore we proceeded with chemotherapy.    FEN: Hypocalcemia with calcium of 5.9, he was given 1 g of calcium gluconate IV in infusion.  Electrolytes are otherwise okay and appetite is really good.    Neutropenic fever: Recently hospitalized, secondary chemotherapy.  Suspect UTI, currently on Omnicef, did not receive Neupogen as he had received Neulasta on day 6 of cycle 1.  Afebrile prior to chemotherapy today.    YAYA: Creatinine 4.51 hospitalized on 9/23/2020.  He is status post bilateral ureteral stents and Palafox placement.  Palafox catheter has been removed and urine output is excellent.  Creatinine stable  1.43.    Heme: Hemoglobin 7.6 and no indication for blood.  Platelets 908, likely reactive, no indication for thrombocytosis treatment.    Developmental Delay: Lives with his sister, Mamta, who accompanies him with every visit.      Chart documentation with Dragon Voice recognition Software. Although reviewed after completion, some words and grammatical errors may remain.      Araceli Pacheco PA-C  Hematology/Oncology  [facility]                Again, thank you for allowing me to participate in the care of your patient.        Sincerely,        Araceli Pacheco PA-C

## 2020-10-26 NOTE — PROGRESS NOTES
Infusion Nursing Note:  Luz Marina Diallo presents today for Bleomycin and IV Calcium.    Patient seen by provider today: Yes: Araceli CHOWDARY   present during visit today: Yes, Language: Laotian.     Note: Ca 5.9.  Araceli CHOWDARY notified.  VORB: give 1 g IV Ca today.      Temp 99.7 oral and 99.6 tympanic.  No other signs of illness.  Recently treated in hospital for neutropenic fever.  Patient and sister, Mamta, instructed to go to ED if fever increases above 100.4 or patient has other signs of illness.      Intravenous Access:  Peripheral IV placed.    Treatment Conditions:  Lab Results   Component Value Date    HGB 7.6 10/26/2020     Lab Results   Component Value Date    WBC 37.4 10/26/2020      Lab Results   Component Value Date    ANEU 24.7 10/26/2020     Lab Results   Component Value Date     10/26/2020      Lab Results   Component Value Date     10/26/2020                   Lab Results   Component Value Date    POTASSIUM 4.2 10/26/2020           Lab Results   Component Value Date    MAG 1.6 09/29/2020            Lab Results   Component Value Date    CR 1.52 10/26/2020                   Lab Results   Component Value Date    IVANIA 5.9 10/26/2020                Lab Results   Component Value Date    BILITOTAL 0.2 10/26/2020           Lab Results   Component Value Date    ALBUMIN 2.7 10/26/2020                    Lab Results   Component Value Date    ALT 33 10/26/2020           Lab Results   Component Value Date    AST 28 10/26/2020       Results reviewed, labs MET treatment parameters, ok to proceed with treatment.      Post Infusion Assessment:  Patient tolerated infusion without incident.  Site patent and intact, free from redness, edema or discomfort.  No evidence of extravasations.  Access discontinued per protocol.       Discharge Plan:   AVS to patient via MYCHART.  Patient will return 11/2 for next appointment.   Patient discharged in stable condition accompanied by: self.  Departure  Mode: Ambulatory.    Rosey Wilks RN

## 2020-10-26 NOTE — H&P
Hospitalist Admission Note    Name: Luz Marina Diallo    MRN: [id number]          YOB: 1965    Age: 55 year old  Date of admission: 10/26/2020  Primary care provider: Rosie Segura              Assessment:       Brief summary of admission assessment:     Luz Marina Diallo is a 55 year old [place]  male with a significant past medical history of metastatic seminoma currently undergoing chemotherapy who presents with high-grade fever.  Patient was seen at oncology service today for chemotherapy and had a low-grade temp of 99.2.  Patient developed fever of 103.5 at home and vomited once.  He was brought to the emergency department for evaluation.    On arrival to emergency department patient was tachycardic with heart rate of 152, respiratory rate of 16 temperature 103.1 and blood pressure of 109/72.  EKG showed sinus tachycardia.    Patient was given intravenous fluid with a total of 1398 mL and injection of Zofran as well as broad-spectrum antibiotic cefepime and vancomycin.  Cultures were obtained and hospital service was consulted for admission to the hospital.        Admission diagnoses:      #1.  Sepsis on the basis of high-grade fever, tachycardia, tachypnea.  urinalysis does not reveal evidence of UTI.  Patient had bilateral ureteral stent in place.  Etiology of sepsis is not clear at this time.  Chest x-ray showed no acute infiltrate.    Addendum   - Patient hypotensive and tachycardic but he was asymptomatic without complaints   -- lactate normal   -- will administer additional litre of IVF   -- monitor vitals   -- if he remains hypotensive after a bolus of fluid , bedside nurse to gregorio FAUST for possible transfer to ICU   -- he may need CT  imaging for sepsis of unclear source  D/w Dr Hanley    Addendum   -I spoke with tele ICU physician Dr Dailey who recommended CT chest ,abdomen and pelvis with contrast before admission to ICU.  --will order CT without  contrast  for sepsis workup and will transfer to ICU after the CT is completed.  -- bedside nurse notified   -- d/w Dr Hanley       #2.  Fever: Rule out COVID-19    #3.  Significant leukocytosis : Due to sepsis versus Neulasta    #4.  Anemia: Chronic    #5.  Thrombocytosis    #6.  Recent admission for neutropenic fever, Klebsiella UTI, pancytopenia, epistaxis    #7.  Seminoma: Metastatic currently undergoing neoadjuvant BEP with Neulasta support  --Follows with [facility] physicians hematology/oncology    #8.  Renal insufficiency: Suspect chronic kidney disease due to obstructive uropathy    #9.  History of urinary obstruction status post bilateral ureteral stent placement    #10.  Recent admission between October 9 and 14 for sepsis, Klebsiella UTI  COVID-19 Testing :  COVID Status:  COVID-19 PCR Results    COVID-19 PCR Results 9/24/20 9/24/20 10/9/20 10/9/20 10/19/20    0229 [id number] 2228 2228    COVID-19 Virus PCR to [facility] - Result Test received-See reflex to IDDL test SARS CoV2 (COVID-19) Virus RT-PCR  Test received-See reflex to IDDL test SARS CoV2 (COVID-19) Virus RT-PCR  Not Detected   COVID-19 Virus PCR to [facility] - Source Nasopharyngeal  Nasopharyngeal  Nasopharyngeal   SARS-CoV-2 Virus Specimen Source  Nasopharyngeal  Nasopharyngeal    SARS-CoV-2 PCR Result  NEGATIVE  NEGATIVE       Comments are available for some flowsheets but are not being displayed.         COVID-19 Antibody Results, Testing for Immunity    COVID-19 Antibody Results, Testing for Immunity   No data to display.                  Symptomatic test pending    Isolation  requirement   o Contact and droplet:May discontinue if SARS CoV2 result is negative             Active comorbid medical conditions:     Large abdominal mass secondary to recent diagnosis of seminoma    Anemia of chronic disease    History of developmental delay     Plan/MDM:       > Admission Status: Will admit patient to hospitalist service as inpatient as  patient likely need over two mid night stays in the hospital.     >Care plan:      Admit to inpatient status to monitored bed, hematology oncology consult, continue IV cefepime and vancomycin, monitor cultures including urine and blood, COVID-19 swab pending, monitor patient closely.    >Supportive care:IV fluids continued  Oxygen increased  Pain management: acetominophen, anxiolytics and IV narcotics  Nausea and vomiting control measures    >Diet:Diet advanced    >Activity:Advance activity as tolerated    >Education/Counseling :Discussed treatment plan with the patient    >Consults:Inpatient consult with infectious disease and oncology    >VTE prophylactic measures:prophylaxis against venous thromboembolism with PCD     >Therapies:none for now       >Additional orders:    --Care plan discussed with the patient/family and agreed to care plan   --Patient will be transferred to care of hospitalist attending for further evaluation and management as appropriate   --Old medical orders reviewed   --imaging result independently reviewed by me     (See orders placed for this visit by me )     - Home medication reviewed and will be continued as appropriate once pharmacy reconciliation is completed         Code Status/Disposition:     >Code Status:Full Code      >Disposition:anticipate discharge to home and Anticipate discharge in 3 days        Disclaimer: This note consists of symbols derived from keyboarding, dictation and/or voice recognition software. As a result, there may be errors in the script that have gone undetected. Please consider this when interpreting information found in this chart.             Chief Complaint:       Fever     History is obtained from the patient, electronic health record, emergency department physician and patient's sister          History of Present Illness:      This patient is a 55 year old  male with a significant past medical history of testicular cancer who presents with the following  "condition requiring a hospital admission:    Severe sepsis    Patient is 55-year-old male well-known to our service from recent admission who presents with fever, dizziness nausea and vomiting.  Patient was seen earlier in oncology clinic for his chemotherapy infusion and was found to have low-grade temperature.  He was sent home in stable condition but patient developed symptoms including high-grade fever, nausea and dizziness and some numbness.  He came to the emergency department for evaluation.  Patient sister is at bedside and she provided much of the history due to patient's condition of developmental delay.  Patient denies any chest pain or shortness of breath or cough.  No diarrhea or abdominal pain noted.  No urinary symptoms         Past Medical History:     Past Medical History:   Diagnosis Date     Mental retardation             Past Surgical History:     Past Surgical History:   Procedure Laterality Date     ABDOMEN SURGERY      sister states he had an \"abdominal surgery 20 years ago\" unknown      CYSTOSCOPY, RETROGRADES, INSERT STENT URETER(S), COMBINED Bilateral 9/24/2020    Procedure: Cystoscopy with bilateral retrograde pyelogram and bilateral ureteral stent insertiona-complex due to severe bilateral hydroureteronephrosis with ureteral tortuosity, examination under anesthesia and fluoroscopic interpretation > 1 hour physician time;  Surgeon: Ralf Chan MD;  Location:  OR     ESOPHAGOSCOPY, GASTROSCOPY, DUODENOSCOPY (EGD), COMBINED N/A 3/1/2019    Procedure: ESOPHAGOSCOPY, GASTROSCOPY, DUODENOSCOPY (EGD) Rm 226;  Surgeon: Susy Camara MD;  Location:  GI             Social History:     Social History     Tobacco Use     Smoking status: Never Smoker     Smokeless tobacco: Never Used   Substance Use Topics     Alcohol use: Yes     Alcohol/week: 2.0 standard drinks     Types: 1 Glasses of wine, 1 Cans of beer per week     Comment: once every month or two, social functions only         "     Family History:     Family History   Problem Relation Age of Onset     Diabetes Father      Glaucoma No family hx of      Macular Degeneration No family hx of           Allergies:   No Known Allergies          Medications:        Prior to Admission medications    Medication Sig Last Dose Taking? Auth Provider   allopurinol (ZYLOPRIM) 300 MG tablet Take 1 tablet (300 mg) by mouth daily   Daniel Su MD   ferrous sulfate (FEROSUL) 325 (65 Fe) MG tablet Take 1 tablet (325 mg) by mouth daily (with breakfast)   Meche Solano PA-C   loratadine (CLARITIN) 10 MG tablet Take 10 mg by mouth daily For 5 days   Unknown, Entered By History   multivitamin w/minerals (THERA-VIT-M) tablet Take 1 tablet by mouth daily   Unknown, Entered By History   tamsulosin (FLOMAX) 0.4 MG capsule Take 1 capsule (0.4 mg) by mouth daily   Logan Bunch MD          Review of Systems:     A Comprehensive greater than 10 system review of systems was carried out.  Pertinent positives and negatives are noted above in HPI.  Otherwise negative for contributory information.           Physical Exam:     Vital signs were reviewed    Temp:  [99.2  F (37.3  C)-103.1  F (39.5  C)] 103.1  F (39.5  C)  Pulse:  [104-152] 133  Resp:  [16-33] 22  BP: ()/(59-92) 108/65  SpO2:  [94 %-100 %] 98 %    Patient remotely seen using video encounter.  Physical exam documented above Dr. Erickson of emergency medicine on October 26, 2020 at 3:15 PM was reviewed by me.  Patient appears to be comfortable after he was treated with intravenous fluids       Data:       All laboratory and imaging data in the past 24 hours reviewed     Results for orders placed or performed during the hospital encounter of 10/26/20   XR Chest Port 1 View     Status: None    Narrative    XR PORTABLE CHEST ONE VIEW   10/26/2020 3:00 PM     HISTORY: Fever    COMPARISON: Chest x-ray on 10/19/2020      Impression    IMPRESSION: Single portable AP view of the chest was obtained.  Stable  cardiomediastinal silhouette. No suspicious focal pulmonary opacities.  No significant pleural effusion or pneumothorax.    BEE DALLAS MD   CBC with platelets differential     Status: Abnormal   Result Value Ref Range    WBC 35.9 (H) 4.0 - 11.0 10e9/L    RBC Count 2.67 (L) 4.4 - 5.9 10e12/L    Hemoglobin 7.6 (L) 13.3 - 17.7 g/dL    Hematocrit 25.8 (L) 40.0 - 53.0 %    MCV 97 78 - 100 fl    MCH 28.5 26.5 - 33.0 pg    MCHC 29.5 (L) 31.5 - 36.5 g/dL    RDW 19.5 (H) 10.0 - 15.0 %    Platelet Count 832 (H) 150 - 450 10e9/L    Diff Method Manual Differential     % Neutrophils 81.0 %    % Lymphocytes 1.0 %    % Monocytes 4.0 %    % Eosinophils 0.0 %    % Basophils 2.0 %    % Metamyelocytes 10.0 %    % Myelocytes 2.0 %    Nucleated RBCs 1 (H) 0 /100    Absolute Neutrophil 29.1 (H) 1.6 - 8.3 10e9/L    Absolute Lymphocytes 0.4 (L) 0.8 - 5.3 10e9/L    Absolute Monocytes 1.4 (H) 0.0 - 1.3 10e9/L    Absolute Eosinophils 0.0 0.0 - 0.7 10e9/L    Absolute Basophils 0.7 (H) 0.0 - 0.2 10e9/L    Absolute Metamyelocytes 3.6 (H) 0 10e9/L    Absolute Myelocytes 0.7 (H) 0 10e9/L    Absolute Nucleated RBC 0.4     Anisocytosis Moderate     Hypochromasia Present     Platelet Estimate       Automated count confirmed.  Platelet morphology is abnormal.   Comprehensive metabolic panel     Status: Abnormal   Result Value Ref Range    Sodium 135 133 - 144 mmol/L    Potassium 4.0 3.4 - 5.3 mmol/L    Chloride 103 94 - 109 mmol/L    Carbon Dioxide 25 20 - 32 mmol/L    Anion Gap 7 3 - 14 mmol/L    Glucose 117 (H) 70 - 99 mg/dL    Urea Nitrogen 18 7 - 30 mg/dL    Creatinine 1.43 (H) 0.66 - 1.25 mg/dL    GFR Estimate 55 (L) >60 mL/min/[1.73_m2]    GFR Estimate If Black 64 >60 mL/min/[1.73_m2]    Calcium 6.2 (L) 8.5 - 10.1 mg/dL    Bilirubin Total 0.4 0.2 - 1.3 mg/dL    Albumin 2.8 (L) 3.4 - 5.0 g/dL    Protein Total 7.6 6.8 - 8.8 g/dL    Alkaline Phosphatase 278 (H) 40 - 150 U/L    ALT 37 0 - 70 U/L    AST 35 0 - 45 U/L   Blood gas  venous     Status: Abnormal   Result Value Ref Range    Ph Venous 7.39 7.32 - 7.43 pH    PCO2 Venous 39 (L) 40 - 50 mm Hg    PO2 Venous 27 25 - 47 mm Hg    Bicarbonate Venous 23 21 - 28 mmol/L    Base Deficit Venous 1.5 mmol/L    FIO2 RA    INR     Status: None   Result Value Ref Range    INR 1.03 0.86 - 1.14   Partial thromboplastin time     Status: None   Result Value Ref Range    PTT 35 22 - 37 sec   UA with Microscopic     Status: Abnormal   Result Value Ref Range    Color Urine Light Yellow     Appearance Urine Clear     Glucose Urine Negative NEG^Negative mg/dL    Bilirubin Urine Negative NEG^Negative    Ketones Urine Negative NEG^Negative mg/dL    Specific Gravity Urine 1.013 1.003 - 1.035    Blood Urine Small (A) NEG^Negative    pH Urine 5.5 5.0 - 7.0 pH    Protein Albumin Urine 30 (A) NEG^Negative mg/dL    Urobilinogen mg/dL Normal 0.0 - 2.0 mg/dL    Nitrite Urine Negative NEG^Negative    Leukocyte Esterase Urine Negative NEG^Negative    Source Midstream Urine     WBC Urine 4 0 - 5 /HPF    RBC Urine 15 (H) 0 - 2 /HPF    Bacteria Urine Few (A) NEG^Negative /HPF    Squamous Epithelial /HPF Urine <1 0 - 1 /HPF    Mucous Urine Present (A) NEG^Negative /LPF   Symptomatic COVID-19 Virus (Coronavirus) by PCR     Status: None    Specimen: Nasopharyngeal   Result Value Ref Range    COVID-19 Virus PCR to [facility] - Source Nasopharyngeal     COVID-19 Virus PCR to [facility] - Result       Test received-See reflex to IDDL test SARS CoV2 (COVID-19) Virus RT-PCR   Lactic acid whole blood     Status: None   Result Value Ref Range    Lactic Acid 1.8 0.7 - 2.0 mmol/L   EKG 12 lead     Status: None (Preliminary result)   Result Value Ref Range    Interpretation ECG Click View Image link to view waveform and result    Blood culture     Status: None (Preliminary result)    Specimen: Blood    Right Arm   Result Value Ref Range    Specimen Description Blood Right Arm     Culture Micro No growth after 1 hour    Blood culture      Status: None (Preliminary result)    Specimen: Blood    Left Arm   Result Value Ref Range    Specimen Description Blood Left Arm     Culture Micro No growth after 1 hour    Results for orders placed or performed in visit on 10/26/20   CBC with platelets differential     Status: Abnormal   Result Value Ref Range    WBC 37.4 (H) 4.0 - 11.0 10e9/L    RBC Count 2.64 (L) 4.4 - 5.9 10e12/L    Hemoglobin 7.6 (L) 13.3 - 17.7 g/dL    Hematocrit 25.4 (L) 40.0 - 53.0 %    MCV 96 78 - 100 fl    MCH 28.8 26.5 - 33.0 pg    MCHC 29.9 (L) 31.5 - 36.5 g/dL    RDW 20.2 (H) 10.0 - 15.0 %    Platelet Count 908 (H) 150 - 450 10e9/L    Diff Method Manual Differential     % Neutrophils 66.0 %    % Lymphocytes 7.0 %    % Monocytes 7.0 %    % Eosinophils 2.0 %    % Basophils 0.0 %    % Metamyelocytes 11.0 %    % Myelocytes 7.0 %    Nucleated RBCs 3 (H) 0 /100    Absolute Neutrophil 24.7 (H) 1.6 - 8.3 10e9/L    Absolute Lymphocytes 2.6 0.8 - 5.3 10e9/L    Absolute Monocytes 2.6 (H) 0.0 - 1.3 10e9/L    Absolute Eosinophils 0.7 0.0 - 0.7 10e9/L    Absolute Basophils 0.0 0.0 - 0.2 10e9/L    Absolute Metamyelocytes 4.1 (H) 0 10e9/L    Absolute Myelocytes 2.6 (H) 0 10e9/L    Absolute Nucleated RBC 1.1     Anisocytosis Moderate     Microcytes Present     Macrocytes Present     Hypochromasia Present     Platelet Estimate       Automated count confirmed.  Platelet morphology is normal.   Comprehensive metabolic panel     Status: Abnormal   Result Value Ref Range    Sodium 137 133 - 144 mmol/L    Potassium 4.2 3.4 - 5.3 mmol/L    Chloride 106 94 - 109 mmol/L    Carbon Dioxide 24 20 - 32 mmol/L    Anion Gap 7 3 - 14 mmol/L    Glucose 199 (H) 70 - 99 mg/dL    Urea Nitrogen 19 7 - 30 mg/dL    Creatinine 1.52 (H) 0.66 - 1.25 mg/dL    GFR Estimate 51 (L) >60 mL/min/[1.73_m2]    GFR Estimate If Black 60 (L) >60 mL/min/[1.73_m2]    Calcium 5.9 (LL) 8.5 - 10.1 mg/dL    Bilirubin Total 0.2 0.2 - 1.3 mg/dL    Albumin 2.7 (L) 3.4 - 5.0 g/dL    Protein Total 7.4  6.8 - 8.8 g/dL    Alkaline Phosphatase 262 (H) 40 - 150 U/L    ALT 33 0 - 70 U/L    AST 28 0 - 45 U/L   AFP tumor marker     Status: None   Result Value Ref Range    Alpha Fetoprotein 4.4 0 - 8 ug/L   Lactate Dehydrogenase     Status: Abnormal   Result Value Ref Range    Lactate Dehydrogenase 578 (H) 85 - 227 U/L        Recent Results (from the past 48 hour(s))   XR Chest Port 1 View    Narrative    XR PORTABLE CHEST ONE VIEW   10/26/2020 3:00 PM     HISTORY: Fever    COMPARISON: Chest x-ray on 10/19/2020      Impression    IMPRESSION: Single portable AP view of the chest was obtained. Stable  cardiomediastinal silhouette. No suspicious focal pulmonary opacities.  No significant pleural effusion or pneumothorax.    BEE DALLAS MD       EKG results: Sinus tachycardia         All imaging studies reviewed by me.         Patient`s old medical records reviewed and case discussed with the ED physician.    ED course-Reviewed  and care plan discussed with Dr. Erickson

## 2020-10-26 NOTE — ED TRIAGE NOTES
Pt with hx of testicular cancer, had chemo today and sister noted fever of 103.5 today, vomited once also PTA. Tachy in 150's in triage, denies pain. ABC's intact, alert and oriented X3.

## 2020-10-27 NOTE — PLAN OF CARE
ICU End of Shift Summary.  For vital signs and complete assessments, please see documentation flowsheets.     Pertinent assessments: Laotian speaking, attempted to use  iPad, pt understands minimal English. LS fine crackles intermittently, RA. MAP >65 all shift, no pressors needed. PIV SL. Denies pain or nausea, tolerating PO. BG q4h. CMS intact.   Major Shift Events: Transferred from [facility] at 2200 after Abd CT. Palafox placed d/t distended bladder, good UOP. PICC placed via PICC RN R arm, blood return noted, infusing IV abx, lytes, & MIVF. Tmax 103.1, PO tylenol w/minimal changes, ice packs applied, temp 99. HR elevated, ST into 130s, slowly improving. Critical calcium, IV ca gluconate given. Critical Hgb 6.7, T&S sent, need blood consent, will call sister, Mamta, repeat Hgb 0700. Mag 1.3, replacement started, recheck later this am.   Plan (Upcoming Events): IV abx, recheck labs, monitor temps, collect stool sample, ID/Heme/Onc/urology consults  Discharge/Transfer Needs: Pt continues to require ICU support    Bedside Shift Report Completed : Y  Bedside Safety Check Completed: MANNY

## 2020-10-27 NOTE — PROVIDER NOTIFICATION
DATE:  10/27/2020   TIME OF RECEIPT FROM LAB:  0429  LAB TEST:  Calcium  LAB VALUE:  5.5  RESULTS GIVEN WITH READ-BACK TO (PROVIDER):  Markus RN  TIME LAB VALUE REPORTED TO PROVIDER:   0432      IV Ca gluconate ordered.

## 2020-10-27 NOTE — PROGRESS NOTES
[facility]   Procedure Note           Peripherally Inserted Central Line Catheter (PICC):       Luz Marina Diallo  MRN# [id number]   October 27, 2020, 1:46 AM Indication: Hypotension           Pause for the cause: Consent for catheter placement procedure signed  Time out completed  Patient ID's verified using two distinct indicators  All necessary equipment is present   Type of line to be used: PICC   Full barrier precautions used: Yes   Skin preparation: Chloraprep   Date of insertion: October 27, 2020, 1:47 AM   Device type: Double lumen, valved, 5.0   Catheter brand: [employer]   Lot number: [id number]   Insertion location: Right basilic vein   Method of placement: Venipuncture  MST  Ultrasound   Number of attempts: With ultrasound: yes   Without ultrasound: na   Difficulty threading: no   picc device: Dressing dry and intact  Chlorhexidine patch   Arm circumference: Adults 10 cm    extremity circumference: 24 cm   Internal length: 38.5 cm    visible catheter length: 0 cm   Total catheter length: 38.5 cm   Tip termination: ra svc junction   Method of verification: Chest x-ray    patency post placement: Positive blood return  Flushes without difficulty   Line flush: Line flush documented on the eMARx   Placement verified by: Registered Nurse   Catheter placed by: Jethro Perkins   Discontinuation form initiated: No   Patient tolerance: Tolerated well      Summary:  This procedure was performed without difficulty and he tolerated the procedure with no immediate complications.   Secure a cath not used because the total length of the catheter was needed to reach the lower svc - ca junction.       Recorded by Jethro Perkins    Attestation:  This patient has been seen and evaluated by Jethro oates RN.

## 2020-10-27 NOTE — PROGRESS NOTES
Tele X Cover:    Patient transferred to ICU for hypotension.  CT scan shows distended bladder.  There are bilateral ureteral stents.     He needs a tran catheter and to send the urine for UA.  Continue IV antibiotics. Source of sepsis is likely Pyelonephritis.  MAP goal is 65.  Will need urology consultation in the AM    Abdulkadir Dailey MD

## 2020-10-27 NOTE — PROGRESS NOTES
[facility]    Hospitalist Progress Note    Date of Service (when I saw the patient): 10/27/2020  Provider:  Graham Varner MD   Text Page  7am - 6PM       Assessment & Plan   Luz Marina Diallo is a 55 year old male who was admitted on 10/26/2020 with high fever, tachycardia, soft blood pressure, in necessity for fluid resuscitation.  Patient was highly suspicious for sepsis with early septic shock given his history of malignancy (seminoma) on chemotherapy and recent bilateral stent placement for hydronephrosis.  He also had Neulasta in the last few days..    CT of the chest abdomen and pelvis did not reveal a source of infection.  Admitted to the ICU for further treatment and follow-up.  Stable overnight but febrile, tachycardic and tachypneic.     1.  Sepsis of unclear etiology, no localizing sources.  Patient at high risk given history of malignancy, chemotherapy, recent bilateral stent placement.  2.  Metastatic seminoma undergoing neoadjuvant BEP with Neulasta support.  3.  CKD stage II.  Suspect chronic obstructive uropathy.  Worse in the past.  Status post bilateral stent placement.  4.  Anemia of chronic disease. Hgb < 7. One PRBC unit ordered.   5.  Thrombocytosis.  Suspect reactive/JESSICA.  6.   PUI. Symptomatic Covid 19 screening.       Plan.  ICU care.  High risk for clinical deterioration.  Cardiac telemetry.  Pulse oximetry.  Normal saline at 150 mL/h.  Allopurinol 300 mg p.o. daily.  Cefepime 2 g IV every 12 hours.  Vancomycin 1 g every 24 hour.  Flomax 0.4 mg 1 capsule daily.  Electrolyte replacement per protocol.  Treatment of nausea and vomiting per protocol.  Ferrous sulfate 325 mg 1 tablet daily.  Protonix for PPI      DVT Prophylaxis: Heparin SQ  Code Status: Full Code    Disposition: Expected discharge in  2 days once stable/source identified.    Interval History   Slightly better than last night, non English speaking. Sister consented for transfusion.     -Data  reviewed today: I reviewed all new labs and imaging results over the last 24 hours. I personally reviewed the EKG tracing showing Sinus tachycardia with heart rate 104..    Physical Exam   Temp: 99  F (37.2  C) Temp src: Bladder BP: 102/70 Pulse: 99   Resp: 23 SpO2: 100 % O2 Device: None (Room air)    Vitals:    10/26/20 2215 10/27/20 0400   Weight: 48 kg (105 lb 13.1 oz) 48.5 kg (107 lb)     Vital Signs with Ranges  Temp:  [98.2  F (36.8  C)-103.1  F (39.5  C)] 99  F (37.2  C)  Pulse:  [] 99  Resp:  [9-33] 23  BP: ()/(50-92) 102/70  SpO2:  [94 %-100 %] 100 %  I/O last 3 completed shifts:  In: 1990 [P.O.:320; I.V.:1670]  Out: 1445 [Urine:1445]    GEN:  Alert, oriented x 3, appears comfortable, NAD.  HEENT:  Normocephalic/atraumatic, no scleral icterus, no nasal discharge, mouth moist.  CV: Sinus tachycardia, no murmur or JVD.  S1 + S2 noted, no S3 or S4.  LUNGS:  Clear to auscultation bilaterally without rales/rhonchi/wheezing/retractions.  Symmetric chest rise on inhalation noted.  ABD:  Active bowel sounds, soft, non-tender/non-distended.  No rebound/guarding/rigidity.  EXT:  No edema or cyanosis.  No joint synovitis noted.  SKIN:  Dry to touch, no exanthems noted in the visualized areas.       Medications     - MEDICATION INSTRUCTIONS -       sodium chloride 150 mL/hr at 10/27/20 0600       allopurinol  300 mg Oral Daily     ceFEPIme (MAXIPIME) IV  2 g Intravenous Q12H     ferrous sulfate  325 mg Oral Daily with breakfast     heparin ANTICOAGULANT  5,000 Units Subcutaneous Q12H     loratadine  10 mg Oral Daily     multivitamin w/minerals  1 tablet Oral Daily     sodium chloride (PF)  10 mL Intracatheter Q8H     sodium chloride (PF)  3 mL Intracatheter Q8H     tamsulosin  0.4 mg Oral Daily     vancomycin (VANCOCIN) IV  1,000 mg Intravenous Q24H       Data   Recent Labs   Lab 10/27/20  0700 10/27/20  0400 10/26/20  1453 10/26/20  0820   WBC  --  27.2* 35.9* 37.4*   HGB 6.6* 6.7* 7.6* 7.6*   MCV  --  94  97 96   PLT  --  655* 832* 908*   INR  --   --  1.03  --    NA  --  140 135 137   POTASSIUM  --  4.2 4.0 4.2   CHLORIDE  --  112* 103 106   CO2  --  22 25 24   BUN  --  19 18 19   CR  --  1.54* 1.43* 1.52*   ANIONGAP  --  6 7 7   IVANIA  --  5.5* 6.2* 5.9*   GLC  --  127* 117* 199*   ALBUMIN  --  2.1* 2.8* 2.7*   PROTTOTAL  --  6.2* 7.6 7.4   BILITOTAL  --  0.5 0.4 0.2   ALKPHOS  --  340* 278* 262*   ALT  --  101* 37 33   AST  --  152* 35 28       Recent Results (from the past 24 hour(s))   XR Chest Port 1 View    Narrative    XR PORTABLE CHEST ONE VIEW   10/26/2020 3:00 PM     HISTORY: Fever    COMPARISON: Chest x-ray on 10/19/2020      Impression    IMPRESSION: Single portable AP view of the chest was obtained. Stable  cardiomediastinal silhouette. No suspicious focal pulmonary opacities.  No significant pleural effusion or pneumothorax.    BEE DALLAS MD   CT Chest Abdomen Pelvis w/o Contrast    Narrative    EXAM: CT CHEST ABDOMEN PELVIS W/O CONTRAST  LOCATION: [facility]  DATE/TIME: 10/26/2020 9:50 PM    INDICATION: Sepsis. History of testicular cancer. Fever. Vomiting. Tachycardia.  COMPARISON: 9/25/2020 and 9/23/2020.  TECHNIQUE: CT scan of the chest, abdomen, and pelvis was performed without IV contrast. Multiplanar reformats were obtained. Dose reduction techniques were used.   CONTRAST: None.    FINDINGS:   LUNGS AND PLEURA: Evaluation of lung parenchyma is limited by breathing motion artifact. No large consolidations. No pneumothorax or pleural effusion. Mild dependent atelectasis.    MEDIASTINUM/AXILLAE: There are multiple mildly enlarged mediastinal lymph nodes which are larger than on the previous exam. Low AP window node measures 2.7 x 1.0 cm (previously 2.1 x 0.5 cm).    HEPATOBILIARY: Normal.    PANCREAS: Normal.    SPLEEN: Normal.    ADRENAL GLANDS: Normal.    KIDNEYS/BLADDER: There is mild dilatation of the renal collecting systems bilaterally. Bilateral double-J ureteral stents  are in normal position. The urinary bladder is very distended but otherwise appears normal.    BOWEL: There is no bowel obstruction. Moderate amount of stool in the colon. No free intraperitoneal gas or fluid.    LYMPH NODES: There are again multiple enlarged and partially calcified lymph nodes in the retroperitoneum and pelvis. A large pelvic mass has decreased significantly in size in the interval now measuring approximately 9.9 cm AP x 12.2 cm transverse   (previously 15 cm AP x 14.6 cm transverse).    VASCULATURE: Unremarkable.    PELVIC ORGANS: Normal.    MUSCULOSKELETAL: Left inguinal hernia containing soft tissue as before, possibly testicle.      Impression    IMPRESSION:  1.  Interval decrease in size of the partially calcified pelvic mass and abdominal and pelvic lymph node enlargement.  2.  Mild bilateral hydronephrosis with bilateral ureteral stents in place. The urinary bladder is extremely distended.  3.  Few mildly enlarged mediastinal lymph nodes measure larger than on the prior exam.  4.  Stable soft tissue in the left inguinal canal, possibly testicle.   XR Chest Port 1 View    Narrative    EXAM: XR CHEST PORT 1 VW  LOCATION: [facility]  DATE/TIME: 10/27/2020 1:20 AM    INDICATION: Line placement  COMPARISON: None.      Impression    IMPRESSION: Right PICC with tip at the cavoatrial junction. Heart size upper limits normal.       Disclaimer: This note consists of symbols derived from keyboarding, dictation and/or voice recognition software. As a result, there may be errors in the script that have gone undetected. Please consider this when interpreting information found in this chart.

## 2020-10-27 NOTE — PLAN OF CARE
ICU End of Shift Summary.  For vital signs and complete assessments, please see documentation flowsheets.     Pertinent assessments: alert and follows commands, developmental delay and marcel speaking cause communication difficulties even with an , pt's sister feels that he is confused when she talks with him, febrile up to 102.5, ST, BP WNL, denies pain, LS clear on RA, good UO per tran, no BM, tolerating reg diet  Major Shift Events: fever reduced with tylenol and ice packs, MD aware of fevers -no new cultures at this time, given 1 unit PRBC for Hgb 6.6 and recheck up to 9.1  Plan (Upcoming Events): continue to trend temp/HR/BP, procal and TB pending, ID and Heme/Onc following  Discharge/Transfer Needs: pt is stable enough to leave ICU if BPs remain WNL    Bedside Shift Report Completed : y  Bedside Safety Check Completed: prateek

## 2020-10-27 NOTE — CONSULTS
[facility]    Hematology/Oncology Consult Note      Date of Admission:  10/26/2020  Date of Consult:  10/27/20  Reason for Consult: testicular cancer, admitted with fever      ASSESSMENT/PLAN:  Luz Marina Diallo is a 55 year old male with:    1) Sepsis: He still has fever, tachycardia, soft blood pressures, being monitored in the ICU.  -appreciate hospitalist and ICU cares  -on broad spectrum antibiotics, IV fluids, supportive cares  -follow cultures  -COVID-19 testing is pending    2) Seminoma: s/p BEP with Neulasta support, completed C1D15 bleomycin yesterday.    -follow-up with Dr. Ramos and Araceli outpatient    3) Anemia: likely secondary to chemotherapy, sepsis  -transfuse as needed for hemoglobin <7.  He did receive a unit of blood today.    4) Thrombocytosis: suspect reactive  -monitor    5) Leukocytosis: likely secondary to sepsis.  He last received Neulasta on 10/6/20.    We will continue to follow.          HISTORY OF PRESENT ILLNESS: Luz Marina Diallo is a 55 year old male who presents with fever.  He is a patient of Dr. Ramos.       He was hospitalized at [facility] on 9/23/2020 for large abdominal mass, weight loss, decreased appetite and was found to have renal failure with creatinine of 4.5 with hypercalcemia 15.5.  CT confirmed a large abdominal mass compressing the ureters and he underwent bilateral ureteral stent placement on 9/24/2020.  Abdominal mass concern for malignancy and biopsy revealed germ cell tumor (seminoma).  Palafox was placed and he was given Zometa for hypercalcemia with IV fluids.  He was started on neoadjuvant chemotherapy with bleomycin, etoposide and carboplatin (cisplatin was not used due to renal function), C1 D1 completed on 10/1/2020 and he received the first 5 days inpatient.  He tolerated this well.  Neulasta was given on 10/6/20.    He received C1D15 of bleomycin on 10/26/20.      He was admitted last night due to high fever,  tachycardia, soft blood pressures.  He is in the ICU due to concern of sepsis.  CT c/a/p done yesterday showed interval decrease in size of the partially calcified pelvic mass and abdominal and pelvic lymph node enlargement.  Mild bilateral hydronephrosis with bilateral ureteral stents in place.  The urinary bladder is extremely distended.  He is broad spectrum antibiotics.        REVIEW OF SYSTEMS:   14 point ROS was reviewed and is negative other than as noted above in HPI.       MEDICATIONS:  Current Facility-Administered Medications   Medication     acetaminophen (TYLENOL) tablet 650 mg     allopurinol (ZYLOPRIM) tablet 300 mg     ceFEPIme (MAXIPIME) 2 g vial to attach to  ml bag for ADULTS or 50 ml bag for PEDS     ferrous sulfate (FEROSUL) tablet 325 mg     heparin ANTICOAGULANT injection 5,000 Units     heparin lock flush 10 UNIT/ML injection 2-5 mL     HYDROmorphone (PF) (DILAUDID) injection 0.3-0.5 mg     lidocaine (LMX4) cream     lidocaine 1 % 0.1-1 mL     lidocaine 1 % 0.1-1 mL     lidocaine 1 % 0.1-1 mL     lidocaine 1 % 0.1-5 mL     loratadine (CLARITIN) tablet 10 mg     magnesium sulfate 4 g in 100 mL sterile water (premade)     melatonin tablet 1 mg     metoclopramide (REGLAN) tablet 10 mg    Or     metoclopramide (REGLAN) injection 10 mg     multivitamin w/minerals (THERA-VIT-M) tablet 1 tablet     naloxone (NARCAN) injection 0.1-0.4 mg     naloxone (NARCAN) injection 0.1-0.4 mg     ondansetron (ZOFRAN-ODT) ODT tab 4 mg    Or     ondansetron (ZOFRAN) injection 4 mg     Patient is already receiving anticoagulation with heparin, enoxaparin (LOVENOX), warfarin (COUMADIN)  or other anticoagulant medication     potassium chloride (KLOR-CON) Packet 20-40 mEq     potassium chloride 10 mEq in 100 mL intermittent infusion with 10 mg lidocaine     potassium chloride 10 mEq in 100 mL sterile water intermittent infusion (premix)     potassium chloride ER (KLOR-CON M) CR tablet 20-40 mEq      "prochlorperazine (COMPAZINE) injection 10 mg    Or     prochlorperazine (COMPAZINE) tablet 10 mg    Or     prochlorperazine (COMPAZINE) suppository 25 mg     sodium chloride (PF) 0.9% PF flush 10 mL     sodium chloride (PF) 0.9% PF flush 10-20 mL     sodium chloride (PF) 0.9% PF flush 3 mL     sodium chloride (PF) 0.9% PF flush 3 mL     sodium chloride (PF) 0.9% PF flush 3 mL     sodium chloride (PF) 0.9% PF flush 3 mL     sodium chloride (PF) 0.9% PF flush 5-50 mL     sodium chloride 0.9% infusion     tamsulosin (FLOMAX) capsule 0.4 mg     vancomycin (VANCOCIN) 1000 mg in dextrose 5% 200 mL PREMIX         ALLERGIES:  No Known Allergies      PAST MEDICAL HISTORY:  Past Medical History:   Diagnosis Date     Mental retardation          PAST SURGICAL HISTORY:  Past Surgical History:   Procedure Laterality Date     ABDOMEN SURGERY      sister states he had an \"abdominal surgery 20 years ago\" unknown      CYSTOSCOPY, RETROGRADES, INSERT STENT URETER(S), COMBINED Bilateral 9/24/2020    Procedure: Cystoscopy with bilateral retrograde pyelogram and bilateral ureteral stent insertiona-complex due to severe bilateral hydroureteronephrosis with ureteral tortuosity, examination under anesthesia and fluoroscopic interpretation > 1 hour physician time;  Surgeon: Ralf Chan MD;  Location:  OR     ESOPHAGOSCOPY, GASTROSCOPY, DUODENOSCOPY (EGD), COMBINED N/A 3/1/2019    Procedure: ESOPHAGOSCOPY, GASTROSCOPY, DUODENOSCOPY (EGD) Rm 226;  Surgeon: Susy Camara MD;  Location:  GI         SOCIAL HISTORY:  Social History     Socioeconomic History     Marital status: Single     Spouse name: Not on file     Number of children: Not on file     Years of education: Not on file     Highest education level: Not on file   Occupational History     Not on file   Social Needs     Financial resource strain: Not on file     Food insecurity     Worry: Not on file     Inability: Not on file     Transportation needs     Medical: Not " "on file     Non-medical: Not on file   Tobacco Use     Smoking status: Never Smoker     Smokeless tobacco: Never Used   Substance and Sexual Activity     Alcohol use: Yes     Alcohol/week: 2.0 standard drinks     Types: 1 Glasses of wine, 1 Cans of beer per week     Comment: once every month or two, social functions only     Drug use: No     Sexual activity: Not Currently   Lifestyle     Physical activity     Days per week: Not on file     Minutes per session: Not on file     Stress: Not on file   Relationships     Social connections     Talks on phone: Not on file     Gets together: Not on file     Attends Faith service: Not on file     Active member of club or organization: Not on file     Attends meetings of clubs or organizations: Not on file     Relationship status: Not on file     Intimate partner violence     Fear of current or ex partner: Not on file     Emotionally abused: Not on file     Physically abused: Not on file     Forced sexual activity: Not on file   Other Topics Concern     Parent/sibling w/ CABG, MI or angioplasty before 65F 55M? Not Asked   Social History Narrative     Not on file         FAMILY HISTORY:  Family History   Problem Relation Age of Onset     Diabetes Father      Glaucoma No family hx of      Macular Degeneration No family hx of          PHYSICAL EXAM:  Vital signs:  Temp: 101.8  F (38.8  C) Temp src: Bladder BP: 109/76 Pulse: 124   Resp: 20 SpO2: 98 % O2 Device: None (Room air)     Weight: 48.5 kg (107 lb)  Estimated body mass index is 20.22 kg/m  as calculated from the following:    Height as of 10/9/20: 1.549 m (5' 1\").    Weight as of this encounter: 48.5 kg (107 lb).    GENERAL/CONSTITUTIONAL: No acute distress.  EYES: No scleral icterus.  RESPIRATORY: Clear to auscultation bilaterally on anterior auscultation.  CARDIOVASCULAR: Tachycardic.  GASTROINTESTINAL: No tenderness. No guarding.    MUSCULOSKELETAL: Warm and well-perfuseda.  NEUROLOGIC: Alert, oriented, answers " questions appropriately.    LABS:  CBC RESULTS:   Recent Labs   Lab Test 10/27/20  0700 10/27/20  0400   WBC  --  27.2*   RBC  --  2.28*   HGB 6.6* 6.7*   HCT  --  21.5*   MCV  --  94   MCH  --  29.4   MCHC  --  31.2*   RDW  --  19.6*   PLT  --  655*       Recent Labs   Lab Test 10/27/20  0400 10/26/20  1453    135   POTASSIUM 4.2 4.0   CHLORIDE 112* 103   CO2 22 25   ANIONGAP 6 7   * 117*   BUN 19 18   CR 1.54* 1.43*   IVANIA 5.5* 6.2*           IMAGING:  CT c/a/p 10/26/20:  1.  Interval decrease in size of the partially calcified pelvic mass and abdominal and pelvic lymph node enlargement.  2.  Mild bilateral hydronephrosis with bilateral ureteral stents in place. The urinary bladder is extremely distended.  3.  Few mildly enlarged mediastinal lymph nodes measure larger than on the prior exam.  4.  Stable soft tissue in the left inguinal canal, possibly testicle.      Thank you for the opportunity to participate in this patient's care.  Please call with any questions.    Virgie Fan MD  Hematology/Oncology  [facility]

## 2020-10-27 NOTE — CONSULTS
ID consult dictated IMP 1 54 yo male met malignancy , recent urosepsis, new sepsis cx pending CT wo clear source    REc same, await cxs, as an aside quant TB check(no iso issue)

## 2020-10-27 NOTE — PHARMACY-ADMISSION MEDICATION HISTORY
Admission medication history interview status for this patient is complete. See [facility] admission navigator for allergy information, prior to admission medications and immunization status.     Medication history interview done via telephone during Covid-19 pandemic, indicate source(s): Patient's sister Mamta  Medication history resources (including written lists, pill bottles, clinic record): [employer] dispense records  Pharmacy:     Changes made to PTA medication list:  Added: None  Deleted: None  Changed:   1. Added instructions to Loratadine    Actions taken by pharmacist (provider contacted, etc):None     Additional medication history information:    See treatment plan for outpatient chemotherapy regimen (Bleomycin, Etoposide, Carboplatin). Received Bleomycin today.    Medication reconciliation/reorder completed by provider prior to medication history?  Yes       Prior to Admission medications    Medication Sig Last Dose Taking? Auth Provider   allopurinol (ZYLOPRIM) 300 MG tablet Take 1 tablet (300 mg) by mouth daily 10/26/2020 at AM Yes Daniel Su MD   ferrous sulfate (FEROSUL) 325 (65 Fe) MG tablet Take 1 tablet (325 mg) by mouth daily (with breakfast) 10/26/2020 at AM Yes Meche Solano PA-C   loratadine (CLARITIN) 10 MG tablet Take 10 mg by mouth daily for 5 days following Neulasta 10/9/2020 Yes Unknown, Entered By History   multivitamin w/minerals (THERA-VIT-M) tablet Take 1 tablet by mouth daily 10/26/2020 at AM Yes Unknown, Entered By History   tamsulosin (FLOMAX) 0.4 MG capsule Take 1 capsule (0.4 mg) by mouth daily 10/26/2020 at AM Yes Logan Bunch MD

## 2020-10-27 NOTE — PROVIDER NOTIFICATION
"Dr. Medina updated via web based paging \"Pts blood pressure worsening. BP at 1910 85/53. BP at 2010 78/50. Lactic pending. Interventions? Thanks\"    Interventions: 1000 mL bolus of NS over 1 hour. Recheck pressure when finished and 15 minutes following   "

## 2020-10-27 NOTE — CONSULTS
Care Management Initial Consult    General Information  Assessment completed with:: Other(Sister, Mamta), Mamta  Type of CM/SW Visit: Initial Assessment  Primary Care Provider verified and updated as needed?: Yes  Readmission Within the Last 30 Days: current reason for admission unrelated to previous admission  Return Category: Exacerbation of disease  Reason for Consult: discharge planning  Advance Care Planning: Advance Care Planning Reviewed: no concerns identified          Communication Assessment  Patient's communication style: spoken language (non-English)  Hearing Difficulty or Deaf: no Wear Glasses or Blind: yes    Cognitive  Cognitive/Neuro/Behavioral: .WDL except  Level of Consciousness: alert  Arousal Level: opens eyes spontaneously  Orientation: disoriented to, place, time  Mood/Behavior: calm, cooperative  Best Language: 0 - No aphasia  Speech: clear    Living Environment:   People in home: sibling(s)  Name(s) of People in Home: Mamta  Current living Arrangements: house          Family/Social Support:  Care provided by:    Provides care for:             Description of Support System:                        Current Resources:   Skilled Home Care Services:    Community Resources: [facility], [facility]  Equipment currently used at home: none  Supplies currently used at home:      Employment:  Employment Status: disabled        Financial/Environmental Concerns:            Lifestyle & Psychosocial Needs:        Socioeconomic History     Marital status: Single     Spouse name: Not on file     Number of children: Not on file     Years of education: Not on file     Highest education level: Not on file     Tobacco Use     Smoking status: Never Smoker     Smokeless tobacco: Never Used   Substance and Sexual Activity     Alcohol use: Yes     Alcohol/week: 2.0 standard drinks     Types: 1 Glasses of wine, 1 Cans of beer per week     Comment: once every month or two, social functions only     Drug use: No     Sexual  activity: Not Currently       Functional Status:  Prior to admission patient needed assistance:          Mental Health Status:  WDL                            Values/Beliefs:  Spiritual, Cultural Beliefs, Temple Practices, Values that affect care:                 Additional Information:  CM spoke with pt's sister, Mamta. Pt is a readmit from 10/14. It was noted that Mamta was suppose to have a zoom meeting with [facility] to assess for PCA support. This meeting happened last Thursday and the pt was approved for 1.75hrs of PCA services a day (this has not started yet). Mamta also mentioned he has a CADI waiver pending. At baseline he is independent with mobility but since starting the chemo he has been weak. Mamta also mentions they currently do not need any medical equipment and really never leave the house because of COVID. He follows with Dr. Richard MERIDA oncology and Dr. Chan of urology. Mamta denied any needs at this time.    Bhargavi Cruz RN, BSN CTS  Care Coordinator  [facility]   [phone]

## 2020-10-27 NOTE — CONSULTS
Consult Date:  10/27/2020      INFECTIOUS DISEASE CONSULTATION      REQUESTING PHYSICIAN:  Dr. Varner      IMPRESSION:   1.  A 55-year-old male, recent diagnosis of seminoma with a large abdominal mass, recent urosepsis with Klebsiella, now with recurrent sepsis, urine not particularly abnormal, tumor decreased in size with no major new obstruction on CT scan nor abscess or other, cause of current sepsis is unclear.  Cultures are pending.   2.  Recent Klebsiella urosepsis with obstruction.   3.  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   4.  Renal insufficiency.   5.  COVID-19 rule out - negative.      RECOMMENDATIONS:   1.  Currently getting vanco and cefepime.  Obviously, try to limit the amount of vancomycin.  If cultures not growing something that requires it by tomorrow, probably discontinue.   2.  Further workup as directed.   3.  As an aside, no reason to think this is tuberculosis, but in this high-risk patient, his tuberculosis status should be a known part of the history and is neither known to the patient nor anywhere in the records.  Get a QuantiFERON TB Gold test for that purpose, no isolation issue.      HISTORY OF PRESENT ILLNESS:  This 55-year-old male is seen in consultation due to apparent sepsis.  The patient has a history of recent diagnosis of seminoma, which presented with a large abdominal wall mass.  As part of that, had ureteral obstruction, for which he has hydronephrosis and stents and had urosepsis with Klebsiella several weeks ago, got over that, seemed to be improved, then developed further fever.  He has gotten recent chemotherapy and had neutropenia, but with Neulasta is rapidly up into the normal range.  Some respiratory symptoms, but nothing particularly notable and imaging not impressive.  COVID-19 testing negative.  No recent travels or exposures of note.      The patient is a [place]  immigrant, came to the [place] 10+ years ago.  He thinks he had some skin testing done for TB, but has no knowledge of his status.  Has never had other immigrant-related infections including no malaria or anything similar.      PAST MEDICAL HISTORY:  A [place] immigrant without prior major infections including no TB knowledge of history, the recent seminoma as noted including ureteral obstruction and hydronephrosis.      ALLERGIES:  NONE.      SOCIAL AND FAMILY HISTORY:  No known resistant pathogens.  [place] immigrant without immigrant-related diseases.  No known recent COVID-19 exposures.      MEDICATIONS:  As listed.      REVIEW OF SYSTEMS:  Largely as above.  Feels relatively better currently, some degree of malaise, fatigue.  No particular notable pain or discomfort.      PHYSICAL EXAMINATION:   CONSTITUTIONAL:  The patient appears his stated age.  He is in the ICU, but not that toxic or ill looking.  Tachycardic to 120.  Oxygen levels relatively okay.  Major fever up to 103, now down.   HEENT:  No visible lesions.  No facial skin rashes.   NECK:  Supple and nontender without lymphadenopathy or thyromegaly.   HEART:  Regular rhythm, tachycardic.   LUNGS:  Crackles at both bases, okay air movement.   ABDOMEN:  Mostly nontender.   EXTREMITIES:  No rashes or skin lesions.      LABORATORY DATA:  Blood cultures so far negative.  Urine relatively unremarkable.        CT scan decreased size in the abdominal tumor, stents in place.  No abscesses or similar.      Thank you very much for the consultation.  I will follow the patient with you.         VIC PALACIOS MD             D: 10/27/2020   T: 10/27/2020   MT: SRAVAN      Name:     RAJWINDER OSULLIVAN   MRN:      [id number]        Account:       [id number]   :      1965           Consult Date:  10/27/2020      Document: [id number]       cc: [facility]

## 2020-10-27 NOTE — PROVIDER NOTIFICATION
DATE:  10/27/2020   TIME OF RECEIPT FROM LAB:  0423  LAB TEST:  Hgb  LAB VALUE:  6.7  RESULTS GIVEN WITH READ-BACK TO (PROVIDER):  Telehub RN  TIME LAB VALUE REPORTED TO PROVIDER:   0424      Ordered type & screen. Unable to obtain pt's consent with  ipad. Will contact sister, Mamta, for blood consent.

## 2020-10-27 NOTE — PHARMACY-VANCOMYCIN DOSING SERVICE
Pharmacy Vancomycin Initial Note  Date of Service 2020  Patient's  1965  55 year old, male    Indication: Sepsis and Urinary Tract Infection    Current estimated CrCl = Estimated Creatinine Clearance: 39.4 mL/min (A) (based on SCr of 1.43 mg/dL (H)).    Creatinine for last 3 days  10/26/2020:  8:20 AM Creatinine 1.52 mg/dL;  2:53 PM Creatinine 1.43 mg/dL    Recent Vancomycin Level(s) for last 3 days  No results found for requested labs within last 72 hours.      Vancomycin IV Administrations (past 72 hours)                   vancomycin (VANCOCIN) 1000 mg in dextrose 5% 200 mL PREMIX (mg) 1,000 mg New Bag 10/26/20 1622                Nephrotoxins and other renal medications (From now, onward)    Start     Dose/Rate Route Frequency Ordered Stop    10/27/20 1600  vancomycin (VANCOCIN) 1000 mg in dextrose 5% 200 mL PREMIX      1,000 mg  200 mL/hr over 1 Hours Intravenous EVERY 24 HOURS 10/26/20 2114            Contrast Orders - past 72 hours (72h ago, onward)    None                Plan:  1.  Start vancomycin  1000 mg IV q24h.   2.  Goal Trough Level: 15-20 mg/L   3.  Pharmacy will check trough levels as appropriate in 1-3 Days.    4. Serum creatinine levels will be ordered daily for the first week of therapy and at least twice weekly for subsequent weeks.    5. [place] method utilized to dose vancomycin therapy: Method 1    Shyam Wetzel [facility]

## 2020-10-28 NOTE — PROGRESS NOTES
[facility]  Hospitalist Progress Note  Daniel Su MD 10/28/20    Reason for Stay (Diagnosis): Sepsis, hypocalcemia         Assessment and Plan:      Summary of Stay:   Luz Marina Diallo is a 55 year old male with past medical history of seminoma on chemotherapy who just received bleomycin, anemia, obstructive uropathy with bilateral ureteral stent place, CKD stage II, PUD, hiatal hernia, and developmental delay who was admitted on 10/26/2020 with fevers.   he just finished bleomycin and is also getting Neulasta.  Presenting primarily with high fevers up to 103-104  F.  Initially tachycardic.  Lactic acid not elevated.  Does have leukocytosis of 35.  Procalcitonin significantly elevated 96.  Urinalysis was bland.  CT of the chest/abdomen/pelvis did not show any obvious infectious source.  He was started on IV vancomycin and cefepime.  He was admitted initially to the ICU for soft blood pressure in the setting of sepsis.  Blood and urine cultures no growth to date.  COVID-19 PCR negative.  Infectious disease and oncology consulted.  Fairly significant hypocalcemia that is being replaced daily.      Problem List/Assessment and Plan:   Sepsis: Presenting with high fevers up to 103-104  F, tachycardia, leukocytosis at 35, procalcitonin very elevated 96   In the setting of chemotherapy.  Source is unclear.  Blood cultures no growth to date.  COVID-19 PCR negative.  Urinalysis bland.  CT chest/abdomen/pelvis showed decreasing size of his known pelvic mass, mild bilateral hydronephrosis with patent ureteral stents, and mediastinal adenopathy, but no source of infection.  -ID consulted  -Continue empiric vancomycin and cefepime  -Blood cultures NGTD    Hypocalcemia: Low ionized calcium 3.4.  Receiving replacement intermittently.  -3 g IV calcium gluconatex1 and start Os-Farshad 650 mg p.o. twice daily  -Check ICA tomorrow    Seminoma: Has a large abdominal mass and lymphadenopathy.  Follows with Dr. Ramos  from [facility] oncology. S/p BEP chemotherapy with Neulasta support.  Completed bleomycin on 10/26/2020.  -Oncology consulted   -continue allopurinol 300 mg daily    Chronic anemia: Secondary to chemotherapy.  Transfused 1 unit RBCs on 10/27.  -Daily CBC, transfuse for hemoglobin less than 7  Continue oral ferrous sulfate-    Thrombocytosis: Likely reactive secondary to sepsis.  Is receiving Lovenox no need to monitor platelet count.  -Continue Lovenox for now as he does not have thrombocytopenia and likely had significant elevation in his initial platelet count  that was reactive from his sepsis    YAYA on CKD stage II: Recent hospitalization for significant YAYA.  Has bilateral ureteral stents in place for obstructive uropathy.  After Palafox catheter placement for significant retention on CT and inability to void creatinine initially 1.8 now continuously improving while here with IV fluids.  Excellent urine output.   -Decrease IV normal saline to 100 ml/hr  -BMP tomorrow    Urinary retention:  significantly distended bladder on initial CT.  Unable to void.  Previously had catheter in place during last hospitalization.  -Continue Palafox catheter  -Flomax    Developmental delay: Lives with sister who helps provide care.  At baseline he is very functional    DVT Prophylaxis: Enoxaparin (Lovenox) SQ  Code Status: Full Code  FEN: Regular diet, decrease NS to 100 ml/hr  Discharge Dispo: Home  Estimated Disch Date / # of Days until Disch: May be able to transition out of ICU if vital signs remain relatively stable and that is needed later tonight.  Anticipate minimal additional 3-5-day hospitalization        Interval History (Subjective):      Assumed care today.  Patient known to me from previous hospitalizations.  He does not endorse any new symptoms.  He denies any cough, shortness of breath, abdominal pain.  He was febrile up to 104.2  F overnight.  Continuing empiric antibiotics.  Excellent urine output.                   Physical Exam:      Last Vital Signs:  /71   Pulse 116   Temp 98.6  F (37  C)   Resp 20   Wt 46.4 kg (102 lb 4.7 oz)   SpO2 100%   BMI 19.33 kg/m        Intake/Output Summary (Last 24 hours) at 10/28/2020 1753  Last data filed at 10/28/2020 1600  Gross per 24 hour   Intake 4305 ml   Output 3950 ml   Net 355 ml       Constitutional: Awake, NAD   Eyes: sclera white   HEENT:  MMM  Respiratory: no respiratory distress, lungs cta bilaterally, no crackles or wheeze  Cardiovascular: RRR.  No murmur   GI: Mildly distended and firm, nontender, bowel sounds present  Genitourinary: Palafox catheter draining clear yellow urine  Skin: no rash   Musculoskeletal/extremities: No significant lower extremity edema  Neurologic: A&O, in setting symptom based questions  Psychiatric: calm, cooperative          Medications:      All current medications were reviewed with changes reflected in problem list.         Data:      All new lab and imaging data was reviewed.   Labs:  Recent Labs   Lab 10/26/20  1803 10/26/20  1531 10/26/20  1454   CULT No growth No growth after 2 days No growth after 2 days     Recent Labs   Lab 10/28/20  1420 10/28/20  0550 10/27/20  0400 10/26/20  1453     --  140 135   POTASSIUM 3.8  --  4.2 4.0   CHLORIDE 113*  --  112* 103   CO2 20  --  22 25   ANIONGAP 9  --  6 7   GLC 91  --  127* 117*   BUN 14  --  19 18   CR 1.15 1.32* 1.54* 1.43*   GFRESTIMATED 72 61 51* 55*   GFRESTBLACK 84 71 59* 64   IVANIA 7.1*  --  5.5* 6.2*     Recent Labs   Lab 10/28/20  1420 10/28/20  0550 10/27/20  1440 10/27/20  0400 10/27/20  0400 10/26/20  1453   WBC 18.8*  --   --   --  27.2* 35.9*   HGB 8.0* 8.2* 9.1*   < > 6.7* 7.6*   HCT 25.8*  --   --   --  21.5* 25.8*   MCV 94  --   --   --  94 97     --   --   --  655* 832*    < > = values in this interval not displayed.   Ionized calcium 3.5  Imaging:   None today      Daniel Su MD

## 2020-10-28 NOTE — PHARMACY-VANCOMYCIN DOSING SERVICE
Pharmacy Vancomycin Note  Date of Service 2020  Patient's  1965   55 year old, male    Indication: Sepsis  Goal Trough Level: 15-20 mg/L  Day of Therapy: 3  Current Vancomycin regimen:  1000 mg IV q24h    Current estimated CrCl = Estimated Creatinine Clearance: 48.8 mL/min (based on SCr of 1.15 mg/dL).    Creatinine for last 3 days  10/26/2020:  8:20 AM Creatinine 1.52 mg/dL;  2:53 PM Creatinine 1.43 mg/dL  10/27/2020:  4:00 AM Creatinine 1.54 mg/dL  10/28/2020:  5:50 AM Creatinine 1.32 mg/dL;  2:20 PM Creatinine 1.15 mg/dL    Recent Vancomycin Levels (past 3 days)  10/27/2020:  2:40 PM Vancomycin Level 8.1 mg/L  10/28/2020:  2:20 PM Vancomycin Level 10.7 mg/L    Vancomycin IV Administrations (past 72 hours)                   vancomycin (VANCOCIN) 1000 mg in dextrose 5% 200 mL PREMIX (mg) 1,000 mg New Bag 10/27/20 1621    vancomycin (VANCOCIN) 1000 mg in dextrose 5% 200 mL PREMIX (mg) 1,000 mg New Bag 10/26/20 1622                Nephrotoxins and other renal medications (From now, onward)    Start     Dose/Rate Route Frequency Ordered Stop    10/27/20 1600  vancomycin (VANCOCIN) 1000 mg in dextrose 5% 200 mL PREMIX      1,000 mg  200 mL/hr over 1 Hours Intravenous EVERY 24 HOURS 10/26/20 2114               Contrast Orders - past 72 hours (72h ago, onward)    None          Interpretation of levels and current regimen:  Trough level is  Subtherapeutic (but after only after 2 doses given, has not reached stable state)    Has serum creatinine changed > 50% in last 72 hours: No    Urine output:  good urine output    Renal Function: Stable    Plan:  1.  Continue Current Dose  2.  Pharmacy will check trough levels as appropriate in 1-3 Days.    3. Serum creatinine levels will be ordered daily for the first week of therapy and at least twice weekly for subsequent weeks.      Bethel El RPH        .

## 2020-10-28 NOTE — PLAN OF CARE
ICU End of Shift Summary.  For vital signs and complete assessments, please see documentation flowsheets.     Pertinent assessments: disoriented to time (sister says this is normal), alert and cooperative, Samoan speaking and developmentally delayed, ST, normotensive, denies pain, LS clear on RA, low grade temp max 100.9, up to bathroom with Ax1, good UO per tran, 2 large soft BMs, eating well at meals  Major Shift Events: up to chair most of day, no worsening fever today  Plan (Upcoming Events): continue IV abx, cultures pending  Discharge/Transfer Needs: could transfer tomorrow if remaining stable    Bedside Shift Report Completed : y  Bedside Safety Check Completed: y

## 2020-10-28 NOTE — TELEPHONE ENCOUNTER
Post Discharge Medication Reconciliation Status: discharge medications reconciled, continue medications without change, see post hospitalizations  Dorinda Flood, RN, BSN  Message handled by CLINIC NURSE.  .

## 2020-10-28 NOTE — PROGRESS NOTES
[facility]    Hematology/Oncology Follow-up Note      Today's Date: 10/28/20  Date of Admission:  10/26/2020  Reason for Consult: testicular cancer, admitted with fever        ASSESSMENT/PLAN:  Luz Marina Diallo is a 55 year old male with:     Sepsis: Currently in the ICU, low-grade fever and tachycardia  -appreciate hospitalist, infectious disease and ICU cares  -on broad spectrum antibiotics, IV fluids, supportive cares  -Blood cultures and urine culture no growth to date  -COVID-19 test negative     Seminoma: s/p BEP with Neulasta support, completed C1D15 bleomycin on 10/26.  -follow-up with Dr. Ramos and [facility] outpatient     Anemia: likely secondary to chemotherapy, sepsis  -transfuse as needed for hemoglobin <7.  He did receive a unit of blood 10/27.     Thrombocytosis: suspect reactive  -improved     Leukocytosis: likely secondary to sepsis.  He last received Neulasta on 10/6/20.  -improved      INTERIM HISTORY: Rigoberto was seen in his room and feels much better today. His sister, Mamta, is at bedside. His appetite is really good today. He denies any pain. Some fever earlier this morning. No nausea or vomiting.        MEDICATIONS:  Current Facility-Administered Medications   Medication     acetaminophen (TYLENOL) tablet 650 mg     allopurinol (ZYLOPRIM) tablet 300 mg     calcium carbonate (TUMS) chewable tablet 500 mg     ceFEPIme (MAXIPIME) 2 g vial to attach to  ml bag for ADULTS or 50 ml bag for PEDS     ferrous sulfate (FEROSUL) tablet 325 mg     heparin ANTICOAGULANT injection 5,000 Units     heparin lock flush 10 UNIT/ML injection 2-5 mL     HYDROmorphone (PF) (DILAUDID) injection 0.3-0.5 mg     lidocaine (LMX4) cream     lidocaine 1 % 0.1-1 mL     lidocaine 1 % 0.1-1 mL     lidocaine 1 % 0.1-1 mL     lidocaine 1 % 0.1-5 mL     loratadine (CLARITIN) tablet 10 mg     melatonin tablet 1 mg     metoclopramide (REGLAN) tablet 10 mg    Or     metoclopramide (REGLAN) injection 10  "mg     multivitamin w/minerals (THERA-VIT-M) tablet 1 tablet     naloxone (NARCAN) injection 0.1-0.4 mg     naloxone (NARCAN) injection 0.1-0.4 mg     ondansetron (ZOFRAN-ODT) ODT tab 4 mg    Or     ondansetron (ZOFRAN) injection 4 mg     pantoprazole (PROTONIX) EC tablet 40 mg     Patient is already receiving anticoagulation with heparin, enoxaparin (LOVENOX), warfarin (COUMADIN)  or other anticoagulant medication     prochlorperazine (COMPAZINE) injection 10 mg    Or     prochlorperazine (COMPAZINE) tablet 10 mg    Or     prochlorperazine (COMPAZINE) suppository 25 mg     sodium chloride (PF) 0.9% PF flush 10 mL     sodium chloride (PF) 0.9% PF flush 10-20 mL     sodium chloride (PF) 0.9% PF flush 3 mL     sodium chloride (PF) 0.9% PF flush 3 mL     sodium chloride (PF) 0.9% PF flush 3 mL     sodium chloride (PF) 0.9% PF flush 3 mL     sodium chloride (PF) 0.9% PF flush 5-50 mL     sodium chloride 0.9% infusion     tamsulosin (FLOMAX) capsule 0.4 mg     vancomycin (VANCOCIN) 1000 mg in dextrose 5% 200 mL PREMIX         ALLERGIES:  No Known Allergies      PHYSICAL EXAM:  Vital signs:  Temp: 99.1  F (37.3  C) Temp src: Bladder BP: 104/70 Pulse: 106   Resp: 20 SpO2: 98 % O2 Device: None (Room air)     Weight: 46.4 kg (102 lb 4.7 oz)  Estimated body mass index is 19.33 kg/m  as calculated from the following:    Height as of 10/9/20: 1.549 m (5' 1\").    Weight as of this encounter: 46.4 kg (102 lb 4.7 oz).    GENERAL:  Male, in no acute distress.  Alert and oriented x3. Well groomed.   HEENT:  Normocephalic, atraumatic. No conjunctival injection or eye swelling.   LUNGS:  Nonlabored breathing, no cough or audible wheezing, able to speak full sentences.  MSK: Full ROM UE.    SKIN: No rash on exposed skin.   NEURO: CN grossly intact, speech normal  PSYCH: Smiling and happy today      LABS:  CBC RESULTS:   Recent Labs   Lab Test 10/28/20  0550 10/27/20  0400 10/27/20  0400   WBC  --   --  27.2*   RBC  --   --  2.28*   HGB " 8.2*   < > 6.7*   HCT  --   --  21.5*   MCV  --   --  94   MCH  --   --  29.4   MCHC  --   --  31.2*   RDW  --   --  19.6*   PLT  --   --  655*    < > = values in this interval not displayed.       Recent Labs   Lab Test 10/28/20  0550 10/27/20  0400 10/26/20  1453   NA  --  140 135   POTASSIUM  --  4.2 4.0   CHLORIDE  --  112* 103   CO2  --  22 25   ANIONGAP  --  6 7   GLC  --  127* 117*   BUN  --  19 18   CR 1.32* 1.54* 1.43*   IVANIA  --  5.5* 6.2*           Araceli Pacheco PA-C  Hematology/Oncology  [facility]

## 2020-10-28 NOTE — PROGRESS NOTES
[facility]  Infectious Disease Progress Note          Assessment and Plan:   IMPRESSION:   1.  A 55-year-old male, recent diagnosis of seminoma with a large abdominal mass, recent urosepsis with Klebsiella, now with recurrent sepsis, urine not particularly abnormal, tumor decreased in size with no major new obstruction on CT scan nor abscess or other, cause of current sepsis is unclear.  Cultures are pending.   2.  Recent Klebsiella urosepsis with obstruction.   3.  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   4.  Renal insufficiency.   5.  COVID-19 rule out - negative.      RECOMMENDATIONS:   1.  Currently getting vanco and cefepime.  Obviously, try to limit the amount of vancomycin.  If cultures not growing something that requires it  probably discontinue, but dx is unclear.   2.  Further workup as directed. No new focal sxs  3.  As an aside, no reason to think this is tuberculosis, but in this high-risk patient, his tuberculosis status should be a known part of the history and is neither known to the patient nor anywhere in the records.  Get a QuantiFERON TB Gold test for that purpose, no isolation issue.         Interval History:   no new complaints and doing well; T slowly better, no + cxs .              Medications:       allopurinol  300 mg Oral Daily     calcium carbonate  500 mg Oral BID w/meals     calcium carbonate  500 mg Oral BID w/meals     ceFEPIme (MAXIPIME) IV  2 g Intravenous Q12H     ferrous sulfate  325 mg Oral Daily with breakfast     heparin ANTICOAGULANT  5,000 Units Subcutaneous Q12H     loratadine  10 mg Oral Daily     multivitamin w/minerals  1 tablet Oral Daily     pantoprazole  40 mg Oral QAM AC     sodium chloride (PF)  10 mL Intracatheter Q8H     sodium chloride (PF)  3 mL Intracatheter Q8H     tamsulosin  0.4 mg Oral Daily     vancomycin (VANCOCIN) IV  1,000 mg Intravenous Q24H                   Physical Exam:   Blood pressure 98/67, pulse 99, temperature 98.1  F (36.7  C), resp. rate 18, weight 46.4 kg (102 lb 4.7 oz), SpO2 100 %.  Wt Readings from Last 2 Encounters:   10/28/20 46.4 kg (102 lb 4.7 oz)   10/26/20 46.6 kg (102 lb 12.8 oz)     Vital Signs with Ranges  Temp:  [97.3  F (36.3  C)-104.2  F (40.1  C)] 98.1  F (36.7  C)  Pulse:  [] 99  Resp:  [10-40] 18  BP: ()/(57-83) 98/67  SpO2:  [76 %-100 %] 100 %    Constitutional: Awake, alert, cooperative, no apparent distress looks much better   Lungs: Clear to auscultation bilaterally, no crackles or wheezing   Cardiovascular: Regular rate and rhythm, normal S1 and S2, and no murmur noted   Abdomen: Normal bowel sounds, soft, non-distended, sl tender   Skin: No rashes, no cyanosis, sl edema   Other:           Data:   All microbiology laboratory data reviewed.  Recent Labs   Lab Test 10/28/20  1420 10/28/20  0550 10/27/20  1440 10/27/20  0400 10/27/20  0400 10/26/20  1453   WBC 18.8*  --   --   --  27.2* 35.9*   HGB 8.0* 8.2* 9.1*   < > 6.7* 7.6*   HCT 25.8*  --   --   --  21.5* 25.8*   MCV 94  --   --   --  94 97     --   --   --  655* 832*    < > = values in this interval not displayed.     Recent Labs   Lab Test 10/28/20  1420 10/28/20  0550 10/27/20  0400   CR 1.15 1.32* 1.54*     No lab results found.  Recent Labs   Lab Test 10/26/20  1803 10/26/20  1531 10/26/20  1454 10/19/20  1748 10/19/20  1648 10/19/20  1515 10/11/20  0108 10/09/20  2228 10/09/20  2154   CULT No growth No growth after 2 days No growth after 2 days No growth No growth No growth No growth Cultured on the 1st day of incubation:  Klebsiella oxytoca  Susceptibility testing done on previous specimen  *  Critical Value/Significant Value, preliminary result only, called to and read back by  Krista Miller RN. 10/10/20 @ CANDIE Weir    Cultured on the 1st day of incubation:  Klebsiella oxytoca  *  Critical Value/Significant Value, preliminary result  only, called to and read back by   Krista Miller RN. 10/10/20 @ 134CANDIE Vázquez    (Note)  POSITIVE for KLEBSIELLA OXYTOCA by Sez[employer] multiplex nucleic acid  test. Final identification and antimicrobial susceptibility testing  will be verified by standard methods.    Specimen tested with Picture[employer]igene multiplex, gram-negative blood culture  nucleic acid test for the following targets: Acinetobacter sp.,  Citrobacter sp., Enterobacter sp., Proteus sp., E. coli, K.  pneumoniae/oxytoca, P. aeruginosa, and the following resistance  markers: CTXM, KPC, NDM, VIM, IMP and OXA.    Critical Value/Significant Value called to and read back by  Toi Palomino RN @ 1605. 10/10/20. AV   >100,000 colonies/mL  Klebsiella oxytoca  *

## 2020-10-28 NOTE — PROGRESS NOTES
Called tele hub regarding patient's -160's, also has fever 38.2, patient shivering.   Will start will tylenol for fever and continue to monitor.

## 2020-10-28 NOTE — PLAN OF CARE
ICU End of Shift Summary.  For vital signs and complete assessments, please see documentation flowsheets.     Pertinent assessments: patient alert/orientated. Answers yes/no questions. Room air. High fever 104.5 at start of shift, tachycardic 160's, rigors. Tylenol given and ice packs placed on patient. Fever resolved. Denies pain. Palafox patent, good uo. Large BM this shift. Picc line in R upper arm.   Major Shift Events: fever, tachycardic  Plan (Upcoming Events): continue plan of care  Discharge/Transfer Needs: TBD    Bedside Shift Report Completed : yes  Bedside Safety Check Completed: yes

## 2020-10-29 NOTE — PLAN OF CARE
ICU End of Shift Summary.  For vital signs and complete assessments, please see documentation flowsheets.     Pertinent assessments: A&Ox4, Argentine speaking and dev delay, ST, low grad temp, denies pain, LS clear on RA, up with Ax1, good UO per tran, fair appetite with reg diet  Major Shift Events: IV Ca++ replaced  Plan (Upcoming Events): continue IV abx, remove PICC but keep tran for now  Discharge/Transfer Needs: transfer to room 541 med/surg at this time via w/c, sister called with update on room change, report given to receiving nurse

## 2020-10-29 NOTE — PROGRESS NOTES
[facility]  Hospitalist Progress Note  Daniel Su MD 10/29/20    Reason for Stay (Diagnosis): Sepsis, hypocalcemia         Assessment and Plan:      Summary of Stay:   Luz Marina Diallo is a 55 year old male with past medical history of seminoma on chemotherapy who just received bleomycin, anemia, obstructive uropathy with bilateral ureteral stent place, CKD stage II, PUD, hiatal hernia, and developmental delay who was admitted on 10/26/2020 with fevers.   he just finished bleomycin and is also getting Neulasta.  Presenting primarily with high fevers up to 103-104  F.  Initially tachycardic.  Lactic acid not elevated.  Does have leukocytosis of 35.  Procalcitonin significantly elevated 96.  Urinalysis was bland.  CT of the chest/abdomen/pelvis did not show any obvious infectious source.  He was started on IV vancomycin and cefepime.  He was admitted initially to the ICU for soft blood pressure in the setting of sepsis.  Blood and urine cultures no growth to date.  COVID-19 PCR negative.  Infectious disease and oncology consulted.  Fairly significant hypocalcemia that is being replaced daily.  Fever curve and leukocytosis improving.  Removing PICC.  Transfer to medical floor due to improved condition.    Problem List/Assessment and Plan:   Sepsis: Presenting with high fevers up to 103-104  F, tachycardia, leukocytosis at 35, procalcitonin very elevated 96   In the setting of chemotherapy.  Source is unclear.  Blood cultures no growth to date.  COVID-19 PCR negative.  Urinalysis bland.  CT chest/abdomen/pelvis showed decreasing size of his known pelvic mass, mild bilateral hydronephrosis with patent ureteral stents, and mediastinal adenopathy, but no source of infection.  Fever curve and leukocytosis improving.  -ID consulted  -Continue empiric vancomycin and cefepime  -Blood cultures NGTD  -Remove PICC line    Hypocalcemia: Low ionized calcium 3.4.  Receiving replacement intermittently.  -3 g  IV calcium gluconatex1 again today and continue new Os-Farshad 650 mg p.o. twice daily  -Check ICA tomorrow    Seminoma: Has a large abdominal mass and lymphadenopathy.  Follows with Dr. Ramos from [facility] oncology. S/p BEP chemotherapy with Neulasta support.  Completed bleomycin on 10/26/2020.  -Oncology consulted   -continue allopurinol 300 mg daily    Chronic anemia: Secondary to chemotherapy.  Transfused 1 unit RBCs on 10/27.  -Daily CBC, transfuse for hemoglobin less than 7  -Continue oral ferrous sulfate    Thrombocytosis: Likely reactive secondary to sepsis.  Is receiving Lovenox no need to monitor platelet count.  -Continue Lovenox for now as he does not have thrombocytopenia and likely had significant elevation in his initial platelet count  that was reactive from his sepsis    YAYA on CKD stage II: Recent hospitalization for significant YAYA.  Has bilateral ureteral stents in place for obstructive uropathy.  After Palafox catheter placement for significant retention on CT and inability to void creatinine initially 1.8 now continuously improving while here with IV fluids.  Excellent urine output.   -Stop IV fluids  -BMP tomorrow    Urinary retention:  significantly distended bladder on initial CT.  Unable to void.  Previously had catheter in place during last hospitalization.  -Continue Palafox catheter anticipate will likely need on discharge   -Flomax daily    Developmental delay: Lives with sister who helps provide care.  At baseline he is very functional    DVT Prophylaxis: Enoxaparin (Lovenox) SQ  Code Status: Full Code  Lines: Palafox catheter, remove PICC line  FEN: Regular diet, stop IV fluids  Discharge Dispo: Home  Estimated Disch Date / # of Days until Disch: Clinically improved.  Pending resolution of fevers.  Additional 2-3 days.    Updated Sister Mamta via phone today.        Interval History (Subjective):      Febrile again last night, although lower than previous temperatures.  He again says he feels  fine.  No cough or shortness of breath.  Denies abdominal pain.  Excellent urine output.  Calcium remains low and is being replaced.  Transferring to medical floor.                  Physical Exam:      Last Vital Signs:  /61 (BP Location: Left arm)   Pulse 114   Temp 97.6  F (36.4  C) (Oral)   Resp 20   Wt 49.4 kg (108 lb 14.5 oz)   SpO2 99%   BMI 20.58 kg/m      Intake/Output Summary (Last 24 hours) at 10/29/2020 1542  Last data filed at 10/29/2020 1000  Gross per 24 hour   Intake 2047 ml   Output 3125 ml   Net -1078 ml       Constitutional: Awake, NAD   Eyes: sclera white   HEENT:  MMM  Respiratory:   lungs cta bilaterally, no crackles or wheeze  Cardiovascular: RRR.  No murmur   GI: Mildly distended and firm, nontender, bowel sounds present  Genitourinary: Palafox catheter draining clear yellow urine  Skin: no rash   Musculoskeletal/extremities: No lower extremity edema  Neurologic: A&O,  answering questions appropriately  Psychiatric: calm, cooperative          Medications:      All current medications were reviewed with changes reflected in problem list.         Data:      All new lab and imaging data was reviewed.   Labs:  Recent Labs   Lab 10/26/20  1803 10/26/20  1531 10/26/20  1454   CULT No growth No growth after 3 days No growth after 3 days     Recent Labs   Lab 10/29/20  0530 10/28/20  1420 10/28/20  0550 10/27/20  0400    142  --  140   POTASSIUM 3.8  3.8 3.8  --  4.2   CHLORIDE 114* 113*  --  112*   CO2 19* 20  --  22   ANIONGAP 8 9  --  6   GLC 84 91  --  127*   BUN 12 14  --  19   CR 1.27*  1.27* 1.15 1.32* 1.54*   GFRESTIMATED 63  63 72 61 51*   GFRESTBLACK 73  73 84 71 59*   IVANIA 6.6* 7.1*  --  5.5*     Recent Labs   Lab 10/29/20  0530 10/28/20  1420 10/28/20  0550 10/27/20  0400 10/27/20  0400   WBC 14.5* 18.8*  --   --  27.2*   HGB 8.5* 8.0* 8.2*   < > 6.7*   HCT 27.1* 25.8*  --   --  21.5*   MCV 92 94  --   --  94     311 386  --   --  655*    < > = values in this  interval not displayed.   Ionized calcium 3.8  Lactic acid 0.4    Imaging:   None today      Daniel Su MD

## 2020-10-29 NOTE — PROGRESS NOTES
[facility]  Infectious Disease Progress Note          Assessment and Plan:   IMPRESSION:   1.  A 55-year-old male, recent diagnosis of seminoma with a large abdominal mass, recent urosepsis with Klebsiella, now with recurrent sepsis, urine not particularly abnormal, tumor decreased in size with no major new obstruction on CT scan nor abscess or other, cause of current sepsis is unclear.  Cultures are pending.   2.  Recent Klebsiella urosepsis with obstruction.   3.  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   4.  Renal insufficiency.   5.  COVID-19 rule out - negative.      RECOMMENDATIONS:   1.  Currently getting vanco and cefepime.  Obviously, try to limit the amount of vancomycin.  If cultures not growing something that requires it  probably discontinue, but dx is unclear.   2.  Further workup as directed. No new focal sxs, out of ICU slowly better incl feels better wo focal sxs  3.  As an aside, no reason to think this is tuberculosis, but in this high-risk patient, his tuberculosis status should be a known part of the history and is neither known to the patient nor anywhere in the records.  Get a QuantiFERON TB Gold test for that purpose, no isolation issue.         Interval History:   no new complaints and doing well; T slowly better, no + cxs .              Medications:       allopurinol  300 mg Oral Daily     calcium carbonate 500 mg (elemental)  500 mg Oral BID w/meals     ceFEPIme (MAXIPIME) IV  2 g Intravenous Q12H     ferrous sulfate  325 mg Oral Daily with breakfast     heparin ANTICOAGULANT  5,000 Units Subcutaneous Q12H     loratadine  10 mg Oral Daily     multivitamin w/minerals  1 tablet Oral Daily     pantoprazole  40 mg Oral QAM AC     sodium chloride (PF)  10 mL Intracatheter Q8H     sodium chloride (PF)  3 mL Intracatheter Q8H     tamsulosin  0.4 mg Oral Daily     vancomycin (VANCOCIN)  IV  1,000 mg Intravenous Q24H                  Physical Exam:   Blood pressure 104/61, pulse 114, temperature 97.6  F (36.4  C), temperature source Oral, resp. rate 20, weight 49.4 kg (108 lb 14.5 oz), SpO2 99 %.  Wt Readings from Last 2 Encounters:   10/29/20 49.4 kg (108 lb 14.5 oz)   10/26/20 46.6 kg (102 lb 12.8 oz)     Vital Signs with Ranges  Temp:  [97.6  F (36.4  C)-101.5  F (38.6  C)] 97.6  F (36.4  C)  Pulse:  [] 114  Resp:  [8-22] 20  BP: ()/(57-85) 104/61  SpO2:  [98 %-100 %] 99 %    Constitutional: Awake, alert, cooperative, no apparent distress looks much better   Lungs: Clear to auscultation bilaterally, no crackles or wheezing   Cardiovascular: Regular rate and rhythm, normal S1 and S2, and no murmur noted   Abdomen: Normal bowel sounds, soft, non-distended, sl tender   Skin: No rashes, no cyanosis, sl edema   Other:           Data:   All microbiology laboratory data reviewed.  Recent Labs   Lab Test 10/29/20  0530 10/28/20  1420 10/28/20  0550 10/27/20  0400 10/27/20  0400   WBC 14.5* 18.8*  --   --  27.2*   HGB 8.5* 8.0* 8.2*   < > 6.7*   HCT 27.1* 25.8*  --   --  21.5*   MCV 92 94  --   --  94     311 386  --   --  655*    < > = values in this interval not displayed.     Recent Labs   Lab Test 10/29/20  0530 10/28/20  1420 10/28/20  0550   CR 1.27*  1.27* 1.15 1.32*     No lab results found.  Recent Labs   Lab Test 10/26/20  1803 10/26/20  1531 10/26/20  1454 10/19/20  1748 10/19/20  1648 10/19/20  1515 10/11/20  0108 10/09/20  2228 10/09/20  2154   CULT No growth No growth after 3 days No growth after 3 days No growth No growth No growth No growth Cultured on the 1st day of incubation:  Klebsiella oxytoca  Susceptibility testing done on previous specimen  *  Critical Value/Significant Value, preliminary result only, called to and read back by  Krista Miller RN. 10/10/20 @ [facility]    Cultured on the 1st day of incubation:  Klebsiella oxytoca  *  Critical  Value/Significant Value, preliminary result only, called to and read back by   Krista Miller RN. 10/10/20 @ [facility] Weir    (Note)  POSITIVE for KLEBSIELLA OXYTOCA by [employer] multiplex nucleic acid  test. Final identification and antimicrobial susceptibility testing  will be verified by standard methods.    Specimen tested with [employer] multiplex, gram-negative blood culture  nucleic acid test for the following targets: Acinetobacter sp.,  Citrobacter sp., Enterobacter sp., Proteus sp., E. coli, K.  pneumoniae/oxytoca, P. aeruginosa, and the following resistance  markers: CTXM, KPC, NDM, VIM, IMP and OXA.    Critical Value/Significant Value called to and read back by  Toi Palomino RN @ 1605. 10/10/20. AV   >100,000 colonies/mL  Klebsiella oxytoca  *

## 2020-10-29 NOTE — PLAN OF CARE
To Do:  End of Shift Summary  For vital signs and complete assessments, please see documentation flowsheets.     Pertinent assessments: alert--- denies pain and nausea ----appetite good --- up in chair ---  Major Shift Events  transferred from ICU  Treatment Plan:  Monitor   Bedside Nurse: Brenna Gonzalez RN

## 2020-10-29 NOTE — PROGRESS NOTES
[facility]    Hematology/Oncology Follow-up Note      Today's Date: 10/29/20  Date of Admission:  10/26/2020  Reason for Consult: testicular cancer, admitted with fever        ASSESSMENT/PLAN:  Luz Marina Diallo is a 55 year old male with:     Sepsis: Left the ICU today  -appreciate hospitalist, infectious disease and ICU cares  -on broad spectrum antibiotics, IV fluids, supportive cares  -Blood cultures and urine culture no growth to date  -COVID-19 test negative  -Recommend Levaquin 500mg daily when discharged     Seminoma: s/p BEP with Neulasta support, completed C1D15 bleomycin on 10/26.  -Scheduled to see me Monday, 11/2 for follow up and BEP C2D1     Anemia: likely secondary to chemotherapy, sepsis  -transfuse as needed for hemoglobin <7.  He did receive a unit of blood 10/27.     Thrombocytosis: suspect reactive  -improved     Leukocytosis: likely secondary to sepsis.  He last received Neulasta on 10/6/20.  -improved       INTERIM HISTORY: Rigoberto is doing really well today. Feeling better and eating well. No new complaints.        MEDICATIONS:  Current Facility-Administered Medications   Medication     acetaminophen (TYLENOL) tablet 650 mg     allopurinol (ZYLOPRIM) tablet 300 mg     calcium carbonate 500 mg (elemental) (OSCAL) tablet 500 mg     ceFEPIme (MAXIPIME) 2 g vial to attach to  ml bag for ADULTS or 50 ml bag for PEDS     ferrous sulfate (FEROSUL) tablet 325 mg     heparin ANTICOAGULANT injection 5,000 Units     heparin lock flush 10 UNIT/ML injection 2-5 mL     HYDROmorphone (PF) (DILAUDID) injection 0.3-0.5 mg     lidocaine (LMX4) cream     lidocaine 1 % 0.1-1 mL     lidocaine 1 % 0.1-1 mL     lidocaine 1 % 0.1-1 mL     lidocaine 1 % 0.1-5 mL     loratadine (CLARITIN) tablet 10 mg     melatonin tablet 1 mg     metoclopramide (REGLAN) tablet 10 mg    Or     metoclopramide (REGLAN) injection 10 mg     multivitamin w/minerals (THERA-VIT-M) tablet 1 tablet     naloxone  "(NARCAN) injection 0.1-0.4 mg     naloxone (NARCAN) injection 0.1-0.4 mg     ondansetron (ZOFRAN-ODT) ODT tab 4 mg    Or     ondansetron (ZOFRAN) injection 4 mg     pantoprazole (PROTONIX) EC tablet 40 mg     Patient is already receiving anticoagulation with heparin, enoxaparin (LOVENOX), warfarin (COUMADIN)  or other anticoagulant medication     prochlorperazine (COMPAZINE) injection 10 mg    Or     prochlorperazine (COMPAZINE) tablet 10 mg    Or     prochlorperazine (COMPAZINE) suppository 25 mg     sodium chloride (PF) 0.9% PF flush 10 mL     sodium chloride (PF) 0.9% PF flush 10-20 mL     sodium chloride (PF) 0.9% PF flush 3 mL     sodium chloride (PF) 0.9% PF flush 3 mL     sodium chloride (PF) 0.9% PF flush 3 mL     sodium chloride (PF) 0.9% PF flush 3 mL     sodium chloride (PF) 0.9% PF flush 5-50 mL     tamsulosin (FLOMAX) capsule 0.4 mg     vancomycin (VANCOCIN) 1000 mg in dextrose 5% 200 mL PREMIX         ALLERGIES:  No Known Allergies      PHYSICAL EXAM:  Vital signs:  Temp: 97.6  F (36.4  C) Temp src: Oral BP: 104/61 Pulse: 114   Resp: 20 SpO2: 99 % O2 Device: None (Room air)     Weight: 49.4 kg (108 lb 14.5 oz)  Estimated body mass index is 20.58 kg/m  as calculated from the following:    Height as of 10/9/20: 1.549 m (5' 1\").    Weight as of this encounter: 49.4 kg (108 lb 14.5 oz).    GENERAL:  Female, in no acute distress.  Alert and oriented x3. Well groomed.   HEENT:  Normocephalic, atraumatic. No conjunctival injection or eye swelling.   LUNGS:  Nonlabored breathing, no cough or audible wheezing, able to speak full sentences.  MSK: Full ROM UE.    SKIN: No rash on exposed skin.   NEURO: CN grossly intact, speech normal  PSYCH: Mentation appears normal, insight and judgement intact        LABS:  CBC RESULTS:   Recent Labs   Lab Test 10/29/20  0530   WBC 14.5*   RBC 2.94*   HGB 8.5*   HCT 27.1*   MCV 92   MCH 28.9   MCHC 31.4*   RDW 18.0*     311       Recent Labs   Lab Test 10/29/20  0530 " 10/28/20  1420    142   POTASSIUM 3.8  3.8 3.8   CHLORIDE 114* 113*   CO2 19* 20   ANIONGAP 8 9   GLC 84 91   BUN 12 14   CR 1.27*  1.27* 1.15   IVANIA 6.6* 7.1*           Araceli Pacheco PA-C  Hematology/Oncology  [facility]

## 2020-10-29 NOTE — PLAN OF CARE
ICU End of Shift Summary.  For vital signs and complete assessments, please see documentation flowsheets.     Pertinent assessments: patient alert/orientated. Answers yes/no questions. Room air.T-max 101.5 at start of shift, tachycardic 130's, Tylenol given and ice packs placed on patient. Fever resolved. Denies pain. Palafox patent, good uo. Picc line in R upper arm.   Major Shift Events: fever, tachycardic    Plan (Upcoming Events): continue plan of care  Discharge/Transfer Needs: TBD    Bedside Shift Report Completed : yes  Bedside Safety Check Completed: yes

## 2020-10-30 NOTE — PROGRESS NOTES
Oncology/Hematology Visit Note    Nov 2, 2020    Reason for visit: Follow up seminoma    Oncology HPI: Luz Marina Diallo is a 55 year old male with a history of developmental delay with testicular cancer.  He was hospitalized at [facility] on 9/23/2020 for large abdominal mass, weight loss, decreased appetite and was found to have renal failure with creatinine of 4.5 with hypercalcemia 15.5.  CT confirmed a large abdominal mass compressing the ureters and he underwent bilateral ureteral stent placement on 9/24/2020.  Abdominal mass concern for malignancy and biopsy revealed germ cell tumor (seminoma).  Palafox was placed and he was given Zometa for hypercalcemia with IV fluids.  He was started on neoadjuvant chemotherapy with bleomycin, etoposide and carboplatin (cisplatin was not used due to renal function), C1 D1 completed on 10/1/2020 and he received the first 5 days inpatient.  He tolerated this well.    Unfortunately, he was admitted for neutropenic fever and urosepsis multiple times since starting chemotherapy, most recently on 10/26/2020.  He received C1 D 15 and he was having high fever and tachycardia, presumed urinary source again.  He received antibiotics, IV fluids and discharged on 11/1/2020.  We recommended starting Levaquin 500 mg daily.    Of note, he prefers to be addressed with his nickname, Rigoberto.     He is here today for BEP C2 D1.     Interval History: Rigoberto is doing better today.  It has been a long month for him since starting chemotherapy he was discharged to the hospital yesterday again.  He started Cipro twice a day and he tolerated this fairly well.  He is nervous again today.  He is here with his sister, Mamta.  Appetite is been really good.  No fever or chills, vomiting or diarrhea, headache or vision changes, abdominal pain or bleeding.  No other new complaints.    Review of Systems: See interval hx. Denies fevers, chills, HA, dizziness, n/t, changes in vision, cough, sore throat,  CP, SOB, abdominal pain, N/V, diarrhea, changes in urination, bleeding, bruising, rash.     PMHx and Social Hx reviewed per EPIC.      Medications:  Current Outpatient Medications   Medication Sig Dispense Refill     allopurinol (ZYLOPRIM) 300 MG tablet Take 1 tablet (300 mg) by mouth daily 30 tablet 0     ciprofloxacin (CIPRO) 500 MG tablet Take 1 tablet (500 mg) by mouth 2 times daily for 7 days 14 tablet 0     ferrous sulfate (FEROSUL) 325 (65 Fe) MG tablet Take 1 tablet (325 mg) by mouth daily (with breakfast) 30 tablet 0     loratadine (CLARITIN) 10 MG tablet Take 10 mg by mouth daily for 5 days following Neulasta       LORazepam (ATIVAN) 0.5 MG tablet Take 1 tablet (0.5 mg) by mouth every 4 hours as needed (Anxiety, Nausea/Vomiting or Sleep) 30 tablet 3     multivitamin w/minerals (THERA-VIT-M) tablet Take 1 tablet by mouth daily       prochlorperazine (COMPAZINE) 10 MG tablet Take 1 tablet (10 mg) by mouth every 6 hours as needed (Nausea/Vomiting) 30 tablet 3     tamsulosin (FLOMAX) 0.4 MG capsule Take 1 capsule (0.4 mg) by mouth daily 30 capsule 0       No Known Allergies      EXAM:    /66   Pulse 114   Temp 97.1  F (36.2  C) (Tympanic)   Resp 16   Wt 46.3 kg (102 lb)   SpO2 100%   BMI 19.27 kg/m      GENERAL:  Male, in no acute distress.  Alert and oriented x3.   HEENT:  Normocephalic, atraumatic.  PERRL, oropharynx clear with no sores or thrush.   LYMPH NODES:  No palpable cervical lyphadenopathy appreciated.  LUNGS: Nonlabored breathing  ABDOMEN:   Large area of firmness to lower abdomen, actually softer today.  : Palafox catheter in place.   EXTREMITIES: 1+ pitting edema bilaterally.   SKIN: No rash  PSYCH: Mood stable      Labs:   Results for AFSANEH OSULLIVAN (MRN [id number]) as of 11/2/2020 11:24   11/2/2020 08:45   Sodium 138   Potassium 4.1   Chloride 107   Carbon Dioxide 22   Urea Nitrogen 16   Creatinine 1.44 (H)   GFR Estimate 55 (L)   GFR Estimate If Black 64   Calcium 6.8 (L)    Anion Gap 9   Magnesium 2.0   Albumin 2.3 (L)   Protein Total 7.1   Bilirubin Total 0.5   Alkaline Phosphatase 1,492 (H)    (H)    (H)   Glucose 134 (H)   WBC 25.8 (H)   Hemoglobin 8.7 (L)   Hematocrit 28.4 (L)   Platelet Count 394   RBC Count 3.05 (L)   MCV 93   MCH 28.5   MCHC 30.6 (L)   RDW 18.6 (H)   Diff Method Manual Differential   % Neutrophils 71.0   % Lymphocytes 7.0   % Monocytes 4.0   % Eosinophils 9.0   % Basophils 0.0   % Metamyelocytes 3.0   % Myelocytes 6.0   Absolute Neutrophil 18.3 (H)   Absolute Lymphocytes 1.8   Absolute Monocytes 1.0   Absolute Eosinophils 2.3 (H)   Absolute Basophils 0.0   Absolute Metamyelocytes 0.8 (H)   Absolute Myelocytes 1.5 (H)   RBC Morphology Consistent with reported results   Platelet Estimate Automated count confirmed.  Platelet morphology is normal.       Imaging: n/a    Impression/Plan: Luz Marina Diallo is a 55 year old male with metastatic seminoma currently undergoing neoadjuvant BEP with Neulasta support.    Seminoma: Beta , metastasis to the lymph nodes, currently undergoing BEP with Neulasta support, C1 D1 done inpatient on 10/1/2020. He tolerated this well, but unfortunately has been hospitalized for neutropenia or fever with every treatment of chemotherapy.  Recently discharged again yesterday and presumed UTI, Palafox catheter in place and we have started him on ciprofloxacin 500 mg twice daily indefinitely through treatment.  Continues to have some leukocytosis, likely from previous infection as his last Neulasta was 10/6, see below.  He is feeling well and nervous, but labs are within parameters to proceed with BEP C2 D1 today.  Dr. Ramos has replaced carboplatin with cisplatin start with cycle 2.  We will continue with schedule as planned. No indication for blood or platelets today.  --11/9 Araceli BEP C2 D8 (bleo only)    LFTs: Alk phos significantly elevated at 1492 and previously 340 on 10/27.  Bone scan on 9/28 with no  evidence of bony lesions.  Likely from liver metastasis and discussed with Dr. Ramos.  We will monitor.    Leukocytosis: Multifactorial, previously from Neulasta, then recently hospitalized for presumed UTI.  WBC improved to 19.9 on 11/1 after being discharged, increased to 25.8 today.  I spoke with Dr. Ramos and he is afebrile, feeling well and still curative intent, therefore we will proceed with chemo cycle 2 today.    FEN: Hypocalcemia, overall improved to 6.8, slightly worse from hospital stay recently. Electrolytes are otherwise okay and appetite is really good.    Neutropenic fever: Recently hospitalized, secondary to chemotherapy.  Suspect UTI and will keep him on Cipro 500 mg twice daily while undergoing chemotherapy, hopefully to prevent further hospitalizations.    YAYA: Creatinine 4.51 hospitalized on 9/23/2020.  He is status post bilateral ureteral stents and currently has Palafox catheter in place. Creatinine stable 1.44.    Heme: Hemoglobin 8.7, platelets 394.  See leukocytosis above.    Developmental Delay: Lives with his sister, Mamta, who accompanies him with every visit.      Chart documentation with Dragon Voice recognition Software. Although reviewed after completion, some words and grammatical errors may remain.      Araceli Pacheco PA-C  Hematology/Oncology  [facility]

## 2020-10-30 NOTE — PROGRESS NOTES
[facility]  Hospitalist Progress Note  Toby Arciniega MD 10/29/20    Reason for Stay (Diagnosis): Sepsis, hypocalcemia         Assessment and Plan:      Summary of Stay:   Luz Marina Diallo is a 55 year old male with past medical history of seminoma on chemotherapy who just received bleomycin, anemia, obstructive uropathy with bilateral ureteral stent place, CKD stage II, PUD, hiatal hernia, and developmental delay who was admitted on 10/26/2020 with fevers.   he just finished bleomycin and is also getting Neulasta.  Presenting primarily with high fevers up to 103-104  F.  Initially tachycardic.  Lactic acid not elevated.  Does have leukocytosis of 35.  Procalcitonin significantly elevated 96.  Urinalysis was bland.  CT of the chest/abdomen/pelvis did not show any obvious infectious source.  He was started on IV vancomycin and cefepime.  He was admitted initially to the ICU for soft blood pressure in the setting of sepsis.  Blood and urine cultures no growth to date.  COVID-19 PCR negative.  Infectious disease and oncology consulted.  Fairly significant hypocalcemia that is being replaced daily.  Fever curve and leukocytosis improving.  Removing PICC.     Mr. Diallo was transferred to a medical bed on 10/29.  Last fever > 100.5 on 10/29.  WBC now at 16K (receiving Neulasta, last dose on 10/6). At this time, the patient appears to be doing very well.  He was already seen by Dr. Huerta today who articulated a plan to switch to oral tomorrow assuming he continues to do well.    Problem List/Assessment and Plan:   Sepsis: Presenting with high fevers up to 103-104  F, tachycardia, leukocytosis at 35, procalcitonin very elevated 96   -In the setting of chemotherapy.  Source is unclear.  This recurrent problem for this patient who has been serially admitted for high fevers following chemotherapy.  -Blood cultures no growth to date.  COVID-19 PCR negative.  Urinalysis bland.  CT  chest/abdomen/pelvis showed decreasing size of his known pelvic mass, mild bilateral hydronephrosis with patent ureteral stents, and mediastinal adenopathy, but no source of infection.    -Fever curve and leukocytosis improving.  -ID consulted.  Currently on cefepime alone.  Consider switch to oral at the time of discharge tomorrow possibly    Hypocalcemia at least partly due to vitamin D deficiency: Low ionized calcium 3.4.  Receiving replacement intermittently.   -3 g IV calcium gluconatex1 again today and continue new Os-Farshad 650 mg p.o. twice daily  -Replete vitamin D    Seminoma: Has a large abdominal mass and lymphadenopathy.  Follows with Dr. Ramos from [facility] oncology. S/p BEP chemotherapy with Neulasta support.  Completed bleomycin on 10/26/2020.  -continues allopurinol 300 mg daily    Chronic anemia: Secondary to chemotherapy.  Transfused 1 unit RBCs on 10/27.  -Daily CBC, transfuse for hemoglobin less than 7  -Continue oral ferrous sulfate    Thrombocytosis: Likely reactive secondary to sepsis.  Is receiving Lovenox no need to monitor platelet count.  -Continue Lovenox for now as he does not have thrombocytopenia and likely had significant elevation in his initial platelet count  that was reactive from his sepsis    YAYA on CKD stage II: Recent hospitalization for significant YAYA.  Has bilateral ureteral stents in place for obstructive uropathy.  After Palafox catheter placement for significant retention on CT and inability to void creatinine initially 1.8 now continuously improving while here with IV fluids.  Excellent urine output.   -Stop IV fluids  -BMP tomorrow    Urinary retention:  significantly distended bladder on initial CT.  Unable to void.  Previously had catheter in place during last hospitalization.  -Continue Palafox catheter anticipate will likely need on discharge   -Flomax daily    Developmental delay: Lives with sister who helps provide care.  At baseline he is very functional    DVT  Prophylaxis: Enoxaparin (Lovenox) SQ  Code Status: Full Code  Lines: Palafox catheter, remove PICC line  FEN: Regular diet, stop IV fluids  Discharge Dispo: Home  Estimated Disch Date / # of Days until Disch: Clinically improved.  Now that the patient is 24 hours without measured fever, possibly tomorrow on oral antibiotics.    I spoke with the patient's sister, Mamta who is at the bedside today.        Interval History (Subjective):      Chart reviewed, pt interviewed.    Aureliano is not articulate with me.  Indicates that he is not having any discomfort.  Reportedly now is eating okay.  His sister at the bedside translates.                  Physical Exam:      Last Vital Signs:  BP 96/54 (BP Location: Left arm)   Pulse 131   Temp 99.1  F (37.3  C) (Oral)   Resp 20   Wt 48.9 kg (107 lb 11.2 oz)   SpO2 98%   BMI 20.35 kg/m      Intake/Output Summary (Last 24 hours) at 10/29/2020 1542  Last data filed at 10/29/2020 1000  Gross per 24 hour   Intake 2047 ml   Output 3125 ml   Net -1078 ml       Constitutional: Awake, NAD   Eyes: sclera white   HEENT:  MMM  Respiratory:   lungs cta bilaterally, no crackles or wheeze  Cardiovascular: RRR.  No murmur   GI: Mildly distended and firm, nontender, bowel sounds present  Genitourinary: Palafox catheter draining clear yellow urine  Skin: no rash   Musculoskeletal/extremities: No lower extremity edema  Neurologic: A&O,  answering questions appropriately  Psychiatric: calm, cooperative          Medications:      All current medications were reviewed with changes reflected in problem list.         Data:      All new lab and imaging data was reviewed.   Labs/Imaging:   Results for orders placed or performed during the hospital encounter of 10/26/20 (from the past 24 hour(s))   CBC with platelets   Result Value Ref Range    WBC 15.7 (H) 4.0 - 11.0 10e9/L    RBC Count 2.79 (L) 4.4 - 5.9 10e12/L    Hemoglobin 8.1 (L) 13.3 - 17.7 g/dL    Hematocrit 25.5 (L) 40.0 - 53.0 %    MCV 91 78 - 100  fl    MCH 29.0 26.5 - 33.0 pg    MCHC 31.8 31.5 - 36.5 g/dL    RDW 17.8 (H) 10.0 - 15.0 %    Platelet Count 284 150 - 450 10e9/L   Basic metabolic panel   Result Value Ref Range    Sodium 139 133 - 144 mmol/L    Potassium 3.9 3.4 - 5.3 mmol/L    Chloride 110 (H) 94 - 109 mmol/L    Carbon Dioxide 23 20 - 32 mmol/L    Anion Gap 6 3 - 14 mmol/L    Glucose 88 70 - 99 mg/dL    Urea Nitrogen 11 7 - 30 mg/dL    Creatinine 1.35 (H) 0.66 - 1.25 mg/dL    GFR Estimate 59 (L) >60 mL/min/[1.73_m2]    GFR Estimate If Black 69 >60 mL/min/[1.73_m2]    Calcium 7.1 (L) 8.5 - 10.1 mg/dL   Magnesium   Result Value Ref Range    Magnesium 1.7 1.6 - 2.3 mg/dL   Calcium ionized   Result Value Ref Range    Calcium Ionized 4.0 (L) 4.4 - 5.2 mg/dL   Vitamin D Deficiency   Result Value Ref Range    Vitamin D Deficiency screening 25 20 - 75 ug/L         Toby Arciniega MD

## 2020-10-30 NOTE — PROGRESS NOTES
[facility]    Hematology/Oncology Follow-up Note      Today's Date: 10/30/20  Date of Admission:  10/26/2020  Reason for Consult: testicular cancer, admitted with fever        ASSESSMENT/PLAN:  Luz Marina Diallo is a 55 year old male with:     Sepsis: Previously ICU, Medical floor now  -appreciate hospitalist, infectious disease and ICU cares  -on broad spectrum antibiotics, IV fluids, supportive cares  -Blood cultures and urine culture no growth to date  -COVID-19 test negative  -Recommend Levaquin 500mg daily when discharged     Seminoma: s/p BEP with Neulasta support, completed C1D15 bleomycin on 10/26.  -Scheduled to see me Monday, 11/2 for follow up and BEP C2D1. This is curative intent and we will not be adjusting the dosing. Recommend Levaquin 500 mg daily when discharged.      Anemia: likely secondary to chemotherapy, sepsis  -transfuse as needed for hemoglobin <7.  He did receive a unit of blood 10/27.     Thrombocytosis: suspect reactive  -improved     Leukocytosis: likely secondary to sepsis.  He last received Neulasta on 10/6/20.  -stable    INTERIM HISTORY: Rigoberto was seen in his room, sister Mamta at bedside.  He is feeling much better today.  Appetite is great.  Fever earlier this morning, but really resolving.  No other new complaints.       MEDICATIONS:  Current Facility-Administered Medications   Medication     acetaminophen (TYLENOL) tablet 650 mg     allopurinol (ZYLOPRIM) tablet 300 mg     calcium carbonate 500 mg (elemental) (OSCAL) tablet 500 mg     ceFEPIme (MAXIPIME) 2 g vial to attach to  ml bag for ADULTS or 50 ml bag for PEDS     ferrous sulfate (FEROSUL) tablet 325 mg     heparin ANTICOAGULANT injection 5,000 Units     HYDROmorphone (PF) (DILAUDID) injection 0.3-0.5 mg     lidocaine (LMX4) cream     lidocaine 1 % 0.1-1 mL     lidocaine 1 % 0.1-1 mL     lidocaine 1 % 0.1-1 mL     loratadine (CLARITIN) tablet 10 mg     melatonin tablet 1 mg      "metoclopramide (REGLAN) tablet 10 mg    Or     metoclopramide (REGLAN) injection 10 mg     multivitamin w/minerals (THERA-VIT-M) tablet 1 tablet     naloxone (NARCAN) injection 0.1-0.4 mg     naloxone (NARCAN) injection 0.1-0.4 mg     ondansetron (ZOFRAN-ODT) ODT tab 4 mg    Or     ondansetron (ZOFRAN) injection 4 mg     pantoprazole (PROTONIX) EC tablet 40 mg     Patient is already receiving anticoagulation with heparin, enoxaparin (LOVENOX), warfarin (COUMADIN)  or other anticoagulant medication     prochlorperazine (COMPAZINE) injection 10 mg    Or     prochlorperazine (COMPAZINE) tablet 10 mg    Or     prochlorperazine (COMPAZINE) suppository 25 mg     sodium chloride (PF) 0.9% PF flush 10 mL     sodium chloride (PF) 0.9% PF flush 10-20 mL     sodium chloride (PF) 0.9% PF flush 3 mL     sodium chloride (PF) 0.9% PF flush 3 mL     sodium chloride (PF) 0.9% PF flush 3 mL     sodium chloride (PF) 0.9% PF flush 3 mL     tamsulosin (FLOMAX) capsule 0.4 mg         ALLERGIES:  No Known Allergies      PHYSICAL EXAM:  Vital signs:  Temp: 97.7  F (36.5  C) Temp src: Oral BP: 93/61 Pulse: 107   Resp: 24 SpO2: 98 % O2 Device: None (Room air)     Weight: 48.9 kg (107 lb 11.2 oz)  Estimated body mass index is 20.35 kg/m  as calculated from the following:    Height as of 10/9/20: 1.549 m (5' 1\").    Weight as of this encounter: 48.9 kg (107 lb 11.2 oz).    GENERAL:  Male, in no acute distress.  Alert and oriented x3. Well groomed.   HEENT:  Normocephalic, atraumatic. No conjunctival injection or eye swelling.   LUNGS:  Nonlabored breathing, no cough or audible wheezing, able to speak full sentences.  MSK: Full ROM UE.    SKIN: No rash on exposed skin.   NEURO: CN grossly intact, speech normal  PSYCH: Dev delay, stable      LABS:  CBC RESULTS:   Recent Labs   Lab Test 10/30/20  0625   WBC 15.7*   RBC 2.79*   HGB 8.1*   HCT 25.5*   MCV 91   MCH 29.0   MCHC 31.8   RDW 17.8*          Recent Labs   Lab Test 10/30/20  0625 " 10/29/20  0530    141   POTASSIUM 3.9 3.8  3.8   CHLORIDE 110* 114*   CO2 23 19*   ANIONGAP 6 8   GLC 88 84   BUN 11 12   CR 1.35* 1.27*  1.27*   IVANIA 7.1* 6.6*           Araceli Pacheco PA-C  Hematology/Oncology  [facility]

## 2020-10-30 NOTE — PROGRESS NOTES
[facility]  Infectious Disease Progress Note          Assessment and Plan:   IMPRESSION:   1.  A 55-year-old male, recent diagnosis of seminoma with a large abdominal mass, recent urosepsis with Klebsiella, now with recurrent sepsis, urine not particularly abnormal, tumor decreased in size with no major new obstruction on CT scan nor abscess or other, cause of current sepsis is unclear.  Cultures are pending.   2.  Recent Klebsiella urosepsis with obstruction.   3.  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   4.  Renal insufficiency.   5.  COVID-19 rule out - negative.      RECOMMENDATIONS:   1 vanco and cefepime day 5.  Obviously, try to limit the amount of vancomycin.   cultures not growing something that requires it  so discontinue, dx is still unclear.   2.  Further workup as directed. No new focal sxs, out of ICU slowly better incl feels better wo focal sxs  3.  As an aside, no reason to think this is tuberculosis, but in this high-risk reactivation patient, his tuberculosis status should be a known part of the history and is neither known to the patient nor anywhere in the records.  pos QuantiFERON TB Gold test , absolutely should be treated no isolation issue. Delay Rif or INH until this illness resolved, I will see in Follow-up for this purpose  4 If doing well tomorrow likely empiric po switch and disposition        Interval History:   no new complaints and doing well; T slowly better, no + cxs .quant Tb +              Medications:       allopurinol  300 mg Oral Daily     calcium carbonate 500 mg (elemental)  500 mg Oral BID w/meals     ceFEPIme (MAXIPIME) IV  2 g Intravenous Q12H     ferrous sulfate  325 mg Oral Daily with breakfast     heparin ANTICOAGULANT  5,000 Units Subcutaneous Q12H     loratadine  10 mg Oral Daily     multivitamin w/minerals  1 tablet Oral Daily     pantoprazole  40 mg Oral  QAM AC     sodium chloride (PF)  10 mL Intracatheter Q8H     sodium chloride (PF)  3 mL Intracatheter Q8H     tamsulosin  0.4 mg Oral Daily                  Physical Exam:   Blood pressure 93/61, pulse 107, temperature 97.7  F (36.5  C), temperature source Oral, resp. rate 24, weight 48.9 kg (107 lb 11.2 oz), SpO2 98 %.  Wt Readings from Last 2 Encounters:   10/30/20 48.9 kg (107 lb 11.2 oz)   10/26/20 46.6 kg (102 lb 12.8 oz)     Vital Signs with Ranges  Temp:  [97.5  F (36.4  C)-98.8  F (37.1  C)] 97.7  F (36.5  C)  Pulse:  [] 107  Resp:  [20-24] 24  BP: ()/(56-69) 93/61  SpO2:  [97 %-99 %] 98 %    Constitutional: Awake, alert, cooperative, no apparent distress looks much better   Lungs: Clear to auscultation bilaterally, no crackles or wheezing   Cardiovascular: Regular rate and rhythm, normal S1 and S2, and no murmur noted   Abdomen: Normal bowel sounds, soft, non-distended, sl tender   Skin: No rashes, no cyanosis, sl edema   Other:           Data:   All microbiology laboratory data reviewed.  Recent Labs   Lab Test 10/30/20  0625 10/29/20  0530 10/28/20  1420   WBC 15.7* 14.5* 18.8*   HGB 8.1* 8.5* 8.0*   HCT 25.5* 27.1* 25.8*   MCV 91 92 94    311  311 386     Recent Labs   Lab Test 10/30/20  0625 10/29/20  0530 10/28/20  1420   CR 1.35* 1.27*  1.27* 1.15     No lab results found.  Recent Labs   Lab Test 10/26/20  1803 10/26/20  1531 10/26/20  1454 10/19/20  1748 10/19/20  1648 10/19/20  1515 10/11/20  0108 10/09/20  2228 10/09/20  2154   CULT No growth No growth after 4 days No growth after 4 days No growth No growth No growth No growth Cultured on the 1st day of incubation:  Klebsiella oxytoca  Susceptibility testing done on previous specimen  *  Critical Value/Significant Value, preliminary result only, called to and read back by  Krista Miller RN. 10/10/20 @ CANDIE Weir    Cultured on the 1st day of incubation:  Klebsiella oxytoca  *  Critical Value/Significant Value,  preliminary result only, called to and read back by   Krista Miller RN. 10/10/20 @ [id number]    (Note)  POSITIVE for KLEBSIELLA OXYTOCA by [employer] multiplex nucleic acid  test. Final identification and antimicrobial susceptibility testing  will be verified by standard methods.    Specimen tested with [employer] multiplex, gram-negative blood culture  nucleic acid test for the following targets: Acinetobacter sp.,  Citrobacter sp., Enterobacter sp., Proteus sp., E. coli, K.  pneumoniae/oxytoca, P. aeruginosa, and the following resistance  markers: CTXM, KPC, NDM, VIM, IMP and OXA.    Critical Value/Significant Value called to and read back by  Toi Palomino RN @ 1605. 10/10/20. AV   >100,000 colonies/mL  Klebsiella oxytoca  *

## 2020-10-30 NOTE — PLAN OF CARE
Pertinent assessments: Soft BP. Tachycardic. Afebrile. Room air. Denies pain. Aox4. LS clear. Palafox with adequate output. LBM 10/29.  Major Shift Events: Uneventful  Treatment Plan: IV cefepime and vanco. Recheck calcium

## 2020-10-31 NOTE — PLAN OF CARE
Pertinent assessments: Lung sounds clear, A&Ox4, no pain to report, Pt calm and cooperative, overt concerns.  Major Shift Events: Sepsis protocol triggered, lactic acid 1.0. T Max 102.8, Tylenol given with relief.  Treatment Plan: Continue to care and watch for sepsis, monitor for fevers, WBC and signs/symptoms of infection.

## 2020-10-31 NOTE — PLAN OF CARE
Pertinent assessments: Tachycardic, all other VSS. Afebrile. Denies pain. Aox4. LS clear. Palafox with adequate output. LBM 10/30.  Major Shift Events: Uneventful  Treatment Plan: Monitor for fevers. IV cefepime. Lovenox.

## 2020-10-31 NOTE — PLAN OF CARE
To Do:  End of Shift Summary  For vital signs and complete assessments, please see documentation flowsheets.     Pertinent assessments: Afebrile. Tachycardic. Up SBA to bathroom. Denies pain. Last BM today 10/31. PICC to right arm. Palafox patent.    Major Shift Events: Uneventful.    Treatment Plan: Monitor for fevers. IV cefepime. Lovenox.

## 2020-10-31 NOTE — PROVIDER NOTIFICATION
0946 Dr. Arciniega paged- Pt. has order from a few days ago for PICC to be removed and PICC is still in place. Would you like that to be removed? Thanks!    Eileen Cosby RN on 10/31/2020 at 9:47 AM

## 2020-10-31 NOTE — PROGRESS NOTES
[facility]  Infectious Disease Progress Note          Assessment and Plan:   IMPRESSION:   1.  A 55-year-old male, recent diagnosis of seminoma with a large abdominal mass, recent urosepsis with Klebsiella, now with recurrent sepsis, urine not particularly abnormal, tumor decreased in size with no major new obstruction on CT scan nor abscess or other, cause of current sepsis is unclear.  Cultures are pending.   2.  Recent Klebsiella urosepsis with obstruction.   3.  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   4.  Renal insufficiency.   5.  COVID-19 rule out - negative.      RECOMMENDATIONS:   1 vanco and cefepime day 6.  Obviously, try to limit the amount of vancomycin.   cultures not growing something that requires it  so discontinue, dx is still unclear.   2.  Further workup as directed. No new focal sxs, out of ICU slowly better incl feels better wo focal sxs  3.  As an aside, no reason to think this is tuberculosis, but in this high-risk reactivation patient, his tuberculosis status should be a known part of the history and is neither known to the patient nor anywhere in the records.  pos QuantiFERON TB Gold test , absolutely should be treated no isolation issue. Delay Rif or INH until this illness resolved, I will see in Follow-up for this purpose  4 Unfortunately fever spike, no focal sxs, at least 1 day more IV , doubt related to vanco discontinue off for now still        Interval History:   no new complaints and doing well; Tmostly down but 1 spike slowly better, no + cxs .quant Tb +              Medications:       allopurinol  300 mg Oral Daily     calcium carbonate 500 mg (elemental)  500 mg Oral BID w/meals     ceFEPIme (MAXIPIME) IV  2 g Intravenous Q12H     ferrous sulfate  325 mg Oral Daily with breakfast     heparin ANTICOAGULANT  5,000 Units Subcutaneous Q12H     loratadine  10 mg Oral Daily      multivitamin w/minerals  1 tablet Oral Daily     pantoprazole  40 mg Oral QAM AC     sodium chloride (PF)  10 mL Intracatheter Q8H     sodium chloride (PF)  3 mL Intracatheter Q8H     tamsulosin  0.4 mg Oral Daily     cholecalciferol  50 mcg Oral Daily                  Physical Exam:   Blood pressure 101/57, pulse 122, temperature 98.1  F (36.7  C), temperature source Oral, resp. rate 18, weight 46.1 kg (101 lb 9.6 oz), SpO2 99 %.  Wt Readings from Last 2 Encounters:   10/31/20 46.1 kg (101 lb 9.6 oz)   10/26/20 46.6 kg (102 lb 12.8 oz)     Vital Signs with Ranges  Temp:  [98  F (36.7  C)-100.8  F (38.2  C)] 98.1  F (36.7  C)  Pulse:  [] 122  Resp:  [18-20] 18  BP: ()/(54-70) 101/57  SpO2:  [97 %-99 %] 99 %    Constitutional: Awake, alert, cooperative, no apparent distress looks much better   Lungs: Clear to auscultation bilaterally, no crackles or wheezing   Cardiovascular: Regular rate and rhythm, normal S1 and S2, and no murmur noted   Abdomen: Normal bowel sounds, soft, non-distended, sl tender   Skin: No rashes, no cyanosis, sl edema   Other:           Data:   All microbiology laboratory data reviewed.  Recent Labs   Lab Test 10/31/20  0600 10/30/20  0625 10/29/20  0530   WBC 17.7* 15.7* 14.5*   HGB 8.5* 8.1* 8.5*   HCT 27.4* 25.5* 27.1*   MCV 92 91 92    284 311  311     Recent Labs   Lab Test 10/31/20  0600 10/30/20  0625 10/29/20  0530   CR 1.26* 1.35* 1.27*  1.27*     No lab results found.  Recent Labs   Lab Test 10/26/20  1803 10/26/20  1531 10/26/20  1454 10/19/20  1748 10/19/20  1648 10/19/20  1515 10/11/20  0108 10/09/20  2228 10/09/20  2154   CULT No growth No growth after 5 days No growth after 5 days No growth No growth No growth No growth Cultured on the 1st day of incubation:  Klebsiella oxytoca  Susceptibility testing done on previous specimen  *  Critical Value/Significant Value, preliminary result only, called to and read back by  Krista Miller RN. 10/10/20 @ 0832,  CANDIE    Cultured on the 1st day of incubation:  Klebsiella oxytoca  *  Critical Value/Significant Value, preliminary result only, called to and read back by   Krista Miller RN. 10/10/20 @ 7481, CANDIE    (Note)  POSITIVE for KLEBSIELLA OXYTOCA by [employer] multiplex nucleic acid  test. Final identification and antimicrobial susceptibility testing  will be verified by standard methods.    Specimen tested with Moondoig[employer] multiplex, gram-negative blood culture  nucleic acid test for the following targets: Acinetobacter sp.,  Citrobacter sp., Enterobacter sp., Proteus sp., E. coli, K.  pneumoniae/oxytoca, P. aeruginosa, and the following resistance  markers: CTXM, KPC, NDM, VIM, IMP and OXA.    Critical Value/Significant Value called to and read back by  Toi Palomino RN @ 1605. 10/10/20. AV   >100,000 colonies/mL  Klebsiella oxytoca  *

## 2020-10-31 NOTE — PROGRESS NOTES
[facility]  Hospitalist Progress Note  Toby Arciniega MD 10/29/20    Reason for Stay (Diagnosis): Sepsis, hypocalcemia         Assessment and Plan:      Summary of Stay:   Luz Marina Diallo is a 55 year old male with past medical history of seminoma on chemotherapy who just received bleomycin, anemia, obstructive uropathy with bilateral ureteral stent place, CKD stage II, PUD, hiatal hernia, and developmental delay who was admitted on 10/26/2020 with fevers.   he just finished bleomycin and is also getting Neulasta.  Presenting primarily with high fevers up to 103-104  F.  Initially tachycardic.  Lactic acid not elevated.  Does have leukocytosis of 35.  Procalcitonin significantly elevated 96.  Urinalysis was bland.  CT of the chest/abdomen/pelvis did not show any obvious infectious source.  He was started on IV vancomycin and cefepime.  He was admitted initially to the ICU for soft blood pressure in the setting of sepsis.  Blood and urine cultures no growth to date.  COVID-19 PCR negative.  Infectious disease and oncology consulted.  Fairly significant hypocalcemia that is being replaced daily.  Fever curve and leukocytosis improving.  Removing PICC.     Mr. Diallo was transferred to a medical bed on 10/29.  Last fever > 100.5 on 10/29.  WBC now at 16K (receiving Neulasta, last dose on 10/6). At this time, the patient appears to be doing very well.  He was already seen by Dr. Huerta today who articulated a plan to switch to oral tomorrow assuming he continues to do well.    Problem List/Assessment and Plan:   Sepsis: Presented with high fevers up to 103-104  F, tachycardia, leukocytosis at 35, procalcitonin very elevated 96   -In the setting of chemotherapy.  No source has been identified for the recurrent post chemotherapy fevers.    -Blood cultures no growth to date.  COVID-19 PCR negative.  Urinalysis bland.  CT chest/abdomen/pelvis showed decreasing size of his known pelvic mass,  mild bilateral hydronephrosis with patent ureteral stents, and mediastinal adenopathy, but no source of infection.    -Fever curve and leukocytosis generally are improving but again had a fever spike to almost 101 last night along with that jump in WBC to 17.7.  -Currently on cefepime alone.  Infectious diseases recommended 1 more day of IV antibiotics with the plan to discharge home on orals.    Hypocalcemia at least partly due to vitamin D deficiency: Low ionized calcium 3.4.  Receiving replacement intermittently.   -3 g IV calcium gluconatex1 again today and continue new Os-Farshad 650 mg p.o. twice daily  -Replete vitamin D, continue calcium supplementation    Seminoma: Has a large abdominal mass and lymphadenopathy.  Follows with Dr. Ramos from [facility] [facility]ology. S/p BEP chemotherapy with Neulasta support.  Completed bleomycin on 10/26/2020.  -continues allopurinol 300 mg daily    Chronic anemia: Secondary to chemotherapy.  Transfused 1 unit RBCs on 10/27.  -Daily CBC, transfuse for hemoglobin less than 7.  Hemoglobin now relatively stable between 8 and 8.5.  -Continue oral ferrous sulfate    Thrombocytosis: Likely reactive secondary to sepsis.  Is receiving Lovenox no need to monitor platelet count.  -Resolved    YAYA on CKD stage II: Recent hospitalization for significant YAYA.  Has bilateral ureteral stents in place for obstructive uropathy.  After Palafox catheter placement for significant retention on CT and inability to void creatinine initially 1.8 now continuously improving while here with IV fluids.  Excellent urine output.   -Stop IV fluids  -BMP tomorrow    Urinary retention:  significantly distended bladder on initial CT.  Unable to void.  Previously had catheter in place during last hospitalization.  -Continue Palafox catheter anticipate will likely need on discharge   -Flomax daily    Developmental delay: Lives with sister who helps provide care.  At baseline he is very functional    DVT Prophylaxis:  Enoxaparin (Lovenox) SQ  Code Status: Full Code  Lines: Palafox catheter, remove PICC line  FEN: Regular diet, stop IV fluids  Discharge Dispo: Home  Estimated Disch Date / # of Days until Disch: Clinically improved.     I spoke with the patient's sister, Mamta who is at the bedside today.        Interval History (Subjective):      Fever last night.  At this time Davis is sitting up in bed in eating well.  His sister reports that he is generally brighter.  He apparently has new congestion but no significant cough.                  Physical Exam:      Last Vital Signs:  /65 (BP Location: Left arm)   Pulse 120   Temp 98.5  F (36.9  C) (Oral)   Resp 18   Wt 46.1 kg (101 lb 9.6 oz)   SpO2 98%   BMI 19.20 kg/m      Intake/Output Summary (Last 24 hours) at 10/29/2020 1542  Last data filed at 10/29/2020 1000  Gross per 24 hour   Intake 2047 ml   Output 3125 ml   Net -1078 ml       Constitutional: Awake, NAD   Eyes: sclera white   HEENT:  MMM  Respiratory:   lungs cta bilaterally, no crackles or wheeze  Cardiovascular: RRR.  No murmur   GI: Mildly distended and firm, nontender, bowel sounds present  Genitourinary: Palafox catheter draining clear yellow urine  Skin: no rash   Musculoskeletal/extremities: No lower extremity edema  Neurologic: A&O,  answering questions appropriately  Psychiatric: calm, cooperative          Medications:      All current medications were reviewed with changes reflected in problem list.         Data:      All new lab and imaging data was reviewed.   Labs/Imaging:   Results for orders placed or performed during the hospital encounter of 10/26/20 (from the past 24 hour(s))   Calcium ionized   Result Value Ref Range    Calcium Ionized 4.0 (L) 4.4 - 5.2 mg/dL   Lactic acid whole blood   Result Value Ref Range    Lactic Acid 1.0 0.7 - 2.0 mmol/L   Basic metabolic panel   Result Value Ref Range    Sodium 140 133 - 144 mmol/L    Potassium 4.4 3.4 - 5.3 mmol/L    Chloride 109 94 - 109 mmol/L     Carbon Dioxide 24 20 - 32 mmol/L    Anion Gap 7 3 - 14 mmol/L    Glucose 102 (H) 70 - 99 mg/dL    Urea Nitrogen 16 7 - 30 mg/dL    Creatinine 1.26 (H) 0.66 - 1.25 mg/dL    GFR Estimate 65 >60 mL/min/[1.73_m2]    GFR Estimate If Black 75 >60 mL/min/[1.73_m2]    Calcium 7.3 (L) 8.5 - 10.1 mg/dL   CBC with platelets   Result Value Ref Range    WBC 17.7 (H) 4.0 - 11.0 10e9/L    RBC Count 2.97 (L) 4.4 - 5.9 10e12/L    Hemoglobin 8.5 (L) 13.3 - 17.7 g/dL    Hematocrit 27.4 (L) 40.0 - 53.0 %    MCV 92 78 - 100 fl    MCH 28.6 26.5 - 33.0 pg    MCHC 31.0 (L) 31.5 - 36.5 g/dL    RDW 18.0 (H) 10.0 - 15.0 %    Platelet Count 277 150 - 450 10e9/L   Magnesium   Result Value Ref Range    Magnesium 1.9 1.6 - 2.3 mg/dL         Toby Arciniega MD

## 2020-10-31 NOTE — PROVIDER NOTIFICATION
1523 Dr. Arciniega paged- Vascular requesting that the remove PICC order be discontinued if PICC is going to remain in place, and that vascular needs to be re-consulted. Thanks!    Eileen Cosby RN on 10/31/2020 at 3:23 PM

## 2020-11-01 NOTE — PLAN OF CARE
Pertinent assessments: afebrile, no pain to report, bowel sounds audible and normoactive, up with SBA in halls, appetite good, pt's Platelets 277, admitting platelet count 908. Platelet count has fallen below baseline and was held on evening shift.  Major Shift Events: Heparin held, good PO intake.  Treatment Plan: discharge home 11/1, continue to monitor for s/sx of infection

## 2020-11-01 NOTE — PROGRESS NOTES
[facility]    Hematology/Oncology Follow-up Note      Today's Date: November 1, 2020   Date of Admission:  10/26/2020  Reason for Consult: testicular cancer, admitted with fever        ASSESSMENT/PLAN:  Luz Marina Diallo is a 55 year old male with:     Aureliano is doing better. He is still having urinary retention and is getting a urinary catheter placed. He would be discharged home on an indwelling tran's catheter. He will have a follow up in urology fort this.     He has improved significantly over the last few days and now has a fair appetite. Okay to discharge home today.     Sepsis: Previously ICU, Medical floor now  -appreciate hospitalist, infectious disease and ICU cares  -on broad spectrum antibiotics, IV fluids, supportive cares  -Blood cultures and urine culture no growth to date  -COVID-19 test negative  -Recommend Levaquin 500mg daily when discharged for the duration of chemotherapy. Alternately we could do cipro 500 mg bid while on chemotherapy.      Seminoma: s/p BEP with Neulasta support, completed C1D15 bleomycin on 10/26.  -Scheduled to see Araceli Pacheco Monday, 11/2 for follow up and BEP C2D1. This is curative intent and we will not be adjusting the dosing. Recommend Levaquin 500 mg daily OR ciprofloxacin 500 mg bid when discharged to be continued while on chemotherapy.      Anemia: likely secondary to chemotherapy, sepsis  -transfuse as needed for hemoglobin <7.  He did receive a unit of blood 10/27.     Thrombocytosis: suspect reactive  -improved     Leukocytosis: likely secondary to sepsis.  He last received Neulasta on 10/6/20.  -stable    Over 35 min of time spent with more than 50% time spent in counseling and coordinating care.      Jarvis Ramos    Hematologist and Medical Oncologist  [facility]          INTERIM HISTORY: Rigoberto was seen in his room, sister Mamta at bedside.  He is feeling much better today.  Appetite is great.  Fevers have resolved.  No other new  complaints.      Temp (24hrs), Av.3  F (36.8  C), Min:97.6  F (36.4  C), Max:99.2  F (37.3  C)        MEDICATIONS:  Current Facility-Administered Medications   Medication     acetaminophen (TYLENOL) tablet 650 mg     allopurinol (ZYLOPRIM) tablet 300 mg     calcium carbonate 500 mg (elemental) (OSCAL) tablet 500 mg     ceFEPIme (MAXIPIME) 2 g vial to attach to  ml bag for ADULTS or 50 ml bag for PEDS     ferrous sulfate (FEROSUL) tablet 325 mg     heparin ANTICOAGULANT injection 5,000 Units     HYDROmorphone (PF) (DILAUDID) injection 0.3-0.5 mg     lidocaine (LMX4) cream     lidocaine 1 % 0.1-1 mL     lidocaine 1 % 0.1-1 mL     lidocaine 1 % 0.1-1 mL     loratadine (CLARITIN) tablet 10 mg     melatonin tablet 1 mg     metoclopramide (REGLAN) tablet 10 mg    Or     metoclopramide (REGLAN) injection 10 mg     multivitamin w/minerals (THERA-VIT-M) tablet 1 tablet     naloxone (NARCAN) injection 0.1-0.4 mg     naloxone (NARCAN) injection 0.1-0.4 mg     ondansetron (ZOFRAN-ODT) ODT tab 4 mg    Or     ondansetron (ZOFRAN) injection 4 mg     pantoprazole (PROTONIX) EC tablet 40 mg     Patient is already receiving anticoagulation with heparin, enoxaparin (LOVENOX), warfarin (COUMADIN)  or other anticoagulant medication     prochlorperazine (COMPAZINE) injection 10 mg    Or     prochlorperazine (COMPAZINE) tablet 10 mg    Or     prochlorperazine (COMPAZINE) suppository 25 mg     sodium chloride (PF) 0.9% PF flush 10 mL     sodium chloride (PF) 0.9% PF flush 10-20 mL     sodium chloride (PF) 0.9% PF flush 3 mL     sodium chloride (PF) 0.9% PF flush 3 mL     sodium chloride (PF) 0.9% PF flush 3 mL     sodium chloride (PF) 0.9% PF flush 3 mL     tamsulosin (FLOMAX) capsule 0.4 mg     Vitamin D3 (CHOLECALCIFEROL) tablet 50 mcg         ALLERGIES:  No Known Allergies      PHYSICAL EXAM:  Vital signs:  Temp: 97.6  F (36.4  C) Temp src: Oral BP: 97/63 Pulse: 118   Resp: 18 SpO2: 100 % O2 Device: None (Room air)     Weight:  "47.9 kg (105 lb 11.2 oz)  Estimated body mass index is 19.97 kg/m  as calculated from the following:    Height as of 10/9/20: 1.549 m (5' 1\").    Weight as of this encounter: 47.9 kg (105 lb 11.2 oz).    GENERAL:  Male, in no acute distress.  Alert and oriented x3. Well groomed.   HEENT:  Normocephalic, atraumatic. No conjunctival injection or eye swelling.   LUNGS:  Nonlabored breathing, no cough or audible wheezing, able to speak full sentences.  MSK: Full ROM UE.    SKIN: No rash on exposed skin.   NEURO: CN grossly intact, speech normal  PSYCH: Dev delay, stable      LABS:  Recent Labs   Lab Test 11/01/20  0520 10/31/20  0600 10/30/20  0625 10/29/20  0530 10/28/20  1420    140 139 141 142   POTASSIUM 4.0 4.4 3.9 3.8  3.8 3.8   CHLORIDE 110* 109 110* 114* 113*   CO2 25 24 23 19* 20   ANIONGAP 4 7 6 8 9   BUN 14 16 11 12 14   CR 1.42* 1.26* 1.35* 1.27*  1.27* 1.15   GLC 98 102* 88 84 91   IVANIA 6.9* 7.3* 7.1* 6.6* 7.1*     Recent Labs   Lab Test 10/31/20  0600 10/30/20  0625 10/29/20  0530 10/28/20  1420 10/28/20  0550 10/27/20  1055 10/03/20  0703 10/03/20  0703 09/28/20  0650 09/28/20  0650 09/27/20  0719 09/26/20  0756   MAG 1.9 1.7 1.6  --  2.0 3.1*   < >  --    < > 1.7  --   --    PHOS  --   --   --  3.1  --   --   --  4.3  --  3.1 2.9 3.2    < > = values in this interval not displayed.     Recent Labs   Lab Test 11/01/20  0520 10/31/20  0600 10/30/20  0625 10/29/20  0530 10/28/20  1420 10/27/20  0400 10/27/20  0400 10/26/20  1453 10/26/20  0820 10/19/20  1649 10/19/20  0831   WBC 19.9* 17.7* 15.7* 14.5* 18.8*  --  27.2* 35.9* 37.4* 68.2* 68.3*   HGB 8.1* 8.5* 8.1* 8.5* 8.0*   < > 6.7* 7.6* 7.6* 6.7* 5.3*    277 284 311  311 386  --  655* 832* 908* 276 262   MCV 92 92 91 92 94  --  94 97 96 94 97   NEUTROPHIL  --   --   --   --   --   --  80.0 81.0 66.0 69.0 75.0    < > = values in this interval not displayed.     Recent Labs   Lab Test 10/27/20  0400 10/26/20  1453 10/26/20  0820 09/24/20  0601 " 09/24/20  0601   BILITOTAL 0.5 0.4 0.2   < >  --    ALKPHOS 340* 278* 262*   < >  --    * 37 33   < >  --    * 35 28   < >  --    ALBUMIN 2.1* 2.8* 2.7*   < >  --    LDH  --   --  578*  --  710*    < > = values in this interval not displayed.     TSH   Date Value Ref Range Status   08/13/2017 0.75 0.40 - 4.00 mU/L Final     Recent Labs   Lab Test 09/25/20  0705   CEA 0.5     Results for orders placed or performed during the hospital encounter of 10/26/20   XR Chest Port 1 View    Narrative    XR PORTABLE CHEST ONE VIEW   10/26/2020 3:00 PM     HISTORY: Fever    COMPARISON: Chest x-ray on 10/19/2020      Impression    IMPRESSION: Single portable AP view of the chest was obtained. Stable  cardiomediastinal silhouette. No suspicious focal pulmonary opacities.  No significant pleural effusion or pneumothorax.    BEE DALLAS MD   CT Chest Abdomen Pelvis w/o Contrast    Narrative    EXAM: CT CHEST ABDOMEN PELVIS W/O CONTRAST  LOCATION: [facility]  DATE/TIME: 10/26/2020 9:50 PM    INDICATION: Sepsis. History of testicular cancer. Fever. Vomiting. Tachycardia.  COMPARISON: 9/25/2020 and 9/23/2020.  TECHNIQUE: CT scan of the chest, abdomen, and pelvis was performed without IV contrast. Multiplanar reformats were obtained. Dose reduction techniques were used.   CONTRAST: None.    FINDINGS:   LUNGS AND PLEURA: Evaluation of lung parenchyma is limited by breathing motion artifact. No large consolidations. No pneumothorax or pleural effusion. Mild dependent atelectasis.    MEDIASTINUM/AXILLAE: There are multiple mildly enlarged mediastinal lymph nodes which are larger than on the previous exam. Low AP window node measures 2.7 x 1.0 cm (previously 2.1 x 0.5 cm).    HEPATOBILIARY: Normal.    PANCREAS: Normal.    SPLEEN: Normal.    ADRENAL GLANDS: Normal.    KIDNEYS/BLADDER: There is mild dilatation of the renal collecting systems bilaterally. Bilateral double-J ureteral stents are in normal  position. The urinary bladder is very distended but otherwise appears normal.    BOWEL: There is no bowel obstruction. Moderate amount of stool in the colon. No free intraperitoneal gas or fluid.    LYMPH NODES: There are again multiple enlarged and partially calcified lymph nodes in the retroperitoneum and pelvis. A large pelvic mass has decreased significantly in size in the interval now measuring approximately 9.9 cm AP x 12.2 cm transverse   (previously 15 cm AP x 14.6 cm transverse).    VASCULATURE: Unremarkable.    PELVIC ORGANS: Normal.    MUSCULOSKELETAL: Left inguinal hernia containing soft tissue as before, possibly testicle.      Impression    IMPRESSION:  1.  Interval decrease in size of the partially calcified pelvic mass and abdominal and pelvic lymph node enlargement.  2.  Mild bilateral hydronephrosis with bilateral ureteral stents in place. The urinary bladder is extremely distended.  3.  Few mildly enlarged mediastinal lymph nodes measure larger than on the prior exam.  4.  Stable soft tissue in the left inguinal canal, possibly testicle.   XR Chest Port 1 View    Narrative    EXAM: XR CHEST PORT 1 VW  LOCATION: [facility]  DATE/TIME: 10/27/2020 1:20 AM    INDICATION: Line placement  COMPARISON: None.      Impression    IMPRESSION: Right PICC with tip at the cavoatrial junction. Heart size upper limits normal.   Echocardiogram Complete    Narrative    [id number]  [id number]  [id number]  [id number]^BRANDON^ROBERT^R           [facility]  Echocardiography Laboratory  [address] [place] [address]        Name: RAJWINDER OSULLIVAN  MRN: [id number]  : 1965  Study Date: 2020 08:30 AM  Age: 55 yrs  Gender: Male  Patient Location: [facility]  Reason For Study: Tachycardia  Ordering Physician: ROBERT TAYLOR  Performed By: Ana Rosa Guzman     BSA: 1.4 m2  Height: 61 in  Weight: 101 lb  HR: 86  BP: 92/48  mmHg  _____________________________________________________________________________  __        Procedure  Complete Portable Echo Adult.  _____________________________________________________________________________  __        Interpretation Summary     The left ventricle is normal in size. There is mild concentric left  ventricular hypertrophy. Left ventricular systolic function is low normal. The  visual ejection fraction is estimated at 50-55%. Left ventricular diastolic  function is normal. LVEF 52% based on biplane 2D tracing.  The right ventricle is normal size. The right ventricular systolic function is  normal.  Normal left atrial size. Right atrial size is normal. Right atrial catheter.  There is no color Doppler evidence of an atrial shunt.  Trace mitral and tricuspid regurgitation.  No pericardial effusion.  No previous study for comparison.  _____________________________________________________________________________  __        Left Ventricle  The left ventricle is normal in size. There is mild concentric left  ventricular hypertrophy. Left ventricular systolic function is low normal. The  visual ejection fraction is estimated at 50-55%. Left ventricular diastolic  function is normal. LVEF 52% based on biplane 2D tracing.     Right Ventricle  The right ventricle is normal size. The right ventricular systolic function is  normal.     Atria  Normal left atrial size. Right atrial size is normal. Right atrial catheter.  There is no color Doppler evidence of an atrial shunt.     Mitral Valve  There is trace mitral regurgitation.        Tricuspid Valve  There is trace tricuspid regurgitation. The right ventricular systolic  pressure is approximated at 22.6 mmHg plus the right atrial pressure.     Aortic Valve  The aortic valve is trileaflet. No aortic regurgitation is present. No aortic  stenosis is present.     Pulmonic Valve  There is no pulmonic valvular stenosis.     Vessels  The aortic arch is Mildly  dilated. Normal size ascending aorta. Descending  aortic velocity normal. The inferior vena cava is normal.     Pericardium  There is no pericardial effusion.        Rhythm  Sinus rhythm was noted.  _____________________________________________________________________________  __  MMode/2D Measurements & Calculations     IVSd: 1.3 cm  LVIDd: 4.4 cm  LVIDs: 3.4 cm  LVPWd: 1.2 cm  FS: 24.0 %  LV mass(C)d: 208.4 grams  LV mass(C)dI: 147.5 grams/m2  Ao root diam: 3.9 cm  LA dimension: 3.4 cm  asc Aorta Diam: 3.4 cm  LA/Ao: 0.88  LVOT diam: 2.0 cm  LVOT area: 3.0 cm2  LA Volume (BP): 41.1 ml  LA Volume Index (BP): 29.1 ml/m2  RWT: 0.56           Doppler Measurements & Calculations  MV E max haritha: 65.8 cm/sec  MV A max haritha: 53.1 cm/sec  MV E/A: 1.2  MV dec time: 0.19 sec  LV V1 max P.2 mmHg  LV V1 max: 102.4 cm/sec  LV V1 VTI: 16.9 cm  SV(LVOT): 51.2 ml  SI(LVOT): 36.3 ml/m2  PA acc time: 0.11 sec  TR max haritha: 237.5 cm/sec  TR max P.6 mmHg  E/E' av.5  Lateral E/e': 5.3  Medial E/e': 9.6              _____________________________________________________________________________  __        Report approved by: Johnnie Rodriguez 2020 01:55 PM

## 2020-11-01 NOTE — PROGRESS NOTES
[facility]  Infectious Disease Progress Note          Assessment and Plan:   IMPRESSION:   1.  A 55-year-old male, recent diagnosis of seminoma with a large abdominal mass, recent urosepsis with Klebsiella, now with recurrent sepsis, urine not particularly abnormal, tumor decreased in size with no major new obstruction on CT scan nor abscess or other, cause of current sepsis is unclear.  Cultures are pending.   2.  Recent Klebsiella urosepsis with obstruction.   3.  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   4.  Renal insufficiency.   5.  COVID-19 rule out - negative.      RECOMMENDATIONS:   1 vanco and cefepime day 6.  Obviously, try to limit the amount of vancomycin.   cultures not growing something that requires it  so discontinue, dx is still unclear.   2.  Further workup as directed. No new focal sxs, out of ICU slowly better incl feels better wo focal sxs  3.  As an aside, no reason to think this is tuberculosis, but in this high-risk reactivation patient, his tuberculosis status should be a known part of the history and is neither known to the patient nor anywhere in the records.  pos QuantiFERON TB Gold test , absolutely should be treated no isolation issue. Delay Rif or INH until this illness resolved, I will see in Follow-up for this purpose  4 no further  fever spike, no focal sxs, OK home 1 week empiric cipro 500 bid        Interval History:   no new complaints and doing well; Tmostly down but 1 spike slowly better, no + cxs .quant Tb +              Medications:       allopurinol  300 mg Oral Daily     calcium carbonate 500 mg (elemental)  500 mg Oral BID w/meals     ceFEPIme (MAXIPIME) IV  2 g Intravenous Q12H     ferrous sulfate  325 mg Oral Daily with breakfast     heparin ANTICOAGULANT  5,000 Units Subcutaneous Q12H     loratadine  10 mg Oral Daily     multivitamin w/minerals  1 tablet Oral  Daily     pantoprazole  40 mg Oral QAM AC     sodium chloride (PF)  10 mL Intracatheter Q8H     sodium chloride (PF)  3 mL Intracatheter Q8H     tamsulosin  0.4 mg Oral Daily     cholecalciferol  50 mcg Oral Daily                  Physical Exam:   Blood pressure 97/63, pulse 118, temperature 97.6  F (36.4  C), temperature source Oral, resp. rate 18, weight 47.9 kg (105 lb 11.2 oz), SpO2 100 %.  Wt Readings from Last 2 Encounters:   11/01/20 47.9 kg (105 lb 11.2 oz)   10/26/20 46.6 kg (102 lb 12.8 oz)     Vital Signs with Ranges  Temp:  [97.6  F (36.4  C)-99.2  F (37.3  C)] 97.6  F (36.4  C)  Pulse:  [] 118  Resp:  [18-20] 18  BP: (90-99)/(48-63) 97/63  SpO2:  [98 %-100 %] 100 %    Constitutional: Awake, alert, cooperative, no apparent distress looks much better   Lungs: Clear to auscultation bilaterally, no crackles or wheezing   Cardiovascular: Regular rate and rhythm, normal S1 and S2, and no murmur noted   Abdomen: Normal bowel sounds, soft, non-distended, sl tender   Skin: No rashes, no cyanosis, sl edema   Other:           Data:   All microbiology laboratory data reviewed.  Recent Labs   Lab Test 11/01/20  0520 10/31/20  0600 10/30/20  0625   WBC 19.9* 17.7* 15.7*   HGB 8.1* 8.5* 8.1*   HCT 26.1* 27.4* 25.5*   MCV 92 92 91    277 284     Recent Labs   Lab Test 11/01/20  0520 10/31/20  0600 10/30/20  0625   CR 1.42* 1.26* 1.35*     No lab results found.  Recent Labs   Lab Test 10/26/20  1803 10/26/20  1531 10/26/20  1454 10/19/20  1748 10/19/20  1648 10/19/20  1515 10/11/20  0108 10/09/20  2228 10/09/20  2154   CULT No growth No growth No growth No growth No growth No growth No growth Cultured on the 1st day of incubation:  Klebsiella oxytoca  Susceptibility testing done on previous specimen  *  Critical Value/Significant Value, preliminary result only, called to and read back by  Krista Miller RN. 10/10/20 @ CANDIE Weir    Cultured on the 1st day of incubation:  Klebsiella oxytoca  *   Critical Value/Significant Value, preliminary result only, called to and read back by   Krista Miller RN. 10/10/20 @ CANDIE Weir    (Note)  POSITIVE for KLEBSIELLA OXYTOCA by [employer] multiplex nucleic acid  test. Final identification and antimicrobial susceptibility testing  will be verified by standard methods.    Specimen tested with [employer] multiplex, gram-negative blood culture  nucleic acid test for the following targets: Acinetobacter sp.,  Citrobacter sp., Enterobacter sp., Proteus sp., E. coli, K.  pneumoniae/oxytoca, P. aeruginosa, and the following resistance  markers: CTXM, KPC, NDM, VIM, IMP and OXA.    Critical Value/Significant Value called to and read back by  Toi Palomino RN @ 1605. 10/10/20. AV   >100,000 colonies/mL  Klebsiella oxytoca  *

## 2020-11-01 NOTE — PLAN OF CARE
To Do:  End of Shift Summary  For vital signs and complete assessments, please see documentation flowsheets.     Pertinent assessments: Pt alert, no c/o pain. PICC, PIV patent and flushed. IV ABX continued. Palafox intact and draining, straw colorurine present. Low grade fever 99.2, tylenol given, recheck temp 98.6.     Major Shift Events: none  Treatment Plan: discharge home 11/1, continue to monitor for s/sx of infection  Bedside Nurse: Claudia Medina RN

## 2020-11-01 NOTE — PROGRESS NOTES
Patient discharge at 1600, PIV removed, PICC removed. Palafox care instructions gone over with sisterMamta. VS obtained and WDL. No pain to report per patient. All belongings sent with patient and patient's sister aware of Abx to  from pharmacy.

## 2020-11-01 NOTE — PLAN OF CARE
Went through discharge and followup instructions with patient and family. Oncology follow up appointment tomorrow and Urology followup to try a void trial. Pt given discharge tran instructions. Instructed when medications were last taken and when to take them next. All questions answered at this time. Pt needs to have IV and PICC line removed before discharge.

## 2020-11-01 NOTE — PLAN OF CARE
End of Shift Summary  For vital signs and complete assessments, please see documentation flowsheets.     Pertinent assessments: Pt alert, no c/o pain. PICC - Purple lumen was occluded - Vascular changed cap and flushed so is now patent, PIV patent and flushed. IV ABX continued. Palafox intact and draining, straw colorurine present. VSS    Major Shift Events: none  Treatment Plan: discharge home 11/1, continue to monitor for s/sx of infection

## 2020-11-02 NOTE — PROGRESS NOTES
Infusion Nursing Note:  Kristynvitaly Diallo presents today for PIV start and labs.    Patient seen by provider today: Yes: Araceli   present during visit today: Not Applicable.    Note: N/A.    Intravenous Access:  Lab draw site R lower FA, Needle type angiocath, Gauge 22.  Labs drawn without difficulty.  Peripheral IV placed.    Treatment Conditions:  Not Applicable.      Post Infusion Assessment:  Patient tolerated procedure without incident.  Site patent and intact, free from redness, edema or discomfort.  No evidence of extravasations.       Discharge Plan:   To clinic for PA appointment.    ELIE LAST RN

## 2020-11-02 NOTE — LETTER
11/2/2020         RE: Luz Marina Diallo  [id number] [facility] [id number]        Dear Colleague,    Thank you for referring your patient, Luz Marina Diallo, to the [facility]. Please see a copy of my visit note below.    Oncology/Hematology Visit Note    Nov 2, 2020    Reason for visit: Follow up seminoma    Oncology HPI: Luz Marina Diallo is a 55 year old male with a history of developmental delay with testicular cancer.  He was hospitalized at [facility] on 9/23/2020 for large abdominal mass, weight loss, decreased appetite and was found to have renal failure with creatinine of 4.5 with hypercalcemia 15.5.  CT confirmed a large abdominal mass compressing the ureters and he underwent bilateral ureteral stent placement on 9/24/2020.  Abdominal mass concern for malignancy and biopsy revealed germ cell tumor (seminoma).  Palafox was placed and he was given Zometa for hypercalcemia with IV fluids.  He was started on neoadjuvant chemotherapy with bleomycin, etoposide and carboplatin (cisplatin was not used due to renal function), C1 D1 completed on 10/1/2020 and he received the first 5 days inpatient.  He tolerated this well.    Unfortunately, he was admitted for neutropenic fever and urosepsis multiple times since starting chemotherapy, most recently on 10/26/2020.  He received C1 D 15 and he was having high fever and tachycardia, presumed urinary source again.  He received antibiotics, IV fluids and discharged on 11/1/2020.  We recommended starting Levaquin 500 mg daily.    Of note, he prefers to be addressed with his nickname, Rigoberto.     He is here today for BEP C2 D1.     Interval History: Rigoberto is doing better today.  It has been a long month for him since starting chemotherapy he was discharged to the hospital yesterday again.  He started Cipro twice a day and he tolerated this fairly well.  He is nervous again today.  He is here with his sister, Mamta.   Appetite is been really good.  No fever or chills, vomiting or diarrhea, headache or vision changes, abdominal pain or bleeding.  No other new complaints.    Review of Systems: See interval hx. Denies fevers, chills, HA, dizziness, n/t, changes in vision, cough, sore throat, CP, SOB, abdominal pain, N/V, diarrhea, changes in urination, bleeding, bruising, rash.     PMHx and Social Hx reviewed per EPIC.      Medications:  Current Outpatient Medications   Medication Sig Dispense Refill     allopurinol (ZYLOPRIM) 300 MG tablet Take 1 tablet (300 mg) by mouth daily 30 tablet 0     ciprofloxacin (CIPRO) 500 MG tablet Take 1 tablet (500 mg) by mouth 2 times daily for 7 days 14 tablet 0     ferrous sulfate (FEROSUL) 325 (65 Fe) MG tablet Take 1 tablet (325 mg) by mouth daily (with breakfast) 30 tablet 0     loratadine (CLARITIN) 10 MG tablet Take 10 mg by mouth daily for 5 days following Neulasta       LORazepam (ATIVAN) 0.5 MG tablet Take 1 tablet (0.5 mg) by mouth every 4 hours as needed (Anxiety, Nausea/Vomiting or Sleep) 30 tablet 3     multivitamin w/minerals (THERA-VIT-M) tablet Take 1 tablet by mouth daily       prochlorperazine (COMPAZINE) 10 MG tablet Take 1 tablet (10 mg) by mouth every 6 hours as needed (Nausea/Vomiting) 30 tablet 3     tamsulosin (FLOMAX) 0.4 MG capsule Take 1 capsule (0.4 mg) by mouth daily 30 capsule 0       No Known Allergies      EXAM:    /66   Pulse 114   Temp 97.1  F (36.2  C) (Tympanic)   Resp 16   Wt 46.3 kg (102 lb)   SpO2 100%   BMI 19.27 kg/m      GENERAL:  Male, in no acute distress.  Alert and oriented x3.   HEENT:  Normocephalic, atraumatic.  PERRL, oropharynx clear with no sores or thrush.   LYMPH NODES:  No palpable cervical lyphadenopathy appreciated.  LUNGS: Nonlabored breathing  ABDOMEN:   Large area of firmness to lower abdomen, actually softer today.  : Palafox catheter in place.   EXTREMITIES: 1+ pitting edema bilaterally.   SKIN: No rash  PSYCH: Mood  stable      Labs:   Results for AFSANEH DIALLO (MRN [id number]) as of 11/2/2020 11:24   11/2/2020 08:45   Sodium 138   Potassium 4.1   Chloride 107   Carbon Dioxide 22   Urea Nitrogen 16   Creatinine 1.44 (H)   GFR Estimate 55 (L)   GFR Estimate If Black 64   Calcium 6.8 (L)   Anion Gap 9   Magnesium 2.0   Albumin 2.3 (L)   Protein Total 7.1   Bilirubin Total 0.5   Alkaline Phosphatase 1,492 (H)    (H)    (H)   Glucose 134 (H)   WBC 25.8 (H)   Hemoglobin 8.7 (L)   Hematocrit 28.4 (L)   Platelet Count 394   RBC Count 3.05 (L)   MCV 93   MCH 28.5   MCHC 30.6 (L)   RDW 18.6 (H)   Diff Method Manual Differential   % Neutrophils 71.0   % Lymphocytes 7.0   % Monocytes 4.0   % Eosinophils 9.0   % Basophils 0.0   % Metamyelocytes 3.0   % Myelocytes 6.0   Absolute Neutrophil 18.3 (H)   Absolute Lymphocytes 1.8   Absolute Monocytes 1.0   Absolute Eosinophils 2.3 (H)   Absolute Basophils 0.0   Absolute Metamyelocytes 0.8 (H)   Absolute Myelocytes 1.5 (H)   RBC Morphology Consistent with reported results   Platelet Estimate Automated count confirmed.  Platelet morphology is normal.       Imaging: n/a    Impression/Plan: Luz Marina Diallo is a 55 year old male with metastatic seminoma currently undergoing neoadjuvant BEP with Neulasta support.    Seminoma: Beta , metastasis to the lymph nodes, currently undergoing BEP with Neulasta support, C1 D1 done inpatient on 10/1/2020. He tolerated this well, but unfortunately has been hospitalized for neutropenia or fever with every treatment of chemotherapy.  Recently discharged again yesterday and presumed UTI, Palafox catheter in place and we have started him on ciprofloxacin 500 mg twice daily indefinitely through treatment.  Continues to have some leukocytosis, likely from previous infection as his last Neulasta was 10/6, see below.  He is feeling well and nervous, but labs are within parameters to proceed with BEP C2 D1 today.  Dr. Ramos has replaced  carboplatin with cisplatin start with cycle 2.  We will continue with schedule as planned. No indication for blood or platelets today.  --11/9 Araceli, BEP C2 D8 (bleo only)    LFTs: Alk phos significantly elevated at 1492 and previously 340 on 10/27.  Bone scan on 9/28 with no evidence of bony lesions.  Likely from liver metastasis and discussed with Dr. Ramos.  We will monitor.    Leukocytosis: Multifactorial, previously from Neulasta, then recently hospitalized for presumed UTI.  WBC improved to 19.9 on 11/1 after being discharged, increased to 25.8 today.  I spoke with Dr. Ramos and he is afebrile, feeling well and still curative intent, therefore we will proceed with chemo cycle 2 today.    FEN: Hypocalcemia, overall improved to 6.8, slightly worse from hospital stay recently. Electrolytes are otherwise okay and appetite is really good.    Neutropenic fever: Recently hospitalized, secondary to chemotherapy.  Suspect UTI and will keep him on Cipro 500 mg twice daily while undergoing chemotherapy, hopefully to prevent further hospitalizations.    YAYA: Creatinine 4.51 hospitalized on 9/23/2020.  He is status post bilateral ureteral stents and currently has Palafox catheter in place. Creatinine stable 1.44.    Heme: Hemoglobin 8.7, platelets 394.  See leukocytosis above.    Developmental Delay: Lives with his sister, Mamta, who accompanies him with every visit.      Chart documentation with Dragon Voice recognition Software. Although reviewed after completion, some words and grammatical errors may remain.      Araceli Pacheco PA-C  Hematology/Oncology  [facility]                Again, thank you for allowing me to participate in the care of your patient.        Sincerely,        Araceli Pacheco PA-C

## 2020-11-02 NOTE — PROGRESS NOTES
Infusion Nursing Note:  Luz Marina Diallo presents today for C2D1 BEP.    Patient seen by provider today: Yes: Junior   present during visit today: Yes, Language: Comoran -- Dtr present today    Note: Liver functions elevated, most specifically Alk Phos.  Pharmacy discussed with Dr Ramos.  Will continue treatment at current doses    Intravenous Access:  Peripheral IV placed per fast track RN  Positive blood return noted     Treatment Conditions:  Lab Results   Component Value Date    HGB 8.7 11/02/2020     Lab Results   Component Value Date    WBC 25.8 11/02/2020      Lab Results   Component Value Date    ANEU 18.3 11/02/2020     Lab Results   Component Value Date     11/02/2020      Lab Results   Component Value Date     11/02/2020                   Lab Results   Component Value Date    POTASSIUM 4.1 11/02/2020           Lab Results   Component Value Date    MAG 2.0 11/02/2020            Lab Results   Component Value Date    CR 1.44 11/02/2020                   Lab Results   Component Value Date    IVANIA 6.8 11/02/2020                Lab Results   Component Value Date    BILITOTAL 0.5 11/02/2020           Lab Results   Component Value Date    ALBUMIN 2.3 11/02/2020                    Lab Results   Component Value Date     11/02/2020           Lab Results   Component Value Date     11/02/2020       Results reviewed, labs MET treatment parameters, ok to proceed with treatment.      Post Infusion Assessment:  Patient tolerated infusion without incident.  Blood return noted pre and post infusion.  Site patent and intact, free from redness, edema or discomfort.  No evidence of extravasations.  Access discontinued per protocol.       Discharge Plan:   Discharge instructions reviewed with: Patient and Family.  Patient and/or family verbalized understanding of discharge instructions and all questions answered.  AVS to patient via [employer].  Patient will return C2D2 BEP for next  appointment.   Patient discharged in stable condition accompanied by: family  Departure Mode: Ambulatory.    Stefanie Haskins RN

## 2020-11-02 NOTE — NURSING NOTE
"Oncology Rooming Note    November 2, 2020 8:56 AM   Luz Marina Diallo is a 55 year old male who presents for:    Chief Complaint   Patient presents with     Oncology Clinic Visit     Seminoma     Initial Vitals: /66   Pulse 114   Temp 97.1  F (36.2  C) (Tympanic)   Resp 16   Wt 46.3 kg (102 lb)   SpO2 100%   BMI 19.27 kg/m   Estimated body mass index is 19.27 kg/m  as calculated from the following:    Height as of 10/9/20: 1.549 m (5' 1\").    Weight as of this encounter: 46.3 kg (102 lb). Body surface area is 1.41 meters squared.  No Pain (0) Comment: Data Unavailable   No LMP for male patient.  Allergies reviewed: Yes  Medications reviewed: Yes    Medications: Medication refills not needed today.  Pharmacy name entered into [facility]:    [facility] [address] [place] [facility] [address]  [facility] [address] [place] [address] [address]    Clinical concerns: follow up       Ana Chin CMA              "

## 2020-11-03 NOTE — TELEPHONE ENCOUNTER
Please call patient for Inpatient Discharge f/u 11/01/20. Sepsis Without Acute Organ Dysfunction, Due To Unspecified Organism. Ruth Behrens.

## 2020-11-03 NOTE — TELEPHONE ENCOUNTER
Post Discharge Medication Reconciliation Status: discharge medications reconciled, continue medications without change.  Yon Robbins RN

## 2020-11-03 NOTE — PROGRESS NOTES
Infusion Nursing Note:  Luz Marina Diallo presents today for cisplatin and etoposide.    Patient seen by provider today: No   present during visit today: Not Applicable.    Note: N/A.    Intravenous Access:  Peripheral IV placed.    Treatment Conditions:  Not Applicable.      Post Infusion Assessment:  Patient tolerated infusion without incident.  Blood return noted pre and post infusion.  Site patent and intact, free from redness, edema or discomfort.  No evidence of extravasations.  Access discontinued per protocol.       Discharge Plan:   Patient declined prescription refills.  Discharge instructions reviewed with: Family.  Patient and/or family verbalized understanding of discharge instructions and all questions answered.  Copy of AVS reviewed with patient and/or family.    Patient discharged in stable condition accompanied by: self.  Departure Mode: Ambulatory.    Claudia Rosenthal RN

## 2020-11-04 NOTE — PROGRESS NOTES
Infusion Nursing Note:  Luz Marina Diallo presents today for Etoposide, cisplat.    Patient seen by provider today: No   present during visit today: Not Applicable.  Sister here, 3 rd day in a row this week    Note: has indwelling catheter in    Intravenous Access:  Peripheral IV placed.    Treatment Conditions:  Lab Results   Component Value Date    HGB 8.7 11/02/2020     Lab Results   Component Value Date    WBC 25.8 11/02/2020      Lab Results   Component Value Date    ANEU 18.3 11/02/2020     Lab Results   Component Value Date     11/02/2020      Lab Results   Component Value Date     11/02/2020                   Lab Results   Component Value Date    POTASSIUM 4.1 11/02/2020           Lab Results   Component Value Date    MAG 2.0 11/02/2020            Lab Results   Component Value Date    CR 1.44 11/02/2020                   Lab Results   Component Value Date    IVANIA 6.8 11/02/2020                Lab Results   Component Value Date    BILITOTAL 0.5 11/02/2020           Lab Results   Component Value Date    ALBUMIN 2.3 11/02/2020                    Lab Results   Component Value Date     11/02/2020           Lab Results   Component Value Date     11/02/2020       Results reviewed, labs MET treatment parameters, ok to proceed with treatment.      Post Infusion Assessment:  Patient tolerated infusion without incident.  Blood return noted pre and post infusion.  Site patent and intact, free from redness, edema or discomfort.  No evidence of extravasations.  Access discontinued per protocol.       Discharge Plan:   Discharge instructions reviewed with: Patient.  Patient and/or family verbalized understanding of discharge instructions and all questions answered.  Patient discharged in stable condition accompanied by: self.  Departure Mode: Ambulatory.    Minna Harkins RN

## 2020-11-05 NOTE — PROGRESS NOTES
Infusion Nursing Note:  Attapeu NO JUSTICE Diallo presents today for Cycle 2, Day 4 Etoposide, Cisplatin and IVF.    Patient seen by provider today: No   present during visit today: Yes, sister here with patient 4th day in a row.    Note: Patient no new complaints over night. Has indwelling catheter.    Patient was scheduled for fast track and labs prior to Dr. Richard espinoza tomorrow. Clarfied with Apolonia Jordan RN, no labs needed. May cancel fast track appt. Patient and sister updated to come tomorrow at 0900 for Dr. Ramos appt with infusion following.     Intravenous Access:  Peripheral IV placed.    Treatment Conditions:  Lab Results   Component Value Date    HGB 8.7 11/02/2020     Lab Results   Component Value Date    WBC 25.8 11/02/2020      Lab Results   Component Value Date    ANEU 18.3 11/02/2020     Lab Results   Component Value Date     11/02/2020      Lab Results   Component Value Date     11/02/2020                   Lab Results   Component Value Date    POTASSIUM 4.1 11/02/2020           Lab Results   Component Value Date    MAG 2.0 11/02/2020            Lab Results   Component Value Date    CR 1.44 11/02/2020                   Lab Results   Component Value Date    IVANIA 6.8 11/02/2020                Lab Results   Component Value Date    BILITOTAL 0.5 11/02/2020           Lab Results   Component Value Date    ALBUMIN 2.3 11/02/2020                    Lab Results   Component Value Date     11/02/2020           Lab Results   Component Value Date     11/02/2020       Results reviewed from 11/2, labs MET treatment parameters, ok to proceed with treatment.      Post Infusion Assessment:  Patient tolerated infusion without incident.  Blood return noted pre and post infusion.  Site patent and intact, free from redness, edema or discomfort.  No evidence of extravasations.  Access discontinued per protocol.       Discharge Plan:   Patient declined prescription refills.   Patient  will return 11/6 for next appointment.  Patient discharged in stable condition accompanied by: daughter.  Departure Mode: Ambulatory.    Stefanie Lopez RN

## 2020-11-06 NOTE — DISCHARGE SUMMARY
[facility] Discharge Summary    Attvitaly Diallo MRN# [id number]   Age: 55 year old YOB: 1965     Date of Admission:  10/26/2020  Date of Discharge::  11/1/2020  4:00 PM  Admitting Physician:  Jameson Medina MD  Discharge Physician:  Toby Arciniega MD     Home clinic: [facility]          Admission Diagnoses:   Sinus tachycardia [R00.0]  Chronic renal impairment, unspecified CKD stage [N18.9]  Sepsis without acute organ dysfunction, due to unspecified organism (H) [A41.9]          Discharge Diagnosis:   Principal Problem:    Sepsis without acute organ dysfunction, due to unspecified organism (H)  Active Problems:    Testicular neoplasm    Sinus tachycardia    Chronic renal impairment, unspecified CKD stage            Procedures:   PICC  Echocardiogram  CXR  CT Chest/abd/pelvis without contrast          Discharge Medications:     Discharge Medication List as of 11/1/2020  3:19 PM      START taking these medications    Details   ciprofloxacin (CIPRO) 500 MG tablet Take 1 tablet (500 mg) by mouth 2 times daily for 7 days, Disp-14 tablet, R-0, E-Prescribe         CONTINUE these medications which have NOT CHANGED    Details   allopurinol (ZYLOPRIM) 300 MG tablet Take 1 tablet (300 mg) by mouth daily, Disp-30 tablet, R-0, E-Prescribe      ferrous sulfate (FEROSUL) 325 (65 Fe) MG tablet Take 1 tablet (325 mg) by mouth daily (with breakfast), Disp-30 tablet, R-0, E-Prescribe      loratadine (CLARITIN) 10 MG tablet Take 10 mg by mouth daily for 5 days following Neulasta, Historical      multivitamin w/minerals (THERA-VIT-M) tablet Take 1 tablet by mouth daily, Historical      tamsulosin (FLOMAX) 0.4 MG capsule Take 1 capsule (0.4 mg) by mouth daily, Disp-30 capsule, R-0, E-Prescribe                   Consultations:   Consultation during this admission received from infectious disease and oncology          Hospital Course:   Luz Marina Diallo is a 55 year old male  with past medical history of seminoma on chemotherapy who just received bleomycin, anemia, obstructive uropathy with bilateral ureteral stent place, CKD stage II, PUD, hiatal hernia, and developmental delay who was admitted on 10/26/2020 with fevers.   He had just finished bleomycin and was also getting Neulasta.  He was brought to attention primarily due to high fevers measured at 103-104  F.  Initially tachycardic.  Lactic acid not elevated but with leukocytosis of 35.  Procalcitonin significantly elevated 96.  Urinalysis was bland.  CT of the chest/abdomen/pelvis did not show any obvious infectious source.  He was started on IV vancomycin and cefepime.  He was admitted initially to the ICU for soft blood pressure in the setting of sepsis.  He was started on empiric antibiotics after cultures were obtained.    Blood and urine cultures remained negative throughout the hospital stay.  COVID-19 PCR negative.  Infectious disease and oncology were consulted.  He also had fairly significant hypocalcemia that was replaced daily.      Mr. Diallo was transferred to a medical bed on 10/29.  Last fever > 100.5 on 10/29. At this time, the patient appears to be doing very well.  Notably, despite clinical improvement, his WBC tyshawn prior to discharge.    BP 97/55 (BP Location: Left arm)   Pulse 126   Temp 97.6  F (36.4  C) (Oral)   Resp 18   Wt 47.9 kg (105 lb 11.2 oz)   SpO2 98%   BMI 19.97 kg/m    On the date of discharge, I again examined Zelalem, who appeared in good spirits and with a strong appetite.  Communication with him directly is limited due to language barrier as well as some apparent cognitive delay, but his sister, who communicates with him in [place] also felt that he looked improved.  We agreed that discharge home was the most appropriate step.  Gilbert Huerta and Richard had been by to see Zelalem as well.    HEENT: no focal.  Chest: no increased WOB  COR: RRR without murmur  Abd: soft, NTND          Discharge  Instructions and Follow-Up:   Discharge diet: Regular   Discharge activity: Activity as tolerated   Discharge follow-up: Follow up with Dr. Ramos and oncologyservice in the next several days           Discharge Disposition:   Discharged to home      Attestation:  I have reviewed today's vital signs, notes, medications, labs and imaging.  Total time: 35 minutes    Toby Arciniega MD

## 2020-11-06 NOTE — PROGRESS NOTES
Oncology/Hematology Visit Note    Nov 9, 2020    Reason for visit: Follow up seminoma    Oncology HPI: Luz Marina Diallo is a 55 year old male with a history of developmental delay with testicular cancer.  He was hospitalized at [facility] on 9/23/2020 for large abdominal mass, weight loss, decreased appetite and was found to have renal failure with creatinine of 4.5 with hypercalcemia 15.5.  CT confirmed a large abdominal mass compressing the ureters and he underwent bilateral ureteral stent placement on 9/24/2020.  Abdominal mass concern for malignancy and biopsy revealed germ cell tumor (seminoma).  Palafox was placed and he was given Zometa for hypercalcemia with IV fluids.  He was started on neoadjuvant chemotherapy with bleomycin, etoposide and carboplatin (cisplatin was not used due to renal function), C1 D1 completed on 10/1/2020 and he received the first 5 days inpatient.  He tolerated this well.    Unfortunately, he was admitted for neutropenic fever and urosepsis multiple times since starting chemotherapy, most recently on 10/26/2020.  He received C1 D 15 and he was having high fever and tachycardia, presumed urinary source again.  He received antibiotics, IV fluids and discharged on 11/1/2020.  We recommended starting Levaquin 500 mg daily and he completed BEP cycle 2 from 11/2-11/6 last week.    He is here today for BEP C2 D8.     Interval History: Rigoberto is here with the second.  He is feeling more fatigued over this past week with chemotherapy.  His appetite has been down as well.  No fever and he continues on Cipro twice a day.  No vomiting or diarrhea, abdominal pain.  He does have a few lip sores, but they are not painful.  No headache or vision changes.  No other complaints.    Review of Systems: See interval hx. Denies fevers, chills, HA, dizziness, n/t, changes in vision, cough, sore throat, CP, SOB, abdominal pain, N/V, diarrhea, changes in urination, bleeding, bruising, rash.     PMHx  and Social Hx reviewed per EPIC.      Medications:  Current Outpatient Medications   Medication Sig Dispense Refill     allopurinol (ZYLOPRIM) 300 MG tablet Take 1 tablet (300 mg) by mouth daily 30 tablet 0     ciprofloxacin (CIPRO) 500 MG tablet Take 1 tablet (500 mg) by mouth 2 times daily 60 tablet 1     ferrous sulfate (FEROSUL) 325 (65 Fe) MG tablet Take 1 tablet (325 mg) by mouth daily (with breakfast) 30 tablet 0     fluconazole (DIFLUCAN) 150 MG tablet Take 1 tablet (150 mg) by mouth once for 1 dose 1 tablet 0     loratadine (CLARITIN) 10 MG tablet Take 10 mg by mouth daily for 5 days following Neulasta       LORazepam (ATIVAN) 0.5 MG tablet Take 1 tablet (0.5 mg) by mouth every 4 hours as needed (Anxiety, Nausea/Vomiting or Sleep) 30 tablet 3     multivitamin w/minerals (THERA-VIT-M) tablet Take 1 tablet by mouth daily       prochlorperazine (COMPAZINE) 10 MG tablet Take 1 tablet (10 mg) by mouth every 6 hours as needed (Nausea/Vomiting) 30 tablet 3     tamsulosin (FLOMAX) 0.4 MG capsule Take 1 capsule (0.4 mg) by mouth daily 30 capsule 0       No Known Allergies      EXAM:    /66   Pulse 111   Temp 98.4  F (36.9  C) (Tympanic)   Resp 16   Wt 46.3 kg (102 lb)   SpO2 100%   BMI 19.27 kg/m      GENERAL:  Male, in no acute distress.  Alert and oriented x3.   HEENT:  Normocephalic, atraumatic.  PERRL, oropharynx clear with no sores or thrush.   LYMPH NODES:  No palpable cervical lyphadenopathy appreciated.  LUNGS: Nonlabored breathing  ABDOMEN:   Large area of firmness to lower abdomen, actually softer today.  : Palafox catheter in place.   EXTREMITIES: Trace pitting edema bilaterally.   SKIN: No rash  PSYCH: Mood stable      Labs:   Results for AFSANEH OSULLIVAN SHAUNA RENDON (MRN [id number]) as of 11/9/2020 13:47   11/9/2020 09:12   Sodium 140   Potassium 3.8   Chloride 110 (H)   Carbon Dioxide 21   Urea Nitrogen 37 (H)   Creatinine 1.28 (H)   GFR Estimate 63   GFR Estimate If Black 73   Calcium 6.0 (L)    Anion Gap 9   Uric Acid 2.5 (L)   Glucose 204 (H)   WBC 29.3 (H)   Hemoglobin 5.7 (LL)   Hematocrit 18.3 (L)   Platelet Count 449   RBC Count 1.93 (L)   MCV 95   MCH 29.5   MCHC 31.1 (L)   RDW 18.3 (H)   Diff Method Manual Differential   % Neutrophils 97.0   % Lymphocytes 0.0   % Monocytes 1.0   % Eosinophils 2.0   % Basophils 0.0   Absolute Neutrophil 28.4 (H)   Absolute Lymphocytes 0.0 (L)   Absolute Monocytes 0.3   Absolute Eosinophils 0.6   Absolute Basophils 0.0   RBC Morphology Consistent with reported results   Platelet Estimate Automated count confirmed.  Platelet morphology is normal.       Imaging: n/a    Impression/Plan: Luz Marina Diallo is a 55 year old male with metastatic seminoma currently undergoing neoadjuvant BEP with Neulasta support.    Seminoma: Beta , metastasis to the lymph nodes, currently undergoing BEP with Neulasta support, C1 D1 done inpatient on 10/1/2020. He tolerated this well, but unfortunately has been hospitalized for neutropenia or fever multiple times.  He was started on Cipro twice a day prophylactically.  He completed C2 D1-5 last week with no fever and he continues on Cipro.  Dr Ramos replaced carboplatin with cisplatin with the start of cycle 2.  Fatigue has certainly been worse recently, but he is noted to have a hemoglobin of 5.7 as well, see below.  Continues to have some leukocytosis and given Neulasta on 11/6.  Labs within parameters to proceed with C2 D8 today and he will return next week for day 15.  --11/16  BEP C2 D15 (bleo only)  --11/23 KATHLEEN Charles C3 D1    Addendum: After bleomycin and a unit of blood, patient developed a reaction with fever as high as 102, intermittent tremors and tachycardia in the low 120s.  He was given fluids, Solu-Medrol, Benadryl, Pepcid and Tylenol.  Demerol was not given.  No significant rigors.  He was reassessed and he was given about 2 L of fluids.  His temp did drop to 101 and he did feel better, tremors had resolved.   Spoke to Dr. Ramos and plan to premedicate with Tylenol with each bleomycin and I will add this to the premedications in the chemo plan.  He will come tomorrow for a liter of fluids and we will monitor closely.    LFTs: Alk phos significantly elevated at 1492 and no evidence of bony lesions on bone scan on 9/28.  Alk phos has improved to 878, possibly liver.  We will monitor.    Heme:   -Leukocytosis: Multifactorial, previously hospitalized for UTI, but also received Neulasta on 11/6.  Afebrile currently on Cipro.  Plan for chemotherapy and we will monitor.  -Anemia/thrombocytopenia: Hemoglobin 5.7 and plan for 2 units of blood today.  Platelets 449 and no active bleeding.  WBC 29.3, see above.    FEN: Hypocalcemia with calcium 6.0 today.  We will give IV calcium gluconate and new prescription for calcium/vitamin D sent to pharmacy.  Electrolytes are otherwise okay.  Appetite is down and recommended boost supplementation throughout the day.    Neutropenic fever: Recently hospitalized, secondary to chemotherapy.  Suspect UTI and will keep him on Cipro 500 mg twice daily while undergoing chemotherapy, hopefully to prevent further hospitalizations.    YAYA: Creatinine 4.51 hospitalized on 9/23/2020.  He is status post bilateral ureteral stents and currently has Palafox catheter in place. Creatinine stable 1.28.    Developmental Delay: Lives with his sister, Mamta, who accompanies him with every visit.      Chart documentation with Dragon Voice recognition Software. Although reviewed after completion, some words and grammatical errors may remain.      Araceli Pacheco PA-C  Hematology/Oncology  [facility]

## 2020-11-06 NOTE — PROGRESS NOTES
Infusion Nursing Note:  Attapeu NO JUSTICE Diallo presents today for etoposide,  cisplat.    Patient seen by provider today: Yes: Richard   present during visit today: Not Applicable.    Note: additional labs drawn  today -Alk Phos, ALT,AST were elevated earlier in week. Improved today. Proceed with chemo per Jaxon justa/Dr Ramos    Intravenous Access:  Lab draw site R hand, Needle type piv, Gauge 24.  Peripheral IV placed.    Treatment Conditions:  Lab Results   Component Value Date     11/06/2020                   Lab Results   Component Value Date    POTASSIUM 3.9 11/06/2020           Lab Results   Component Value Date    MAG 2.0 11/02/2020            Lab Results   Component Value Date    CR 1.14 11/06/2020                   Lab Results   Component Value Date    IVANIA 6.1 11/06/2020                Lab Results   Component Value Date    BILITOTAL 0.3 11/06/2020           Lab Results   Component Value Date    ALBUMIN 2.1 11/06/2020                    Lab Results   Component Value Date     11/06/2020           Lab Results   Component Value Date    AST 65 11/06/2020           Post Infusion Assessment:  Patient tolerated infusion without incident.   ONPRO  Was placed on patient's: back of left arm.    Was placed at 1450 PM    ONPRO injector device Lot number: [id number]    Patient education included: what patient can expect after application, what colored lights mean on the device, when to remove device, when and where to call with questions or issues, all patients questions answered and that Neulasta administration will occur at 1800 tomorrow.    Patient tolerated administration well.        Discharge Plan:   Discharge instructions reviewed with: sister.  Patient and/or family verbalized understanding of discharge instructions and all questions answered.  Patient discharged in stable condition accompanied by: self and sister.  Departure Mode: Ambulatory.    Minna Harkins RN

## 2020-11-06 NOTE — LETTER
"    11/6/2020         RE: Luz Marina Diallo  [address] [facility] [address]        Dear Colleague,    Thank you for referring your patient, Luz Marina Diallo, to the [facility]. Please see a copy of my visit note below.    Oncology Rooming Note    November 6, 2020 9:04 AM   Luz Marina Diallo is a 55 year old male who presents for:    Chief Complaint   Patient presents with     Oncology Clinic Visit     Seminoma     Initial Vitals: /73   Pulse 86   Temp 97  F (36.1  C) (Tympanic)   Resp 16   Wt 51.2 kg (112 lb 14.4 oz)   SpO2 100%   BMI 21.33 kg/m   Estimated body mass index is 21.33 kg/m  as calculated from the following:    Height as of 10/9/20: 1.549 m (5' 1\").    Weight as of this encounter: 51.2 kg (112 lb 14.4 oz). Body surface area is 1.48 meters squared.  No Pain (0) Comment: Data Unavailable   No LMP for male patient.  Allergies reviewed: Yes  Medications reviewed: Yes    Medications: Medication refills not needed today.  Pharmacy name entered into [employer]:    [facility] [id number] [place] [facility] [address] [facility] [address]  [facility] [address] [place] [address] [address]    Clinical concerns: Concerns about breathing Dr. Ramos was notified.      Adali Mejia [facility]  HEMATOLOGY AND ONCOLOGY    FOLLOW-UP VISIT NOTE    PATIENT NAME: Luz Marina Diallo MRN # [id number]  DATE OF VISIT: Nov 6, 2020 YOB: 1965    REFERRING PROVIDER: No referring provider defined for this encounter.    CANCER TYPE: Seminoma  STAGE: Intermediate risk disease  Stage III    TREATMENT SUMMARY:  He was hospitalized at [facility] on 9/23/2020 for large abdominal mass, weight loss, decreased appetite and was found to have renal failure with creatinine of 4.5 with hypercalcemia 15.5.  CT confirmed a large abdominal mass compressing the ureters and he underwent bilateral ureteral stent " placement on 9/24/2020.  Abdominal mass concern for malignancy and biopsy revealed germ cell tumor (seminoma).  Palafox was placed and he was given Zometa for hypercalcemia with IV fluids.  He was started on neoadjuvant chemotherapy with bleomycin, etoposide and carboplatin (cisplatin was not used due to renal function), C1 D1 completed on 10/1/2020 and he received the first 5 days inpatient.  Unfortunately, he was admitted for neutropenic fever and urosepsis thrice after his first cycle.    CURRENT INTERVENTIONS:  BLEOMYCIN, ETOPOSIDE AND CISPLATINUM (BEP)     SUBJECTIVE   Luz Marina Diallo is being followed for advanced seminoma    Luz Marina was seen in person at this late visit.  As always he was silent for the duration of the visit and barely answering the questions.  He is accompanied by his sister as in the past.  She notes that he has been stable and has not been complaining which is not unusual for him.  He seems to be tolerating the current chemotherapy okay.      PAST MEDICAL HISTORY     Past Medical History:   Diagnosis Date     Mental retardation          CURRENT OUTPATIENT MEDICATIONS     Current Outpatient Medications   Medication Sig     allopurinol (ZYLOPRIM) 300 MG tablet Take 1 tablet (300 mg) by mouth daily     ciprofloxacin (CIPRO) 500 MG tablet Take 1 tablet (500 mg) by mouth 2 times daily for 7 days     ferrous sulfate (FEROSUL) 325 (65 Fe) MG tablet Take 1 tablet (325 mg) by mouth daily (with breakfast)     loratadine (CLARITIN) 10 MG tablet Take 10 mg by mouth daily for 5 days following Neulasta     LORazepam (ATIVAN) 0.5 MG tablet Take 1 tablet (0.5 mg) by mouth every 4 hours as needed (Anxiety, Nausea/Vomiting or Sleep)     multivitamin w/minerals (THERA-VIT-M) tablet Take 1 tablet by mouth daily     prochlorperazine (COMPAZINE) 10 MG tablet Take 1 tablet (10 mg) by mouth every 6 hours as needed (Nausea/Vomiting)     tamsulosin (FLOMAX) 0.4 MG capsule Take 1 capsule (0.4 mg) by mouth daily      No current facility-administered medications for this visit.         ALLERGIES    No Known Allergies     REVIEW OF SYSTEMS   As above in the HPI, o/w complete 12-point ROS was negative.     PHYSICAL EXAM   /73   Pulse 86   Temp 97  F (36.1  C) (Tympanic)   Resp 16   Wt 51.2 kg (112 lb 14.4 oz)   SpO2 100%   BMI 21.33 kg/m    GEN: NAD  HEENT: PERRL, EOMI, no icterus, injection or pallor. Oropharynx is clear.  LYMPHATICs: no cervical or supraclavicular lymphadenopathy; no other abn lymphadenopathy  PULMONARY: clear with good air entry bilaterally  CARDIOVASCULAR: regular, no murmurs, rubs, or gallops  GASTROINTESTINAL: soft, non-tender, non-distended, normal bowel sounds, no hepatosplenomegaly by percussion or palpation  MUSCULOSKELTAL: warm, well perfused, no edema  NEURO: awake, alert and oriented to time place and person, cranial nerves intact - II - XII, no focal neurologic deficits  SKIN: no rashes     LABORATORY AND IMAGING STUDIES     Recent Labs   Lab Test 11/06/20  1045 11/02/20  0845 11/01/20  0520 10/31/20  0600 10/30/20  0625    138 139 140 139   POTASSIUM 3.9 4.1 4.0 4.4 3.9   CHLORIDE 114* 107 110* 109 110*   CO2 18* 22 25 24 23   ANIONGAP 10 9 4 7 6   BUN 36* 16 14 16 11   CR 1.14 1.44* 1.42* 1.26* 1.35*   * 134* 98 102* 88   IVANIA 6.1* 6.8* 6.9* 7.3* 7.1*     Lab Test 11/02/20  0845 10/31/20  0600 10/30/20  0625 10/29/20  0530 10/28/20  1420 10/28/20  0550 10/03/20  0703 10/03/20  0703 09/28/20  0650 09/28/20  0650   MAG 2.0 1.9 1.7 1.6  --  2.0   < >  --    < > 1.7   PHOS  --   --   --   --  3.1  --   --  4.3  --  3.1     Lab Test 11/02/20  0845 11/01/20  0520 10/31/20  0600 10/30/20  0625 10/29/20  0530 10/27/20  0400 10/26/20  1453 10/26/20  0820   WBC 25.8* 19.9* 17.7* 15.7* 14.5* 27.2* 35.9* 37.4*   HGB 8.7* 8.1* 8.5* 8.1* 8.5* 6.7* 7.6* 7.6*    286 277 284 311  311 655* 832* 908*   MCV 93 92 92 91 92 94 97 96   NEUTROPHIL 71.0  --   --   --   --  80.0 81.0 66.0      Lab Test 11/06/20  1045 11/02/20  0845 10/27/20  0400 10/26/20  0820   BILITOTAL 0.3 0.5 0.5 0.2   ALKPHOS 878* 1,492* 340* 262*   * 233* 101* 33   AST 65* 172* 152* 28   ALBUMIN 2.1* 2.3* 2.1* 2.7*   LDH  --   --   --  578*     TSH   Date Value Ref Range Status   08/13/2017 0.75 0.40 - 4.00 mU/L Final     Recent Labs   Lab Test 09/25/20  0705   CEA 0.5     Results for orders placed or performed during the hospital encounter of 10/26/20   XR Chest Port 1 View    Narrative    XR PORTABLE CHEST ONE VIEW   10/26/2020 3:00 PM     HISTORY: Fever    COMPARISON: Chest x-ray on 10/19/2020      Impression    IMPRESSION: Single portable AP view of the chest was obtained. Stable  cardiomediastinal silhouette. No suspicious focal pulmonary opacities.  No significant pleural effusion or pneumothorax.    BEE DALLAS MD   CT Chest Abdomen Pelvis w/o Contrast    Narrative    EXAM: CT CHEST ABDOMEN PELVIS W/O CONTRAST  LOCATION: [facility]  DATE/TIME: 10/26/2020 9:50 PM    INDICATION: Sepsis. History of testicular cancer. Fever. Vomiting. Tachycardia.  COMPARISON: 9/25/2020 and 9/23/2020.  TECHNIQUE: CT scan of the chest, abdomen, and pelvis was performed without IV contrast. Multiplanar reformats were obtained. Dose reduction techniques were used.   CONTRAST: None.    FINDINGS:   LUNGS AND PLEURA: Evaluation of lung parenchyma is limited by breathing motion artifact. No large consolidations. No pneumothorax or pleural effusion. Mild dependent atelectasis.    MEDIASTINUM/AXILLAE: There are multiple mildly enlarged mediastinal lymph nodes which are larger than on the previous exam. Low AP window node measures 2.7 x 1.0 cm (previously 2.1 x 0.5 cm).    HEPATOBILIARY: Normal.    PANCREAS: Normal.    SPLEEN: Normal.    ADRENAL GLANDS: Normal.    KIDNEYS/BLADDER: There is mild dilatation of the renal collecting systems bilaterally. Bilateral double-J ureteral stents are in normal position. The urinary  bladder is very distended but otherwise appears normal.    BOWEL: There is no bowel obstruction. Moderate amount of stool in the colon. No free intraperitoneal gas or fluid.    LYMPH NODES: There are again multiple enlarged and partially calcified lymph nodes in the retroperitoneum and pelvis. A large pelvic mass has decreased significantly in size in the interval now measuring approximately 9.9 cm AP x 12.2 cm transverse   (previously 15 cm AP x 14.6 cm transverse).    VASCULATURE: Unremarkable.    PELVIC ORGANS: Normal.    MUSCULOSKELETAL: Left inguinal hernia containing soft tissue as before, possibly testicle.      Impression    IMPRESSION:  1.  Interval decrease in size of the partially calcified pelvic mass and abdominal and pelvic lymph node enlargement.  2.  Mild bilateral hydronephrosis with bilateral ureteral stents in place. The urinary bladder is extremely distended.  3.  Few mildly enlarged mediastinal lymph nodes measure larger than on the prior exam.  4.  Stable soft tissue in the left inguinal canal, possibly testicle.   XR Chest Port 1 View    Narrative    EXAM: XR CHEST PORT 1 VW  LOCATION: [facility]  DATE/TIME: 10/27/2020 1:20 AM    INDICATION: Line placement  COMPARISON: None.      Impression    IMPRESSION: Right PICC with tip at the cavoatrial junction. Heart size upper limits normal.   Echocardiogram Complete    Narrative    [id number]  [id number]  [id number]  [id number]^BRANDON^ROBERT^R           [facility]  Echocardiography Laboratory  [address] [place] [address]        Name: RAJWINDER OSULLIVAN  MRN: [id number]  : 1965  Study Date: 2020 08:30 AM  Age: 55 yrs  Gender: Male  Patient Location: [facility]  Reason For Study: Tachycardia  Ordering Physician: ROBERT TAYLOR  Performed By: Ana Rosa Guzman     BSA: 1.4 m2  Height: 61 in  Weight: 101 lb  HR: 86  BP: 92/48  mmHg  _____________________________________________________________________________  __        Procedure  Complete Portable Echo Adult.  _____________________________________________________________________________  __        Interpretation Summary     The left ventricle is normal in size. There is mild concentric left  ventricular hypertrophy. Left ventricular systolic function is low normal. The  visual ejection fraction is estimated at 50-55%. Left ventricular diastolic  function is normal. LVEF 52% based on biplane 2D tracing.  The right ventricle is normal size. The right ventricular systolic function is  normal.  Normal left atrial size. Right atrial size is normal. Right atrial catheter.  There is no color Doppler evidence of an atrial shunt.  Trace mitral and tricuspid regurgitation.  No pericardial effusion.  No previous study for comparison.  _____________________________________________________________________________  __        Left Ventricle  The left ventricle is normal in size. There is mild concentric left  ventricular hypertrophy. Left ventricular systolic function is low normal. The  visual ejection fraction is estimated at 50-55%. Left ventricular diastolic  function is normal. LVEF 52% based on biplane 2D tracing.     Right Ventricle  The right ventricle is normal size. The right ventricular systolic function is  normal.     Atria  Normal left atrial size. Right atrial size is normal. Right atrial catheter.  There is no color Doppler evidence of an atrial shunt.     Mitral Valve  There is trace mitral regurgitation.        Tricuspid Valve  There is trace tricuspid regurgitation. The right ventricular systolic  pressure is approximated at 22.6 mmHg plus the right atrial pressure.     Aortic Valve  The aortic valve is trileaflet. No aortic regurgitation is present. No aortic  stenosis is present.     Pulmonic Valve  There is no pulmonic valvular stenosis.     Vessels  The aortic arch is Mildly  dilated. Normal size ascending aorta. Descending  aortic velocity normal. The inferior vena cava is normal.     Pericardium  There is no pericardial effusion.        Rhythm  Sinus rhythm was noted.  _____________________________________________________________________________  __  MMode/2D Measurements & Calculations     IVSd: 1.3 cm  LVIDd: 4.4 cm  LVIDs: 3.4 cm  LVPWd: 1.2 cm  FS: 24.0 %  LV mass(C)d: 208.4 grams  LV mass(C)dI: 147.5 grams/m2  Ao root diam: 3.9 cm  LA dimension: 3.4 cm  asc Aorta Diam: 3.4 cm  LA/Ao: 0.88  LVOT diam: 2.0 cm  LVOT area: 3.0 cm2  LA Volume (BP): 41.1 ml  LA Volume Index (BP): 29.1 ml/m2  RWT: 0.56           Doppler Measurements & Calculations  MV E max haritha: 65.8 cm/sec  MV A max haritha: 53.1 cm/sec  MV E/A: 1.2  MV dec time: 0.19 sec  LV V1 max P.2 mmHg  LV V1 max: 102.4 cm/sec  LV V1 VTI: 16.9 cm  SV(LVOT): 51.2 ml  SI(LVOT): 36.3 ml/m2  PA acc time: 0.11 sec  TR max haritha: 237.5 cm/sec  TR max P.6 mmHg  E/E' av.5  Lateral E/e': 5.3  Medial E/e': 9.6              _____________________________________________________________________________  __        Report approved by: Johnnie Rodriguez 2020 01:55 PM            ASSESSMENT AND PLAN   Seminoma presenting as 17 x 17 x 13.5 cm abdominal pelvic mass.  Postobstructive renal failure improving after first cycle of chemotherapy with carboplatin, etoposide and bleomycin  Recurrent admissions for neutropenic fever and urosepsis after first cycle of chemotherapy  Severe developmental delay    Von was seen in person and is accompanied by his sister at this clinic visit.  He again does not have any complaints and does not do any of the talking during the clinic visit.  Our greatest challenge is his inability to express any of his symptoms.  I suspect that he would tolerate the cisplatin better than his time with carboplatin during the first cycle.  Cisplatin is less myelosuppressive and more efficacious in testicular cancers.  Most  patients with testicular cancer are much younger than him.    I will use Neulasta with the second cycle due to his neutropenic fevers.  We will apply Onpro which will administer Neulasta tomorrow.  I also plan to continue with ciprofloxacin or Levaquin for the duration of his chemotherapy.    He has markedly elevated alkaline phosphatase.  I am not sure about the source for this.  It is extremely uncommon to have bony metastasis with to minimize.  His bone scans were negative for any bony metastasis.  I suspect that this alkaline phosphatase elevation is from his liver.  I will check for alkaline phosphatase isoenzymes.  They remain pending at this time.    I have repeated his alkaline phosphatase which seem to be improving.     We will continue to see Araceli prior to each cycle of chemotherapy and I will see him intermittently.  We would plan for 3-4 cycles with cisplatin based chemotherapy.  We would not be using bleomycin for more than 4 cycles in all including the first cycle that had carboplatin instead of cisplatin.    Over 25 min of direct face to face time spent with patient with more than 50% time spent in counseling and coordinating care.        Again, thank you for allowing me to participate in the care of your patient.        Sincerely,        Jarvis Ramos MD

## 2020-11-06 NOTE — PROGRESS NOTES
"Oncology Rooming Note    November 6, 2020 9:04 AM   Attapeu NO JUSTICE Diallo is a 55 year old male who presents for:    Chief Complaint   Patient presents with     Oncology Clinic Visit     Seminoma     Initial Vitals: /73   Pulse 86   Temp 97  F (36.1  C) (Tympanic)   Resp 16   Wt 51.2 kg (112 lb 14.4 oz)   SpO2 100%   BMI 21.33 kg/m   Estimated body mass index is 21.33 kg/m  as calculated from the following:    Height as of 10/9/20: 1.549 m (5' 1\").    Weight as of this encounter: 51.2 kg (112 lb 14.4 oz). Body surface area is 1.48 meters squared.  No Pain (0) Comment: Data Unavailable   No LMP for male patient.  Allergies reviewed: Yes  Medications reviewed: Yes    Medications: Medication refills not needed today.  Pharmacy name entered into [facility]:    [facility] [id number] [place] [facility] [address]  [facility] [address] [place] [address] [address]    Clinical concerns: Concerns about breathing Dr. Ramos was notified.      Adali Mejia CMA              "

## 2020-11-08 NOTE — PROGRESS NOTES
[facility]  HEMATOLOGY AND ONCOLOGY    FOLLOW-UP VISIT NOTE    PATIENT NAME: Luz Marina Diallo MRN # [id number]  DATE OF VISIT: Nov 6, 2020 YOB: 1965    REFERRING PROVIDER: No referring provider defined for this encounter.    CANCER TYPE: Seminoma  STAGE: Intermediate risk disease  Stage III    TREATMENT SUMMARY:  He was hospitalized at [facility] on 9/23/2020 for large abdominal mass, weight loss, decreased appetite and was found to have renal failure with creatinine of 4.5 with hypercalcemia 15.5.  CT confirmed a large abdominal mass compressing the ureters and he underwent bilateral ureteral stent placement on 9/24/2020.  Abdominal mass concern for malignancy and biopsy revealed germ cell tumor (seminoma).  Palafox was placed and he was given Zometa for hypercalcemia with IV fluids.  He was started on neoadjuvant chemotherapy with bleomycin, etoposide and carboplatin (cisplatin was not used due to renal function), C1 D1 completed on 10/1/2020 and he received the first 5 days inpatient.  Unfortunately, he was admitted for neutropenic fever and urosepsis thrice after his first cycle.    CURRENT INTERVENTIONS:  BLEOMYCIN, ETOPOSIDE AND CISPLATINUM (BEP)     SUBJECTIVE   Attvitaly iDallo is being followed for advanced seminoma    Attvitaly was seen in person at this late visit.  As always he was silent for the duration of the visit and barely answering the questions.  He is accompanied by his sister as in the past.  She notes that he has been stable and has not been complaining which is not unusual for him.  He seems to be tolerating the current chemotherapy okay.      PAST MEDICAL HISTORY     Past Medical History:   Diagnosis Date     Mental retardation          CURRENT OUTPATIENT MEDICATIONS     Current Outpatient Medications   Medication Sig     allopurinol (ZYLOPRIM) 300 MG tablet Take 1 tablet (300 mg) by mouth daily     ciprofloxacin (CIPRO) 500 MG tablet Take 1  tablet (500 mg) by mouth 2 times daily for 7 days     ferrous sulfate (FEROSUL) 325 (65 Fe) MG tablet Take 1 tablet (325 mg) by mouth daily (with breakfast)     loratadine (CLARITIN) 10 MG tablet Take 10 mg by mouth daily for 5 days following Neulasta     LORazepam (ATIVAN) 0.5 MG tablet Take 1 tablet (0.5 mg) by mouth every 4 hours as needed (Anxiety, Nausea/Vomiting or Sleep)     multivitamin w/minerals (THERA-VIT-M) tablet Take 1 tablet by mouth daily     prochlorperazine (COMPAZINE) 10 MG tablet Take 1 tablet (10 mg) by mouth every 6 hours as needed (Nausea/Vomiting)     tamsulosin (FLOMAX) 0.4 MG capsule Take 1 capsule (0.4 mg) by mouth daily     No current facility-administered medications for this visit.         ALLERGIES    No Known Allergies     REVIEW OF SYSTEMS   As above in the HPI, o/w complete 12-point ROS was negative.     PHYSICAL EXAM   /73   Pulse 86   Temp 97  F (36.1  C) (Tympanic)   Resp 16   Wt 51.2 kg (112 lb 14.4 oz)   SpO2 100%   BMI 21.33 kg/m    GEN: NAD  HEENT: PERRL, EOMI, no icterus, injection or pallor. Oropharynx is clear.  LYMPHATICs: no cervical or supraclavicular lymphadenopathy; no other abn lymphadenopathy  PULMONARY: clear with good air entry bilaterally  CARDIOVASCULAR: regular, no murmurs, rubs, or gallops  GASTROINTESTINAL: soft, non-tender, non-distended, normal bowel sounds, no hepatosplenomegaly by percussion or palpation  MUSCULOSKELTAL: warm, well perfused, no edema  NEURO: awake, alert and oriented to time place and person, cranial nerves intact - II - XII, no focal neurologic deficits  SKIN: no rashes     LABORATORY AND IMAGING STUDIES     Recent Labs   Lab Test 11/06/20  1045 11/02/20  0845 11/01/20  0520 10/31/20  0600 10/30/20  0625    138 139 140 139   POTASSIUM 3.9 4.1 4.0 4.4 3.9   CHLORIDE 114* 107 110* 109 110*   CO2 18* 22 25 24 23   ANIONGAP 10 9 4 7 6   BUN 36* 16 14 16 11   CR 1.14 1.44* 1.42* 1.26* 1.35*   * 134* 98 102* 88   IVANIA  6.1* 6.8* 6.9* 7.3* 7.1*     Lab Test 11/02/20  0845 10/31/20  0600 10/30/20  0625 10/29/20  0530 10/28/20  1420 10/28/20  0550 10/03/20  0703 10/03/20  0703 09/28/20  0650 09/28/20  0650   MAG 2.0 1.9 1.7 1.6  --  2.0   < >  --    < > 1.7   PHOS  --   --   --   --  3.1  --   --  4.3  --  3.1     Lab Test 11/02/20  0845 11/01/20  0520 10/31/20  0600 10/30/20  0625 10/29/20  0530 10/27/20  0400 10/26/20  1453 10/26/20  0820   WBC 25.8* 19.9* 17.7* 15.7* 14.5* 27.2* 35.9* 37.4*   HGB 8.7* 8.1* 8.5* 8.1* 8.5* 6.7* 7.6* 7.6*    286 277 284 311  311 655* 832* 908*   MCV 93 92 92 91 92 94 97 96   NEUTROPHIL 71.0  --   --   --   --  80.0 81.0 66.0     Lab Test 11/06/20  1045 11/02/20  0845 10/27/20  0400 10/26/20  0820   BILITOTAL 0.3 0.5 0.5 0.2   ALKPHOS 878* 1,492* 340* 262*   * 233* 101* 33   AST 65* 172* 152* 28   ALBUMIN 2.1* 2.3* 2.1* 2.7*   LDH  --   --   --  578*     TSH   Date Value Ref Range Status   08/13/2017 0.75 0.40 - 4.00 mU/L Final     Recent Labs   Lab Test 09/25/20  0705   CEA 0.5     Results for orders placed or performed during the hospital encounter of 10/26/20   XR Chest Port 1 View    Narrative    XR PORTABLE CHEST ONE VIEW   10/26/2020 3:00 PM     HISTORY: Fever    COMPARISON: Chest x-ray on 10/19/2020      Impression    IMPRESSION: Single portable AP view of the chest was obtained. Stable  cardiomediastinal silhouette. No suspicious focal pulmonary opacities.  No significant pleural effusion or pneumothorax.    BEE DALLAS MD   CT Chest Abdomen Pelvis w/o Contrast    Narrative    EXAM: CT CHEST ABDOMEN PELVIS W/O CONTRAST  LOCATION: [facility]  DATE/TIME: 10/26/2020 9:50 PM    INDICATION: Sepsis. History of testicular cancer. Fever. Vomiting. Tachycardia.  COMPARISON: 9/25/2020 and 9/23/2020.  TECHNIQUE: CT scan of the chest, abdomen, and pelvis was performed without IV contrast. Multiplanar reformats were obtained. Dose reduction techniques were used.    CONTRAST: None.    FINDINGS:   LUNGS AND PLEURA: Evaluation of lung parenchyma is limited by breathing motion artifact. No large consolidations. No pneumothorax or pleural effusion. Mild dependent atelectasis.    MEDIASTINUM/AXILLAE: There are multiple mildly enlarged mediastinal lymph nodes which are larger than on the previous exam. Low AP window node measures 2.7 x 1.0 cm (previously 2.1 x 0.5 cm).    HEPATOBILIARY: Normal.    PANCREAS: Normal.    SPLEEN: Normal.    ADRENAL GLANDS: Normal.    KIDNEYS/BLADDER: There is mild dilatation of the renal collecting systems bilaterally. Bilateral double-J ureteral stents are in normal position. The urinary bladder is very distended but otherwise appears normal.    BOWEL: There is no bowel obstruction. Moderate amount of stool in the colon. No free intraperitoneal gas or fluid.    LYMPH NODES: There are again multiple enlarged and partially calcified lymph nodes in the retroperitoneum and pelvis. A large pelvic mass has decreased significantly in size in the interval now measuring approximately 9.9 cm AP x 12.2 cm transverse   (previously 15 cm AP x 14.6 cm transverse).    VASCULATURE: Unremarkable.    PELVIC ORGANS: Normal.    MUSCULOSKELETAL: Left inguinal hernia containing soft tissue as before, possibly testicle.      Impression    IMPRESSION:  1.  Interval decrease in size of the partially calcified pelvic mass and abdominal and pelvic lymph node enlargement.  2.  Mild bilateral hydronephrosis with bilateral ureteral stents in place. The urinary bladder is extremely distended.  3.  Few mildly enlarged mediastinal lymph nodes measure larger than on the prior exam.  4.  Stable soft tissue in the left inguinal canal, possibly testicle.   XR Chest Port 1 View    Narrative    EXAM: XR CHEST PORT 1 VW  LOCATION: [facility]  DATE/TIME: 10/27/2020 1:20 AM    INDICATION: Line placement  COMPARISON: None.      Impression    IMPRESSION: Right PICC with tip at the  cavoatrial junction. Heart size upper limits normal.   Echocardiogram Complete    Narrative    [id number]  [id number]  [id number]  [id number]^BRANDON^ROBERT^R           [facility]  Echocardiography Laboratory  [address] [place] [address]        Name: RAJWINDER OSULLIVAN  MRN: [id number]  : 1965  Study Date: 2020 08:30 AM  Age: 55 yrs  Gender: Male  Patient Location: [facility]  Reason For Study: Tachycardia  Ordering Physician: ROBERT TAYLOR  Performed By: Ana Rosa Guzman     BSA: 1.4 m2  Height: 61 in  Weight: 101 lb  HR: 86  BP: 92/48 mmHg  _____________________________________________________________________________  __        Procedure  Complete Portable Echo Adult.  _____________________________________________________________________________  __        Interpretation Summary     The left ventricle is normal in size. There is mild concentric left  ventricular hypertrophy. Left ventricular systolic function is low normal. The  visual ejection fraction is estimated at 50-55%. Left ventricular diastolic  function is normal. LVEF 52% based on biplane 2D tracing.  The right ventricle is normal size. The right ventricular systolic function is  normal.  Normal left atrial size. Right atrial size is normal. Right atrial catheter.  There is no color Doppler evidence of an atrial shunt.  Trace mitral and tricuspid regurgitation.  No pericardial effusion.  No previous study for comparison.  _____________________________________________________________________________  __        Left Ventricle  The left ventricle is normal in size. There is mild concentric left  ventricular hypertrophy. Left ventricular systolic function is low normal. The  visual ejection fraction is estimated at 50-55%. Left ventricular diastolic  function is normal. LVEF 52% based on biplane 2D tracing.     Right Ventricle  The right ventricle is normal size. The right ventricular systolic function is  normal.      Atria  Normal left atrial size. Right atrial size is normal. Right atrial catheter.  There is no color Doppler evidence of an atrial shunt.     Mitral Valve  There is trace mitral regurgitation.        Tricuspid Valve  There is trace tricuspid regurgitation. The right ventricular systolic  pressure is approximated at 22.6 mmHg plus the right atrial pressure.     Aortic Valve  The aortic valve is trileaflet. No aortic regurgitation is present. No aortic  stenosis is present.     Pulmonic Valve  There is no pulmonic valvular stenosis.     Vessels  The aortic arch is Mildly dilated. Normal size ascending aorta. Descending  aortic velocity normal. The inferior vena cava is normal.     Pericardium  There is no pericardial effusion.        Rhythm  Sinus rhythm was noted.  _____________________________________________________________________________  __  MMode/2D Measurements & Calculations     IVSd: 1.3 cm  LVIDd: 4.4 cm  LVIDs: 3.4 cm  LVPWd: 1.2 cm  FS: 24.0 %  LV mass(C)d: 208.4 grams  LV mass(C)dI: 147.5 grams/m2  Ao root diam: 3.9 cm  LA dimension: 3.4 cm  asc Aorta Diam: 3.4 cm  LA/Ao: 0.88  LVOT diam: 2.0 cm  LVOT area: 3.0 cm2  LA Volume (BP): 41.1 ml  LA Volume Index (BP): 29.1 ml/m2  RWT: 0.56           Doppler Measurements & Calculations  MV E max haritha: 65.8 cm/sec  MV A max haritha: 53.1 cm/sec  MV E/A: 1.2  MV dec time: 0.19 sec  LV V1 max P.2 mmHg  LV V1 max: 102.4 cm/sec  LV V1 VTI: 16.9 cm  SV(LVOT): 51.2 ml  SI(LVOT): 36.3 ml/m2  PA acc time: 0.11 sec  TR max haritha: 237.5 cm/sec  TR max P.6 mmHg  E/E' av.5  Lateral E/e': 5.3  Medial E/e': 9.6              _____________________________________________________________________________  __        Report approved by: Johnnie Rodriguez 2020 01:55 PM            ASSESSMENT AND PLAN   Seminoma presenting as 17 x 17 x 13.5 cm abdominal pelvic mass.  Postobstructive renal failure improving after first cycle of chemotherapy with carboplatin, etoposide and  bleomycin  Recurrent admissions for neutropenic fever and urosepsis after first cycle of chemotherapy  Severe developmental delay    Aureliano was seen in person and is accompanied by his sister at this clinic visit.  He again does not have any complaints and does not do any of the talking during the clinic visit.  Our greatest challenge is his inability to express any of his symptoms.  I suspect that he would tolerate the cisplatin better than his time with carboplatin during the first cycle.  Cisplatin is less myelosuppressive and more efficacious in testicular cancers.  Most patients with testicular cancer are much younger than him.    I will use Neulasta with the second cycle due to his neutropenic fevers.  We will apply Onpro which will administer Neulasta tomorrow.  I also plan to continue with ciprofloxacin or Levaquin for the duration of his chemotherapy.    He has markedly elevated alkaline phosphatase.  I am not sure about the source for this.  It is extremely uncommon to have bony metastasis with to minimize.  His bone scans were negative for any bony metastasis.  I suspect that this alkaline phosphatase elevation is from his liver.  I will check for alkaline phosphatase isoenzymes.  They remain pending at this time.    I have repeated his alkaline phosphatase which seem to be improving.     We will continue to see Araceli prior to each cycle of chemotherapy and I will see him intermittently.  We would plan for 3-4 cycles with cisplatin based chemotherapy.  We would not be using bleomycin for more than 4 cycles in all including the first cycle that had carboplatin instead of cisplatin.    Over 25 min of direct face to face time spent with patient with more than 50% time spent in counseling and coordinating care.

## 2020-11-09 NOTE — NURSING NOTE
"Oncology Rooming Note    November 9, 2020 9:41 AM   Attapeu NO JUSTICE Diallo is a 55 year old male who presents for:    Chief Complaint   Patient presents with     Oncology Clinic Visit     Seminoma      Initial Vitals: /66   Pulse 111   Temp 98.4  F (36.9  C) (Tympanic)   Resp 16   Wt 46.3 kg (102 lb)   SpO2 100%   BMI 19.27 kg/m   Estimated body mass index is 19.27 kg/m  as calculated from the following:    Height as of 10/9/20: 1.549 m (5' 1\").    Weight as of this encounter: 46.3 kg (102 lb). Body surface area is 1.41 meters squared.  No Pain (0) Comment: Data Unavailable   No LMP for male patient.  Allergies reviewed: Yes  Medications reviewed: Yes    Medications: Medication refills not needed today.  Pharmacy name entered into [employer]:    [facility] [id number] [place] [facility] [address]  [facility] [address] [place] [address] [address]    Clinical concerns: follow up       Ana Chin [facility]              "

## 2020-11-09 NOTE — PROGRESS NOTES
Infusion Nursing Note:  Attapeu NO JUSTICE Diallo presents today for Day 8, Cycle 1 Bleomycin, 2 units PRBC (reaction- see below), 1 liter NS and and Calcium Gluconate.    Patient seen by provider today: Yes: AMEYA Bocanegra.   present during visit today: Not Applicable. Sister here to interpret- this was their preference today.    Note: Patient received Bleomycin first today without any issus. Patient stable during 1st unit of blood. After 273 ml of the 2nd bag of blood given, patient complaining of chills, and temp was up (see vitals), he also started having slight rigors. Denied itching, SOB, back pain, nausea or throat or chest tightness. Blood stopped and SoluMedrol, Tylenol, Pepcid, and Benadryl given per orders from AMEYA Bocanegra. Araceli also came and assessed patient again in Infusion. Pulse remained high and temp remained high for quite some time. A liter of fluid ordered and the Calcium Gluconate that had previously been ordered (before reaction) was also administered per VORB from AMEYA Bocanegra. Patient to be discharged home after infusions and Araceli and writer instructed sister to bring the patient to the ED tonight if issues increase; she verbalized understanding. Temp and pulse coming down before discharge. Patient stating he isn't feeling completely back to normal but feels much better and is comfortable with going home.  Patient to return tomorrow for more fluids.    Intravenous Access:  Peripheral IV placed.    Treatment Conditions:  Lab Results   Component Value Date    HGB 5.7 11/09/2020     Lab Results   Component Value Date    WBC 29.3 11/09/2020      Lab Results   Component Value Date    ANEU 28.4 11/09/2020     Lab Results   Component Value Date     11/09/2020      Lab Results   Component Value Date     11/09/2020                   Lab Results   Component Value Date    POTASSIUM 3.8 11/09/2020           Lab Results   Component Value Date    MAG 2.0  11/02/2020            Lab Results   Component Value Date    CR 1.28 11/09/2020                   Lab Results   Component Value Date    IVANIA 6.0 11/09/2020                Lab Results   Component Value Date    BILITOTAL 0.3 11/06/2020           Lab Results   Component Value Date    ALBUMIN 2.1 11/06/2020                    Lab Results   Component Value Date     11/06/2020           Lab Results   Component Value Date    AST 65 11/06/2020       Results reviewed, labs MET treatment parameters, ok to proceed with treatment.  Blood transfusion consent signed 10/19/20.        Post Infusion Assessment:  Patient tolerated infusion poorly due to : Hypersensitivity: Did patient have a hypersensitivity reaction? : Yes  Drug or Product name: blood  Were pre-meds administered?: No     First or Subsequent treatment: Subsequent infusion  Rate of infusion when patient had hypersensitivity reaction: 270  Time the hypersensitivity reaction was first recognized: 1431  Symptoms observed or reported (select all that apply): Chills  Interventions/treatment following reaction: Infusion stopped;Hypersensitivity medications administered  What hypersensitivity medications were administered?: DiphendydrAMINE (benadryl);Methylprednisolone;Famotidine(Pepcid);Other: (Comment)(Tylenol)  Name of provider notified: Araceli  Time provider notified: 1439  Type of notification (select all that apply): Provider at bedside  Was the patient re-challenged today?: No - no re-challenge today   Blood return noted pre and post infusion.  Site patent and intact, free from redness, edema or discomfort.  No evidence of extravasations.  Access discontinued per protocol.       Discharge Plan:   Discharge instructions reviewed with: Patient and Family.  Patient and/or family verbalized understanding of discharge instructions and all questions answered.  AVS to patient via [employer].  Patient will return 11/10/20 for IVF and assessment.  Patient discharged in  stable condition accompanied by: sister.  Departure Mode: Ambulatory.    Serene Gibbs RN

## 2020-11-09 NOTE — PROGRESS NOTES
Nursing Note:  Attapeu NO MI Imani presents today for labs.    Patient seen by provider today: Yes:    present during visit today: Not Applicable.    Note: N/A.    Intravenous Access:  Labs drawn without difficulty.  Peripheral IV placed.    Discharge Plan:   Patient was sent to javan Rosenthal RN

## 2020-11-09 NOTE — LETTER
11/9/2020         RE: Luz Marina Diallo  [address] [address] [facility] [address]        Dear Colleague,    Thank you for referring your patient, Luz Marina Diallo, to the [facility]. Please see a copy of my visit note below.    Nursing Note:  Luz Marina Diallo presents today for labs.    Patient seen by provider today: Yes:    present during visit today: Not Applicable.    Note: N/A.    Intravenous Access:  Labs drawn without difficulty.  Peripheral IV placed.    Discharge Plan:   Patient was sent to javan Rosenthal RN                Again, thank you for allowing me to participate in the care of your patient.        Sincerely,         Lab Draw 1

## 2020-11-09 NOTE — LETTER
11/9/2020         RE: Luz Marina Diallo  [address] [facility] [address]        Dear Colleague,    Thank you for referring your patient, Luz Marina Diallo, to the [facility]. Please see a copy of my visit note below.    Oncology/Hematology Visit Note    Nov 9, 2020    Reason for visit: Follow up seminoma    Oncology HPI: Luz Marina Diallo is a 55 year old male with a history of developmental delay with testicular cancer.  He was hospitalized at [facility] on 9/23/2020 for large abdominal mass, weight loss, decreased appetite and was found to have renal failure with creatinine of 4.5 with hypercalcemia 15.5.  CT confirmed a large abdominal mass compressing the ureters and he underwent bilateral ureteral stent placement on 9/24/2020.  Abdominal mass concern for malignancy and biopsy revealed germ cell tumor (seminoma).  Palafox was placed and he was given Zometa for hypercalcemia with IV fluids.  He was started on neoadjuvant chemotherapy with bleomycin, etoposide and carboplatin (cisplatin was not used due to renal function), C1 D1 completed on 10/1/2020 and he received the first 5 days inpatient.  He tolerated this well.    Unfortunately, he was admitted for neutropenic fever and urosepsis multiple times since starting chemotherapy, most recently on 10/26/2020.  He received C1 D 15 and he was having high fever and tachycardia, presumed urinary source again.  He received antibiotics, IV fluids and discharged on 11/1/2020.  We recommended starting Levaquin 500 mg daily and he completed BEP cycle 2 from 11/2-11/6 last week.    He is here today for BEP C2 D8.     Interval History: Rigoberto is here with the second.  He is feeling more fatigued over this past week with chemotherapy.  His appetite has been down as well.  No fever and he continues on Cipro twice a day.  No vomiting or diarrhea, abdominal pain.  He does have a few lip sores, but they are  not painful.  No headache or vision changes.  No other complaints.    Review of Systems: See interval hx. Denies fevers, chills, HA, dizziness, n/t, changes in vision, cough, sore throat, CP, SOB, abdominal pain, N/V, diarrhea, changes in urination, bleeding, bruising, rash.     PMHx and Social Hx reviewed per EPIC.      Medications:  Current Outpatient Medications   Medication Sig Dispense Refill     allopurinol (ZYLOPRIM) 300 MG tablet Take 1 tablet (300 mg) by mouth daily 30 tablet 0     ciprofloxacin (CIPRO) 500 MG tablet Take 1 tablet (500 mg) by mouth 2 times daily 60 tablet 1     ferrous sulfate (FEROSUL) 325 (65 Fe) MG tablet Take 1 tablet (325 mg) by mouth daily (with breakfast) 30 tablet 0     fluconazole (DIFLUCAN) 150 MG tablet Take 1 tablet (150 mg) by mouth once for 1 dose 1 tablet 0     loratadine (CLARITIN) 10 MG tablet Take 10 mg by mouth daily for 5 days following Neulasta       LORazepam (ATIVAN) 0.5 MG tablet Take 1 tablet (0.5 mg) by mouth every 4 hours as needed (Anxiety, Nausea/Vomiting or Sleep) 30 tablet 3     multivitamin w/minerals (THERA-VIT-M) tablet Take 1 tablet by mouth daily       prochlorperazine (COMPAZINE) 10 MG tablet Take 1 tablet (10 mg) by mouth every 6 hours as needed (Nausea/Vomiting) 30 tablet 3     tamsulosin (FLOMAX) 0.4 MG capsule Take 1 capsule (0.4 mg) by mouth daily 30 capsule 0       No Known Allergies      EXAM:    /66   Pulse 111   Temp 98.4  F (36.9  C) (Tympanic)   Resp 16   Wt 46.3 kg (102 lb)   SpO2 100%   BMI 19.27 kg/m      GENERAL:  Male, in no acute distress.  Alert and oriented x3.   HEENT:  Normocephalic, atraumatic.  PERRL, oropharynx clear with no sores or thrush.   LYMPH NODES:  No palpable cervical lyphadenopathy appreciated.  LUNGS: Nonlabored breathing  ABDOMEN:   Large area of firmness to lower abdomen, actually softer today.  : Palafox catheter in place.   EXTREMITIES: Trace pitting edema bilaterally.   SKIN: No rash  PSYCH: Mood  stable      Labs:   Results for AFSANEH DIALLO (MRN [id number]) as of 11/9/2020 13:47   11/9/2020 09:12   Sodium 140   Potassium 3.8   Chloride 110 (H)   Carbon Dioxide 21   Urea Nitrogen 37 (H)   Creatinine 1.28 (H)   GFR Estimate 63   GFR Estimate If Black 73   Calcium 6.0 (L)   Anion Gap 9   Uric Acid 2.5 (L)   Glucose 204 (H)   WBC 29.3 (H)   Hemoglobin 5.7 (LL)   Hematocrit 18.3 (L)   Platelet Count 449   RBC Count 1.93 (L)   MCV 95   MCH 29.5   MCHC 31.1 (L)   RDW 18.3 (H)   Diff Method Manual Differential   % Neutrophils 97.0   % Lymphocytes 0.0   % Monocytes 1.0   % Eosinophils 2.0   % Basophils 0.0   Absolute Neutrophil 28.4 (H)   Absolute Lymphocytes 0.0 (L)   Absolute Monocytes 0.3   Absolute Eosinophils 0.6   Absolute Basophils 0.0   RBC Morphology Consistent with reported results   Platelet Estimate Automated count confirmed.  Platelet morphology is normal.       Imaging: n/a    Impression/Plan: Luz Marina Diallo is a 55 year old male with metastatic seminoma currently undergoing neoadjuvant BEP with Neulasta support.    Seminoma: Beta , metastasis to the lymph nodes, currently undergoing BEP with Neulasta support, C1 D1 done inpatient on 10/1/2020. He tolerated this well, but unfortunately has been hospitalized for neutropenia or fever multiple times.  He was started on Cipro twice a day prophylactically.  He completed C2 D1-5 last week with no fever and he continues on Cipro.  Dr Ramos replaced carboplatin with cisplatin with the start of cycle 2.  Fatigue has certainly been worse recently, but he is noted to have a hemoglobin of 5.7 as well, see below.  Continues to have some leukocytosis and given Neulasta on 11/6.  Labs within parameters to proceed with C2 D8 today and he will return next week for day 15.  --11/16  BEP C2 D15 (bleo only)  --11/23 Araceli BEP C3 D1    LFTs: Alk phos significantly elevated at 1492 and no evidence of bony lesions on bone scan on 9/28.  Alk phos  has improved to 878, possibly liver.  We will monitor.    Heme:   -Leukocytosis: Multifactorial, previously hospitalized for UTI, but also received Neulasta on 11/6.  Afebrile currently on Cipro.  Plan for chemotherapy and we will monitor.  -Anemia/thrombocytopenia: Hemoglobin 5.7 and plan for 2 units of blood today.  Platelets 449 and no active bleeding.  WBC 29.3, see above.    FEN: Hypocalcemia with calcium 6.0 today.  We will give IV calcium gluconate and new prescription for calcium/vitamin D sent to pharmacy.  Electrolytes are otherwise okay.  Appetite is down and recommended boost supplementation throughout the day.    Neutropenic fever: Recently hospitalized, secondary to chemotherapy.  Suspect UTI and will keep him on Cipro 500 mg twice daily while undergoing chemotherapy, hopefully to prevent further hospitalizations.    YAYA: Creatinine 4.51 hospitalized on 9/23/2020.  He is status post bilateral ureteral stents and currently has Palafox catheter in place. Creatinine stable 1.28.    Developmental Delay: Lives with his sister, Mamta, who accompanies him with every visit.      Chart documentation with Dragon Voice recognition Software. Although reviewed after completion, some words and grammatical errors may remain.      Araceli Pacheco PA-C  Hematology/Oncology  [facility]                Again, thank you for allowing me to participate in the care of your patient.        Sincerely,        Araceli Pacheco PA-C

## 2020-11-10 NOTE — PROGRESS NOTES
Infusion Nursing Note:  Attapeu NO JUSTICE Diallo presents today for ivf.     present during visit today: Not Applicable.    Note: N/A.    Intravenous Access:  Peripheral IV placed.    Treatment Conditions:  NA    Post Lab Assessment:  Patient tolerated infusion   Blood return noted pre and post infusion.  Site patent and intact, free from redness, edema or discomfort.  No evidence of extravasations.  Access discontinued)     Discharge Plan:   Patient and/or family verbalized understanding of  instructions and all questions answered.  Patient  to lobby in stable condition accompanied by: self and sister.  Patient to see provider today: No  Departure Mode: Ambulatory.  Minna Harkins, RN, RN

## 2020-11-12 PROBLEM — R50.81 FEBRILE NEUTROPENIA (H): Status: ACTIVE | Noted: 2020-01-01

## 2020-11-12 PROBLEM — R33.9 URINARY RETENTION: Status: ACTIVE | Noted: 2020-01-01

## 2020-11-12 PROBLEM — D70.9 FEBRILE NEUTROPENIA (H): Status: ACTIVE | Noted: 2020-01-01

## 2020-11-12 NOTE — H&P
[facility]    History and Physical  Hospitalist       Date of Admission:  11/12/2020  Date of Service (when I saw the patient): 11/12/20    Assessment & Plan   Attapeu NO JUSTICE Diallo is a 55 year old male patient with past medical history of developmental delay, seminoma with metastasis to lymph nodes, on chemotherapy, who was brought to emergency room for evaluation for fever and chills.  Patient is Laotian speaking and also has developmental delay and a difficult to obtain history from him.  History was obtained from his sister. Patient is on chemotherapy for seminoma of the testicle.  Patient received chemotherapy on Monday.  He was also transfused blood on Monday.  Patient had reaction on second unit of blood after 30 minutes of transfusion.  On Tuesday patient went to the clinic to get IV fluids.  He started to have fever and chills last night.  He had follow-up with urology today and had voiding trial and removal of Palafox catheter.  Patient was sent to emergency room because of his fever and chills.  In the ER, he was noted to be febrile and tachycardic.  Laboratory work-up showed normal electrolytes.  Creatinine 1.37.  , ALT 59, AST 25, lactic acid 1.0.  WBC 0.1 differential count not done due to low WBC below 0.5.  Chest x-ray showed mild bilateral lung base atelectasis or infiltrate.  He was given IV fluids, IV cefepime and vancomycin emergency room.  He was admitted for further management.    1.  Febrile neutropenia  Patient does have history of seminoma for which he is on chemotherapy.  Last chemo dose was on Monday.  He does have fever, chills, tachycardia, neutropenia.  Blood culture collected.  UA is unremarkable.  Chest x-ray showed atelectasis at the lung bases.  We will continue IV fluid resuscitation.  We will also continue cefepime and vancomycin.  Further antibiotic modification once cultures are available.  Will consult oncology for comanagement.  COVID-19 PCR  sent and result pending.    2.  Seminoma of the testicle, on chemotherapy  Now here with febrile neutropenia.  We will continue antibiotics as above.  Will consult oncology    3.  Anemia, suspect secondary to testicular cancer  Transfused with PRBC on Monday.  Hemoglobin 7.8 today.  Patient is on iron supplement which I will resume.  We will continue to monitor hemoglobin and transfuse if hemoglobin less than 7.0.    4.  Chronic kidney disease Baseline creatinine around 1.14-1.42.  Creatinine 1.37 today.  Will monitor renal function    5.  Urine retention  And voiding trial and removal of Palafox catheter in the urology clinic today.  With straight cathed in the ER with 600 ml returned.  Will monitor for urine retention    6.  History of developmental delay, history obtained from his sister    DVT Prophylaxis: Enoxaparin (Lovenox) SQ  Code Status: Full Code    Disposition: Expected discharge: anticipate more than two nights of hospital course    Sunil Dixon MD    Primary Care Physician   [facility]    Chief Complaint   Fever, chills, cough    History is obtained from the patient    History of Present Illness   Attapeu NO JUSTICE Diallo is a 55 year old male patient with past medical history of developmental delay, seminoma with metastasis to lymph nodes, on chemotherapy, who was brought to emergency room for evaluation for fever and chills.  Patient is Laotian speaking and also had developmental delay and a difficult to obtain history from him.  History was obtained from his sister. Patient is on chemotherapy for seminoma of the testicle.  Patient follows with Dr. Ramos of oncology.  He had chemotherapy on Monday.  He was also given 1 units of blood and also had additional unit of blood which was stopped after 30 minutes due to transfusion reaction.  Patient went to the clinic on Tuesday to get IV fluids.  Patient had fever and chills last night.  He also had intermittent cough.  He went to  "urology today and had voiding trial and Palafox catheter removed.  His oncology office was contacted due to his fever and was advised to come to emergency room for evaluation.  Patient had no contact with known Covid positive patient.  On arrival to emergency room, vital signs were checked and showed temperature 103.2, pulse 156, blood pressure 101/75, oxygen saturation 100% on room air.  Laboratory work-up showed sodium 137, potassium 3.8, creatinine 1.37, ALT 59, AST 25, lactic acid 1.0.  WBC 0.1, differential count not done with WBC less than 0.5.  Hemoglobin 7.8, blood culture and urine culture collected.  Chest x-ray showed mild bilateral lung base atelectasis versus infiltrate with possible small pleural effusions.  Emergency room, patient was given IV fluids and IV cefepime and vancomycin.  He was admitted to the hospital for further management.    Past Medical History    I have reviewed this patient's medical history and updated it with pertinent information if needed.   Past Medical History:   Diagnosis Date     Mental retardation        Past Surgical History   I have reviewed this patient's surgical history and updated it with pertinent information if needed.  Past Surgical History:   Procedure Laterality Date     ABDOMEN SURGERY      sister states he had an \"abdominal surgery 20 years ago\" unknown      CYSTOSCOPY, RETROGRADES, INSERT STENT URETER(S), COMBINED Bilateral 9/24/2020    Procedure: Cystoscopy with bilateral retrograde pyelogram and bilateral ureteral stent insertiona-complex due to severe bilateral hydroureteronephrosis with ureteral tortuosity, examination under anesthesia and fluoroscopic interpretation > 1 hour physician time;  Surgeon: Ralf Chan MD;  Location:  OR     ESOPHAGOSCOPY, GASTROSCOPY, DUODENOSCOPY (EGD), COMBINED N/A 3/1/2019    Procedure: ESOPHAGOSCOPY, GASTROSCOPY, DUODENOSCOPY (EGD) Rm 226;  Surgeon: Susy Camara MD;  Location:  GI       Prior to Admission " Medications   Prior to Admission Medications   Prescriptions Last Dose Informant Patient Reported? Taking?   LORazepam (ATIVAN) 0.5 MG tablet   No No   Sig: Take 1 tablet (0.5 mg) by mouth every 4 hours as needed (Anxiety, Nausea/Vomiting or Sleep)   allopurinol (ZYLOPRIM) 300 MG tablet   No No   Sig: Take 1 tablet (300 mg) by mouth daily   calcium citrate-vitamin D (CITRACAL W/D) 250-100 MG-UNIT tablet   No No   Sig: Take 2 tablets by mouth 2 times daily (with meals)   ciprofloxacin (CIPRO) 500 MG tablet   No No   Sig: Take 1 tablet (500 mg) by mouth 2 times daily   ferrous sulfate (FEROSUL) 325 (65 Fe) MG tablet   No No   Sig: Take 1 tablet (325 mg) by mouth daily (with breakfast)   loratadine (CLARITIN) 10 MG tablet   Yes No   Sig: Take 10 mg by mouth daily for 5 days following Neulasta   multivitamin w/minerals (THERA-VIT-M) tablet   Yes No   Sig: Take 1 tablet by mouth daily   prochlorperazine (COMPAZINE) 10 MG tablet   No No   Sig: Take 1 tablet (10 mg) by mouth every 6 hours as needed (Nausea/Vomiting)   tamsulosin (FLOMAX) 0.4 MG capsule   No No   Sig: Take 1 capsule (0.4 mg) by mouth daily      Facility-Administered Medications: None     Allergies   No Known Allergies    Social History   I have reviewed this patient's social history and updated it with pertinent information if needed. Attapeu NO JUSTICE Diallo  reports that he has never smoked. He has never used smokeless tobacco. He reports current alcohol use of about 2.0 standard drinks of alcohol per week. He reports that he does not use drugs.    Family History   I have reviewed this patient's family history and updated it with pertinent information if needed.   Family History   Problem Relation Age of Onset     Diabetes Father      Glaucoma No family hx of      Macular Degeneration No family hx of        Review of Systems   The 10 point Review of Systems is negative other than noted in the HPI or here. Fever, chills, cough    Physical Exam   Temp:  103.3  F (39.6  C) Temp src: Oral BP: 114/73 Pulse: 128   Resp: 22 SpO2: 99 %      Vital Signs with Ranges  Temp:  [102.2  F (39  C)-103.3  F (39.6  C)] 103.3  F (39.6  C)  Pulse:  [123-156] 128  Resp:  [22] 22  BP: (101-124)/(72-79) 114/73  SpO2:  [97 %-100 %] 99 %  0 lbs 0 oz    GEN:  Alert, oriented x 3, appears comfortable, NAD.  HEENT:  Normocephalic/atraumatic, no scleral icterus, no nasal discharge, mouth moist.  CV:  Regular rate and rhythm, no murmur or JVD.  S1 + S2 noted, no S3 or S4.  LUNGS:  Clear to auscultation bilaterally without rales/rhonchi/wheezing/retractions.  Symmetric chest rise on inhalation noted.  ABD:  Active bowel sounds, soft, non-tender/non-distended.  No rebound/guarding/rigidity.  EXT:  No edema or cyanosis.  Hands/feet warm to touch with good signs of peripheral perfusion.  No joint synovitis noted.  SKIN:  Dry to touch, no exanthems noted in the visualized areas.  NEURO:  Symmetric muscle strength, sensation to touch grossly intact.  No new focal deficits appreciated.    Data   Data reviewed today:  I personally reviewed the chest x-ray image(s) showing no evidence of acute infiltrates.  Recent Labs   Lab 11/12/20  1601 11/09/20  0912 11/06/20  1045   WBC 0.1* 29.3*  --    HGB 7.8* 5.7*  --    MCV 91 95  --    * 449  --    INR 1.00  --   --     140 142   POTASSIUM 3.8 3.8 3.9   CHLORIDE 105 110* 114*   CO2 22 21 18*   BUN 27 37* 36*   CR 1.37* 1.28* 1.14   ANIONGAP 10 9 10   IVANIA 6.8* 6.0* 6.1*   * 204* 132*   ALBUMIN 2.4*  --  2.1*   PROTTOTAL 6.8  --  6.3*   BILITOTAL 0.4  --  0.3   ALKPHOS 635*  --  878*   ALT 59  --  123*   AST 25  --  65*       Recent Results (from the past 24 hour(s))   XR Chest Port 1 View    Narrative    XR CHEST PORT 1 VW 11/12/2020 4:48 PM    HISTORY: sob. History testicular cancer.     COMPARISON: 10/27/2020. 10/26/2020 chest CT.      Impression    IMPRESSION: Low lung volumes with mild bilateral lung base atelectasis  versus  infiltrate with possible small pleural effusions. No overt  pulmonary edema. Increased mediastinal widening/hilar enlargement may  be due to low lung volumes but progression of known thoracic  lymphadenopathy not excluded.    YEMI DIAZ MD

## 2020-11-12 NOTE — ED NOTES
[facility]  ED Nurse Handoff Report    Attapeu NO JUSTICE Diallo is a 55 year old male   ED Chief complaint: Fever and Cough  . ED Diagnosis:   Final diagnoses:   Urinary retention     Allergies: No Known Allergies    Code Status: Full Code  Activity level - Baseline/Home:  Stand by Assist. Activity Level - Current:   Assist X 2. Lift room needed: No. Bariatric: No   Needed: No   Isolation: Yes. Infection: COVID r/o    Vital Signs:   Vitals:    11/12/20 1536 11/12/20 1600 11/12/20 1615 11/12/20 1630   BP: 101/75 114/79 111/72 124/75   Pulse: 156 135 126 125   Resp: 22      Temp: 103.2  F (39.6  C)      TempSrc: Oral      SpO2: 100% 97% 99% 100%       Cardiac Rhythm:  ,      Pain level:    Patient confused: pt is mentally handicap. Patient Falls Risk: Yes.   Elimination Status: Urethral catheter (tran) in place; refer to orders to discontinue as per protocol    Patient Report - Initial Complaint: fever, tachycardia, cough. Focused Assessment: cardiac, resp,   Tests Performed: labs, cxr, UA Abnormal Results:   Labs Ordered and Resulted from Time of ED Arrival Up to the Time of Departure from the ED   CBC WITH PLATELETS DIFFERENTIAL - Abnormal; Notable for the following components:       Result Value    WBC 0.1 (*)     RBC Count 2.68 (*)     Hemoglobin 7.8 (*)     Hematocrit 24.3 (*)     RDW 15.9 (*)     Platelet Count 105 (*)     All other components within normal limits   COMPREHENSIVE METABOLIC PANEL - Abnormal; Notable for the following components:    Glucose 130 (*)     Creatinine 1.37 (*)     GFR Estimate 58 (*)     Calcium 6.8 (*)     Albumin 2.4 (*)     Alkaline Phosphatase 635 (*)     All other components within normal limits   BLOOD GAS VENOUS - Abnormal; Notable for the following components:    PCO2 Venous 37 (*)     All other components within normal limits   PARTIAL THROMBOPLASTIN TIME - Abnormal; Notable for the following components:    PTT 38 (*)     All other components  Repatha Sureclick initial follow up and refill confirmed. We will ship Repatha refill on 19 via fedex to arrive on 19. $5.00 copay- 004. Confirmed 2 patient identifiers - name and .      Start date confirmed 19.  Patient self injected Repatha on his stomach without any difficulities.  He has zero doses on hand, last injection on 19.  Patient reports no side effects since beginning of therapy.  No missed doses, no new medications, no new allergies or health conditions reported at this time. All questions answered and addressed to patients satisfaction. Pt verbalized understanding.     within normal limits   ROUTINE UA WITH MICROSCOPIC - Abnormal; Notable for the following components:    Glucose Urine 70 (*)     Protein Albumin Urine 50 (*)     RBC Urine 3 (*)     Bacteria Urine Few (*)     Mucous Urine Present (*)     All other components within normal limits   INR   COVID-19 VIRUS (CORONAVIRUS) BY PCR   LACTIC ACID WHOLE BLOOD   PULSE OXIMETRY NURSING   CARDIAC CONTINUOUS MONITORING   STRICT INTAKE AND OUTPUT   PERIPHERAL IV CATHETER   IP ACUTE INDWELLING URINARY CATHETER (VILLA)   BLOOD CULTURE   BLOOD CULTURE   URINE CULTURE AEROBIC BACTERIAL     XR Chest Port 1 View   Final Result   IMPRESSION: Low lung volumes with mild bilateral lung base atelectasis   versus infiltrate with possible small pleural effusions. No overt   pulmonary edema. Increased mediastinal widening/hilar enlargement may   be due to low lung volumes but progression of known thoracic   lymphadenopathy not excluded.      YEMI DIAZ MD          Treatments provided: IV antibiotics  Family Comments: sister (caregiver) at bedside  OBS brochure/video discussed/provided to patient:  N/A  ED Medications:   Medications   sodium chloride (PF) 0.9% PF flush 3 mL (has no administration in time range)   sodium chloride (PF) 0.9% PF flush 3 mL (has no administration in time range)   lidocaine (XYLOCAINE) 2 % external gel 20 mL (has no administration in time range)   ceFEPIme (MAXIPIME) 2 g vial to attach to  ml bag for ADULTS or 50 ml bag for PEDS (has no administration in time range)   vancomycin (VANCOCIN) 1000 mg in dextrose 5% 200 mL PREMIX (has no administration in time range)   lactated ringers BOLUS 1,000 mL (1,000 mLs Intravenous New Bag 11/12/20 1605)     Drips infusing:  No  For the majority of the shift, the patient's behavior Green. Interventions performed were n/a.    Sepsis treatment initiated:   Yes    Per the ED Provider, Time Zero for severe sepsis or septic shock is:  15:46  3 Hour Severe Sepsis Bundle  Completion:  1. Initial Lactic Acid Result:   Recent Labs   Lab Test 11/12/20  1559 10/30/20  2142 10/26/20  1952   LACT 1.0 1.0 0.9     2. Blood Cultures before Antibiotics: Yes  3. Broad Spectrum Antibiotics Administered:     Anti-infectives (From now, onward)    Start     Dose/Rate Route Frequency Ordered Stop    11/12/20 1710  vancomycin (VANCOCIN) 1000 mg in dextrose 5% 200 mL PREMIX      1,000 mg  200 mL/hr over 1 Hours Intravenous ONCE 11/12/20 1705      11/12/20 1700  ceFEPIme (MAXIPIME) 2 g vial to attach to  ml bag for ADULTS or 50 ml bag for PEDS      2 g  over 30 Minutes Intravenous ONCE 11/12/20 1659          4. 1000 ml of IV fluids have been given so far      6 Hour Severe Sepsis Bundle Completion:    1. Repeat Lactic Acid Level:   Last result   Lab Results   Component Value Date    LACT 1.0 11/12/2020     2. Patient currently on Vasopressors =  No     Patient tested for COVID 19 prior to admission: YES    ED Nurse Name/Phone Number: Wilmar Hughes RN,   5:18 PM    RECEIVING UNIT ED HANDOFF REVIEW    Above ED Nurse Handoff Report was reviewed: Yes  Reviewed by: Yoanna Ibrahim RN on November 12, 2020 at 5:55 PM

## 2020-11-12 NOTE — ED TRIAGE NOTES
Pt just finished chemo for prostate cancer. Fever of 102 at home yesterday. Tachycardic in triage. Also reports cough. ABC intact. A/O x4. Pt speaks Laotian.

## 2020-11-12 NOTE — ED NOTES
DATE:  11/12/2020   TIME OF RECEIPT FROM LAB:  4:53 PM  LAB TEST:  wbc  LAB VALUE:  0.1  RESULTS GIVEN WITH READ-BACK TO (PROVIDER):  Molina  TIME LAB VALUE REPORTED TO PROVIDER:   4:54 PM

## 2020-11-12 NOTE — PHARMACY-ADMISSION MEDICATION HISTORY
Admission medication history interview status for this patient is complete. See [facility] admission navigator for allergy information, prior to admission medications and immunization status.     Medication history interview done via telephone during Covid-19 pandemic, indicate source(s): Family, sister Mamta at [phone]  Medication history resources (including written lists, pill bottles, clinic record): Care Everywhere recorsd & Sure Scripts  Pharmacy:  [facility]    Changes made to PTA medication list:  Added:  None  Deleted:  None  Changed:  None    Actions taken by pharmacist (provider contacted, etc):None     Additional medication history information:None    Medication reconciliation/reorder completed by provider prior to medication history?  No    Prior to Admission medications    Medication Sig Last Dose Taking? Auth Provider   allopurinol (ZYLOPRIM) 300 MG tablet Take 1 tablet (300 mg) by mouth daily 11/12/2020 at am Yes Daniel Su MD   calcium citrate-vitamin D (CITRACAL W/D) 250-100 MG-UNIT tablet Take 2 tablets by mouth 2 times daily (with meals) 11/12/2020 at am Yes Araceli Riley PA-C   ciprofloxacin (CIPRO) 500 MG tablet Take 1 tablet (500 mg) by mouth 2 times daily 11/12/2020 at am Yes Araceli Riley PA-C   ferrous sulfate (FEROSUL) 325 (65 Fe) MG tablet Take 1 tablet (325 mg) by mouth daily (with breakfast) 11/12/2020 at am Yes Meche Solano PA-C   loratadine (CLARITIN) 10 MG tablet Take 10 mg by mouth daily  11/12/2020 at am Yes Unknown, Entered By History   LORazepam (ATIVAN) 0.5 MG tablet Take 1 tablet (0.5 mg) by mouth every 4 hours as needed (Anxiety, Nausea/Vomiting or Sleep) prn Yes Jarvis Ramos MD   multivitamin w/minerals (THERA-VIT-M) tablet Take 1 tablet by mouth daily 11/12/2020 at am Yes Unknown, Entered By History   prochlorperazine (COMPAZINE) 10 MG tablet Take 1 tablet (10 mg) by mouth every 6 hours as needed  (Nausea/Vomiting) prn Yes Jarvis Ramos MD   tamsulosin (FLOMAX) 0.4 MG capsule Take 1 capsule (0.4 mg) by mouth daily 11/12/2020 at am Yes Logan Bunch MD

## 2020-11-12 NOTE — ED NOTES
his RN called pt's sister per ED MD request. Sister denies pt has medical issues other than mentally handicap. ED MD notified. Sister in route back to ED. Triage and ED MD notified.

## 2020-11-12 NOTE — ED PROVIDER NOTES
History     Chief Complaint:  Fever and Cough     The history is provided by a relative and medical records. History limited by: mental retardation.     HPI:  Attapeu SHAUNA JUSTICE Diallo is a 55 year old male who presents with fever and cough. Patient reports chronic cough but denies any vomiting or diarrhea. Per nursing note, he recently finished chemotherapy for prostate cancer and had a fever of 102 today.  We use a Laotian  for interaction with the patient.  Patient has mental retardation at baseline therefore unable to answer most of the questions.  I called his Sister Mamta who will be coming into the ER to assist us.  After reading his chart, he was at urology this morning and had a voiding trial.  They discontinue his Palafox catheter.  I am not sure what kind of chemotherapy he got or when his last dose was.     Per patient's sister, he had chemo 3 days ago with a blood transfusion. They successfully transfused 1 bag, but he had a reaction about 30 minutes into 2nd bag. This may have been a reaction to the chemo. He went back in the next day for IV fluids. His fever started last night but this had resolved before arriving at urology clinic today.    Allergies:  No known drug allergies     Medications:    Zyloprim  Cipro  Ferosul  Claritin  Ativan  Compazine  Flomax    Past Medical History:    Mental retardation  Anemia  Testicular neoplasm  Seminoma  Neutropenia, drug-induced  Bilateral hydronephrosis  Sinus tachycardia  Chronic renal impairment, unspecified CKD stage  Sepsis     Past Surgical History:    Abdomen surgery  Cystoscopy, retrogrades, insert stent ureter  EGD     Family History:    Diabetes     Social History:  Smoking status: never  Alcohol use: social  Drug use: no  PCP: [facility]    Marital Status:  Single    Review of Systems  Please see HPI. All other systems reviewed and are negative.    Physical Exam     Vitals:  Patient Vitals for the past 24 hrs:   BP Temp  Temp src Pulse Resp SpO2   11/12/20 1630 124/75 -- -- 125 -- 100 %   11/12/20 1615 111/72 -- -- 126 -- 99 %   11/12/20 1600 114/79 -- -- 135 -- 97 %   11/12/20 1536 101/75 103.2  F (39.6  C) Oral 156 22 100 %       Physical Exam  Constitutional:       Appearance: Normal appearance. He is well-developed.   HENT:      Right Ear: External ear normal.      Left Ear: External ear normal.      Mouth/Throat:      Mouth: Mucous membranes are dry.      Pharynx: Oropharynx is clear. No oropharyngeal exudate or posterior oropharyngeal erythema.      Comments: Large blister on lower lip  Eyes:      General: No scleral icterus.     Extraocular Movements: Extraocular movements intact.      Conjunctiva/sclera: Conjunctivae normal.      Pupils: Pupils are equal, round, and reactive to light.   Neck:      Musculoskeletal: Normal range of motion and neck supple.   Cardiovascular:      Rate and Rhythm: Regular rhythm. Tachycardia present.      Heart sounds: Normal heart sounds. No murmur. No friction rub. No gallop.    Pulmonary:      Effort: Pulmonary effort is normal. No respiratory distress.      Breath sounds: Normal breath sounds. No wheezing or rales.   Abdominal:      General: Bowel sounds are normal. There is no distension.      Palpations: Abdomen is soft. There is no mass.      Tenderness: There is no abdominal tenderness.   Musculoskeletal: Normal range of motion.   Skin:     General: Skin is warm and dry.      Capillary Refill: Capillary refill takes 2 to 3 seconds.      Findings: No rash.   Neurological:      General: No focal deficit present.      Mental Status: He is alert.         Emergency Department Course     ECG (15:54:48):  Rate 135 bpm. NH interval 134. QRS duration 78. QT/QTc 288/432. P-R-T axes 58 24 30.    Sinus tachycardia. Minimal voltage criteria for LVH, may be normal variant.     Interpreted at 1604 by Flory Molina MD.    Imaging:  XR Chest, portable  Low lung volumes with mild bilateral lung base  atelectasis  versus infiltrate with possible small pleural effusions. No overt  pulmonary edema. Increased mediastinal widening/hilar enlargement may  be due to low lung volumes but progression of known thoracic  lymphadenopathy not excluded.  Per radiology.    Laboratory:  CBC: WBC 0.1 (LL), HGB 7.8 (L),  (L)  CMP: Glucose 130 (H), Creatinine 1.37 (H), GFR 58 (L), Calcium 6.8 (L), Albumin 2.4 (L), AlkPhos 635 (H) o/w WNL  Lactic Acid (Resulted: 1612): 1.0  Blood Gas venous: pH 7.41, PCO2 37 (L), PO2 38, Bicarbonate 23, Base deficit 1.3, room air  Urine analysis: Glc 70 (A), Albumin 50 (A), RBC/HPF 3 (H), Bacteria few (A), Mucous present (A) o/w WNL  INR: 1.00  PTT: 38 (H)    Symptomatic COVID-19 virus by PCR: pending  Urine culture: pending  Blood culture x2: pending    Interventions:  1605 Lactated ringers Bolus, 1000 mL, IV  1727 Cefepime, 2 g, IV  1803 Tylenol, 975 mg, PO  1808 Vancomycin, 1000 mg, IV  1815 Zofran, 4 mg, IV    Emergency Department Course:  1541  Past medical records, nursing notes, and vitals reviewed. I performed an exam of the patient and obtained history, as documented above.    EKG was taken in the ED.     IV was inserted and blood was drawn for laboratory testing, results above.'    The patient provided a urine sample here in the emergency department. This was sent for laboratory testing, findings above.    The patient was sent for a chest XR while in the emergency department, results above.     1627 Per RN, 600 ml urine was drained from bladder via Palafox catheter.    1644 Patient rechecked and updated. Sister at bedside. Additional history obtained as noted above.    1719 Discussed the patient with Dr. Dixon, who will admit the patient for further monitoring, evaluation, and treatment.    1721 Findings and plan explained to the Patient who consents to admission.    I personally reviewed the laboratory & imaging results with the Patient and sister who voiced understanding of the  findings. I answered all related questions prior to admission.     Impression & Plan         Covid-19:  Luz Marina Diallo was evaluated during a global COVID-19 pandemic, which necessitated consideration that the patient might be at risk for infection with the SARS-CoV-2 virus that causes COVID-19.   Applicable protocols for evaluation were followed during the patient's care.   COVID-19 was considered as part of the patient's evaluation. The plan for testing is:  a test was obtained during this visit.    Medical Decision Making:  Luz Marina Diallo is a 55 year old male who presents to the emergency department today for evaluation neutropenic fever. He cannot give any history due to mental retardation and language barrier. Sister was able to come to the ER and help is out. He recently finished chemo and is found to be neutropenic. He is also febrile with sepsis syndrome. So far the source is unclear but given his immunocompromised state, he was given emperic treatment. Vancomycin and cefepime were administered. He was also found to have urinary retention. Palafox has been re-inserted to relieve that obstruction. Patient is admitted to the hospitalist service.        Diagnosis:    ICD-10-CM    1. Urinary retention  R33.9 CBC with platelets differential     Comprehensive metabolic panel     Blood culture     UA with Microscopic     Urine Culture Aerobic Bacterial     Symptomatic COVID-19 Virus (Coronavirus) by PCR   2. Febrile neutropenia (H)  D70.9     R50.81         Disposition:  Admitted to Dr. Dixon.    Scribe Disclosure:  I, Coreen Newberry, am serving as a scribe at 3:42 PM on 11/12/2020 to document services personally performed by Flory Molina MD based on my observations and the provider's statements to me.       Coreen Newberry  11/12/2020     Flory Molina MD  11/12/20 3871

## 2020-11-12 NOTE — PROGRESS NOTES
Pt's sister, Mamta, called in today to let us know that patient had shivers and felt cold yesterday afternoon.  Mamta gave patient Tylenol and he seemed to be doing some better.  Mamta said patient woke up at 1am this morning and developed a fever  (101.2).  Mamta said she called in to the after hours care and they said to watch his temperature and not give more tylenol.  She said his fever stayed between  until around 2pm this afternoon when he spiked a TMAX of 102.  Mamta said patient had a dry cough yesterday, but reported that the cough is less prevalent today.  Pt denies N/V, denies sore throat, denies pain or body aches.  Spoke with EPI Bocanegra regarding patient symptoms.  Because of patient's history, Araceli recommends that patient go to [facility] ED to be evaluated.  Pt's sister, Mamta, voiced understanding and said that she will take patient there.  No further questions or concerns at this time.

## 2020-11-12 NOTE — TELEPHONE ENCOUNTER
Patient's sister is calling and is complaining of fever for 8 hours now. The last temperature was 102 temporal at 1am, patient has been receiving tylenol for fluctuating temperatures. No other symptoms noted. Triage guidelines recommend to go to ED, caller states provider had advised for patient to stay out of hospital.  Triager called out to answering service, left message for on  call oncologist. Zoya Galeana to call triager at [phone]. Caller advised to call back if no return call within 20 minutes.    Reason for Disposition    [1] Neutropenia known or suspected (e.g., recent cancer chemotherapy) AND [2] fever > 100.4 F (38.0 C)    Additional Information    Negative: Shock suspected (e.g., cold/pale/clammy skin, too weak to stand, low BP, rapid pulse)    Negative: Difficult to awaken or acting confused (e.g., disoriented, slurred speech)    Negative: Bluish (or gray) lips or face now    Negative: New onset rash with many purple (or blood-colored) spots or dots    Negative: Sounds like a life-threatening emergency to the triager    Negative: Other symptom is present, see that guideline  (e.g., symptoms of cough, runny nose, sore throat, earache, abdominal pain, diarrhea, vomiting)    Negative: Fever > 104 F (40 C)    Protocols used: CANCER - FEVER-A-

## 2020-11-13 NOTE — PLAN OF CARE
End of Shift Summary  For vital signs and complete assessments, please see documentation flowsheets.     Pertinent assessments: Tmax 102.3 ax, heart rate tachy 130s, soft BPs. Lungs clear/dim, on room air. Chronic tran with good output. Skin: rash lower abdomen. Dark lines on back and back of legs (old scratch marks?). Sores on lips, tongue white, possible thrush, mucositis. Will request order for magic mouthwash.   Major Shift Events Magnesium replaced, febrile. Paged MD re fever and heart rate, no new orders.   Treatment Plan: IV cefepime and vancomycin, IVF, supportive cares

## 2020-11-13 NOTE — CONSULTS
Consult Date:  11/13/2020      INFECTIOUS DISEASE CONSULTATION      REQUESTING PHYSICIAN:  Jameson Medina MD      IMPRESSION:   1.  A 55-year-old male, well known to me from recent admission, now admitted with acute fever and profound neutropenia from chemotherapy, no clear focal infection, mild respiratory symptoms and recent urosepsis as possible sources, but unclear.   2.  Seminoma with recent chemotherapy, now profoundly neutropenic.   3.  Recent episode of urosepsis with Klebsiella and then subsequent episode of sepsis resolved with oral Cipro without clearcut infection being found.   4.  Latent tuberculosis, recent new diagnosis.  No evidence of active tuberculosis.   5.  COVID-19 rule out, negative times several.   6.  Slight hypoxia, minimal chest x-ray abnormality.   7.  Renal insufficiency.      RECOMMENDATIONS:   1.  Agree with broad antibiotics currently.  Await cultures and readjust.   2.  Follow neutropenia, focal symptoms and cultures and readjust.  Hematology following.   3.  Same broad antibiotics, but simplify as cultures allow.   4.  Initiate latent TB treatment when he has recovered from all of this.      HISTORY:  This 55-year-old male is well known to me, currently with language barrier,  not here.  The patient recently was hospitalized with sepsis, felt to be probable urinary tract, resolved with antibiotics and eventual Cipro but was never entirely proven, was on the Cipro right up until now.  Got chemotherapy as an outpatient for his known seminoma and now has profound neutropenia and new fever, some slight respiratory symptoms, but nothing obvious.  No other focal or localizing new pain sites.  The patient, during his recent hospital stay, was discovered to have latent TB, but nothing to suggest active tuberculosis and no history of this.  Prior to this with the seminoma, had obstruction that required intervention and had Klebsiella urosepsis in October.  He has had multiple  COVID tests negative, including now, and not really that much in the way of respiratory symptoms or infiltrates.      PAST MEDICAL HISTORY:  The recent recurrent urosepsis and obstruction, the seminoma with recent chemotherapy, now profoundly neutropenic, mild renal insufficiency.      SOCIAL AND FAMILY HISTORY:  [place] immigrant.  Recent discovery of latent tuberculosis, untreated.  Had low positive QuantiFERON TB Gold test.      MEDICATIONS:  As listed.      REVIEW OF SYSTEMS:  As able.  No particular localizing pain sites, not particularly coughing or respiratory symptoms.      PHYSICAL EXAMINATION:   GENERAL:  The patient looks relatively well for the clinical condition.     VITAL SIGNS:  Tachycardic at 100.  O2 saturations okay.   HEENT:  No visible lesions.   NECK:  Supple and nontender.   HEART:  Slightly tachycardic, no major murmur.   LUNGS:  Crackles at both bases.   ABDOMEN:  Nontender, no flank tenderness.   EXTREMITIES:  No rashes, skin lesions or embolic lesions.      LABORATORY DATA:  Blood cultures pending.  White count 100 with no PMNs.  Blood cultures so far negative.  Urine culture pending.  Urine is minimally abnormal currently. Imaging included a chest x-ray.  Last CT was 10/26, which showed still the large mass.  Chest x-ray currently with minimal slight effusion or infiltrate.  COVID-19 negative.      Thank you very much for consultation.  I will follow the patient with you.      LOCATION:  Room 527, [facility].         VIC PALACIOS MD             D: 2020   T: 2020   MT: REGINA      Name:     RAJWINDER OSULLIVAN   MRN:      [id number]        Account:       [id number]   :      1965           Consult Date:  2020      Document: [id number]

## 2020-11-13 NOTE — PLAN OF CARE
End of Shift Summary  For vital signs and complete assessments, please see documentation flowsheets.     Pertinent assessments: Tmax 102.9 ax, heart rate tachy 120s for most of day- around 1700 patient began to sit in 140s/150s- EKG complete with results paged to day MD, soft BPs. Lungs clear, on room air. Palafox with good output. Skin: darker pigment to lower abdomen with Dark lines on back and back of legs (old scratch marks?). Sores on lips, tongue white, possible thrush, mucositis. Magic mouthwash and nystatin swishes started.    Major Shift Events: Blood administered. Development of chest and abdomen rash, tachycardia, 2 episodes of emesis (brown/tan/clear) with zofran and compazine given. Tylenol suppository given once this AM for fevers, refused rest of shift. With rash,tachycardia and soft BPs 1L NS bolus infusing.    Treatment Plan: IV cefepime and vancomycin, IVF, supportive cares    Bedside Nurse: Helen Rainey RN

## 2020-11-13 NOTE — PROVIDER NOTIFICATION
FYI patient refusing oral meds as mouth hurts too much to swallow, can magic mouth wash be ordered  THanks    Dr Adam damian

## 2020-11-13 NOTE — CONSULTS
[facility]    Hematology / Oncology Consultation     Date of Admission:  11/12/2020  Date of Consult (When I saw the patient): 11/13/20    Assessment & Plan   Attapeu NO JUSTICE Diallo is a 55 year old male who was admitted on 11/12/2020. 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 We have been struggling with his current curative intent chemotherapy. He had a good radiographic response to his first cycle of BLEOMYCIN, ETOPOSIDE AND carboplatin (BEP) which was suboptimal since we could not use cisplatin. Despite use of carboplatin, he had an excellent response but had several hospital admission for neutropenic fevers.     He has mucositis and has not been able to eat or drink. He has been getting nystatin. I will also add magic mouthwash. He should do salt/baking sod swishes several times a day in addition to his nystatin and magic mouth wash.     Cisplatin is less myelosuppressive and I was hoping that he would not have infections with this cycle. I had him on ciprofloxacin 500 mg bid for prophylaxis. Despite this he has developed high grade fevers. We will seek input from infectious disease on best approach as he needs to complete chemotherapy to be cured of this highly curable cancer.     The other challenge is likely his renal dysfunction and liver dysfunction which might affect his drug levels. Most drugs are metabolized in liver or kidney. Cisplatin is an exception and is excreted in the active form via kidney - but impaired renal function could even cisplatin excretion.     Recommendations:  - Agree with broad spectrum antibiotics as current  - Magic mouthwash, nystatin for mucositis   Salt and  "baking soda rinses several times a day  - Check daily CBC with diff   Transfuse for Hgb < 7 g/dl; Platelet < 20k (since fevers)  - He got Neulasta on 11/6/20 and still remains severely neutropenic    Will add neupogen daily until ANC > 500 for 2 days or > 1500   - Consider infectious disease consult to have a preferred antibiotic after discharge to prevent neutropenic fevers despite severe neutropenia  - We will continue to follow.     Over 60 min of time spent with more than 50% time spent in counseling and coordinating care.      Jarvis Ramos MD    Code Status    Full Code    Reason for Consult   Reason for consult: I was asked by Dr. Dixon to evaluate this patient for neutropenic fevers.    Primary Care Physician   [facility]    Chief Complaint   Fevers    History is obtained from the patient and patient's sister    History of Present Illness   Attapeu NO JUSTICE Diallo is a 55 year old male who presents with neutropenic fevers    Von has severe mouth sores with his current cycle of chemotherapy. He is unable to eat or drink at this time. He has been spiking fevers. He has been spiking fevers on days of his bleomycin. He did not have fever following couple of days but then again had high grade fever yesterday. He has been having high grade fevers.     Past Medical History   I have reviewed this patient's medical history and updated it with pertinent information if needed.   Past Medical History:   Diagnosis Date     Mental retardation        Past Surgical History   I have reviewed this patient's surgical history and updated it with pertinent information if needed.  Past Surgical History:   Procedure Laterality Date     ABDOMEN SURGERY      sister states he had an \"abdominal surgery 20 years ago\" unknown      CYSTOSCOPY, RETROGRADES, INSERT STENT URETER(S), COMBINED Bilateral 9/24/2020    Procedure: Cystoscopy with bilateral retrograde pyelogram and bilateral ureteral stent " insertiona-complex due to severe bilateral hydroureteronephrosis with ureteral tortuosity, examination under anesthesia and fluoroscopic interpretation > 1 hour physician time;  Surgeon: Ralf Chan MD;  Location:  OR     ESOPHAGOSCOPY, GASTROSCOPY, DUODENOSCOPY (EGD), COMBINED N/A 3/1/2019    Procedure: ESOPHAGOSCOPY, GASTROSCOPY, DUODENOSCOPY (EGD) Rm 226;  Surgeon: Susy Camara MD;  Location:  GI       Prior to Admission Medications   Prior to Admission Medications   Prescriptions Last Dose Informant Patient Reported? Taking?   LORazepam (ATIVAN) 0.5 MG tablet prn  No Yes   Sig: Take 1 tablet (0.5 mg) by mouth every 4 hours as needed (Anxiety, Nausea/Vomiting or Sleep)   allopurinol (ZYLOPRIM) 300 MG tablet 11/12/2020 at am  No Yes   Sig: Take 1 tablet (300 mg) by mouth daily   calcium citrate-vitamin D (CITRACAL W/D) 250-100 MG-UNIT tablet 11/12/2020 at am  No Yes   Sig: Take 2 tablets by mouth 2 times daily (with meals)   ciprofloxacin (CIPRO) 500 MG tablet 11/12/2020 at am  No Yes   Sig: Take 1 tablet (500 mg) by mouth 2 times daily   ferrous sulfate (FEROSUL) 325 (65 Fe) MG tablet 11/12/2020 at am  No Yes   Sig: Take 1 tablet (325 mg) by mouth daily (with breakfast)   loratadine (CLARITIN) 10 MG tablet 11/12/2020 at am  Yes Yes   Sig: Take 10 mg by mouth daily    multivitamin w/minerals (THERA-VIT-M) tablet 11/12/2020 at am  Yes Yes   Sig: Take 1 tablet by mouth daily   prochlorperazine (COMPAZINE) 10 MG tablet prn  No Yes   Sig: Take 1 tablet (10 mg) by mouth every 6 hours as needed (Nausea/Vomiting)   tamsulosin (FLOMAX) 0.4 MG capsule 11/12/2020 at am  No Yes   Sig: Take 1 capsule (0.4 mg) by mouth daily      Facility-Administered Medications: None     Allergies   No Known Allergies    Social History   I have reviewed this patient's social history and updated it with pertinent information if needed. Attapeu NO JUSTICE Diallo  reports that he has never smoked. He has never used  smokeless tobacco. He reports current alcohol use of about 2.0 standard drinks of alcohol per week. He reports that he does not use drugs.    Family History   I have reviewed this patient's family history and updated it with pertinent information if needed.   Family History   Problem Relation Age of Onset     Diabetes Father      Glaucoma No family hx of      Macular Degeneration No family hx of        Review of Systems   The 10 point Review of Systems is negative other than noted in the HPI or here.      Physical Exam   Temp: 101.6  F (38.7  C) Temp src: Axillary BP: 102/59 Pulse: 122   Resp: 24 SpO2: 95 % O2 Device: None (Room air)    Vital Signs with Ranges  Temp:  [98.5  F (36.9  C)-103.6  F (39.8  C)] 101.6  F (38.7  C)  Pulse:  [111-160] 122  Resp:  [18-24] 24  BP: ()/(48-79) 102/59  SpO2:  [95 %-100 %] 95 %  100 lbs 6.4 oz      Data   Recent Labs   Lab Test 11/13/20  0715 11/12/20  1601 11/09/20  0912 11/06/20  1045 11/02/20  0845    137 140 142 138   POTASSIUM 3.8 3.8 3.8 3.9 4.1   CHLORIDE 110* 105 110* 114* 107   CO2 24 22 21 18* 22   ANIONGAP 6 10 9 10 9   BUN 26 27 37* 36* 16   CR 1.30* 1.37* 1.28* 1.14 1.44*   * 130* 204* 132* 134*   IVANIA 5.9* 6.8* 6.0* 6.1* 6.8*     Recent Labs   Lab Test 11/13/20  0715 11/12/20  1848 11/02/20  0845 10/31/20  0600 10/30/20  0625 10/28/20  1420 10/28/20  1420 10/03/20  0703 10/03/20  0703 09/28/20  0650 09/28/20  0650 09/27/20  0719 09/26/20  0756   MAG 1.8 1.3* 2.0 1.9 1.7   < >  --    < >  --    < > 1.7  --   --    PHOS  --   --   --   --   --   --  3.1  --  4.3  --  3.1 2.9 3.2    < > = values in this interval not displayed.     Recent Labs   Lab Test 11/13/20  0715 11/12/20  1601 11/09/20  0912 11/02/20  0845 11/01/20  0520 10/27/20  0400 10/27/20  0400 10/26/20  1453 10/26/20  0820   WBC 0.1* 0.1* 29.3* 25.8* 19.9*   < > 27.2* 35.9* 37.4*   HGB 6.6* 7.8* 5.7* 8.7* 8.1*   < > 6.7* 7.6* 7.6*   PLT 63* 105* 449 394 286   < > 655* 832* 908*   MCV 92  91 95 93 92   < > 94 97 96   NEUTROPHIL  --   --  97.0 71.0  --   --  80.0 81.0 66.0    < > = values in this interval not displayed.     Recent Labs   Lab Test 11/12/20  1601 11/06/20  1045 11/02/20  0845 10/26/20  0820 10/26/20  0820 09/24/20  0601 09/24/20  0601   BILITOTAL 0.4 0.3 0.5   < > 0.2   < >  --    ALKPHOS 635* 878* 1,492*   < > 262*   < >  --    ALT 59 123* 233*   < > 33   < >  --    AST 25 65* 172*   < > 28   < >  --    ALBUMIN 2.4* 2.1* 2.3*   < > 2.7*   < >  --    LDH  --   --   --   --  578*  --  710*    < > = values in this interval not displayed.     TSH   Date Value Ref Range Status   08/13/2017 0.75 0.40 - 4.00 mU/L Final     Recent Labs   Lab Test 09/25/20  0705   CEA 0.5     Results for orders placed or performed during the hospital encounter of 11/12/20   XR Chest Port 1 View    Narrative    XR CHEST PORT 1 VW 11/12/2020 4:48 PM    HISTORY: sob. History testicular cancer.     COMPARISON: 10/27/2020. 10/26/2020 chest CT.      Impression    IMPRESSION: Low lung volumes with mild bilateral lung base atelectasis  versus infiltrate with possible small pleural effusions. No overt  pulmonary edema. Increased mediastinal widening/hilar enlargement may  be due to low lung volumes but progression of known thoracic  lymphadenopathy not excluded.    YEMI DIAZ MD     Recent Labs   Lab Test 10/26/20  0820 09/24/20  0830 09/24/20  0601   FETO 4.4 1.5  --    HCGTM 6* 700*  --    *  --  710*

## 2020-11-13 NOTE — ED NOTES
Pt vomiting following tylenol admin. No pill fragments observed. ED MD notified of need for nausea meds

## 2020-11-13 NOTE — PROGRESS NOTES
[facility]  Hospitalist Progress Note  Jameson Medina MD 11/13/2020    Reason for Stay        Febrile neutropenia    Pancytopenia secondary to chemotherapy for seminoma    Severe anemia requiring blood transfusion    Hypocalcemia    Acute urinary retention    Mucositis    History of developmental delay         Assessment and Plan:        Summary of Stay:     Attaperayshawn Diallo is a 55 year old male patient with past medical history of developmental delay, seminoma with metastasis to lymph nodes, on chemotherapy, who was brought to emergency room for evaluation for fever and chills.  Patient is Laotian speaking and also has developmental delay and a difficult to obtain history from him.  History was obtained from his sister. Patient is on chemotherapy for seminoma of the testicle.  Patient received chemotherapy on Monday.  He was also transfused blood on Monday.  Patient had reaction on second unit of blood after 30 minutes of transfusion.  On Tuesday patient went to the clinic to get IV fluids.  He started to have fever and chills last night.  He had follow-up with urology today and had voiding trial and removal of Palafox catheter.  Patient was sent to emergency room because of his fever and chills.  In the ER, he was noted to be febrile and tachycardic.  Laboratory work-up showed normal electrolytes.  Creatinine 1.37.  , ALT 59, AST 25, lactic acid 1.0.  WBC 0.1 differential count not done due to low WBC below 0.5.  Chest x-ray showed mild bilateral lung base atelectasis or infiltrate.  He was given IV fluids, IV cefepime and vancomycin emergency room.  He was admitted for further management.        Patient progress during stay:    Patient was continued with intravenous antibiotic and was closely monitored.  Both hematology oncology as well as infectious history was consulted for further evaluation recommendations.  He was transfused a unit of blood on November 13, 2020 for  severe anemia with hemoglobin of 6.6.    He was treated with nystatin as well as Magic mouthwash for mucositis.        Problem List with Assessment and Plan      1. Neutropenic fever    Patient on broad-spectrum antibiotic with cefepime and vancomycin    Still febrile and pancytopenic    Continue anabiotic, monitor cultures,    Infectious disease physician consulted and hematology oncology following      2. Pancytopenia secondary to chemotherapy    Severe anemia with hemoglobin 6.6 today, transfusing her blood, monitor hemoglobin    Transfuse for hemoglobin of less than 7    Leukopenia with neutropenia, monitor counts, hematology oncology following    Neulasta on November 6 and recommended additional Neupogen daily until absolute neutrophil count is over 500 for 2 days or over 1500 per oncology service.    Thrombocytopenia: Monitor platelets, no need for transfusion at this time    Transfuse for platelet count of less than 20        3. Mucositis secondary to chemotherapy      Nystatin swish and spit, Magic mouthwash    Continue to monitor    4.  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  Chronic kidney disease: Serum creatinine is stable, at baseline    6.  Hypocalcemia:     Serum calcium of 5.9 with ionized calcium of 3.4    We will administer calcium gluconate and monitor calcium      7.  Urinary retention, acute continue intermittent catheterization for now.  Avoid Palafox catheter        Plan for today :    Continue current antibiotic regimen follow cultures and vitals    Magic mouthwash and nystatin    Encourage oral hydration        LDA     Access : Peripheral    Palafox Catheter: in place, indication: Retention        FEN :    Orders Placed This Encounter      Combination Diet Regular Diet Adult           Intake/Output Summary (Last 24 hours) at 11/13/2020 1605  Last data filed at 11/13/2020 1457  Gross per 24 hour   Intake 2281 ml   Output 1775 ml   Net 506 ml          DVT  "Prophylaxis: Pneumatic Compression Devices    Code Status:  Full Code    Estimated discharge  disposition and plan:  May discharge  to home in 2-3 day(s) if patient remains stable           Interval History (Subjective):        Patient is seen and examined by me today and medical record reviewed.Overnight events noted and care discussed with nursing staff.  Patient has some mouth sores otherwise denies any chest pain.  Is been having fever.  No chest pain or chills.  Blood transfusion consent was obtained from his sister over the phone in consultation with pain.                  Physical Exam:        Last Vital Signs:  BP 99/63 (BP Location: Left arm)   Pulse 131   Temp 102.9  F (39.4  C) (Axillary)   Resp 24   Ht 1.549 m (5' 1\")   Wt 45.5 kg (100 lb 6.4 oz)   SpO2 97%   BMI 18.97 kg/m        Intake/Output Summary (Last 24 hours) at 11/13/2020 1620  Last data filed at 11/13/2020 1457  Gross per 24 hour   Intake 2281 ml   Output 1775 ml   Net 506 ml       Wt Readings from Last 5 Encounters:   11/12/20 45.5 kg (100 lb 6.4 oz)   11/09/20 46.3 kg (102 lb)   11/06/20 51.2 kg (112 lb 14.4 oz)   11/02/20 46.3 kg (102 lb)   11/01/20 47.9 kg (105 lb 11.2 oz)        Constitutional: Awake, alert, cooperative, no apparent distress     Respiratory: Clear to auscultation bilaterally, no crackles or wheezing   Cardiovascular: Regular rate and rhythm, normal S1 and S2, and no murmur noted   Abdomen: Normal bowel sounds, soft, non-distended, non-tender   Skin: No rashes, no cyanosis, dry to touch   Neuro: Alert with  no weakness   Extremities: No edema, normal range of motion   Other(s):  Oral thrush and diffuse mouth lesions       All other systems: Negative          Medications:        All current medications were reviewed with changes reflected in problem list.         Data:      All new lab and imaging data was reviewed.      Data reviewed today: I reviewed all new labs and imaging results over the last 24 hours. I " personally reviewed no images or EKG's today      Recent Labs   Lab 11/13/20  0715 11/12/20  1601 11/09/20  0912   WBC 0.1* 0.1* 29.3*   HGB 6.6* 7.8* 5.7*   HCT 20.8* 24.3* 18.3*   MCV 92 91 95   PLT 63* 105* 449     Recent Labs   Lab 11/12/20  1631 11/12/20  1620 11/12/20  1559   CULT No growth after 18 hours Culture negative monitoring continues No growth after 18 hours     Recent Labs   Lab 11/13/20  0715 11/12/20  1848 11/12/20  1601 11/09/20  0912     --  137 140   POTASSIUM 3.8  --  3.8 3.8   CHLORIDE 110*  --  105 110*   CO2 24  --  22 21   ANIONGAP 6  --  10 9   *  --  130* 204*   BUN 26  --  27 37*   CR 1.30*  --  1.37* 1.28*   GFRESTIMATED 62  --  58* 63   GFRESTBLACK 72  --  68 73   IVANIA 5.9*  --  6.8* 6.0*   MAG 1.8 1.3*  --   --    PROTTOTAL  --   --  6.8  --    ALBUMIN  --   --  2.4*  --    BILITOTAL  --   --  0.4  --    ALKPHOS  --   --  635*  --    AST  --   --  25  --    ALT  --   --  59  --        Recent Labs   Lab 11/13/20  0715 11/12/20  1601 11/09/20  0912   * 130* 204*       Recent Labs   Lab 11/12/20  1601   INR 1.00         No results for input(s): TROPONIN, TROPI, TROPR in the last 168 hours.    Invalid input(s): TROP, TROPONINIES    Recent Results (from the past 48 hour(s))   XR Chest Port 1 View    Narrative    XR CHEST PORT 1 VW 11/12/2020 4:48 PM    HISTORY: sob. History testicular cancer.     COMPARISON: 10/27/2020. 10/26/2020 chest CT.      Impression    IMPRESSION: Low lung volumes with mild bilateral lung base atelectasis  versus infiltrate with possible small pleural effusions. No overt  pulmonary edema. Increased mediastinal widening/hilar enlargement may  be due to low lung volumes but progression of known thoracic  lymphadenopathy not excluded.    YEMI DIAZ MD       COVID Status:  COVID-19 PCR Results    COVID-19 PCR Results 9/24/20 9/24/20 10/9/20 10/9/20 10/19/20 10/26/20 10/26/20 11/12/20 11/12/20    0229 0229 2228 2228  1531 1531 1631 1631    COVID-19 Virus PCR to [facility] - Result Test received-See reflex to IDDL test SARS CoV2 (COVID-19) Virus RT-PCR  Test received-See reflex to IDDL test SARS CoV2 (COVID-19) Virus RT-PCR  Not Detected Test received-See reflex to IDDL test SARS CoV2 (COVID-19) Virus RT-PCR  Test received-See reflex to IDDL test SARS CoV2 (COVID-19) Virus RT-PCR    COVID-19 Virus PCR to [facility] - Source Nasopharyngeal  Nasopharyngeal  Nasopharyngeal Nasopharyngeal  Nasopharyngeal    SARS-CoV-2 Virus Specimen Source  Nasopharyngeal  Nasopharyngeal   Nasopharyngeal  Nasopharyngeal   SARS-CoV-2 PCR Result  NEGATIVE  NEGATIVE   NEGATIVE  NEGATIVE      Comments are available for some flowsheets but are not being displayed.         COVID-19 Antibody Results, Testing for Immunity    COVID-19 Antibody Results, Testing for Immunity   No data to display.              Disclaimer: This note consists of symbols derived from keyboarding, dictation and/or voice recognition software. As a result, there may be errors in the script that have gone undetected. Please consider this when interpreting information found in this chart.

## 2020-11-13 NOTE — CONSULTS
"CLINICAL NUTRITION SERVICES  -  ASSESSMENT NOTE    Recommendations Ordered by Registered Dietitian (RD):   Continue diet as ordered   Ordered Boost Plus TID   Ordered MVI+M   Malnutrition:   % Weight Loss: difficult to assess - fluid vs true weight loss? Potential for > 5% in 1 month (severe malnutrition)  % Intake: unable to assess - suspect low at baseline given low BMI   Subcutaneous Fat Loss: unable to assess   Muscle Loss: unable to assess  Fluid Retention:  None noted    Malnutrition Diagnosis: Unable to determine due to lack of nutrition focused physical exam per direction of new department policy in the setting of COVID19     REASON FOR ASSESSMENT  Attapeu NO JUSTICE Diallo is a 55 year old male seen by Registered Dietitian for Admission Nutrition Risk Screen for reduced oral intake over the last month and RN Consult - \"decreased oral intake, mouth very sore      NUTRITION HISTORY  - Information obtained from chart, per direction of new department policy in the setting of COVID19. 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 Previously admitted in September and assessed by an RD. The following information was obtained:   \"- Resides with sister.    - Diet at home: Regular.  - Usual intakes: Meals TID + snacks in-between.  - Barriers to PO intakes: Sister describes poor appetite/intakes with skipping of snacks and smaller meals for a period of months PTA (\"since July\").   - Use of oral supplements: None.  - Chewing/swallowing issues: Denied.  - Meal preparation/grocery shopping: Family.  - Allergies: NKFA.\"   - Food Allergies: NKFA    CURRENT NUTRITION ORDERS  Diet Order:     Regular Diet     Current Intake/Tolerance:  No information recorded in the flowsheets to comment on as patient recently " "admitted     NUTRITION FOCUSED PHYSICAL ASSESSMENT FOR DIAGNOSING MALNUTRITION)  No:  per direction of new department policy in the setting of COVID19        Observed:    Unable to assess, see above     Obtained from Chart/Interdisciplinary Team:  - Last BM not recorded   - per RN note on 11/13: \"Sores on lips, tongue white, possible thrush, mucositis.\"     ANTHROPOMETRICS  Height: 5' 1\"  Weight: 45.5 kg ( 100 lbs 6.4 oz)   Body mass index is 18.97 kg/m .  Weight Status:  Normal BMI  Weight History: weight is down 11.6 kg over the past 1 month, this equates to a weight loss of 20%? Some fluid related making it difficult to determine true weight loss.   Per RD note on 9/30/2020:   \"Mild to trace, generalized edema.  Potential to impact true wt trending. Sister had reported UBW of 110# and noticed a severe decline in wt since August 2020.  Potential for 6% wt loss in a period of ~1 month with current masking by fluid retention.\"   Wt Readings from Last 10 Encounters:   11/12/20 45.5 kg (100 lb 6.4 oz)   11/09/20 46.3 kg (102 lb)   11/06/20 51.2 kg (112 lb 14.4 oz)   11/02/20 46.3 kg (102 lb)   11/01/20 47.9 kg (105 lb 11.2 oz)   10/26/20 46.6 kg (102 lb 12.8 oz)   10/19/20 45.4 kg (100 lb)   10/09/20 51.3 kg (113 lb 1.5 oz)   10/06/20 55.4 kg (122 lb 3.2 oz)   10/05/20 57.1 kg (125 lb 12.8 oz)     LABS  Labs reviewed    Electrolytes  Potassium (mmol/L)   Date Value   11/12/2020 3.8   11/09/2020 3.8   11/06/2020 3.9     Phosphorus (mg/dL)   Date Value   10/28/2020 3.1   10/03/2020 4.3   09/28/2020 3.1   09/27/2020 2.9   09/26/2020 3.2    Blood Glucose  Glucose (mg/dL)   Date Value   11/12/2020 130 (H)   11/09/2020 204 (H)   11/06/2020 132 (H)   11/02/2020 134 (H)   11/01/2020 98    Inflammatory Markers  WBC (10e9/L)   Date Value   11/12/2020 0.1 (LL)   11/09/2020 29.3 (H)   11/02/2020 25.8 (H)     Albumin (g/dL)   Date Value   11/12/2020 2.4 (L)   11/06/2020 2.1 (L)   11/02/2020 2.3 (L)      Magnesium (mg/dL)   Date " Value   11/12/2020 1.3 (L)   11/02/2020 2.0   10/31/2020 1.9     Sodium (mmol/L)   Date Value   11/12/2020 137   11/09/2020 140   11/06/2020 142    Renal  Urea Nitrogen (mg/dL)   Date Value   11/12/2020 27   11/09/2020 37 (H)   11/06/2020 36 (H)     Creatinine (mg/dL)   Date Value   11/12/2020 1.37 (H)   11/09/2020 1.28 (H)   11/06/2020 1.14     Additional  Ketones Urine (mg/dL)   Date Value   11/12/2020 Negative        MEDICATIONS  Medications reviewed      allopurinol  300 mg Oral Daily     calcium carbonate-vitamin D  1 tablet Oral BID w/meals     ceFEPIme (MAXIPIME) IV  2 g Intravenous Q12H     ferrous sulfate  325 mg Oral Daily with breakfast     loratadine  10 mg Oral Daily     multivitamin w/minerals  1 tablet Oral Daily     polyethylene glycol  17 g Oral Daily     senna-docusate  1 tablet Oral BID    Or     senna-docusate  2 tablet Oral BID     sodium chloride (PF)  3 mL Intracatheter Q8H     tamsulosin  0.4 mg Oral Daily     vancomycin (VANCOCIN) IV  1,000 mg Intravenous Q24H        sodium chloride 125 mL/hr at 11/13/20 0343      acetaminophen **OR** acetaminophen, lidocaine 4%, lidocaine (buffered or not buffered), LORazepam, melatonin, ondansetron **OR** ondansetron, prochlorperazine **OR** prochlorperazine **OR** prochlorperazine, sodium chloride (PF)     ASSESSED NUTRITION NEEDS PER APPROVED PRACTICE GUIDELINES:    Dosing Weight 45.5 kg   Estimated Energy Needs: 0918-4765 kcals (35-40 Kcal/Kg)  Justification: repletion and underweight  Estimated Protein Needs: 68-91 grams protein (1.5-2 g pro/Kg)  Justification: Repletion and preservation of lean body mass  Estimated Fluid Needs: per MD    MALNUTRITION:  % Weight Loss: difficult to assess - fluid vs true weight loss? Potential for > 5% in 1 month (severe malnutrition)  % Intake: unable to assess - suspect low at baseline given low BMI   Subcutaneous Fat Loss: unable to assess   Muscle Loss: unable to assess  Fluid Retention:  None noted    Malnutrition  Diagnosis: Unable to determine due to lack of nutrition focused physical exam per direction of new department policy in the setting of COVID19    NUTRITION DIAGNOSIS:  Increased nutrient needs (protein and energy) related to repletion needs as evidenced by pt with a potential weight loss of 20% in 1 month?, requiring a minimum of 35 kcal/kg and 1.5 g protein/kg.     NUTRITION INTERVENTIONS  Recommendations / Nutrition Prescription  Continue diet as ordered   Ordered Boost Plus TID   Ordered MVI+M    Implementation  Nutrition education: Not appropriate at this time due to patient condition  Medical Food Supplement: as above   Multivitamin/Mineral: as above   Collaboration and Referral of Nutrition care: discussed pt during interdisciplinary rounds this morning     Nutrition Goals  Pt to consume >/=50% of meals ordered TID + >/=2 oral nutritional supplements per day     MONITORING AND EVALUATION:  Progress towards goals will be monitored and evaluated per protocol and Practice Guidelines    Amie Robbins, RD, LD  Clinical Dietitian

## 2020-11-13 NOTE — PROVIDER NOTIFICATION
patient tachy up in 140s/150s and triggered lactic draw. New rash on chest and abdomen    Dr Adam damian MD called floor, verbal taken to get EKG and 1L NS bolus over 2 hours

## 2020-11-13 NOTE — PROGRESS NOTES
I was contacted for critical Diagnostic Studies and Labs value:Critical labs called: hgb 6.6, WBC 0.1  Abnormal result acknowledged.  EMR reviewed   Plan: Transfuse a unit of blood, continue to monitor

## 2020-11-13 NOTE — PROVIDER NOTIFICATION
"EKG shows \"Sinus Tachycardia possible inferior infart, age undetermined\"  No lactic result yet  Thanks    Dr Adam damian  "

## 2020-11-13 NOTE — PROGRESS NOTES
11/13/20 0843   Significant Event   Significant Event Critical result/value  (Calcium 5.9)     Blood bank needs new type/screen for blood and CRITICAL calcium of 5.9    Dr Adam damian

## 2020-11-13 NOTE — CONSULTS
Care Management Initial Consult    General Information  Assessment completed with: Caregiver(sister Mamta), Type of CM/SW Visit: Initial Assessment  Primary Care Provider verified and updated as needed: Yes   Readmission within the last 30 days: current reason for admission unrelated to previous admission   Return Category: New Diagnosis  Reason for Consult: care coordination/care conference;discharge planning  Advance Care Planning: Advance Care Planning Reviewed: no concerns identified          Communication Assessment  Patient's communication style: spoken language (non-English)    Hearing Difficulty or Deaf: no   Wear Glasses or Blind: yes    Cognitive  Cognitive/Neuro/Behavioral: .WDL except(HX OF DEVELOPMENTAL DELAY)  Level of Consciousness: lethargic                   Living Environment:   People in home: sibling(s)  (Mamta)  Current living Arrangements: house      Able to return to prior arrangements: yes       Family/Social Support:  Care provided by: other (see comments)  Provides care for: (sister is his PCA)  Marital Status: Single  Sibling(s)          Description of Support System: Supportive;Involved    Support Assessment: Adequate family and caregiver support    Current Resources:   Skilled Home Care Services:    Community Resources: PCA  Equipment currently used at home: cane, quad  Supplies currently used at home:      Employment/Financial:  Employment Status: disabled        Financial Concerns: No concerns identified           Lifestyle & Psychosocial Needs:        Socioeconomic History     Marital status: Single     Spouse name: Not on file     Number of children: Not on file     Years of education: Not on file     Highest education level: Not on file     Tobacco Use     Smoking status: Never Smoker     Smokeless tobacco: Never Used   Substance and Sexual Activity     Alcohol use: Yes     Alcohol/week: 2.0 standard drinks     Types: 1 Glasses of wine, 1 Cans of beer per week     Comment: once every  month or two, social functions only     Drug use: No     Sexual activity: Not Currently       Functional Status:  Prior to admission patient needed assistance:   Dependent ADLs:: Ambulation-cane;Bathing;Dressing;Eating;Grooming;Transfers;Toileting          Mental Health Status:      WDL    Chemical Dependency Status:      NA          Values/Beliefs:  Spiritual, Cultural Beliefs, Congregation Practices, Values that affect care:       NA          Additional Information:  Patient COVID status pending and patient is developmentally delayed.  Thus, CTS called and spoke with patient sister Mamta. Mamta functions as his PCA through [facility]. PCA services have increased from 1.75hr/day to 10hr/day.  He currently does not have HC or medical equipment.  Baseline mobility is independent with cane.  Mamta assist with all ADL's and IADL's.  They have been quarantining except for doctor apts. If patient is found to be COVID + we will need to reassess if patient will go home.  Mamta plans to take patient home, she said he does not like TCU.  Sister will transport.  He follow with Dr. Ramos [email or web address] oncology and Dr. Chan for urology.    Julia Gao RN, BSN, PHN, CTS  Care Coordinator  [facility]  [phone]

## 2020-11-13 NOTE — PHARMACY-VANCOMYCIN DOSING SERVICE
Pharmacy Vancomycin Initial Note  Date of Service 2020  Patient's  1965  55 year old, male    Indication: Febrile Neutropenia    Current estimated CrCl = Estimated Creatinine Clearance: 40.1 mL/min (A) (based on SCr of 1.37 mg/dL (H)).    Creatinine for last 3 days  2020:  4:01 PM Creatinine 1.37 mg/dL    Recent Vancomycin Level(s) for last 3 days  No results found for requested labs within last 72 hours.      Vancomycin IV Administrations (past 72 hours)                   vancomycin (VANCOCIN) 1000 mg in dextrose 5% 200 mL PREMIX (mg) 1,000 mg New Bag 20 1808                Nephrotoxins and other renal medications (From now, onward)    Start     Dose/Rate Route Frequency Ordered Stop    20 1700  vancomycin (VANCOCIN) 1000 mg in dextrose 5% 200 mL PREMIX      1,000 mg  200 mL/hr over 1 Hours Intravenous EVERY 24 HOURS 20 2109            Contrast Orders - past 72 hours (72h ago, onward)    None                Plan:  1.  Continue vancomycin  1000 mg IV q24h.  First dose given in ER.  2.  Goal Trough Level: 10-15 mg/L   3.  Pharmacy will check trough levels as appropriate in 1-3 Days.    4. Serum creatinine levels will be ordered a minimum of twice weekly.    5. [place] method utilized to dose vancomycin therapy: Method 1    Isak Lynn [facility]

## 2020-11-13 NOTE — PROGRESS NOTES
11/13/20 0821   Significant Event   Significant Event Critical result/value  (hgb 6.6, wbc 0.1)     Critical labs called: hgb 6.6, WBC 0.1  Thanks    Dr Adam damian

## 2020-11-13 NOTE — CONSULTS
ID consult dictated IMP 1 56 yo male seminoma,chemo,neutropenia, now fever    REc same ABX await cxs

## 2020-11-14 NOTE — PROGRESS NOTES
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  Patient is Laotian speaking and also has developmental delay and a difficult to obtain history from him.  History was obtained from his sister. Patient is on chemotherapy for seminoma of the testicle.  Patient received chemotherapy on Monday.  He was also transfused blood on Monday.  Patient had reaction on second unit of blood after 30 minutes of transfusion.  On Tuesday patient went to the clinic to get IV fluids.  He started to have fever and chills last night.  He had follow-up with urology today and had voiding trial and removal of Palafox catheter.  Patient was sent to emergency room because of his fever and chills.  In the ER, he was noted to be febrile and tachycardic.  Laboratory work-up showed normal electrolytes.  Creatinine 1.37.  , ALT 59, AST 25, lactic acid 1.0.  WBC 0.1 differential count not done due to low WBC below 0.5.  Chest x-ray showed mild bilateral lung base atelectasis or infiltrate.  He was given IV fluids, IV cefepime and vancomycin emergency room.  He was admitted for further management.        Patient progress during stay:    Patient was continued with intravenous antibiotic and was closely monitored.  Both hematology oncology as well as infectious history was consulted for further evaluation recommendations.  He was transfused a unit of blood on November 13, 2020 for  severe anemia with hemoglobin of 6.6.    He was treated with nystatin as well as Magic mouthwash for mucositis.            Problem List with Assessment and Plan      1. Neutropenic fever    Patient on broad-spectrum antibiotic with cefepime and vancomycin    Still febrile, pancytopenic and tachycardic    Afebrile this morning but he was 103.1 last evening    Blood culture and urine culture negative to date    Continue to biotic, infectious disease service assisting with antibiotic management.  Oncology following.      2. Pancytopenia secondary to chemotherapy    Severe anemia with hemoglobin 6.6 on November 13, 2020.  Transfuse as needed blood on November 13.  Hemoglobin improved to 8.3 after transfusion on November 13 but hemoglobin 6.7 this morning.  Will transfuse additional unit of blood today, monitor hemoglobin every 12 hours and conditional orders placed transfuse if hemoglobin is less than 7      Leukopenia with neutropenia, monitor counts, hematology oncology following    WBC count is slightly improved at 0.4 this morning, patient received Neulasta on November 6 and recommended additional Neupogen during the stay daily until absolute neutrophil count is over 500 for 2 days or over 1500 per oncology service.    Thrombocytopenia: Monitor platelets, no need for transfusion at this time.    Platelet is 63 today stable, no bleeding    Transfuse for platelet count of less than 20        3. Mucositis secondary to chemotherapy: This is a major issue for him which decreased intake, discomfort, cough.      Nystatin swish and spit, Magic mouthwash ordered, patient encouraged to use it    Continue to monitor    4.  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  Chronic kidney disease: Serum creatinine is stable, near baseline    6.  Hypocalcemia:     Serum calcium of 5.9 with ionized calcium of 3.4 on November 13 and a gram of calcium gluconate was given    Serum calcium is 5.4 and ionized  "calcium is 2.9 this morning.    We will administer additional calcium gluconate and monitor calcium      7.  Urinary retention, acute continue intermittent catheterization for now.  Avoid Palafox catheter            Plan for today :    Continue current antibiotic regimen follow cultures and vitals    Magic mouthwash and nystatin    Encourage oral hydration    Increase IV fluid    Transfuse additional unit of blood    Will update family        LDA     Access : Peripheral    Palafox Catheter: in place, indication: Retention        FEN :    Orders Placed This Encounter      Combination Diet Regular Diet Adult           Intake/Output Summary (Last 24 hours) at 11/13/2020 1605  Last data filed at 11/13/2020 1457  Gross per 24 hour   Intake 2281 ml   Output 1775 ml   Net 506 ml          DVT Prophylaxis: Pneumatic Compression Devices    Code Status:  Full Code    Estimated discharge  disposition and plan:  May discharge  to home in 2-3 day(s) if patient remains stable           Interval History (Subjective):        Patient is seen and examined by me today and medical record reviewed.Overnight events noted and care discussed with nursing staff.  Patient continues to have discomfort in his mouth, cough and vomited few times.  Patient has no fever this morning.                  Physical Exam:        Last Vital Signs:  /66 (BP Location: Left arm)   Pulse 132   Temp 98.3  F (36.8  C) (Axillary)   Resp 20   Ht 1.549 m (5' 1\")   Wt 45.5 kg (100 lb 6.4 oz)   SpO2 99%   BMI 18.97 kg/m        Intake/Output Summary (Last 24 hours) at 11/13/2020 1620  Last data filed at 11/13/2020 1457  Gross per 24 hour   Intake 2281 ml   Output 1775 ml   Net 506 ml       Wt Readings from Last 5 Encounters:   11/12/20 45.5 kg (100 lb 6.4 oz)   11/09/20 46.3 kg (102 lb)   11/06/20 51.2 kg (112 lb 14.4 oz)   11/02/20 46.3 kg (102 lb)   11/01/20 47.9 kg (105 lb 11.2 oz)        Constitutional: Awake, alert, cooperative, no apparent distress "     Respiratory: Clear to auscultation bilaterally, no crackles or wheezing   Cardiovascular: Regular rate and rhythm, normal S1 and S2, and no murmur noted   Abdomen: Normal bowel sounds, soft, non-distended, non-tender   Skin: No rashes, no cyanosis, dry to touch   Neuro: Alert with  no weakness   Extremities: No edema, normal range of motion   Other(s):  Oral thrush and diffuse mouth lesions       All other systems: Negative          Medications:        All current medications were reviewed with changes reflected in problem list.         Data:      All new lab and imaging data was reviewed.      Data reviewed today: I reviewed all new labs and imaging results over the last 24 hours. I personally reviewed no images or EKG's today      Recent Labs   Lab 11/14/20 0628 11/13/20  1640 11/13/20 0715 11/12/20  1601   WBC 0.4*  --  0.1* 0.1*   HGB 6.7* 8.3* 6.6* 7.8*   HCT 22.3*  --  20.8* 24.3*   MCV 96  --  92 91   PLT 63*  --  63* 105*     Recent Labs   Lab 11/12/20  1631 11/12/20  1620 11/12/20  1559   CULT No growth after 2 days No growth No growth after 2 days     Recent Labs   Lab 11/14/20 0628 11/13/20 0715 11/12/20  1848 11/12/20  1601    140  --  137   POTASSIUM 4.2 3.8  --  3.8   CHLORIDE 116* 110*  --  105   CO2 19* 24  --  22   ANIONGAP 7 6  --  10   * 111*  --  130*   BUN 31* 26  --  27   CR 1.41* 1.30*  --  1.37*   GFRESTIMATED 56* 62  --  58*   GFRESTBLACK 65 72  --  68   IVANIA 5.4* 5.9*  --  6.8*   MAG  --  1.8 1.3*  --    PROTTOTAL 5.3*  --   --  6.8   ALBUMIN 1.5*  --   --  2.4*   BILITOTAL 0.4  --   --  0.4   ALKPHOS 443*  --   --  635*   AST 20  --   --  25   ALT 38  --   --  59       Recent Labs   Lab 11/14/20 0628 11/13/20 0715 11/12/20  1601 11/09/20  0912   * 111* 130* 204*       Recent Labs   Lab 11/12/20  1601   INR 1.00         No results for input(s): TROPONIN, TROPI, TROPR in the last 168 hours.    Invalid input(s): TROP, TROPONINIES    Recent Results (from the past  48 hour(s))   XR Chest Port 1 View    Narrative    XR CHEST PORT 1 VW 11/12/2020 4:48 PM    HISTORY: sob. History testicular cancer.     COMPARISON: 10/27/2020. 10/26/2020 chest CT.      Impression    IMPRESSION: Low lung volumes with mild bilateral lung base atelectasis  versus infiltrate with possible small pleural effusions. No overt  pulmonary edema. Increased mediastinal widening/hilar enlargement may  be due to low lung volumes but progression of known thoracic  lymphadenopathy not excluded.    YEMI DIAZ MD       COVID Status:  COVID-19 PCR Results    COVID-19 PCR Results 9/24/20 9/24/20 10/9/20 10/9/20 10/19/20 10/26/20 10/26/20 11/12/20 11/12/20    0229 0229 2228 2228  1531 1531 1631 1631   COVID-19 Virus PCR to [facility] - Result Test received-See reflex to IDDL test SARS CoV2 (COVID-19) Virus RT-PCR  Test received-See reflex to IDDL test SARS CoV2 (COVID-19) Virus RT-PCR  Not Detected Test received-See reflex to IDDL test SARS CoV2 (COVID-19) Virus RT-PCR  Test received-See reflex to IDDL test SARS CoV2 (COVID-19) Virus RT-PCR    COVID-19 Virus PCR to [facility] - Source Nasopharyngeal  Nasopharyngeal  Nasopharyngeal Nasopharyngeal  Nasopharyngeal    SARS-CoV-2 Virus Specimen Source  Nasopharyngeal  Nasopharyngeal   Nasopharyngeal  Nasopharyngeal   SARS-CoV-2 PCR Result  NEGATIVE  NEGATIVE   NEGATIVE  NEGATIVE      Comments are available for some flowsheets but are not being displayed.         COVID-19 Antibody Results, Testing for Immunity    COVID-19 Antibody Results, Testing for Immunity   No data to display.              Disclaimer: This note consists of symbols derived from keyboarding, dictation and/or voice recognition software. As a result, there may be errors in the script that have gone undetected. Please consider this when interpreting information found in this chart.

## 2020-11-14 NOTE — PLAN OF CARE
To Do:  End of Shift Summary  For vital signs and complete assessments, please see documentation flowsheets.     Pertinent assessments:Palafox with good output. Skin: darker pigment to lower abdomen with Dark lines on back and back of legs (old scratch marks?). Sores on lips, tongue white, sore , swollen --unable to eat , drink or swallow pills ----    Major Shift Event Hg 6.7 1 unit PC-- mouth swab done and sent --    Treatment Plan: ,supportive cares---yanker suction at bedside ---new antibitics to start     Bedside Nurse:Brenna Wong

## 2020-11-14 NOTE — PROGRESS NOTES
[facility]  Infectious Disease Progress Note          Assessment and Plan:   IMPRESSION:   1.  A 55-year-old male, well known to me from recent admission, now admitted with acute fever and profound neutropenia from chemotherapy, no clear focal infection, mild respiratory symptoms and recent urosepsis as possible sources, but unclear.   2.  Seminoma with recent chemotherapy, now profoundly neutropenic.   3.  Recent episode of urosepsis with Klebsiella and then subsequent episode of sepsis resolved with oral Cipro without clearcut infection being found.   4.  Latent tuberculosis, recent new diagnosis.  No evidence of active tuberculosis.   5.  COVID-19 rule out, negative times several.   6.  Slight hypoxia, minimal chest x-ray abnormality.   7.  Renal insufficiency.   8 Severe stomatitis, ? Chemo vs HSV     RECOMMENDATIONS:   1.  Contunue broad antibiotics currently.  Await cultures and readjust. So far neg ? T down  2.  Follow neutropenia, focal symptoms and cultures and readjust.  Hematology following.   3.  acycolvir, flucon IV for stomatitis, swab for HSV, topical mouth txs   4.  Initiate latent TB treatment when he has recovered from all of this.  Discussed in detail pt ands sister              Interval History:   no new complaints can not swallow, stomatitis, some vesicular ? HSV, cxs neg Tdown               Medications:       acyclovir (ZOVIRAX) IV  5 mg/kg Intravenous Q12H     allopurinol  300 mg Oral Daily     calcium carbonate-vitamin D  1 tablet Oral BID w/meals     ceFEPIme (MAXIPIME) IV  2 g Intravenous Q12H     ferrous sulfate  325 mg Oral Daily with breakfast     filgrastim (NEUPOGEN/GRANIX) subcutaneous  300 mcg Subcutaneous Daily at 8 pm     fluconazole  200 mg Intravenous Q24H     loratadine  10 mg Oral Daily     multivitamin w/minerals  1 tablet Oral Daily     nystatin  500,000 Units Swish & Spit 4x Daily     polyethylene glycol  17 g Oral Daily     senna-docusate  1  "tablet Oral BID    Or     senna-docusate  2 tablet Oral BID     sodium chloride (PF)  3 mL Intracatheter Q8H     tamsulosin  0.4 mg Oral Daily     vancomycin (VANCOCIN) IV  1,000 mg Intravenous Q24H     cholecalciferol  25 mcg Oral BID                  Physical Exam:   Blood pressure 101/60, pulse 128, temperature 97.3  F (36.3  C), temperature source Axillary, resp. rate 18, height 1.549 m (5' 1\"), weight 45.5 kg (100 lb 6.4 oz), SpO2 99 %.  Wt Readings from Last 2 Encounters:   11/12/20 45.5 kg (100 lb 6.4 oz)   11/09/20 46.3 kg (102 lb)     Vital Signs with Ranges  Temp:  [97.3  F (36.3  C)-103.1  F (39.5  C)] 97.3  F (36.3  C)  Pulse:  [110-143] 128  Resp:  [18-28] 18  BP: ()/(55-77) 101/60  SpO2:  [97 %-99 %] 99 %    Constitutional: Awake, alert, cooperative, no apparent distress severe stomatitis tolips   Lungs: Clear to auscultation bilaterally, no crackles or wheezing   Cardiovascular: Regular rate and rhythm, normal S1 and S2, and no murmur noted   Abdomen: Normal bowel sounds, soft, non-distended, non-tender   Skin: No rashes, no cyanosis, no edema   Other:           Data:   All microbiology laboratory data reviewed.  Recent Labs   Lab Test 11/14/20 0628 11/13/20  1640 11/13/20  0715 11/12/20  1601   WBC 0.4*  --  0.1* 0.1*   HGB 6.7* 8.3* 6.6* 7.8*   HCT 22.3*  --  20.8* 24.3*   MCV 96  --  92 91   PLT 63*  --  63* 105*     Recent Labs   Lab Test 11/14/20  0628 11/13/20  0715 11/12/20  1601   CR 1.41* 1.30* 1.37*     No lab results found.  Recent Labs   Lab Test 11/12/20  1631 11/12/20  1620 11/12/20  1559 10/26/20  1803 10/26/20  1531 10/26/20  1454 10/19/20  1748 10/19/20  1648 10/19/20  1515   CULT No growth after 2 days No growth No growth after 2 days No growth No growth No growth No growth No growth No growth       "

## 2020-11-14 NOTE — PLAN OF CARE
End of Shift Summary  For vital signs and complete assessments, please see documentation flowsheets.     Pertinent assessments: Tmax 99.6 ax, heart rate tachy 110s, soft BPs. Lungs clear, on room air. Palafox with good output. Skin: darker pigment to lower abdomen with Dark lines on back and back of legs (old scratch marks?). Sores on lips, tongue white, possible thrush, mucositis. Magic mouthwash given on evenings.     Major Shift Events: Awake most of the night but quiet, AM loose dark black/green stool with clear drool and brown emesis. Palafox emptied for 650.     Treatment Plan: IV cefepime and vancomycin, IVF, supportive cares    Bedside Nurse: Destinee Martines RN

## 2020-11-15 NOTE — PROGRESS NOTES
[facility]    Hematology / Oncology Progress note     Date of Admission:  11/12/2020  Date of visit: 11\15\2020    Assessment & Plan   Attapeu NO JUSTICE Diallo is a 55 year old male who was admitted on 11/12/2020. I was asked to see the patient for neutropenic fevers    Neutropenic fevers while on chemotherapy  Mucositis  Pancytopenia on chemotherapy   Seminoma - locally advanced disease responding to curative intent chemotherapy  Developmental delay limiting his ability to express himself   Urinary retention    Von is under Dr Felix care for seminoma. They have been struggling with his current curative intent chemotherapy. He had a good radiographic response to his first cycle of BLEOMYCIN, ETOPOSIDE AND carboplatin (BEP) which was suboptimal since we could not use cisplatin. Despite use of carboplatin, he had an excellent response but had several hospital admission for neutropenic fevers.     He has mucositis and has not been able to eat or drink. He has been getting nystatin. I will also add magic mouthwash. He should do salt/baking sod swishes several times a day in addition to his nystatin and magic mouth wash.     Cisplatin is less myelosuppressive and I was hoping that he would not have infections with this cycle. I had him on ciprofloxacin 500 mg bid for prophylaxis. Despite this he has developed high grade fevers. We will seek input from infectious disease on best approach as he needs to complete chemotherapy to be cured of this highly curable cancer.     The other challenge is likely his renal dysfunction and liver dysfunction which might affect his drug levels. Most drugs are metabolized in liver or kidney. Cisplatin is an exception and is excreted in the active form via kidney - but impaired renal function could even cisplatin excretion.     Recommendations:  - Agree with broad spectrum antibiotics as current  - will add decadron solution S+S for comfort if available (has magic  "mouthwas and nystatin which he is refusing)  - Check daily CBC with diff   Transfuse for Hgb < 7 g/dl; Platelet < 50k (since fevers)  - GI bleed last night/ this AM. Appears stable now, keep platelets > 50k, suspect from mucositis.   - He got Neulasta on 11/6/20 and Dr Felix added neupogen during hospitalization, ANC improving and probably can discontinue neupogen tomorrow  - Consider infectious disease consult to have a preferred antibiotic after discharge to prevent neutropenic fevers despite severe neutropenia  - We will continue to follow.         Leonor Cleary MD        Today  Attapeu NO JUSTICE Diallo is a 55 year old male who presents with neutropenic fevers    Von has severe mouth sores with his current cycle of chemotherapy. He is refusing to use the magic mouth wash or nystatin as it is uncomfortable.  Overnight he had BRBPR and melena. This seems to have resolved. Was transfused 2 units red blood cells.    Past Medical History   I have reviewed this patient's medical history and updated it with pertinent information if needed.   Past Medical History:   Diagnosis Date     Mental retardation        Past Surgical History   I have reviewed this patient's surgical history and updated it with pertinent information if needed.  Past Surgical History:   Procedure Laterality Date     ABDOMEN SURGERY      sister states he had an \"abdominal surgery 20 years ago\" unknown      CYSTOSCOPY, RETROGRADES, INSERT STENT URETER(S), COMBINED Bilateral 9/24/2020    Procedure: Cystoscopy with bilateral retrograde pyelogram and bilateral ureteral stent insertiona-complex due to severe bilateral hydroureteronephrosis with ureteral tortuosity, examination under anesthesia and fluoroscopic interpretation > 1 hour physician time;  Surgeon: Ralf Chan MD;  Location:  OR     ESOPHAGOSCOPY, GASTROSCOPY, DUODENOSCOPY (EGD), COMBINED N/A 3/1/2019    Procedure: ESOPHAGOSCOPY, GASTROSCOPY, DUODENOSCOPY (EGD) Rm 226;  " Surgeon: Susy Camara MD;  Location:  GI       Prior to Admission Medications   Prior to Admission Medications   Prescriptions Last Dose Informant Patient Reported? Taking?   LORazepam (ATIVAN) 0.5 MG tablet prn  No Yes   Sig: Take 1 tablet (0.5 mg) by mouth every 4 hours as needed (Anxiety, Nausea/Vomiting or Sleep)   allopurinol (ZYLOPRIM) 300 MG tablet 11/12/2020 at am  No Yes   Sig: Take 1 tablet (300 mg) by mouth daily   calcium citrate-vitamin D (CITRACAL W/D) 250-100 MG-UNIT tablet 11/12/2020 at am  No Yes   Sig: Take 2 tablets by mouth 2 times daily (with meals)   ciprofloxacin (CIPRO) 500 MG tablet 11/12/2020 at am  No Yes   Sig: Take 1 tablet (500 mg) by mouth 2 times daily   ferrous sulfate (FEROSUL) 325 (65 Fe) MG tablet 11/12/2020 at am  No Yes   Sig: Take 1 tablet (325 mg) by mouth daily (with breakfast)   loratadine (CLARITIN) 10 MG tablet 11/12/2020 at am  Yes Yes   Sig: Take 10 mg by mouth daily    multivitamin w/minerals (THERA-VIT-M) tablet 11/12/2020 at am  Yes Yes   Sig: Take 1 tablet by mouth daily   prochlorperazine (COMPAZINE) 10 MG tablet prn  No Yes   Sig: Take 1 tablet (10 mg) by mouth every 6 hours as needed (Nausea/Vomiting)   tamsulosin (FLOMAX) 0.4 MG capsule 11/12/2020 at am  No Yes   Sig: Take 1 capsule (0.4 mg) by mouth daily      Facility-Administered Medications: None     Allergies   No Known Allergies    Social History   I have reviewed this patient's social history and updated it with pertinent information if needed. Attapeu NO JUSTICE Diallo  reports that he has never smoked. He has never used smokeless tobacco. He reports current alcohol use of about 2.0 standard drinks of alcohol per week. He reports that he does not use drugs.    Family History   I have reviewed this patient's family history and updated it with pertinent information if needed.   Family History   Problem Relation Age of Onset     Diabetes Father      Glaucoma No family hx of      Macular  Degeneration No family hx of        Review of Systems   The 10 point Review of Systems is negative other than noted in the HPI or here.      Physical Exam   Temp: 98.5  F (36.9  C) Temp src: Axillary BP: 107/65 Pulse: 126   Resp: 22 SpO2: 99 % O2 Device: None (Room air)      Data   Recent Labs   Lab Test 11/15/20  0256 11/14/20  0628 11/13/20  0715 11/12/20  1601 11/09/20  0912   * 142 140 137 140   POTASSIUM 3.3* 4.2 3.8 3.8 3.8   CHLORIDE 121* 116* 110* 105 110*   CO2 17* 19* 24 22 21   ANIONGAP 10 7 6 10 9   BUN 39* 31* 26 27 37*   CR 1.76* 1.41* 1.30* 1.37* 1.28*   * 151* 111* 130* 204*   IVANIA 5.6* 5.4* 5.9* 6.8* 6.0*     Recent Labs   Lab Test 11/15/20  0256 11/13/20  0715 11/12/20  1848 11/02/20  0845 10/31/20  0600 10/28/20  1420 10/28/20  1420 10/03/20  0703 10/03/20  0703 09/28/20  0650 09/28/20  0650 09/27/20  0719 09/26/20  0756   MAG 1.5* 1.8 1.3* 2.0 1.9   < >  --    < >  --    < > 1.7  --   --    PHOS  --   --   --   --   --   --  3.1  --  4.3  --  3.1 2.9 3.2    < > = values in this interval not displayed.     Recent Labs   Lab Test 11/15/20  0256 11/14/20  1737 11/14/20  0628 11/13/20  1640 11/13/20  0715 11/12/20  1601 11/09/20  0912 11/02/20  0845 10/27/20  0400 10/27/20  0400 10/26/20  1453   WBC 1.1*  --  0.4*  --  0.1* 0.1* 29.3* 25.8*   < > 27.2* 35.9*   HGB 7.3* 6.3* 6.7* 8.3* 6.6* 7.8* 5.7* 8.7*   < > 6.7* 7.6*   PLT 54*  --  63*  --  63* 105* 449 394   < > 655* 832*   MCV 93  --  96  --  92 91 95 93   < > 94 97   NEUTROPHIL 68.0  --   --   --   --   --  97.0 71.0  --  80.0 81.0    < > = values in this interval not displayed.     Recent Labs   Lab Test 11/15/20  0256 11/14/20  0628 11/12/20  1601 10/26/20  0820 10/26/20  0820 09/24/20  0601 09/24/20  0601   BILITOTAL 0.3 0.4 0.4   < > 0.2   < >  --    ALKPHOS 290* 443* 635*   < > 262*   < >  --    ALT 32 38 59   < > 33   < >  --    AST 20 20 25   < > 28   < >  --    ALBUMIN 1.4* 1.5* 2.4*   < > 2.7*   < >  --    LDH  --   --   --    --  578*  --  710*    < > = values in this interval not displayed.     TSH   Date Value Ref Range Status   08/13/2017 0.75 0.40 - 4.00 mU/L Final     Recent Labs   Lab Test 09/25/20  0705   CEA 0.5     Results for orders placed or performed during the hospital encounter of 11/12/20   XR Chest Port 1 View    Narrative    XR CHEST PORT 1 VW 11/12/2020 4:48 PM    HISTORY: sob. History testicular cancer.     COMPARISON: 10/27/2020. 10/26/2020 chest CT.      Impression    IMPRESSION: Low lung volumes with mild bilateral lung base atelectasis  versus infiltrate with possible small pleural effusions. No overt  pulmonary edema. Increased mediastinal widening/hilar enlargement may  be due to low lung volumes but progression of known thoracic  lymphadenopathy not excluded.    YEMI DIAZ MD

## 2020-11-15 NOTE — CONSULTS
Consult Date:  11/15/2020      GASTROINTESTINAL (GI) CONSULTATION      HISTORY OF PRESENT ILLNESS:  The patient is a 55-year-old gentleman from [facility] who has been treated for metastatic seminoma.  He was getting bleomycin, etoposide and cisplatin.  He was admitted on 11/12 because of fever and chills.  His last chemotherapy was last Monday, which was approximately 6 days ago.  His hospital course has been notable for febrile neutropenia with an absolute white count as low as 100 white blood cells.  Currently, this has been improving and today, his absolute neutrophil count is approximately 600.  I should add this is the first day that his absolute neutrophil count has been greater than 500.  Other significant laboratory includes a platelet count of 54,000.  He is chronically anemic.  Hemoglobin today is 7.3.  MCV is 93.  He has rather marked mucositis and has been refusing to eat anything due to the discomfort.  Last night, he was noted to have significant GI bleeding, reportedly with black and red blood per rectum.  He received 3 units of packed cells.  Hemoglobin dropped as low as 6.3 and has improved today to 7.3.  He just had a watery bowel movement, which I examined, and it looks completely melanotic with no evidence of red blood.  Impression of the Hospitalist was that his bleeding has stabilized at this point in time.      PAST MEDICAL HISTORY:  Notable for some unknown abdominal surgery 20 years ago, history of cystoscopy and ureteral stents placed in 09/2020.  He did have an upper endoscopy about a year ago by one of my partners for iron-deficiency anemia with nonbleeding gastric ulcers, as well as multiple nonbleeding duodenal ulcers identified and a large hiatal hernia.      CURRENT MEDICATIONS PRIOR TO ADMISSION:  Include lorazepam, allopurinol, calcium, ciprofloxacin, ferrous sulfate, loratadine, Compazine and tamsulosin.  In addition, he is on antibiotics here in the hospital with cefepime and  vancomycin.      ALLERGIES:  NO KNOWN ALLERGIES.      SOCIAL HISTORY:  He does have developmental delay.  Most of his history is obtained through his sister.  He does not smoke.  Alcohol intakes is about 2 alcoholic drinks a week or less.        FAMILY HISTORY:  No family history notable for diabetes in his father.      REVIEW OF SYSTEMS:  Could not be obtained today but was reportedly negative by the hospitalist.      PHYSICAL EXAMINATION:   GENERAL:  A gentleman resting quietly in bed with obvious erosive ulcerative lesions on his lips.   VITAL SIGNS:  Current temperature is 97.9.  He was as high as 101.5 yesterday around 8:00 p.m.  Pulse 115, blood pressure 102/64, respirations 22.   HEENT:  Pupils round and reactive to light.   COR:  Normal S1, S2.   CHEST:  He has some inspiratory coarse rales at both bases posteriorly.   ABDOMEN:  Benign.  Bowel sounds are present.  I cannot appreciate hepatosplenomegaly, but there is some level of guarding.   EXTREMITIES:  Without edema.  He does have a diffuse macular rash over both lower extremities.  I did not have much conversation, but he does seem to follow commands readily.      LABORATORY DATA:  As noted above.  He does have some elevation in liver function tests.  Alkaline phosphatase is 290, ALT 32, AST 20, total bilirubin 0.3.  This is markedly decreased from 11/02.  At that time he had an alkaline phosphatase of 1492, , .      ASSESSMENT:  Gastrointestinal bleeding in this gentleman with a history of seminoma, febrile neutropenia on chemotherapy.  I believe the liver tests are likely related to his chemotherapy, and both clindamycin and etoposide have significant hepatotoxicity associated with their use.  This seems to be improving at the present time.  Uncertain if this is upper GI or lower GI in nature, but the increase in his BUN and creatinine, as well as a black appearance of feces noted today is more suggestive for upper GI source.  Suspect  that this is secondary to mucositis from his chemotherapy.  He does appear marginally stable at this point in time, and I am reluctant to proceed with upper endoscopy, both in view of the discomfort associated with his mucositis, as well as his low white blood cell count.      RECOMMENDATIONS:   1.  Restart pantoprazole 40 mg IV q.12 hours.   2.  Would consider Carafate, but I do not believe he can tolerate anything orally yet.   3.  We will hold on tagged red cell scan for now.   4.  I believe that upper endoscopy would be warranted if bleeding continues over the next 1-2 days.  Obviously, this can be done at any time for life-threatening bleeding but would prefer to avoid it if possible.         FREDA JACOB MD             D: 11/15/2020   T: 11/15/2020   MT: [email or web address]      Name:     RAJWINDER OSULLIVAN   MRN:      [id number]        Account:       [id number]   :      1965           Consult Date:  11/15/2020      Document: [id number]

## 2020-11-15 NOTE — PLAN OF CARE
Pertinent assessments: Soft Bps. Tachycardic. Febrile at times. Room air. Denies pain. Mouth swollen, cracked, open sores. PRN Rincinol given. Emesis x2, PRN Zofran given. Palafox with adequate output. Barrier cream to groin.     Major Shift Event: 1 unit PRBC transfused at 2000. Around 0300 Pt began to have large amount of blood clots and blood expel from rectum, RRT was called - 2nd unit of PRBC transfusing. IV Calcium and Bolus infused. Vitals remain steady. Will continue to monitor.     Treatment: Monitor Hgb and blood loss. Transfuse as needed. Vanco and cefepime. Replace calcium. Mag protocol. Nystatin for mouth.     Bedside Nurse: Manjula Worthy RN

## 2020-11-15 NOTE — PROVIDER NOTIFICATION
MD paged:  Just wanted to touch base prior to giving blood. Pt does have temp 101.7, , Soft BP, and 1 episode of emesis.     Callback received: Will pre-medicate with antiemetic and Benadryl. Will give blood and then give Lasix half-way through transfusion.

## 2020-11-15 NOTE — PROVIDER NOTIFICATION
MD made aware of critical lab value, hemoglobin 6.3    Will continue to monitor and await orders     1 unit PRBC ordered per Dr. Castillo

## 2020-11-15 NOTE — PLAN OF CARE
End of Shift Summary  For vital signs and complete assessments, please see documentation flowsheets.     Pertinent assessments:Palafox with good output. Skin: darker pigment to lower abdomen with Dark lines on back and back of legs (old scratch marks?). Sores on lips, tongue white, sore , swollen --unable to eat , drink or swallow pills ----    Major Shift Event: triggered sepsis, lactate 1.3. HGB 6.3 1 unit of PRBC. Pt is neutropenic    Lasix ordered benadryl avaialable due to previous reactions., --  Treatment: acyclovir, maxipime, diflucan and vanco.       Treatment Plan: supportive cares---yanker suction at bedside ---    Bedside Nurse:Vandana Farrell RN

## 2020-11-15 NOTE — PROGRESS NOTES
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  Patient is Laotian speaking and also has developmental delay and a difficult to obtain history from him.  History was obtained from his sister. Patient is on chemotherapy for seminoma of the testicle.  Patient received chemotherapy on Monday.  He was also transfused blood on Monday.  Patient had reaction on second unit of blood after 30 minutes of transfusion.  On Tuesday patient went to the clinic to get IV fluids.  He started to have fever and chills last night.  He had follow-up with urology today and had voiding trial and removal of Palafox catheter.  Patient was sent to emergency room because of his fever and chills.  In the ER, he was noted to be febrile and tachycardic.  Laboratory work-up showed normal electrolytes.  Creatinine 1.37.  , ALT 59, AST 25, lactic acid 1.0.  WBC 0.1 differential count not done due to low WBC below 0.5.  Chest x-ray showed mild bilateral lung base atelectasis or infiltrate.  He was given IV fluids, IV cefepime and vancomycin emergency room.  He was admitted for further management.        Patient progress during stay:    Patient was continued with intravenous antibiotic and was closely monitored.  Both hematology oncology as well as infectious history was consulted for further evaluation recommendations.  He was transfused a unit of blood on  November 13, 2020 for severe anemia with hemoglobin of 6.6.    He was treated with nystatin as well as Magic mouthwash for mucositis.  Patient developed Lower GI bleed on November 14, 2020 with episodes of bloody stool.  He required additional blood transfusions.    Mucositis remained a problem with decreased intake due to mouth sores.  Patient was encouraged to use the Magic mouthwash as well as nystatin.  Oncologist recommended Decadron topical mouth  solution as well.                   Problem List with Assessment and Plan      1. Neutropenic fever    Patient on broad-spectrum antibiotic with cefepime and vancomycin    Afebrile this morning with temperature 98.5 but she remains tachycardic.  Blood pressure is stable    Still febrile, pancytopenic and tachycardic    Blood culture and urine culture remain negative to date.    HSV 1 and 2 DNA by PCR is pending.    Infectious disease, hematology oncology team following.    Continue current antibiotic, monitor cultures and await further recommendation by ID and hematology oncology service.    2. Pancytopenia secondary to chemotherapy    Severe anemia with hemoglobin 6.6 on November 13, 2020.     Patient required 3 units of blood thus far due to severe anemia and lower GI bleed.     Continue to monitor hemoglobin, conditional orders placed to transfuse for hemoglobin of 7 or less.         Leukopenia with neutropenia, monitor counts, hematology oncology following    WBC count improving with WBC count 1.1 this morning, received Neulasta on November 6 and recommended additional Neupogen during the stay daily until absolute neutrophil count is over 500 for 2 days or over 1500 per oncology service.    Thrombocytopenia:     Platelet is slightly down at 54 this morning from 63 yesterday.      Hematology oncologist following and care plan discussed the need for platelet transfusion.    Continue to monitor platelets    Transfuse for platelet count of less than 20          3. Mucositis secondary to chemotherapy: This is a major issue for him which decreased intake, discomfort, cough.      Nystatin swish and spit, Magic mouthwash ordered, patient encouraged to use it    Topical steroid recommended and ordered     continue to monitor    4.  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  Acute kidney injury on chronic kidney disease:    Serum creatinine is at 1.76 this morning up from 1.41 yesterday.  This is likely from decreased intake due to mucositis.    Continue IV fluid including LR at 125 mL an hour and D5 at 25 cc an hour.    Avoid nephrotoxic medications.  No NSAIDs due to GI bleed and acute kidney injury as well.    As needed oxycodone solution was ordered for pain control in addition to Tylenol.    6.  Hypocalcemia:     Serum calcium of 5.9 with ionized calcium of 3.4 on November 13 and a gram of calcium gluconate was given.    Serum calcium is 5.4 and ionized calcium is 2.9 morning of November 14 and additional 1 g of calcium gluconate was administered.      This morning ionized calcium is 3.3, may not need IV calcium gluconate but will replace orally.    Continue to monitor ionized calcium.    7.  Urinary retention, acute continue intermittent catheterization for now.  Avoid Palafox catheter    8.  Lower GI bleed: 2 episodes of bloody stool last night and dark stool this morning    Patient needed blood transfusion x2 last night.    GI bleed could be from mucosal lesions from chemotherapy but underlying other etiologies cannot be excluded at this time.    Will get GI team to see patient for further recommendations.            Plan for today :    Encourage oral feeding, full liquid diet for now.  Nutrition service consulted to assist nutrition needs    Continue IV fluids    Consult GI    Monitor hemoglobin, platelets and transfuse as needed    Pain medications, encouraged to use oral topical solutions        LDA     Access :  "Peripheral    Palafox Catheter: in place, indication: Retention        FEN :    Orders Placed This Encounter      Full Liquid Diet           Intake/Output Summary (Last 24 hours) at 11/13/2020 1605  Last data filed at 11/13/2020 1457  Gross per 24 hour   Intake 2281 ml   Output 1775 ml   Net 506 ml          DVT Prophylaxis: Pneumatic Compression Devices    Code Status:  Full Code    Estimated discharge  disposition and plan: Unable to determine at this time.  May need additional 2 more days in the hospital        Interval History (Subjective):        Patient is seen and examined by me today and medical record reviewed.Overnight events noted and care discussed with nursing staff.  Patient was not doing well overnight.  2 episodes of bloody bowel movements noted and RRT called.  Patient was given 2 units of blood overnight.  He remained tachycardic but no fevers morning.  Patient sister is at bedside and she is assisting with encouraging him to follow directions and use of topical nystatin and Magic  Mouthwash.                  Physical Exam:        Last Vital Signs:  /65   Pulse 126   Temp 98.5  F (36.9  C) (Axillary)   Resp 22   Ht 1.549 m (5' 1\")   Wt 45.5 kg (100 lb 6.4 oz)   SpO2 99%   BMI 18.97 kg/m        Intake/Output Summary (Last 24 hours) at 11/13/2020 1620  Last data filed at 11/13/2020 1457  Gross per 24 hour   Intake 2281 ml   Output 1775 ml   Net 506 ml       Wt Readings from Last 5 Encounters:   11/12/20 45.5 kg (100 lb 6.4 oz)   11/09/20 46.3 kg (102 lb)   11/06/20 51.2 kg (112 lb 14.4 oz)   11/02/20 46.3 kg (102 lb)   11/01/20 47.9 kg (105 lb 11.2 oz)        Constitutional: Awake, alert, cooperative, no apparent distress     Respiratory: Clear to auscultation bilaterally, no crackles or wheezing   Cardiovascular: Regular rate and rhythm, normal S1 and S2, and no murmur noted   Abdomen: Normal bowel sounds, soft, non-distended, non-tender   Skin: No rashes, no cyanosis, dry to touch "   Neuro: Alert with  no weakness   Extremities: No edema, normal range of motion   Other(s):  Oral thrush and diffuse mouth lesions.  Patient appears depressed with no acute distress.       All other systems: Negative          Medications:        All current medications were reviewed with changes reflected in problem list.         Data:      All new lab and imaging data was reviewed.      Data reviewed today: I reviewed all new labs and imaging results over the last 24 hours. I personally reviewed no images or EKG's today      Recent Labs   Lab 11/15/20  0256 11/14/20  1737 11/14/20  0628 11/13/20  0715 11/13/20  0715   WBC 1.1*  --  0.4*  --  0.1*   HGB 7.3* 6.3* 6.7*   < > 6.6*   HCT 23.1*  --  22.3*  --  20.8*   MCV 93  --  96  --  92   PLT 54*  --  63*  --  63*    < > = values in this interval not displayed.     Recent Labs   Lab 11/12/20  1631 11/12/20  1620 11/12/20  1559   CULT No growth after 3 days No growth No growth after 3 days     Recent Labs   Lab 11/15/20  0256 11/14/20  0628 11/13/20  0715 11/12/20  1848 11/12/20  1601   * 142 140  --  137   POTASSIUM 3.3* 4.2 3.8  --  3.8   CHLORIDE 121* 116* 110*  --  105   CO2 17* 19* 24  --  22   ANIONGAP 10 7 6  --  10   * 151* 111*  --  130*   BUN 39* 31* 26  --  27   CR 1.76* 1.41* 1.30*  --  1.37*   GFRESTIMATED 43* 56* 62  --  58*   GFRESTBLACK 50* 65 72  --  68   IVANIA 5.6* 5.4* 5.9*  --  6.8*   MAG 1.5*  --  1.8 1.3*  --    PROTTOTAL 4.7* 5.3*  --   --  6.8   ALBUMIN 1.4* 1.5*  --   --  2.4*   BILITOTAL 0.3 0.4  --   --  0.4   ALKPHOS 290* 443*  --   --  635*   AST 20 20  --   --  25   ALT 32 38  --   --  59       Recent Labs   Lab 11/15/20  0256 11/14/20  0628 11/13/20  0715 11/12/20  1601 11/09/20  0912   * 151* 111* 130* 204*       Recent Labs   Lab 11/12/20  1601   INR 1.00         No results for input(s): TROPONIN, TROPI, TROPR in the last 168 hours.    Invalid input(s): TROP, TROPONINIES    Recent Results (from the past 48 hour(s))    XR Chest Port 1 View    Narrative    XR CHEST PORT 1 VW 11/12/2020 4:48 PM    HISTORY: sob. History testicular cancer.     COMPARISON: 10/27/2020. 10/26/2020 chest CT.      Impression    IMPRESSION: Low lung volumes with mild bilateral lung base atelectasis  versus infiltrate with possible small pleural effusions. No overt  pulmonary edema. Increased mediastinal widening/hilar enlargement may  be due to low lung volumes but progression of known thoracic  lymphadenopathy not excluded.    YEMI DIAZ MD       COVID Status:  COVID-19 PCR Results    COVID-19 PCR Results 9/24/20 9/24/20 10/9/20 10/9/20 10/19/20 10/26/20 10/26/20 11/12/20 11/12/20    0229 0229 2228 2228  1531 1531 1631 1631   COVID-19 Virus PCR to [facility] - Result Test received-See reflex to IDDL test SARS CoV2 (COVID-19) Virus RT-PCR  Test received-See reflex to IDDL test SARS CoV2 (COVID-19) Virus RT-PCR  Not Detected Test received-See reflex to IDDL test SARS CoV2 (COVID-19) Virus RT-PCR  Test received-See reflex to IDDL test SARS CoV2 (COVID-19) Virus RT-PCR    COVID-19 Virus PCR to [facility] - Source Nasopharyngeal  Nasopharyngeal  Nasopharyngeal Nasopharyngeal  Nasopharyngeal    SARS-CoV-2 Virus Specimen Source  Nasopharyngeal  Nasopharyngeal   Nasopharyngeal  Nasopharyngeal   SARS-CoV-2 PCR Result  NEGATIVE  NEGATIVE   NEGATIVE  NEGATIVE      Comments are available for some flowsheets but are not being displayed.         COVID-19 Antibody Results, Testing for Immunity    COVID-19 Antibody Results, Testing for Immunity   No data to display.              Disclaimer: This note consists of symbols derived from keyboarding, dictation and/or voice recognition software. As a result, there may be errors in the script that have gone undetected. Please consider this when interpreting information found in this chart.

## 2020-11-15 NOTE — PROGRESS NOTES
Cross Cover Note    Notified that patient has hemoglobin of 6.3.  He is here for neutropenic fever.  He did receive 1 unit PRBC earlier today.  His transfusion goal is for his hemoglobin to be 7 or higher.  1 unit PRBCs has been ordered.  He does have as needed Benadryl ordered as well.    Evelyn Castillo MD

## 2020-11-15 NOTE — PROGRESS NOTES
[facility]  Infectious Disease Progress Note          Assessment and Plan:   IMPRESSION:   1.  A 55-year-old male, well known to me from recent admission, now admitted with acute fever and profound neutropenia from chemotherapy, no clear focal infection, mild respiratory symptoms and recent urosepsis as possible sources, but unclear.   2.  Seminoma with recent chemotherapy, now profoundly neutropenic.   3.  Recent episode of urosepsis with Klebsiella and then subsequent episode of sepsis resolved with oral Cipro without clearcut infection being found.   4.  Latent tuberculosis, recent new diagnosis.  No evidence of active tuberculosis.   5.  COVID-19 rule out, negative times several.   6.  Slight hypoxia, minimal chest x-ray abnormality.   7.  Renal insufficiency.   8 Severe stomatitis, ? Chemo vs HSV     RECOMMENDATIONS:   1.  Contunue broad antibiotics currently.  Await cultures and readjust. So far neg ? T down  2.  Follow neutropenia, focal symptoms and cultures and readjust.  Hematology following.   3.  acycolvir, flucon IV for stomatitis, swab for HSV, topical mouth txs   4.  Initiate latent TB treatment when he has recovered from all of this.  Discussed in detail pt ands sister   WBC rising so hopefully fever will resolve with that             Interval History:   no new complaints can not swallow, stomatitis, some vesicular ? HSV, cxs neg Tdown               Medications:       acyclovir (ZOVIRAX) IV  5 mg/kg Intravenous Q12H     allopurinol  300 mg Oral Daily     calcium carbonate-vitamin D  1 tablet Oral BID w/meals     ceFEPIme (MAXIPIME) IV  2 g Intravenous Q12H     dexamethasone  0.5 mg Oral Q8H     ferrous sulfate  325 mg Oral Daily with breakfast     filgrastim (NEUPOGEN/GRANIX) subcutaneous  300 mcg Subcutaneous Daily at 8 pm     fluconazole  200 mg Intravenous Q24H     loratadine  10 mg Oral Daily     multivitamin w/minerals  1 tablet Oral Daily     nystatin  500,000  "Units Swish & Spit 4x Daily     polyethylene glycol  17 g Oral Daily     senna-docusate  1 tablet Oral BID    Or     senna-docusate  2 tablet Oral BID     sodium chloride (PF)  3 mL Intracatheter Q8H     tamsulosin  0.4 mg Oral Daily     vancomycin (VANCOCIN) IV  1,000 mg Intravenous Q24H     cholecalciferol  25 mcg Oral BID                  Physical Exam:   Blood pressure 102/64, pulse 115, temperature 97.9  F (36.6  C), temperature source Axillary, resp. rate 22, height 1.549 m (5' 1\"), weight 45.5 kg (100 lb 6.4 oz), SpO2 98 %.  Wt Readings from Last 2 Encounters:   11/12/20 45.5 kg (100 lb 6.4 oz)   11/09/20 46.3 kg (102 lb)     Vital Signs with Ranges  Temp:  [97.9  F (36.6  C)-101.7  F (38.7  C)] 97.9  F (36.6  C)  Pulse:  [106-135] 115  Resp:  [20-24] 22  BP: ()/(56-70) 102/64  SpO2:  [98 %-100 %] 98 %    Constitutional: Awake, alert, cooperative, no apparent distress severe stomatitis tolips   Lungs: Clear to auscultation bilaterally, no crackles or wheezing   Cardiovascular: Regular rate and rhythm, normal S1 and S2, and no murmur noted   Abdomen: Normal bowel sounds, soft, non-distended, non-tender   Skin: No rashes, no cyanosis, no edema   Other:           Data:   All microbiology laboratory data reviewed.  Recent Labs   Lab Test 11/15/20  0256 11/14/20  1737 11/14/20  0628 11/13/20  0715 11/13/20  0715   WBC 1.1*  --  0.4*  --  0.1*   HGB 7.3* 6.3* 6.7*   < > 6.6*   HCT 23.1*  --  22.3*  --  20.8*   MCV 93  --  96  --  92   PLT 54*  --  63*  --  63*    < > = values in this interval not displayed.     Recent Labs   Lab Test 11/15/20  0256 11/14/20  0628 11/13/20  0715   CR 1.76* 1.41* 1.30*     No lab results found.  Recent Labs   Lab Test 11/12/20  1631 11/12/20  1620 11/12/20  1559 10/26/20  1803 10/26/20  1531 10/26/20  1454 10/19/20  1748 10/19/20  1648 10/19/20  1515   CULT No growth after 3 days No growth No growth after 3 days No growth No growth No growth No growth No growth No growth       "

## 2020-11-16 NOTE — PROGRESS NOTES
[facility]    Hematology / Oncology Progress note     Date of Admission:  11/12/2020  Date of visit: November 16, 2020     Assessment & Plan   Attapeu NO JUSTICE Diallo is a 55 year old male who was admitted on 11/12/2020. 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 They have been struggling with his current curative intent chemotherapy. He had a good radiographic response to his first cycle of  BEP which was suboptimal since we could not use cisplatin. Despite use of carboplatin, he had an excellent response but had several hospital admission for neutropenic fevers.     He has mucositis and has not been able to eat or drink. His mucositis is improving as expected with improvement in his white cell counts.     He has been able to take in liquids today. He was in the room with his sister. I explained her our dilemma of treating with curative intent in a highly curable disease with therapy which would be otherwise fatal. Most patients do not have difficulty tolerating this regimen but he is having more problems than any of my previous patients. We could try and complete as much of therapy as we can. Aureliano would like to keep going with curative intent chemotherapy. We would be more cautious with next cycle and I will dose reduce his chemotherapy by 20%.      Recommendations:  - Agree with broad spectrum antibiotics as current   We will follow ID recommendations on this  - Check daily CBC with diff   Transfuse for Hgb < 7 g/dl; Platelet < 50k (since fevers)  - GI bleed. Appears stable now, keep platelets > 50k, suspect from mucositis.   - We will continue to follow.       Over 35 min of time spent with more  "than 50% time spent in counseling and coordinating care.      Jarvis Ramos MD        Today  Attapeu NO JUSTICE Diallo is a 55 year old male who presents with neutropenic fevers    Von has severe mouth sores with his current cycle of chemotherapy. He denies any further bleed. He is doing better as compared to yesterday. He continues to spike fevers.   Temp (24hrs), Av.7  F (37.6  C), Min:98.9  F (37.2  C), Max:101.2  F (38.4  C)      Past Medical History   I have reviewed this patient's medical history and updated it with pertinent information if needed.   Past Medical History:   Diagnosis Date     Mental retardation        Past Surgical History   I have reviewed this patient's surgical history and updated it with pertinent information if needed.  Past Surgical History:   Procedure Laterality Date     ABDOMEN SURGERY      sister states he had an \"abdominal surgery 20 years ago\" unknown      CYSTOSCOPY, RETROGRADES, INSERT STENT URETER(S), COMBINED Bilateral 2020    Procedure: Cystoscopy with bilateral retrograde pyelogram and bilateral ureteral stent insertiona-complex due to severe bilateral hydroureteronephrosis with ureteral tortuosity, examination under anesthesia and fluoroscopic interpretation > 1 hour physician time;  Surgeon: Ralf Chan MD;  Location:  OR     ESOPHAGOSCOPY, GASTROSCOPY, DUODENOSCOPY (EGD), COMBINED N/A 3/1/2019    Procedure: ESOPHAGOSCOPY, GASTROSCOPY, DUODENOSCOPY (EGD) Rm 226;  Surgeon: Susy Camara MD;  Location:  GI       Prior to Admission Medications   Prior to Admission Medications   Prescriptions Last Dose Informant Patient Reported? Taking?   LORazepam (ATIVAN) 0.5 MG tablet prn  No Yes   Sig: Take 1 tablet (0.5 mg) by mouth every 4 hours as needed (Anxiety, Nausea/Vomiting or Sleep)   allopurinol (ZYLOPRIM) 300 MG tablet 2020 at am  No Yes   Sig: Take 1 tablet (300 mg) by mouth daily   calcium citrate-vitamin D (CITRACAL W/D) 250-100 " MG-UNIT tablet 11/12/2020 at am  No Yes   Sig: Take 2 tablets by mouth 2 times daily (with meals)   ciprofloxacin (CIPRO) 500 MG tablet 11/12/2020 at am  No Yes   Sig: Take 1 tablet (500 mg) by mouth 2 times daily   ferrous sulfate (FEROSUL) 325 (65 Fe) MG tablet 11/12/2020 at am  No Yes   Sig: Take 1 tablet (325 mg) by mouth daily (with breakfast)   loratadine (CLARITIN) 10 MG tablet 11/12/2020 at am  Yes Yes   Sig: Take 10 mg by mouth daily    multivitamin w/minerals (THERA-VIT-M) tablet 11/12/2020 at am  Yes Yes   Sig: Take 1 tablet by mouth daily   prochlorperazine (COMPAZINE) 10 MG tablet prn  No Yes   Sig: Take 1 tablet (10 mg) by mouth every 6 hours as needed (Nausea/Vomiting)   tamsulosin (FLOMAX) 0.4 MG capsule 11/12/2020 at am  No Yes   Sig: Take 1 capsule (0.4 mg) by mouth daily      Facility-Administered Medications: None     Allergies   No Known Allergies    Social History   I have reviewed this patient's social history and updated it with pertinent information if needed. Attapeu NO JUSTICE Diallo  reports that he has never smoked. He has never used smokeless tobacco. He reports current alcohol use of about 2.0 standard drinks of alcohol per week. He reports that he does not use drugs.    Family History   I have reviewed this patient's family history and updated it with pertinent information if needed.   Family History   Problem Relation Age of Onset     Diabetes Father      Glaucoma No family hx of      Macular Degeneration No family hx of        Review of Systems   The 10 point Review of Systems is negative other than noted in the HPI or here.      Physical Exam   Temp: 98.9  F (37.2  C) Temp src: Axillary BP: 97/53 Pulse: 101   Resp: 24 SpO2: 97 % O2 Device: None (Room air)      Data   Recent Labs   Lab Test 11/16/20  0840 11/16/20  0715 11/15/20  0256 11/14/20  0628 11/13/20  0715 11/12/20  1601   NA  --  146* 148* 142 140 137   POTASSIUM 3.1* 3.1* 3.3* 4.2 3.8 3.8   CHLORIDE  --  119* 121* 116*  110* 105   CO2  --  19* 17* 19* 24 22   ANIONGAP  --  8 10 7 6 10   BUN  --  39* 39* 31* 26 27   CR  --  1.70* 1.76* 1.41* 1.30* 1.37*   GLC  --  119* 147* 151* 111* 130*   IVANIA  --  6.1* 5.6* 5.4* 5.9* 6.8*     Recent Labs   Lab Test 11/16/20  1114 11/16/20  0840 11/16/20  0715 11/15/20  0256 11/13/20  0715 11/12/20  1848 11/02/20  0845 10/28/20  1420 10/28/20  1420 10/03/20  0703 10/03/20  0703 09/28/20  0650 09/28/20  0650   MAG  --   --  1.9 1.5* 1.8 1.3* 2.0   < >  --    < >  --    < > 1.7   PHOS 1.6* 1.6*  --   --   --   --   --   --  3.1  --  4.3  --  3.1    < > = values in this interval not displayed.     Recent Labs   Lab Test 11/16/20  0840 11/16/20  0715 11/16/20  0320 11/15/20  1832 11/15/20  1418 11/15/20  0256 11/14/20  0628 11/14/20  0628 11/13/20  0715 11/13/20  0715 11/12/20  1601 11/09/20  0912 11/02/20  0845 10/27/20  0400 10/27/20  0400   WBC 2.9*  --   --   --   --  1.1*  --  0.4*  --  0.1* 0.1* 29.3* 25.8*   < > 27.2*   HGB 7.0* 7.1* 7.4* 6.2* 7.0* 7.3*   < > 6.7*   < > 6.6* 7.8* 5.7* 8.7*   < > 6.7*   PLT 58* 59* 56*  --  58* 54*  --  63*  --  63* 105* 449 394   < > 655*   MCV 95  --   --   --   --  93  --  96  --  92 91 95 93   < > 94   NEUTROPHIL 90.0  --   --   --   --  68.0  --   --   --   --   --  97.0 71.0  --  80.0    < > = values in this interval not displayed.     Recent Labs   Lab Test 11/16/20  0715 11/15/20  0256 11/14/20  0628 10/26/20  0820 10/26/20  0820 09/24/20  0601 09/24/20  0601   BILITOTAL 0.2 0.3 0.4   < > 0.2   < >  --    ALKPHOS 205* 290* 443*   < > 262*   < >  --    ALT 33 32 38   < > 33   < >  --    AST 20 20 20   < > 28   < >  --    ALBUMIN 1.4* 1.4* 1.5*   < > 2.7*   < >  --    LDH  --   --   --   --  578*  --  710*    < > = values in this interval not displayed.     TSH   Date Value Ref Range Status   08/13/2017 0.75 0.40 - 4.00 mU/L Final     Recent Labs   Lab Test 09/25/20  0705   CEA 0.5     Results for orders placed or performed during the hospital encounter of  11/12/20   XR Chest Port 1 View    Narrative    XR CHEST PORT 1 VW 11/12/2020 4:48 PM    HISTORY: sob. History testicular cancer.     COMPARISON: 10/27/2020. 10/26/2020 chest CT.      Impression    IMPRESSION: Low lung volumes with mild bilateral lung base atelectasis  versus infiltrate with possible small pleural effusions. No overt  pulmonary edema. Increased mediastinal widening/hilar enlargement may  be due to low lung volumes but progression of known thoracic  lymphadenopathy not excluded.    YEMI DIAZ MD

## 2020-11-16 NOTE — PLAN OF CARE
Pertinent assessments: Soft Bps. Tachycardic. Room air. Denies pain. Lip swollen, cracked, open sores. Generalized rash. PRN Zofran given for emesis. 4 large black frothy stools. Palafox patent. Barrier cream to groin.   Major Shift Event:1 unit PRBC transfused, Hgb recheck 7.4.   Treatment: Monitor lab and vitals. Vanco and cefepime. IVF.   Bedside Nurse: Manjula Worthy RN

## 2020-11-16 NOTE — PROGRESS NOTES
"GASTROENTEROLOGY PROGRESS NOTE        SUBJECTIVE:  History per sister. No abdominal pain. Reports of dark stool. He tried oatmeal this morning only able to eat small amount.     OBJECTIVE:    /65 (BP Location: Right arm)   Pulse 101   Temp 98.3  F (36.8  C) (Axillary)   Resp 20   Ht 1.549 m (5' 1\")   Wt 45.5 kg (100 lb 6.4 oz)   SpO2 100%   BMI 18.97 kg/m    Temp (24hrs), Av  F (37.2  C), Min:97.9  F (36.6  C), Max:100.1  F (37.8  C)    No data found.    Intake/Output Summary (Last 24 hours) at 2020 1110  Last data filed at 2020 0556  Gross per 24 hour   Intake 1160 ml   Output 1875 ml   Net -715 ml        PHYSICAL EXAM     Constitutional: Resting, swollen lips  Abdomen: soft, NTTP         Additional Comments:  ROS, FH, SH: See initial GI consult for details.     I have reviewed the patient's new clinical lab results:     Recent Labs   Lab Test 20  0840 20  0715 20  0320 11/15/20  0256 11/15/20  0256 20  0628 20  0628 20  1601 20  1601 10/26/20  1453 10/26/20  1453 10/19/20  1649 10/19/20  1649   WBC 2.9*  --   --   --  1.1*  --  0.4*   < > 0.1*   < > 35.9*   < > 68.2*   HGB 7.0* 7.1* 7.4*   < > 7.3*   < > 6.7*   < > 7.8*   < > 7.6*   < > 6.7*   MCV 95  --   --   --  93  --  96   < > 91   < > 97   < > 94   PLT 58* 59* 56*   < > 54*  --  63*   < > 105*   < > 832*   < > 276   INR  --   --   --   --   --   --   --   --  1.00  --  1.03  --  1.08    < > = values in this interval not displayed.     Recent Labs   Lab Test 20  0840 20  0715 11/15/20  0256 20   POTASSIUM 3.1* 3.1* 3.3* 4.2   CHLORIDE  --  119* 121* 116*   CO2  --  19* 17* 19*   BUN  --  39* 39* 31*   ANIONGAP  --  8 10 7     Recent Labs   Lab Test 20  0715 11/15/20  0256 2028 20  1620 10/26/20  2240 10/26/20  2240 10/26/20  1803 20  2112 20  2112   ALBUMIN 1.4* 1.4* 1.5*  --    < >  --   --    < > 3.7   BILITOTAL 0.2 0.3 0.4  --    " < >  --   --    < > 0.2   ALT 33 32 38  --    < >  --   --    < > 21   AST 20 20 20  --    < >  --   --    < > 40   PROTEIN  --   --   --  50*  --  20* 30*   < >  --    LIPASE  --   --   --   --   --   --   --   --  115    < > = values in this interval not displayed.        ASSESSMENT/ PLAN  Luz Marina Diallo is a 54 yo male Laotian speaking male with developmental delay, seminoma with mets, on chemotherapy who presented to the ED with fever and chills found to be pancytopenic with reports of melena.     1. Melena: suspected secondary to mucositis, HgB stable at 7, he did pass dark stool today.   -- EGD 1 year ago for Fe deficient anemia revealing nonbleeding gastric and duodenal ulcers along with a large hiatal hernia.   -- Continue pantoprazole 40 mg IV twice daily   -- Transfusions per medicine team  -- Continue conservative measures    2. Pancytopenia: received a total of 3 units of PRBCs, per oncology to keep platelets above 50 K, on neutropenic precautions.      Discussed with Dr. Barbara Pak PATiffanieC  [facility] ( [facility])

## 2020-11-16 NOTE — PROGRESS NOTES
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  Patient is Laotian speaking and also has developmental delay and a difficult to obtain history from him.  History was obtained from his sister. Patient is on chemotherapy for seminoma of the testicle.  Patient received chemotherapy on Monday.  He was also transfused blood on Monday.  Patient had reaction on second unit of blood after 30 minutes of transfusion.  On Tuesday patient went to the clinic to get IV fluids.  He started to have fever and chills last night.  He had follow-up with urology today and had voiding trial and removal of Palafox catheter.  Patient was sent to emergency room because of his fever and chills.  In the ER, he was noted to be febrile and tachycardic.  Laboratory work-up showed normal electrolytes.  Creatinine 1.37.  , ALT 59, AST 25, lactic acid 1.0.  WBC 0.1 differential count not done due to low WBC below 0.5.  Chest x-ray showed mild bilateral lung base atelectasis or infiltrate.  He was given IV fluids, IV cefepime and vancomycin emergency room.  He was admitted for further management.        Patient progress during stay:    Patient was continued with intravenous antibiotic and was closely monitored.  Both hematology oncology as well as infectious history was consulted for further  evaluation recommendations.  He was transfused a unit of blood on November 13, 2020 for severe anemia with hemoglobin of 6.6.    He was treated with nystatin as well as Magic mouthwash for mucositis.  Patient developed Lower GI bleed on November 14, 2020 with episodes of bloody stool.  He required additional blood transfusions.    Mucositis remained a problem with decreased intake due to mouth sores.  Patient was encouraged to use the Magic mouthwash as well as nystatin.  Oncologist recommended Decadron topical mouth  solution as well.                   Problem List with Assessment and Plan      1. Neutropenic fever    Patient on broad-spectrum antibiotic with cefepime and vancomycin    Cultures negative to date    Afebrile.  Tachycardia is better.  Blood pressure is normal and oxygenating well    Continue antibiotic per ID recommendations, follow cultures    2. Pancytopenia secondary to chemotherapy    Severe anemia with hemoglobin 6.6 on November 13, 2020.     Patient required 3 units of blood thus far due to severe anemia and lower GI bleed.     Continue to monitor hemoglobin, conditional orders placed to transfuse for hemoglobin less than 7.  Hemoglobin is 7      Leukopenia with neutropenia, monitor counts, hematology oncology following    WBC is trending up at 2.9 today.  No more neutropenia.     received Neulasta on November 6 and recommended additional Neupogen during the stay daily until absolute neutrophil count is over 500 for 2 days or over 1500 per oncology service.    Thrombocytopenia:     Platelet -58, no obvious acute bleeding    Hematology oncologist following and care plan discussed the need for platelet transfusion.    Continue to monitor platelets    Transfuse for platelet count of less than 20         3. Mucositis secondary to chemotherapy: This is a major issue for him which decreased intake, discomfort, cough.      Nystatin swish and spit, Magic mouthwash ordered, patient encouraged to use  it    Topical steroid recommended and ordered     Mucositis much better and patient is able to take some oral food.    Encourage diet orally rather than TPN      4.  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  Acute kidney injury on chronic kidney disease:    Serum creatinine is at 1.7, improving    This is likely from decreased intake due to mucositis.    Continue IV fluid including LR at 125 mL an hour and D5 at 25 cc an hour.    Avoid nephrotoxic medications.  No NSAIDs due to GI bleed and acute kidney injury as well.    As needed oxycodone solution was ordered for pain control in addition to Tylenol.    6.  Hypocalcemia:     Check ionized calcium and replace intravenously sinus calcium less than 3    7.  Urinary retention, acute continue intermittent catheterization for now.  Avoid Palafox catheter    8.  Lower GI bleed: Lower GI bleed with hematochezia and bloody stool    Discussed with GI team.    Patient had 3 units of blood transfused already so far    Continue PPI, GI following.  Further work-up deferred to GI team    9.  Fluid electrolyte nutrition: Patient's mucositis is improving and patient taking some oral nutrition but not adequate.  Encouraged to get more oral nutrition.  I would avoid TPN or PPN at this point.  If he does not take adequate oral intake, consider PPN tomorrow          Plan for today :    Patient is improving    Continue increased oral intake    Continue antibiotic per ID    Discussed with patient's sister at bedside        LDA     Access : Peripheral    Palafox Catheter: in place, indication: Retention        FEN :    Orders Placed This Encounter      Full Liquid Diet           Intake/Output Summary (Last 24 hours) at 11/13/2020 1605  Last data filed at 11/13/2020 1457  Gross per 24 hour   Intake 2281 ml   Output 1775 ml   Net 506 ml          DVT Prophylaxis: Pneumatic Compression Devices    Code Status:  Full Code    Estimated discharge  disposition and  "plan: May need additional 2 to 3 days in the hospital        Interval History (Subjective):        Patient is seen and examined by me today and medical record reviewed.Overnight events noted and care discussed with nursing staff.  Patient feels better today.  His most lesions are much better.  He was able to take some oral nutrition.  I spoke with dietitian regarding the need for alternative feeding.                  Physical Exam:        Last Vital Signs:  /65 (BP Location: Right arm)   Pulse 101   Temp 98.3  F (36.8  C) (Axillary)   Resp 20   Ht 1.549 m (5' 1\")   Wt 45.5 kg (100 lb 6.4 oz)   SpO2 100%   BMI 18.97 kg/m        Intake/Output Summary (Last 24 hours) at 11/13/2020 1620  Last data filed at 11/13/2020 1457  Gross per 24 hour   Intake 2281 ml   Output 1775 ml   Net 506 ml       Wt Readings from Last 5 Encounters:   11/12/20 45.5 kg (100 lb 6.4 oz)   11/09/20 46.3 kg (102 lb)   11/06/20 51.2 kg (112 lb 14.4 oz)   11/02/20 46.3 kg (102 lb)   11/01/20 47.9 kg (105 lb 11.2 oz)        Constitutional: Awake, alert, cooperative, no apparent distress     Respiratory: Clear to auscultation bilaterally, no crackles or wheezing   Cardiovascular: Regular rate and rhythm, normal S1 and S2, and no murmur noted   Abdomen: Normal bowel sounds, soft, non-distended, non-tender   Skin: No rashes, no cyanosis, dry to touch   Neuro: Alert with  no weakness   Extremities: No edema, normal range of motion   Other(s):  Oral thrush and diffuse mouth lesions.  Patient appears depressed with no acute distress.       All other systems: Negative          Medications:        All current medications were reviewed with changes reflected in problem list.         Data:      All new lab and imaging data was reviewed.      Data reviewed today: I reviewed all new labs and imaging results over the last 24 hours. I personally reviewed no images or EKG's today      Recent Labs   Lab 11/16/20  0840 11/16/20  0715 11/16/20  0320 " 11/15/20  0256 11/15/20  0256 11/14/20  0628 11/14/20  0628   WBC 2.9*  --   --   --  1.1*  --  0.4*   HGB 7.0* 7.1* 7.4*   < > 7.3*   < > 6.7*   HCT 22.3*  --   --   --  23.1*  --  22.3*   MCV 95  --   --   --  93  --  96   PLT 58* 59* 56*   < > 54*  --  63*    < > = values in this interval not displayed.     Recent Labs   Lab 11/12/20  1631 11/12/20  1620 11/12/20  1559   CULT No growth after 4 days No growth No growth after 4 days     Recent Labs   Lab 11/16/20  0840 11/16/20  0715 11/15/20  0256 11/14/20  0628 11/13/20  0715   NA  --  146* 148* 142 140   POTASSIUM 3.1* 3.1* 3.3* 4.2 3.8   CHLORIDE  --  119* 121* 116* 110*   CO2  --  19* 17* 19* 24   ANIONGAP  --  8 10 7 6   GLC  --  119* 147* 151* 111*   BUN  --  39* 39* 31* 26   CR  --  1.70* 1.76* 1.41* 1.30*   GFRESTIMATED  --  45* 43* 56* 62   GFRESTBLACK  --  52* 50* 65 72   IVANIA  --  6.1* 5.6* 5.4* 5.9*   MAG  --  1.9 1.5*  --  1.8   PHOS 1.6*  --   --   --   --    PROTTOTAL  --  4.5* 4.7* 5.3*  --    ALBUMIN  --  1.4* 1.4* 1.5*  --    BILITOTAL  --  0.2 0.3 0.4  --    ALKPHOS  --  205* 290* 443*  --    AST  --  20 20 20  --    ALT  --  33 32 38  --        Recent Labs   Lab 11/16/20  0715 11/15/20  0256 11/14/20  0628 11/13/20 0715 11/12/20  1601   * 147* 151* 111* 130*       Recent Labs   Lab 11/12/20  1601   INR 1.00         No results for input(s): TROPONIN, TROPI, TROPR in the last 168 hours.    Invalid input(s): TROP, TROPONINIES    Recent Results (from the past 48 hour(s))   XR Chest Port 1 View    Narrative    XR CHEST PORT 1 VW 11/12/2020 4:48 PM    HISTORY: sob. History testicular cancer.     COMPARISON: 10/27/2020. 10/26/2020 chest CT.      Impression    IMPRESSION: Low lung volumes with mild bilateral lung base atelectasis  versus infiltrate with possible small pleural effusions. No overt  pulmonary edema. Increased mediastinal widening/hilar enlargement may  be due to low lung volumes but progression of known thoracic  lymphadenopathy  not excluded.    YEMI DIAZ MD       COVID Status:  COVID-19 PCR Results    COVID-19 PCR Results 9/24/20 9/24/20 10/9/20 10/9/20 10/19/20 10/26/20 10/26/20 11/12/20 11/12/20    0229 0229 2228 2228  1531 1531 1631 1631   COVID-19 Virus PCR to [facility] - Result Test received-See reflex to IDDL test SARS CoV2 (COVID-19) Virus RT-PCR  Test received-See reflex to IDDL test SARS CoV2 (COVID-19) Virus RT-PCR  Not Detected Test received-See reflex to IDDL test SARS CoV2 (COVID-19) Virus RT-PCR  Test received-See reflex to IDDL test SARS CoV2 (COVID-19) Virus RT-PCR    COVID-19 Virus PCR to [facility] - Source Nasopharyngeal  Nasopharyngeal  Nasopharyngeal Nasopharyngeal  Nasopharyngeal    SARS-CoV-2 Virus Specimen Source  Nasopharyngeal  Nasopharyngeal   Nasopharyngeal  Nasopharyngeal   SARS-CoV-2 PCR Result  NEGATIVE  NEGATIVE   NEGATIVE  NEGATIVE      Comments are available for some flowsheets but are not being displayed.         COVID-19 Antibody Results, Testing for Immunity    COVID-19 Antibody Results, Testing for Immunity   No data to display.              Disclaimer: This note consists of symbols derived from keyboarding, dictation and/or voice recognition software. As a result, there may be errors in the script that have gone undetected. Please consider this when interpreting information found in this chart.

## 2020-11-16 NOTE — PROGRESS NOTES
[facility]  Infectious Disease Progress Note          Assessment and Plan:   IMPRESSION:   1.  A 55-year-old male, well known to me from recent admission, now admitted with acute fever and profound neutropenia from chemotherapy, no clear focal infection, mild respiratory symptoms and recent urosepsis as possible sources, but unclear.   2.  Seminoma with recent chemotherapy, now profoundly neutropenic.   3.  Recent episode of urosepsis with Klebsiella and then subsequent episode of sepsis resolved with oral Cipro without clearcut infection being found.   4.  Latent tuberculosis, recent new diagnosis.  No evidence of active tuberculosis.   5.  COVID-19 rule out, negative times several.   6.  Slight hypoxia, minimal chest x-ray abnormality.   7.  Renal insufficiency.   8 Severe stomatitis, ? Chemo vs HSV     RECOMMENDATIONS:   1.  Contunue cefepime 1 day more discontinue vanco, So far neg , T down, PMNs rising  2.  improved neutropenia,no new focal symptoms    3.  acycolvir, flucon IV for stomatitis, swab for HSV +, topical mouth txs   4.  Initiate latent TB treatment when he has recovered from all of this.  Discussed in detail pt ands sister   If T josé and swallowing po soon prob lev 1 week and valtrex             Interval History:   no new complaints can not swallow, stomatitis, some vesicular + HSV, cxs neg Tdown WBC 2900               Medications:       acyclovir (ZOVIRAX) IV  5 mg/kg Intravenous Q12H     allopurinol  300 mg Oral Daily     calcium carbonate-vitamin D  1 tablet Oral BID w/meals     ceFEPIme (MAXIPIME) IV  2 g Intravenous Q12H     dexamethasone  0.5 mg Oral Q8H     ferrous sulfate  325 mg Oral Daily with breakfast     filgrastim (NEUPOGEN/GRANIX) subcutaneous  300 mcg Subcutaneous Daily at 8 pm     fluconazole  200 mg Intravenous Q24H     loratadine  10 mg Oral Daily     multivitamin w/minerals  1 tablet Oral Daily     nystatin  500,000 Units Swish & Spit 4x Daily      "pantoprazole (PROTONIX) IV  40 mg Intravenous Q12H BMT CSA     polyethylene glycol  17 g Oral Daily     senna-docusate  1 tablet Oral BID    Or     senna-docusate  2 tablet Oral BID     sodium chloride (PF)  3 mL Intracatheter Q8H     sodium phosphate  9 mmol Intravenous Once     tamsulosin  0.4 mg Oral Daily     vancomycin (VANCOCIN) IV  1,000 mg Intravenous Q24H     cholecalciferol  25 mcg Oral BID                  Physical Exam:   Blood pressure 91/48, pulse 113, temperature 98.2  F (36.8  C), temperature source Axillary, resp. rate 24, height 1.549 m (5' 1\"), weight 45.5 kg (100 lb 6.4 oz), SpO2 99 %.  Wt Readings from Last 2 Encounters:   11/12/20 45.5 kg (100 lb 6.4 oz)   11/09/20 46.3 kg (102 lb)     Vital Signs with Ranges  Temp:  [98.2  F (36.8  C)-100.1  F (37.8  C)] 98.2  F (36.8  C)  Pulse:  [101-125] 113  Resp:  [20-28] 24  BP: ()/(48-69) 91/48  SpO2:  [94 %-100 %] 99 %    Constitutional: Awake, alert, cooperative, no apparent distress severe stomatitis tolips   Lungs: Clear to auscultation bilaterally, no crackles or wheezing   Cardiovascular: Regular rate and rhythm, normal S1 and S2, and no murmur noted   Abdomen: Normal bowel sounds, soft, non-distended, non-tender   Skin: No rashes, no cyanosis, no edema   Other:           Data:   All microbiology laboratory data reviewed.  Recent Labs   Lab Test 11/16/20  0840 11/16/20  0715 11/16/20  0320 11/15/20  0256 11/15/20  0256 11/14/20  0628 11/14/20  0628   WBC 2.9*  --   --   --  1.1*  --  0.4*   HGB 7.0* 7.1* 7.4*   < > 7.3*   < > 6.7*   HCT 22.3*  --   --   --  23.1*  --  22.3*   MCV 95  --   --   --  93  --  96   PLT 58* 59* 56*   < > 54*  --  63*    < > = values in this interval not displayed.     Recent Labs   Lab Test 11/16/20  0715 11/15/20  0256 11/14/20  0628   CR 1.70* 1.76* 1.41*     No lab results found.  Recent Labs   Lab Test 11/12/20  1631 11/12/20  1620 11/12/20  1559 10/26/20  1803 10/26/20  1531 10/26/20  1454 10/19/20  1748 " 10/19/20  1648 10/19/20  1515   CULT No growth after 4 days No growth No growth after 4 days No growth No growth No growth No growth No growth No growth

## 2020-11-16 NOTE — PLAN OF CARE
ertinent assessments: Soft Bps. Tachycardic.  Room air. Denies pain. Mouth swollen, cracked, open sores. PRN Emesis frequently-- Zofran given. 2 lge black frothy----    Major Shift Event:--mouth swollen with open sores ---hg 7.0 Dr Medina notified stated to not give blood  ---iv fluids infusing well---    Treatment: Monitor lab and vitals    Bedside Nurse: Brenna Gonzalez RN

## 2020-11-16 NOTE — PROGRESS NOTES
11/15/20 1900   Significant Event   Significant Event Critical result/value  (HGB 6.2. )     Admitting MD damian and Brenna Holt RN notified.

## 2020-11-16 NOTE — PLAN OF CARE
To Do:  End of Shift Summary  For vital signs and complete assessments, please see documentation flowsheets.     Pertinent assessments: Soft Bps. Tachycardic.  Room air. Denies pain. Mouth swollen, cracked, open sores. PRN Emesis frequently-- Zofran given. 2 lge black frothy----    Major Shift Event:--mouth swollen with open sores ---hg 7.0 Dr Medina notified stated to not give blood  ---iv fluids infusing well---    Treatment: Monitor lab and vitals    Bedside Nurse: Brenna Gonzalez RN

## 2020-11-16 NOTE — CONSULTS
"CLINICAL NUTRITION SERVICES - BRIEF NOTE    NEW FINDINGS:   - Re-consulted with patient/family request from hospitalist.  - Refer to RD assessment completed 11/13/2020.   - Attempting to support oral intakes with diet per MD and supplements with meals, though patient with mucositis 2/2 chemotherapy and inability to tolerate small sips of water or even pills at times.  Is receiving Magic Mouthwash and nystatin.   - Now with GI bleed concerns, refer to GI notes.  - No meals ordered via meal system since admission.  Even if receiving liquids from floor, meeting <50% needs throughout admit.  Discussed sx with current RN and confirmed patient unable to take small sips of liquids or PO medications as a result of pain and also nausea.    - Pending clinical progression and LOS, will need to consider bridging with peripheral nutrition until improvement in mucositis, GI bleed, and PO intake trending.  - Discussed with Dr. Medina who plans to discuss nutrition POC with patient and sister.  Please consult with \"pharmacy/nutrition to start and manage TPN\" when nutrition-related POC confirmed.  Would recommend the following regimen:    - Access: Peripheral  - Frequency: Continuous  - Clinimix (D5, AA4.25) at 80 ml/hr + 250 mL 20% intralipids daily  - 1920 mL provides 1154 kcal, 82 g AA, 96 g dextrose  - DW: 46 kg  - Total fluids (IVFs + PN) = 150 mL/hr as ordered or per MD  - 11/16/2020: Once D5-containing IVFs discontinued or changed to non-dextrose, ok to initiate at goal rate of 80 ml/hr.      ASSESSED NUTRITION NEEDS PER APPROVED PRACTICE GUIDELINES:     Dosing Weight 45.5 kg   Estimated Energy Needs: [id number] kcals (30-35-40 Kcal/Kg)  Justification: repletion and underweight, lower end of needs for PPN  Estimated Protein Needs: 55-68-91 grams protein (1.2-1.5-2 g pro/Kg)  Justification: Repletion and preservation of lean body mass, lower end of needs for PPN  Estimated Fluid Needs: per MD      - Labs reviewed: would " require a phosphorus check prior to nutrition support start (ordered).  K also needs to be replaced per protocol.  - Medications reviewed: on D5 and LR.  - Will continue following.  Discussed initial plans with current RN and PharmD, team during rounds.      Razia Aranda RDN, LD  Clinical Dietitian  3rd floor/ICU: [phone]  All other floors: [phone]  Weekend/holiday: [phone]

## 2020-11-16 NOTE — PLAN OF CARE
MD paged:    FYI- pt's Calcium ionized whole blood is 3.4 and Calcium is 6.1. Would you like to replace this?   Thanks!

## 2020-11-17 NOTE — PROGRESS NOTES
[facility]  Infectious Disease Progress Note          Assessment and Plan:   IMPRESSION:   1.  A 55-year-old male, well known to me from recent admission, now admitted with acute fever and profound neutropenia from chemotherapy, no clear focal infection, mild respiratory symptoms and recent urosepsis as possible sources, but unclear.   2.  Seminoma with recent chemotherapy, now profoundly neutropenic.   3.  Recent episode of urosepsis with Klebsiella and then subsequent episode of sepsis resolved with oral Cipro without clearcut infection being found.   4.  Latent tuberculosis, recent new diagnosis.  No evidence of active tuberculosis.   5.  COVID-19 rule out, negative times several.   6.  Slight hypoxia, minimal chest x-ray abnormality.   7.  Renal insufficiency.   8 Severe stomatitis, ? Chemo vs HSV     RECOMMENDATIONS:   1.  Contunue cefepime given fever discontinue vanco, So far neg , T down, PMNs rising  2.  improved neutropenia,no new focal symptoms    3.  acycolvir, flucon IV for stomatitis, swab for HSV +, topical mouth txs , mouth is better  4.  Initiate latent TB treatment when he has recovered from all of this.  Discussed in detail pt ands sister   If T resolves and swallowing po OK prob lev 250 daily 1 week and valtrex 500 bid(renal adjusted)  T 101 wo new sxs despite marrow recovery, watch 1 day more hopefully resolves             Interval History:   no new complaints can not swallow, stomatitis, some vesicular + HSV, cxs neg Tdown WBC 2900               Medications:       acyclovir (ZOVIRAX) IV  5 mg/kg Intravenous Q12H     allopurinol  300 mg Oral Daily     calcium carbonate 500 mg (elemental)  500 mg Oral BID w/meals     calcium carbonate-vitamin D  1 tablet Oral BID w/meals     ceFEPIme (MAXIPIME) IV  2 g Intravenous Q12H     dexamethasone  0.5 mg Oral Q8H     ferrous sulfate  325 mg Oral Daily with breakfast     fluconazole  200 mg Intravenous Q24H     lipids  250 mL  "Intravenous Q24H     loratadine  10 mg Oral Daily     multivitamin w/minerals  1 tablet Oral Daily     nystatin  500,000 Units Swish & Spit 4x Daily     pantoprazole (PROTONIX) IV  40 mg Intravenous Q12H BMT CSA     polyethylene glycol  17 g Oral Daily     senna-docusate  1 tablet Oral BID    Or     senna-docusate  2 tablet Oral BID     sodium chloride (PF)  10 mL Intracatheter Q8H     sodium chloride (PF)  3 mL Intracatheter Q8H     tamsulosin  0.4 mg Oral Daily     cholecalciferol  25 mcg Oral BID                  Physical Exam:   Blood pressure 96/47, pulse 107, temperature 99.9  F (37.7  C), temperature source Axillary, resp. rate 22, height 1.549 m (5' 1\"), weight 45.5 kg (100 lb 6.4 oz), SpO2 95 %.  Wt Readings from Last 2 Encounters:   11/12/20 45.5 kg (100 lb 6.4 oz)   11/09/20 46.3 kg (102 lb)     Vital Signs with Ranges  Temp:  [98.9  F (37.2  C)-101.2  F (38.4  C)] 99.9  F (37.7  C)  Pulse:  [100-119] 107  Resp:  [20-28] 22  BP: (92-99)/(44-60) 96/47  SpO2:  [94 %-97 %] 95 %    Constitutional: Awake, alert, cooperative, no apparent distress severe stomatitis tolips   Lungs: Clear to auscultation bilaterally, no crackles or wheezing   Cardiovascular: Regular rate and rhythm, normal S1 and S2, and no murmur noted   Abdomen: Normal bowel sounds, soft, non-distended, non-tender   Skin: No rashes, no cyanosis, no edema   Other:           Data:   All microbiology laboratory data reviewed.  Recent Labs   Lab Test 11/17/20  0456 11/16/20  1917 11/16/20  0840 11/15/20  0256 11/15/20  0256   WBC 8.9  --  2.9*  --  1.1*   HGB 7.9* 8.1* 7.0*   < > 7.3*   HCT 24.1*  --  22.3*  --  23.1*   MCV 92  --  95  --  93   PLT 67* 61* 58*   < > 54*    < > = values in this interval not displayed.     Recent Labs   Lab Test 11/17/20  0456 11/16/20  0715 11/15/20  0256   CR 1.67* 1.70* 1.76*     No lab results found.  Recent Labs   Lab Test 11/17/20  1108 11/17/20  1104 11/12/20  1631 11/12/20  1620 11/12/20  1559 10/26/20  1803 " 10/26/20  1531 10/26/20  1454 10/19/20  1748   CULT PENDING No growth after 3 hours No growth after 5 days No growth No growth after 5 days No growth No growth No growth No growth

## 2020-11-17 NOTE — PROGRESS NOTES
"GASTROENTEROLOGY PROGRESS NOTE        SUBJECTIVE: 2 small dark colored stools, received 1 unit of PRBCs yesterday. No abdominal pain.      OBJECTIVE:    BP 92/44 (BP Location: Left arm)   Pulse 119   Temp 100.4  F (38  C) (Axillary)   Resp 24   Ht 1.549 m (5' 1\")   Wt 45.5 kg (100 lb 6.4 oz)   SpO2 94%   BMI 18.97 kg/m    Temp (24hrs), Av  F (37.2  C), Min:97.9  F (36.6  C), Max:100.1  F (37.8  C)    No data found.    Intake/Output Summary (Last 24 hours) at 2020 1110  Last data filed at 2020 0556  Gross per 24 hour   Intake 1160 ml   Output 1875 ml   Net -715 ml        PHYSICAL EXAM     Constitutional: Resting, swollen lips  Abdomen: soft, NTTP         Additional Comments:  ROS, FH, SH: See initial GI consult for details.     I have reviewed the patient's new clinical lab results:     Recent Labs   Lab Test 2040 11/15/20  0256 11/15/20  0256 20  1601 20  1601 10/26/20  1453 10/26/20  1453 10/19/20  1649 10/19/20  1649   WBC 8.9  --  2.9*  --  1.1*   < > 0.1*   < > 35.9*   < > 68.2*   HGB 7.9* 8.1* 7.0*   < > 7.3*   < > 7.8*   < > 7.6*   < > 6.7*   MCV 92  --  95  --  93   < > 91   < > 97   < > 94   PLT 67* 61* 58*   < > 54*   < > 105*   < > 832*   < > 276   INR  --   --   --   --   --   --  1.00  --  1.03  --  1.08    < > = values in this interval not displayed.     Recent Labs   Lab Test 20  0840 20  0715 11/15/20  0256   POTASSIUM 3.2* 2.9* 3.1* 3.1* 3.3*   CHLORIDE 117*  --   --  119* 121*   CO2 19*  --   --  19* 17*   BUN 25  --   --  39* 39*   ANIONGAP 7  --   --  8 10     Recent Labs   Lab Test 20  0456 20  0715 11/15/20  0256 20  1620 20  1620 10/26/20  2240 10/26/20  2240 10/26/20  1803 20   ALBUMIN 1.4* 1.4* 1.4*   < >  --    < >  --   --    < > 3.7   BILITOTAL 0.2 0.2 0.3   < >  --    < >  --   --    < > 0.2   ALT 23 33 32   < >  --    < >  " --   --    < > 21   AST 18 20 20   < >  --    < >  --   --    < > 40   PROTEIN  --   --   --   --  50*  --  20* 30*   < >  --    LIPASE  --   --   --   --   --   --   --   --   --  115    < > = values in this interval not displayed.        ASSESSMENT/ PLAN  Luz Marina Diallo is a 56 yo male Laotian speaking male with developmental delay, seminoma with mets, on chemotherapy who presented to the ED with fever and chills found to be pancytopenic with reports of melena.     1. Melena: suspected secondary to mucositis, HgB stable at 7-8, he did pass dark stool today.   -- EGD 1 year ago for Fe deficient anemia revealing nonbleeding gastric and duodenal ulcers along with a large hiatal hernia.   -- Continue pantoprazole 40 mg IV twice daily   -- Transfusions per medicine team  -- Continue conservative measures    2. Pancytopenia / neutropenic:  per oncology to keep platelets above 50 K, on neutropenic precautions.      Discussed with Dr. Barbara Pak PATiffanieC  [facility] ( [facility])

## 2020-11-17 NOTE — CONSULTS
WO Nurse Inpatient Wound Assessment   Reason for consultation: Evaluate and treat  Coccyx wounds    Assessment  Coccyx and gluteal fold mild denudement related to frequent loose watery stools. Upon arrival for exam pt was incontinent of watery green stool. Information was translated thru sister at bedside.  Status: initial assessment and stable; very superficial erosion to perianal area related to moisture. Pt has irregular colored skin with large areas of deep red, suspected from blood transfusion reaction. All areas dsemond.  Edematous penis; ice packs in place, tran    Treatment Plan  Buttocks wounds: Daily  and PRN  1. Gently wipe skin clean with Abdi and dry wipe, wipe stool off with dry wipe first then apply Abdi  2. Apply light layer of paste to buttocks and skin fold  3. To cleanse skin for assessment: melt paste off with No Sting barrier   4. Pressure Injury Prevention: turn side to side; consider Pulsate air mattress for prolonged stay or deteriorating skin    Orders Written  Recommended provider order: None, at this time  WO Nurse follow-up plan:weekly  Nursing to notify the Provider(s) and re-consult the WO Nurse if wound(s) deteriorates or new skin concern.    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  Patient is Laotian speaking and also had developmental delay and a difficult to obtain history from him.  History was obtained from his sister. Patient is on chemotherapy for seminoma of the testicle.  Patient follows with Dr. Ramos of oncology.  He had chemotherapy on Monday.  He was also given 1 units of blood and also had additional unit of blood which was stopped after 30 minutes due to transfusion reaction.  Patient went to the clinic on Tuesday to get IV fluids.  Patient had fever and chills  last night.  He also had intermittent cough.  He went to urology today and had voiding trial and Palafox catheter removed.  His oncology office was contacted due to his fever and was advised to come to emergency room for evaluation.  Patient had no contact with known Covid positive patient.    Objective Data  Containment of urine/stool: Incontinence Protocol, Diaper, Incontinent pad in bed and Indwelling catheter    Active Diet Order  Orders Placed This Encounter      Full Liquid Diet      Output:   I/O last 3 completed shifts:  In: 0   Out: 1550 [Urine:1550]    Risk Assessment:   Sensory Perception: 3-->slightly limited  Moisture: 3-->occasionally moist  Activity: 1-->bedfast  Mobility: 2-->very limited  Nutrition: 1-->very poor  Friction and Shear: 2-->potential problem  Toi Score: 12                          Labs:   Recent Labs   Lab 11/17/20  0456 11/12/20  1601 11/12/20  1601   ALBUMIN 1.4*   < > 2.4*   HGB 7.9*   < > 7.8*   INR  --   --  1.00   WBC 8.9   < > 0.1*    < > = values in this interval not displayed.       Physical Exam  Areas of skin assessed: Buttocks and Gluteal fold/ Coccyx    Wound Location:  Coccyx and PeriAnal area  Date of last photo pt refused  Wound History: see above    Wound Base: very superficial erosion of epidermis to perianal area; skin to coccyx intact, blotchy red but blanchable      Palpation of the wound bed: normal      Drainage: none     Description of drainage: none     Measurements (length x width x depth, in cm) scattered superficial to perianal area, all areas over bony and blanchable      Tunneling N/A     Undermining N/A  Periwound skin: intact and erythema- blanchable     Odor: none  Pain: denies ,     Interventions  Visual inspection and assessment completed with sister  Wound Care Rationale Protect periwound skin and Provide protection   Wound Care: done per plan of care  Supplies: gathered and placed at the bedside  Current off-loading measures: Pillows under calves  and Pillows  Current support surface: Standard  Atmos Air mattress  Education provided to: plan of care and Moisture management  Discussed plan of care with Patient, Family and Nurse    CATALINA WangN, RN, CWON

## 2020-11-17 NOTE — PROGRESS NOTES
[facility]    Hematology/Oncology Follow-up Note      Today's Date: 11/17/20  Date of Admission:  11/12/2020  Reason for Consult: Seminoma, admitted for neutropenic fever, severe mucositis,       ASSESSMENT/PLAN:  Attapeu NO JUSTICE Diallo is a 55 year old male with:    Neutropenic fever: Secondary to BEP and C2 D8 completed on 10/19/2020.  ANC undetectable on 11/12 on admission.  Infectious disease consulted and receiving antibiotics.  He received Neulasta on 11/6 and then 3 doses of Neupogen from 11/13-11/15.  WBC now normalized at 8.9 and ANC 7.1.     Mucositis: Secondary to chemotherapy, severe over the weekend and slowly improving.  He is actually able to eat more today and he has been intermittently using nystatin and Magic mouthwash.  May be contributing to GI bleed, see below.    GI bleed: Overall improved, likely secondary to mucositis and hemoglobin has been stable.  He has received 3 units of blood so far.  -GI following  -Transfuse platelets if <20 for the bleeding    Seminoma: Patient Dr Ramos currently on BEP chemotherapy and initially started with carboplatin, replaced with cisplatin for less myelosuppression.  He has had a great response, but frequently admitted for neutropenic fevers.    Heme: Hemoglobin 7.9, secondary to chemotherapy and GI bleed.  Thrombocytopenia with platelets of 67, likely secondary to chemotherapy.  -Transfuse if hemoglobin <7.0 or further bleeding  -Transfuse if platelets <20 or further bleeding      INTERIM HISTORY: Don is here with his sister, who is at bedside.  He is feeling much better today.  His mucositis is improved and he is able to eat more today.  He is actually able to smile and feels much better.  Intermittent fever early in the day, but nothing since sister arrived.       MEDICATIONS:  Current Facility-Administered Medications   Medication     acetaminophen (TYLENOL) tablet 650 mg    Or     acetaminophen (TYLENOL) Suppository 650 mg      acyclovir (ZOVIRAX) 250 mg in D5W 50 mL intermittent infusion     allopurinol (ZYLOPRIM) tablet 300 mg     calcium carbonate 500 mg (elemental) (OSCAL) tablet 500 mg     calcium carbonate-vitamin D (OSCAL w/D) per tablet 1 tablet     ceFEPIme (MAXIPIME) 2 g vial to attach to  ml bag for ADULTS or 50 ml bag for PEDS     dexamethasone (DECADRON) alcohol-free oral solution 0.5 mg     diphenhydrAMINE (BENADRYL) injection 25 mg     ferrous sulfate (FEROSUL) tablet 325 mg     fluconazole (DIFLUCAN) intermittent infusion 200 mg     guaiFENesin-dextromethorphan (ROBITUSSIN DM) 100-10 MG/5ML syrup 5 mL     lidocaine (LMX4) cream     lidocaine 1 % 0.1-1 mL     loratadine (CLARITIN) tablet 10 mg     LORazepam (ATIVAN) tablet 0.5 mg     magic mouthwash suspension (diphenhydramine, lidocaine, aluminum-magnesium & simethicone)     magnesium sulfate 2 g in water intermittent infusion     melatonin tablet 1 mg     multivitamin w/minerals (THERA-VIT-M) tablet 1 tablet     naloxone (NARCAN) injection 0.1-0.4 mg     nystatin (MYCOSTATIN) suspension 500,000 Units     ondansetron (ZOFRAN-ODT) ODT tab 4 mg    Or     ondansetron (ZOFRAN) injection 4 mg     oxyCODONE (ROXICODONE) solution 5 mg     pantoprazole (PROTONIX) IV push injection 40 mg     polyethylene glycol (MIRALAX) Packet 17 g     potassium chloride ER (KLOR-CON M) CR tablet 20 mEq     prochlorperazine (COMPAZINE) injection 5 mg    Or     prochlorperazine (COMPAZINE) tablet 10 mg    Or     prochlorperazine (COMPAZINE) suppository 25 mg     rincinol P.R.N liquid 10 mL     senna-docusate (SENOKOT-S/PERICOLACE) 8.6-50 MG per tablet 1 tablet    Or     senna-docusate (SENOKOT-S/PERICOLACE) 8.6-50 MG per tablet 2 tablet     sodium chloride (PF) 0.9% PF flush 3 mL     sodium chloride (PF) 0.9% PF flush 3 mL     sodium phosphate 9 mmol in D5W intermittent infusion     tamsulosin (FLOMAX) capsule 0.4 mg     Vitamin D3 (CHOLECALCIFEROL) tablet 25 mcg       ALLERGIES:  No Known  "Allergies      PHYSICAL EXAM:  Vital signs:  Temp: 100.4  F (38  C) Temp src: Axillary BP: 92/44 Pulse: 119   Resp: 24 SpO2: 94 % O2 Device: None (Room air)   Height: 154.9 cm (5' 1\") Weight: 45.5 kg (100 lb 6.4 oz)  Estimated body mass index is 18.97 kg/m  as calculated from the following:    Height as of this encounter: 1.549 m (5' 1\").    Weight as of this encounter: 45.5 kg (100 lb 6.4 oz).    GENERAL:  Male, in no acute distress.  Alert and oriented x3. Well groomed.   HEENT:  Normocephalic, atraumatic.  Mucositis noted, no bleeding.  LUNGS:  Nonlabored breathing  SKIN: Intermittent blotchiness to the face, but otherwise no rash on exposed skin  NEURO: CN grossly intact, speech normal  PSYCH: Mentation appears normal, insight and judgement intact        LABS:  CBC RESULTS:   Recent Labs   Lab Test 11/17/20  0456   WBC 8.9   RBC 2.63*   HGB 7.9*   HCT 24.1*   MCV 92   MCH 30.0   MCHC 32.8   RDW 15.1*   PLT 67*       Recent Labs   Lab Test 11/17/20  1105 11/17/20  0456 11/16/20  0715 11/16/20  0715   NA  --  143  --  146*   POTASSIUM 3.2* 3.2*   < > 3.1*   CHLORIDE  --  117*  --  119*   CO2  --  19*  --  19*   ANIONGAP  --  7  --  8   GLC  --  95  --  119*   BUN  --  25  --  39*   CR  --  1.67*  --  1.70*   IVANIA  --  5.5*  --  6.1*    < > = values in this interval not displayed.         Araceli Pacheco PA-C  Hematology/Oncology  [facility]      "

## 2020-11-17 NOTE — PROGRESS NOTES
Cross cover notified of patient with increasing 2+ edema in the left arm.  No significant edema elsewhere.  He has 2 peripheral IVs that have been continuously used during the day today.  Recommend placing peripheral IV in the right arm.  Unfortunately should remove both peripherals from the left arm and elevate it.  If swelling significantly tomorrow consider ultrasound of the left arm to make sure no DVT, but these were peripherals and not a PICC so this would be less likely.

## 2020-11-17 NOTE — PLAN OF CARE
End of Shift Summary  For vital signs and complete assessments, please see documentation flowsheets.     Pertinent assessments: Finished 1u PRBC. Continues to have diffuse red body rash. Potassium and phosphorus levels low, working on replacement. L arm edematous, MD aware, elevating. Palafox with good output. Pink area on bottom, barrier cream applied. Offloading pressure q2hr. Buccal mucosa sores and lips swollen.  Major Shift Event:1 unit PRBC transfused. Began electrolyte replacement.  Treatment: Monitor lab and vitals. Nystatin/magic mouth wash. Administering antibiotics.

## 2020-11-17 NOTE — PROGRESS NOTES
"CLINICAL NUTRITION SERVICES - REASSESSMENT NOTE    Recommendations Ordered by Registered Dietitian (RD):   Continue diet as ordered   Ordered Boost Plus BID  Recommend the following PPN regimen to bridge nutrition gap:   - Access: Peripheral  - Frequency: Continuous  - Clinimix (D5, AA4.25) at 80 ml/hr + 250 mL 20% intralipids daily  - 1920 mL provides 1154 kcal, 82 g AA, 96 g dextrose  - DW: 46 kg  - Total fluids (IVFs + PN) = per MD  - 11/17/2020: ok to initiate at goal rate of 80 ml/hr.  - Replace electrolytes per protocol      Malnutrition:   % Weight Loss: difficult to assess - fluid vs true weight loss? Potential for > 5% in 1 month (severe malnutrition)  % Intake:  </= 50% for >/= 5 days (severe malnutrition)  Subcutaneous Fat Loss:  Orbital region moderate depletion and Upper arm region moderate depletion  Muscle Loss:  Temporal region moderate depletion, Clavicle bone region moderate depletion, Acromion bone region moderate depletion, Scapular bone region moderate depletion, Anterior thigh region mild-moderate depletion and Posterior calf region mild-moderate depletion  Fluid Retention:  None noted    Malnutrition Diagnosis: Severe malnutrition  In Context of:  Acute illness or injury  Chronic illness or disease     EVALUATION OF PROGRESS TOWARD GOALS   Diet: Full Liquid Diet     Intake/Tolerance: No information recorded in the flowsheets to comment on. Per RD brief note on 11/16, \"No meals ordered via meal system since admission. Even if receiving liquids from floor, meeting <50% needs throughout admit Discussed sx with current RN and confirmed patient unable to take small sips of liquids or PO medications as a result of pain and also nausea\"      Sister notes that he is eating a little more each day but that it is still minimal, bites of rice porridge, etc. She also mentions that the patient usually drinks strawberry Boost Plus at home.     Barriers to PO intake: mucositis 2/2 chemotherapy and inability " to tolerate small sips of water. Lips swollen.     ASSESSED NUTRITION NEEDS:  Dosing Weight 45.5 kg   Estimated Energy Needs: [id number] kcals (30-35-40 Kcal/Kg)  Justification: repletion and underweight, lower end of needs for PPN  Estimated Protein Needs: 55-68-91 grams protein (1.2-1.5-2 g pro/Kg)  Justification: Repletion and preservation of lean body mass, lower end of needs for PPN  Estimated Fluid Needs: per MD    NEW FINDINGS:   - hem/onc following   - weight: no new weights to comment on   Vitals:    11/12/20 1958   Weight: 45.5 kg (100 lb 6.4 oz)     - medications:    acyclovir (ZOVIRAX) IV  5 mg/kg Intravenous Q12H     allopurinol  300 mg Oral Daily     calcium carbonate 500 mg (elemental)  500 mg Oral BID w/meals     calcium carbonate-vitamin D  1 tablet Oral BID w/meals     ceFEPIme (MAXIPIME) IV  2 g Intravenous Q12H     dexamethasone  0.5 mg Oral Q8H     ferrous sulfate  325 mg Oral Daily with breakfast     fluconazole  200 mg Intravenous Q24H     loratadine  10 mg Oral Daily     multivitamin w/minerals  1 tablet Oral Daily     nystatin  500,000 Units Swish & Spit 4x Daily     pantoprazole (PROTONIX) IV  40 mg Intravenous Q12H BMT CSA     polyethylene glycol  17 g Oral Daily     senna-docusate  1 tablet Oral BID    Or     senna-docusate  2 tablet Oral BID     sodium chloride (PF)  3 mL Intracatheter Q8H     sodium phosphate  9 mmol Intravenous Once     tamsulosin  0.4 mg Oral Daily     cholecalciferol  25 mcg Oral BID        D5W Stopped (11/16/20 1400)     lactated ringers Stopped (11/16/20 1400)      acetaminophen **OR** acetaminophen, diphenhydrAMINE, lidocaine 4%, lidocaine (buffered or not buffered), LORazepam, lidocaine visc 2% & maalox/mylanta w/simethicone & diphenhydramine, melatonin, naloxone, ondansetron **OR** ondansetron, oxyCODONE, prochlorperazine **OR** prochlorperazine **OR** prochlorperazine, rincinol P.R.N, sodium chloride (PF)     - labs:    Electrolytes  Potassium (mmol/L)    Date Value   11/17/2020 3.2 (L)   11/16/2020 2.9 (L)   11/16/2020 3.1 (L)     Phosphorus (mg/dL)   Date Value   11/17/2020 1.6 (L)   11/17/2020 2.1 (L)   11/16/2020 1.8 (L)   11/16/2020 1.8 (L)   11/16/2020 1.6 (L)    Blood Glucose  Glucose (mg/dL)   Date Value   11/17/2020 95   11/16/2020 119 (H)   11/15/2020 147 (H)   11/14/2020 151 (H)   11/13/2020 111 (H)    Inflammatory Markers  WBC (10e9/L)   Date Value   11/17/2020 8.9   11/16/2020 2.9 (L)   11/15/2020 1.1 (L)     Albumin (g/dL)   Date Value   11/17/2020 1.4 (L)   11/16/2020 1.4 (L)   11/15/2020 1.4 (L)      Magnesium (mg/dL)   Date Value   11/16/2020 1.9   11/15/2020 1.5 (L)   11/13/2020 1.8     Sodium (mmol/L)   Date Value   11/17/2020 143   11/16/2020 146 (H)   11/15/2020 148 (H)    Renal  Urea Nitrogen (mg/dL)   Date Value   11/17/2020 25   11/16/2020 39 (H)   11/15/2020 39 (H)     Creatinine (mg/dL)   Date Value   11/17/2020 1.67 (H)   11/16/2020 1.70 (H)   11/15/2020 1.76 (H)     Additional  Ketones Urine (mg/dL)   Date Value   11/12/2020 Negative        Previous Goals:   Pt to consume >/=50% of meals ordered TID + >/=2 oral nutritional supplements per day   Evaluation: Not met    Previous Nutrition Diagnosis:   Increased nutrient needs (protein and energy) related to repletion needs as evidenced by pt with a potential weight loss of 20% in 1 month?, requiring a minimum of 35 kcal/kg and 1.5 g protein/kg.   Evaluation: No change    MALNUTRITION  % Weight Loss: difficult to assess - fluid vs true weight loss? Potential for > 5% in 1 month (severe malnutrition)  % Intake:  </= 50% for >/= 5 days (severe malnutrition)  Subcutaneous Fat Loss:  Orbital region moderate depletion and Upper arm region moderate depletion  Muscle Loss:  Temporal region moderate depletion, Clavicle bone region moderate depletion, Acromion bone region moderate depletion, Scapular bone region moderate depletion, Anterior thigh region mild-moderate depletion and Posterior calf  region mild-moderate depletion  Fluid Retention:  None noted    Malnutrition Diagnosis: Severe malnutrition  In Context of:  Acute illness or injury  Chronic illness or disease    CURRENT NUTRITION DIAGNOSIS  Increased nutrient needs (protein and energy) related to repletion needs as evidenced by pt with a potential weight loss of 20% in 1 month?, requiring a minimum of 30 kcal/kg and 1.2 g protein/kg.     INTERVENTIONS  Recommendations / Nutrition Prescription  Continue diet as ordered   Ordered Boost Plus BID  Recommend the following PPN regimen to bridge nutrition gap:   - Access: Peripheral  - Frequency: Continuous  - Clinimix (D5, AA4.25) at 80 ml/hr + 250 mL 20% intralipids daily  - 1920 mL provides 1154 kcal, 82 g AA, 96 g dextrose  - DW: 46 kg  - Total fluids (IVFs + PN) = per MD  - 11/17/2020: ok to initiate at goal rate of 80 ml/hr.    Implementation  PN Composition, PN Schedule: as above  Oral nutritional supplements: as above   Collaboration and Referral of Nutrition care: discussed pt during interdisciplinary rounds this morning     Goals  PN to meet >/=80% of nutritional needs at goal rate     MONITORING AND EVALUATION:  Progress towards goals will be monitored and evaluated per protocol and Practice Guidelines    Amie Robbins, RD, LD  Clinical Dietitian

## 2020-11-17 NOTE — PROGRESS NOTES
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  Patient is Laotian speaking and also has developmental delay and a difficult to obtain history from him.  History was obtained from his sister. Patient is on chemotherapy for seminoma of the testicle.  Patient received chemotherapy on Monday.  He was also transfused blood on Monday.  Patient had reaction on second unit of blood after 30 minutes of transfusion.  On Tuesday patient went to the clinic to get IV fluids.  He started to have fever and chills last night.  He had follow-up with urology today and had voiding trial and removal of Palafox catheter.  Patient was sent to emergency room because of his fever and chills.  In the ER, he was noted to be febrile and tachycardic.  Laboratory work-up showed normal electrolytes.  Creatinine 1.37.  , ALT 59, AST 25, lactic acid 1.0.  WBC 0.1 differential count not done due to low WBC below 0.5.  Chest x-ray showed mild bilateral lung base atelectasis or infiltrate.  He was given IV fluids, IV cefepime and vancomycin emergency room.  He was admitted for further management.           Patient progress during stay:     Patient was continued with intravenous antibiotic and was closely monitored.  Both hematology oncology as well as infectious history was consulted for further evaluation recommendations.  He was transfused a unit of blood on November 13, 2020 for severe anemia with hemoglobin of 6.6.     He was treated with nystatin as well as Magic mouthwash for mucositis.  Patient developed Lower GI bleed on November 14, 2020 with episodes of bloody stool.  He  required additional blood transfusions.     Mucositis remained a problem with decreased intake due to mouth sores.  Patient was encouraged to use the Magic mouthwash as well as nystatin.  Oncologist recommended Decadron topical mouth  solution as well.         Neutropenic fever   Patient on broad-spectrum antibiotic with cefepime, fluconazole and acyclovir. Cultures negative to date.  Continue his to have fevers reculture today, possibly transfusion related?.  Infectious disease when okay for discharge can be discharged on levofloxacin and acyclovir.     Pancytopenia secondary to chemotherapy.  Transfuse platelets for his GI bleed if platelets less than 20 per oncology.  Has received multiple transfusions due to his severe anemia.  Most recent 11/16. Received Neulasta on November 6 and recommended additional Neupogen during the stay daily until absolute neutrophil count is over 500 for 2 days or over 1500 per oncology service.     Mucositis secondary to chemotherapy.  Major issue in his nutrition status has been started on PPN.  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  I have discontinued his fluids as he has been starting on PPN.  Further nephrotoxins    Electrolyte impairments hypocalcemia hypokalemia hypomagnesia.  Replace as needed    Urinary retention requiring intermittent catheterization    Acute GI bleed with melanotic stools.  Suspect secondary to his mucositis continue conservative management per the GI team.  Continue IV pantoprazole GI daily    Diffuse body rash  Maculopapular, will continue to monitor, possibly transfusion reaction, chemotherapy related?  ---------     # Code status: Full  # Anticipated discharge date and Disposition: 2-3 days  # DVT: SCDs  # IVF: PPN                      Stephanie López MD  Text Page (7am - 6pm, M-F)               Subjective  "  Chief Complaint: Weakness  Subjective: Still having difficulty eating has pain in his mouth and in his throat he has poor appetite.  He has coughing.  Was no other complaints no abdominal pain nausea vomiting has his diffuse body rash.      Objective   BP 92/44 (BP Location: Left arm)   Pulse 119   Temp 100.4  F (38  C) (Axillary)   Resp 24   Ht 1.549 m (5' 1\")   Wt 45.5 kg (100 lb 6.4 oz)   SpO2 94%   BMI 18.97 kg/m       Physical Exam  General: Pt in NAD, normal appearance  HEENT: Fuhs mucositis in his mouth and lips no JVD  Lungs: Bibasilar crackles, normal breathing  without accessory muscle usage, no wheezing, rhonchi or crackles  Cardiac: +S1, S2, RRR, no MRG, no edema  Abdominal: normal bowel sounds, NT/ND  Skin: Loose body maculopapular rash  Psyche: A& O x3, appropriate affect       Intake/Output Summary (Last 24 hours) at 11/17/2020 1353  Last data filed at 11/17/2020 1322  Gross per 24 hour   Intake 800 ml   Output 1625 ml   Net -825 ml           Labs and Imaging Results:      Recent Labs   Lab 11/17/20  0456 11/16/20  1917 11/16/20  0840   WBC 8.9  --  2.9*   HGB 7.9* 8.1* 7.0*   PLT 67* 61* 58*        Recent Labs   Lab 11/17/20  0456 11/16/20  0715    146*   CO2 19* 19*   BUN 25 39*        Recent Labs   Lab 11/12/20  1601   INR 1.00   PTT 38*      No results for input(s): CKMB in the last 168 hours.    Invalid input(s): TROPONINT     Recent Labs   Lab 11/17/20  0456 11/16/20  0715   ALBUMIN 1.4* 1.4*   AST 18 20   ALT 23 33   ALKPHOS 207* 205*        Micro:     Radio:  XR Chest Port 1 View   Final Result   IMPRESSION: Low lung volumes with mild bilateral lung base atelectasis   versus infiltrate with possible small pleural effusions. No overt   pulmonary edema. Increased mediastinal widening/hilar enlargement may   be due to low lung volumes but progression of known thoracic   lymphadenopathy not excluded.      YEMI DIAZ MD              Medications:      Scheduled Meds:      " acyclovir (ZOVIRAX) IV  5 mg/kg Intravenous Q12H     allopurinol  300 mg Oral Daily     calcium carbonate 500 mg (elemental)  500 mg Oral BID w/meals     calcium carbonate-vitamin D  1 tablet Oral BID w/meals     ceFEPIme (MAXIPIME) IV  2 g Intravenous Q12H     dexamethasone  0.5 mg Oral Q8H     ferrous sulfate  325 mg Oral Daily with breakfast     fluconazole  200 mg Intravenous Q24H     lipids  250 mL Intravenous Q24H     loratadine  10 mg Oral Daily     magnesium sulfate  2 g Intravenous Once     multivitamin w/minerals  1 tablet Oral Daily     nystatin  500,000 Units Swish & Spit 4x Daily     pantoprazole (PROTONIX) IV  40 mg Intravenous Q12H BMT CSA     polyethylene glycol  17 g Oral Daily     senna-docusate  1 tablet Oral BID    Or     senna-docusate  2 tablet Oral BID     sodium chloride (PF)  3 mL Intracatheter Q8H     sodium phosphate  9 mmol Intravenous Once     tamsulosin  0.4 mg Oral Daily     cholecalciferol  25 mcg Oral BID     Continuous Infusions:      dextrose       parenteral nutrition - Clinimix E       PRN Meds:  acetaminophen **OR** acetaminophen, dextrose, diphenhydrAMINE, guaiFENesin-dextromethorphan, lidocaine 4%, lidocaine (buffered or not buffered), LORazepam, lidocaine visc 2% & maalox/mylanta w/simethicone & diphenhydramine, melatonin, naloxone, ondansetron **OR** ondansetron, oxyCODONE, prochlorperazine **OR** prochlorperazine **OR** prochlorperazine, rincinol P.R.N, sodium chloride (PF)

## 2020-11-17 NOTE — PLAN OF CARE
To Do:  End of Shift Summary  For vital signs and complete assessments, please see documentation flowsheets.     Pertinent assessments: BP soft, tachycardic,tachypenia, otherwise vss. Minimal oral pain, attempted magic mouth wash w/o success. A&O, sister utilized for assessment. Rash throughout body. Repositioned as able, incontinent stool x2, Small open moisture spot on coccyx, barrier applied. Palafox patent. Penile edema, elevated w/ ice packs. Redness/lesions in mishel area, barrier applied. 25% of fulls for breakfast. Swallowing pills/water, improvement in oral mucosa.   Major Shift Event:1 unit PRBC transfused, HGb check pending. Phos, Potassium replaced. Herpes positive.   Treatment: Monitor lab and vitals. Nystatin/magic mouth wash.  Bedside Nurse: Alphonse Corrales RN

## 2020-11-17 NOTE — PROGRESS NOTES
11/17/20 0544   Significant Event   Significant Event Critical result/value  (calcium 5.5 MD paged)

## 2020-11-17 NOTE — PROGRESS NOTES
11/17/20 0528   Significant Event   Significant Event Critical result/value  (Calcium 5.5 )   Notified of a critical lab. Will notify bedside RN.

## 2020-11-17 NOTE — PLAN OF CARE
End of Shift Summary  For vital signs and complete assessments, please see documentation flowsheets.     Pertinent assessments: Continues to have diffuse red body rash. L arm edematous, MD aware, elevating. Palafox with good output. Pink area on bottom, barrier cream applied. Offloading pressure q2hr. Buccal mucosa sores and lips swollen.    Major Shift Event: Phos & K+ replaced. Calcium replacement started.    Treatment: Monitor lab and vitals. Nystatin/magic mouth wash. Administering antibiotics.    Bedside Nurse: Lolly Eden RN

## 2020-11-17 NOTE — PROCEDURES
[facility]    Single Lumen Midline Placement    Date/Time: 11/17/2020 4:04 PM  Performed by: Saida De León RN  Authorized by: Stephanie López MD   Indications: vascular access    UNIVERSAL PROTOCOL   Site Marked: NA  Prior Images Obtained and Reviewed:  Yes  Required items: Required blood products, implants, devices and special equipment available    Patient identity confirmed:  Verbally with patient, arm band and hospital-assigned identification number  Patient was reevaluated immediately before administering moderate or deep sedation or anesthesia  Confirmation Checklist:  Patient's identity using two indicators, relevant allergies and correct equipment/implants were available  Universal Protocol: the Joint Commission Universal Protocol was followed    Preparation: Patient was prepped and draped in usual sterile fashion    ESBL (mL):  2         ANESTHESIA    Anesthesia: Local infiltration  Local Anesthetic:  Lidocaine 1% without epinephrine  Anesthetic Total (mL):  1      SEDATION    Patient Sedated: No        Preparation: skin prepped with ChloraPrep  Skin prep agent: skin prep agent completely dried prior to procedure  Sterile barriers: maximum sterile barriers were used: cap, mask, sterile gown, sterile gloves, and large sterile sheet  Hand hygiene: hand hygiene performed prior to central venous catheter insertion  Type of line used: Midline  Catheter type: single lumen  Lumen type: non-valved  Catheter size: 4 Fr  Brand: Bard  Lot number: [id number]  Placement method: venipuncture, MST and ultrasound  Number of attempts: 1  Successful placement: yes  Orientation: left  Location: basilic vein (vein diameter 0.50cm)  Arm circumference: adults 10 cm  Extremity circumference: 21  Visible catheter length: 0  Internal length: 13 cm  Total catheter length: 13  Dressing and securement: sterile dressing applied, chlorhexidine patch applied, blood cleaned with CHG, securement device, site  cleaned and blood removed  Post procedure assessment: blood return through all ports and free fluid flow  PROCEDURE   Patient Tolerance:  Patient tolerated the procedure well with no immediate complications  Describe Procedure: Ok to use MIDLINE

## 2020-11-18 NOTE — PROVIDER NOTIFICATION
DATE:  11/18/2020   TIME OF RECEIPT FROM LAB:  0650    LAB TEST:  Calcium   LAB VALUE:  5.6  0651:  Notified pt's primary RN (Nisha) who is notifying MD.  Calcium from 11/17 was 5.5.

## 2020-11-18 NOTE — PLAN OF CARE
For vital signs and complete assessments, please see documentation flowsheets.     Pertinent assessments: HR tachy. Tmax 100.2. dry nonpro cough.   2 medium black/green loose stools. Pt refusing ice to groin. Penis/scrotum red, edematous- groin with excoriated areas. Red rash to whole body. Edema to bilateral arms, hips, BLE. Lips also edematous, cracked, bleeding.  Major Shift Event: finished 2u PRBC, starting phos replacement    Treatment: Monitor lab and vitals. Nystatin/magic mouth wash. Transfusion support. PPN/lipids.

## 2020-11-18 NOTE — SIGNIFICANT EVENT
MD notified:  FYI- patient's hemoglobin is 5.1. Will administer of 1u of PRBC per conditional order. Advise with other recommendations if needed.  Thanks!        Paged MD for type and screen orders and pre-medication d/t transfusion reactions.

## 2020-11-18 NOTE — PROGRESS NOTES
"GASTROENTEROLOGY PROGRESS NOTE       SUBJECTIVE:  History from chart and nurse as patient being evaluated for Covid with respiratory symptoms.  No change in mucositis.  Has had three stools yesterday, two overnight.  Color black/green and green.     OBJECTIVE:  /77 (BP Location: Right arm)   Pulse 131   Temp 101.5  F (38.6  C) (Axillary)   Resp (!) 32   Ht 1.549 m (5' 1\")   Wt 45.5 kg (100 lb 6.4 oz)   SpO2 92%   BMI 18.97 kg/m    Temp (24hrs), Av.4  F (37.4  C), Min:97.5  F (36.4  C), Max:102.7  F (39.3  C)    No data found.    Intake/Output Summary (Last 24 hours) at 2020 1420  Last data filed at 2020 1231  Gross per 24 hour   Intake 900 ml   Output 3070 ml   Net -2170 ml        PHYSICAL EXAM     Deferred because of Covid      Recent Labs   Lab Test 20  0600 20  1830 20  0456 20  1917 20  0840 20  1601 20  1601 10/26/20  1453 10/26/20  1453   WBC 31.4*  --  8.9  --  2.9*   < > 0.1*   < > 35.9*   HGB 10.9* 5.1* 7.9* 8.1* 7.0*   < > 7.8*   < > 7.6*   MCV 93  --  92  --  95   < > 91   < > 97   PLT 79*  --  67* 61* 58*   < > 105*   < > 832*   INR 1.13  --   --   --   --   --  1.00  --  1.03    < > = values in this interval not displayed.     Recent Labs   Lab Test 20  0600 20  0020 20  1830 20  0456 20  0456 20  0715 20  0715   POTASSIUM 3.7 4.0 3.2*   < > 3.2*   < > 3.1*   CHLORIDE 115*  --   --   --  117*  --  119*   CO2 19*  --   --   --  19*  --  19*   BUN 21  --   --   --  25  --  39*   ANIONGAP 6  --   --   --  7  --  8    < > = values in this interval not displayed.     Recent Labs   Lab Test 20  0456 20  0715 11/15/20  0256 20  1620 20  1620 10/26/20  2240 10/26/20  2240 10/26/20  1803 20   ALBUMIN 1.4* 1.4* 1.4*   < >  --    < >  --   --    < > 3.7   BILITOTAL 0.2 0.2 0.3   < >  --    < >  --   --    < > 0.2   ALT 23 33 32   < >  --    < >  --   -- "    < > 21   AST 18 20 20   < >  --    < >  --   --    < > 40   PROTEIN  --   --   --   --  50*  --  20* 30*   < >  --    LIPASE  --   --   --   --   --   --   --   --   --  115    < > = values in this interval not displayed.           Active Problems:   Anemia    Assessment: The hemoglobin dipped to 5.1 yesterday, but after two units is 10.9.  Not clear whether the 5.1 or the 10.9 is a spurious result.  Suspect mucositis is the reason for black stool.  Little that can be done endoscopically for this.  On pantoprazole which should address peptic inflammation.  He is being evaluated for a PE also.  If found, anticoagulation will be difficult with ongoing bleeding.        Jason Jimenez MD  [facility]  Office:  [phone]

## 2020-11-18 NOTE — PROGRESS NOTES
Xcover.    Patient regarding hemoglobin of 5.1.  Patient has a conditional order to transfuse 1 unit of PRBC.  Patient needs an additional 1 unit which was ordered.     So in total transfuse 2 units of PRBC for hemoglobin of 5.1.

## 2020-11-18 NOTE — PROVIDER NOTIFICATION
Spoke to pharmacist regarding PPN, ok to use through central line. Pharmacist will speak with dietary regarding next bag.

## 2020-11-18 NOTE — PROVIDER NOTIFICATION
Web based paged MD regarding: , R 32, coughing. very SOB 02 92%, please see patient. Patient denies pain, chest pain. States when he coughs, heart races.

## 2020-11-18 NOTE — PROGRESS NOTES
[facility]    Hematology/Oncology Follow-up Note      Today's Date: 11/18/20  Date of Admission:  11/12/2020  Reason for Consult: Seminoma, admitted for neutropenic fever, severe mucositis        ASSESSMENT/PLAN:  Attapeu NO JUSTICE Diallo is a 55 year old male with:     Neutropenic fever: Secondary to BEP and C2 D8 completed on 10/19/2020.  ANC undetectable on 11/12 on admission.  Infectious disease consulted and receiving antibiotics.  He received Neulasta on 11/6 and then 3 doses of Neupogen from 11/13-11/15.  WBC has now climbed to 31.4, likely from Neupogen, but he is having fevers and infection could certainly be contributing.  Fever up to 102 again today.  He has been retested for Covid.  -Appreciate hospitalist cares and ID input    SOB: Likely multifactorial, possible fluid overload, anemia, but D-dimer elevated 8.7.  Plan for CT chest to rule out PE as he is also been hypoxic.  -Per hospitalist     Mucositis: Secondary to chemotherapy, severe over the weekend and slowly improving.  He is actually able to eat more yesterday, however at the mucositis has worsened again.  It may be contributing to GI bleed.     GI bleed: Overall improved, likely secondary to mucositis.  Seen by GI.  Hemoglobin dropped to 5.1 yesterday and he received 2 units of blood.  Hemoglobin 10.9 today.     Seminoma: Patient Dr Ramos currently on BEP chemotherapy and initially started with carboplatin, replaced with cisplatin for less myelosuppression. He has had a great response, but frequently admitted for neutropenic fevers.     Heme: Hemoglobin 7.9, secondary to chemotherapy and GI bleed.  Thrombocytopenia with platelets of 67, likely secondary to chemotherapy.  -Transfuse if hemoglobin <7.0 or further bleeding  -Transfuse if platelets <20 or further bleeding       Discussed with Dr Dennis and she agreed with the plan above.       INTERIM HISTORY: Patient was seen briefly in his room, was not initially aware  of his precautions.  History is obtained from sister out in the hallway. He has had increased coughing, more for lethargy, not feeling well, worsening mucositis with diarrhea and persistent dark stools.       MEDICATIONS:  Current Facility-Administered Medications   Medication     acetaminophen (TYLENOL) tablet 650 mg    Or     acetaminophen (TYLENOL) Suppository 650 mg     acyclovir (ZOVIRAX) 250 mg in D5W 50 mL intermittent infusion     allopurinol (ZYLOPRIM) tablet 300 mg     calcium carbonate 500 mg (elemental) (OSCAL) tablet 500 mg     calcium carbonate-vitamin D (OSCAL w/D) per tablet 1 tablet     ceFEPIme (MAXIPIME) 2 g vial to attach to  ml bag for ADULTS or 50 ml bag for PEDS     dexamethasone (DECADRON) alcohol-free oral solution 0.5 mg     dextrose 10% infusion     diphenhydrAMINE (BENADRYL) injection 25 mg     ferrous sulfate (FEROSUL) tablet 325 mg     fluconazole (DIFLUCAN) intermittent infusion 200 mg     guaiFENesin-dextromethorphan (ROBITUSSIN DM) 100-10 MG/5ML syrup 5 mL     heparin lock flush 10 UNIT/ML injection 2-5 mL     lidocaine (LMX4) cream     lidocaine (LMX4) cream     lidocaine 1 % 0.1-1 mL     lidocaine 1 % 0.1-1 mL     lidocaine 1 % 0.1-5 mL     lipids (INTRALIPID) 20 % infusion 250 mL     loratadine (CLARITIN) tablet 10 mg     LORazepam (ATIVAN) tablet 0.5 mg     magic mouthwash suspension (diphenhydramine, lidocaine, aluminum-magnesium & simethicone)     melatonin tablet 1 mg     multivitamin w/minerals (THERA-VIT-M) tablet 1 tablet     naloxone (NARCAN) injection 0.1-0.4 mg     nystatin (MYCOSTATIN) suspension 500,000 Units     ondansetron (ZOFRAN-ODT) ODT tab 4 mg    Or     ondansetron (ZOFRAN) injection 4 mg     oxyCODONE (ROXICODONE) solution 5 mg     pantoprazole (PROTONIX) IV push injection 40 mg     parenteral nutrition - Clinimix E     polyethylene glycol (MIRALAX) Packet 17 g     prochlorperazine (COMPAZINE) injection 5 mg    Or     prochlorperazine (COMPAZINE) tablet  "10 mg    Or     prochlorperazine (COMPAZINE) suppository 25 mg     rincinol P.R.N liquid 10 mL     senna-docusate (SENOKOT-S/PERICOLACE) 8.6-50 MG per tablet 1 tablet    Or     senna-docusate (SENOKOT-S/PERICOLACE) 8.6-50 MG per tablet 2 tablet     sodium chloride (PF) 0.9% PF flush 10 mL     sodium chloride (PF) 0.9% PF flush 10-20 mL     sodium chloride (PF) 0.9% PF flush 3 mL     sodium chloride (PF) 0.9% PF flush 3 mL     sodium chloride (PF) 0.9% PF flush 5-50 mL     tamsulosin (FLOMAX) capsule 0.4 mg     Vitamin D3 (CHOLECALCIFEROL) tablet 25 mcg         ALLERGIES:  No Known Allergies      PHYSICAL EXAM:  Vital signs:  Temp: 101.5  F (38.6  C) Temp src: Axillary BP: 127/77 Pulse: 131   Resp: (!) 32 SpO2: 92 % O2 Device: Nasal cannula Oxygen Delivery: 1 LPM Height: 154.9 cm (5' 1\") Weight: 45.5 kg (100 lb 6.4 oz)  Estimated body mass index is 18.97 kg/m  as calculated from the following:    Height as of this encounter: 1.549 m (5' 1\").    Weight as of this encounter: 45.5 kg (100 lb 6.4 oz).    GENERAL:  Male, moderate distress, ill-appearing  HEENT:  Normocephalic, atraumatic.   LUNGS:   Frequent cough  MSK: Full ROM UE.    SKIN: Blotchy rash to the legs  NEURO: Unable to assess  PSYCH: Withdrawn, sad      LABS:  CBC RESULTS:   Recent Labs   Lab Test 11/18/20  0600   WBC 31.4*   RBC 3.51*   HGB 10.9*   HCT 32.5*   MCV 93   MCH 31.1   MCHC 33.5   RDW 15.0   PLT 79*       Recent Labs   Lab Test 11/18/20  1210 11/18/20  0600 11/18/20  0020 11/17/20  0456 11/17/20  0456   NA  --  140  --   --  143   POTASSIUM  --  3.7 4.0   < > 3.2*   CHLORIDE  --  115*  --   --  117*   CO2  --  19*  --   --  19*   ANIONGAP  --  6  --   --  7   * 121* 138*  --  95   BUN  --  21  --   --  25   CR  --  1.56*  --   --  1.67*   IVANIA  --  5.6*  --   --  5.5*    < > = values in this interval not displayed.           Araceli Pacheco PA-C  Hematology/Oncology  [facility]      "

## 2020-11-18 NOTE — PROGRESS NOTES
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  Patient is Laotian speaking and also has developmental delay and a difficult to obtain history from him.  History was obtained from his sister. Patient is on chemotherapy for seminoma of the testicle.  Patient received chemotherapy on Monday.  He was also transfused blood on Monday.  Patient had reaction on second unit of blood after 30 minutes of transfusion.  On Tuesday patient went to the clinic to get IV fluids.  He started to have fever and chills last night.  He had follow-up with urology today and had voiding trial and removal of Palafox catheter.  Patient was sent to emergency room because of his fever and chills.  In the ER, he was noted to be febrile and tachycardic.  Laboratory work-up showed normal electrolytes.  Creatinine 1.37.  , ALT 59, AST 25, lactic acid 1.0.  WBC 0.1 differential count not done due to low WBC below 0.5.  Chest x-ray showed mild bilateral lung base atelectasis or infiltrate.  He was given IV fluids, IV cefepime and vancomycin emergency room.  He was admitted for further management.           Patient progress during stay:     Patient was continued with intravenous antibiotic and was closely monitored.  Both hematology oncology as well as infectious history was consulted for further evaluation recommendations.  He was transfused a unit of blood on November 13, 2020 for severe anemia with hemoglobin of 6.6.     He was treated with nystatin as well as Magic mouthwash for mucositis.  Patient developed Lower GI bleed on November 14, 2020 with episodes of bloody stool.  He  required additional blood transfusions.     Mucositis remained a problem with decreased intake due to mouth sores.  Patient was encouraged to use the Magic mouthwash as well as nystatin.  Oncologist recommended Decadron topical mouth  solution as well.      He continues to have fevers. On 11/18 he has been developing worsening coughs, and tachycardia. D-dimer obtained which is significantly positive.  Covid test has been reordered. CTA chest ordered as well    Neutropenic fever. Neutropenia has resolved but still persistent fevers.   Patient on broad-spectrum antibiotic with cefepime, fluconazole and acyclovir. Cultures negative to date.  Continue his to have fevers re cultured, possibly transfusion related?.  Infectious disease  has been consulted. When okay for discharge can be discharged on levofloxacin and acyclovir. COVID test has been re-ordered on patient. Clostridium difficile ordered as well     Elevated d-dimer, hypoxia, tachycardia. In the setting of his malignancy and being bed bound CTA of chest ordered    Acute GI bleed with melanotic stools.  Suspect secondary to his mucositis continue conservative management per the GI team.  Continue IV pantoprazole GI daily. Has received multiple blood transfusions    Pancytopenia secondary to chemotherapy.  Transfuse platelets for his GI bleed if platelets less than 20 per oncology.  Has received multiple transfusions due to his severe anemia.  Most recent 11/17. Received Neulasta on November 6 and recommended additional Neupogen during the stay daily until absolute neutrophil count is over 500 for 2 days or over 1500 per oncology service.     Mucositis secondary to chemotherapy.  Major issue in his nutrition status has been started on PPN.  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  I have discontinued his fluids as he has  "been starting on PPN.  Further nephrotoxins    Electrolyte impairments hypocalcemia hypokalemia hypomagnesia.  Replace as needed    Urinary retention requiring intermittent catheterization    Diffuse body rash  Maculopapular, will continue to monitor, possibly transfusion reaction, chemotherapy related?  ---------     # Code status: Full  # Anticipated discharge date and Disposition: 2-3 days  # DVT: SCDs  # IVF: PPN                      Stephanie López MD  Text Page (7am - 6pm, M-F)               Subjective   Chief Complaint: Weakness  Subjective: Still having difficulty eating has pain in his mouth and in his throat he has poor appetite.  He has coughing.  Was no other complaints no abdominal pain nausea vomiting has his diffuse body rash.      Objective   /67 (BP Location: Right arm)   Pulse 131   Temp 102.7  F (39.3  C) (Axillary)   Resp (!) 32   Ht 1.549 m (5' 1\")   Wt 45.5 kg (100 lb 6.4 oz)   SpO2 92%   BMI 18.97 kg/m       Physical Exam  General: Pt in NAD, normal appearance  HEENT: Fuhs mucositis in his mouth and lips no JVD  Lungs: Bibasilar crackles, normal breathing  without accessory muscle usage, no wheezing, rhonchi or crackles  Cardiac: +S1, S2, tacycardia, no MRG, no edema  Abdominal: normal bowel sounds, NT/ND  Skin: Loose body maculopapular rash  Psyche: A& O x3, appropriate affect       Intake/Output Summary (Last 24 hours) at 11/17/2020 1353  Last data filed at 11/17/2020 1322  Gross per 24 hour   Intake 800 ml   Output 1625 ml   Net -825 ml           Labs and Imaging Results:      Recent Labs   Lab 11/18/20  0600 11/17/20  1830 11/17/20  0456   WBC 31.4*  --  8.9   HGB 10.9* 5.1* 7.9*   PLT 79*  --  67*        Recent Labs   Lab 11/18/20  0600 11/17/20  0456    143   CO2 19* 19*   BUN 21 25        Recent Labs   Lab 11/18/20  0600 11/12/20  1601   INR 1.13 1.00   PTT  --  38*      No results for input(s): CKMB in the last 168 hours.    Invalid input(s): TROPONINT     Recent Labs "   Lab 11/17/20  0456 11/16/20  0715   ALBUMIN 1.4* 1.4*   AST 18 20   ALT 23 33   ALKPHOS 207* 205*        Micro:     Radio:  XR Chest Port 1 View   Final Result   IMPRESSION: Heart size is upper limit of normal. Scattered bibasilar   opacities have progressed since previous exam, concerning for   worsening pneumonia. Suspect small bilateral pleural effusions. No   pneumothorax. Pulmonary edema is consideration, clinical correlation   necessary.      DENIS RAMIREZ MD      XR Chest Port 1 View   Final Result   IMPRESSION: Low lung volumes with mild bilateral lung base atelectasis   versus infiltrate with possible small pleural effusions. No overt   pulmonary edema. Increased mediastinal widening/hilar enlargement may   be due to low lung volumes but progression of known thoracic   lymphadenopathy not excluded.      YEMI IDAZ MD      CTA Chest with Contrast    (Results Pending)           Medications:      Scheduled Meds:      acyclovir (ZOVIRAX) IV  5 mg/kg Intravenous Q12H     allopurinol  300 mg Oral Daily     calcium carbonate 500 mg (elemental)  500 mg Oral BID w/meals     calcium carbonate-vitamin D  1 tablet Oral BID w/meals     ceFEPIme (MAXIPIME) IV  2 g Intravenous Q12H     dexamethasone  0.5 mg Oral Q8H     ferrous sulfate  325 mg Oral Daily with breakfast     fluconazole  200 mg Intravenous Q24H     lipids  250 mL Intravenous Q24H     loratadine  10 mg Oral Daily     multivitamin w/minerals  1 tablet Oral Daily     nystatin  500,000 Units Swish & Spit 4x Daily     pantoprazole (PROTONIX) IV  40 mg Intravenous Q12H BMT CSA     polyethylene glycol  17 g Oral Daily     senna-docusate  1 tablet Oral BID    Or     senna-docusate  2 tablet Oral BID     sodium chloride (PF)  10 mL Intracatheter Q8H     sodium chloride (PF)  3 mL Intracatheter Q8H     tamsulosin  0.4 mg Oral Daily     cholecalciferol  25 mcg Oral BID     Continuous Infusions:      dextrose       parenteral nutrition - Clinimix E 80 mL/hr  at 11/18/20 0752     PRN Meds:  acetaminophen **OR** acetaminophen, dextrose, diphenhydrAMINE, guaiFENesin-dextromethorphan, heparin lock flush, lidocaine 4%, lidocaine 4%, lidocaine (buffered or not buffered), lidocaine (buffered or not buffered), lidocaine (buffered or not buffered), LORazepam, lidocaine visc 2% & maalox/mylanta w/simethicone & diphenhydramine, melatonin, naloxone, ondansetron **OR** ondansetron, oxyCODONE, prochlorperazine **OR** prochlorperazine **OR** prochlorperazine, rincinol P.R.N, sodium chloride (PF), sodium chloride (PF), sodium chloride (PF)

## 2020-11-18 NOTE — PROGRESS NOTES
[facility]  Infectious Disease Progress Note          Assessment and Plan:   IMPRESSION:   1.  A 55-year-old male, well known to me from recent admission, now admitted with acute fever and profound neutropenia from chemotherapy, no clear focal infection, mild respiratory symptoms and recent urosepsis as possible sources, but unclear.   2.  Seminoma with recent chemotherapy, now profoundly neutropenic.   3.  Recent episode of urosepsis with Klebsiella and then subsequent episode of sepsis resolved with oral Cipro without clearcut infection being found.   4.  Latent tuberculosis, recent new diagnosis.  No evidence of active tuberculosis.   5.  COVID-19 rule out, negative times several.   6.  Slight hypoxia, minimal chest x-ray abnormality.   7.  Renal insufficiency.   8 Severe stomatitis, ? Chemo vs HSV     RECOMMENDATIONS:   1.    Despite marrow recovery and white cells rising, major worsening primarily respiratory and fever.  CT scan shows new worsening infiltrates.  Large differential diagnosis here, essentially neutropenic related potential pneumonia, conceivably underlying disease, multiple possibilities large differential diagnosis  2 diarrhea, C. difficile pending, less diarrhea now doubt this is C. Difficile.  3 spanned antibiotics to meropenem, doxycycline  4 get fungal serologies, lung other things still concern for potential TB in the background  5 repeat COVID-19 testing but this is unlikely to be the diagnosis  6 continue acyclovir, mouth is improving  7 DC fluconazole increase we need to go to voriconazole  8 low threshold here for pulmonary consult             Interval History:   improved swallowing, stomatitis, some vesicular + HSV, cxs neg Tdown WBC 31 K , leukemoid rxn from marrow recovery  Major hypoxia, CT noted  Sig infiltrates, some diarrhea              Medications:       acyclovir (ZOVIRAX) IV  5 mg/kg Intravenous Q12H     allopurinol  300 mg Oral Daily     calcium  "carbonate 500 mg (elemental)  500 mg Oral BID w/meals     calcium carbonate-vitamin D  1 tablet Oral BID w/meals     dexamethasone  0.5 mg Oral Q8H     doxycycline (VIBRAMYCIN) IV  100 mg Intravenous Q12H     ferrous sulfate  325 mg Oral Daily with breakfast     lipids  250 mL Intravenous Q24H     loratadine  10 mg Oral Daily     meropenem  1 g Intravenous Q12H     multivitamin w/minerals  1 tablet Oral Daily     nystatin  500,000 Units Swish & Spit 4x Daily     pantoprazole (PROTONIX) IV  40 mg Intravenous Q12H BMT CSA     polyethylene glycol  17 g Oral Daily     senna-docusate  1 tablet Oral BID    Or     senna-docusate  2 tablet Oral BID     sodium chloride (PF)  10 mL Intracatheter Q7 Days     sodium chloride (PF)  3 mL Intracatheter Q8H     tamsulosin  0.4 mg Oral Daily     cholecalciferol  25 mcg Oral BID                  Physical Exam:   Blood pressure 105/63, pulse 121, temperature 98.3  F (36.8  C), temperature source Axillary, resp. rate 22, height 1.549 m (5' 1\"), weight 45.5 kg (100 lb 6.4 oz), SpO2 93 %.  Wt Readings from Last 2 Encounters:   11/12/20 45.5 kg (100 lb 6.4 oz)   11/09/20 46.3 kg (102 lb)     Vital Signs with Ranges  Temp:  [97.5  F (36.4  C)-102.7  F (39.3  C)] 98.3  F (36.8  C)  Pulse:  [] 121  Resp:  [20-32] 22  BP: ()/(52-77) 105/63  SpO2:  [92 %-97 %] 93 %    Constitutional: Awake, alert, cooperative, no apparent distress severe stomatitis tolips   Lungs: Clear to auscultation bilaterally, no crackles or wheezing   Cardiovascular: Regular rate and rhythm, normal S1 and S2, and no murmur noted   Abdomen: Normal bowel sounds, soft, non-distended, non-tender   Skin: No rashes, no cyanosis, no edema   Other:           Data:   All microbiology laboratory data reviewed.  Recent Labs   Lab Test 11/18/20  0600 11/17/20  1830 11/17/20  0456 11/16/20  1917 11/16/20  0840   WBC 31.4*  --  8.9  --  2.9*   HGB 10.9* 5.1* 7.9* 8.1* 7.0*   HCT 32.5*  --  24.1*  --  22.3*   MCV 93  --  92 "  --  95   PLT 79*  --  67* 61* 58*     Recent Labs   Lab Test 11/18/20  0600 11/17/20  0456 11/16/20  0715   CR 1.56* 1.67* 1.70*     No lab results found.  Recent Labs   Lab Test 11/17/20  1108 11/17/20  1104 11/12/20  1631 11/12/20  1620 11/12/20  1559 10/26/20  1803 10/26/20  1531 10/26/20  1454 10/19/20  1748   CULT No growth after 1 day No growth after 1 day No growth No growth No growth No growth No growth No growth No growth

## 2020-11-18 NOTE — PLAN OF CARE
To Do:  End of Shift Summary  For vital signs and complete assessments, please see documentation flowsheets.     Pertinent assessments:T max 101.2, prn tylenol x2 for fevers, last check 99.9 ax. Tachycardic, otherwise vss. Oral mucosa pain continues, denied interventions. Mucositis improving, see flow sheets. Minimal oral intake, 250 ml's of soup from home. Bowel sounds active, 3 black stools during shift. Palafox patent. 2+ penile edema, elevated, ice applied w/ minimal changes. [place] sores -cleansed, barrier applied. Barrier to moisture spot on coccyx. Trace edema throughout, Improvement in Left arm 2+ to 1+. Has not been out of bed. Full body rash continues  Major Shift Event: Phos, K+, mag  replaced. Midline placed     Treatment: Monitor lab and vitals. Nystatin/magic mouth wash. Administering antibiotics.    Bedside Nurse: Alphonse Corrales RN

## 2020-11-18 NOTE — PROGRESS NOTES
Paged MD regarding elevated temps. MD ordered cooling blanket. Called ICU, cooling blanket in use. Paged MD and will ice packs being used.

## 2020-11-18 NOTE — PLAN OF CARE
End of Shift Summary  For vital signs and complete assessments, please see documentation flowsheets.     Pertinent assessments: AO. Tachycardic. Tmax 102.7, tylenol given, ice pack applied, improving. Shallow breathing, incessant nonproductive cough, Robitussin DM. No bowel movements. Ice to Penis/scrotum, red, edematous, groin with excoriated areas. Redness to whole body. Edema to bilateral arms, hips, BLE. Lips also edematous, cracked, bleeding. Sister used for . PPN paused while PICC placed.    Major Shift Event: Finished phos replacement, recheck completed. HR 140s, with coughing. EKG completed. Tele started. Enteric and covid 19 isolation added. Covid test collected. Chest xray comptleted. D Dimer elevated.. CTA chest with contrast. PICC placed. Triggered sepsis lactic 1.1.    Treatment: Monitor lab and vitals. Nystatin/magic mouth wash. Transfusion support. PPN/lipids.

## 2020-11-18 NOTE — PLAN OF CARE
"  End of Shift Summary  For vital signs and complete assessments, please see documentation flowsheets.     Pertinent assessments: Frequent dry cough. LS: clear. Low grade temperatures, gave tylenol, improved. Tachycardic. Lips and buccal mucosa continue to be swollen and dry. Diffuse body rash still evident. R arm edema 2+, arm elevated. Penile edema noted, iced and elevated. Urethral catheter patent, good output. 1 loose black stool.  Major Shift Event: PPN and lipids started. Hgb: 5.1, 1 unit of PRBCs administered.    Treatment: Monitor lab and vitals. Nystatin/magic mouth wash. Administering antibiotics. Administer PRBCs.  /60   Pulse 93   Temp 98.8  F (37.1  C) (Axillary)   Resp 22   Ht 1.549 m (5' 1\")   Wt 45.5 kg (100 lb 6.4 oz)   SpO2 95%   BMI 18.97 kg/m      "

## 2020-11-18 NOTE — PROCEDURES
[facility]    Double Lumen PICC Placement    Date/Time: 11/18/2020 2:40 PM  Performed by: Saida De León RN  Authorized by: Stephanie López MD   Indications: vascular access    UNIVERSAL PROTOCOL   Site Marked: NA  Prior Images Obtained and Reviewed:  Yes  Required items: Required blood products, implants, devices and special equipment available    Patient identity confirmed:  Verbally with patient, arm band and hospital-assigned identification number  Patient was reevaluated immediately before administering moderate or deep sedation or anesthesia  Confirmation Checklist:  Patient's identity using two indicators, relevant allergies, procedure was appropriate and matched the consent or emergent situation and correct equipment/implants were available  Time out: Immediately prior to the procedure a time out was called    Universal Protocol: the Joint Commission Universal Protocol was followed    Preparation: Patient was prepped and draped in usual sterile fashion    ESBL (mL):  4         ANESTHESIA    Anesthesia: Local infiltration  Local Anesthetic:  Lidocaine 1% without epinephrine  Anesthetic Total (mL):  1      SEDATION    Patient Sedated: No        Preparation: skin prepped with ChloraPrep  Type of line used: Power PICC  Catheter type: double lumen  Lumen type: valved  Catheter size: 5 Fr  Brand: [employer]leine [employer]  Lot number: [id number]  Placement method: venipuncture, MST, ultrasound and tip confirmation system  Number of attempts: OVER WIRE EXCHANGE.  Successful placement: yes  Orientation: left  Location: basilic vein (OVER WIRE EXCHANGE FROM MIDLINE)  Arm circumference: adults 10 cm  Extremity circumference: 21  Visible catheter length: 4  Internal length: 44 cm  Total catheter length: 48  Dressing and securement: chlorhexidine patch applied, blood cleaned with CHG, blood removed, sterile dressing applied, site cleaned and securement device  Post procedure assessment: blood return through all  ports and free fluid flow  PROCEDURE   Patient Tolerance:  Patient tolerated the procedure well with no immediate complications  Describe Procedure: Ok to use PICC line, verified with 3cg Tip Confirmation. AZUCENA Manuel informed ok to use.     Sister at bedside during placement.

## 2020-11-19 NOTE — PLAN OF CARE
For vital signs and complete assessments, please see documentation flowsheets.     Pertinent assessments: HR tachy 110s-120s, tmax 100.4 (tylenol), tachypneic 20s-30. Denies SOB though appears to be. 6L oxymask to maintain saturations at rest, 8L during coughing fits. Lips/oral mucosa scabbed, swollen. Lungs w/ crackles in bases. Frequent cough. 1+-2+ edema in arms, hips, BLE. Red rash to whole body. Refusing almost all oral intake. L picc, PPN/lipids. Scrotal/penile edema, groin reddened/excoriated. Palafox in place.incontinent of green/black stool.    Major Shift Events: replaced phos    Treatment Plan: Supplemental oxygen, supportive cares. IV Merrem, Doxycycline, acyclovir. Nystatin for thrush.

## 2020-11-19 NOTE — PROGRESS NOTES
[facility]    Hematology/Oncology Follow-up Note      Today's Date: 11/19/20  Date of Admission:  11/12/2020  Reason for Consult: Seminoma, admitted for neutropenic fever, severe mucositis, now SOB        ASSESSMENT/PLAN:  Attapeu NO JUSTICE Diallo is a 55 year old male with:     Neutropenic fever: Secondary to BEP and C2 D8 completed on 10/19/2020.  ANC undetectable on 11/12 on admission.  Infectious disease consulted and receiving antibiotics.  He received Neulasta on 11/6 and then 3 doses of Neupogen from 11/13-11/15.  Continues to be febrile and WBC climbing to over 50 now, see below.  No clear cause at this point.  -Appreciate hospitalist cares and ID input     SOB: Hypoxia, now on High flow O2, CT neg for PE, concern for TB or pneumonia. ID closely involved. Could possibly be bleomycin toxicity (occurs in 10% of patients) and will need to r/o everything else first.   -Would recommend hospitalist connect with Radiologist on imaging appearing similar to lung toxicity     Mucositis: Secondary to chemotherapy, severe over the weekend and slowly improved.      GI bleed: Overall improved, likely secondary to mucositis.  Seen by GI.  Hemoglobin dropped to 5.1 yesterday and he received 2 units of blood.  Hemoglobin 11.5 today.     Seminoma: Patient Dr Ramos currently on BEP chemotherapy and initially started with carboplatin, replaced with cisplatin for less myelosuppression. He has had a great response, but frequently admitted for neutropenic fevers. Now with SOB, hypoxia and will need to consider possible bleo toxicity.     Heme: Hemoglobin significantly improved at 11.5.  Thrombocytopenia improved with platelets of 88, secondary to chemotherapy.  WBC 52.4, he did receive Neulasta on 11/6 and Neupogen x3 from 11/13-11/15.  Concern for possible TB, pneumonia and infection could certainly be contributing.  -Transfuse if hemoglobin <7.0 or further bleeding  -Transfuse if platelets <20 or  further bleeding        Discussed with Dr Dennis and she agreed with the plan above.       INTERIM HISTORY: Rigoberto was seen virtually due to his isolation for possible TB.  High flow oxygen in place.  He is able to speak, but does appear to be tachypneic.  He is getting pretty worn out.  He was also needed to have a bowel movement today.  Appetite is been a little low.  No other new complaints.       MEDICATIONS:  Current Facility-Administered Medications   Medication     acetaminophen (TYLENOL) tablet 650 mg    Or     acetaminophen (TYLENOL) Suppository 650 mg     acyclovir (ZOVIRAX) 250 mg in D5W 50 mL intermittent infusion     allopurinol (ZYLOPRIM) tablet 300 mg     calcium carbonate 500 mg (elemental) (OSCAL) tablet 500 mg     calcium carbonate-vitamin D (OSCAL w/D) per tablet 1 tablet     dexamethasone (DECADRON) alcohol-free oral solution 0.5 mg     dextrose 10% infusion     diphenhydrAMINE (BENADRYL) injection 25 mg     doxycycline (VIBRAMYCIN) 100 mg vial to attach to  mL bag     ferrous sulfate (FEROSUL) tablet 325 mg     guaiFENesin-dextromethorphan (ROBITUSSIN DM) 100-10 MG/5ML syrup 5 mL     lidocaine (LMX4) cream     lidocaine 1 % 0.1-1 mL     lipids (INTRALIPID) 20 % infusion 250 mL     loratadine (CLARITIN) tablet 10 mg     LORazepam (ATIVAN) tablet 0.5 mg     magic mouthwash suspension (diphenhydramine, lidocaine, aluminum-magnesium & simethicone)     melatonin tablet 1 mg     meropenem (MERREM) 1 g vial to attach to  mL bag     multivitamin w/minerals (THERA-VIT-M) tablet 1 tablet     naloxone (NARCAN) injection 0.1-0.4 mg     nystatin (MYCOSTATIN) suspension 500,000 Units     ondansetron (ZOFRAN-ODT) ODT tab 4 mg    Or     ondansetron (ZOFRAN) injection 4 mg     oxyCODONE (ROXICODONE) solution 5 mg     pantoprazole (PROTONIX) IV push injection 40 mg     parenteral nutrition - Clinimix E     parenteral nutrition - Clinimix E     polyethylene glycol (MIRALAX) Packet 17 g      "prochlorperazine (COMPAZINE) injection 5 mg    Or     prochlorperazine (COMPAZINE) tablet 10 mg    Or     prochlorperazine (COMPAZINE) suppository 25 mg     rincinol P.R.N liquid 10 mL     senna-docusate (SENOKOT-S/PERICOLACE) 8.6-50 MG per tablet 1 tablet    Or     senna-docusate (SENOKOT-S/PERICOLACE) 8.6-50 MG per tablet 2 tablet     sodium chloride (PF) 0.9% PF flush 10 mL     sodium chloride (PF) 0.9% PF flush 10-20 mL     sodium chloride (PF) 0.9% PF flush 3 mL     sodium chloride (PF) 0.9% PF flush 3 mL     sodium chloride (PF) 0.9% PF flush 5-50 mL     tamsulosin (FLOMAX) capsule 0.4 mg     Vitamin D3 (CHOLECALCIFEROL) tablet 25 mcg     voriconazole (VFEND) tablet 200 mg         ALLERGIES:  No Known Allergies      PHYSICAL EXAM:  Vital signs:  Temp: 101.8  F (38.8  C) Temp src: Axillary BP: 105/63 Pulse: 125   Resp: 24 SpO2: 94 % O2 Device: High Flow Nasal Cannula (HFNC) Oxygen Delivery: 35 LPM Height: 154.9 cm (5' 1\") Weight: 45.5 kg (100 lb 6.4 oz)  Estimated body mass index is 18.97 kg/m  as calculated from the following:    Height as of this encounter: 1.549 m (5' 1\").    Weight as of this encounter: 45.5 kg (100 lb 6.4 oz).    GENERAL:  Male, in mild distress.  Alert and oriented x3. Well groomed.   HEENT:  Normocephalic, atraumatic. No conjunctival injection or eye swelling.   LUNGS: Tachypnea, coarse cough, no audible wheezing over video.  MSK: Full ROM UE.    SKIN: Blotchiness to legs and arms, not seen up close.  NEURO: CN grossly intact, speech normal  PSYCH: Mentation appears normal, insight and judgement intact        LABS:  CBC RESULTS:   Recent Labs   Lab Test 11/19/20  1127   WBC 52.4*   RBC 3.77*   HGB 11.5*   HCT 34.4*   MCV 91   MCH 30.5   MCHC 33.4   RDW 15.7*   PLT 88*       Recent Labs   Lab Test 11/19/20  0655 11/18/20  1747 11/18/20  0600 11/18/20  0600     --   --  140   POTASSIUM 3.5  --   --  3.7   CHLORIDE 112*  --   --  115*   CO2 21  --   --  19*   ANIONGAP 7  --   --  6 "   * 114*   < > 121*   BUN 18  --   --  21   CR 1.34*  --   --  1.56*   IVANIA 5.9*  --   --  5.6*    < > = values in this interval not displayed.       IMAGING:  CT PULMONARY ANGIOGRAM OF THE CHEST WITH INTRAVENOUS CONTRAST  11/18/2020 3:21 PM      HISTORY: Shortness of breath.     TECHNIQUE:  CT pulmonary angiogram of the chest was performed  following the administration of iopamidol (ISOVUE-370) solution 53 mL.  Radiation dose for this scan was reduced using automated exposure  control, adjustment of the mA and/or kV according to patient size, or  iterative reconstruction technique.     COMPARISON: CT of the chest, abdomen and pelvis dated 10/26/2020.     FINDINGS:  No definite evidence for a pulmonary embolism.     There are diffuse patchy and confluent groundglass opacities primarily  within the lower lobes and to a lesser extent in the upper lobes and  right middle lobe. There are small bilateral pleural effusions. There  are atelectatic changes in the lower lobes. Again identified are  mildly prominent mediastinal and axillary lymph nodes. These are not  significantly changed in size. There is a left-sided PICC with tip in  satisfactory position in the low superior vena cava.                                                                      IMPRESSION: Patchy and confluent groundglass opacities in the lungs as  above. Findings would suggest a multifocal pneumonia. In addition,  there are small bilateral pleural effusions with adjacent atelectasis.          Araceli Pacheco PA-C  Hematology/Oncology  [facility]

## 2020-11-19 NOTE — PROGRESS NOTES
Clinical Nutrition - Brief Note    RD following for parenteral nutrition management   PICC placed yesterday; now infusing PPN through central line   With next bag, OK to transition to TPN formula goal: Clinimix D15 AA5 at 65 mL/hr with 250 mL 20% lipids daily = 1560 mL, 1607 kcal (35 kcal/kg), 224 gm Dex, 78 gm amino acid (1.7 gm protein/kg) and 31% fat. GIR 3.4  DW 45.5 kg  Total fluid (IVF + TPN) =  per MD    Labs reviewed  Medications reviewed      Plan:  Start new bag and run at 40 mL/hr. After 24 hrs if Mg, K WNL and Phos >/=1.9 with replacements, advance to goal rate of 65 mL/hr     Spoke with Pharmacy and Interdisciplinary care team in rounds     Sanna Babcock RD, LD  Clinical Dietitian

## 2020-11-19 NOTE — PROGRESS NOTES
"[facility]  [facility]  Gastroenterology Progress Note    Interval History:    Spoke with nurse. Patient had BM at end of night shift, but unclear appearance. Looks like green/black stools documented overnight. Patient denies abdominal pain. Hemoglobin stable at 10.9 this morning. Remains intermittently febrile.    Physical Exam:    /76 (BP Location: Right arm)   Pulse 133   Temp 100.2  F (37.9  C) (Axillary)   Resp 24   Ht 1.549 m (5' 1\")   Wt 45.5 kg (100 lb 6.4 oz)   SpO2 90%   BMI 18.97 kg/m    Temp (24hrs), Av.7  F (37.6  C), Min:97.5  F (36.4  C), Max:102.7  F (39.3  C)    No data found.    Intake/Output Summary (Last 24 hours) at 2020 0850  Last data filed at 2020 0657  Gross per 24 hour   Intake 2510 ml   Output 2870 ml   Net -360 ml       Constitutional: No acute distress.  Cardiovascular: Tachycardia.  Respiratory: O2 face mask in place.  Abdomen: Soft, nondistended, nontender.  Skin: No jaundice.    Additional Comments:  ROS, FH, SH: See initial GI consult for details.    Laboratory Data:  Recent Labs   Lab Test 20  0655 20  1747 20  0600 20  0456 20  0456 20  1601 20  1601 10/26/20  1453 10/26/20  1453   WBC 49.5*  --  31.4*  --  8.9   < > 0.1*   < > 35.9*   HGB 10.9* 9.9* 10.9*   < > 7.9*   < > 7.8*   < > 7.6*   MCV 91  --  93  --  92   < > 91   < > 97   PLT 89*  --  79*  --  67*   < > 105*   < > 832*   INR  --   --  1.13  --   --   --  1.00  --  1.03    < > = values in this interval not displayed.     Recent Labs   Lab Test 20  0655 20  0600 20  0020 20  0456 206    140  --   --  143   POTASSIUM 3.5 3.7 4.0   < > 3.2*   CHLORIDE 112* 115*  --   --  117*   CO2 21 19*  --   --  19*   BUN 18 21  --   --  25   CR 1.34* 1.56*  --   --  1.67*   ANIONGAP 7 6  --   --  7   IVANIA 5.9* 5.6*  --   --  5.5*    < > = values in this interval not displayed.     Recent Labs   Lab " Test 11/18/20  0600 11/17/20  0456 11/16/20  0715 11/15/20  0256 11/12/20  1620 11/12/20  1620 10/26/20  2240 10/26/20  2240 10/26/20  1803 09/25/20  0705 09/25/20  0705 09/23/20  2112 09/23/20  2112   ALBUMIN  --  1.4* 1.4* 1.4*   < >  --    < >  --   --    < > 2.6*   < > 3.7   BILITOTAL  --  0.2 0.2 0.3   < >  --    < >  --   --    < > 0.2  --  0.2   DBIL <0.1 0.1  --   --   --   --   --   --   --   --  <0.1  --  0.1   ALT  --  23 33 32   < >  --    < >  --   --    < > 17  --  21   AST  --  18 20 20   < >  --    < >  --   --    < > 57*  --  40   ALKPHOS  --  207* 205* 290*   < >  --    < >  --   --    < > 491*  --  528*   PROTEIN  --   --   --   --   --  50*  --  20* 30*   < >  --    < >  --    LIPASE  --   --   --   --   --   --   --   --   --   --   --   --  115    < > = values in this interval not displayed.       Imaging / Endoscopy:  No pertinent results.    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 Has also been having melena. Today patient is tachycardic, remains on face mask for O2 delivery. ID following, pulm consulted as well.    1) Melena: Suspect secondary to mucositis. Still passing some darker stools. Hemoglobin stable around 10 for the past 24 hours. EGD 1 year ago (for iron deficiency anemia) showed nonbleeding gastric and duodenal ulcers along with a large hiatal hernia.         - Monitor hemoglobin and stool output.         - BID PPI.         - Continue magic mouthwash, topical steroid for mucositis.         - Continue conservative management.         - GI will be available PRN. Call if ongoing concern for GI bleeding.    Discussed with Dr. Jimenez. GI will sign off at this time. Please call if further assistance is needed.    Petty Sandoval PA-C  [facility] ([facility])

## 2020-11-19 NOTE — PROGRESS NOTES
11/19/20 0740   Significant Event   Significant Event Critical result/value  (calcium 5.9)     07:39 - Writer notified Dr. López of above critical lab result via web based page. Awaiting direction of MD.    08:16 - Writer repaged Dr. López regarding critical lab result. Awaiting further orders.    08:25 - Dr. López acknowledged critical lab result. Chart will be reviewed by MD. No new orders at this time.    12:17 - Writer notified Dr. López regarding critical WBC of 52.4.

## 2020-11-19 NOTE — PLAN OF CARE
End of Shift Summary  For vital signs and complete assessments, please see documentation flowsheets.     Pertinent assessments: Tachycardic 100-120s. Lung sounds with crackles from mid to lower lobes bilaterally. Oxygen increased to 5LPM via oxymask. Appears dyspneic but pt denies any shortness of breath. Tachypneic at times, particularly after coughing. Oxygen desats noted with coughing. Robitussin DM given x1. Generalized edema noted with regional rash. Per sister, rash is new within last couple days. Open/excoriated groin/scrotum. Lips/oral mucosa very dry/cracked. Poor appetite, refused dinner.   Major Shift Events: Increased oxygen needs via oxymask.   Treatment Plan: Supplemental oxygen, supportive cares. IV Merrem, Doxycycline, acyclovir. Nystatin for thrush.

## 2020-11-19 NOTE — PROGRESS NOTES
"The HFNC was applied to the pt @ 35LPM and 70% for PEEP therapy. Skin looks good and remains intact.     Vital signs:  Temp: 101.8  F (38.8  C) Temp src: Axillary BP: 105/63 Pulse: 125   Resp: 24 SpO2: 94 % O2 Device: High Flow Nasal Cannula (HFNC) Oxygen Delivery: 35 LPM Height: 154.9 cm (5' 1\") Weight: 45.5 kg (100 lb 6.4 oz)  Estimated body mass index is 18.97 kg/m  as calculated from the following:    Height as of this encounter: 1.549 m (5' 1\").    Weight as of this encounter: 45.5 kg (100 lb 6.4 oz).    Past Medical History:   Diagnosis Date     Mental retardation        Past Surgical History:   Procedure Laterality Date     ABDOMEN SURGERY      sister states he had an \"abdominal surgery 20 years ago\" unknown      CYSTOSCOPY, RETROGRADES, INSERT STENT URETER(S), COMBINED Bilateral 9/24/2020    Procedure: Cystoscopy with bilateral retrograde pyelogram and bilateral ureteral stent insertiona-complex due to severe bilateral hydroureteronephrosis with ureteral tortuosity, examination under anesthesia and fluoroscopic interpretation > 1 hour physician time;  Surgeon: Ralf Chan MD;  Location:  OR     ESOPHAGOSCOPY, GASTROSCOPY, DUODENOSCOPY (EGD), COMBINED N/A 3/1/2019    Procedure: ESOPHAGOSCOPY, GASTROSCOPY, DUODENOSCOPY (EGD) Rm 226;  Surgeon: Susy Camara MD;  Location:  GI       Family History   Problem Relation Age of Onset     Diabetes Father      Glaucoma No family hx of      Macular Degeneration No family hx of        Social History     Tobacco Use     Smoking status: Never Smoker     Smokeless tobacco: Never Used   Substance Use Topics     Alcohol use: Yes     Alcohol/week: 2.0 standard drinks     Types: 1 Glasses of wine, 1 Cans of beer per week     Comment: once every month or two, social functions only     Liam Zafar [facility]  11/19/2020      "

## 2020-11-19 NOTE — PROGRESS NOTES
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  Patient is Laotian speaking and also has developmental delay and a difficult to obtain history from him.  History was obtained from his sister. Patient is on chemotherapy for seminoma of the testicle.  Patient received chemotherapy on Monday.  He was also transfused blood on Monday.  Patient had reaction on second unit of blood after 30 minutes of transfusion.  On Tuesday patient went to the clinic to get IV fluids.  He started to have fever and chills last night.  He had follow-up with urology today and had voiding trial and removal of Palafox catheter.  Patient was sent to emergency room because of his fever and chills.  In the ER, he was noted to be febrile and tachycardic.  Laboratory work-up showed normal electrolytes.  Creatinine 1.37.  , ALT 59, AST 25, lactic acid 1.0.  WBC 0.1 differential count not done due to low WBC below 0.5.  Chest x-ray showed mild bilateral lung base atelectasis or infiltrate.  He was given IV fluids, IV cefepime and vancomycin emergency room.  He was admitted for further management.           Patient progress during stay:     Patient was continued with intravenous antibiotic and was closely monitored.  Both hematology oncology as well as infectious history was consulted for further evaluation recommendations.  He was transfused a unit of blood on November 13, 2020 for severe anemia with hemoglobin of 6.6.     He was treated with nystatin as well as Magic mouthwash for mucositis.  Patient developed Lower GI bleed on November 14, 2020 with episodes of bloody stool.  He  required additional blood transfusions.     Mucositis remained a problem with decreased intake due to mouth sores.  Patient was encouraged to use the Magic mouthwash as well as nystatin.  Oncologist recommended Decadron topical mouth  solution as well.      He continues to have fevers. On 11/18 he has been developing worsening coughs, and tachycardia. CT angio obtained negative for PE but showed bilateral pulmonary infiltrates concerning for multifocal pneumonia.  Patient has developed acute hypoxic respiratory failure as well currently on 10 L maintaining sats at 90%.     Acute hypoxic respiratory failure.  Differential broad.  Patient has received bleomycin for his chemotherapy, he also may have lymphatic spread of his seminoma.  In the setting of profound neutropenia could have some sort of atypical infection which has not been diagnosed yet.  On po voriconazole,  And IVmeropenem and acyclovir. Her voriconazole has been switched to oral because of nephrotoxicity and hepatoxicity. If patient not able to take PO, will need to switch to IV and monitor LFT, renal function closely. In the setting of his fevers I will avoid doing any steroid therapy infection cannot be ruled out.  pulmonology has been consulted appreciate IDs help.     Neutropenic fever, now with Leukocytosis and persistent fevers.   Patient on broad-spectrum antibiotic with meropenem, voriconazole and acyclovir. 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 Repeat COVID test negative. Infectious disease  has been consulted.Clostridium difficile negative. Multiple labs ordered for fungal and TB sputum. Vasculitis lab ordered, Anti- GBM ordered as well, less likely. On airborne isolation currently    Acute GI bleed with melanotic stools.  Suspect secondary to his mucositis continue conservative management per the GI team.  Continue IV pantoprazole GI daily. Has received multiple blood transfusions. GI has signed off    Abdominal  "distension: Xray abdomen ordered    Pancytopenia secondary to chemotherapy.  Transfuse platelets for his GI bleed if platelets less than 20 per oncology.  Has received multiple transfusions due to his severe anemia.  Most recent 11/17. Received Neulasta as well.      Mucositis secondary to chemotherapy.  Major issue in his nutrition status has been started on PPN.  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  I have discontinued his fluids as he has been starting on PPN.  Further nephrotoxins    Electrolyte impairments hypocalcemia hypokalemia hypomagnesia.  Replace as needed    Urinary retention requiring intermittent catheterization    Diffuse body rash  Maculopapular, will continue to monitor, possibly transfusion reaction, chemotherapy related?    mild-moderate depletion secondary to malignancy  - per dietician  ---------     # Code status: Full  # Anticipated discharge date and Disposition: 2-3 days  # DVT: SCDs  # IVF: PPN                      Stephanie López MD  Text Page (7am - 6pm, M-F)               Subjective   Chief Complaint: Weakness  Subjective: despite being significantly hypoxic, patient relatively asymptomatic, denies any shortness of breath.  He has coughing.  But having abdominal distention he denies any abdominal pain did have bowel movement around 4 AM.  Otherwise no other complaints.      Objective   /57 (BP Location: Right arm)   Pulse 118   Temp 100  F (37.8  C) (Axillary)   Resp 24   Ht 1.549 m (5' 1\")   Wt 45.5 kg (100 lb 6.4 oz)   SpO2 97%   BMI 18.97 kg/m       Physical Exam  General: Pt in NAD, normal appearance  HEENT: Fuhs mucositis in his mouth and lips no JVD  Lungs: Bibasilar crackles, normal breathing  without accessory muscle usage, no wheezing, rhonchi or crackles  Cardiac: +S1, S2, tacycardia, no MRG, no edema  Abdominal: normal " bowel sounds, NT/ND  Skin: Loose body maculopapular rash  Psyche: A& O x3, appropriate affect       Intake/Output Summary (Last 24 hours) at 11/17/2020 1353  Last data filed at 11/17/2020 1322  Gross per 24 hour   Intake 800 ml   Output 1625 ml   Net -825 ml           Labs and Imaging Results:      Recent Labs   Lab 11/19/20  1127 11/19/20  0655   WBC 52.4* 49.5*   HGB 11.5* 10.9*   PLT 88* 89*        Recent Labs   Lab 11/19/20  0655 11/18/20  0600    140   CO2 21 19*   BUN 18 21        Recent Labs   Lab 11/18/20  0600 11/12/20  1601   INR 1.13 1.00   PTT  --  38*      No results for input(s): CKMB in the last 168 hours.    Invalid input(s): TROPONINT     Recent Labs   Lab 11/19/20  1127 11/17/20  0456   ALBUMIN 1.6* 1.4*   AST 47* 18   ALT 19 23   ALKPHOS 385* 207*        Micro:     Radio:  CTA Chest with Contrast   Final Result   IMPRESSION: Patchy and confluent groundglass opacities in the lungs as   above. Findings would suggest a multifocal pneumonia. In addition,   there are small bilateral pleural effusions with adjacent atelectasis.      MAYRA VIERA MD      XR Chest Port 1 View   Final Result   IMPRESSION: Heart size is upper limit of normal. Scattered bibasilar   opacities have progressed since previous exam, concerning for   worsening pneumonia. Suspect small bilateral pleural effusions. No   pneumothorax. Pulmonary edema is consideration, clinical correlation   necessary.      DENIS RAMIREZ MD      XR Chest Port 1 View   Final Result   IMPRESSION: Low lung volumes with mild bilateral lung base atelectasis   versus infiltrate with possible small pleural effusions. No overt   pulmonary edema. Increased mediastinal widening/hilar enlargement may   be due to low lung volumes but progression of known thoracic   lymphadenopathy not excluded.      YEMI DIAZ MD      XR Abdomen Port 2 Views    (Results Pending)           Medications:      Scheduled Meds:      acyclovir (ZOVIRAX) IV  5 mg/kg  Intravenous Q12H     allopurinol  300 mg Oral Daily     calcium carbonate 500 mg (elemental)  500 mg Oral BID w/meals     calcium carbonate-vitamin D  1 tablet Oral BID w/meals     dexamethasone  0.5 mg Oral Q8H     doxycycline (VIBRAMYCIN) IV  100 mg Intravenous Q12H     ferrous sulfate  325 mg Oral Daily with breakfast     lipids  250 mL Intravenous Q24H     loratadine  10 mg Oral Daily     meropenem  1 g Intravenous Q12H     multivitamin w/minerals  1 tablet Oral Daily     nystatin  500,000 Units Swish & Spit 4x Daily     pantoprazole (PROTONIX) IV  40 mg Intravenous Q12H BMT CSA     polyethylene glycol  17 g Oral Daily     senna-docusate  1 tablet Oral BID    Or     senna-docusate  2 tablet Oral BID     sodium chloride (PF)  10 mL Intracatheter Q7 Days     sodium chloride (PF)  3 mL Intracatheter Q8H     tamsulosin  0.4 mg Oral Daily     cholecalciferol  25 mcg Oral BID     voriconazole  200 mg Oral Q12H DANK     Continuous Infusions:      dextrose       parenteral nutrition - Clinimix E       parenteral nutrition - Clinimix E 80 mL/hr at 11/19/20 1014     PRN Meds:  acetaminophen **OR** acetaminophen, dextrose, diphenhydrAMINE, guaiFENesin-dextromethorphan, lidocaine 4%, lidocaine (buffered or not buffered), LORazepam, lidocaine visc 2% & maalox/mylanta w/simethicone & diphenhydramine, melatonin, naloxone, ondansetron **OR** ondansetron, oxyCODONE, prochlorperazine **OR** prochlorperazine **OR** prochlorperazine, rincinol P.R.N, sodium chloride (PF), sodium chloride (PF), sodium chloride (PF)

## 2020-11-20 NOTE — PROGRESS NOTES
[facility] [facility]  Infectious Disease Progress Note          Assessment and Plan:   IMPRESSION:   1.  A 55-year-old male, well known to me from recent admission, now admitted with acute fever and profound neutropenia from chemotherapy, no clear focal infection, mild respiratory symptoms and recent urosepsis as possible sources, but unclear.   2.  Seminoma with recent chemotherapy, now profoundly neutropenic.   3.  Recent episode of urosepsis with Klebsiella and then subsequent episode of sepsis resolved with oral Cipro without clearcut infection being found.   4.  Latent tuberculosis, recent new diagnosis.  No evidence of active tuberculosis.   5.  COVID-19 rule out, negative times several.   6.  Slight hypoxia, minimal chest x-ray abnormality.   7.  Renal insufficiency.   8 Severe stomatitis, ? Chemo vs HSV     RECOMMENDATIONS:   1.    Despite marrow recovery and white cells rising(very high with marrow recovery and growth factors not a marker of bacterial infection), major worsening primarily respiratory and fever.  CT scan shows new worsening infiltrates.  Large differential diagnosis here, essentially neutropenic related potential pneumonia, conceivably underlying disease, multiple possibilities large differential diagnosis incl Bleo toxicity  2 diarrhea, C. difficile neg, less diarrhea now discontinue  C. Difficile iso.  3 Broad  antibiotics to meropenem, doxycycline, start vori also  4 get fungal serologies, lung other things still concern for potential TB in the background, imaging not typical  5 repeat COVID-19 testing but this is unlikely to be the diagnosis, neg, still airborne for sl TB potential  6 continue acyclovir, mouth is improving progressively  7  voriconazole, IV for now but po if taking po soon  8  pulmonary consult, ? Bronch but would likely need intubation             Interval History:   improved swallowing, stomatitis, some vesicular + HSV, cxs neg  WBC60 K , leukemoid rxn  "from marrow recovery/neupogen, procal 0  Major hypoxia, CT noted  Sig infiltrates, some diarrhea C diff neg creat better at 1.34 procal is 3              Medications:       acyclovir (ZOVIRAX) IV  5 mg/kg Intravenous Q12H     allopurinol  300 mg Oral Daily     calcium carbonate 500 mg (elemental)  500 mg Oral BID w/meals     calcium carbonate-vitamin D  1 tablet Oral BID w/meals     dexamethasone  0.5 mg Oral Q8H     doxycycline (VIBRAMYCIN) IV  100 mg Intravenous Q12H     ferrous sulfate  325 mg Oral Daily with breakfast     lipids  250 mL Intravenous Q24H     loratadine  10 mg Oral Daily     meropenem  1 g Intravenous Q12H     multivitamin w/minerals  1 tablet Oral Daily     nystatin  500,000 Units Swish & Spit 4x Daily     pantoprazole (PROTONIX) IV  40 mg Intravenous Q12H BMT CSA     polyethylene glycol  17 g Oral Daily     sodium phosphate  9 mmol Intravenous Once     senna-docusate  1 tablet Oral BID    Or     senna-docusate  2 tablet Oral BID     sodium chloride (PF)  10 mL Intracatheter Q7 Days     sodium chloride (PF)  3 mL Intracatheter Q8H     tamsulosin  0.4 mg Oral Daily     cholecalciferol  25 mcg Oral BID     voriconazole  200 mg Oral Q12H DANK                  Physical Exam:   Blood pressure 104/70, pulse 121, temperature 100  F (37.8  C), temperature source Axillary, resp. rate 24, height 1.549 m (5' 1\"), weight 47.6 kg (105 lb), SpO2 95 %.  Wt Readings from Last 2 Encounters:   11/20/20 47.6 kg (105 lb)   11/09/20 46.3 kg (102 lb)     Vital Signs with Ranges  Temp:  [98.8  F (37.1  C)-101.8  F (38.8  C)] 100  F (37.8  C)  Pulse:  [110-133] 121  Resp:  [22-38] 24  BP: ()/(51-78) 104/70  FiO2 (%):  [60 %-70 %] 60 %  SpO2:  [94 %-97 %] 95 %    Constitutional: Awake, alert, cooperative, no apparent distress severe stomatitis tolips   Lungs: Clear to auscultation bilaterally, no crackles or wheezing   Cardiovascular: Regular rate and rhythm, normal S1 and S2, and no murmur noted   Abdomen: Normal " bowel sounds, soft, non-distended, non-tender   Skin: No rashes, no cyanosis, no edema   Other:           Data:   All microbiology laboratory data reviewed.  Recent Labs   Lab Test 11/20/20  0530 11/19/20  1817 11/19/20  1127 11/19/20  0655   WBC 63.1*  --  52.4* 49.5*   HGB 10.0* 10.2* 11.5* 10.9*   HCT 30.5*  --  34.4* 32.1*   MCV 93  --  91 91   *  --  88* 89*     Recent Labs   Lab Test 11/20/20  0530 11/19/20  1817 11/19/20  1625   CR 1.40* 1.43* 1.43*     Recent Labs   Lab Test 11/19/20  0655   SED 18     Recent Labs   Lab Test 11/17/20  1108 11/17/20  1104 11/12/20  1631 11/12/20  1620 11/12/20  1559 10/26/20  1803 10/26/20  1531 10/26/20  1454 10/19/20  1748   CULT No growth after 3 days No growth after 3 days No growth No growth No growth No growth No growth No growth No growth

## 2020-11-20 NOTE — PROVIDER NOTIFICATION
Patient tachycardic, remaining in 140s-150s. Triggered lactic, awaiting results. T101.8 and Increasing O2 needs.    MD returned page, continue to monitor. Ordered ABGs. Placed transfer orders.

## 2020-11-20 NOTE — PROGRESS NOTES
Patient remains on HFNC with current settings @ 40LPM and 60% for PEEP therapy. RR 22-26, BS diminished and sating 94%. Pt is tolerating it well. Will continue to monitor and assess the pt's current respiratory status and needs.        Juan Harrington, RT     Strong peripheral pulses

## 2020-11-20 NOTE — PROGRESS NOTES
"CLINICAL NUTRITION SERVICES - REASSESSMENT NOTE    Recommendations Ordered by Registered Dietitian (RD):   Continue diet per MD   Continue carole counts as ordered   Continue TPN as ordered, will not increase today given low phos. Will reassess tomorrow.    Malnutrition:  % Weight Loss: difficult to assess - fluid vs true weight loss? Potential for > 5% in 1 month (severe malnutrition)  % Intake:  </= 50% for >/= 5 days (severe malnutrition)  Subcutaneous Fat Loss:  Orbital region moderate depletion and Upper arm region moderate depletion  Muscle Loss:  Temporal region moderate depletion, Clavicle bone region moderate depletion, Acromion bone region moderate depletion, Scapular bone region moderate depletion, Anterior thigh region mild-moderate depletion and Posterior calf region mild-moderate depletion  Fluid Retention:  None noted     Malnutrition Diagnosis: Severe malnutrition  In Context of:  Acute illness or injury  Chronic illness or disease     EVALUATION OF PROGRESS TOWARD GOALS   Diet: Full Liquid Diet + Boost Plus + Calorie Counts     Ordering small \"meals\" consisting of full liquids inconsistently (once or twice a day) throughout admission. Calorie counts now moved to the correct door, will chart when obtained.     Nutrition Support: TPN currently at 40 mL/hr   Initiation: D15 AA5 at 40 mL/hr (960 mL total) + 250 mL 20% lipids daily  Goal rate: D15 AA5 at 65 mL/hr (1560 mL total) + 250 mL 20% lipids daily   This provides 1607 kcal (35 kcal/kg), 224 gm Dex, 78 gm amino acid (1.7 gm protein/kg) and 31% fat. GIR 3.4  DW 45.5 kg  Dosing weight: 45.5 kg  Total fluids (TPN + IVF) = per MD     Intake/Tolerance: transitioned from PPN to TPN on 11/19 to better match nutritional needs with minimal PO intake 2/2 mucositis. Pt appears to be tolerating the TPN well. Na WNL. K, Mag and Phos are all low. Blood glucose levels <180 mg/dL. Last BM on 11/20. Did not see pt in person given airborne precautions     ASSESSED " NUTRITION NEEDS:  Dosing Weight 45.5 kg   Estimated Energy Needs: [id number] kcals (30-35-40 Kcal/Kg)  Justification: repletion and underweight, lower end of needs for PPN  Estimated Protein Needs: 55-68-91 grams protein (1.2-1.5-2 g pro/Kg)  Justification: Repletion and preservation of lean body mass, lower end of needs for PPN  Estimated Fluid Needs: per MD    NEW FINDINGS:   - WOC is following   - GI following   - PICC placed on 11/18  - weight: weight is stable during admission   Vitals:    11/12/20 1958 11/20/20 0518   Weight: 45.5 kg (100 lb 6.4 oz) 47.6 kg (105 lb)     - labs:    Electrolytes  Potassium (mmol/L)   Date Value   11/20/2020 3.2 (L)   11/20/2020 3.1 (L)   11/19/2020 2.8 (L)     Phosphorus (mg/dL)   Date Value   11/20/2020 1.2 (L)   11/19/2020 2.7   11/18/2020 1.9 (L)   11/18/2020 1.7 (L)   11/17/2020 1.3 (L)    Blood Glucose  Glucose (mg/dL)   Date Value   11/20/2020 136 (H)   11/19/2020 162 (H)   11/19/2020 131 (H)   11/19/2020 116 (H)   11/18/2020 114 (H)    Inflammatory Markers  CRP Inflammation (mg/L)   Date Value   11/19/2020 129.0 (H)     WBC (10e9/L)   Date Value   11/20/2020 63.1 (HH)   11/19/2020 52.4 (HH)   11/19/2020 49.5 (H)     Albumin (g/dL)   Date Value   11/19/2020 1.6 (L)   11/17/2020 1.4 (L)   11/16/2020 1.4 (L)      Magnesium (mg/dL)   Date Value   11/20/2020 1.5 (L)   11/19/2020 1.9   11/18/2020 1.9     Sodium (mmol/L)   Date Value   11/20/2020 140   11/19/2020 140   11/19/2020 139    Renal  Urea Nitrogen (mg/dL)   Date Value   11/20/2020 21   11/19/2020 25   11/19/2020 24     Creatinine (mg/dL)   Date Value   11/20/2020 1.40 (H)   11/19/2020 1.43 (H)   11/19/2020 1.43 (H)     Additional  Triglycerides (mg/dL)   Date Value   11/17/2020 198 (H)     Ketones Urine (mg/dL)   Date Value   11/12/2020 Negative        - medications:     acyclovir (ZOVIRAX) IV  5 mg/kg Intravenous Q12H     allopurinol  300 mg Oral Daily     calcium carbonate 500 mg (elemental)  500 mg Oral BID  w/meals     calcium carbonate-vitamin D  1 tablet Oral BID w/meals     dexamethasone  0.5 mg Oral Q8H     doxycycline (VIBRAMYCIN) IV  100 mg Intravenous Q12H     ferrous sulfate  325 mg Oral Daily with breakfast     lipids  250 mL Intravenous Q24H     loratadine  10 mg Oral Daily     meropenem  1 g Intravenous Q12H     multivitamin w/minerals  1 tablet Oral Daily     nystatin  500,000 Units Swish & Spit 4x Daily     pantoprazole (PROTONIX) IV  40 mg Intravenous Q12H BMT CSA     polyethylene glycol  17 g Oral Daily     sodium phosphate  9 mmol Intravenous Once     senna-docusate  1 tablet Oral BID    Or     senna-docusate  2 tablet Oral BID     sodium chloride (PF)  10 mL Intracatheter Q7 Days     sodium chloride (PF)  3 mL Intracatheter Q8H     tamsulosin  0.4 mg Oral Daily     cholecalciferol  25 mcg Oral BID     voriconazole  200 mg Oral Q12H DANK        dextrose       parenteral nutrition - Clinimix E 40 mL/hr at 11/20/20 1107      acetaminophen **OR** acetaminophen, dextrose, diphenhydrAMINE, guaiFENesin-dextromethorphan, lidocaine 4%, lidocaine (buffered or not buffered), LORazepam, lidocaine visc 2% & maalox/mylanta w/simethicone & diphenhydramine, melatonin, naloxone, ondansetron **OR** ondansetron, oxyCODONE, prochlorperazine **OR** prochlorperazine **OR** prochlorperazine, rincinol P.R.N, sodium chloride (PF), sodium chloride (PF), sodium chloride (PF)     Previous Goals:   PN to meet >/=80% of nutritional needs at goal rate   Evaluation: Met    Previous Nutrition Diagnosis:   Increased nutrient needs (protein and energy) related to repletion needs as evidenced by pt with a potential weight loss of 20% in 1 month?, requiring a minimum of 30 kcal/kg and 1.2 g protein/kg.   Evaluation: No change    MALNUTRITION  % Weight Loss: difficult to assess - fluid vs true weight loss? Potential for > 5% in 1 month (severe malnutrition)  % Intake:  </= 50% for >/= 5 days (severe malnutrition)  Subcutaneous Fat Loss:   Orbital region moderate depletion and Upper arm region moderate depletion  Muscle Loss:  Temporal region moderate depletion, Clavicle bone region moderate depletion, Acromion bone region moderate depletion, Scapular bone region moderate depletion, Anterior thigh region mild-moderate depletion and Posterior calf region mild-moderate depletion  Fluid Retention:  None noted     Malnutrition Diagnosis: Severe malnutrition  In Context of:  Acute illness or injury  Chronic illness or disease    CURRENT NUTRITION DIAGNOSIS  Increased nutrient needs (protein and energy) related to repletion needs as evidenced by pt with a potential weight loss of 20% in 1 month?, requiring a minimum of 30 kcal/kg and 1.2 g protein/kg.     INTERVENTIONS  Recommendations / Nutrition Prescription  Continue diet per MD   Continue carole counts as ordered   Continue TPN as ordered, will not increase today given low phos. Will reassess tomorrow.     Implementation  PN Composition, PN Schedule: as above   Collaboration and Referral of Nutrition care: discussed pt during interdisciplinary rounds this morning     Goals  Pt to consume >/=60% of nutritional needs to discontinue TPN   TPN to meet % of nutritional needs at goal     MONITORING AND EVALUATION:  Progress towards goals will be monitored and evaluated per protocol and Practice Guidelines    Amie Robbins, RD, LD  Clinical Dietitian

## 2020-11-20 NOTE — PLAN OF CARE
To Do:  End of Shift Summary  For vital signs and complete assessments, please see documentation flowsheets.     Pertinent assessments: Patient is Aox4 this shift. Denies having any pain.Tachycardia through shift, worsens with coughing and cares. HFNC 60% 40 liters. Frequent, nonproductive cough. Frequent oral cares done throughout shift. +2 generalized edema TPN infusing.     Major Shift Events: Magnesium replacement done, recheck in the morning. Potassium phospate replacement initiated with scheduled recheck at 1830. Swabbed for influenza, negative A and B. Pulmonology consult     Treatment Plan:IV merrem, IV doxycycline, IV acyclovir, PO voriconazole. TPN/Lipids. Nystatin, decadron swish and spit. High flow oxygen. Electrolyte replacements. Supportive cares.    Bedside Nurse: Eve Mederos RN

## 2020-11-20 NOTE — PROGRESS NOTES
[facility]    Hematology / Oncology Progress note     Date of Admission:  11/12/2020  Date of visit: November 16, 2020     Assessment & Plan   Attapeu NO JUSTICE Diallo is a 55 year old male who was admitted on 11/12/2020. 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 He has been struggling with his current curative intent chemotherapy. He had a good radiographic response to his first cycle of  BEP which was suboptimal since we could not use cisplatin. Despite use of carboplatin, he had an excellent response but had several hospital admission for neutropenic fevers.     He has mucositis from this 2nd cycle and was admitted 9 days ago. He appears lot sicker today. He was tachypnec and on high flow oxygen at the time of my evaluation. He was also tachycardic in 140's      The current presentation and clinical deterioration is still infectious and his lung infiltrates could be either infectious or inflammatory fluid but not malignancy. He has received 4 weekly doses of bleomycin which can cause pneumonitis. Typically this gradually presents with increasing shortness of breath and cough after several cycles of therapy. It would be quite odd to have pneumonitis this early. He would need bronchoscopy to rule out infections. Pneumonitis in mediated by inflammatory cytokines and treated with steroids.      I have reviewed above with his sister who was in the room.     Recommendations:  - Agree with broad spectrum antibiotics as current   Dr. Huerta has been closely following Aureliano  - Check daily CBC with  "diff   Transfuse for Hgb < 7 g/dl; Platelet < 50k (since fevers)  - Recommend bronchoscopy for further evaluation   I would suspect atypical infections to be more likely than bleomycin induced toxicity (this appears to soon and too rapid a progression)   Seminoma would not present with lymphangitic spread on therapy. He is in the midst of a good treatment response.   - We will continue to follow.       Over 35 min of time spent with more than 50% time spent in counseling and coordinating care.      Jarvis Ramos MD        Today  Attapeu NO JUSTICE Diallo is a 55 year old male who presents with neutropenic fevers    Von has been sicker today. He has been quite short of breath and needing high flow oxygen. He expressed he was fine though he was clearly tachynec.       Past Medical History   I have reviewed this patient's medical history and updated it with pertinent information if needed.   Past Medical History:   Diagnosis Date     Mental retardation        Past Surgical History   I have reviewed this patient's surgical history and updated it with pertinent information if needed.  Past Surgical History:   Procedure Laterality Date     ABDOMEN SURGERY      sister states he had an \"abdominal surgery 20 years ago\" unknown      CYSTOSCOPY, RETROGRADES, INSERT STENT URETER(S), COMBINED Bilateral 9/24/2020    Procedure: Cystoscopy with bilateral retrograde pyelogram and bilateral ureteral stent insertiona-complex due to severe bilateral hydroureteronephrosis with ureteral tortuosity, examination under anesthesia and fluoroscopic interpretation > 1 hour physician time;  Surgeon: Ralf Chan MD;  Location:  OR     ESOPHAGOSCOPY, GASTROSCOPY, DUODENOSCOPY (EGD), COMBINED N/A 3/1/2019    Procedure: ESOPHAGOSCOPY, GASTROSCOPY, DUODENOSCOPY (EGD) Rm 226;  Surgeon: Susy Camara MD;  Location:  GI       Prior to Admission Medications   Prior to Admission Medications   Prescriptions Last Dose Informant " Patient Reported? Taking?   LORazepam (ATIVAN) 0.5 MG tablet prn  No Yes   Sig: Take 1 tablet (0.5 mg) by mouth every 4 hours as needed (Anxiety, Nausea/Vomiting or Sleep)   allopurinol (ZYLOPRIM) 300 MG tablet 11/12/2020 at am  No Yes   Sig: Take 1 tablet (300 mg) by mouth daily   calcium citrate-vitamin D (CITRACAL W/D) 250-100 MG-UNIT tablet 11/12/2020 at am  No Yes   Sig: Take 2 tablets by mouth 2 times daily (with meals)   ciprofloxacin (CIPRO) 500 MG tablet 11/12/2020 at am  No Yes   Sig: Take 1 tablet (500 mg) by mouth 2 times daily   ferrous sulfate (FEROSUL) 325 (65 Fe) MG tablet 11/12/2020 at am  No Yes   Sig: Take 1 tablet (325 mg) by mouth daily (with breakfast)   loratadine (CLARITIN) 10 MG tablet 11/12/2020 at am  Yes Yes   Sig: Take 10 mg by mouth daily    multivitamin w/minerals (THERA-VIT-M) tablet 11/12/2020 at am  Yes Yes   Sig: Take 1 tablet by mouth daily   prochlorperazine (COMPAZINE) 10 MG tablet prn  No Yes   Sig: Take 1 tablet (10 mg) by mouth every 6 hours as needed (Nausea/Vomiting)   tamsulosin (FLOMAX) 0.4 MG capsule 11/12/2020 at am  No Yes   Sig: Take 1 capsule (0.4 mg) by mouth daily      Facility-Administered Medications: None     Allergies   No Known Allergies    Social History   I have reviewed this patient's social history and updated it with pertinent information if needed. Attapeu NO JUSTICE Diallo  reports that he has never smoked. He has never used smokeless tobacco. He reports current alcohol use of about 2.0 standard drinks of alcohol per week. He reports that he does not use drugs.    Family History   I have reviewed this patient's family history and updated it with pertinent information if needed.   Family History   Problem Relation Age of Onset     Diabetes Father      Glaucoma No family hx of      Macular Degeneration No family hx of        Review of Systems   The 10 point Review of Systems is negative other than noted in the HPI or here.      Physical Exam   Temp:  100  F (37.8  C) Temp src: Axillary BP: 104/70 Pulse: 121   Resp: 24 SpO2: 96 % O2 Device: High Flow Nasal Cannula (HFNC) Oxygen Delivery: 40 LPM    Data   Recent Labs   Lab Test 11/20/20  0530 11/20/20  0335 11/19/20  1817 11/19/20  1625 11/19/20  0655 11/18/20  1747 11/18/20  0600 11/18/20  0600     --  140 139 140  --   --  140   POTASSIUM 3.2* 3.1* 2.8* 2.9* 3.5  --   --  3.7   CHLORIDE 112*  --  110* 109 112*  --   --  115*   CO2 24  --  23 23 21  --   --  19*   ANIONGAP 4  --  7 7 7  --   --  6   BUN 21  --  25 24 18  --   --  21   CR 1.40*  --  1.43* 1.43* 1.34*  --   --  1.56*   *  --  162* 131* 116* 114*   < > 121*   IVANIA 6.1*  --  6.1* 6.2* 5.9*  --   --  5.6*    < > = values in this interval not displayed.     Recent Labs   Lab Test 11/20/20  0530 11/19/20  0655 11/18/20  1445 11/18/20  0600 11/17/20  1830 11/17/20  0708 11/16/20  0715 11/16/20  0715   MAG 1.5* 1.9  --  1.9  --  1.5*  --  1.9   PHOS 1.2* 2.7 1.9* 1.7* 1.3* 1.6*   < >  --     < > = values in this interval not displayed.     Recent Labs   Lab Test 11/20/20  0530 11/19/20  1817 11/19/20  1127 11/19/20  0655 11/18/20  1747 11/18/20  0600 11/17/20  0456 11/17/20  0456 11/16/20  0840 11/16/20  0840 11/15/20  0256 11/15/20  0256 11/09/20  0912 11/09/20  0912   WBC 63.1*  --  52.4* 49.5*  --  31.4*  --  8.9  --  2.9*  --  1.1*   < > 29.3*   HGB 10.0* 10.2* 11.5* 10.9* 9.9* 10.9*   < > 7.9*   < > 7.0*   < > 7.3*   < > 5.7*   *  --  88* 89*  --  79*  --  67*   < > 58*   < > 54*   < > 449   MCV 93  --  91 91  --  93  --  92  --  95  --  93   < > 95   NEUTROPHIL  --   --  71.0  --   --   --   --  77.0  --  90.0  --  68.0  --  97.0    < > = values in this interval not displayed.     Recent Labs   Lab Test 11/19/20  1127 11/17/20  0456 11/16/20  0715 10/26/20  0820 10/26/20  0820 09/24/20  0601 09/24/20  0601   BILITOTAL 0.3 0.2 0.2   < > 0.2   < >  --    ALKPHOS 385* 207* 205*   < > 262*   < >  --    ALT 19 23 33   < > 33   < >  --     AST 47* 18 20   < > 28   < >  --    ALBUMIN 1.6* 1.4* 1.4*   < > 2.7*   < >  --    LDH  --   --   --   --  578*  --  710*    < > = values in this interval not displayed.     TSH   Date Value Ref Range Status   11/18/2020 2.74 0.40 - 4.00 mU/L Final   08/13/2017 0.75 0.40 - 4.00 mU/L Final     Recent Labs   Lab Test 09/25/20  0705   CEA 0.5     Results for orders placed or performed during the hospital encounter of 11/12/20   XR Chest Port 1 View    Narrative    XR CHEST PORT 1 VW 11/12/2020 4:48 PM    HISTORY: sob. History testicular cancer.     COMPARISON: 10/27/2020. 10/26/2020 chest CT.      Impression    IMPRESSION: Low lung volumes with mild bilateral lung base atelectasis  versus infiltrate with possible small pleural effusions. No overt  pulmonary edema. Increased mediastinal widening/hilar enlargement may  be due to low lung volumes but progression of known thoracic  lymphadenopathy not excluded.    YEMI DIAZ MD   XR Chest Port 1 View    Narrative    CHEST ONE VIEW PORTABLE  11/18/2020 10:19 AM     HISTORY: Cough.    COMPARISON: November 12, 2020      Impression    IMPRESSION: Heart size is upper limit of normal. Scattered bibasilar  opacities have progressed since previous exam, concerning for  worsening pneumonia. Suspect small bilateral pleural effusions. No  pneumothorax. Pulmonary edema is consideration, clinical correlation  necessary.    DENIS RAMIREZ MD   CTA Chest with Contrast    Narrative    CT PULMONARY ANGIOGRAM OF THE CHEST WITH INTRAVENOUS CONTRAST  11/18/2020 3:21 PM     HISTORY: Shortness of breath.    TECHNIQUE:  CT pulmonary angiogram of the chest was performed  following the administration of iopamidol (ISOVUE-370) solution 53 mL.  Radiation dose for this scan was reduced using automated exposure  control, adjustment of the mA and/or kV according to patient size, or  iterative reconstruction technique.    COMPARISON: CT of the chest, abdomen and pelvis dated  10/26/2020.    FINDINGS:  No definite evidence for a pulmonary embolism.    There are diffuse patchy and confluent groundglass opacities primarily  within the lower lobes and to a lesser extent in the upper lobes and  right middle lobe. There are small bilateral pleural effusions. There  are atelectatic changes in the lower lobes. Again identified are  mildly prominent mediastinal and axillary lymph nodes. These are not  significantly changed in size. There is a left-sided PICC with tip in  satisfactory position in the low superior vena cava.      Impression    IMPRESSION: Patchy and confluent groundglass opacities in the lungs as  above. Findings would suggest a multifocal pneumonia. In addition,  there are small bilateral pleural effusions with adjacent atelectasis.    MAYRA VIERA MD   XR Abdomen Port 2 Views    Narrative    ABDOMEN TWO VIEWS 11/19/2020 3:00 PM     HISTORY: Abdominal distension    COMPARISON: 10/26/2020.      Impression    IMPRESSION: There are bilateral double-J ureteral stents. Prominent  gas noted in large and small bowel loops, likely related to ileus. No  evidence of free intra-abdominal air. The urinary bladder catheter is  present. Pulmonary opacities are noted.    CARRIE FRAZIER MD

## 2020-11-20 NOTE — CONSULTS
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  His hospitalization has been complicated by acute hypoxic respiratory failure with escalating O2 requirements, no requiring 60% FiO2 on HFNC.     DDx for this patient is broad and includes the following:   - no evidence of inflammatory/autoimmunity based on negative ANASTACIO, ANCA, GBM  - infection- most recent procal is 3.22, but micro to date remains negative.  Fungal studies are pending.  Either way, the patient is being broadly covered and I agree with this- would continue current antimicrobial therapy.  He has a positive quant gold which is new- imaging doesn't seem consistent with active TB but I think we are obligated to rule this out.   - probably some component of pulmonary edema given low-normal EF (50-55%), multiple transfusions, increased weight, and LE edema.  I have added on an nt-pro BNP.  However, I don't think this is the primary cause  - my main concern is for bleomycin toxicity given his history of renal insufficiency and age (higher risk of this with increasing age, renal toxicity).      Ideally, would recommend performing bronchoscopy to r/o infection in this patient prior to intiating steroids (data for using glucocorticoids for bleomycin toxicity is marginal at best, however, no  other therapy), however, the patient's O2 requirements are far too high and bronch may precipitate worsening respiratory failure and intubation (My threshold is ~4-6LPM NC, other providers may have different thresholds).     Recommendations:   - Nt-Pro BNP added on to today's labs.  If elevated, would recommend fairly aggressive diuresis  - continue current antimicrobials as you are, follow up fungitell and galactomannan  - if the patient becomes intubated, would perform bronchoscopy at that point  - will discuss with primary team whether or not they are comfortable starting glucocorticoid therapy as we are unable to perform a bronchoscopy- again, data is limited but this is the only agent that has been used in the literature    Thank you for this consult.  Pulmonary will continue to follow this patient with you.  We are in house M, W, F from 8am to noon.  All other times please page the pulmonologist on call.     Virgie Dixon MD   of Medicine  Division of Pulmonary, Critical Care, Allergy, and Sleep Medicine  Pager [phone]      Chief Complaint and History of Present Illness:    CC:  Acute hypoxic respiratory failure  HPI:     Mr. 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  His last chemotherapy was on Monday 11/9 (bleomycin, etoposide, cisplatin).  His chemotherapy has been complicated by urosepsis (October 2020), mucositis, and inability to use carboplatin.     Throughout this admission, the patient has been broadly covered with multiple antimicrobials:   - Acyclovir (11/12-present)  - Cefepime (11/12-11/18)--> Meropenem (11/18-present)  -Doxycycline (11/18-present)  - Fluconazole (11/12-11/18)->Voriconazole 11/18-present  - vancomycin (11/12-11/15)    He has had a reasonably broad and negative  "infectious eval, with the exception of a newly identified positive quant gold:   COVID negative  Fungitell, galactomannan pending  Legionella pna urine ag negative  Strep pneumo urine ag negative  Influenza A/B negative  +Quantiferon Gold    His O2 requirements have continued to escalate; initially was on room air, then 6LPM oxymask by Wednesday 11/18, and now up to HFNC, 60% FiO2/40LPM.     I did discuss obtaining some inflammatory markers with the primary team on Thursday 11/19, all of which are negative including ANASTACIO, anti-GBM, and ANCA.    He has received multiple blood products and G-CSF throughout this admission. His weight is 105lbs as compared to 100lbs upon admission.     His sister reports that the patient has had no lung problems prior to this admission.  He is a never smoker and has no known occupational exposures.  He came to the [place] in [place] on a visa and went through some kind of medical screening upon arrival but she isn't sure what.     Currently, the patient reports shortness of breath with any exertion.  He also endorses orthopnea, however, his sister reports he has not laid flat for some time.       Review of Systems:  Complete 12 point ROS negative unless mentioned in HPI  Histories, Prior to Admission Medications, Allergies:    Past Medical History:  Past Medical History:   Diagnosis Date     Mental retardation        Past Surgical History:  Past Surgical History:   Procedure Laterality Date     ABDOMEN SURGERY      sister states he had an \"abdominal surgery 20 years ago\" unknown      CYSTOSCOPY, RETROGRADES, INSERT STENT URETER(S), COMBINED Bilateral 9/24/2020    Procedure: Cystoscopy with bilateral retrograde pyelogram and bilateral ureteral stent insertiona-complex due to severe bilateral hydroureteronephrosis with ureteral tortuosity, examination under anesthesia and fluoroscopic interpretation > 1 hour physician time;  Surgeon: Ralf Chan MD;  Location:  OR     " ESOPHAGOSCOPY, GASTROSCOPY, DUODENOSCOPY (EGD), COMBINED N/A 3/1/2019    Procedure: ESOPHAGOSCOPY, GASTROSCOPY, DUODENOSCOPY (EGD) Rm 226;  Surgeon: Susy Camara MD;  Location:  GI       Past Social History:  Social History     Socioeconomic History     Marital status: Single     Spouse name: Not on file     Number of children: Not on file     Years of education: Not on file     Highest education level: Not on file   Occupational History     Not on file   Social Needs     Financial resource strain: Not on file     Food insecurity     Worry: Not on file     Inability: Not on file     Transportation needs     Medical: Not on file     Non-medical: Not on file   Tobacco Use     Smoking status: Never Smoker     Smokeless tobacco: Never Used   Substance and Sexual Activity     Alcohol use: Yes     Alcohol/week: 2.0 standard drinks     Types: 1 Glasses of wine, 1 Cans of beer per week     Comment: once every month or two, social functions only     Drug use: No     Sexual activity: Not Currently   Lifestyle     Physical activity     Days per week: Not on file     Minutes per session: Not on file     Stress: Not on file   Relationships     Social connections     Talks on phone: Not on file     Gets together: Not on file     Attends Buddhism service: Not on file     Active member of club or organization: Not on file     Attends meetings of clubs or organizations: Not on file     Relationship status: Not on file     Intimate partner violence     Fear of current or ex partner: Not on file     Emotionally abused: Not on file     Physically abused: Not on file     Forced sexual activity: Not on file   Other Topics Concern     Parent/sibling w/ CABG, MI or angioplasty before 65F 55M? Not Asked   Social History Narrative     Not on file       Family History:  Family History   Problem Relation Age of Onset     Diabetes Father      Glaucoma No family hx of      Macular Degeneration No family hx of        Medications:     acyclovir (ZOVIRAX) IV  5 mg/kg Intravenous Q12H     allopurinol  300 mg Oral Daily     calcium carbonate 500 mg (elemental)  500 mg Oral BID w/meals     calcium carbonate-vitamin D  1 tablet Oral BID w/meals     dexamethasone  0.5 mg Oral Q8H     doxycycline (VIBRAMYCIN) IV  100 mg Intravenous Q12H     ferrous sulfate  325 mg Oral Daily with breakfast     lipids  250 mL Intravenous Q24H     loratadine  10 mg Oral Daily     magnesium sulfate  2 g Intravenous Once     meropenem  1 g Intravenous Q12H     multivitamin w/minerals  1 tablet Oral Daily     nystatin  500,000 Units Swish & Spit 4x Daily     pantoprazole (PROTONIX) IV  40 mg Intravenous Q12H BMT CSA     polyethylene glycol  17 g Oral Daily     sodium phosphate  9 mmol Intravenous Once     senna-docusate  1 tablet Oral BID    Or     senna-docusate  2 tablet Oral BID     sodium chloride (PF)  10 mL Intracatheter Q7 Days     sodium chloride (PF)  3 mL Intracatheter Q8H     tamsulosin  0.4 mg Oral Daily     cholecalciferol  25 mcg Oral BID     voriconazole  200 mg Oral Q12H DANK     acetaminophen **OR** acetaminophen, dextrose, diphenhydrAMINE, guaiFENesin-dextromethorphan, lidocaine 4%, lidocaine (buffered or not buffered), LORazepam, lidocaine visc 2% & maalox/mylanta w/simethicone & diphenhydramine, melatonin, naloxone, ondansetron **OR** ondansetron, oxyCODONE, prochlorperazine **OR** prochlorperazine **OR** prochlorperazine, rincinol P.R.N, sodium chloride (PF), sodium chloride (PF), sodium chloride (PF)    Allergies:   No Known Allergies    Physical Exam:    Temp:  [98.8  F (37.1  C)-101.8  F (38.8  C)] 100  F (37.8  C)  Pulse:  [110-133] 121  Resp:  [22-38] 24  BP: ()/(51-78) 104/70  FiO2 (%):  [60 %-70 %] 60 %  SpO2:  [94 %-97 %] 95 %    Intake/Output Summary (Last 24 hours) at 11/20/2020 0943  Last data filed at 11/20/2020 0553  Gross per 24 hour   Intake 2385 ml   Output 3475 ml   Net -1090 ml     General: thin, very ill appearing male laying in  bed in NAD  HEENT: anicteric, moist mucosa  Neck: no palpable lymphadenopathy, no JVD noted  Chest: bibasilar crackles appreciated.  Coarse breath sounds bilaterally.  Tachypneic.   Cardiac: tachycardic to 140s.  no murmurs appreciated  Abdomen: Soft, flat, non tender, active BS  Extremities: 2+ pitting edema in bilateral ankles/feet  Neuro: A&Ox3, no focal deficits   Skin: diffuse pinkish/vilaceous rash on all 4 extremities; I'm told this has been present over the past week.  No open sores or excoriations.     Laboratory, imaging, and microbiologic data:    All laboratory and imaging data reviewed.      BMPs notable for stable Cr in the 1.3-1.5 range.  Procal up to 3.22 on 11/19.   CBC notable for WBC of 63.1    Multiple inflammatory studies pending.     CT Chest 11/18/20 personally reviewed.  Diffuse bilateral ground glass opacities with increasing density towards the lung bases in addition to interlobular septal thickening.  Bilateral small pleural effusions. Appears to have developing traction bronchiectasis.     ECHO 11/1  Interpretation Summary     The left ventricle is normal in size. There is mild concentric left  ventricular hypertrophy. Left ventricular systolic function is low normal. The  visual ejection fraction is estimated at 50-55%. Left ventricular diastolic  function is normal. LVEF 52% based on biplane 2D tracing.  The right ventricle is normal size. The right ventricular systolic function is  normal.  Normal left atrial size. Right atrial size is normal. Right atrial catheter.  There is no color Doppler evidence of an atrial shunt.  Trace mitral and tricuspid regurgitation.  No pericardial effusion.  No previous study for comparison.

## 2020-11-20 NOTE — PROGRESS NOTES
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  Patient is [place]otian speaking and also has developmental delay and a difficult to obtain history from him.  History was obtained from his sister. Patient is on chemotherapy for seminoma of the testicle.  Patient received chemotherapy on Monday.  He was also transfused blood on Monday.  Patient had reaction on second unit of blood after 30 minutes of transfusion.  On Tuesday patient went to the clinic to get IV fluids.  He started to have fever and chills last night.  He had follow-up with urology today and had voiding trial and removal of Palafox catheter.  Patient was sent to emergency room because of his fever and chills.  In the ER, he was noted to be febrile and tachycardic.  Laboratory work-up showed normal electrolytes.  Creatinine 1.37.  , ALT 59, AST 25, lactic acid 1.0.  WBC 0.1 differential count not done due to low WBC below 0.5.  Chest x-ray showed mild bilateral lung base atelectasis or infiltrate.  He was given IV fluids, IV cefepime and vancomycin emergency room.  He was admitted for further management.           Patient progress during stay:     Patient was continued with intravenous antibiotic and was closely monitored.  Both hematology oncology as well as infectious history was consulted for further evaluation recommendations.  He was transfused a unit of blood on November 13, 2020 for severe anemia with hemoglobin of 6.6.     He was treated with nystatin as well as Magic mouthwash for mucositis.  Patient developed Lower GI bleed on November 14, 2020 with episodes of bloody stool.  He  required additional blood transfusions.     Mucositis remained a problem with decreased intake due to mouth sores.  Patient was encouraged to use the Magic mouthwash as well as nystatin.  Oncologist recommended Decadron topical mouth  solution as well.      He continues to have fevers. On 11/18 he has been developing worsening coughs, and tachycardia. CT angio obtained negative for PE but showed bilateral pulmonary infiltrates concerning for multifocal pneumonia.  Patient has developed acute hypoxic respiratory failure as well currently on 10 L maintaining sats at 90%.     On 11/19 continues to have respiratory distress and has been started on HFNC.     Acute hypoxic respiratory failure.  Differential broad.  Patient has received bleomycin for his chemotherapy, he also may have lymphatic spread of his seminoma.  In the setting of profound neutropenia could have some sort of atypical infection which has not been diagnosed yet.  His vasculitis lab and Anti- GBM negative. On po voriconazole,  and IV meropenem and acyclovir. Her voriconazole has been switched to oral because of nephrotoxicity and hepatoxicity. If patient not able to take PO, will need to switch to IV and monitor LFT, renal function closely. In the setting of his fevers I will avoid doing any steroid therapy as infection cannot be ruled out.  pulmonology has been consulted, appreciate IDs help.     Neutropenic fever, now with Leukocytosis and persistent fevers.  Patient on broad-spectrum antibiotic with meropenem, voriconazole and acyclovir. 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 Repeat COVID test negative. Infectious disease  has been consulted.Clostridium difficile negative. Multiple labs ordered for fungal and TB sputum. .On airborne isolation currently    Acute GI bleed with melanotic stools.  Suspect secondary to his mucositis continue conservative management per the GI team.  Continue IV pantoprazole GI daily. Has received  "multiple blood transfusions. GI has signed off    Ileus  - continue to monitor supportive management. Patient with symptoms of nausea, vomitting, or abdominal pian, has bowel movements.    Pancytopenia secondary to chemotherapy. Leukopenia Resolved, thrombocytopenia persists but improving. now with significant leukocytosis, but did receive Nulesta.      Mucositis secondary to chemotherapy.  Major issue in his nutrition status has been started on PPN.  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  I have discontinued his fluids as he has been starting on PPN.  Further nephrotoxins    Electrolyte impairments hypocalcemia hypokalemia hypomagnesia.  Replace as needed    Urinary retention requiring intermittent catheterization    Diffuse body rash  Maculopapular, will continue to monitor, possibly transfusion reaction, chemotherapy related?    mild-moderate depletion secondary to malignancy  - per dietician  ---------     # Code status: Full  # Anticipated discharge date and Disposition: 2-3 days  # DVT: SCDs  # IVF: PPN                      Stephanie López MD  Text Page (7am - 6pm, M-F)               Subjective   Chief Complaint: Weakness  Subjective: despite being significantly hypoxic, patient relatively asymptomatic, denies any shortness of breath.  He has coughing.  But having abdominal distention he denies any abdominal pain. Sister at bedside. No new concerns.      Objective   /70 (BP Location: Right arm)   Pulse 121   Temp 100  F (37.8  C) (Axillary)   Resp 24   Ht 1.549 m (5' 1\")   Wt 47.6 kg (105 lb)   SpO2 96%   BMI 19.84 kg/m       Physical Exam  General: Pt in NAD, normal appearance  HEENT: hs mucositis in his mouth and lips no JVD  Lungs: Bibasilar crackles, normal breathing  without accessory muscle usage, no wheezing, rhonchi or crackles  Cardiac: +S1, S2, " tacycardia, no MRG, no edema  Abdominal: normal bowel sounds, NT/ND  Skin: Loose body maculopapular rash  Psyche: A& O x3, appropriate affect       Intake/Output Summary (Last 24 hours) at 11/17/2020 1353  Last data filed at 11/17/2020 1322  Gross per 24 hour   Intake 800 ml   Output 1625 ml   Net -825 ml           Labs and Imaging Results:      Recent Labs   Lab 11/20/20  0530 11/19/20  1817 11/19/20  1127   WBC 63.1*  --  52.4*   HGB 10.0* 10.2* 11.5*   *  --  88*        Recent Labs   Lab 11/20/20  0530 11/19/20  1817    140   CO2 24 23   BUN 21 25        Recent Labs   Lab 11/18/20  0600   INR 1.13      No results for input(s): CKMB in the last 168 hours.    Invalid input(s): TROPONINT     Recent Labs   Lab 11/19/20  1127 11/17/20  0456   ALBUMIN 1.6* 1.4*   AST 47* 18   ALT 19 23   ALKPHOS 385* 207*        Micro:     Radio:  XR Abdomen Port 2 Views   Final Result   IMPRESSION: There are bilateral double-J ureteral stents. Prominent   gas noted in large and small bowel loops, likely related to ileus. No   evidence of free intra-abdominal air. The urinary bladder catheter is   present. Pulmonary opacities are noted.      CARRIE FRAZIER MD      CTA Chest with Contrast   Final Result   IMPRESSION: Patchy and confluent groundglass opacities in the lungs as   above. Findings would suggest a multifocal pneumonia. In addition,   there are small bilateral pleural effusions with adjacent atelectasis.      MAYRA VIERA MD      XR Chest Port 1 View   Final Result   IMPRESSION: Heart size is upper limit of normal. Scattered bibasilar   opacities have progressed since previous exam, concerning for   worsening pneumonia. Suspect small bilateral pleural effusions. No   pneumothorax. Pulmonary edema is consideration, clinical correlation   necessary.      DENIS RAMIREZ MD      XR Chest Port 1 View   Final Result   IMPRESSION: Low lung volumes with mild bilateral lung base atelectasis   versus infiltrate with  possible small pleural effusions. No overt   pulmonary edema. Increased mediastinal widening/hilar enlargement may   be due to low lung volumes but progression of known thoracic   lymphadenopathy not excluded.      YEMI DIAZ MD              Medications:      Scheduled Meds:      acyclovir (ZOVIRAX) IV  5 mg/kg Intravenous Q12H     allopurinol  300 mg Oral Daily     calcium carbonate 500 mg (elemental)  500 mg Oral BID w/meals     calcium carbonate-vitamin D  1 tablet Oral BID w/meals     dexamethasone  0.5 mg Oral Q8H     doxycycline (VIBRAMYCIN) IV  100 mg Intravenous Q12H     ferrous sulfate  325 mg Oral Daily with breakfast     lipids  250 mL Intravenous Q24H     loratadine  10 mg Oral Daily     meropenem  1 g Intravenous Q12H     multivitamin w/minerals  1 tablet Oral Daily     nystatin  500,000 Units Swish & Spit 4x Daily     pantoprazole (PROTONIX) IV  40 mg Intravenous Q12H BMT CSA     polyethylene glycol  17 g Oral Daily     sodium phosphate  9 mmol Intravenous Once     senna-docusate  1 tablet Oral BID    Or     senna-docusate  2 tablet Oral BID     sodium chloride (PF)  10 mL Intracatheter Q7 Days     sodium chloride (PF)  3 mL Intracatheter Q8H     tamsulosin  0.4 mg Oral Daily     cholecalciferol  25 mcg Oral BID     voriconazole  200 mg Oral Q12H DANK     Continuous Infusions:      dextrose       parenteral nutrition - Clinimix E 40 mL/hr at 11/20/20 1107     PRN Meds:  acetaminophen **OR** acetaminophen, dextrose, diphenhydrAMINE, guaiFENesin-dextromethorphan, lidocaine 4%, lidocaine (buffered or not buffered), LORazepam, lidocaine visc 2% & maalox/mylanta w/simethicone & diphenhydramine, melatonin, naloxone, ondansetron **OR** ondansetron, oxyCODONE, prochlorperazine **OR** prochlorperazine **OR** prochlorperazine, rincinol P.R.N, sodium chloride (PF), sodium chloride (PF), sodium chloride (PF)

## 2020-11-20 NOTE — PLAN OF CARE
Pertinent assessments: Tmax 101.1. Tachycardic. Soft BP. Tachypneic at times. HFNC 60% 40L. Aox4. Denies pain. LS diminished. Frequent nonproductive cough, refused Robitussin. Palafox in place. 1 BM this shift. Scheduled decadron to lips/oral mucosa lesions. Penile, scrotal, groin, and coccyx wounds noted. Generalized rash and edema. TPN/Lipids infusing.   Major Shift Events: K recheck 3.1, replacement infusing. Desats to 85-86% and HR in 150s when coughing.   Treatment Plan: IV merrem, IV doxycycline, IV acyclovir, PO voriconazole. TPN/Lipids. Nystatin, decadron swish and spit. High flow oxygen. Electrolyte replacements. Supportive cares.  Bedside Nurse: Manjula Worthy RN

## 2020-11-20 NOTE — PLAN OF CARE
End of Shift Summary  For vital signs and complete assessments, please see documentation flowsheets.     Pertinent assessments: Temp max of 101.8 axillary, tylenol x1. Tachypnea, tachycardia noted all shift but improved after initiation of HFNC. Pt on telemetry. HR at times up into 150s when moving in bed; most of shift HR in 120s. Lung sounds with crackles from mid to lower lobes bilaterally. Frequent, nonproductive cough, Robitussin DM x2; prior to HFNC pt would have oxygen desats into mid 80s while on 10LPM oxygen via oxymask; oxygen sats stable on HFNC. Lasix x1 with large urine output via tran catheter. Abdominal distention noted, xray ordered by MD. BM x1, loose/green. Poor appetite. Oral lesions noted; nystatin and decadron scheduled, pt refused PRNs. Potassium low this evening after diuresis during day; K+  to be replaced via protocol by oncoming RN. Repositioning encouraged more frequently this evening as pt able to tolerate more activity without as significant tachypnea, tachycardia, and oxygen desats.   Major Shift Events: Placed on high flow oxygen. Critically low calcium and critically elevated WBCs this shift. Transitioned to TPN from PPN.   Treatment Plan: IV merrem, IV doxycycline, IV acyclovir, PO voriconazole. TPN/Lipids. Nystatin, decadron swish and spit. High flow oxygen. Supportive cares.

## 2020-11-21 PROBLEM — R50.9 FUO (FEVER OF UNKNOWN ORIGIN): Status: ACTIVE | Noted: 2020-01-01

## 2020-11-21 PROBLEM — Z86.15 HISTORY OF LATENT TUBERCULOSIS: Chronic | Status: ACTIVE | Noted: 2020-01-01

## 2020-11-21 PROBLEM — B00.9 HSV (HERPES SIMPLEX VIRUS) INFECTION: Status: ACTIVE | Noted: 2020-01-01

## 2020-11-21 PROBLEM — D84.9 ACQUIRED IMMUNOCOMPROMISED STATE (H): Status: ACTIVE | Noted: 2020-01-01

## 2020-11-21 NOTE — PLAN OF CARE
.ICU End of Shift Summary.  For vital signs and complete assessments, please see documentation flowsheets.     Pertinent assessments: Patient transferred from 3rd floor this shift.  Intubated upon arrival.  Agitated.  Currently on vent at 80% FiO2.  LS coarse.  Propofol for sedation.  Titrated up to meet RASS goal but had to wean back down due to patient's BP.   ml bolus given per Tele ICU.  Fentanyl infusing.  Palafox patent with good urine output.  One large watery stool since here.  Continue in Airborne precaution.    Major Shift Events: as above  Plan (Upcoming Events): continue with current plan of care.    Discharge/Transfer Needs: TBD    .Right wrist and Left wrist restraints continued 11/21/2020    Clinical Justification: Pulling lines, pulling tubes, and pulling equipment  Less Restrictive Alternative: Re-evaluate equipment, Disguise equipment, Repositioning  Attending Physician Notified: MD ordered restraint,     New orders placed NO  Length of Order: 1 Day      Berkley Higuera RN

## 2020-11-21 NOTE — PROGRESS NOTES
325 AS- Sister did call back- Mamta- [phone]. Can we get an order for more Ativan to help with his increased RR and HR? Thank you    Received order for 1 mg IV ativan one time dose.

## 2020-11-21 NOTE — CONSULTS
NUTRITION BRIEF NOTE + CONSULT: TF Assess and Order  See RD note 11/20 for full reassessment details    New findings in last 24 hours:    Intubated overnight. TPN continues at step 2/3 due to electrolyte abnormalities: Clinimix D15 AA5 at 40 mL/hr (960 mL total) + 250 mL 20% lipids daily    OG placed    Weight: 47.6 kg, up ~2 kg since admit    Fluid: +1-2 generalized, BLE and BUE edema    BM: 11/21, diarrhea, C diff pending    Labs and meds: reviewed, Propofol gtt at 8.6 mL/hr to provide 227 kcal daily from lipids    Assessed Nutrition Needs (DW: 47 kg):  Estimated Energy Needs: 7700-9754 kcals (25-30 Kcal/Kg)  Justification: intubation  Estimated Protein Needs: 56-94 grams protein (1.2-2 g pro/Kg)  Justification: preservation of LBM while intubated  Estimated Fluid Needs: per MD    Interventions:  Wean PN - continue at 40 mL/hr with minimum one hour at 1/2 rate to avoid rebound hypoglycemia until bag finished (concern minimized with TF initiation) with goal rate for TF initiation as follows:   Type of Feeding Tube: OG   Enteral Frequency:  Continuous  Enteral Regimen: Replete 1.0 without fiber at 50 mL/hr  Total Enteral Provisions: 1200 mL daily provides 1200 kcal, 77 g protein, 134 g CHO, 0 g fiber.  Certavite contraindicated   Free Water Flush: 60 mL q4 hours  Once ok to start feeding via OG (after bronch), begin at 10 mL/hr and increase by 10 mL q8 hours as tolerated  **Total goal rate pending tolerance, kcal from propofol**      Entered orders for regimen outlined above    Collaboration and Referral of care: Discussed patient during interdisciplinary care rounds this morning    Please page/consult as needed.      Jimena Morel, MS, RDN-AP, LD, CNSC  Pager - 3rd floor/ICU: [phone]  Pager - All other floors: [phone]  Pager - Weekend/holiday: [phone]  Office: [phone]

## 2020-11-21 NOTE — ADDENDUM NOTE
Addendum  created 11/21/20 0314 by Wilmar Isidro APRN CRNA    Child order released for a procedure order, Clinical Note Signed, Intraprocedure Blocks edited, Intraprocedure Event edited, LDA created via procedure documentation

## 2020-11-21 NOTE — PROGRESS NOTES
RT- Present for bedside bronch. No complications noted during procedure. Patient requiring 100% FIO2 post bronch.    Lele Lopez, RT on 11/21/2020 at 3:07 PM

## 2020-11-21 NOTE — PHARMACY-CONSULT NOTE
Pharmacy Tube Feeding Consult    Medication reviewed for administration by feeding tube and for potential food/drug interactions.    Recommendation:  - Acetaminophen, Calcium, Oxycodone, Voriconazole tablet --> susp via TF  - Pantoprazole IV --> susp via TF    All other medication routes updated as appropriate.    Pharmacy will continue to follow as new medications are ordered.       Jonathan Slaughter Pharm.D., BCPS

## 2020-11-21 NOTE — PROVIDER NOTIFICATION
ABGS resulted, on HFNC 50L 60%. Bipap?     MD came to floor and assessed patient, ok to trial Bipap.

## 2020-11-21 NOTE — PROGRESS NOTES
"Note Infectious Disease Consult Service Progress Note  Covering for [facility]   Pt Name Attapeu NO JUSTICE Diallo   Date 11/21/2020   MRN [id number]     Notes / labs / imaging test results and other data were reviewed    CHIEF COMPLAINT: REASON FOR VISIT    Fever / recent chemo-induced neutropenia    HPI    56 yo undergoing chemo for seminoma - last chemo Nov 9, 2020  Hx K oxytoca UTI  & sepsis October 2020  Recently noted (+) Quant gold / no past therapy for active or latent TB  Foreign borne ([place])    admittted 11/12 with fever / neutropenia  Despite broad spectrum antimicrobial agents, remains febrile  No newly confirmed infection other than oral HSV I PCR  (+)  Lung infiltrates on CT scan  Neutropenia resolved     11/21/2020  INTERIM UPDATE    Fi02 060        Temp graph (red squares / line)  WBC  antibiotic use over time      Remainder of 14-point ROS was negative except as listed above    PFSH:  Personal / Family / Social Histories were reviewed and updated  nill new  Vital Signs: BP (!) 117/93   Pulse 117   Temp 100.9  F (38.3  C) (Axillary)   Resp 21   Ht 1.549 m (5' 1\")   Wt 47.6 kg (105 lb)   SpO2 98%   BMI 19.84 kg/m    EXAM    gen'l   In ICU  On vent     neuro   Not responding to me     lungs   Fine crackles both posterolateral (L > R tho')     CV   tachy     abd   Soft  No apparent tenderness     skin   No discrete rash   extrems   unremarkable   IV   PICC L upper arm - site OK         Data  Micro data          Other labs        Cultures                  IMAGING    11.18   CT chest IMPRESSION: Patchy and confluent groundglass opacities in the lungs as above.   Findings would suggest a multifocal pneumonia.   In addition, there are small bilateral pleural " effusions with adjacent atelectasis.         LABS  Lab Results   Component Value Date/Time    WBC 62.1 (HH) 11/21/2020 05:32 AM    WBC 63.1 (HH) 11/20/2020 05:30 AM    WBC 52.4 (HH) 11/19/2020 11:27 AM    WBC 49.5 (H) 11/19/2020 06:55 AM    AST 47 (H) 11/19/2020 11:27 AM    AST 18 11/17/2020 04:56 AM    AST 20 11/16/2020 07:15 AM    AST 20 11/15/2020 02:56 AM    ALT 19 11/19/2020 11:27 AM    ALT 23 11/17/2020 04:56 AM    ALT 33 11/16/2020 07:15 AM    ALT 32 11/15/2020 02:56 AM    ALKPHOS 385 (H) 11/19/2020 11:27 AM    ALKPHOS 207 (H) 11/17/2020 04:56 AM    ALKPHOS 205 (H) 11/16/2020 07:15 AM    ALKPHOS 290 (H) 11/15/2020 02:56 AM           ASSESSMENT & SUGGESTIONS  Patient Active Problem List   Diagnosis Code     YAYA (acute kidney injury) (H) N17.9     Seminoma (H) C62.90     Neutropenia, drug-induced (H) D70.2     Chemotherapy-induced neutropenia (H) D70.1, T45.1X5A     Neutropenic fever (H) D70.9, R50.81     Chronic renal impairment,  N18.9     Sepsis  (H) A41.9     Febrile neutropenia (H) D70.9, R50.81     FUO (fever of unknown origin) R50.9     HSV (herpes simplex virus) infection B00.9     History of latent tuberculosis Z86.15     Acquired immunocompromised state (H) D84.9      SE   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 Parasitic (Strongyloides)  ? Mycobacterial (no known hx tx for  LTBI)  ? Non-infectious (adverse drug rxn)    Cont airborne iso  Sputum x 3 for AFB smear / culture  Strongyloides IgG ordered  No change empiric atbs for  now    TT 36 min  > 50 % CC         Ki Bruner MD  Covering for [facility]  Infectious Disease service    CURRENT MED REVIEWD  Current Facility-Administered Medications   Medication     acetaminophen (TYLENOL) tablet 650 mg    Or     acetaminophen (TYLENOL) Suppository 650 mg     acyclovir (ZOVIRAX) 250 mg in D5W 50 mL intermittent infusion     allopurinol (ZYLOPRIM) tablet 300 mg     calcium  carbonate-vitamin D (OSCAL w/D) per tablet 2 tablet     calcium gluconate 1 g in D5W 100 mL intermittent infusion     dextrose 10% infusion     diphenhydrAMINE (BENADRYL) injection 25 mg     doxycycline (VIBRAMYCIN) 100 mg vial to attach to  mL bag     fentaNYL (SUBLIMAZE) infusion     guaiFENesin-dextromethorphan (ROBITUSSIN DM) 100-10 MG/5ML syrup 5 mL     lidocaine (LMX4) cream     lidocaine 1 % 0.1-1 mL     lipids (INTRALIPID) 20 % infusion 250 mL     loratadine (CLARITIN) tablet 10 mg     LORazepam (ATIVAN) injection 0.5-1 mg     magic mouthwash suspension (diphenhydramine, lidocaine, aluminum-magnesium & simethicone)     melatonin tablet 1 mg     meropenem (MERREM) 1 g vial to attach to  mL bag     naloxone (NARCAN) injection 0.1-0.4 mg     norepinephrine (LEVOPHED) 16 mg in  mL infusion CENTRAL LINE     ondansetron (ZOFRAN-ODT) ODT tab 4 mg    Or     ondansetron (ZOFRAN) injection 4 mg     oxyCODONE (ROXICODONE) solution 5 mg     pantoprazole (PROTONIX) IV push injection 40 mg     parenteral nutrition - Clinimix E     prochlorperazine (COMPAZINE) injection 5 mg    Or     prochlorperazine (COMPAZINE) tablet 10 mg    Or     prochlorperazine (COMPAZINE) suppository 25 mg     propofol (DIPRIVAN) infusion     rincinol P.R.N liquid 10 mL     sodium chloride (PF) 0.9% PF flush 10 mL     sodium chloride (PF) 0.9% PF flush 10-20 mL     sodium chloride (PF) 0.9% PF flush 3 mL     sodium chloride (PF) 0.9% PF flush 3 mL     Vitamin D3 (CHOLECALCIFEROL) tablet 25 mcg     voriconazole (VFEND) tablet 200 mg

## 2020-11-21 NOTE — PROGRESS NOTES
Pt was transferred to MS3 room 324- upon settling pt in room pt was tachypnic w/ RR 40-50s. Pt endorsed SOB did not endorse pain. MD notified, PRN ativan IV 0.5 mg-1 mg ordered. 0.5 mg given and RR had small change. Second dose of 0.5 mg ativan given after. Will continue to monitor.

## 2020-11-21 NOTE — PROGRESS NOTES
HOSPITALIST CRITICAL CARE NOTE:    Subjective: Asked to see patient due to tachypnea, tachycardia, and worsening dyspnea.  [place] general medical [place] [facility] [place] and started on BiPAP due to worsening respiratory failure.  He is clearly in respiratory distress and poorly tolerating BiPAP.  He is unable to provide any history at this time.    Objective:  Vital Signs with Ranges  Temp:  [98.8  F (37.1  C)-101.8  F (38.8  C)] 99.4  F (37.4  C)  Pulse:  [115-144] 144  Resp:  [24-58] 58  BP: (104-126)/(70-80) 125/80  FiO2 (%):  [40 %-60 %] 40 %  SpO2:  [92 %-98 %] 96 %  I/O last 3 completed shifts:  In: 1551 [I.V.:557]  Out: 2250 [Urine:2250]  GEN: Respiratory distress.  CV: Tachycardic but regular.  PULM: Coarse bilaterally.  ABD: Soft and nontender.  EXT: No edema.  NEURO: No focal deficits.    PERTINENT AND AVAILABLE LABS:   pH 7.46, PCO2 29, PO2 48, bicarb 20.  This ABG was performed on 60% FiO2 prior to BiPAP initiation.    Assessment:  1.  Acute hypoxic respiratory failure, broad differential diagnosis and likely multifactorial  2.  Neutropenic fever.  3.  Acute GI bleeding.    Plan:   -Transfer to the intensive care unit.  -Plan for intubation in a more controlled setting as he is clearly in respiratory distress and not tolerating BiPAP well.  -Continue broad-spectrum antibiotics.  -Discontinue dexamethasone and start stress dose steroids.  -Pulmonology and infectious disease already following.  -I called his sister Mamta but she did not answer and her voicemail is not yet set up.    Critical care time: >30 minutes    Add: Updated tele-ICU and pt's sister who called back and consented to intubation.    Shyam Martinez DO MPH  [facility]  [address] [place] [address]  Pager: [phone]  11/21/2020

## 2020-11-21 NOTE — PROGRESS NOTES
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    Initially on 11/12, patient was admitted for neutropenic fever.  He had received chemotherapy 3 days prior to admission with bleomycin, etoposide, and platinum.  He was started on broad-spectrum antibiotics with cefepime and vancomycin.  Infectious disease and oncology were consulted.  Was also treated for pancytopenia due to chemotherapy.  Received Neulasta x3 on 11/13 through 15.  He developed mucositis which was initially treated with topical medications but ultimately required TPN.  Also had hypocalcemia issues requiring replacement.  He developed bright red blood per rectum and GI was consulted.  Recommended Protonix but holding off endoscopy.  Acyclovir and fluconazole were added given the mucositis.  Patient required multiple additional blood transfusions.    11/18 he developed worsening cough and tachycardia.  CT of the chest showed patchy and confluent groundglass opacities suggestive of multifocal pneumonia.  COVID-19 test was repeated and negative again.  Patient has been having ongoing fevers but they seem to get worse around the time as well.  On 11/19 his WBC count was noted to increase to 52 whereas it has been very low before that.  Antibiotics were broadened to include doxycycline, meropenem and voriconazole.  Pulmonary consulted on 11/20.  Concern for bleomycin toxicity.  Recommended bronchoscopy if possible.  In the evening of 11/20-21, patient's respiratory status worsened despite BiPAP and he was endotracheally intubated.    Patient now seen in ICU setting for  progressive respiratory failure.    Acute hypoxic respiratory failure  Bilateral patchy groundglass opacities  The etiology is unclear at this time.  He was admitted on 11/12 without significant respiratory symptoms and had low lung volumes with mild bilateral basilar atelectasis versus infiltrate.  As outlined above, his respiratory status progressively worsened during the hospitalization and he required intubation on 11/21.  CT angiogram of the chest did not show any PE 11/18.  Did show the bilateral groundglass multifocal opacities concerning for pneumonia.  Patient has been a large variety of antimicrobials during hospitalization with cefepime, vancomycin, acyclovir, fluconazole.  Currently on doxycycline, meropenem voriconazole.  Aspergillus galactomannan and beta D glucan have been negative. COVID negative x2. Influenza antigen negative.   ID has been consulted, appreciate recommendations.  Appreciate pulmonary consultation.  Patient has known positive QuantiFERON gold, felt to be likely due to latent TB, has not received treatment for that.  Appreciate oncology consultation.  They felt that the timing of the infiltrates is not suggestive of bleomycin toxicity, though this has been on the differential.  Also felt that seminoma is very unlikely to cause lymphangitic spread in the lungs.  Bronchoscopy 11/21. Results pending. Add on influenza PCR.  Currently mechanically ventilated with propofol and fentanyl for sedation, 50% FiO2 and 5 of PEEP.    Hypotension  Sedation vs infection related  Norepinephrine available as needed to maintain MAP > 65    Leukocytosis  White count has been very elevated in the 60-50 range after filgrastim doses.  Monitor daily.    Hypocalcemia  Has been an ongoing issue during hospitalization  Replace as needed.    Seminoma  This was initially diagnosed in September 2020.  Started chemotherapy while hospitalized in October.  Has had issues with admissions for neutropenic fever and  "urosepsis since that time.  Per oncology, patient has locally advanced disease which has been responding to curative intent chemotherapy.  Is on bleomycin, etoposide and platinum based chemotherapy.  As outlined above, oncology feels that his current lung filtrates more likely infectious and related to his cancer, which is improving, or his chemotherapy, which should not be at this point sufficient to cause the lung toxicity.    Multifactorial anemia  Thrombocytopenia  Hb has ranged anywhere from as low as 5 to as high 11 during the hospitalization.  Accommodation of blood loss from GI tract which is likely related to mucositis as well as chemotherapy induced pancytopenia.  Monitor daily, transfuse if platelets are below 50 or Hb < 7.   Continue BID protonix.     Diet: TPN being weaned for tube feeds  DVT Prophylaxis: Pneumatic Compression Devices  Palafox Catheter: Yes; in place for acute illness  Code Status: Full Code     Disposition Plan   Expected discharge:   Anticipate continued prolonged hospitalization.     Matt Haskins MD  Hospitalist Service  [facility]  ______________________________________________________________________    Interval History   Patient was transferred to the ICU for intubation overnight due to worsening respiratory status.  Ventilated and sedated this morning.  Moves all extremities.    Data reviewed today: I reviewed all medications, new labs and imaging results over the last 24 hours.     Physical Exam   BP 96/62   Pulse 122   Temp 100.9  F (38.3  C) (Axillary)   Resp 10   Ht 1.549 m (5' 1\")   Wt 47.6 kg (105 lb)   SpO2 97%   BMI 19.84 kg/m    105 lbs 0 oz       General: Intubated and sedated.  Reacts to touch.    HEENT: No scleral icterus. Oropharynx moist.     Neck: Supple. Normal range of motion.     Pulmonary: Coarse ventilator associated breath sounds bilaterally.    Cardiovascular: Regular rate and rhythm without murmur or extra heart " sounds.    Abdomen: Soft and non-tender.    Extremities: No peripheral edema. No clubbing or cyanosis.     Neurologic: Moves all extremities, not following commands.    Skin: Warm and dry.    Data    Recent Labs   Lab 11/21/20  0532 11/20/20  1800 11/20/20  1306 11/20/20  0530 11/19/20  1817 11/19/20  1817 11/19/20  1127 11/19/20  1127 11/18/20  1210 11/18/20  1210 11/18/20  0600 11/17/20  0456 11/17/20  0456   WBC 62.1*  --   --  63.1*  --   --   --  52.4*   < >  --  31.4*  --  8.9   HGB 9.0* 10.0*  --  10.0*  --  10.2*  --  11.5*   < >  --  10.9*   < > 7.9*   MCV 94  --   --  93  --   --   --  91   < >  --  93  --  92   *  --   --  111*  --   --   --  88*   < >  --  79*  --  67*   INR  --   --   --   --   --   --   --   --   --   --  1.13  --   --      --   --  140  --  140   < >  --    < >  --  140  --  143   POTASSIUM 3.4  --  3.8 3.2*   < > 2.8*   < >  --    < >  --  3.7   < > 3.2*   CHLORIDE 110*  --   --  112*  --  110*   < >  --    < >  --  115*  --  117*   CO2 25  --   --  24  --  23   < >  --    < >  --  19*  --  19*   BUN 21  --   --  21  --  25   < >  --    < >  --  21  --  25   CR 1.37*  --   --  1.40*  --  1.43*   < >  --    < >  --  1.56*  --  1.67*   ANIONGAP 5  --   --  4  --  7   < >  --    < >  --  6  --  7   IVANIA 5.6*  --   --  6.1*  --  6.1*   < >  --    < >  --  5.6*  --  5.5*   *  --   --  136*  --  162*   < >  --    < > 152* 121*   < > 95   ALBUMIN  --   --   --   --   --   --   --  1.6*  --   --   --   --  1.4*   PROTTOTAL  --   --   --   --   --   --   --  5.4*  --   --   --   --  4.3*   BILITOTAL  --   --   --   --   --   --   --  0.3  --   --   --   --  0.2   ALKPHOS  --   --   --   --   --   --   --  385*  --   --   --   --  207*   ALT  --   --   --   --   --   --   --  19  --   --   --   --  23   AST  --   --   --   --   --   --   --  47*  --   --   --   --  18   TROPI  --   --   --   --   --   --   --   --   --  0.018  --   --   --     < > = values in this interval  not displayed.     Recent Results (from the past 24 hour(s))   XR Chest Port 1 View    Narrative    EXAM: XR CHEST PORT 1 VW  LOCATION: [facility]  DATE/TIME: 11/21/2020 2:50 AM    INDICATION: Intubated.    COMPARISON: 11/18/2020.    FINDINGS: Upright portable chest. ET tube approximately 5 cm above the berlin. NG tube in the stomach. Left PICC in place with tip in the right atrium. This could be withdrawn 4 cm. There is no pneumothorax. The heart is at the upper limits of normal in   size. Bibasilar pulmonary infiltrates similar to the previous exam.      Impression    IMPRESSION: ET tube 5 cm above the berlin.       Medications      Current Facility-Administered Medications   Medication Dose Route Frequency     acyclovir (ZOVIRAX) IV  5 mg/kg Intravenous Q12H     allopurinol  300 mg Oral Daily     calcium carbonate  2,500 mg Per Feeding Tube BID     doxycycline (VIBRAMYCIN) IV  100 mg Intravenous Q12H     lipids  250 mL Intravenous Q24H     loratadine  10 mg Oral Daily     meropenem  1 g Intravenous Q12H     pantoprazole (PROTONIX) IV  40 mg Intravenous Q12H BMT CSA     sodium chloride (PF)  10 mL Intracatheter Q7 Days     sodium chloride (PF)  3 mL Intracatheter Q8H     cholecalciferol  25 mcg Oral BID     voriconazole  200 mg Oral Q12H DANK

## 2020-11-21 NOTE — ANESTHESIA PROCEDURE NOTES
Airway   Date/Time: 11/21/2020 2:30 AM   Patient location during procedure: ICU    Staff -   CRNA: Wilmar Isidro APRN CRNA  Performed By: CRNA    Consent for Airway   Urgency: emergentConsent: The procedure was performed in an emergent situation.      Indications and Patient Condition  Indications for airway management: respiratory insufficiency  Mallampati: Not AssessedInduction type:RSIMask difficulty assessment: 0 - not attempted    Final Airway Details  Final airway type: endotracheal airway  Successful airway:ETT - single  Endotracheal Airway Details   ETT size (mm): 6.5  Successful intubation technique: video laryngoscopy  Grade View of Cords: 1  Adjucts: stylet  Measured from: lips  Secured at (cm): 21  Secured with: commercial tube kuo  Bite block used: None    Post intubation assessment   ETT secured, Vent settings by primary/ICU team, Primary/ICU team to review CXR, Sedation to be ordered by primary/ICU team and No apparent complications  Placement verified by: capnometry, equal breath sounds and chest rise   Number of attempts at approach: 1  Number of other approaches attempted: 0  Secured with:commercial tube kuo  Ease of procedure: easy  Dentition: Intact and Unchanged

## 2020-11-21 NOTE — PROGRESS NOTES
hospitalist x-cover    Pt d/w Dr. Knowles this evening.  Given rapid RR and pulse ABG obtained.  PCO2 low.  However given rapid RR am concerned that the patient will not be able to sustain respiratory effort.      Pt seen and examined.    Mucositis noted on exam.  Tachycardia to 135.  sats mid 90s on HFNC.  Awake, interactive.  Sister present at bedside interpreting.      Plan  tx to [facility]  Would like to attempt BIPAP, if he tolerates in light of mucositis.  Will likely need frequent breaks for oral cares.    If does not tolerate BIPAP may need to consider intubation if rapid RR continues but not needed currently

## 2020-11-21 NOTE — PROGRESS NOTES
"ICU Progress Note  DOS 2020      Patient name: Attapeu SHAUNA Diallo  MRN: [id number]  : 1965    55 year old male with developmental delay prestned to the ICU with worsened hypoxic respiratory failure    Past Medical History:   Diagnosis Date     Acquired immunocompromised state (H) 2020    Therapy for seminoma      History of latent tuberculosis 2020     Mental retardation      Past Surgical History:   Procedure Laterality Date     ABDOMEN SURGERY      sister states he had an \"abdominal surgery 20 years ago\" unknown      CYSTOSCOPY, RETROGRADES, INSERT STENT URETER(S), COMBINED Bilateral 2020    Procedure: Cystoscopy with bilateral retrograde pyelogram and bilateral ureteral stent insertiona-complex due to severe bilateral hydroureteronephrosis with ureteral tortuosity, examination under anesthesia and fluoroscopic interpretation > 1 hour physician time;  Surgeon: Ralf Chan MD;  Location:  OR     ESOPHAGOSCOPY, GASTROSCOPY, DUODENOSCOPY (EGD), COMBINED N/A 3/1/2019    Procedure: ESOPHAGOSCOPY, GASTROSCOPY, DUODENOSCOPY (EGD) Rm 226;  Surgeon: Susy Camara MD;  Location:  GI         Vitals:   Vitals:    20 0045 20 0100 20 0115 20 0130   BP: (!) 83/57 (!) 84/55 91/62 98/64   BP Location: Right arm      Pulse: 96 78 82 81   Resp:    Temp: 97.7  F (36.5  C)      TempSrc: Axillary      SpO2: 96% 93% 93% 94%   Weight: 49.5 kg (109 lb 2 oz)      Height:             I/O: .I/O last 3 completed shifts:  In: 2485.34 [I.V.:558.51; NG/GT:210]  Out: 3375 [Urine:3275; Emesis/NG output:100]      Exam:  Gen: comfortable appearing, no distress, legs crossed in bed2  HEENT: normocephalic  Pulm: coarse breath sounds  Cor: RRR  Abdomen/GI: soft, nontender, nondistended  : deferred  Extremities: no LE edema  Skin: no acute skin issues      Assessment/plan:  Attapeu SHAUNA Diallo is a 55 year old male with a history of a metastatic " seminoma s/p chemotherapy who presents with fevers, increased wbc ongoing concern for infection vs bleomycin associated respiratory dysfunction who was admitted to the ICU on 11/21/2020 for management of hypoxic respiratory failure requiring intubation.  CNS:   Sedation/ pain: rass goal 0 to -1 fentanyl, propofol  Pulm: hypoxic respiratory failure requiring intubation  - infection vs bleomycin toxicity  - Bronchoscopy today without significant findings, BAL from RUL sent for labs  CV:   Shock, possible component of sepsis but likely significant component of hypovolemia and sedation  - levophed for MAP >65 mmhg  Tachycardia - likely related to distress, work of breathing, anxiety and hypovolemia  - has now improved  - continue to monitor  FEN/GI:   Start tube feeds today, stop TPN  Mucositis - can use magic mouthwash to wipe inside of mouth for comfort  GI bleed with melanotic stools early in hospital stay  : adequate uop, no concerns reguarding renal function  Heme/onc: hx of metastatic seminoma s/p BEP therapy. Patient now with concern of bleomycin related lung toxicity or other pulmonary infection. Patient did receive neupogen early on in his course  Msk: no acute skin issues  Endo: Q4H BG checks  ID: unclear cause of possible infection with very high WBC. c dif checked x2 with negative result.   - continue broad antibiotics with aid of ID.   - Quanterferon gold test resulted positive. AFP smear from bal is pending  - if testing is negative, may need to consider steroids instead  - meropenem and voriconazole  ICU:     General cares:  DVT Prophylaxis: VTE Prophylaxis contraindicated due to recent gi bleed  GI Prophylaxis: H2 Blocker  Restraints: Restraints for medical healing needed: YES  Family update by me today: Yes   Current lines are required for patient management    30 min cct.  Mayra Freitas MD 11/21/2020   8:12 AM   Surgical Critical Care  [phone]

## 2020-11-21 NOTE — PROGRESS NOTES
Due to increased pips, patients rr increased to 22 and vt decreased to 360    Mayra Freitas MD 11/21/2020 9:49 AM

## 2020-11-21 NOTE — OR NURSING
Called in for bronchoscopy in ICU. Used 6cc of 1% lidocaine through the ET tube. Lavage performed using 100ml of NS. Hemodynamically monitored via ICU staff per protocol. Patient tolerated exam. Specimens collected and sent to lab by ENDO staff.

## 2020-11-21 NOTE — PROGRESS NOTES
RT ABG Note      An ABG was completed on the pt's right radial @ 0015 on an FIO2 of 50%  with no complications noted during the procedure.  Pressure was held until no bleeding.  BandAid was applied.    Nguyen Alfred, RT

## 2020-11-21 NOTE — PLAN OF CARE
To Do:  End of Shift Summary  For vital signs and complete assessments, please see documentation flowsheets.     Pertinent assessments: Patient is Aox4 this shift. Denies having any pain.Tachycardia through shift, nonproductive cough. Good urine output    Major Shift Events: Sent to 3rd floor IMC    Treatment Plan:IV merrem, IV doxycycline, IV acyclovir, PO voriconazole. TPN/Lipids. Nystatin, decadron swish and spit. BiPAP, Electrolyte replacements. Supportive cares.    Bedside Nurse: Otf Garcia RN

## 2020-11-21 NOTE — PROGRESS NOTES
324 AS- can you please come and see patient stat, patient is doing worse. Thank you    MD came and seen patient and is working on ICU transfer.

## 2020-11-21 NOTE — PROGRESS NOTES
ICU nurses Wilder and Jhony stayed with the patient to help maintain patient's condition until patient can be transferred to ICU.     Called report to ICU nurse. Patient transferred to ICU. All belonging sent with patient. MD updated patients sister Mamta.

## 2020-11-21 NOTE — PROGRESS NOTES
[facility]    Heme/onc chart check  11/21/2020    Clinical summary: 54 yo male with developmental delay, positive Quantiferon Gold 10/27, and metastatic seminoma, admitted 11/12 for neutropenic fever 2/2 C2D8 BEP 10/19, mucositis and now with SOB (neg CT PE) and increasing leukocytosis. Received Neulasta 11/6 then 3 doses of neupogen between 11/13-15.    Interval history: Patient transferred to ICU and intubated. Pulm has been consulted.    Objective:   Vitals reviewed. Tmax 24 hour is 101.8. Tachycardiac 100-160. BP labile. Intubated.     Labs reviewed. Cr. 1.37 (stable). Calcium 5.6. Ionized calcium 3.7.   WBC 62.1, Hgb 9, plts 136K.     Assessment and plan  Neutropenic fever  - On broad spectrum abx, antiviral and antifungal. ID following.   -Worsening respiratory status, likely infection. Unlikely to be seminoma pr Bleomycin toxicity (given timing).  Pulm consulted and considering bronchoscopy  - Check daily CBC and diff   -Leukocytosis at least partly due to neulasta and neupogen. Also related to infection and dexamethasone (started 11.20) but if keeps going up we might need further evaluation.   -Transfuse for Hgb < 7 g/dl; Platelet < 50k (due to GI bleed)  -Replace electrolytes prn (calcium today)  -We will continue to follow    Recent GI bleed  - Appears stable now, keep platelets > 50k, suspect from mucositis.     Rafia Hemphill PA-C Heme/onc

## 2020-11-22 NOTE — PROGRESS NOTES
ICU Progress Note  DOS 2020      Patient name: Attapeu SHAUNA Diallo  MRN: [id number]  : 1965    55 year old male with developmental delay presented to the ICU with worsened hypoxic respiratory failure    Following commands, awake and interactive    Vitals:   Vitals:    20 1140 20 1200 20 1300 20 1400   BP:  110/70 104/66 (!) 86/58   BP Location:       Pulse:  104 102 106   Resp:     Temp:  99.7  F (37.6  C)     TempSrc:  Axillary     SpO2: 96% 99% 94% 97%   Weight:       Height:             I/O: .I/O last 3 completed shifts:  In: 2575.87 [I.V.:911.04; NG/GT:300]  Out: 4400 [Urine:4300; Emesis/NG output:100]      Exam:  Gen: comfortable appearing, no distress, follows commands in all four extremities, denies pain  HEENT: normocephalic  Pulm: coarse breath sounds  Cor: RRR  Abdomen/GI: soft, nontender, nondistended  : deferred  Extremities: no LE edema  Skin: no acute skin issues    Data:  Results for AFSANEH DIALLO (MRN [id number]) as of 2020 08:29   2020 04:30   Sodium 149 (H)   Potassium 3.5   Chloride 118 (H)   Carbon Dioxide 28   Urea Nitrogen 21   Creatinine 1.40 (H)   GFR Estimate 57 (L)   GFR Estimate If Black 66   Calcium 5.9 (LL)   Anion Gap 3   Magnesium 1.9   Phosphorus 2.4 (L)   Calcium Ionized Whole Blood 3.4 (L)   Glucose 134 (H)   WBC 72.7 (HH)   Hemoglobin 9.8 (L)   Hematocrit 30.3 (L)   Platelet Count 234   RBC Count 3.24 (L)   MCV 94   MCH 30.2   MCHC 32.3   RDW 17.0 (H)   Diff Method Manual Differential   % Neutrophils 75.0   % Lymphocytes 4.0   % Monocytes 6.0   % Eosinophils 0.0   % Basophils 0.0   % Metamyelocytes 8.0   % Myelocytes 5.0   % Promyelocytes 2.0   Nucleated RBCs 1 (H)   Absolute Neutrophil 54.5 (H)   Absolute Lymphocytes 2.9   Absolute Monocytes 4.4 (H)   Absolute Eosinophils 0.0   Absolute Basophils 0.0   Absolute Metamyelocytes 5.8 (H)   Absolute Myelocytes 3.6 (H)   Absolute Promyelocytes 1.5 (H)    Absolute Nucleated RBC 0.7   Anisocytosis Slight   Polychromasia Slight   Microcytes Present   Macrocytes Present   Platelet Estimate Automated count confirmed.  Giant platelets are present.       Assessment/plan:  Attapeu NO JUSTICE Diallo is a 55 year old male with a history of a metastatic seminoma s/p chemotherapy who presents with fevers, increased wbc ongoing concern for infection vs bleomycin associated respiratory dysfunction who was admitted to the ICU on 11/21/2020 for management of hypoxic respiratory failure requiring intubation.  CNS:   Sedation/ pain: rass goal 0 to -1 fentanyl, propofol  Pulm: hypoxic respiratory failure requiring intubation  - infection vs bleomycin toxicity  - Bronchoscopy 11/21 without significant findings, BAL from RUL sent for labs  CV:   Shock, possible component of sepsis but likely significant component of hypovolemia and sedation  - levophed for MAP >65 mmhg  Tachycardia - likely related to distress, work of breathing, anxiety and hypovolemia  - has now improved - Recurred along with worsened oxygenation in PM of 11/22 - CTA PE ordered  - continue to monitor  FEN/GI:   Start tube feeds today, stop TPN  Mucositis - can use magic mouthwash to wipe inside of mouth for comfort  GI bleed with melanotic stools early in hospital stay  : adequate uop, no concerns reguarding renal function  Hypernatremia  - 160 ml free water Q 4 hr to correct to 144 by 11/23  Heme/onc: hx of metastatic seminoma s/p BEP therapy. Patient now with concern of bleomycin related lung toxicity or other pulmonary infection. Patient did receive neupogen early on in his course  Msk: no acute skin issues  Endo: Q4H BG checks  ID: unclear cause of possible infection with very high WBC. c dif checked x2 with negative result.   - continue broad antibiotics with aid of ID.   - Quanterferon gold test resulted positive. AFP smear from bal is pending  - if testing is negative, may need to consider steroids  instead  - meropenem and voriconazole, acyclovir for HSV  - CMV IgG in Lungs is positive - start ganicyclovir per ID  - profound leukocytosis - ?neupogen,   ICU:     General cares:  DVT Prophylaxis: VTE Prophylaxis contraindicated due to recent gi bleed  GI Prophylaxis: H2 Blocker  Restraints: Restraints for medical healing needed: YES  Family update by me today: Yes   Current lines are required for patient management    30 min cct.  Mayra Freitas MD 11/22/2020   8:12 AM   Surgical Critical Care  [phone]

## 2020-11-22 NOTE — PROGRESS NOTES
[facility]    Medicine Progress Note - Hospitalist Service       Date of Admission:  11/12/2020  Length of stay: 10 days    Assessment & Plan   Luz Marina Diallo is a 55-year-old male with a history of developmental delay, metastatic seminoma on chemotherapy, known positive QuantiFERON gold, recent issues with fever and urinary tract infection, who is admitted to the ICU with respiratory failure requiring intubation.    Initially on 11/12, patient was admitted for neutropenic fever.  He had received chemotherapy 3 days prior to admission with bleomycin, etoposide, and platinum.  He was started on broad-spectrum antibiotics with cefepime and vancomycin.  Infectious disease and oncology were consulted.  Was also treated for pancytopenia due to chemotherapy.  Received Neulasta x3 on 11/13 through 15.  He developed mucositis which was initially treated with topical medications but ultimately required TPN.  Also had hypocalcemia issues requiring replacement.  He developed bright red blood per rectum and GI was consulted.  Recommended Protonix but holding off endoscopy.  Acyclovir and fluconazole were added given the mucositis.  Patient required multiple additional blood transfusions.    11/18 he developed worsening cough and tachycardia.  CT of the chest showed patchy and confluent groundglass opacities suggestive of multifocal pneumonia.  COVID-19 test was repeated and negative again.  Patient has been having ongoing fevers but they seem to get worse around the time as well.  On 11/19 his WBC count was noted to increase to 52 whereas it has been very low before that.  Antibiotics were broadened to include doxycycline, meropenem and voriconazole.  Pulmonary consulted on 11/20.  Concern for bleomycin toxicity.  Recommended bronchoscopy if possible.  In the evening of 11/20-21, patient's respiratory status worsened despite BiPAP and he was endotracheally intubated. Patient underwent bronchoscopy in the  ICU on 11/21.  Results from that are pending.     His current status is critically ill.  He is mechanically ventilated and on significant pressor support.  He has a large work-up for the progressive bilateral infiltrates and respiratory failure, which has been so far unrevealing.    Acute hypoxic respiratory failure  Bilateral patchy groundglass opacities  - The etiology is unclear at this time.  - He was admitted on 11/12 without significant respiratory symptoms and had low lung volumes with mild bilateral basilar atelectasis versus infiltrate.  As outlined above, his respiratory status progressively worsened during the hospitalization and he required intubation on 11/21.  - CT angiogram of the chest did not show any PE 11/18.  Did show the bilateral groundglass multifocal opacities concerning for pneumonia.  - Patient has been niles large variety of antimicrobials during hospitalization with cefepime, vancomycin, acyclovir, fluconazole.  - Currently on doxycycline, meropenem and voriconazole.  - Aspergillus galactomannan and beta D glucan have been negative. COVID negative x2. Influenza antigen negative.  - ID has been consulted, appreciate recommendations.  - Appreciate pulmonary consultation, appreciate recommendations  - Patient has known positive QuantiFERON gold, felt to be likely due to latent TB, has not received treatment for that.  -Appreciate oncology consultation.  They felt that the timing of the infiltrates is not suggestive of bleomycin toxicity, though this has been on the differential.  Also felt that seminoma is very unlikely to cause lymphangitic spread in the lungs.  - Bronchoscopy 11/21. Results pending. Add on influenza PCR.  - Currently mechanically ventilated with propofol and fentanyl for sedation, 50% FiO2 and 5 of PEEP.  - Holding off on steroids for now while infectious workup is being completed.     Hypotension  - Sedation vs infection related  - Norepinephrine available as needed to  maintain MAP > 65.    Leukocytosis  - White count has been very elevated in the 60-50 range after filgrastim doses.  Monitor daily.  - Increasing again to 72.7 on 11/22.  - Differential shows left shift with metamyelocytes.    Hypocalcemia  - Has been an ongoing issue during hospitalization  - Replace as needed.    Seminoma  - This was initially diagnosed in September 2020.  Started chemotherapy while hospitalized in October.  Has had issues with admissions for neutropenic fever and urosepsis since that time.  Per oncology, patient has locally advanced disease which has been responding to curative intent chemotherapy.  - Is on bleomycin, etoposide and platinum based chemotherapy.  - As outlined above, oncology feels that his current lung filtrates more likely infectious and related to his cancer, which is improving, or his chemotherapy, which should not be at this point sufficient to cause the lung toxicity.    Multifactorial anemia  Thrombocytopenia  - Hb has ranged anywhere from as low as 5 to as high 11 during the hospitalization.  A combination of blood loss from GI tract which is likely related to mucositis as well as chemotherapy induced pancytopenia.  - Has received multiple blood transfusions during hospitalization  - Monitor daily, transfuse if platelets are below 50 or Hb < 7.   - Continue BID protonix.     Diet: TPN being weaned for tube feeds  DVT Prophylaxis: Pneumatic Compression Devices  Palafox Catheter: Yes; in place for acute illness  Code Status: Full Code     Disposition Plan   Expected discharge:   Anticipate continued prolonged hospitalization.     Matt Haskins MD  Hospitalist Service  [facility]  ______________________________________________________________________    Interval History   Pressor needs stable to increased overnight. Remains sedated mechanically ventilated today.    Data reviewed today: I reviewed all medications, new labs and imaging results over the last  "24 hours.     Physical Exam   /66   Pulse 102   Temp 99.7  F (37.6  C) (Axillary)   Resp 22   Ht 1.549 m (5' 1\")   Wt 49.5 kg (109 lb 2 oz)   SpO2 94%   BMI 20.62 kg/m    109 lbs 2.04 oz       General: Intubated and sedated.  Reacts to touch.    HEENT: No scleral icterus. Oropharynx moist.     Neck: Supple. Normal range of motion.     Pulmonary: Coarse ventilator associated breath sounds bilaterally.    Cardiovascular: Regular rate and rhythm without murmur or extra heart sounds.    Abdomen: Soft and non-tender.    Extremities: No peripheral edema. No clubbing or cyanosis.     Neurologic: Moves all extremities, not following commands.    Skin: Warm and dry.    Data    Recent Labs   Lab 11/22/20  0430 11/21/20  1815 11/21/20  0532 11/20/20  1306 11/20/20  1306 11/20/20  0530 11/19/20  1127 11/19/20  1127 11/18/20  1210 11/18/20  1210 11/18/20  0600 11/17/20  0456 11/17/20  0456   WBC 72.7*  --  62.1*  --   --  63.1*  --  52.4*   < >  --  31.4*  --  8.9   HGB 9.8* 9.2* 9.0*   < >  --  10.0*   < > 11.5*   < >  --  10.9*   < > 7.9*   MCV 94  --  94  --   --  93  --  91   < >  --  93  --  92     --  136*  --   --  111*  --  88*   < >  --  79*  --  67*   INR  --   --   --   --   --   --   --   --   --   --  1.13  --   --    *  --  140  --   --  140   < >  --    < >  --  140  --  143   POTASSIUM 3.5  --  3.4  --  3.8 3.2*   < >  --    < >  --  3.7   < > 3.2*   CHLORIDE 118*  --  110*  --   --  112*   < >  --    < >  --  115*  --  117*   CO2 28  --  25  --   --  24   < >  --    < >  --  19*  --  19*   BUN 21  --  21  --   --  21   < >  --    < >  --  21  --  25   CR 1.40*  --  1.37*  --   --  1.40*   < >  --    < >  --  1.56*  --  1.67*   ANIONGAP 3  --  5  --   --  4   < >  --    < >  --  6  --  7   IVANIA 5.9*  --  5.6*  --   --  6.1*   < >  --    < >  --  5.6*  --  5.5*   *  --  150*  --   --  136*   < >  --    < > 152* 121*   < > 95   ALBUMIN  --   --   --   --   --   --   --  1.6*  --   -- "   --   --  1.4*   PROTTOTAL  --   --   --   --   --   --   --  5.4*  --   --   --   --  4.3*   BILITOTAL  --   --   --   --   --   --   --  0.3  --   --   --   --  0.2   ALKPHOS  --   --   --   --   --   --   --  385*  --   --   --   --  207*   ALT  --   --   --   --   --   --   --  19  --   --   --   --  23   AST  --   --   --   --   --   --   --  47*  --   --   --   --  18   TROPI  --   --   --   --   --   --   --   --   --  0.018  --   --   --     < > = values in this interval not displayed.     Recent Results (from the past 24 hour(s))   XR Chest Port 1 View    Narrative    XR PORTABLE CHEST ONE VIEW   11/21/2020 2:29 PM     HISTORY: Follow-p post bronch.    COMPARISON: Chest x-ray on same day earlier      Impression    IMPRESSION: Single portable AP view of the chest was obtained.  Endotracheal tube tip projects over lower thoracic trachea  approximately 2 cm from the berlin. Enteric tube crosses the diaphragm  with the distal tip outside the field of view. Left upper extremity  PICC tip projects over lower right atrium. Stable enlargement of the  cardiac silhouette. Small bilateral pleural effusions and associated  basilar atelectasis/consolidation. Scattered patchy airspace pulmonary  opacities, worrisome for infection. No significant pneumothorax.    BEE DALLAS MD       Medications      Current Facility-Administered Medications   Medication Dose Route Frequency     acyclovir (ZOVIRAX) IV  5 mg/kg Intravenous Q12H     allopurinol  300 mg Per Feeding Tube Daily     calcium carbonate  2,500 mg Per Feeding Tube BID     doxycycline (VIBRAMYCIN) IV  100 mg Intravenous Q12H     lipids  250 mL Intravenous Q24H     meropenem  1 g Intravenous Q12H     pantoprazole  40 mg Per Feeding Tube BID     sodium phosphate  9 mmol Intravenous Once     sodium chloride (PF)  10 mL Intracatheter Q7 Days     sodium chloride (PF)  3 mL Intracatheter Q8H     voriconazole  200 mg Per Feeding Tube Q12H DANK

## 2020-11-22 NOTE — PROGRESS NOTES
"Note Infectious Disease Consult Service Progress Note  Covering for [facility]   Pt Name Attapeu NO JUSTICE Diallo   Date 11/22/2020   MRN [id number]     Notes / labs / imaging test results and other data were reviewed    CHIEF COMPLAINT: REASON FOR VISIT    Fever / recent chemo-induced neutropenia    HPI    56 yo undergoing chemo for seminoma - last chemo Nov 9, 2020  Hx K oxytoca UTI  & sepsis October 2020  Recently noted (+) Quant gold / no past therapy for active or latent TB  Foreign borne ([place])    admittted 11/12 with fever / neutropenia  Despite broad spectrum antimicrobial agents, remains febrile  No newly confirmed infection other than oral HSV I PCR  (+)  Lung infiltrates on CT scan  Neutropenia resolved     11/21/2020  INTERIM UPDATE    TEMP NOW NORMALIZED ...  Temp (18hrs)  Max:  98.4  F (36.9  C)  Temp (24hrs)  Max:  98.9  F (37.2  C)  Temp (36hrs)  Max:101.4  F (38.6  C)       Remainder of 14-point ROS was negative except as listed above    PFSH:  Personal / Family / Social Histories were reviewed and updated  nill new  Vital Signs: BP (!) 84/54   Pulse 85   Temp 96.9  F (36.1  C) (Axillary)   Resp 22   Ht 1.549 m (5' 1\")   Wt 49.5 kg (109 lb 2 oz)   SpO2 97%   BMI 20.62 kg/m      Resp support  02 mech vent   Fi02            0.50  PEEP                 5  Data    MICROBIOLOGY          IMAGING    11.18   CT chest IMPRESSION: Patchy and confluent groundglass opacities in the lungs as above.   Findings would suggest a multifocal pneumonia.   In addition, there are small bilateral pleural effusions with adjacent atelectasis.         LABS  Lab Results   Component Value Date/Time    WBC 72.7 (HH) 11/22/2020 04:30 AM    WBC 62.1 (HH) 11/21/2020 05:32 AM    WBC 63.1 (HH) 11/20/2020 05:30 AM    WBC 52.4 (HH) 11/19/2020 11:27 AM    AST 47 (H) 11/19/2020 11:27 AM    AST 18 11/17/2020 04:56 AM    AST 20 11/16/2020 07:15 AM    AST 20 11/15/2020 02:56 AM    ALT 19 11/19/2020 11:27 AM    ALT 23 " 11/17/2020 04:56 AM    ALT 33 11/16/2020 07:15 AM    ALT 32 11/15/2020 02:56 AM    ALKPHOS 385 (H) 11/19/2020 11:27 AM    ALKPHOS 207 (H) 11/17/2020 04:56 AM    ALKPHOS 205 (H) 11/16/2020 07:15 AM    ALKPHOS 290 (H) 11/15/2020 02:56 AM     Creatinine   Date Value Ref Range Status   11/22/2020 1.40 (H) 0.66 - 1.25 mg/dL Final     Last Comprehensive Metabolic Panel:  Sodium   Date Value Ref Range Status   11/22/2020 149 (H) 133 - 144 mmol/L Final     Potassium   Date Value Ref Range Status   11/22/2020 3.5 3.4 - 5.3 mmol/L Final     Chloride   Date Value Ref Range Status   11/22/2020 118 (H) 94 - 109 mmol/L Final     Carbon Dioxide   Date Value Ref Range Status   11/22/2020 28 20 - 32 mmol/L Final     Anion Gap   Date Value Ref Range Status   11/22/2020 3 3 - 14 mmol/L Final     Glucose   Date Value Ref Range Status   11/22/2020 134 (H) 70 - 99 mg/dL Final     Urea Nitrogen   Date Value Ref Range Status   11/22/2020 21 7 - 30 mg/dL Final     Creatinine   Date Value Ref Range Status   11/22/2020 1.40 (H) 0.66 - 1.25 mg/dL Final     GFR Estimate   Date Value Ref Range Status   11/22/2020 57 (L) >60 mL/min/[1.73_m2] Final     Comment:     Non  GFR Calc  Starting 12/18/2018, serum creatinine based estimated GFR (eGFR) will be   calculated using the Chronic Kidney Disease Epidemiology Collaboration   (CKD-EPI) equation.       Calcium   Date Value Ref Range Status   11/22/2020 5.9 (LL) 8.5 - 10.1 mg/dL Final     Comment:     Reviewed: OK with previous  Critical Value called to and read back by  SHARRON BOTELLO RN ([facility]) ON 11/22/2020 AT 04:50 BY DNT.               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 Non-infectious (adverse drug rxn)    Cont airborne iso  F/u myriad of tests currently in progress ...    ADDENDUM:  CMV in bronch  CMV pneumonitis very uncommon outside of lung, heart-lung transplant or allogeneic BMT  Cannot r/o true pneumonitis here  eGFR 50-69 mL/min range, so 2.5 mg/kg iv 12h ganciclovir             Ki Bruner MD  Covering for [facility]  Infectious Disease service    CURRENT MED REVIEWD  Current Facility-Administered Medications   Medication     acetaminophen (TYLENOL) solution 650 mg     acyclovir (ZOVIRAX) 250 mg in D5W 50 mL intermittent infusion     allopurinol (ZYLOPRIM) tablet 300 mg     calcium carbonate suspension 2,500 mg     calcium chloride 1 g in sodium chloride 0.9 % 100 mL intermittent infusion     dextrose 10% infusion     diphenhydrAMINE (BENADRYL) injection 25 mg     doxycycline (VIBRAMYCIN) 100 mg vial to attach to  mL bag     fentaNYL (SUBLIMAZE) infusion     guaiFENesin-dextromethorphan (ROBITUSSIN DM) 100-10 MG/5ML syrup 5 mL     lidocaine (LMX4) cream     lidocaine 1 % 0.1-1 mL     lipids (INTRALIPID) 20 % infusion 250 mL     LORazepam (ATIVAN) injection 0.5-1 mg     magic mouthwash suspension (diphenhydramine, lidocaine, aluminum-magnesium & simethicone)     melatonin tablet 1 mg     meropenem (MERREM) 1 g vial to attach to  mL bag     naloxone (NARCAN) injection 0.1-0.4 mg     norepinephrine (LEVOPHED) 16 mg in   mL infusion CENTRAL LINE     ondansetron (ZOFRAN-ODT) ODT tab 4 mg    Or     ondansetron (ZOFRAN) injection 4 mg     oxyCODONE (ROXICODONE) solution 5 mg     pantoprazole (PROTONIX) 2 mg/mL suspension 40 mg     potassium phosphate 9 mmol in D5W intermittent infusion     prochlorperazine (COMPAZINE) injection 5 mg    Or     prochlorperazine (COMPAZINE) tablet 10 mg    Or     prochlorperazine (COMPAZINE) suppository 25 mg     propofol (DIPRIVAN) infusion     rincinol P.R.N liquid 10 mL     sodium chloride (PF) 0.9% PF flush 10 mL     sodium chloride (PF) 0.9% PF flush 10-20 mL     sodium chloride (PF) 0.9% PF flush 3 mL     sodium chloride (PF) 0.9% PF flush 3 mL     voriconazole (VFEND) suspension 200 mg

## 2020-11-22 NOTE — PLAN OF CARE
ICU End of Shift Summary.  For vital signs and complete assessments, please see documentation flowsheets.     Pertinent assessments: RASS goal of -2- -3 met. Withdrawals from pain. Continues on propofol and fentanyl. BPs soft, started on levophed. Tele shows SR-ST. Edema on hands and feet of +1 to +2. Continues with vent. ETT advanced 3 cm so it is now at 24cm at lip. Oral and lip lesions. Copious amounts of bloody ETT secretions. OG in place and ok to use. Palafox in place with adequate UOP. BM x1. TF started in evening.    Major Shift Events: -Bronchoscopy completed, fluid sent                                     -Levophed started                                    -0800 Nurse noted that ETT was 5cm above berlin on CXR. Dr. Freitas notified. MD stated she will advance during bronchoscopy.                                     -Ionized calcium low, calcium gluconate and calcium chloride given.                                     -Critical lactic acid. Dr. Freitas updated. No orders.                                           Plan (Upcoming Events): Wean of pressors. Wean off O2 as able.   Discharge/Transfer Needs: TBD    Bedside Shift Report Completed : yes  Bedside Safety Check Completed: yes

## 2020-11-22 NOTE — PROVIDER NOTIFICATION
Notified Dr. Haskins of +CMV from bronch lavage that was done on 11/21/2020. Plan is to get I.D consulted.       15:02 Dr. Bruner from I.D contacted to notify of +cmv from bronch lavage yesterday. MD said he will take a look once logged in to a computer.

## 2020-11-22 NOTE — PROGRESS NOTES
[facility] ICU VENTILATOR RESPIRATORY NOTE  Date of Admission: 11/12/2020  Date of Intubation (most recent): 11/21/2020  Reason for Mechanical Ventilation: Respiratory Failure  Number of Days on Mechanical Ventilation: 1  Met Criteria for Pressure Support Trial: No  Reason for No Pressure Support Trial: High FIO2, secretions  Significant Events Today: Bronch with BAL completed at bedside  ABG Results:   Recent Labs   Lab 11/21/20  0532 11/21/20  0324 11/21/20  0015 11/20/20  1749   PH  --   --  7.41 7.46*   PCO2  --   --  34* 29*   PO2  --   --  47* 48*   HCO3  --   --  22 20*   O2PER 80% 50% 50% 60%     ETT appearance on chest x-ray: 2 cm from the berlin  6.5 ETT secured 24 cm at the teeth.    Ventilation Mode: CMV/AC  (Continuous Mandatory Ventilation/ Assist Control)  FiO2 (%): 55 %  Rate Set (breaths/minute): 22 breaths/min  Tidal Volume Set (mL): 360 mL  PEEP (cm H2O): 5 cmH2O  Oxygen Concentration (%): 55 %  Resp: 22    Temp: 98.4  F (36.9  C) Temp src: Axillary BP: (!) 79/53 Pulse: 90   Resp: 22 SpO2: 99 % O2 Device: Mechanical Ventilator Oxygen Delivery: 50 LPM     RT Rito  11/21/2020 6:11 PM '

## 2020-11-22 NOTE — PLAN OF CARE
ICU End of Shift Summary.  For vital signs and complete assessments, please see documentation flowsheets.     Pertinent assessments: Sedation vacation done at 10:36 til 16:45.  Pt following commands and alert and calm for most of the shift. No BM today. Discontinue enteric precautions. Pt's sister was updated.  Major Shift Events: Gave 500 ml of LR bolus. Increased FR to 160 ml/4 hrs. Gave calcium chloride and calcium gluconate. CMV +. heart rate began to increase this afternoon to 130's and 02 sats began to alarm at mid 80%. Restarted propofol because pt started to move arms around but otherwise appears calm. No secretions coming from ETT. Discussed increased heart rate and the need to increase fi02 with Dr. Freitas. Able to come down on levophed.  Plan (Upcoming Events): CT ordered to r/o PE. Start Gangcyclovir for CMV. Rass -1 to -2.  Discharge/Transfer Needs: TBD    Bedside Shift Report Completed :   Bedside Safety Check Completed:

## 2020-11-22 NOTE — PROGRESS NOTES
Ventilation Mode: CMV/AC  (Continuous Mandatory Ventilation/ Assist Control)  FiO2 (%): 45 %  Rate Set (breaths/minute): 22 breaths/min  Tidal Volume Set (mL): 360 mL  PEEP (cm H2O): 5 cmH2O  Oxygen Concentration (%): (S) 45 %  Resp: 21    [facility] ICU VENTILATOR RESPIRATORY NOTE  Date of Admission: 11/12/2020  Date of Intubation (most recent): 11/21/2020  Reason for Mechanical Ventilation: Acute Respiratory Failure  Number of Days on Mechanical Ventilation: 2  Met Criteria for Pressure Support Trial: No  Reason for No Pressure Support Trial: Secretions   Significant Events Today: None noted  ETT appearance on chest x-ray: 2cm above berlin    Plan:  Continue to wean as tolerated.

## 2020-11-22 NOTE — PROGRESS NOTES
Heme/onc chart check  November 22, 2020    Bronchoscopy was done yesterday, awaiting results.     Leukocytosis worsening, all neutrophils. Likely reaction, worse with addition of dexamethasone yesterday. Also received Neulasta 11/6.     D/w Dr Galeana and she agrees.    Rafia Hemphill PA-C

## 2020-11-22 NOTE — PROGRESS NOTES
ICU End of Shift Summary.  For vital signs and complete assessments, please see documentation flowsheets.     Pertinent assessments: Intubated and sedated on Fentanyl @100, Propofol @40. RASS Goal -2/-3. Lungs course. FiO2 increased due to episodes of desating despite suctioning. Tele SR, +1-+2 edema on hands and feet. Significant lesions on lips.   Major Shift Events:    - Ionized Calcium low, Calcium gluconate ordered.   - WBC 72.7   - TPN and Lipids not given as pt transitioned to TF.    - Attempted to wean off pressors, pt didn't tolerate  Plan (Upcoming Events): Wean O2 as able  Discharge/Transfer Needs:     Bedside Shift Report Completed : y  Bedside Safety Check Completed: y

## 2020-11-22 NOTE — PROGRESS NOTES
[facility] ICU VENTILATOR RESPIRATORY NOTE  Date of Admission: 11/12/2020  Date of Intubation (most recent): 11/21/2020  Reason for Mechanical Ventilation: Respiratory Failure  Number of Days on Mechanical Ventilation: 2  Met Criteria for Pressure Support Trial: No  Reason for No Pressure Support Trial: High FIO2, secretions  Significant Events Today: None  ABG Results:   Recent Labs   Lab 11/21/20  0532 11/21/20  0324 11/21/20  0015 11/20/20  1749   PH  --   --  7.41 7.46*   PCO2  --   --  34* 29*   PO2  --   --  47* 48*   HCO3  --   --  22 20*   O2PER 80% 50% 50% 60%     ETT appearance on chest x-ray: 2 cm from the berlin  6.5 ETT secured 24 cm at the teeth.    Ventilation Mode: CMV/AC  (Continuous Mandatory Ventilation/ Assist Control)  FiO2 (%): 50 %  Rate Set (breaths/minute): 22 breaths/min  Tidal Volume Set (mL): 360 mL  PEEP (cm H2O): 5 cmH2O  Oxygen Concentration (%): 50 %  Resp: 22    Temp: 99.8  F (37.7  C) Temp src: Axillary BP: 113/88 Pulse: 126   Resp: 22 SpO2: (!) 87 % O2 Device: Mechanical Ventilator       Lele Lopez RT on 11/22/2020 at 4:58 PM

## 2020-11-22 NOTE — PROVIDER NOTIFICATION
DATE:  11/22/2020   TIME OF RECEIPT FROM LAB:  0450  LAB TEST:  Calcium and WBC  LAB VALUE:  Calcium - 5.9, WBC - 72.7  RESULTS GIVEN WITH READ-BACK TO Tele ICU AZUCENA Redd. Will drawn Ionized calcium  TIME LAB VALUE REPORTED TO PROVIDER:   0451

## 2020-11-23 NOTE — PROGRESS NOTES
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    Initially on 11/12, patient was admitted for neutropenic fever.  He had received chemotherapy 3 days prior to admission with bleomycin, etoposide, and platinum.  He was started on broad-spectrum antibiotics with cefepime and vancomycin.  Infectious disease and oncology were consulted.  Was also treated for pancytopenia due to chemotherapy.  Received Neulasta x3 on 11/13 through 15.  He developed mucositis which was initially treated with topical medications but ultimately required TPN.  Also had hypocalcemia issues requiring replacement.  He developed bright red blood per rectum and GI was consulted.  Recommended Protonix but holding off endoscopy.  Acyclovir and fluconazole were added given the mucositis.  Patient required multiple additional blood transfusions.    11/18 he developed worsening cough and tachycardia.  CT of the chest showed patchy and confluent groundglass opacities suggestive of multifocal pneumonia.  COVID-19 test was repeated and negative again.  Patient has been having ongoing fevers but they seem to get worse around the time as well.  On 11/19 his WBC count was noted to increase to 52 whereas it has been very low before that.  Antibiotics were broadened to include doxycycline, meropenem and voriconazole.  Pulmonary consulted on 11/20.  Concern for bleomycin toxicity.  Recommended bronchoscopy if possible.  In the evening of 11/20-21, patient's respiratory status worsened despite BiPAP and he was endotracheally intubated. Patient underwent bronchoscopy in the  ICU on 11/21.      Overnight, patient had many issues with coughing, hypoxia, worsening hemodynamics. These issues improved with increased sedation. His CMV PCR returned positive and he was started on gancyclovir. Other antimicrobials as outlined below.  Continue mechanical ventilation.    Bilateral patchy groundglass opacities  Patient was admitted on 11/12 without significant respiratory symptoms and CXR showed low lung volumes with mild bilateral basilar atelectasis versus infiltrate.  As outlined above, his respiratory status progressively worsened during the hospitalization and he required intubation on 11/21. CT angiogram of the chest did not show any PE 11/18.  Did show the bilateral groundglass multifocal opacities concerning for pneumonia.  - Earlier in hospital stay patient was on Acyclovir, fluconazole, Cefepime and vancomycin. On 11/18 started on doxycycline, meropenem and voriconazole.  - Bronchoscopy done on 11/21  - Workup so far includes negative aspergillus, beta D glucan, influenza antigen, RVP, sputum cultures  - Repeat CT scan on 11/22 showed no PE, confirmed severe GGO bilaterally  - Pt has + Quantiferon gold, has not been treated for this.  - Pulmonology, oncology, and ID consulted.  - Current etiology unclear. Oncology feels the timing does not fit with Bleomycin toxicity and that seminoma would be very unlikely to cause lymphangitic spread that would present like this.   - CMV was found in the bronch. Per ID, very rare for CMV to cause pneumonitis outside of BMT or transplant populations, but reasonable to treat this.   - Continue antimicrobials of   Acyclovir   Doxycycline   Meropenem   Gancyclovir   Voriconazole  - Last option for treating this process would be steroids, but so far we have held off out of concern for undiagnosed infection.    Acute hypoxic respiratory failure  ARDS  - Due to underlying process above, PE ruled out   - Continue lung protective ventilation per intensivist  -  oxygenation improved when sedation was increased  - Inhaled epoprostenol started 11/23  - fentanyl/propofol for sedation  - Worsening oxygenation this AM but improved with sedation, had been put on 100% FiO2, weaning down.    Hypotension/septic shock  - Sedation vs infection related  - Norepinephrine available as needed to maintain MAP > 65.  - Continue norepinephrine, patient continues to need this to maintain blood pressure    Leukocytosis  - White count has been very elevated in the 60-50 range after filgrastim doses.  Monitor daily.  - Increasing again to 72.7 on 11/22, down a little to the 50 range on 11/23  - Left shift seen on differential--reactive vs related to Filgrastim    Hypocalcemia  - Has been an ongoing issue during hospitalization  - Replace as needed.    Seminoma  - This was initially diagnosed in September 2020.  Started chemotherapy while hospitalized in October.  Has had issues with admissions for neutropenic fever and urosepsis since that time.  Per oncology, patient has locally advanced disease which has been responding to curative intent chemotherapy.  - Is on bleomycin, etoposide and carboplatin.  - As outlined above, oncology feels that his current lung filtrates more likely infectious and related to his cancer, which is improving, or his chemotherapy, which should not be at this point sufficient to cause the lung toxicity.    Multifactorial anemia  Thrombocytopenia  - Hb has ranged anywhere from as low as 5 to as high 11 during the hospitalization.  A combination of blood loss from GI tract which is likely related to mucositis as well as chemotherapy induced pancytopenia.  - Has received multiple blood transfusions during hospitalization  - Monitor daily, transfuse if platelets are below 50 or Hb < 7.   - Continue BID protonix.     Severe malnutrition  - Appreciate RD assistance. Support nutrition with enteral feeds through [place] tube.    Diet: Nasoenteric feeding tube placed 11/23.  Tube  "feeds per dietitian.  DVT Prophylaxis: Pneumatic Compression Devices  Palafox Catheter: Yes; in place for acute illness  Code Status: Full Code     Disposition Plan   Expected discharge:   Anticipate continued prolonged hospitalization.     Matt Haskins MD  Hospitalist Service  [facility]  ______________________________________________________________________    Interval History   Per nursing staff, lots of issues overnight.  Worsening blood pressure.  Patient seem to be coughing a lot.  Had persistent fevers over the night as well.  Seemed to have dyssynchrony with the vent.    Data reviewed today: I reviewed all medications, new labs and imaging results over the last 24 hours.     Physical Exam   BP (!) 85/59   Pulse 125   Temp 100  F (37.8  C) (Bladder)   Resp (!) 34   Ht 1.549 m (5' 1\")   Wt 49.7 kg (109 lb 9.1 oz)   SpO2 99%   BMI 20.70 kg/m    109 lbs 9.1 oz       General: Intubated and sedated.  Reacts to touch.    HEENT: No scleral icterus. Oropharynx moist.     Neck: Supple. Normal range of motion.     Pulmonary: Coarse ventilator associated breath sounds bilaterally.    Cardiovascular: Regular rate and rhythm without murmur or extra heart sounds.    Abdomen: Soft and non-tender.    Extremities: No peripheral edema. No clubbing or cyanosis.     Neurologic: Moves all extremities, not following commands.    Skin: Warm and dry.    Data    Recent Labs   Lab 11/23/20  0320 11/22/20  1705 11/22/20  0430 11/21/20  0532 11/21/20  0532 11/19/20  1127 11/19/20  1127 11/18/20  1210 11/18/20  1210 11/18/20  0600   WBC 58.2*  --  72.7*  --  62.1*   < > 52.4*   < >  --  31.4*   HGB 9.1* 10.0* 9.8*   < > 9.0*   < > 11.5*   < >  --  10.9*   MCV 97  --  94  --  94   < > 91   < >  --  93     --  234  --  136*   < > 88*   < >  --  79*   INR 1.12  --   --   --   --   --   --   --   --  1.13     --  149*  --  140   < >  --    < >  --  140   POTASSIUM 3.4  --  3.5  --  3.4   < >  --    < " >  --  3.7   CHLORIDE 112*  --  118*  --  110*   < >  --    < >  --  115*   CO2 29  --  28  --  25   < >  --    < >  --  19*   BUN 21  --  21  --  21   < >  --    < >  --  21   CR 1.57*  --  1.40*  --  1.37*   < >  --    < >  --  1.56*   ANIONGAP 3  --  3  --  5   < >  --    < >  --  6   IVANIA 7.0*  --  5.9*  --  5.6*   < >  --    < >  --  5.6*   *  --  134*  --  150*   < >  --    < > 152* 121*   ALBUMIN 1.4*  --   --   --   --   --  1.6*  --   --   --    PROTTOTAL 4.8*  --   --   --   --   --  5.4*  --   --   --    BILITOTAL 0.3  --   --   --   --   --  0.3  --   --   --    ALKPHOS 533*  --   --   --   --   --  385*  --   --   --    ALT 24  --   --   --   --   --  19  --   --   --    AST 48*  --   --   --   --   --  47*  --   --   --    TROPI  --   --   --   --   --   --   --   --  0.018  --     < > = values in this interval not displayed.     Recent Results (from the past 24 hour(s))   CT Chest Pulmonary Embolism w Contrast    Narrative    EXAM: CT CHEST PULMONARY EMBOLISM W CONTRAST  LOCATION: [facility]  DATE/TIME: 11/22/2020 7:17 PM    INDICATION: See the Clinical Information for Interpreting Provider; new tachycardia, hypoxia;  COMPARISON: None.  TECHNIQUE: CT chest pulmonary angiogram during arterial phase injection of IV contrast. Multiplanar reformats and MIP reconstructions were performed. Dose reduction techniques were used.   CONTRAST: 50 mL Isovue-370    FINDINGS:  ANGIOGRAM CHEST: No evidence for pulmonary embolism. Pulmonary arteries are mildly dilated. Thoracic aorta normal in caliber. No aortic dissection or other acute abnormality.    HEART: Cardiac chambers within normal limits. No pericardial effusion.    LUNGS AND PLEURA: Severe bibasilar consolidation. Diffuse groundglass density and septal thickening within the remainder of the lungs. Trace bilateral pleural effusions. No pneumothorax. Endotracheal tube terminates 4 to 5 cm above the berlin.    MEDIASTINUM: No adenopathy or  mass. Nasogastric tube in the esophagus terminates in the distal stomach directed anterograde.    LIMITED UPPER ABDOMEN: Negative.    MUSCULOSKELETAL: Negative.      Impression    IMPRESSION:  1.  No evidence for pulmonary embolism.   2.  Severe groundglass density and consolidation in the lungs, differential considerations include ARDS, pulmonary edema/hemorrhage, or pneumonia including COVID 19 pneumonia.       Medications      Current Facility-Administered Medications   Medication Dose Route Frequency     acetylcysteine  4 mL Nebulization Q6H     acyclovir (ZOVIRAX) IV  5 mg/kg Intravenous Q12H     albuterol  2.5 mg Nebulization Q6H     allopurinol  300 mg Per Feeding Tube Daily     calcium carbonate  2,500 mg Per Feeding Tube BID     doxycycline (VIBRAMYCIN) IV  100 mg Intravenous Q12H     ganciclovir (CYTOVENE) intermittent infusion  2.5 mg/kg Intravenous Q24H     lidocaine  5 mL Topical Once     meropenem  1 g Intravenous Q12H     pantoprazole  40 mg Per Feeding Tube BID     sodium chloride (PF)  10 mL Intracatheter Q7 Days     sodium chloride (PF)  3 mL Intracatheter Q8H     voriconazole  200 mg Per Feeding Tube Q12H DANK

## 2020-11-23 NOTE — PROCEDURES
[facility]    Arterial line placement    Date/Time: 11/23/2020 3:48 PM  Performed by: Ralf Jacobs MD  Authorized by: Ralf Jacobs MD     UNIVERSAL PROTOCOL   Site Marked: Yes  Prior Images Obtained and Reviewed:  NA  Required items: Required blood products, implants, devices and special equipment available    Patient identity confirmed:  Arm band  Patient was reevaluated immediately before administering moderate or deep sedation or anesthesia  Confirmation Checklist:  Patient's identity using two indicators  Time out: Immediately prior to the procedure a time out was called    Universal Protocol: the Joint Commission Universal Protocol was followed    Preparation: Patient was prepped and draped in usual sterile fashion        Indication: multiple ABGs respiratory failure  Location: right femoral      SEDATION    Patient Sedated: Yes    Sedation:  See MAR for details  Vital signs: Vital signs monitored during sedation      PROCEDURE DETAILS    Needle Gauge:  18  Seldinger technique: Seldinger technique used    Number of Attempts:  1  Post-procedure:  Line sutured and dressing applied    PROCEDURE   Patient Tolerance:  Patient tolerated the procedure well with no immediate complications    Length of time physician/provider present for 1:1 monitoring during sedation: 5

## 2020-11-23 NOTE — PROGRESS NOTES
Ventilation Mode: CMV/AC  (Continuous Mandatory Ventilation/ Assist Control)  FiO2 (%): 100 %  Rate Set (breaths/minute): 22 breaths/min  Tidal Volume Set (mL): 360 mL  PEEP (cm H2O): 5 cmH2O  Oxygen Concentration (%): 100 %  Resp: 25    [facility] ICU VENTILATOR RESPIRATORY NOTE  Date of Admission: 2020  Date of Intubation (most recent): 2020  Reason for Mechanical Ventilation: Acute Respiratory Failure  Number of Days on Mechanical Ventilation: 3  Met Criteria for Pressure Support Trial: No  Reason for No Pressure Support Trial: Copious Secretions, FIO2 >50%, PEEP >8  Significant Events Today: Pt became hypoxic overnight with significant tachycardia. EKGs and ABGs ordered and completed.  AB.32/48/52/25  ETT appearance on chest x-ray: 2cm above berlin     Plan:  Adjust vent settings to accommodate pt condition.

## 2020-11-23 NOTE — PROGRESS NOTES
Patient Condition Update    0235 - Pt started to drop his O2 sats, down to 66%. RN suctioned via inline on vent and minimal changes. Copious creamy thick secretions. RN lavaged pt, sats increased to mid 80s but not maintaining. RN requested RT to come assess pt.     RT checked and pt found to have large cuff leak. RT applied hemostat clamp to prevent slow leak.     RT will obtain arterial blood gas.

## 2020-11-23 NOTE — PROGRESS NOTES
Respiratory Therapy  ABG was drawn by myself from L radial artery on 100% vent at 1:50 PM. Pressure was held until bleeding stopped. No complications noted.      Lele Lopez, RT on 11/23/2020 at 1:54 PM

## 2020-11-23 NOTE — PROGRESS NOTES
Cosyntropin stim test ordered given cortisol of 1.7 and patient being high risk for steroid administration given immunocompromised state with possible active infection    Mayra Freitas MD 11/22/2020 8:05 PM

## 2020-11-23 NOTE — PROVIDER NOTIFICATION
DATE:  11/23/2020   TIME OF RECEIPT FROM LAB:  0452  LAB TEST:  WBC   LAB VALUE:  58.2  RESULTS GIVEN WITH READ-BACK TO Tele ICU RN Tramaine, advised that this is trending down compared to yesterdays.   TIME LAB VALUE REPORTED TO PROVIDER:   0454

## 2020-11-23 NOTE — PROCEDURES
NASOENTERIC FEEDING TUBE PLACEMENT ASSESSMENT    Reason for Feeding Tube Placement: Enteral nutrition  Chart reviewed for contraindications or high risk placements: Yes     Medicine Delivered During Procedure: none  Procedure Complications: none    Placement Successful: X-ray confirmation pending - at the pylorus vs duodenum  Bridle secured: Yes   Final Placement Osman at exit of R nare, 85 cm    Cortrak Start and End Time: 10:45-11:00 am  Face to Face Time With Patient: 25 minutes  Assisted Serene Jimenez RN with placement      Jimena Morel MS, RDN-AP, LD, CNSC  Pager - 3rd floor/ICU: [phone]  Pager - All other floors: [phone]  Pager - Weekend/holiday: [phone]  Office: [phone]

## 2020-11-23 NOTE — PROGRESS NOTES
An ABG was completed on the pt's right radial site @ 06:18am on an FIO2 of 100% via ventilator  with no complications noted during the procedure.

## 2020-11-23 NOTE — PROGRESS NOTES
CLINICAL NUTRITION SERVICES - REASSESSMENT NOTE    Recommendations Ordered by Registered Dietitian (RD):     Replete without fiber at 60 mL/hr    Free water flush 60 mL q4 hours    Certavite   Future/Additional Recommendations:     Total goal rate pending tolerance   Malnutrition:  % Weight Loss: difficult to assess, previous weight loss, fluid up since admit  % Intake:  </= 50% for >/= 5 days (severe malnutrition) --> slowly improving with nutrition support initiation   Subcutaneous Fat Loss: Overall moderate depletion, as outlined   Muscle Loss: Overall moderate depletion, as outlined   Fluid Retention: Mild edema     Malnutrition Diagnosis: Severe malnutrition  In Context of:  Acute illness or injury with underlying chronic illness or disease     EVALUATION OF PROGRESS TOWARD GOALS/NEW FINDINGS   Progress towards goals will be monitored and evaluated per protocol and Practice Guidelines    Patient remains intubated, TF goal rate reached  at 0500:     Type of Feeding Tube: OG     Enteral Frequency:  Continuous    Enteral Regimen: Replete 1.0 without fiber at 50 mL/hr    Total Enteral Provisions: 1200 mL daily provides 1200 kcal, 77 g protein, 134 g CHO, 0 g fiber.    Certavite contraindicated     Free Water Flush: 60 mL q4 hours    Total EN volume received:  : 610 mL  : 60 mL ---- PPN weaned  with TF start   : TPN (Clinimix at 40 mL/hr + 250 mL 20% lipids) to meet ~2/3 of needs      OG replaced with 10 fr FT (NG vs ND)     Skin, coccyx ulcer per WOCN : Coccyx and gluteal fold mild denudement related to frequent loose watery stools    Mucositis    Fluid: +2 L/R hand and foot edema    Weight: 49 kg     I/O last 3 completed shifts:    In: 3878 [I.V.:; NG/GT:1095]    Out: 1260 [Urine:1260]     Temp (24hrs), Av.9  F (37.2  C), Min:98.6  F (37  C), Max:99  F (37.2  C)  Ventilation Mode: CMV/AC  (Continuous Mandatory Ventilation/ Assist Control)  FiO2 (%): 100 %  Rate  Set (breaths/minute): 22 breaths/min  Tidal Volume Set (mL): 360 mL  PEEP (cm H2O): (S) 16 cmH2O (changed by MD)  Oxygen Concentration (%): 100 %  Resp: 20    Labs: LFT's elevated    Electrolytes  Potassium (mmol/L)   Date Value   11/23/2020 3.4   11/22/2020 3.5   11/21/2020 3.4     Phosphorus (mg/dL)   Date Value   11/23/2020 2.6   11/22/2020 3.1   11/22/2020 2.4 (L)   11/21/2020 2.5   11/20/2020 1.5 (L)        Blood Glucose  Glucose (mg/dL)   Date Value   11/23/2020 129 (H)   11/22/2020 134 (H)   11/21/2020 150 (H)   11/20/2020 136 (H)   11/19/2020 162 (H)        Inflammatory Markers  CRP Inflammation (mg/L)   Date Value   11/19/2020 129.0 (H)     WBC (10e9/L)   Date Value   11/23/2020 58.2 (HH)   11/22/2020 72.7 (HH)   11/21/2020 62.1 (HH)     Albumin (g/dL)   Date Value   11/23/2020 1.4 (L)   11/19/2020 1.6 (L)   11/17/2020 1.4 (L)        Magnesium (mg/dL)   Date Value   11/23/2020 1.5 (L)   11/22/2020 1.9   11/21/2020 1.7     Sodium (mmol/L)   Date Value   11/23/2020 144   11/22/2020 149 (H)   11/21/2020 140        Renal  Urea Nitrogen (mg/dL)   Date Value   11/23/2020 21   11/22/2020 21   11/21/2020 21     Creatinine (mg/dL)   Date Value   11/23/2020 1.57 (H)   11/22/2020 1.40 (H)   11/21/2020 1.37 (H)         Additional  Triglycerides (mg/dL)   Date Value   11/17/2020 198 (H)     Ketones Urine (mg/dL)   Date Value   11/12/2020 Negative        Meds:    acetylcysteine  4 mL Nebulization Q6H     acyclovir (ZOVIRAX) IV  5 mg/kg Intravenous Q12H     albuterol  2.5 mg Nebulization Q6H     allopurinol  300 mg Per Feeding Tube Daily     calcium carbonate  2,500 mg Per Feeding Tube BID     doxycycline (VIBRAMYCIN) IV  100 mg Intravenous Q12H     ganciclovir (CYTOVENE) intermittent infusion  2.5 mg/kg Intravenous Q24H     meropenem  1 g Intravenous Q12H     pantoprazole  40 mg Per Feeding Tube BID     voriconazole  200 mg Per Feeding Tube Q12H DANK        epoprostenol (VELETRI) 20 mcg/mL in sterile water inhalation  solution       fentaNYL 100 mcg/hr (11/23/20 0730)     norepinephrine 0.06 mcg/kg/min (11/23/20 0730)     propofol (DIPRIVAN) infusion 15 mcg/kg/min (11/23/20 0730)     vasopressin Stopped (11/23/20 0026)    Propofol gtt at 4.3 mL/hr to provide 113 kcal daily from lipids    ASSESSED NUTRITION NEEDS (PER APPROVED PRACTICE GUIDELINES; DW: 47 kg):  Estimated Energy Needs: 5502-4779 kcals (25-30 Kcal/Kg)  Justification: intubation  Estimated Protein Needs: 56-94 grams protein (1.2-2 g pro/Kg)  Justification: preservation of LBM while intubated  Estimated Fluid Needs: per MD    Previous Goals:   Pt to consume >/=60% of nutritional needs to discontinue TPN   TPN to meet % of nutritional needs at goal   Evaluation: No longer applicable for both, PN weaned and transitioned to EN    Previous Nutrition Diagnosis:   Increased nutrient needs (protein and energy) related to repletion needs as evidenced by pt with a potential weight loss of 20% in 1 month?, requiring a minimum of 30 kcal/kg and 1.2 g protein/kg.   Evaluation: Ongoing, updated below    CURRENT NUTRITION DIAGNOSIS  Inadequate protein-energy intake related to slow nutrition support advancement with refeeding syndrome risk, intubation as evidenced by average nutrition support meeting <50% of needs x5 days    Inadequate oral intake related to mucositis as evidenced by nutrition support reliant x 5 days, now intubated     INTERVENTIONS  Recommendations / Nutrition Prescription  Updated TF regimen to better match estimated needs:    Type of Feeding Tube: 10 fr FT (NG vs ND, awaiting xray read out)    Enteral Frequency:  Continuous    Enteral Regimen: Replete 1.0 without fiber at 60 mL/hr    Total Enteral Provisions: 1440 mL daily provides 1440 kcal, 92 g protein , 161 g CHO, 0 g fiber. Meets <100% of DRI's --> Certavite contraindicated     Free Water Flush: 60 mL q4 hours    Implementation  EN Composition, EN Schedule, Multivitamin/Minerals and Feeding Tube Flush:  Entered orders to reflect regimen outlined above  Enteral access: see procedure note  Collaboration and Referral of care: Discussed patient during interdisciplinary care rounds this morning    Goals  TF to meet >/=80% of estimated needs during first week of critical illness     MONITORING AND EVALUATION:  Progress towards goals will be monitored and evaluated per protocol and Practice Guidelines      Jimena Morel MS, RDN-AP, LD, CNSC  Pager - 3rd floor/ICU: [phone]  Pager - All other floors: [phone]  Pager - Weekend/holiday: [phone]  Office: [phone]

## 2020-11-23 NOTE — PROGRESS NOTES
RT ABG Note      An ABG was completed on the pt's left radial @ 0255 on an FIO2 of 100%  with no complications noted during the procedure.  Pressure was held until no bleeding.  BandAid was applied.    Nguyen Alfred, RT

## 2020-11-23 NOTE — PROGRESS NOTES
ICU End of Shift Summary.  For vital signs and complete assessments, please see documentation flowsheets.     Pertinent assessments: Following commands, opening eyes to voice. Temp of ~100, on cooling blanket. ST throughout shift. Copious secretions, needed suctioning multiple time. Desats to 80s at times, then back to 95+. Vent 100%  RR 22 peep 16. Keofeed in place, awaiting x-ray results. Double lumen PICC, PIV x1. Prop 55, fentanyl 100, levo 0.2 vaso 2.4   Major Shift Events: Art line placed. Vaso started.   Plan (Upcoming Events): cont icu cares   Discharge/Transfer Needs: tbd    Bedside Shift Report Completed : y   Bedside Safety Check Completed: y

## 2020-11-23 NOTE — PROGRESS NOTES
[facility]    Hematology/Oncology Follow-up Note      Today's Date: 11/23/20  Date of Admission:  11/12/2020  Reason for Consult: Seminoma, admitted for neutropenic fever, severe mucositis, now intubated in the ICU        ASSESSMENT/PLAN:  Luz Marina Diallo is a 55 year old male with:     Neutropenic fever: Secondary to BEP and C2 D8 completed on 10/19/2020.  Currently on broad-spectrum antibiotics, antivirals and antifungal, ID following closely.  Neutropenia has resolved, now leukocytosis from Neulasta, Neupogen and dexamethasone.  WBC improving and 58.2 today.  Bronchoscopy performed 11/21, results are pending. Previous quant gold positive on 10/27.  -Appreciate hospitalist, ICU and ID cares     Respiratory failure: Worsening hypoxia, tachypnea and intubated on 11/21 and transferred to the ICU.  Pulmonology has been consulted and bronchoscopy performed 11/21, results pending. Likely infection, WBC slowly improving.  Dr Ramos saw patient on 11/20 and unlikely to be bleomycin toxicity/pneumonitis given the rapid onset.  Also, unlikely for seminoma to cause lymphangitic spread to the lungs.      Seminoma: Diagnosed September 2020.  Patient Dr Ramos's on curative intent BEP chemo cycle 1 day 1 completed inpatient on 10/1/2020, carboplatin instead of cisplatin due to renal failure. C2 D8 completed on 11/9.  Respiratory failure unlikely from bleomycin toxicity given the more rapid onset.     Heme: Leukocytosis, WBC improving to 58.2, multifactorial from Neulasta, Neupogen, steroids.  Persistent low-grade fevers.  Hemoglobin stable, platelets normal.  -Transfuse if hemoglobin <7.0 and/or platelets <20 or further bleeding        Discussed with Richard and he agreed with the plan above.       INTERIM HISTORY: Intubated and sedated.        MEDICATIONS:  Current Facility-Administered Medications   Medication     acetaminophen (TYLENOL) solution 650 mg     acetylcysteine (MUCOMYST) 10 % nebulizer  solution 4 mL     acyclovir (ZOVIRAX) 250 mg in D5W 50 mL intermittent infusion     albuterol (PROVENTIL) neb solution 2.5 mg     allopurinol (ZYLOPRIM) tablet 300 mg     calcium carbonate suspension 2,500 mg     dextrose 10% infusion     diphenhydrAMINE (BENADRYL) injection 25 mg     doxycycline (VIBRAMYCIN) 100 mg vial to attach to  mL bag     epoprostenol (VELETRI) 20 mcg/mL in sterile water inhalation solution     fentaNYL (SUBLIMAZE) infusion     ganciclovir (CYTOVENE) 125 mg in D5W 100 mL intermittent infusion     guaiFENesin-dextromethorphan (ROBITUSSIN DM) 100-10 MG/5ML syrup 5 mL     lidocaine (LMX4) cream     lidocaine (XYLOCAINE) 2 % solution 5 mL     lidocaine 1 % 0.1-1 mL     LORazepam (ATIVAN) injection 0.5-1 mg     magic mouthwash suspension (diphenhydramine, lidocaine, aluminum-magnesium & simethicone)     melatonin tablet 1 mg     meropenem (MERREM) 1 g vial to attach to  mL bag     naloxone (NARCAN) injection 0.2 mg    Or     naloxone (NARCAN) injection 0.4 mg    Or     naloxone (NARCAN) injection 0.2 mg    Or     naloxone (NARCAN) injection 0.4 mg     norepinephrine (LEVOPHED) 16 mg in  mL infusion CENTRAL LINE     ondansetron (ZOFRAN-ODT) ODT tab 4 mg    Or     ondansetron (ZOFRAN) injection 4 mg     oxyCODONE (ROXICODONE) solution 5 mg     pantoprazole (PROTONIX) 2 mg/mL suspension 40 mg     prochlorperazine (COMPAZINE) injection 5 mg    Or     prochlorperazine (COMPAZINE) tablet 10 mg    Or     prochlorperazine (COMPAZINE) suppository 25 mg     propofol (DIPRIVAN) infusion     rincinol P.R.N liquid 10 mL     sodium chloride (PF) 0.9% PF flush 10 mL     sodium chloride (PF) 0.9% PF flush 10-20 mL     sodium chloride (PF) 0.9% PF flush 3 mL     sodium chloride (PF) 0.9% PF flush 3 mL     vasopressin (VASOSTRICT) 40 Units in sodium chloride 0.9 % 38 mL infusion     voriconazole (VFEND) suspension 200 mg       ALLERGIES:  No Known Allergies      PHYSICAL EXAM:  Vital signs:  Temp:  "100.8  F (38.2  C) Temp src: Bladder BP: 106/68 Pulse: 151   Resp: (!) 31 SpO2: (!) 89 % O2 Device: Mechanical Ventilator Oxygen Delivery: 50 LPM Height: 154.9 cm (5' 1\") Weight: 49.7 kg (109 lb 9.1 oz)  Estimated body mass index is 20.7 kg/m  as calculated from the following:    Height as of this encounter: 1.549 m (5' 1\").    Weight as of this encounter: 49.7 kg (109 lb 9.1 oz).    GENERAL: Intubated and sedated.   HEENT:  Normocephalic, atraumatic.  LUNGS:  vented      LABS:  CBC RESULTS:   Recent Labs   Lab Test 11/23/20  0320   WBC 58.2*   RBC 2.99*   HGB 9.1*   HCT 28.9*   MCV 97   MCH 30.4   MCHC 31.5   RDW 17.4*          Recent Labs   Lab Test 11/23/20  0320 11/22/20  0430    149*   POTASSIUM 3.4 3.5   CHLORIDE 112* 118*   CO2 29 28   ANIONGAP 3 3   * 134*   BUN 21 21   CR 1.57* 1.40*   IVANIA 7.0* 5.9*         PATHOLOGY:  PENDING      Araceli Pacheco PA-C  Hematology/Oncology  [facility]      "

## 2020-11-23 NOTE — PROGRESS NOTES
RT- Patient transported to CT on transport ventilator with HEPA filter in place. No complications noted.    Lele Lopez, RT on 11/22/2020 at 7:43 PM

## 2020-11-23 NOTE — PLAN OF CARE
Right wrist and Left wrist restraints continued 11/22/2020    Clinical Justification: Pulling lines, pulling tubes, and pulling equipment  Less Restrictive Alternative: Repositioning, Re-evaluate equipment, Disguise equipment, De-escalation, Reorientation  Attending Physician Notified: MD ordered restraint,     New orders placed Yes  Length of Order: 1 Day    Pt is more alert today. Pt did move hands up towards ETT. Unsure if pt understands not to pull at tubes. Will continue bilateral soft wrist restraints.   Fern Brandon RN

## 2020-11-23 NOTE — PROGRESS NOTES
[facility] Critical Care Progress Note                          11/23/2020    Name: Luz Marina Diallo MRN#: [id number]   Age: 55 year old YOB: 1965     [facility]  [facility] #3    MV DAY #3             Problem List:   Active Problems:    FUO (fever of unknown origin)    HSV (herpes simplex virus) infection    History of latent tuberculosis    Urinary retention    Febrile neutropenia (H)    Acquired immunocompromised state (H)           Summary/Hospital Course:     55 year old male with a history of a metastatic seminoma s/p chemotherapy who presents with fevers, increased wbc ongoing concern for infection vs bleomycin associated respiratory dysfunction who was admitted to the ICU on 11/21/2020 for management of hypoxic respiratory failure requiring intubation.    11/23: Worsening hypoxemia overnight with worsening secretions.  PEEP increased to 16 and FiO2 now 100%, P/F ratio around 100      Assessment and plan :     Luz Marina Diallo IS a 55 year old male with a history of a metastatic seminoma s/p chemotherapy who presents with fevers, increased wbc ongoing concern for infection vs bleomycin associated respiratory dysfunction who was admitted to the ICU on 11/21/2020 for management of hypoxic respiratory failure requiring intubation.    I have personally reviewed the daily labs, imaging studies, cultures and discussed the case with referring physician and consulting physicians.     My assessment and plan by system for this patient is as follows:    CNS:   Sedation/ pain: RASS goal -3 to -4 given ventilator dyssynchrony. Continue to use fentanyl, propofol.    Pulmonary:   Hypoxemic respiratory failure: Requiring mechanical ventilation (day 3).  No further decompensation overnight ventilator settings now fairly significant (PEEP now 16, FiO2 100%).  Etiology not entirely clear but I suspect ventilator dyssynchrony in the setting of inadequate sedation likely playing some  role.  However, P/F ratio still remains around 100 despite more heavily sedated.  We will recheck ABG this afternoon but likely will need to be prolonged overnight.  He has been covered broadly from an infectious standpoint but thus far have not treated bleomycin toxicity.  -Continue current antimicrobials (appreciate IDs assistance  -Start methylprednisolone 40 mg IV daily for bleomycin toxicity  -Follow ABG this afternoon but likely will need prone ventilation  -Follow bronchoscopy cultures, so far just CMV positive    CV:   Shock: possible component of sepsis but likely significant component of hypovolemia and sedation  -Now on Levophed and vasopressin    Tachycardia - likely related to distress, work of breathing, anxiety and hypovolemia  - has now improved  increase sedation    FEN/GI:   Continue tube feeds today, attempt again Keofeed  Mucositis - can use magic mouthwash to wipe inside of mouth for comfort  GI bleed with melanotic stools early in hospital stay    : adequate uop, no concerns reguarding renal function  Hypernatremia: 144 this morning  -Continue 160 ml free water Q 4 hr    Heme/onc:   Hx of metastatic seminoma s/p BEP therapy. Patient now with concern of bleomycin related lung toxicity or other pulmonary infection. Patient did receive neupogen early on in his course.  -Start methylprednisolone 40 mg IV daily    Msk: no acute skin issues    Endo: Q4H BG checks    ID:   unclear cause of possible infection with very high WBC. c dif checked x2 with negative result.  Bronchoscopy showed positive CMV, thus far remaining studies are negative  - continue broad antibiotics with aid of ID.   - Quanterferon gold test resulted positive. AFP smear from bal is pending  -Start steroids as above  - meropenem and voriconazole, acyclovir for HSV  - Continue ganicyclovir per ID    IV/Access:   1. Venous access - PICC  2. Arterial access - Right femoral eric     Plan  - central access required and  necessary    ICU Prophylaxis:   1. DVT: Hep subcutaneous  2. VAP: HOB 30 degrees, chlorhexidine rinse  3. Stress Ulcer: H2 blocker  4. Restraints: Nonviolent soft two point restraints required and necessary for patient safety and continued cares and good effect as patient continues to pull at necessary lines, tubes despite education and distraction. Will readdress daily.   5. Wound care - per unit routine   6. Feeding - tube feeds   7. Family Update:Yes, sister by phone  8. Disposition - ICU    Key goals for next 24 hours:   1. Place eric  2.  Follow ABG, likely will need prone ventilation overnight  3.  Continue antimicrobial per ID  4.  Start methylprednisolone 40 mg IV daily        Interim History/Overnight Events:     Worsening hypoxemia overnight requiring increased ventilator support.  This morning, on a PEEP of 16 and on percent FiO2 with marginal sats.         Key Medications:       acetylcysteine  4 mL Nebulization Q6H     acyclovir (ZOVIRAX) IV  5 mg/kg Intravenous Q12H     albuterol         albuterol  2.5 mg Nebulization Q6H     allopurinol  300 mg Per Feeding Tube Daily     calcium carbonate  2,500 mg Per Feeding Tube BID     cosyntropin  250 mcg Intravenous Once     doxycycline (VIBRAMYCIN) IV  100 mg Intravenous Q12H     ganciclovir (CYTOVENE) intermittent infusion  2.5 mg/kg Intravenous Q24H     meropenem  1 g Intravenous Q12H     pantoprazole  40 mg Per Feeding Tube BID     sodium chloride (PF)  10 mL Intracatheter Q7 Days     sodium chloride (PF)  3 mL Intracatheter Q8H     voriconazole  200 mg Per Feeding Tube Q12H DANK       dextrose       fentaNYL 100 mcg/hr (11/23/20 0402)     norepinephrine 0.06 mcg/kg/min (11/23/20 0615)     propofol (DIPRIVAN) infusion 15 mcg/kg/min (11/23/20 0403)     vasopressin Stopped (11/23/20 0026)               Physical Examination:   Temp:  [96.9  F (36.1  C)-103.1  F (39.5  C)] 103.1  F (39.5  C)  Pulse:  [] 158  Resp:  [14-32] 25  BP: ()/(40-97)  137/75  FiO2 (%):  [50 %-100 %] 100 %  SpO2:  [72 %-100 %] 97 %    Intake/Output Summary (Last 24 hours) at 11/23/2020 0701  Last data filed at 11/23/2020 0600  Gross per 24 hour   Intake 3808 ml   Output 1260 ml   Net 2548 ml     Wt Readings from Last 4 Encounters:   11/23/20 49.7 kg (109 lb 9.1 oz)   11/09/20 46.3 kg (102 lb)   11/06/20 51.2 kg (112 lb 14.4 oz)   11/02/20 46.3 kg (102 lb)     BP - Mean:  [] 97  Ventilation Mode: CMV/AC  (Continuous Mandatory Ventilation/ Assist Control)  FiO2 (%): 100 %  Rate Set (breaths/minute): 22 breaths/min  Tidal Volume Set (mL): 360 mL  PEEP (cm H2O): 5 cmH2O  Oxygen Concentration (%): 100 %  Resp: 25    Recent Labs   Lab 11/23/20  0618 11/23/20  0255 11/21/20  0532 11/21/20  0324 11/21/20  0015 11/20/20  1749   PH 7.32* 7.31*  --   --  7.41 7.46*   PCO2 48* 50*  --   --  34* 29*   PO2 52* 64*  --   --  47* 48*   HCO3 25 25  --   --  22 20*   O2PER 100% VENT 100% 80% 50% 50% 60%       General:   Comfortable, no pain or respiratory distress   Neurologic:   Sedation: lightly sedated and awakable   HEENT:   Head is atraumatic  Endotrachael tube is present      Lungs:   Symmetrical and normal breath sounds, no wheeze, ronchi, crackles, rub or bronchial breath sounds   Cardiovascular:   Regular rate and rhythm  Normal S1,S2, and no gallop, rub or murmur   Abdomen:   Distended:  No.   Bowel sound present and normal: Yes .   Soft: Yes . Tender: No.   Rebound:tendeness or guarding present No   Extremities:   Clubbing present: No,   Edema present: No,   Acrocyanosis present: No,   Mottling present: No,   Normal capillary refill: Yes    Skin:   Warm, dry.  No rash on limited exam.    Lines/Drain:    Central line present: Yes   Arterial line present: Yes   External ventricular Drain present: No  Chest tube present: No  NAHEED present: No  PEG present: No            Data:   All data and imaging reviewed.     ROUTINE ICU LABS (Last four results)  CMP  Recent Labs   Lab 11/23/20  4539  11/22/20  1705 11/22/20  0430 11/21/20  0532 11/20/20  1306 11/20/20  1306 11/20/20  0530 11/19/20  1127 11/19/20  1127 11/17/20  0456 11/17/20  0456 11/16/20  0715 11/16/20  0715     --  149* 140  --   --  140   < >  --    < > 143  --  146*   POTASSIUM 3.4  --  3.5 3.4  --  3.8 3.2*   < >  --    < > 3.2*   < > 3.1*   CHLORIDE 112*  --  118* 110*  --   --  112*   < >  --    < > 117*  --  119*   CO2 29  --  28 25  --   --  24   < >  --    < > 19*  --  19*   ANIONGAP 3  --  3 5  --   --  4   < >  --    < > 7  --  8   *  --  134* 150*  --   --  136*   < >  --    < > 95  --  119*   BUN 21  --  21 21  --   --  21   < >  --    < > 25  --  39*   CR 1.57*  --  1.40* 1.37*  --   --  1.40*   < >  --    < > 1.67*  --  1.70*   GFRESTIMATED 49*  --  57* 58*  --   --  57*   < >  --    < > 46*  --  45*   GFRESTBLACK 57*  --  66 68  --   --  66   < >  --    < > 53*  --  52*   IVANIA 7.0*  --  5.9* 5.6*  --   --  6.1*   < >  --    < > 5.5*  --  6.1*   MAG 1.5*  --  1.9 1.7  --   --  1.5*  --   --    < >  --   --  1.9   PHOS 2.6 3.1 2.4* 2.5   < >  --  1.2*  --   --    < > 2.1*   < >  --    PROTTOTAL 4.8*  --   --   --   --   --   --   --  5.4*  --  4.3*  --  4.5*   ALBUMIN 1.4*  --   --   --   --   --   --   --  1.6*  --  1.4*  --  1.4*   BILITOTAL 0.3  --   --   --   --   --   --   --  0.3  --  0.2  --  0.2   ALKPHOS 533*  --   --   --   --   --   --   --  385*  --  207*  --  205*   AST 48*  --   --   --   --   --   --   --  47*  --  18  --  20   ALT 24  --   --   --   --   --   --   --  19  --  23  --  33    < > = values in this interval not displayed.     CBC  Recent Labs   Lab 11/23/20  0320 11/22/20  1705 11/22/20  0430 11/21/20  1815 11/21/20  0532 11/20/20  0530 11/20/20  0530   WBC 58.2*  --  72.7*  --  62.1*  --  63.1*   RBC 2.99*  --  3.24*  --  2.96*  --  3.28*   HGB 9.1* 10.0* 9.8* 9.2* 9.0*   < > 10.0*   HCT 28.9*  --  30.3*  --  27.7*  --  30.5*   MCV 97  --  94  --  94  --  93   MCH 30.4  --  30.2  --  30.4   --  30.5   MCHC 31.5  --  32.3  --  32.5  --  32.8   RDW 17.4*  --  17.0*  --  16.4*  --  15.9*     --  234  --  136*  --  111*    < > = values in this interval not displayed.     INR  Recent Labs   Lab 11/23/20  0320 11/18/20  0600   INR 1.12 1.13     Arterial Blood Gas  Recent Labs   Lab 11/23/20  0618 11/23/20  0255 11/21/20  0532 11/21/20  0324 11/21/20  0015 11/20/20  1749   PH 7.32* 7.31*  --   --  7.41 7.46*   PCO2 48* 50*  --   --  34* 29*   PO2 52* 64*  --   --  47* 48*   HCO3 25 25  --   --  22 20*   O2PER 100% VENT 100% 80% 50% 50% 60%       All cultures:  Recent Labs   Lab 11/23/20  0403 11/23/20  0326 11/22/20  1140 11/21/20  1259 11/17/20  1108 11/17/20  1104   CULT No growth after 2 hours No growth after 3 hours PENDING Culture negative monitoring continues  Culture negative monitoring continues  Culture negative monitoring continues  Canceled, Test credited  Duplicate request   No growth No growth     Recent Results (from the past 24 hour(s))   CT Chest Pulmonary Embolism w Contrast    Narrative    EXAM: CT CHEST PULMONARY EMBOLISM W CONTRAST  LOCATION: [facility]  DATE/TIME: 11/22/2020 7:17 PM    INDICATION: See the Clinical Information for Interpreting Provider; new tachycardia, hypoxia;  COMPARISON: None.  TECHNIQUE: CT chest pulmonary angiogram during arterial phase injection of IV contrast. Multiplanar reformats and MIP reconstructions were performed. Dose reduction techniques were used.   CONTRAST: 50 mL Isovue-370    FINDINGS:  ANGIOGRAM CHEST: No evidence for pulmonary embolism. Pulmonary arteries are mildly dilated. Thoracic aorta normal in caliber. No aortic dissection or other acute abnormality.    HEART: Cardiac chambers within normal limits. No pericardial effusion.    LUNGS AND PLEURA: Severe bibasilar consolidation. Diffuse groundglass density and septal thickening within the remainder of the lungs. Trace bilateral pleural effusions. No pneumothorax.  Endotracheal tube terminates 4 to 5 cm above the [place].    MEDIASTINUM: No adenopathy or mass. Nasogastric tube in the esophagus terminates in the distal stomach directed anterograde.    LIMITED UPPER ABDOMEN: Negative.    MUSCULOSKELETAL: Negative.      Impression    IMPRESSION:  1.  No evidence for pulmonary embolism.   2.  Severe groundglass density and consolidation in the lungs, differential considerations include ARDS, pulmonary edema/hemorrhage, or pneumonia including COVID 19 pneumonia.                 Billing: This patient is critically ill: Yes. Total critical care time today 45 min. This does not include time spent on procedures or teaching.     Ralf Jacobs MD  Pulmonary & Critical Care Medicine  [facility]   Pager: [phone]

## 2020-11-24 NOTE — PROGRESS NOTES
"[facility] Nurse Inpatient Wound Assessment   Reason for consultation: Evaluate and treat Bilateral buttocks scratches POA and healed    Assessment  ble buttocks skin intact and heels are intact   Status: healed  Patient in excellent condition from my viewpoint when he was proned. Effectively offloading davie prominences, urine and feces contained.  Treatment Plan  Bilateral buttocks wounds: Every 3 days   Apply sacral foam prevention protocol when patient is turned.  Change it every three days     1. Clean wound with saline or MicroKlenz Spray, pat dry  2. Wipe / \"clean\" the surrounding periwound tissue with several skin preps to help with dressing sticking  3. Press a Mepilex Sacral  foam to the area, making sure to conform nicely to skin curvatures.   4. Time and date dressing change  -REPOSITION ALL PATIENTS SIDE TO SIDE ONLY WHEN IN BED, EVERY 2 HOURS,   -HEELS MUST BE KEPT ELEVATED AT ALL TIMES, USING AT LEAST 2 PILLOWS UNDER EACH CALF, ASSURING HEELS ARE FLOATING  -WHEN UP TO THE CHAIR PT NEEDS TO FULLY OFF LOAD EVERY 2 HOURS    Orders Updated  Recommended provider order: None, at this time  WO Nurse follow-up plan:signing off  Nursing to notify the Provider(s) and re-consult the WO Nurse if wound(s) deteriorates or new skin concern.    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  Patient is Laotian speaking and also had developmental delay and a difficult to obtain history from him.  History was obtained from his sister. Patient is on chemotherapy for seminoma of the testicle.  Patient follows with Dr. Ramos of oncology.  He had chemotherapy on Monday of last week.  He was also given 1 units of blood and also had additional unit of blood which was stopped after 30 minutes due to transfusion reaction.  Patient " went to the clinic on Tuesday of last week to get IV fluids.  Patient had fever and chills last night.  He also had intermittent cough.  He went to urology today and had voiding trial and Palafox catheter removed.  His oncology office was contacted due to his fever and was advised to come to emergency room for evaluation.  Patient had no contact with known Covid positive patient.       Objective Data  Containment of urine/stool: Internal fecal management    Active Diet Order  Orders Placed This Encounter      NPO for Medical/Clinical Reasons Except for: No Exceptions      Output:   I/O last 3 completed shifts:  In: 2477.25 [I.V.:577.25; NG/GT:860]  Out: 1355 [Urine:1355]    Risk Assessment:   Sensory Perception: 1-->completely limited  Moisture: 3-->occasionally moist  Activity: 1-->bedfast  Mobility: 1-->completely immobile  Nutrition: 3-->adequate  Friction and Shear: 2-->potential problem  Toi Score: 11                          Labs:   Recent Labs   Lab 11/24/20  0600 11/23/20  0320 11/23/20  0320 11/19/20  0655 11/19/20  0655   ALBUMIN  --   --  1.4*   < >  --    PREALB  --   --  7*  --   --    HGB 8.3*   < > 9.1*   < > 10.9*   INR  --   --  1.12  --   --    WBC 60.3*  --  58.2*   < > 49.5*   A1C 5.8*  --   --   --   --    CRP  --   --   --   --  129.0*    < > = values in this interval not displayed.       Physical Exam  Areas of skin assessed: focused sacrum and bilateral heels           Wound Location:  Sacrum/bilateral buttocks   Date of last photo 11/24/2020  Wound History: patient proned when I walked in the room    Wound Base: 100 % intact, old scratch marks and not pressure or IAD related      Palpation of the wound bed: no wound bed to palpate       Drainage: none     Description of drainage: none      Periwound skin: intact      Color: normal and consistent with surrounding tissue      Temperature: normal   Odor: none  Pain: unable to assess due to  sedation , unable to assess as he was sedated  Pain  intervention prior to dressing change: IV    Interventions  Visual inspection and assessment completed 11/24/2020   Wound Care Rationale skin is intact, urine and feces are contained  Wound Care: None necessary  Supplies: incontinence protocol products in the room.  Current off-loading measures: Pillows under calves and Wedge positioning system  Current support surface: Bariatric Low air loss mattress  Education provided to: plan of care and wound progress  Discussed plan of care with Nurse    Karly Fernando RN

## 2020-11-24 NOTE — PROGRESS NOTES
[facility] ICU VENTILATOR RESPIRATORY NOTE  Date of Admission: 11/12/2020  Date of Intubation (most recent): 11/21/2020  Reason for Mechanical Ventilation: Respiratory Failure  Number of Days on Mechanical Ventilation: 3  Met Criteria for Pressure Support Trial: No  Reason for No Pressure Support Trial: High FIO2, secretions  Significant Events Today: Increased PEEP, FIO2, excessive coughing with long recovery time, SpO2 drops down to 60% with coughing. Veletri started at 1000    ABG Results:   Recent Labs   Lab 11/23/20  1630 11/23/20  1350 11/23/20  0618 11/23/20  0255   PH 7.30* 7.37 7.32* 7.31*   PCO2 50* 44 48* 50*   PO2 133* 109* 52* 64*   HCO3 25 25 25 25   O2PER 100% 100% VENT 100% VENT 100%       Ventilation Mode: CMV/AC  (Continuous Mandatory Ventilation/ Assist Control)  FiO2 (%): 100 %  Rate Set (breaths/minute): 22 breaths/min  Tidal Volume Set (mL): 360 mL  PEEP (cm H2O): 16 cmH2O  Oxygen Concentration (%): 100 %  Resp: 22    Temp: 99  F (37.2  C) Temp src: (P) Bladder BP: (!) 86/61 Pulse: 102   Resp: 22 SpO2: 100 % O2 Device: Mechanical Ventilator       Lele Lopez RT on 11/23/2020 at 6:30 PM

## 2020-11-24 NOTE — PLAN OF CARE
ICU End of Shift Summary.  For vital signs and complete assessments, please see documentation flowsheets.     Pertinent assessments: RASS -5, CPOT 0. Afebrile. Tele SR, HR 60-70s. MAPs>65 with Levophed gtt. Vent 45/22/360/14. LS coarse, minimal ETT, oral secretions. UOP avg 32 mL/hr for shift. Minimal liq stool to collection tube. Tolerating TF @ goal.   Major Shift Events: Vasopressin, Insulin gtt off. Supine @ 1800  Plan (Upcoming Events): Recheck ABGs @ 2000, possible prone on overnight. Wean Levophed as able  Discharge/Transfer Needs: TBD    Bedside Shift Report Completed : Y  Bedside Safety Check Completed: Y

## 2020-11-24 NOTE — PROGRESS NOTES
Critical Care Update:     Repeat AB.30/50/133 on PEEP of 16 and FiO2 100%.Given P/F ratio of 133, will plan to deeply sedate, paralyze and prone.     Plan:   -Continue with propofol for sedation, RASS goal -4 to -5  -If needed, can add versed gtt for sedation but like to use propofol only if able  -Start vecuronium gtt  -Plan for prone ventilation overnight    Plan discussed with bedside nurse and will sign out to teleICU staff.    Ralf Jacobs MD  Pulmonary & Critical Care Medicine  [facility]   Pager: [phone]

## 2020-11-24 NOTE — PROGRESS NOTES
"Critical access hospital ICU VENTILATOR RESPIRATORY NOTE  Date of Admission: 11/12/2020  Date of Intubation (most recent): 11/21/2020  Reason for Mechanical Ventilation: Respiratory Failure  Number of Days on Mechanical Ventilation: 4  Met Criteria for Pressure Support Trial: No  Length of Pressure Support Trial:   Reason for Stopping Pressure Support Trial:   Reason for No Pressure Support Trial: High PEEP, on Veletri and is currently proned.  Significant Events Today: Veletri continues to run, along with Q2 head turns were done today.  ABG Results: 7.37/44/66/25 @1607  ETT appearance on chest x-ray: 2 cm above Anna    Plan:  Will continue to monitor and assess the pt's current respiratory status and needs, in hopes of liberating him from the ventilator.     Ventilation Mode: CMV/AC  (Continuous Mandatory Ventilation/ Assist Control)  FiO2 (%): 45 %  Rate Set (breaths/minute): 22 breaths/min  Tidal Volume Set (mL): 360 mL  PEEP (cm H2O): 12 cmH2O  Oxygen Concentration (%): 45 %  Resp: 22    Vital signs:  Temp: 98.2  F (36.8  C) Temp src: Bladder BP: 105/54 Pulse: 70   Resp: 22 SpO2: 100 % O2 Device: Mechanical Ventilator Oxygen Delivery: 50 LPM Height: 154.9 cm (5' 1\") Weight: 49.7 kg (109 lb 9.1 oz)  Estimated body mass index is 20.7 kg/m  as calculated from the following:    Height as of this encounter: 1.549 m (5' 1\").    Weight as of this encounter: 49.7 kg (109 lb 9.1 oz).    Recent Labs   Lab 11/24/20  1607 11/24/20  0600 11/24/20  0200 11/23/20  1630   PH 7.37 7.41 7.37 7.30*   PCO2 44 39 43 50*   PO2 66* 130* 313* 133*   HCO3 25 24 25 25   O2PER Ventilator 55% VENT 100% 100%     Past Medical History:   Diagnosis Date     Acquired immunocompromised state (H) 11/21/2020    Therapy for seminoma      History of latent tuberculosis 11/21/2020     Mental retardation        Past Surgical History:   Procedure Laterality Date     ABDOMEN SURGERY      sister states he had an \"abdominal surgery 20 years ago\" unknown      BRONCHOSCOPY " (RIGID OR FLEXIBLE), DIAGNOSTIC N/A 11/21/2020    Procedure: BRONCHOSCOPY, WITH BRONCHOALVEOLAR LAVAGE;  Surgeon: Mayra Freitas MD;  Location:  GI     CYSTOSCOPY, RETROGRADES, INSERT STENT URETER(S), COMBINED Bilateral 9/24/2020    Procedure: Cystoscopy with bilateral retrograde pyelogram and bilateral ureteral stent insertiona-complex due to severe bilateral hydroureteronephrosis with ureteral tortuosity, examination under anesthesia and fluoroscopic interpretation > 1 hour physician time;  Surgeon: Ralf Chan MD;  Location:  OR     ESOPHAGOSCOPY, GASTROSCOPY, DUODENOSCOPY (EGD), COMBINED N/A 3/1/2019    Procedure: ESOPHAGOSCOPY, GASTROSCOPY, DUODENOSCOPY (EGD) Rm 226;  Surgeon: Susy Camara MD;  Location:  GI       Family History   Problem Relation Age of Onset     Diabetes Father      Glaucoma No family hx of      Macular Degeneration No family hx of        Social History     Tobacco Use     Smoking status: Never Smoker     Smokeless tobacco: Never Used   Substance Use Topics     Alcohol use: Yes     Alcohol/week: 2.0 standard drinks     Types: 1 Glasses of wine, 1 Cans of beer per week     Comment: once every month or two, social functions only     Liam ROSA  [facility]  11/24/2020

## 2020-11-24 NOTE — PLAN OF CARE
Assumed cares from [id number]  ICU End of Shift Summary.  For vital signs and complete assessments, please see documentation flowsheets.     Pertinent assessments: Pt continues on vent 100% FiO2. LS coarse. Freq cough & copious secretions. Pt desats during coughing episodes down to 55%, recovers within a couple minutes. Tmax 100, cooling blanket under pt. L PICC line & R PIV infusing. R groin Hedy CDI. Propofol, fentanyl, levophed, vasopressin, and veletri gtts infusing. RASS goal -4/-5 per intensivist. Airborne precautions for latent TB & CMV.  Keofeed in place, TF @ goal of 60ml/hr, 160ml q4h flushes. Lower lip with sores & cracked, moisturizer applied. Weak radial & DP pulses, mild edema to extremities. Palafox patent. Repeat Hgb 9.0.  Major Shift Events: D/t ABG, pt will be paralyzed & proned this evening  Plan (Upcoming Events): IV abx, IV chemo, lovenox,   Discharge/Transfer Needs: Pt continue to require ICU support    Right wrist and Left wrist restraints continued 11/23/2020    Clinical Justification: Pulling lines, pulling tubes, and pulling equipment  Less Restrictive Alternative: Repositioning, Re-evaluate equipment, Pain management, Alarm, Reorientation  Attending Physician Notified: MD ordered restraint,     New orders placed Yes  Length of Order: 1 Day      Ankita Sam, RN    Bedside Shift Report Completed : Y  Bedside Safety Check Completed: Y

## 2020-11-24 NOTE — PROGRESS NOTES
[facility] Critical Care Progress Note                          11/24/2020    Name: Luz Marina Diallo MRN#: [id number]   Age: 55 year old YOB: 1965     [facility] #3    MV DAY #3             Problem List:   Active Problems:    FUO (fever of unknown origin)    HSV (herpes simplex virus) infection    History of latent tuberculosis    Urinary retention    Febrile neutropenia (H)    Acquired immunocompromised state (H)           Summary/Hospital Course:     55 year old male with a history of a metastatic seminoma s/p chemotherapy who presents with fevers, increased wbc ongoing concern for infection vs bleomycin associated respiratory dysfunction who was admitted to the ICU on 11/21/2020 for management of hypoxic respiratory failure requiring intubation.    11/23: Worsening hypoxemia overnight with worsening secretions.  PEEP increased to 16 and FiO2 now 100%, P/F ratio around 100.     11/24: Required initiation of prone ventilation overnight.  Now deeply sedated, paralyzed and on Veletri.       Assessment and plan :     Attvitaly Diallo IS a 55 year old male with a history of a metastatic seminoma s/p chemotherapy who presents with fevers, increased wbc ongoing concern for infection vs bleomycin associated respiratory dysfunction who was admitted to the ICU on 11/21/2020 for management of hypoxic respiratory failure requiring intubation.    I have personally reviewed the daily labs, imaging studies, cultures and discussed the case with referring physician and consulting physicians.     My assessment and plan by system for this patient is as follows:    CNS:   Sedation/ pain:   -RASS goal -4 to -5   -Continue versed, fentanyl, and propofol    Pulmonary:   Hypoxemic respiratory failure: Requiring mechanical ventilation (day 4).  Had respiratory decompensation 11/23 which required significant increase in ventilator support as well as initiation of prone ventilation.   Currently heavily sedated, paralyzed and on Veletri.  Oxygenation much improved with P/F ratio of 236 this morning on 55% FiO2.  Etiology not entirely clear but was initiated on steroids for concern of bleomycin toxicity on 11/23.  We will plan to supine this evening and reassess with ABG determine whether he would be prone ventilation again overnight.  -Supine this evening, blood gas to determine need for prone ventilation again  -Continue current antimicrobials (appreciate IDs assistance  -Start methylprednisolone 40 mg IV daily for bleomycin toxicity  -Follow ABG this afternoon but likely will need prone ventilation  -Follow bronchoscopy cultures, so far just CMV positive    CV:   Shock: possible component of sepsis but likely significant component of hypovolemia and sedation  -Now on Levophed and vasopressin, wean as able    Tachycardia - likely related to distress, work of breathing, anxiety and hypovolemia  - has now improved  increase sedation    FEN/GI:   Continue tube feeds today, attempt again Keofeed  Mucositis - can use magic mouthwash to wipe inside of mouth for comfort  GI bleed with melanotic stools early in hospital stay    : adequate uop, no concerns reguarding renal function  Hypernatremia: 144 this morning  -Continue 160 ml free water Q 4 hr    Heme/onc:   Hx of metastatic seminoma s/p BEP therapy. Patient now with concern of bleomycin related lung toxicity or other pulmonary infection. Patient did receive neupogen early on in his course.  -Continue methylprednisolone 40 mg IV daily (started 11/23)    Msk: no acute skin issues    Endo: Q4H BG checks    ID:   unclear cause of possible infection with very high WBC. c dif checked x2 with negative result.  Bronchoscopy showed positive CMV, thus far remaining studies are negative  - continue broad antibiotics with aid of ID.   - Quanterferon gold test resulted positive. AFP smear from BAL is pending  - Continue steroids  - Continue meropenem and  voriconazole  - Continue ganicyclovir per ID    IV/Access:   1. Venous access - PICC  2. Arterial access - Right femoral eric     Plan  - central access required and necessary    ICU Prophylaxis:   1. DVT: Hep subcutaneous  2. VAP: HOB 30 degrees, chlorhexidine rinse  3. Stress Ulcer: H2 blocker  4. Restraints: Nonviolent soft two point restraints required and necessary for patient safety and continued cares and good effect as patient continues to pull at necessary lines, tubes despite education and distraction. Will readdress daily.   5. Wound care - per unit routine   6. Feeding - tube feeds   7. Family Update: Yes, sister by phone  8. Disposition - ICU    Key goals for next 24 hours:   1.  Supine this evening, follow gas to determine need for proning again    2.  Continue antimicrobial per ID  3.  Continue methylprednisolone 40 mg IV daily        Interim History/Overnight Events:     Proned overnight with improvement in oxygenation.  This morning, remains prone paralyzed and heavily sedated.  Currently on Veletri as well.         Key Medications:       [Held by provider] acetylcysteine  4 mL Nebulization Q6H     [Held by provider] albuterol  2.5 mg Nebulization Q6H     allopurinol  300 mg Per Feeding Tube Daily     artificial tears   Both Eyes Q8H     calcium carbonate  2,500 mg Per Feeding Tube BID     doxycycline (VIBRAMYCIN) IV  100 mg Intravenous Q12H     ganciclovir (CYTOVENE) intermittent infusion  2.5 mg/kg Intravenous Q24H     heparin ANTICOAGULANT  5,000 Units Subcutaneous Q12H     insulin aspart  1-7 Units Subcutaneous TID AC     insulin aspart  1-5 Units Subcutaneous At Bedtime     insulin aspart  6 Units Subcutaneous Q4H     insulin glargine  40 Units Subcutaneous QAM AC     lidocaine  5 mL Topical Once     meropenem  1 g Intravenous Q12H     methylPREDNISolone  40 mg Intravenous Q24H     pantoprazole  40 mg Per Feeding Tube BID     sodium chloride (PF)  10 mL Intracatheter Q7 Days     sodium  chloride (PF)  3 mL Intracatheter Q8H     voriconazole  200 mg Per Feeding Tube Q12H DANK       dextrose       dextrose       epoprostenol (VELETRI) 20 mcg/mL in sterile water inhalation solution 20 ng/kg/min (11/24/20 1308)     fentaNYL 100 mcg/hr (11/24/20 1201)     insulin (regular) 4 Units/hr (11/24/20 1412)     midazolam 6 mg/hr (11/24/20 1201)     norepinephrine 0.07 mcg/kg/min (11/24/20 1201)     propofol (DIPRIVAN) infusion 50 mcg/kg/min (11/24/20 1236)     vasopressin 2.4 Units/hr (11/24/20 1201)     vecuronium (NORCURON) infusion ADULT 0.8 mcg/kg/min (11/24/20 1201)               Physical Examination:   Temp:  [95.5  F (35.3  C)-100.9  F (38.3  C)] 98.4  F (36.9  C)  Pulse:  [] 75  Resp:  [0-24] 22  BP: ()/(48-87) 94/62  MAP:  [60 mmHg-109 mmHg] 83 mmHg  Arterial Line BP: ()/(47-91) 117/62  FiO2 (%):  [55 %-100 %] 55 %  SpO2:  [72 %-100 %] 100 %    Intake/Output Summary (Last 24 hours) at 11/23/2020 0701  Last data filed at 11/23/2020 0600  Gross per 24 hour   Intake 3808 ml   Output 1260 ml   Net 2548 ml     Wt Readings from Last 4 Encounters:   11/23/20 49.7 kg (109 lb 9.1 oz)   11/09/20 46.3 kg (102 lb)   11/06/20 51.2 kg (112 lb 14.4 oz)   11/02/20 46.3 kg (102 lb)     Arterial Line BP: ()/(47-91) 117/62  MAP:  [60 mmHg-109 mmHg] 83 mmHg  BP - Mean:  [] 75  Ventilation Mode: CMV/AC  (Continuous Mandatory Ventilation/ Assist Control)  FiO2 (%): 55 %  Rate Set (breaths/minute): 22 breaths/min  Tidal Volume Set (mL): 360 mL  PEEP (cm H2O): 16 cmH2O  Oxygen Concentration (%): 55 %  Resp: 22    Recent Labs   Lab 11/24/20  0600 11/24/20  0200 11/23/20  1630 11/23/20  1350   PH 7.41 7.37 7.30* 7.37   PCO2 39 43 50* 44   PO2 130* 313* 133* 109*   HCO3 24 25 25 25   O2PER 55% VENT 100% 100% 100% VENT       General:   Comfortable, no pain or respiratory distress   Neurologic:   Sedation: deeply sedated and paralyzed   HEENT:   Head is atraumatic  Endotrachael tube is present       Lungs:   Symmetrical and normal breath sounds, no wheeze, ronchi, crackles, rub or bronchial breath sounds   Cardiovascular:   Regular rate and rhythm  Normal S1,S2, and no gallop, rub or murmur   Abdomen:   Distended:  No.   Bowel sound present and normal: Yes .   Soft: Yes . Tender: No.   Rebound:tendeness or guarding present No   Extremities:   Clubbing present: No,   Edema present: No,   Acrocyanosis present: No,   Mottling present: No,   Normal capillary refill: Yes    Skin:   Warm, dry.  No rash on limited exam.    Lines/Drain:    Central line present: Yes   Arterial line present: Yes   External ventricular Drain present: No  Chest tube present: No  NAHEED present: No  PEG present: No            Data:   All data and imaging reviewed.     ROUTINE ICU LABS (Last four results)  CMP  Recent Labs   Lab 11/24/20  0600 11/23/20  0320 11/22/20  1705 11/22/20  0430 11/21/20  0532 11/19/20  1127 11/19/20  1127    144  --  149* 140   < >  --    POTASSIUM 4.1 3.4  --  3.5 3.4   < >  --    CHLORIDE 108 112*  --  118* 110*   < >  --    CO2 26 29  --  28 25   < >  --    ANIONGAP 6 3  --  3 5   < >  --    * 129*  --  134* 150*   < >  --    BUN 25 21  --  21 21   < >  --    CR 1.27* 1.57*  --  1.40* 1.37*   < >  --    GFRESTIMATED 64 49*  --  57* 58*   < >  --    GFRESTBLACK 74 57*  --  66 68   < >  --    IVANIA 6.3* 7.0*  --  5.9* 5.6*   < >  --    MAG 1.9 1.5*  --  1.9 1.7   < >  --    PHOS 3.1 2.6 3.1 2.4* 2.5   < >  --    PROTTOTAL  --  4.8*  --   --   --   --  5.4*   ALBUMIN  --  1.4*  --   --   --   --  1.6*   BILITOTAL  --  0.3  --   --   --   --  0.3   ALKPHOS  --  533*  --   --   --   --  385*   AST  --  48*  --   --   --   --  47*   ALT  --  24  --   --   --   --  19    < > = values in this interval not displayed.     CBC  Recent Labs   Lab 11/24/20  0600 11/23/20  1805 11/23/20  0320 11/22/20  1705 11/22/20  0430 11/21/20  0532 11/21/20  0532   WBC 60.3*  --  58.2*  --  72.7*  --  62.1*   RBC 2.66*  --   2.99*  --  3.24*  --  2.96*   HGB 8.3* 9.0* 9.1* 10.0* 9.8*   < > 9.0*   HCT 25.4*  --  28.9*  --  30.3*  --  27.7*   MCV 96  --  97  --  94  --  94   MCH 31.2  --  30.4  --  30.2  --  30.4   MCHC 32.7  --  31.5  --  32.3  --  32.5   RDW 17.5*  --  17.4*  --  17.0*  --  16.4*   *  --  365  --  234  --  136*    < > = values in this interval not displayed.     INR  Recent Labs   Lab 11/23/20  0320 11/18/20  0600   INR 1.12 1.13     Arterial Blood Gas  Recent Labs   Lab 11/24/20  0600 11/24/20  0200 11/23/20  1630 11/23/20  1350   PH 7.41 7.37 7.30* 7.37   PCO2 39 43 50* 44   PO2 130* 313* 133* 109*   HCO3 24 25 25 25   O2PER 55% VENT 100% 100% 100% VENT       All cultures:  Recent Labs   Lab 11/23/20  0403 11/23/20  0326 11/22/20  1140 11/21/20  1259 11/21/20  0642   CULT No growth after 1 day No growth after 1 day No growth Culture in progress  No growth after 3 days  Culture negative after 3 days  No growth  Culture negative monitoring continues  Canceled, Test credited  Duplicate request   Culture received and in progress.  Positive AFB results are called as soon as detected.    Final report to follow in 7 to 8 weeks.    Assayed at [employer] [address] [place] [id number] [phone]     No results found for this or any previous visit (from the past 24 hour(s)).              Billing: This patient is critically ill: Yes. Total critical care time today 45 min. This does not include time spent on procedures or teaching.     Ralf Jacobs MD  Pulmonary & Critical Care Medicine  [facility]   Pager: [phone]

## 2020-11-24 NOTE — PROGRESS NOTES
[facility] ICU VENTILATOR RESPIRATORY NOTE  Date of Admission: 11/12/2020  Date of Intubation (most recent): 11/21/2020  Reason for Mechanical Ventilation: Respiratory failure  Number of Days on Mechanical Ventilation: 4  Met Criteria for Pressure Support Trial: No  Reason for No Pressure Support Trial: Pt is on PEEP of 16  Significant Events Today: Pt placed on prone position at 01:34 and remains prone.   ABG Results: 7.41/ 39/ 130/ 24  ETT appearance on chest x-ray: 2 cm above berlin    Ventilation Mode: CMV/AC  (Continuous Mandatory Ventilation/ Assist Control)  FiO2 (%): 55 %  Rate Set (breaths/minute): 22 breaths/min  Tidal Volume Set (mL): 360 mL  PEEP (cm H2O): 16 cmH2O  Oxygen Concentration (%): 55 %  Resp: 21        Juan Harrington, RT

## 2020-11-24 NOTE — PROGRESS NOTES
[facility]    Hematology/Oncology Follow-up Note      Today's Date: 11/24/20  Date of Admission:  11/12/2020  Reason for Consult: Seminoma, admitted for neutropenic fever, severe mucositis, respiratory failure and intubated in the ICU        ASSESSMENT/PLAN:  LuzM arina Diallo is a 55 year old male with:     Neutropenic fever: Secondary to BEP and C2 D8 completed on 10/19/2020.  Currently on broad-spectrum antibiotics, antivirals and antifungal, ID following closely.  Neutropenia has resolved, now leukocytosis from Neulasta, Neupogen and dexamethasone. WBC worsening today, but overall improved from a few days ago.   -Appreciate hospitalist, ICU and ID cares     Respiratory failure: Worsening hypoxia, tachypnea and intubated on 11/21 and transferred to the ICU. Pulmonology has been consulted and bronchoscopy performed 11/21, results still pending. Likely infection/inflammation, WBC slowly improving.  Dr Ramos saw patient on 11/20 and unlikely to be bleomycin toxicity/pneumonitis given the rapid onset.  Also, unlikely for seminoma to cause lymphangitic spread to the lungs.      Seminoma: Diagnosed September 2020.  Patient Dr Ramso's on curative intent BEP chemo cycle 1 day 1 completed inpatient on 10/1/2020, carboplatin instead of cisplatin due to renal failure. C2 D8 completed on 11/9.  Respiratory failure unlikely from bleomycin toxicity given the more rapid onset.  -No new recommendations, we will follow, please call with questions      Heme: Leukocytosis, WBC 60.3 today, multifactorial from Neulasta, Neupogen, steroids.  Persistent low-grade fevers.  Hemoglobin stable, platelets normal.  -Transfuse if hemoglobin <7.0 and/or platelets <20 or further bleeding      Discussed with Dr Fan and she agreed with the plan above.       INTERIM HISTORY: Intubated and sedated.        MEDICATIONS:  Current Facility-Administered Medications   Medication     acetaminophen (TYLENOL) solution 650 mg      [Held by provider] acetylcysteine (MUCOMYST) 10 % nebulizer solution 4 mL     acyclovir (ZOVIRAX) 250 mg in D5W 50 mL intermittent infusion     [Held by provider] albuterol (PROVENTIL) neb solution 2.5 mg     allopurinol (ZYLOPRIM) tablet 300 mg     vecuronium (NORCURON) 50 mg in D5W 50 mL    And     artificial tears ophthalmic ointment     calcium carbonate suspension 2,500 mg     dextrose 10% infusion     dextrose 10% infusion     glucose gel 15-30 g    Or     dextrose 50 % injection 25-50 mL    Or     glucagon injection 1 mg     diphenhydrAMINE (BENADRYL) injection 25 mg     doxycycline (VIBRAMYCIN) 100 mg vial to attach to  mL bag     epoprostenol (VELETRI) 20 mcg/mL in sterile water inhalation solution     fentaNYL (SUBLIMAZE) infusion     ganciclovir (CYTOVENE) 125 mg in D5W 100 mL intermittent infusion     guaiFENesin-dextromethorphan (ROBITUSSIN DM) 100-10 MG/5ML syrup 5 mL     heparin ANTICOAGULANT injection 5,000 Units     insulin aspart (NovoLOG) injection (RAPID ACTING)     insulin aspart (NovoLOG) injection (RAPID ACTING)     insulin aspart (NovoLOG) injection (RAPID ACTING)     insulin glargine (LANTUS PEN) injection 40 Units     lidocaine (LMX4) cream     lidocaine (XYLOCAINE) 2 % solution 5 mL     lidocaine 1 % 0.1-1 mL     LORazepam (ATIVAN) injection 0.5-1 mg     magic mouthwash suspension (diphenhydramine, lidocaine, aluminum-magnesium & simethicone)     melatonin tablet 1 mg     meropenem (MERREM) 1 g vial to attach to  mL bag     methylPREDNISolone sodium succinate (solu-MEDROL) injection 40 mg     midazolam (VERSED) drip - ADULT 100 mg/100 mL in NS PRE-MIX     naloxone (NARCAN) injection 0.2 mg    Or     naloxone (NARCAN) injection 0.4 mg    Or     naloxone (NARCAN) injection 0.2 mg    Or     naloxone (NARCAN) injection 0.4 mg     norepinephrine (LEVOPHED) 16 mg in  mL infusion CENTRAL LINE     ondansetron (ZOFRAN-ODT) ODT tab 4 mg    Or     ondansetron (ZOFRAN) injection 4 mg  "    oxyCODONE (ROXICODONE) solution 5 mg     pantoprazole (PROTONIX) 2 mg/mL suspension 40 mg     prochlorperazine (COMPAZINE) injection 5 mg    Or     prochlorperazine (COMPAZINE) tablet 10 mg    Or     prochlorperazine (COMPAZINE) suppository 25 mg     propofol (DIPRIVAN) infusion     rincinol P.R.N liquid 10 mL     sodium chloride (PF) 0.9% PF flush 10 mL     sodium chloride (PF) 0.9% PF flush 10-20 mL     sodium chloride (PF) 0.9% PF flush 3 mL     sodium chloride (PF) 0.9% PF flush 3 mL     vasopressin (VASOSTRICT) 40 Units in sodium chloride 0.9 % 38 mL infusion     voriconazole (VFEND) suspension 200 mg           ALLERGIES:  No Known Allergies      PHYSICAL EXAM:  Vital signs:  Temp: 98.4  F (36.9  C) Temp src: Bladder BP: 93/64 Pulse: 77   Resp: 22 SpO2: 100 % O2 Device: Mechanical Ventilator Oxygen Delivery: 50 LPM Height: 154.9 cm (5' 1\") Weight: 49.7 kg (109 lb 9.1 oz)  Estimated body mass index is 20.7 kg/m  as calculated from the following:    Height as of this encounter: 1.549 m (5' 1\").    Weight as of this encounter: 49.7 kg (109 lb 9.1 oz).    GENERAL:  Male intubated and sedated.   HEENT:  Normocephalic.   LUNGS: Vented  PSYCH: Sedated      LABS:  CBC RESULTS:   Recent Labs   Lab Test 11/24/20  0600   WBC 60.3*   RBC 2.66*   HGB 8.3*   HCT 25.4*   MCV 96   MCH 31.2   MCHC 32.7   RDW 17.5*   *       Recent Labs   Lab Test 11/24/20  0600 11/23/20  0320    144   POTASSIUM 4.1 3.4   CHLORIDE 108 112*   CO2 26 29   ANIONGAP 6 3   * 129*   BUN 25 21   CR 1.27* 1.57*   IVANIA 6.3* 7.0*       Araceli Pacheco PA-C  Hematology/Oncology  [facility]      "

## 2020-11-24 NOTE — PLAN OF CARE
ICU End of Shift Summary.  For vital signs and complete assessments, please see documentation flowsheets.     Pertinent assessments: Levophed titrated to keep MAP .65,Currently at 0.05mcg/kg/min vasopressin at 2.4 .Prop at 55mcgs,Versed at  6mgs/hr.Fentanyl at 100mcgs/hr. Adequate urine output.Tolerating tube feeds at goal.Recatal tube inserted 2/2 large amt liquid stools                                                   Major Shift Events: Pt paralyzed/sedated and proned. O2 weaned to 55%  Plan (Upcoming Events):   Discharge/Transfer Needs: TBD    Bedside Shift Report Completed : yes  Bedside Safety Check Completed:yes     Problem: Restraint for Non-Violent/Non-Self-Destructive Behavior  Goal: Prevent/Manage Potential Problems  Description: Maintain safety of patient and others during period of restraint.  Promote psychological and physical wellbeing.  Prevent injury to skin and involved body parts.  Promote nutrition, hydration, and elimination.  Outcome: Improving   Pt medically paralyzed ans sedated,restraints discontinued at 0200

## 2020-11-24 NOTE — PROGRESS NOTES
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    Initially on 11/12, patient was admitted for neutropenic fever.  He had received chemotherapy 3 days prior to admission with bleomycin, etoposide, and platinum.  He was started on broad-spectrum antibiotics with cefepime and vancomycin.  Infectious disease and oncology were consulted.  Was also treated for pancytopenia due to chemotherapy.  Received Neulasta x3 on 11/13 through 15.  He developed mucositis which was initially treated with topical medications but ultimately required TPN.  Also had hypocalcemia issues requiring replacement.  He developed bright red blood per rectum and GI was consulted.  Recommended Protonix but holding off endoscopy.  Acyclovir and fluconazole were added given the mucositis.  Patient required multiple additional blood transfusions.    11/18 he developed worsening cough and tachycardia.  CT of the chest showed patchy and confluent groundglass opacities suggestive of multifocal pneumonia.  COVID-19 test was repeated and negative again.  Patient has been having ongoing fevers but they seem to get worse around the time as well.  On 11/19 his WBC count was noted to increase to 52 whereas it has been very low before that.  Antibiotics were broadened to include doxycycline, meropenem and voriconazole.  Pulmonary consulted on 11/20.  Concern for bleomycin toxicity.  Recommended bronchoscopy if possible.  In the evening of 11/20-21, patient's respiratory status worsened despite BiPAP and he was endotracheally intubated. Patient underwent bronchoscopy in the  ICU on 11/21. Bronchoscopy returned positive for CMV, though this is unlikely to explain all of his issues. On 11/23, he had worsening oxygenation and was paralyzed and placed in prone position.    Today, patient remains critically ill and continues on broad spectrum antimicrobials, norepinephrine and vasopressin for blood pressure, and on inhaled epoprostenol/paralyzed and prone for ARDS. White count persistently elevated at 60. Appreciate ongoing ID and heme/onc involvement.     Bilateral patchy groundglass opacities  Patient was admitted on 11/12 without significant respiratory symptoms and CXR showed low lung volumes with mild bilateral basilar atelectasis versus infiltrate.  As outlined above, his respiratory status progressively worsened during the hospitalization and he required intubation on 11/21. CT angiogram of the chest did not show any PE 11/18.  Did show the bilateral groundglass multifocal opacities concerning for pneumonia.  - Earlier in hospital stay patient was on Acyclovir, fluconazole, Cefepime and vancomycin. On 11/18 started on doxycycline, meropenem and voriconazole.  - Bronchoscopy done on 11/21  - Workup so far includes negative aspergillus, beta D glucan, influenza antigen, RVP, sputum cultures  - Repeat CT scan on 11/22 showed no PE, confirmed severe GGO bilaterally  - Pt has + Quantiferon gold, has not been treated for this.  - Pulmonology, oncology, and ID consulted.  - Current etiology unclear. Oncology feels the timing does not fit with Bleomycin toxicity and that seminoma would be very unlikely to cause lymphangitic spread that would present like this.   - CMV was found in the bronch. Per ID, very rare for CMV to cause pneumonitis outside of BMT or transplant populations, but reasonable to treat this.   - Continue antimicrobials of   Acyclovir 11/14 (for mucositis)   Doxycycline 11/18 -    Meropenem 11/18   Gancyclovir 11/22 -    Voriconazole 11/18 -  - started empiric SoluMedrol 40  mg daily on 11/23    Acute hypoxic respiratory failure  ARDS  - Due to underlying process above, PE ruled out   - Continue lung protective ventilation per intensivist  - Intubated 11/21  - Inhaled epoprostenol started 11/23  - fentanyl/propofol for sedation  - Worsening oxygenation afternoon of 11/23, patient was paralyzed and pronated    Hypotension/septic shock  - Sedation vs infection related  - Norepinephrine available as needed to maintain MAP > 65.  - Continue norepinephrine and vasopressin    Hyperglycemia  - Due to steroids and infection, was started on insulin drip 11/23-24  - Switch to basal/bolus regimen with 40 of Lantus daily, medium dose SSi and 6 units aspart q4h    Leukocytosis  - White count has been very elevated in the 60-50 range after filgrastim doses.  Monitor daily.  - persistently elevated in 50-60 range  - Left shift seen on differential--reactive vs related to Filgrastim     Hypocalcemia  - Has been an ongoing issue during hospitalization  - Replace as needed.    Seminoma  - This was initially diagnosed in September 2020.  Started chemotherapy while hospitalized in October.  Has had issues with admissions for neutropenic fever and urosepsis since that time.  Per oncology, patient has locally advanced disease which has been responding to curative intent chemotherapy.  - Outpt chemo regimen was bleomycin, etoposide and carboplatin.  - As outlined above, oncology feels that his current lung filtrates more likely infectious and related to his cancer, which is improving, or his chemotherapy, which should not be at this point sufficient to cause the lung toxicity.    Multifactorial anemia  Thrombocytopenia  - Hb has ranged anywhere from as low as 5 to as high 11 during the hospitalization.  A combination of blood loss from GI tract which is likely related to mucositis as well as chemotherapy induced pancytopenia.  - Has received multiple blood transfusions during hospitalization  - Monitor daily,  "transfuse if platelets are below 50 or Hb < 7.   - Continue BID protonix.     Severe malnutrition  - Appreciate RD assistance. Support nutrition with enteral feeds through [place] tube.    Diet: Nasoenteric feeding tube placed 11/23.  Tube feeds per dietitian.  DVT Prophylaxis: Heparin SQ  Palafox Catheter: Yes; in place for acute illness  Code Status: Full Code     Disposition Plan   Expected discharge:   Anticipate continued prolonged hospitalization.     Matt Haskins MD  Hospitalist Service  [facility]  ______________________________________________________________________    Interval History   Worsening respiratory status yesterday afternoon paralyzed and made prone.  Large amount of liquid stools after the tube was placed.  Otherwise no new issues.    Data reviewed today: I reviewed all medications, new labs and imaging results over the last 24 hours.     Physical Exam   BP 94/62   Pulse 77   Temp 98.4  F (36.9  C)   Resp 21   Ht 1.549 m (5' 1\")   Wt 49.7 kg (109 lb 9.1 oz)   SpO2 100%   BMI 20.70 kg/m    109 lbs 9.1 oz       General: Intubated, prone, paralyzed.    HEENT: No scleral icterus. Oropharynx moist.     Neck: Supple. Normal range of motion.     Pulmonary: Coarse ventilator associated breath sounds bilaterally.    Cardiovascular: Regular rate and rhythm without murmur or extra heart sounds.    Extremities: No peripheral edema. No clubbing or cyanosis.     Neurologic: Paralyzed.    Skin: Warm and dry.    Data    Recent Labs   Lab 11/24/20  0600 11/23/20  1805 11/23/20  0320 11/22/20  0430 11/22/20  0430 11/19/20  1127 11/19/20  1127 11/18/20  1210 11/18/20  1210 11/18/20  0600   WBC 60.3*  --  58.2*  --  72.7*   < > 52.4*   < >  --  31.4*   HGB 8.3* 9.0* 9.1*   < > 9.8*   < > 11.5*   < >  --  10.9*   MCV 96  --  97  --  94   < > 91   < >  --  93   *  --  365  --  234   < > 88*   < >  --  79*   INR  --   --  1.12  --   --   --   --   --   --  1.13     --  144  --  " 149*   < >  --    < >  --  140   POTASSIUM 4.1  --  3.4  --  3.5   < >  --    < >  --  3.7   CHLORIDE 108  --  112*  --  118*   < >  --    < >  --  115*   CO2 26  --  29  --  28   < >  --    < >  --  19*   BUN 25  --  21  --  21   < >  --    < >  --  21   CR 1.27*  --  1.57*  --  1.40*   < >  --    < >  --  1.56*   ANIONGAP 6  --  3  --  3   < >  --    < >  --  6   IVANIA 6.3*  --  7.0*  --  5.9*   < >  --    < >  --  5.6*   *  --  129*  --  134*   < >  --    < > 152* 121*   ALBUMIN  --   --  1.4*  --   --   --  1.6*  --   --   --    PROTTOTAL  --   --  4.8*  --   --   --  5.4*  --   --   --    BILITOTAL  --   --  0.3  --   --   --  0.3  --   --   --    ALKPHOS  --   --  533*  --   --   --  385*  --   --   --    ALT  --   --  24  --   --   --  19  --   --   --    AST  --   --  48*  --   --   --  47*  --   --   --    TROPI  --   --   --   --   --   --   --   --  0.018  --     < > = values in this interval not displayed.     Recent Results (from the past 24 hour(s))   XR Chest Port 1 View    Narrative    CHEST ONE VIEW PORTABLE   11/23/2020 2:56 PM     HISTORY:  PICC line repositioning.    COMPARISON: Chest radiograph performed earlier today.      Impression    IMPRESSION: Left PICC line has been retracted, with tip now near the  SVC/RA junction. No other significant interval change. Endotracheal  tube and enteric tubes are unchanged. Patchy airspace and interstitial  opacities in both lungs are unchanged. Cardiac enlargement and  pulmonary venous congestion.    YEMI GUERRA MD       Medications      Current Facility-Administered Medications   Medication Dose Route Frequency     [Held by provider] acetylcysteine  4 mL Nebulization Q6H     acyclovir (ZOVIRAX) IV  5 mg/kg Intravenous Q12H     [Held by provider] albuterol  2.5 mg Nebulization Q6H     allopurinol  300 mg Per Feeding Tube Daily     artificial tears   Both Eyes Q8H     calcium carbonate  2,500 mg Per Feeding Tube BID     doxycycline (VIBRAMYCIN) IV   100 mg Intravenous Q12H     ganciclovir (CYTOVENE) intermittent infusion  2.5 mg/kg Intravenous Q24H     heparin ANTICOAGULANT  5,000 Units Subcutaneous Q12H     insulin aspart  1-7 Units Subcutaneous TID AC     insulin aspart  1-5 Units Subcutaneous At Bedtime     insulin aspart  6 Units Subcutaneous Q4H     insulin glargine  40 Units Subcutaneous QAM AC     lidocaine  5 mL Topical Once     meropenem  1 g Intravenous Q12H     methylPREDNISolone  40 mg Intravenous Q24H     pantoprazole  40 mg Per Feeding Tube BID     sodium chloride (PF)  10 mL Intracatheter Q7 Days     sodium chloride (PF)  3 mL Intracatheter Q8H     voriconazole  200 mg Per Feeding Tube Q12H DANK

## 2020-11-24 NOTE — PROGRESS NOTES
[facility]  Infectious Disease Progress Note          Assessment and Plan:   IMPRESSION:   1.  A 55-year-old male, well known to me from recent admission, now admitted with acute fever and profound neutropenia from chemotherapy, no clear focal infection, mild respiratory symptoms and recent urosepsis as possible sources, but unclear.   2.  Seminoma with recent chemotherapy, now profoundly neutropenic.   3.  Recent episode of urosepsis with Klebsiella and then subsequent episode of sepsis resolved with oral Cipro without clearcut infection being found.   4.  Latent tuberculosis, recent new diagnosis.  No evidence of active tuberculosis.   5.  COVID-19 rule out, negative times several.   6.  Slight hypoxia, minimal chest x-ray abnormality.   7.  Renal insufficiency.   8 Severe stomatitis, ? Chemo vs HSV     RECOMMENDATIONS:   1.    Despite marrow recovery and white cells rising(very high with marrow recovery and growth factors not a marker of bacterial infection), major worsening primarily respiratory and fever.  CT scan shows new worsening infiltrates.  Large differential diagnosis here, essentially neutropenic related potential pneumonia, conceivably underlying disease, multiple possibilities large differential diagnosis incl Bleo toxicity    2 On  meropenem, doxycycline,  vori already now ganciclovir  3  fungal serologies and labs neg incl gal neg, lung other things still concern for potential TB in the background, imaging not typical  4 repeat COVID-19 testing ,  this is unlikely to be the diagnosis, neg, still airborne for sl TB potential  5 Now ganciclovir with BAL + CMV but very unlikiely this is a true lung infection or anything more than clinically insig reactivation no need acyclovir as ganciclovir covers HSV well    6  Major WE worsening 100% FiO2 in ICU, I do not object to steroids as bleo toxicity is high in ddx, await early BAL studies all so far neg incl cytology  Not likely PJP  "as fungitel neg an now negcytology             Interval History:   iICU high flow FIO2 reviewed all BAL pending, fungitel 0, no + cxs except BAL CMV, by serology is reactivation              Medications:       [Held by provider] acetylcysteine  4 mL Nebulization Q6H     [Held by provider] albuterol  2.5 mg Nebulization Q6H     allopurinol  300 mg Per Feeding Tube Daily     artificial tears   Both Eyes Q8H     calcium carbonate  2,500 mg Per Feeding Tube BID     doxycycline (VIBRAMYCIN) IV  100 mg Intravenous Q12H     ganciclovir (CYTOVENE) intermittent infusion  2.5 mg/kg Intravenous Q24H     heparin ANTICOAGULANT  5,000 Units Subcutaneous Q12H     insulin aspart  1-7 Units Subcutaneous TID AC     insulin aspart  1-5 Units Subcutaneous At Bedtime     insulin aspart  6 Units Subcutaneous Q4H     insulin glargine  40 Units Subcutaneous QAM AC     lidocaine  5 mL Topical Once     meropenem  1 g Intravenous Q12H     methylPREDNISolone  40 mg Intravenous Q24H     pantoprazole  40 mg Per Feeding Tube BID     sodium chloride (PF)  10 mL Intracatheter Q7 Days     sodium chloride (PF)  3 mL Intracatheter Q8H     voriconazole  200 mg Per Feeding Tube Q12H DANK                  Physical Exam:   Blood pressure 94/62, pulse 75, temperature 98.4  F (36.9  C), resp. rate 22, height 1.549 m (5' 1\"), weight 49.7 kg (109 lb 9.1 oz), SpO2 100 %.  Wt Readings from Last 2 Encounters:   11/23/20 49.7 kg (109 lb 9.1 oz)   11/09/20 46.3 kg (102 lb)     Vital Signs with Ranges  Temp:  [95.5  F (35.3  C)-100.9  F (38.3  C)] 98.4  F (36.9  C)  Pulse:  [] 75  Resp:  [0-24] 22  BP: ()/(48-87) 94/62  MAP:  [60 mmHg-109 mmHg] 83 mmHg  Arterial Line BP: ()/(47-91) 117/62  FiO2 (%):  [55 %-100 %] 55 %  SpO2:  [72 %-100 %] 100 %    Constitutional: Intubated sedated ICU   Lungs: Clear to auscultation bilaterally, no crackles or wheezing   Cardiovascular: Regular rate and rhythm, normal S1 and S2, and no murmur noted   Abdomen: Normal " bowel sounds, soft, non-distended, non-tender   Skin: No rashes, no cyanosis, no edema   Other:           Data:   All microbiology laboratory data reviewed.  Recent Labs   Lab Test 11/24/20  0600 11/23/20  1805 11/23/20  0320 11/22/20  0430 11/22/20  0430   WBC 60.3*  --  58.2*  --  72.7*   HGB 8.3* 9.0* 9.1*   < > 9.8*   HCT 25.4*  --  28.9*  --  30.3*   MCV 96  --  97  --  94   *  --  365  --  234    < > = values in this interval not displayed.     Recent Labs   Lab Test 11/24/20  0600 11/23/20  0320 11/22/20  0430   CR 1.27* 1.57* 1.40*     Recent Labs   Lab Test 11/19/20  0655   SED 18     Recent Labs   Lab Test 11/23/20  0403 11/23/20  0326 11/22/20  [facility] 11/21/20  1259 11/21/20  0642 11/17/20  1108 11/17/20  1104 11/12/20  1631 11/12/20  1620   CULT No growth after 21 hours No growth after 21 hours No growth Culture in progress  No growth after 3 days  Culture negative after 3 days  No growth  Culture negative monitoring continues  Canceled, Test credited  Duplicate request   Culture received and in progress.  Positive AFB results are called as soon as detected.    Final report to follow in 7 to 8 weeks.    Assayed at [facility] [address] [place] [phone] No growth No growth No growth No growth

## 2020-11-25 NOTE — CONSULTS
[facility]    Palliative Care Consultation-COVID positive or r/o COVID  Text Page    Date of Admission:  11/12/2020    Assessment & Plan   Attapeu SHAUNA Diallo is a 55 year old male who was admitted on 11/12/2020. I was asked to see the patient for goals of care.    Recommendations:   Please see assessments below for rationale.  1.Decisional Capacity -  Unreliable-intubated and sedated. Patient does not have an advance directive. Per  informed consent policy next of kin should be involved if patient becomes unable.  2. Dyspnea  -Full vent   -Proning and sedation per ICU team  3. Pain- unable to assess  -On fentanyl for sedation will also cover pain  4. Spiritual Care- Oriented to Spiritual Health as part of Palliative Care team.- Mu-ism  5. Care Planning- SW to assist with discharge planning as able    Goals of Care: (POLST verbage) code status Full code- restorative care at this time but did bring up DNR- family not ready for this today  Findings & plan of care discussed with: Bedside Nurse Comfort and Hospitalist Dr Leos, Dr Jacobs  Thank you for involving us in the patient's care. Please do not hesitate to call with any questions or concerns.     Serene SERRA, CNP  Pain Management and Palliative Care  [facility]  Pgr: [phone]    Time Spent on this Encounter   Total unit/floor time 89 minutes, time consisted of the following, examination of the patient, reviewing the record and completing documentation. >50% of time spent in counseling and coordination of care.  Time spend counseling with family and medical team consisted of the following topics, goals of care, education about diagnosis, education about prognosis and symptom management.  Time spent in coordination of care with team members as listed above.       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 He was on the medical floor and went into respiratory distress and was intubated on 11/21. Patient is intubated and sedated at this time. Sister Mamta reports that she has talked to him about his goals of care and offered him an out if he thought the treatments has been too much and he told her that he was not ready to die and wanted to continue with treatments. He remains full code and Mamta reports that they are always talking as a family about the next steps.     Symptoms:   Unable to assess    Social:         Support system: Sister Mamta is support person    Mental Health:   Unable to assess    Coping:   Unable to assess    Spiritual/B[employer]ist:    Spiritual background: declined needs at this time    Prognostic Information:   This has been discussed with sister Mamta    Advance Care Planning:        Code Status:  Full Code      History of Present Illness   Unable to obtain a history from the patient due to critical condition, intubation and sedation  Electronic Medical Record  Sister Mamta    Attvitaly NO MI Souphanthavana paula is a 55 year old male with a past medical history of developmental delay, 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       Decision-Making & Goals of Care Discussion:  Discussed on November 25, 2020 with Serene SERRA CNP: Call placed to sister Mamta, introduced self and the palliative care team. Reviewed hospitalization as well as prior health before hospitalization. Asked about if patient and her talked about goals of care in the past and she said they recently talked about treatments and she said he reported to her that he was not ready to die and was not thinking it was too much for him at this time. Discussed and reviewed code status. Discussed that he is very sick and there is not a clear diagnosis of the hypoxia. Discussed that  "CPR may not save his life. Discussed the side effects of CPR being broken ribs, pain and could mean worse harm. She verbalized understanding and stated that she will discuss this with her family. Discussed that we will continue to discuss this but her and her family should consider how far they would like to go and what that could potentially look like. She verbalized understanding. Questions asked and answered.      Past Medical History   I have reviewed this patient's medical history and updated it with pertinent information if needed.   Past Medical History:   Diagnosis Date     Acquired immunocompromised state (H) 11/21/2020    Therapy for seminoma      History of latent tuberculosis 11/21/2020     Mental retardation        Past Surgical History   I have reviewed this patient's surgical history and updated it with pertinent information if needed.  Past Surgical History:   Procedure Laterality Date     ABDOMEN SURGERY      sister states he had an \"abdominal surgery 20 years ago\" unknown      BRONCHOSCOPY (RIGID OR FLEXIBLE), DIAGNOSTIC N/A 11/21/2020    Procedure: BRONCHOSCOPY, WITH BRONCHOALVEOLAR LAVAGE;  Surgeon: Mayra Freitas MD;  Location:  GI     CYSTOSCOPY, RETROGRADES, INSERT STENT URETER(S), COMBINED Bilateral 9/24/2020    Procedure: Cystoscopy with bilateral retrograde pyelogram and bilateral ureteral stent insertiona-complex due to severe bilateral hydroureteronephrosis with ureteral tortuosity, examination under anesthesia and fluoroscopic interpretation > 1 hour physician time;  Surgeon: Ralf Chan MD;  Location:  OR     ESOPHAGOSCOPY, GASTROSCOPY, DUODENOSCOPY (EGD), COMBINED N/A 3/1/2019    Procedure: ESOPHAGOSCOPY, GASTROSCOPY, DUODENOSCOPY (EGD) Rm 226;  Surgeon: Susy Camara MD;  Location:  GI       Prior to Admission Medications   Prior to Admission Medications   Prescriptions Last Dose Informant Patient Reported? Taking?   LORazepam (ATIVAN) 0.5 MG tablet prn  No " Yes   Sig: Take 1 tablet (0.5 mg) by mouth every 4 hours as needed (Anxiety, Nausea/Vomiting or Sleep)   allopurinol (ZYLOPRIM) 300 MG tablet 11/12/2020 at am  No Yes   Sig: Take 1 tablet (300 mg) by mouth daily   calcium citrate-vitamin D (CITRACAL W/D) 250-100 MG-UNIT tablet 11/12/2020 at am  No Yes   Sig: Take 2 tablets by mouth 2 times daily (with meals)   ciprofloxacin (CIPRO) 500 MG tablet 11/12/2020 at am  No Yes   Sig: Take 1 tablet (500 mg) by mouth 2 times daily   ferrous sulfate (FEROSUL) 325 (65 Fe) MG tablet 11/12/2020 at am  No Yes   Sig: Take 1 tablet (325 mg) by mouth daily (with breakfast)   loratadine (CLARITIN) 10 MG tablet 11/12/2020 at am  Yes Yes   Sig: Take 10 mg by mouth daily    multivitamin w/minerals (THERA-VIT-M) tablet 11/12/2020 at am  Yes Yes   Sig: Take 1 tablet by mouth daily   prochlorperazine (COMPAZINE) 10 MG tablet prn  No Yes   Sig: Take 1 tablet (10 mg) by mouth every 6 hours as needed (Nausea/Vomiting)   tamsulosin (FLOMAX) 0.4 MG capsule 11/12/2020 at am  No Yes   Sig: Take 1 capsule (0.4 mg) by mouth daily      Facility-Administered Medications: None     Allergies   No Known Allergies    Social History   I have updated and reviewed the following Social History Narrative:   Social History     Social History Narrative     Not on file       Family History   I have reviewed this patient's family history and updated it with pertinent information if needed.   Family History   Problem Relation Age of Onset     Diabetes Father      Glaucoma No family hx of      Macular Degeneration No family hx of        Review of Systems   Unable to assess due to sedation and intubation    Palliative Symptom Review  Unable to assess due to sedation and intubation    Physical Exam   Limited assessment due to clinical COVID status, window assessment view only   Temp:  [97  F (36.1  C)-99.1  F (37.3  C)] 99  F (37.2  C)  Pulse:  [69-90] 84  Resp:  [17-22] 21  BP: ()/(48-74) 123/74  MAP:  [58  mmHg-93 mmHg] 79 mmHg  Arterial Line BP: ()/(43-90) 108/62  FiO2 (%):  [45 %-60 %] 60 %  SpO2:  [97 %-100 %] 100 %  104 lbs 11.5 oz  Exam:  GENERAL APPEARANCE:  intubated and sedated  RESP:  per vent  CV:  rate-normal  PSYCH:  intubated and sedated      Data   Results for orders placed or performed during the hospital encounter of 11/12/20 (from the past 24 hour(s))   Glucose by meter   Result Value Ref Range    Glucose 96 70 - 99 mg/dL   Hemoglobin   Result Value Ref Range    Hemoglobin 8.4 (L) 13.3 - 17.7 g/dL   Glucose by meter   Result Value Ref Range    Glucose 115 (H) 70 - 99 mg/dL   Blood gas arterial with oxyhemoglobin   Result Value Ref Range    pH Arterial 7.37 7.35 - 7.45 pH    pCO2 Arterial 45 35 - 45 mm Hg    pO2 Arterial 76 (L) 80 - 105 mm Hg    Bicarbonate Arterial 26 21 - 28 mmol/L    FIO2 Ventilator     Oxyhemoglobin Arterial 95 92 - 100 %    Base Excess Art 0.4 mmol/L   Glucose by meter   Result Value Ref Range    Glucose 106 (H) 70 - 99 mg/dL   Glucose by meter   Result Value Ref Range    Glucose 87 70 - 99 mg/dL   Glucose by meter   Result Value Ref Range    Glucose 127 (H) 70 - 99 mg/dL   Blood gas arterial with oxyhemoglobin   Result Value Ref Range    pH Arterial 7.37 7.35 - 7.45 pH    pCO2 Arterial 45 35 - 45 mm Hg    pO2 Arterial 68 (L) 80 - 105 mm Hg    Bicarbonate Arterial 26 21 - 28 mmol/L    FIO2 45%     Oxyhemoglobin Arterial 93 92 - 100 %    Base Excess Art 0.6 mmol/L   Glucose by meter   Result Value Ref Range    Glucose 145 (H) 70 - 99 mg/dL   Glucose by meter   Result Value Ref Range    Glucose 125 (H) 70 - 99 mg/dL   Magnesium   Result Value Ref Range    Magnesium 1.8 1.6 - 2.3 mg/dL   Phosphorus   Result Value Ref Range    Phosphorus 3.1 2.5 - 4.5 mg/dL   CBC with platelets differential   Result Value Ref Range    WBC 56.5 (HH) 4.0 - 11.0 10e9/L    RBC Count 1.93 (L) 4.4 - 5.9 10e12/L    Hemoglobin 5.9 (LL) 13.3 - 17.7 g/dL    Hematocrit 18.7 (L) 40.0 - 53.0 %    MCV 97 78 -  100 fl    MCH 30.6 26.5 - 33.0 pg    MCHC 31.6 31.5 - 36.5 g/dL    RDW 17.7 (H) 10.0 - 15.0 %    Platelet Count 633 (H) 150 - 450 10e9/L    Diff Method Manual Differential     % Neutrophils 78.0 %    % Lymphocytes 3.0 %    % Monocytes 3.0 %    % Eosinophils 0.0 %    % Basophils 1.0 %    % Metamyelocytes 4.0 %    % Myelocytes 11.0 %    Nucleated RBCs 1 (H) 0 /100    Absolute Neutrophil 44.1 (H) 1.6 - 8.3 10e9/L    Absolute Lymphocytes 1.7 0.8 - 5.3 10e9/L    Absolute Monocytes 1.7 (H) 0.0 - 1.3 10e9/L    Absolute Eosinophils 0.0 0.0 - 0.7 10e9/L    Absolute Basophils 0.6 (H) 0.0 - 0.2 10e9/L    Absolute Metamyelocytes 2.3 (H) 0 10e9/L    Absolute Myelocytes 6.2 (H) 0 10e9/L    Absolute Nucleated RBC 0.6     Anisocytosis Slight     Polychromasia Slight     Target Cells Slight     Macrocytes Present     Toxic Granulation Present     Platelet Estimate       Automated count confirmed.  Giant platelets are present.   Potassium   Result Value Ref Range    Potassium 5.0 3.4 - 5.3 mmol/L   CK total   Result Value Ref Range    CK Total 53 30 - 300 U/L   Triglycerides   Result Value Ref Range    Triglycerides 258 (H) <150 mg/dL   Blood gas arterial with oxyhemoglobin   Result Value Ref Range    pH Arterial 7.38 7.35 - 7.45 pH    pCO2 Arterial 44 35 - 45 mm Hg    pO2 Arterial 74 (L) 80 - 105 mm Hg    Bicarbonate Arterial 26 21 - 28 mmol/L    FIO2 VENT 45%     Oxyhemoglobin Arterial 94 92 - 100 %    Base Excess Art 0.5 mmol/L   ABO/Rh type and screen   Result Value Ref Range    Units Ordered 4     ABO O     RH(D) Pos     Antibody Screen Neg     Test Valid Only At [facility]        Specimen Expires 11/28/2020     Crossmatch Red Blood Cells    Blood component   Result Value Ref Range    Unit Number [id number]     Blood Component Type Red Blood Cells Leukocyte Reduced     Division Number 00     Status of Unit Released to care unit 11/25/2020 0749     Blood Product Code [id number]     Unit Status ISS    Blood  "component   Result Value Ref Range    Unit Number [id number]     Blood Component Type Red Blood Cells LeukoReduced (Part 2)     Division Number 00     Status of Unit Released to care unit 11/25/2020 1246     Blood Product Code [id number]     Unit Status ISS    Blood component   Result Value Ref Range    Unit Number [id number]     Blood Component Type Red Blood Cells Leukocyte Reduced     Division Number 00     Status of Unit Released to care unit 11/25/2020 1346     Blood Product Code [id number]     Unit Status ISS    Blood component   Result Value Ref Range    Unit Number [id number]     Blood Component Type Red Blood Cells LeukoReduced (Part 2)     Division Number 00     Status of Unit Ready for patient 11/25/2020 1344     Blood Product Code [id number]     Unit Status BENITEZ    Gastroenterology IP Consult: Concern for ongoing GI bleed. Hgb drop this AM and worsening pressor needs; Consultant may enter orders: Yes; Patient to be seen: ASAP - within 4 hours; Call back #: ICU; Requested Clinic/Group: [facility]...    Narrative    Yaron Wiley MD     11/25/2020  3:43 PM  Physician Attestation    I, Yaron Wiley, saw and evaluated Attapeu NO JUSTICE Diallo and I have reviewed and discussed with the   advanced practice provider their history, physical and plan.    I personally reviewed the vital signs, medications, labs and   imaging.    Exam:  Vital signs:  Temp: 98.6  F (37  C) Temp src: Bladder BP: 125/79 Pulse: 70     Resp: 16 SpO2: 99 % O2 Device: Mechanical Ventilator Oxygen   Delivery: 50 LPM Height: 154.9 cm (5' 1\") Weight: 47.5 kg (104 lb   11.5 oz)  Estimated body mass index is 19.79 kg/m  as calculated from the   following:    Height as of this encounter: 1.549 m (5' 1\").    Weight as of this encounter: 47.5 kg (104 lb 11.5 oz).      Thank you for allowing me to participate in this patient's   healthcare. Please call any further questions.    Yaron Wiley, " MD, [facility]  [facility]  [phone]        GASTROENTEROLOGY CONSULTATION      Attapeu NO JUSTICE Diallo  [id number] [address] [id number]  55 year old male     Admission Date/Time: 11/12/2020  Primary Care Provider: Rosie Segura [place]     We were asked to see the patient in consultation by Dr. Leos   for evaluation of rectal bleeding.    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 He has been on multiple antibiotics and antifungals   but overall source unclear and he is being followed by infectious   disease. Initial COVID testing has been negative.     We are consulted for a second time this admission regarding GI   bleed. Had melena earlier in hospital stay (11/14) but EGD was   deferred due to his other medical issues including neutropenia   and treated with IV PPI. Melena resolved and hemoglobin   stabilized with transfusion. HGB had dropped to 6.3 from 7.3 at   that time.     He had been receiving tube feeds and tolerating and frequent   brown watery/loose stools prompting a rectal tube that was placed   2 days ago. He has had brown stool up until this morning when it   was noted he had bright red stool on wiping but maroon stool in   rectal tube. No melena. No abdominal distension. Had been   receiving subQ heparin but this has been stopped. Not on any   other blood thinners. 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 He was on minimal   pressor support yesterday but this has increased dramatically   today.     HGB dropped from 8.4 yesterday to 5.9 today. MCV 97. WBC 56.5,   absolute neutrophils 44.1. Platelets elevated at 633. BUN  normal   - 25 with creatinine 1.27.     EGD 3/2019 for iron deficiency anemia showing irregular Z line,   LA grade A esophagitis, non bleeding gastric ulcers without   stigmata of recent bleed, multiple non bleeding duodenal ulcers   without stigmata of recent bleed. Gastric biopsy showed mild   reactive gastropathy but negative H pylori. Duodenal biopsy   normal.     Follow up EGD and colonoscopy 7/2019 through our office showed   grade B esophagitis but healed ulcers. Felt ulcers NSAID induced   based on reports from family at that time. Colonoscopy showed 1   rectal polyp but otherwise normal. GE junction biopsy showed   intestinal metaplasia and rectal polyp consistent with   hamartomatous polyp - Peutz Jehgers type. Repeat colon in 3 years   recommended at that time.     PAST MEDICAL HISTORY:  Patient Active Problem List    Diagnosis Date Noted     FUO (fever of unknown origin) 11/21/2020     Priority: High     HSV (herpes simplex virus) infection 11/21/2020     Priority: High     History of latent tuberculosis 11/21/2020     Priority: High     Acquired immunocompromised state (H) 11/21/2020     Priority: Medium     Therapy for seminoma         Urinary retention 11/12/2020     Priority: Medium     Febrile neutropenia (H) 11/12/2020     Priority: Medium     Hypocalcemia 10/26/2020     Priority: Medium     Sinus tachycardia 10/26/2020     Priority: Medium     Chronic renal impairment, unspecified CKD stage 10/26/2020     Priority: Medium     Sepsis without acute organ dysfunction, due to unspecified   organism (H) 10/26/2020     Priority: Medium     Bilateral hydronephrosis 10/15/2020     Priority: Medium     Added automatically from request for surgery [id number]       Neutropenic fever (H) 10/09/2020     Priority: Medium     Acute cystitis without hematuria 10/09/2020     Priority: Medium     Neutropenia, drug-induced (H) 10/01/2020     Priority: Medium     Chemotherapy-induced neutropenia (H) 10/01/2020      Priority: Medium     Seminoma (H) 09/30/2020     Priority: Medium     ARF (acute renal failure) (H) 09/24/2020     Priority: Medium     YAYA (acute kidney injury) (H) 09/23/2020     Priority: Medium     Added automatically from request for surgery [id number]       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       MEDICATIONS:   Prior to Admission medications    Medication Sig Start Date End Date Taking? Authorizing Provider   allopurinol (ZYLOPRIM) 300 MG tablet Take 1 tablet (300 mg) by   mouth daily 10/6/20  Yes Daniel Su MD   calcium citrate-vitamin D (CITRACAL W/D) 250-100 MG-UNIT tablet   Take 2 tablets by mouth 2 times daily (with meals) 11/9/20 12/9/20 Yes Araceli Riley PA-C   ciprofloxacin (CIPRO) 500 MG tablet Take 1 tablet (500 mg) by   mouth 2 times daily 11/9/20 1/8/21 Yes Araceli Riley PA-C   ferrous sulfate (FEROSUL) 325 (65 Fe) MG tablet Take 1 tablet   (325 mg) by mouth daily (with breakfast) 3/1/19  Yes Meche Solano PA-C   loratadine (CLARITIN) 10 MG tablet Take 10 mg by mouth daily      Yes Unknown, Entered By History   LORazepam (ATIVAN) 0.5 MG tablet Take 1 tablet (0.5 mg) by mouth   every 4 hours as needed (Anxiety, Nausea/Vomiting or Sleep)   11/2/20  Yes Jarvis Ramos MD   multivitamin w/minerals (THERA-VIT-M) tablet Take 1 tablet by   mouth daily   Yes Unknown, Entered By History   prochlorperazine (COMPAZINE) 10 MG tablet Take 1 tablet (10 mg)   by mouth every 6 hours as needed (Nausea/Vomiting) 11/2/20  Yes   Jarvis Ramos MD        ALLERGIES: No Known Allergies     SOCIAL HISTORY:  Social History  "    Tobacco Use     Smoking status: Never Smoker     Smokeless tobacco: Never Used   Substance Use Topics     Alcohol use: Yes     Alcohol/week: 2.0 standard drinks     Types: 1 Glasses of wine, 1 Cans of beer per week     Comment: once every month or two, social functions only     Drug use: No        FAMILY HISTORY:  Family History   Problem Relation Age of Onset     Diabetes Father      Glaucoma No family hx of      Macular Degeneration No family hx of         PHYSICAL EXAM:   /74   Pulse 80   Temp 98.8  F (37.1  C)   Resp 22   Ht   1.549 m (5' 1\")   Wt 47.5 kg (104 lb 11.5 oz)   SpO2 99%   BMI   19.79 kg/m       PHYSICAL EXAM:  General:sedated, on mechanical ventilation  SKIN: no suspicious lesions, rashes, jaundice, or spider angiomas  HEAD: Normocephalic. No masses, lesions, tenderness or   abnormalities  NECK: Neck supple. No adenopathy. Thyroid symmetric, normal size.  EYES: No scleral icterus  ENT: ENT exam normal, no neck nodes or sinus tenderness  RESPIRATORY: negative, Good diaphragmatic excursion. Lungs clear  CARDIOVASCULAR: negative, PMI normal. No lifts, heaves, or   thrills. RRR. No murmurs, clicks gallops or rub  GASTROINTESTINAL: +BS, soft, ND, no HSM, no   masses/guarding/rebound  JOINT/EXTREMITIES: extremities normal- no gross deformities   noted, gait normal and normal muscle tone  NEURO: Reflexes grossly normal and symmetric. Sensation grossly   WNL.  PSYCH: no abnormal anxiety/depression  LYMPH: No anterior cervical, posterior cervical, or   supraclavicular adenopathy    Rectal: rectal tube in place. Maroon stool leaking around tube.   On wiping this appears bright red/liquid/loose.      LABS:  I reviewed the patient's new clinical lab test results.   Recent Labs   Lab Test 11/25/20  0600 11/24/20  1820 11/24/20  0600 11/23/20  0320 11/23/20  0320 11/18/20  0600 11/18/20  0600 11/12/20  1601 11/12/20  1601   WBC 56.5*  --  60.3*  --  58.2*   < > 31.4*   < > 0.1*   HGB 5.9* 8.4* " 8.3*   < > 9.1*   < > 10.9*   < > 7.8*   MCV 97  --  96  --  97   < > 93   < > 91   *  --  465*  --  365   < > 79*   < > 105*   INR  --   --   --   --  1.12  --  1.13  --  1.00    < > = values in this interval not displayed.     Recent Labs   Lab Test 11/25/20  0600 11/24/20  0600 11/23/20  0320 11/22/20  0430   NA  --  140 144 149*   POTASSIUM 5.0 4.1 3.4 3.5   CHLORIDE  --  108 112* 118*   CO2  --  26 29 28   BUN  --  25 21 21   ANIONGAP  --  6 3 3   IVANIA  --  6.3* 7.0* 5.9*     Recent Labs   Lab Test 11/23/20  0320 11/19/20  1127 11/17/20  0456 11/12/20  1620 11/12/20  1620 10/26/20  2240 10/26/20  2240 10/26/20  1803 09/23/20  2112 09/23/20  2112   ALBUMIN 1.4* 1.6* 1.4*   < >  --    < >  --   --    < > 3.7   BILITOTAL 0.3 0.3 0.2   < >  --    < >  --   --    < > 0.2   ALT 24 19 23   < >  --    < >  --   --    < > 21   AST 48* 47* 18   < >  --    < >  --   --    < > 40   ALKPHOS 533* 385* 207*   < >  --    < >  --   --    < > 528*   PROTEIN  --   --   --   --  50*  --  20* 30*   < >  --    LIPASE  --   --   --   --   --   --   --   --   --  115    < > = values in this interval not displayed.        CONSULTATION ASSESSMENT AND PLAN:    55 year old male with past medical history developmental delay,   metastatic seminoma on chemotherapy, latent TB, admitted 11/12   with neutropenic fever after receiving chemotherapy and   subsequently has been admitted to the ICU with respiratory   failure requiring mechanical ventilation. He has been on multiple   antibiotics and antifungals but overall source unclear and he is   being followed by infectious disease. Initial COVID testing has   been negative (most recent 11/18). Developed melena 11/14 but EGD   was deferred, now with recurrent bleeding maroon/bright red stool   starting this am with drop in hemoglobin from 8.4 to 5.9 and   requiring increased pressor support.     1. GI bleed. Unclear source. Has a history of gastric and   duodenal ulcers in 3/2019 felt to  be NSAID induced. Gastric   biopsy negative for H pylori or dysplasia. Follow up   EGD/colonoscopy 7/2019 showed healed ulcers after PPI therapy.   Patient has been on IV PPI BID since initial signs of GI bleed   earlier in stay. Receiving PRBC transfusion now.   --EGD in ICU today.   --If EGD is negative for source, then recommend tagged RBC scan   to help localize bleeding and possibly CT angio/IR involvement   for embolization if needed.   --Monitor HGB and transfuse prn.   --Continue IV PPI BID.    Reviewed with Dr. Wiley.     Thank you for asking us to participate in the care of this   patient.    Yazmin Wilcox, [facility])           Glucose by meter   Result Value Ref Range    Glucose 114 (H) 70 - 99 mg/dL   Blood gas arterial with oxyhemoglobin   Result Value Ref Range    pH Arterial 7.43 7.35 - 7.45 pH    pCO2 Arterial 41 35 - 45 mm Hg    pO2 Arterial 51 (L) 80 - 105 mm Hg    Bicarbonate Arterial 27 21 - 28 mmol/L    FIO2 45% VENT     Oxyhemoglobin Arterial 87 (L) 92 - 100 %    Base Excess Art 2.5 mmol/L   UPPER GI ENDOSCOPY   Result Value Ref Range    Upper GI Endoscopy       [facility]  _______________________________________________________________________________  Patient Name: Luz Marina Morales mi Souphanthavong Procedure Date: 11/25/2020 10:51 AM  MRN: [id number]                       Account Number: [id number]  YOB: 1965              Admit Type: Inpatient  Age: 55                               Gender: Male  Attending MD: Yaron Wiley MD Total Sedation Time: minutes of   continous bedside 1:1  Instrument Name: 205 - Gastroscope      _______________________________________________________________________________     Procedure:                Upper GI endoscopy  Indications:              Suspected upper gastrointestinal bleeding  Providers:                Yaron Wiley MD (Doctor)  Referring MD:               Medicines:                 No additional agents used as patient is sedated and                             intubated  Complications:            No immediate complications. Estimated blood loss:                              Minimal.  _______________________________________________________________________________  Procedure:                Pre-Anesthesia Assessment:                            - Prior to the procedure, a History and Physical                             was performed, and patient medications and                             allergies were reviewed. The patient is unable to                             give consent secondary to the patient's altered                             mental status. The risks and benefits of the                             procedure and the sedation options and risks were                             discussed with the patient's sister. All questions                             were answered and informed consent was obtained.                             Patient identification and proposed procedure were                             verified by the physician and the nurse in the                             pre-procedure area. Mental Status Examinati on:                             sedated. Airway Examination: orotracheal                             intubation. Respiratory Examination: clear to                             auscultation. CV Examination: normal. ASA Grade                             Assessment: IV - A patient with severe systemic                             disease that is a constant threat to life. After                             reviewing the risks and benefits, the patient was                             deemed in satisfactory condition to undergo the                             procedure. The anesthesia plan was to use no                             sedation or anesthesia. Immediately prior to                             administration of medications, the patient was                              re-assessed for adequacy to receive sedatives. The                             heart rate, respiratory rate, oxygen saturations,                             blood pressure, adequacy of pulmonary ventilation,                              and response to care were monitored throughout the                             procedure. The physical status of the patient was                             re-assessed after the procedure.                            - Immediately prior to administration of                             medications, the patient was re-assessed for                             adequacy to receive sedatives.                            After obtaining informed consent, the endoscope was                             passed under direct vision. Throughout the                             procedure, the patient's blood pressure, pulse, and                             oxygen saturations were monitored continuously. The                             Olympus Gastroscope, Model # GIF-H190, [place] #                             [id number], SN #[id number] was introduced through the mouth,                             and advanced to the second part of duodenum. The                             upper GI endoscopy was ac complished without                             difficulty. The patient tolerated the procedure                             well.                                                                                   Findings:       The Z-line was regular and was found 37 cm from the incisors.       The entire examined stomach was normal.       One non-obstructing oozing duodenal ulcer with adherent clot was found        in the second portion of the duodenum. The lesion was 5 mm in largest        dimension. The ulcer base itself is not seen. However, there is a large        clot as noted over the ulcer with oozing. The areas washed off        extensively and all other blood and clot removed. No other  bleeding is        seen in the duodenum or duodenal bulb. This lesion is located directly        opposite the major papilla on the posterior wall consistent with an        ulcer over the area of the gastroduodenal artery. The area was sprayed        with hemospray x5 sprays with good cessation o f bleeding. No further        oozing was noted. At that point 3 hemoclips were placed and no further        oozing was noted.       The exam of the duodenum was otherwise normal.                                                                                   Impression:               - Z-line regular, 37 cm from the incisors.                            - Normal stomach.                            - Non-obstructing oozing duodenal ulcer with                             adherent clot. There is no evidence of perforation.                             This is over the area of the gastroduodeal artery.                             S/P hemospray and clip x 3.                            - No specimens collected.  Recommendation:           - Return patient to ICU for ongoing care.                            - PPI drip after 80 mg bolus.                            - Monitor HH.                            - Transfuse blood product as needed.                            - If rebleeds, recommend IR  evaluation with                             embolization. Zay that further endoscopic                             attempts to control bleeding would be futile.                            - Would not replace NGT at this time.                            - Discussed with hospitalist and intesivest teams.                            - Please call with any questions.                                                                                       _____________________________  Yaron Wiley MD  11/25/2020 2:13:05 PM  I was physically present for the entire viewing portion of the exam.  __________________________Yaron  MD Saurabh  Number of Addenda: 0    Note Initiated On: 11/25/2020 10:51 AM  MRN:                      [id number]  Procedure Date:           11/25/2020 10:51:14 AM  Total Procedure Duration: 0 hours 25 minutes 33 seconds   Estimated Blood Loss:       Scope In: 12:57:19 PM  Scope Out: 1:22:52 PM     Hemoglobin   Result Value Ref Range    Hemoglobin 5.1 (LL) 13.3 - 17.7 g/dL   Glucose by meter   Result Value Ref Range    Glucose 121 (H) 70 - 99 mg/dL   Hemoglobin   Result Value Ref Range    Hemoglobin 8.5 (L) 13.3 - 17.7 g/dL

## 2020-11-25 NOTE — PROGRESS NOTES
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     Initially on 11/12, patient was admitted for neutropenic fever.  He had received chemotherapy 3 days prior to admission with bleomycin, etoposide, and platinum.  He was started on broad-spectrum antibiotics with cefepime and vancomycin.  Infectious disease and oncology were consulted.  Was also treated for pancytopenia due to chemotherapy.  Received Neulasta x3 on 11/13 through 15.  He developed mucositis which was initially treated with topical medications but ultimately required TPN.  Also had hypocalcemia issues requiring replacement.  He developed bright red blood per rectum and GI was consulted.  Recommended Protonix but holding off endoscopy.  Acyclovir and fluconazole were added given the mucositis.  Patient required multiple additional blood transfusions.     11/18 he developed worsening cough and tachycardia.  CT of the chest showed patchy and confluent groundglass opacities suggestive of multifocal pneumonia.  COVID-19 test was repeated and negative again.  Patient has been having ongoing fevers but they seem to get worse around the time as well.  On 11/19 his WBC count was noted to increase to 52 whereas it has been very low before that.  Antibiotics were broadened to include doxycycline, meropenem and voriconazole.  Pulmonary consulted on 11/20.  Concern for bleomycin toxicity.  Recommended bronchoscopy if possible.  In the evening of 11/20-21, patient's respiratory status worsened despite BiPAP and he was endotracheally intubated. Patient underwent bronchoscopy in the ICU on 11/21.  Bronchoscopy returned positive for CMV, though this is unlikely to explain all of his issues. On 11/23, he had worsening oxygenation and was paralyzed and placed in prone position.     Today, patient remains critically ill and continues on broad spectrum antimicrobials, norepinephrine and vasopressin for blood pressure, and on inhaled epoprostenol/paralyzed and prone for ARDS. White count persistently elevated at 56. Appreciate ongoing ID and heme/onc involvement.      Bilateral patchy groundglass opacities  Patient was admitted on 11/12 without significant respiratory symptoms and CXR showed low lung volumes with mild bilateral basilar atelectasis versus infiltrate.  As outlined above, his respiratory status progressively worsened during the hospitalization and he required intubation on 11/21. CT angiogram of the chest did not show any PE 11/18.  Did show the bilateral groundglass multifocal opacities concerning for pneumonia.  - Earlier in hospital stay patient was on Acyclovir, fluconazole, Cefepime and vancomycin. On 11/18 started on doxycycline, meropenem and voriconazole.  - Bronchoscopy done on 11/21  - Workup so far includes negative aspergillus, beta D glucan, influenza antigen, RVP, sputum cultures  - Repeat CT scan on 11/22 showed no PE, confirmed severe GGO bilaterally  - Pt has + Quantiferon gold, has not been treated for this.  - Pulmonology, oncology, and ID consulted.  - Current etiology unclear. Oncology feels the timing does not fit with Bleomycin toxicity and that seminoma would be very unlikely to cause lymphangitic spread that would present like this.   - CMV was found in the bronch. Per ID, very rare for CMV to cause pneumonitis outside of BMT or transplant populations, but reasonable to treat this.   - Continue antimicrobials of              Acyclovir 11/14 (for mucositis)              Doxycycline 11/18 -               Meropenem 11/18              Gancyclovir 11/22 -               Voriconazole  11/18 -  - started empiric SoluMedrol 40 mg daily on 11/23     Acute hypoxic respiratory failure  ARDS  - Due to underlying process above, PE ruled out   - Continue lung protective ventilation per intensivist  - Intubated 11/21  - Inhaled epoprostenol started 11/23  - fentanyl/propofol for sedation  - Worsening oxygenation afternoon of 11/23, patient was paralyzed and pronated     Hypotension/septic shock  - Sedation vs infection related  - Norepinephrine available as needed to maintain MAP > 65.  - Continue norepinephrine and vasopressin     Hyperglycemia  - Due to steroids and infection, was started on insulin drip 11/23-24  - Switch to basal/bolus regimen with 40 of Lantus daily, medium dose SSi and 6 units aspart q4h     Leukocytosis  - White count has been very elevated in the 60-50 range after filgrastim doses.  Monitor daily.  - persistently elevated in 50-60 range  - Left shift seen on differential--reactive vs related to Filgrastim     Hypocalcemia  - Has been an ongoing issue during hospitalization  - Replace as needed.     Seminoma  - This was initially diagnosed in September 2020.  Started chemotherapy while hospitalized in October.  Has had issues with admissions for neutropenic fever and urosepsis since that time.  Per oncology, patient has locally advanced disease which has been responding to curative intent chemotherapy.  - Outpt chemo regimen was bleomycin, etoposide and carboplatin.  - As outlined above, oncology feels that his current lung filtrates more likely infectious and related to his cancer, which is improving, or his chemotherapy, which should not be at this point sufficient to cause the lung toxicity.     ABL anemia related to GI bleed.  Thrombocytopenia  - Hb has ranged anywhere from as low as 5 to as high 11 during the hospitalization.  A combination of blood loss from GI tract which is likely related to mucositis as well as chemotherapy induced pancytopenia.  - Has received multiple  blood transfusions during hospitalization  - Hb this am is 5.9. will stop TF's, IV PPI bid and I have notified GI.  - Transfuse PRBC's and monitor H/H.       Diet: NPO for Medical/Clinical Reasons Except for: No Exceptions  Adult Formula Drip Feeding: Continuous Other; Replete without fiber; Nasoduodenal tube; Goal Rate: 60; mL/hr; Medication - Feeding Tube Flush Frequency: At least 15-30 mL water before and after medication administration and with tube clogging; Upd...    DVT Prophylaxis: Pneumatic Compression Devices  Palafox Catheter: in place, indication: Strict 1-2 Hour I&O  Code Status: Full Code           Disposition Plan   Expected discharge: 4 - 7 days, recommended to transitional care unit once SIRS/Sepsis treated.  Entered: Regina Leos MD 11/25/2020, 9:16 AM       The patient's care was discussed with the Bedside Nurse.    Regina Leos MD  Hospitalist Service  [facility]  Contact information available via [facility] Paging/Directory    ______________________________________________________________________    Interval History     Intubated. + bloody stools.    Data reviewed today: I reviewed all medications, new labs and imaging results over the last 24 hours. I personally reviewed no images or EKG's today.    Physical Exam   Vital Signs: Temp: 98.8  F (37.1  C) Temp src: Bladder BP: 95/62 Pulse: 84   Resp: 22 SpO2: 100 % O2 Device: Mechanical Ventilator    Weight: 104 lbs 11.5 oz    Gen - Intubated and sedated.  Lungs - CTA B.  Heart - RR,S1+S2 nml, no m/g/r.  Abd - soft, NT, ND, + BS.  Ext - no edema.    Data   Recent Labs   Lab 11/25/20  0600 11/24/20  1820 11/24/20  0600 11/23/20  0320 11/23/20  0320 11/22/20  0430 11/22/20  0430 11/19/20  1127 11/19/20  1127 11/18/20  1210 11/18/20  1210   WBC 56.5*  --  60.3*  --  58.2*  --  72.7*   < > 52.4*   < >  --    HGB 5.9* 8.4* 8.3*   < > 9.1*   < > 9.8*   < > 11.5*   < >  --    MCV 97  --  96  --  97  --  94   < > 91   < >  --    PLT  633*  --  465*  --  365  --  234   < > 88*   < >  --    INR  --   --   --   --  1.12  --   --   --   --   --   --    NA  --   --  140  --  144  --  149*   < >  --    < >  --    POTASSIUM 5.0  --  4.1  --  3.4  --  3.5   < >  --    < >  --    CHLORIDE  --   --  108  --  112*  --  118*   < >  --    < >  --    CO2  --   --  26  --  29  --  28   < >  --    < >  --    BUN  --   --  25  --  21  --  21   < >  --    < >  --    CR  --   --  1.27*  --  1.57*  --  1.40*   < >  --    < >  --    ANIONGAP  --   --  6  --  3  --  3   < >  --    < >  --    IVANIA  --   --  6.3*  --  7.0*  --  5.9*   < >  --    < >  --    GLC  --   --  184*  --  129*  --  134*   < >  --    < > 152*   ALBUMIN  --   --   --   --  1.4*  --   --   --  1.6*  --   --    PROTTOTAL  --   --   --   --  4.8*  --   --   --  5.4*  --   --    BILITOTAL  --   --   --   --  0.3  --   --   --  0.3  --   --    ALKPHOS  --   --   --   --  533*  --   --   --  385*  --   --    ALT  --   --   --   --  24  --   --   --  19  --   --    AST  --   --   --   --  48*  --   --   --  47*  --   --    TROPI  --   --   --   --   --   --   --   --   --   --  0.018    < > = values in this interval not displayed.     No results found for this or any previous visit (from the past 24 hour(s)).  Medications     dextrose       dextrose       epoprostenol (VELETRI) 20 mcg/mL in sterile water inhalation solution 20 ng/kg/min (11/25/20 0225)     fentaNYL 100 mcg/hr (11/25/20 0800)     midazolam 6 mg/hr (11/25/20 0800)     norepinephrine 0.35 mcg/kg/min (11/25/20 0915)     propofol (DIPRIVAN) infusion 45 mcg/kg/min (11/25/20 0908)     vasopressin 2.4 Units/hr (11/25/20 0835)     vecuronium (NORCURON) infusion ADULT Stopped (11/25/20 0835)       [Held by provider] acetylcysteine  4 mL Nebulization Q6H     [Held by provider] albuterol  2.5 mg Nebulization Q6H     allopurinol  300 mg Per Feeding Tube Daily     artificial tears   Both Eyes Q8H     calcium carbonate  2,500 mg Per Feeding Tube BID      doxycycline (VIBRAMYCIN) IV  100 mg Intravenous Q12H     ganciclovir (CYTOVENE) intermittent infusion  2.5 mg/kg Intravenous Q24H     [Held by provider] heparin ANTICOAGULANT  5,000 Units Subcutaneous Q12H     insulin aspart  2 Units Subcutaneous Q4H     insulin aspart  1-6 Units Subcutaneous Q4H     insulin glargine  25 Units Subcutaneous QAM     lidocaine  5 mL Topical Once     meropenem  1 g Intravenous Q12H     methylPREDNISolone  40 mg Intravenous Q24H     pantoprazole  40 mg Per Feeding Tube BID     sodium chloride (PF)  10 mL Intracatheter Q7 Days     sodium chloride (PF)  3 mL Intracatheter Q8H     voriconazole  200 mg Per Feeding Tube Q12H DANK

## 2020-11-25 NOTE — PROCEDURES
[facility]    Procedure: Visceral angiogram.     Date/Time: 11/25/2020 5:24 PM  Performed by: Nguyen Grande DO  Authorized by: Nguyen Grande DO     UNIVERSAL PROTOCOL   Site Marked: NA  Prior Images Obtained and Reviewed:  Yes  Required items: Required blood products, implants, devices and special equipment available    Patient identity confirmed:  Verbally with patient, arm band, provided demographic data and hospital-assigned identification number  Patient was reevaluated immediately before administering moderate or deep sedation or anesthesia  Confirmation Checklist:  Patient's identity using two indicators, relevant allergies, procedure was appropriate and matched the consent or emergent situation and correct equipment/implants were available  Time out: Immediately prior to the procedure a time out was called    Universal Protocol: the Joint Commission Universal Protocol was followed    Preparation: Patient was prepped and draped in usual sterile fashion           ANESTHESIA    Anesthesia: Local infiltration  Local Anesthetic:  Lidocaine 1% without epinephrine      SEDATION    Patient Sedated: No    See dictated procedure note for full details.  Findings: Visceral angiogram.     Coil embolization of a branch off of the GDA going to the area of the prior endoscopy clips.     Coil embolization of a branch off of the SMA going to the area of the prior endoscopy clips. This branch had an early bifurcation, both branchs of this artery were embolized. During embolization the endoscopy clips did move, suggesting this was the area of patient active bleeding.     Specimens: none    Complications: None    Condition: Stable    PROCEDURE   Patient Tolerance:  Patient tolerated the procedure well with no immediate complications    Length of time physician/provider present for 1:1 monitoring during sedation: 0

## 2020-11-25 NOTE — OR NURSING
Endo to patient bedside for EGD for bleeding.  Intubated, on IV sedation per ICU.   VSS throughout procedure. Hemospray and clips x3 used.  Bedside nurse present throughout procedure.

## 2020-11-25 NOTE — PLAN OF CARE
ICU End of Shift Summary.  For vital signs and complete assessments, please see documentation flowsheets.     Pertinent assessments: Pt's BP very sensitive to turns,cares etc,takes a while for him to recover,needed to increase Levo to keep MAP >65,currently at 0.22. Remains paralyzed and sedated with propofol at 45mcgs and versed at 6mgs/hr, Increased urine output this shift .Tolerating tube feed at goal.  Major Shift Events: Left supine this shift after tele hub reviewed 0200 ABG  Plan (Upcoming Events): Wean pressors as able  Discharge/Transfer Needs: TBD    Bedside Shift Report Completed : yes  Bedside Safety Check Completed:yes

## 2020-11-25 NOTE — PROGRESS NOTES
Oncology:     Chart check: Seminoma, currently on curative intent chemotherapy with BEP, last given on 11/9.  Admitted with respiratory failure, currently vented, Covid negative.  Bronchoscopy cultures so far positive for CMV.  Currently on antibiotics, antiviral, antifungals and steroids for possible bleomycin toxicity.  Hemoglobin dropped to 5.1, PRBCs given, GI re-consulted and plan for EGD in the ICU today.  -No new oncology recommendations.  Please call us with any questions.      Araceli Pacheco PA-C  Hematology/Oncology  [facility]

## 2020-11-25 NOTE — PROGRESS NOTES
[facility]  Infectious Disease Progress Note          Assessment and Plan:   IMPRESSION:   1.  A 55-year-old male, well known to me from recent admission, now admitted with acute fever and profound neutropenia from chemotherapy, no clear focal infection, mild respiratory symptoms and recent urosepsis as possible sources, but unclear.   2.  Seminoma with recent chemotherapy, now profoundly neutropenic.   3.  Recent episode of urosepsis with Klebsiella and then subsequent episode of sepsis resolved with oral Cipro without clearcut infection being found.   4.  Latent tuberculosis, recent new diagnosis.  No evidence of active tuberculosis.   5.  COVID-19 rule out, negative times several.   6.  Slight hypoxia, minimal chest x-ray abnormality.   7.  Renal insufficiency.   8 Severe stomatitis, ? Chemo vs HSV  9 GI bleeding     RECOMMENDATIONS:   1.    Despite marrow recovery and white cells rising(very high with marrow recovery and growth factors not a marker of bacterial infection), major worsening primarily respiratory and fever.  CT scan  new worsening infiltrates.  Large differential diagnosis here, essentially neutropenic related potential pneumonia, conceivably underlying disease, multiple possibilities large differential diagnosis incl Bleo toxicity  BAL wo new info    2 On  meropenem, doxycycline,  vori already now ganciclovir Vori IV borderline creat but Ok for short time  3  fungal serologies and labs neg incl gal neg, lung other things still concern for potential TB in the background, imaging not typical  4 repeat COVID-19 testing ,  this is unlikely to be the diagnosis, neg, still airborne for sl TB potential  5 Now ganciclovir with BAL + CMV but very unlikiely this is a true lung infection or anything more than clinically insig reactivation no need acyclovir as ganciclovir covers HSV well    6  On  steroids as bleo toxicity is  in ddx,  BAL studies all so far neg incl cytology  Not  "likely PJP as fungitel neg an now neg  cytology             Interval History:   iICU high flow FIO2  60% not betterreviewed all BAL pending, fungitel 0, no + cxs except BAL CMV, by serology is reactivation GI bleeding              Medications:       [Held by provider] acetylcysteine  4 mL Nebulization Q6H     [Held by provider] albuterol  2.5 mg Nebulization Q6H     allopurinol  300 mg Per Feeding Tube Daily     artificial tears   Both Eyes Q8H     calcium carbonate  2,500 mg Per Feeding Tube BID     doxycycline (VIBRAMYCIN) IV  100 mg Intravenous Q12H     fentaNYL  50 mcg Intravenous Once within 24 hrs     furosemide  40 mg Intravenous Once     ganciclovir (CYTOVENE) intermittent infusion  2.5 mg/kg Intravenous Q24H     [Held by provider] heparin ANTICOAGULANT  5,000 Units Subcutaneous Q12H     insulin aspart  2 Units Subcutaneous Q4H     insulin aspart  1-6 Units Subcutaneous Q4H     insulin glargine  25 Units Subcutaneous QAM     lidocaine  5 mL Topical Once     meropenem  1 g Intravenous Q12H     methylPREDNISolone  40 mg Intravenous Q24H     midazolam  2 mg Intravenous Once within 24 hrs     sodium chloride (PF)  10 mL Intracatheter Q8H     sodium chloride (PF)  10 mL Intracatheter Q7 Days     sodium chloride (PF)  3 mL Intracatheter Q8H     sodium chloride (PF)  3 mL Intracatheter Q8H     sodium chloride (PF)  3 mL Intracatheter Q8H     voriconazole  4 mg/kg Intravenous Q12H                  Physical Exam:   Blood pressure (!) 147/92, pulse 57, temperature 99  F (37.2  C), resp. rate 14, height 1.549 m (5' 1\"), weight 47.5 kg (104 lb 11.5 oz), SpO2 95 %.  Wt Readings from Last 2 Encounters:   11/24/20 47.5 kg (104 lb 11.5 oz)   11/09/20 46.3 kg (102 lb)     Vital Signs with Ranges  Temp:  [97  F (36.1  C)-99.1  F (37.3  C)] 99  F (37.2  C)  Pulse:  [57-90] 57  Resp:  [14-27] 14  BP: ()/(38-96) 147/92  MAP:  [58 mmHg-116 mmHg] 116 mmHg  Arterial Line BP: ()/(43-90) 149/81  FiO2 (%):  [45 %-100 %] 60 " %  SpO2:  [83 %-100 %] 95 %    Constitutional: Intubated sedated ICU   Lungs: Clear to auscultation bilaterally, no crackles or wheezing   Cardiovascular: Regular rate and rhythm, normal S1 and S2, and no murmur noted   Abdomen: Normal bowel sounds, soft, non-distended, non-tender   Skin: No rashes, no cyanosis, no edema   Other:           Data:   All microbiology laboratory data reviewed.  Recent Labs   Lab Test 11/25/20  1200 11/25/20  0600 11/24/20  1820 11/24/20  0600 11/23/20  0320 11/23/20  0320   WBC  --  56.5*  --  60.3*  --  58.2*   HGB 5.1* 5.9* 8.4* 8.3*   < > 9.1*   HCT  --  18.7*  --  25.4*  --  28.9*   MCV  --  97  --  96  --  97   PLT  --  633*  --  465*  --  365    < > = values in this interval not displayed.     Recent Labs   Lab Test 11/24/20  0600 11/23/20  0320 11/22/20  0430   CR 1.27* 1.57* 1.40*     Recent Labs   Lab Test 11/19/20  0655   SED 18     Recent Labs   Lab Test 11/23/20  0555 11/23/20  0403 11/23/20  0326 11/22/20  1140 11/21/20  1259 11/21/20  0642 11/17/20  1108 11/17/20  1104 11/12/20  1631   CULT Culture received and in progress.  Positive AFB results are called as soon as detected.    Final report to follow in 7 to 8 weeks.    Assayed at [facility] [address] [facility] [place] [address] [phone] No growth after 2 days No growth after 2 days Culture received and in progress.  Positive AFB results are called as soon as detected.    Final report to follow in 7 to 8 weeks.    Assayed at [facility] [address] [place], [place] [id number] [phone]  No growth No growth after 4 days  Culture negative after 4 days  Culture received and in progress.  Positive AFB results are called as soon as detected.    Final report to follow in 7 to 8 weeks.    Assayed at [facility] [address] [facility] [place] [address] [phone]  Culture in progress  No growth  Culture negative monitoring continues  Canceled, Test credited  Duplicate request   Culture received  and in progress.  Positive AFB results are called as soon as detected.    Final report to follow in 7 to 8 weeks.    Assayed at [facility] [address] [place] [address] [phone] No growth No growth No growth

## 2020-11-25 NOTE — PROVIDER NOTIFICATION
Ventilation Mode: CMV/AC  (Continuous Mandatory Ventilation/ Assist Control)  FiO2 (%): 45 %  Rate Set (breaths/minute): 22 breaths/min  Tidal Volume Set (mL): 360 mL  PEEP (cm H2O): (S) 16 cmH2O (found at +14 and switched to +16 as in recent order)  Oxygen Concentration (%): 45 %  Resp: 22 11/24/20 1954   Ventilator - Patient    Patient Resp Rate  22 breaths/min   Inspiratory Pressure (cm H2O) 42 cmH2O   Mean Airway Pressure (cm H2O) 22 cmH2O   Expiratory Vt  mL 359   Minute Volume L/min 7.94 L/min   Additional Vent Settings   Plateau Pressure (cm H2O) 40 cmH2O   PEEPi (cm H2O) 0.8 cm           Juan Harrington, RT

## 2020-11-25 NOTE — PROGRESS NOTES
"[facility] ICU VENTILATOR RESPIRATORY NOTE  Date of Admission: 11/12/2020  Date of Intubation (most recent): 11/21/2020  Reason for Mechanical Ventilation: Respiratory Failure  Number of Days on Mechanical Ventilation: 5  Met Criteria for Pressure Support Trial: No  Length of Pressure Support Trial:   Reason for Stopping Pressure Support Trial:   Reason for No Pressure Support Trial: High PEEP, on Veletri   Significant Events Today: Veletri continues to run  ,ABG Results: 743/41/51/27 @0935 11/25.  ETT appearance on chest x-ray: 2 cm above [place]     Plan:  Will continue to monitor and assess the pt's current respiratory status and needs, in hopes of liberating him from the ventilator    Ventilation Mode: CMV/AC  (Continuous Mandatory Ventilation/ Assist Control)  FiO2 (%): 60 %  Rate Set (breaths/minute): 22 breaths/min  Tidal Volume Set (mL): 360 mL  PEEP (cm H2O): 14 cmH2O  Oxygen Concentration (%): 60 %  Resp: 14    Vital signs:  Temp: 98.6  F (37  C) Temp src: Bladder BP: 114/64 Pulse: 64   Resp: 14 SpO2: 100 % O2 Device: Mechanical Ventilator Oxygen Delivery: 50 LPM Height: 154.9 cm (5' 1\") Weight: 47.5 kg (104 lb 11.5 oz)  Estimated body mass index is 19.79 kg/m  as calculated from the following:    Height as of this encounter: 1.549 m (5' 1\").    Weight as of this encounter: 47.5 kg (104 lb 11.5 oz).    Recent Labs   Lab 11/25/20  0935 11/25/20  0600 11/25/20  0200 11/24/20 2040   PH 7.43 7.38 7.37 7.37   PCO2 41 44 45 45   PO2 51* 74* 68* 76*   HCO3 27 26 26 26   O2PER 45% VENT VENT 45% 45% Ventilator     Past Medical History:   Diagnosis Date     Acquired immunocompromised state (H) 11/21/2020    Therapy for seminoma      History of latent tuberculosis 11/21/2020     Mental retardation        Past Surgical History:   Procedure Laterality Date     ABDOMEN SURGERY      sister states he had an \"abdominal surgery 20 years ago\" unknown      BRONCHOSCOPY (RIGID OR FLEXIBLE), DIAGNOSTIC N/A 11/21/2020    Procedure: " BRONCHOSCOPY, WITH BRONCHOALVEOLAR LAVAGE;  Surgeon: Mayra Freitas MD;  Location:  GI     CYSTOSCOPY, RETROGRADES, INSERT STENT URETER(S), COMBINED Bilateral 9/24/2020    Procedure: Cystoscopy with bilateral retrograde pyelogram and bilateral ureteral stent insertiona-complex due to severe bilateral hydroureteronephrosis with ureteral tortuosity, examination under anesthesia and fluoroscopic interpretation > 1 hour physician time;  Surgeon: Ralf Chan MD;  Location:  OR     ESOPHAGOSCOPY, GASTROSCOPY, DUODENOSCOPY (EGD), COMBINED N/A 3/1/2019    Procedure: ESOPHAGOSCOPY, GASTROSCOPY, DUODENOSCOPY (EGD) Rm 226;  Surgeon: Susy Camara MD;  Location:  GI       Family History   Problem Relation Age of Onset     Diabetes Father      Glaucoma No family hx of      Macular Degeneration No family hx of        Social History     Tobacco Use     Smoking status: Never Smoker     Smokeless tobacco: Never Used   Substance Use Topics     Alcohol use: Yes     Alcohol/week: 2.0 standard drinks     Types: 1 Glasses of wine, 1 Cans of beer per week     Comment: once every month or two, social functions only     Liam ROSA  [facility]  11/25/2020

## 2020-11-25 NOTE — PROGRESS NOTES
[facility] Critical Care Progress Note                          11/25/2020    Name: Luz Marina Diallo MRN#: [id number]   Age: 55 year old YOB: 1965     Hsptl Day# 13  ICU DAY #3    MV DAY #5             Problem List:   Active Problems:    FUO (fever of unknown origin)    HSV (herpes simplex virus) infection    History of latent tuberculosis    Urinary retention    Febrile neutropenia (H)    Acquired immunocompromised state (H)           Summary/Hospital Course:     55 year old male with a history of a metastatic seminoma s/p chemotherapy who presents with fevers, increased wbc ongoing concern for infection vs bleomycin associated respiratory dysfunction who was admitted to the ICU on 11/21/2020 for management of hypoxic respiratory failure requiring intubation.    11/23: Worsening hypoxemia overnight with worsening secretions.  PEEP increased to 16 and FiO2 now 100%, P/F ratio around 100.     11/24: Required initiation of prone ventilation overnight.  Now deeply sedated, paralyzed and on Veletri.     11/25: Noted to have bright red blood per rectum this morning.  EGD showed large duodenal vessel that was hemosprayed and clipped.      Assessment and plan :     Luz Marina Diallo IS a 55 year old male with a history of a metastatic seminoma s/p chemotherapy who presents with fevers, increased wbc ongoing concern for infection vs bleomycin associated respiratory dysfunction who was admitted to the ICU on 11/21/2020 for management of hypoxic respiratory failure requiring intubation.    I have personally reviewed the daily labs, imaging studies, cultures and discussed the case with referring physician and consulting physicians.     My assessment and plan by system for this patient is as follows:    CNS:   Sedation/ pain:   -RASS goal -4 to -5   -Continue versed, fentanyl, and propofol    Pulmonary:   Hypoxemic respiratory failure: Requiring mechanical ventilation (day 5).  Had  respiratory decompensation 11/23 which required significant increase in ventilator support as well as initiation of prone ventilation.  Currently heavily sedated, paralyzed and on Veletri.  Oxygenation still marginal but was not prone overnight.  This a.m. oxygenation still quite concerning.  P/F ratio remains less than 150. Etiology not entirely clear but was initiated on steroids for concern of bleomycin toxicity on 11/23.  Likely will need prone ventilation again this afternoon.  PEEP is now 14 and FiO2 is 45%.  -We will follow gas this afternoon but likely will need prone ventilation again overnight  -Continue current antimicrobials (appreciate IDs assistance  -Continue methylprednisolone 40 mg IV daily for bleomycin toxicity  -Follow bronchoscopy cultures, so far just CMV positive    CV:   Shock: Worsened overnight in the setting of new GI bleed.  Remains on both Levophed and vasopressin.  -Now on Levophed and vasopressin  -Packed cells for resuscitation    Tachycardia - likely related to distress, work of breathing, anxiety and hypovolemia  - has now improved  increase sedation    FEN/GI:   GI bleed: Drop in hemoglobin this morning and bright red blood per rectum.  GI consulted and subsequent EGD showed large bleeding vessel in the duodenum.  Vessel was hemosprayed and clipped.  Case discussed with interventional radiology who will plan to do embolization of the likely inferior duodenal artery prophylactically to prevent rebleeding.  -Follow hemoglobin  -Plan for IR procedure this afternoon  -PPI gtt    Mucositis - can use magic mouthwash to wipe inside of mouth for comfort    : adequate uop, no concerns reguarding renal function  Hypernatremia: 140 this morning  -Continue 160 ml free water Q 4 hr    Heme/onc:   Hx of metastatic seminoma s/p BEP therapy. Patient now with concern of bleomycin related lung toxicity or other pulmonary infection. Patient did receive neupogen early on in his course.  -Continue  methylprednisolone 40 mg IV daily (started 11/23)    Msk: no acute skin issues    Endo: Q4H BG checks    ID:   unclear cause of possible infection with very high WBC. c dif checked x2 with negative result.  Bronchoscopy showed positive CMV, thus far remaining studies are negative  - continue broad antibiotics with aid of ID.   - Quanterferon gold test resulted positive. AFP smear from BAL is pending  - Continue steroids  - Continue meropenem and voriconazole  - Continue ganicyclovir per ID    IV/Access:   1. Venous access - PICC  2. Arterial access - Right femoral eric     Plan  - central access required and necessary    ICU Prophylaxis:   1. DVT: Hep subcutaneous  2. VAP: HOB 30 degrees, chlorhexidine rinse  3. Stress Ulcer: PPI gtt  4. Restraints: Nonviolent soft two point restraints required and necessary for patient safety and continued cares and good effect as patient continues to pull at necessary lines, tubes despite education and distraction. Will readdress daily.   5. Wound care - per unit routine   6. Feeding - tube feeds   7. Family Update: Yes, sister by phone  8. Disposition - ICU    Key goals for next 24 hours:   1.  IR angio with embolization   2.  Continue antimicrobial per ID  3.  Continue methylprednisolone 40 mg IV daily  4.  Likely to need prone ventilation again this evening  5.  Palliative care consult         Interim History/Overnight Events:     Remains supine but oxygenation still quite poor this morning.  New bright red blood per rectum this a.m. with drop in hemoglobin and increased pressor needs.  EGD at bedside showed large duodenal vessel actively bleeding.         Key Medications:       [Held by provider] acetylcysteine  4 mL Nebulization Q6H     [Held by provider] albuterol  2.5 mg Nebulization Q6H     allopurinol  300 mg Per Feeding Tube Daily     artificial tears   Both Eyes Q8H     calcium carbonate  2,500 mg Per Feeding Tube BID     doxycycline (VIBRAMYCIN) IV  100 mg Intravenous  Q12H     ganciclovir (CYTOVENE) intermittent infusion  2.5 mg/kg Intravenous Q24H     [Held by provider] heparin ANTICOAGULANT  5,000 Units Subcutaneous Q12H     insulin aspart  2 Units Subcutaneous Q4H     insulin aspart  1-6 Units Subcutaneous Q4H     insulin glargine  25 Units Subcutaneous QAM     lidocaine  5 mL Topical Once     meropenem  1 g Intravenous Q12H     methylPREDNISolone  40 mg Intravenous Q24H     pantoprazole (PROTONIX) IV  40 mg Intravenous BID     sodium chloride (PF)  10 mL Intracatheter Q7 Days     sodium chloride (PF)  3 mL Intracatheter Q8H     voriconazole  200 mg Per Feeding Tube Q12H DANK       dextrose       dextrose       epoprostenol (VELETRI) 20 mcg/mL in sterile water inhalation solution 20 ng/kg/min (11/25/20 1041)     fentaNYL 100 mcg/hr (11/25/20 1200)     midazolam 6 mg/hr (11/25/20 1200)     norepinephrine 0.35 mcg/kg/min (11/25/20 1200)     propofol (DIPRIVAN) infusion 45 mcg/kg/min (11/25/20 1200)     vasopressin 2.4 Units/hr (11/25/20 1200)     vecuronium (NORCURON) infusion ADULT Stopped (11/25/20 0835)               Physical Examination:   Temp:  [97  F (36.1  C)-99.1  F (37.3  C)] 99  F (37.2  C)  Pulse:  [69-90] 70  Resp:  [17-27] 22  BP: ()/(38-84) 133/72  MAP:  [58 mmHg-94 mmHg] 94 mmHg  Arterial Line BP: ()/(43-90) 133/72  FiO2 (%):  [45 %-60 %] 60 %  SpO2:  [95 %-100 %] 95 %    Intake/Output Summary (Last 24 hours) at 11/23/2020 0701  Last data filed at 11/23/2020 0600  Gross per 24 hour   Intake 3808 ml   Output 1260 ml   Net 2548 ml     Wt Readings from Last 4 Encounters:   11/24/20 47.5 kg (104 lb 11.5 oz)   11/09/20 46.3 kg (102 lb)   11/06/20 51.2 kg (112 lb 14.4 oz)   11/02/20 46.3 kg (102 lb)     Arterial Line BP: ()/(43-90) 133/72  MAP:  [58 mmHg-94 mmHg] 94 mmHg  BP - Mean:  [] 95  Ventilation Mode: CMV/AC  (Continuous Mandatory Ventilation/ Assist Control)  FiO2 (%): 60 %  Rate Set (breaths/minute): 22 breaths/min  Tidal Volume Set  (mL): 360 mL  PEEP (cm H2O): 14 cmH2O  Oxygen Concentration (%): 60 %  Resp: 22    Recent Labs   Lab 11/25/20  0935 11/25/20  0600 11/25/20  0200 11/24/20  2040   PH 7.43 7.38 7.37 7.37   PCO2 41 44 45 45   PO2 51* 74* 68* 76*   HCO3 27 26 26 26   O2PER 45% VENT VENT 45% 45% Ventilator       General:   Comfortable, no pain or respiratory distress   Neurologic:   Sedation: deeply sedated and paralyzed   HEENT:   Head is atraumatic  Endotrachael tube is present      Lungs:   Symmetrical and normal breath sounds, no wheeze, ronchi, crackles, rub or bronchial breath sounds   Cardiovascular:   Regular rate and rhythm  Normal S1,S2, and no gallop, rub or murmur   Abdomen:   Distended:  No.   Bowel sound present and normal: Yes .   Soft: Yes . Tender: No.   Rebound:tendeness or guarding present No   Extremities:   Clubbing present: No,   Edema present: No,   Acrocyanosis present: No,   Mottling present: No,   Normal capillary refill: Yes    Skin:   Warm, dry.  No rash on limited exam.    Lines/Drain:    Central line present: Yes   Arterial line present: Yes   External ventricular Drain present: No  Chest tube present: No  NAHEED present: No  PEG present: No            Data:   All data and imaging reviewed.     ROUTINE ICU LABS (Last four results)  CMP  Recent Labs   Lab 11/25/20  0600 11/24/20  0600 11/23/20  0320 11/22/20  1705 11/22/20  0430 11/21/20  0532 11/19/20  1127 11/19/20  1127   NA  --  140 144  --  149* 140   < >  --    POTASSIUM 5.0 4.1 3.4  --  3.5 3.4   < >  --    CHLORIDE  --  108 112*  --  118* 110*   < >  --    CO2  --  26 29  --  28 25   < >  --    ANIONGAP  --  6 3  --  3 5   < >  --    GLC  --  184* 129*  --  134* 150*   < >  --    BUN  --  25 21  --  21 21   < >  --    CR  --  1.27* 1.57*  --  1.40* 1.37*   < >  --    GFRESTIMATED  --  64 49*  --  57* 58*   < >  --    GFRESTBLACK  --  74 57*  --  66 68   < >  --    IVANIA  --  6.3* 7.0*  --  5.9* 5.6*   < >  --    MAG 1.8 1.9 1.5*  --  1.9 1.7   < >  --     PHOS 3.1 3.1 2.6 3.1 2.4* 2.5   < >  --    PROTTOTAL  --   --  4.8*  --   --   --   --  5.4*   ALBUMIN  --   --  1.4*  --   --   --   --  1.6*   BILITOTAL  --   --  0.3  --   --   --   --  0.3   ALKPHOS  --   --  533*  --   --   --   --  385*   AST  --   --  48*  --   --   --   --  47*   ALT  --   --  24  --   --   --   --  19    < > = values in this interval not displayed.     CBC  Recent Labs   Lab 11/25/20  1200 11/25/20  0600 11/24/20  1820 11/24/20  0600 11/23/20  0320 11/23/20 0320 11/22/20 0430 11/22/20 0430   WBC  --  56.5*  --  60.3*  --  58.2*  --  72.7*   RBC  --  1.93*  --  2.66*  --  2.99*  --  3.24*   HGB 5.1* 5.9* 8.4* 8.3*   < > 9.1*   < > 9.8*   HCT  --  18.7*  --  25.4*  --  28.9*  --  30.3*   MCV  --  97  --  96  --  97  --  94   MCH  --  30.6  --  31.2  --  30.4  --  30.2   MCHC  --  31.6  --  32.7  --  31.5  --  32.3   RDW  --  17.7*  --  17.5*  --  17.4*  --  17.0*   PLT  --  633*  --  465*  --  365  --  234    < > = values in this interval not displayed.     INR  Recent Labs   Lab 11/23/20  0320   INR 1.12     Arterial Blood Gas  Recent Labs   Lab 11/25/20  0935 11/25/20  0600 11/25/20  0200 11/24/20  2040   PH 7.43 7.38 7.37 7.37   PCO2 41 44 45 45   PO2 51* 74* 68* 76*   HCO3 27 26 26 26   O2PER 45% VENT VENT 45% 45% Ventilator       All cultures:  Recent Labs   Lab 11/23/20  0555 11/23/20  0403 11/23/20  0326 11/22/20  1140 11/21/20  1259 11/21/20  0642   CULT Culture received and in progress.  Positive AFB results are called as soon as detected.    Final report to follow in 7 to 8 weeks.    Assayed at [facility] [address] [place] [address] [phone] No growth after 2 days No growth after 2 days Culture received and in progress.  Positive AFB results are called as soon as detected.    Final report to follow in 7 to 8 weeks.    Assayed at [facility] [address] [place] [address] [phone]  No growth No growth after 4 days  Culture negative after  4 days  Culture received and in progress.  Positive AFB results are called as soon as detected.    Final report to follow in 7 to 8 weeks.    Assayed at [facility] [address] [facility], [place] [address] [phone]  Culture in progress  No growth  Culture negative monitoring continues  Canceled, Test credited  Duplicate request   Culture received and in progress.  Positive AFB results are called as soon as detected.    Final report to follow in 7 to 8 weeks.    Assayed at [facility] [address] [facility], [facility] [phone]     No results found for this or any previous visit (from the past 24 hour(s)).              Billing: This patient is critically ill: Yes. Total critical care time today 45 min. This does not include time spent on procedures or teaching.     Ralf Jacobs MD  Pulmonary & Critical Care Medicine  [facility]   Pager: [phone]

## 2020-11-25 NOTE — PROVIDER NOTIFICATION
[facility] ICU VENTILATOR RESPIRATORY NOTE  Date of Admission: 11/12/2020  Date of Intubation (most recent): 11/21/2020  Reason for Mechanical Ventilation: Respiratory failure  Number of Days on Mechanical Ventilation: 5  Met Criteria for Pressure Support Trial: No  Reason for No Pressure Support Trial: Pt is on PEEP of 16 and continues on veletri    ABG Results: 7.38/ 44/ 74/ 26  ETT appearance on chest x-ray: 2 cm above [place]    Plan:  Continue to monitor and assess.     Ventilation Mode: CMV/AC  (Continuous Mandatory Ventilation/ Assist Control)  FiO2 (%): 45 %  Rate Set (breaths/minute): 22 breaths/min  Tidal Volume Set (mL): 360 mL  PEEP (cm H2O): 16 cmH2O  Oxygen Concentration (%): 45 %  Resp: 21 11/25/20 0523   Ventilator - Patient    Patient Resp Rate  22 breaths/min   Inspiratory Pressure (cm H2O) 37 cmH2O   Mean Airway Pressure (cm H2O) 22 cmH2O   Expiratory Vt  mL 350   Minute Volume L/min 7.9 L/min   Additional Vent Settings   Plateau Pressure (cm H2O) 36 cmH2O   PEEPi (cm H2O) 0.7 cm           Juan Harrington, RT

## 2020-11-25 NOTE — CONSULTS
"Physician Attestation    I, Yaron Wiley, saw and evaluated Attapeu SHAUNA Diallo and I have reviewed and discussed with the advanced practice provider their history, physical and plan.    I personally reviewed the vital signs, medications, labs and imaging.    Exam:  Vital signs:  Temp: 98.6  F (37  C) Temp src: Bladder BP: 125/79 Pulse: 70   Resp: 16 SpO2: 99 % O2 Device: Mechanical Ventilator Oxygen Delivery: 50 LPM Height: 154.9 cm (5' 1\") Weight: 47.5 kg (104 lb 11.5 oz)  Estimated body mass index is 19.79 kg/m  as calculated from the following:    Height as of this encounter: 1.549 m (5' 1\").    Weight as of this encounter: 47.5 kg (104 lb 11.5 oz).      Thank you for allowing me to participate in this patient's healthcare. Please call any further questions.    Yaron Wiley MD, [facility]  [phone]        GASTROENTEROLOGY CONSULTATION      Attapeu SHAUNA Diallo  [id number] [facility] [address]  55 year old male     Admission Date/Time: 11/12/2020  Primary Care Provider: Imelda, [facility]     We were asked to see the patient in consultation by Dr. Leos for evaluation of rectal bleeding.    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 He has been on multiple antibiotics and antifungals but overall source unclear and he is being followed by infectious disease. Initial COVID testing has been negative.     We are consulted for a second time this admission regarding GI bleed. Had melena earlier in hospital stay (11/14) but EGD was deferred due to his other medical issues including neutropenia and treated with IV PPI. Melena resolved and " hemoglobin stabilized with transfusion. HGB had dropped to 6.3 from 7.3 at that time.     He had been receiving tube feeds and tolerating and frequent brown watery/loose stools prompting a rectal tube that was placed 2 days ago. He has had brown stool up until this morning when it was noted he had bright red stool on wiping but maroon stool in rectal tube. No melena. No abdominal distension. Had been receiving subQ heparin but this has been stopped. Not on any other blood thinners. 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 He was on minimal pressor support yesterday but this has increased dramatically today.     HGB dropped from 8.4 yesterday to 5.9 today. MCV 97. WBC 56.5, absolute neutrophils 44.1. Platelets elevated at 633. BUN normal - 25 with creatinine 1.27.     EGD 3/2019 for iron deficiency anemia showing irregular Z line, LA grade A esophagitis, non bleeding gastric ulcers without stigmata of recent bleed, multiple non bleeding duodenal ulcers without stigmata of recent bleed. Gastric biopsy showed mild reactive gastropathy but negative H pylori. Duodenal biopsy normal.     Follow up EGD and colonoscopy 7/2019 through our office showed grade B esophagitis but healed ulcers. Felt ulcers NSAID induced based on reports from family at that time. Colonoscopy showed 1 rectal polyp but otherwise normal. GE junction biopsy showed intestinal metaplasia and rectal polyp consistent with hamartomatous polyp - Peutz Jehgers type. Repeat colon in 3 years recommended at that time.     PAST MEDICAL HISTORY:  Patient Active Problem List    Diagnosis Date Noted     FUO (fever of unknown origin) 11/21/2020     Priority: High     HSV (herpes simplex virus) infection 11/21/2020     Priority: High     History of latent tuberculosis 11/21/2020     Priority: High     Acquired immunocompromised state (H) 11/21/2020     Priority: Medium     Therapy for seminoma         Urinary retention  11/12/2020     Priority: Medium     Febrile neutropenia (H) 11/12/2020     Priority: Medium     Hypocalcemia 10/26/2020     Priority: Medium     Sinus tachycardia 10/26/2020     Priority: Medium     Chronic renal impairment, unspecified CKD stage 10/26/2020     Priority: Medium     Sepsis without acute organ dysfunction, due to unspecified organism (H) 10/26/2020     Priority: Medium     Bilateral hydronephrosis 10/15/2020     Priority: Medium     Added automatically from request for surgery [id number]       Neutropenic fever (H) 10/09/2020     Priority: Medium     Acute cystitis without hematuria 10/09/2020     Priority: Medium     Neutropenia, drug-induced (H) 10/01/2020     Priority: Medium     Chemotherapy-induced neutropenia (H) 10/01/2020     Priority: Medium     Seminoma (H) 09/30/2020     Priority: Medium     ARF (acute renal failure) (H) 09/24/2020     Priority: Medium     YAYA (acute kidney injury) (H) 09/23/2020     Priority: Medium     Added automatically from request for surgery [id number]       Testicular neoplasm 09/23/2020     Priority: Medium     Added automatically from request for surgery [id number]       Anemia 02/28/2019     Priority: Medium     Anemia, unspecified type 08/23/2017     Priority: Medium     Mental retardation      Priority: Medium          ROS: A comprehensive ten point review of systems was negative aside from those in mentioned in the HPI.       MEDICATIONS:   Prior to Admission medications    Medication Sig Start Date End Date Taking? Authorizing Provider   allopurinol (ZYLOPRIM) 300 MG tablet Take 1 tablet (300 mg) by mouth daily 10/6/20  Yes Daniel Su MD   calcium citrate-vitamin D (CITRACAL W/D) 250-100 MG-UNIT tablet Take 2 tablets by mouth 2 times daily (with meals) 11/9/20 12/9/20 Yes Araceli Riley PA-C   ciprofloxacin (CIPRO) 500 MG tablet Take 1 tablet (500 mg) by mouth 2 times daily 11/9/20 1/8/21 Yes Araceli Riley PA-C   ferrous  "sulfate (FEROSUL) 325 (65 Fe) MG tablet Take 1 tablet (325 mg) by mouth daily (with breakfast) 3/1/19  Yes Meche Solano PA-C   loratadine (CLARITIN) 10 MG tablet Take 10 mg by mouth daily    Yes Unknown, Entered By History   LORazepam (ATIVAN) 0.5 MG tablet Take 1 tablet (0.5 mg) by mouth every 4 hours as needed (Anxiety, Nausea/Vomiting or Sleep) 11/2/20  Yes Jarvis Ramos MD   multivitamin w/minerals (THERA-VIT-M) tablet Take 1 tablet by mouth daily   Yes Unknown, Entered By History   prochlorperazine (COMPAZINE) 10 MG tablet Take 1 tablet (10 mg) by mouth every 6 hours as needed (Nausea/Vomiting) 11/2/20  Yes Jarvis Ramos MD        ALLERGIES: No Known Allergies     SOCIAL HISTORY:  Social History     Tobacco Use     Smoking status: Never Smoker     Smokeless tobacco: Never Used   Substance Use Topics     Alcohol use: Yes     Alcohol/week: 2.0 standard drinks     Types: 1 Glasses of wine, 1 Cans of beer per week     Comment: once every month or two, social functions only     Drug use: No        FAMILY HISTORY:  Family History   Problem Relation Age of Onset     Diabetes Father      Glaucoma No family hx of      Macular Degeneration No family hx of         PHYSICAL EXAM:   /74   Pulse 80   Temp 98.8  F (37.1  C)   Resp 22   Ht 1.549 m (5' 1\")   Wt 47.5 kg (104 lb 11.5 oz)   SpO2 99%   BMI 19.79 kg/m       PHYSICAL EXAM:  General:sedated, on mechanical ventilation  SKIN: no suspicious lesions, rashes, jaundice, or spider angiomas  HEAD: Normocephalic. No masses, lesions, tenderness or abnormalities  NECK: Neck supple. No adenopathy. Thyroid symmetric, normal size.  EYES: No scleral icterus  ENT: ENT exam normal, no neck nodes or sinus tenderness  RESPIRATORY: negative, Good diaphragmatic excursion. Lungs clear  CARDIOVASCULAR: negative, PMI normal. No lifts, heaves, or thrills. RRR. No murmurs, clicks gallops or rub  GASTROINTESTINAL: +BS, soft, ND, no HSM, no " masses/guarding/rebound  JOINT/EXTREMITIES: extremities normal- no gross deformities noted, gait normal and normal muscle tone  NEURO: Reflexes grossly normal and symmetric. Sensation grossly WNL.  PSYCH: no abnormal anxiety/depression  LYMPH: No anterior cervical, posterior cervical, or supraclavicular adenopathy    Rectal: rectal tube in place. Maroon stool leaking around tube. On wiping this appears bright red/liquid/loose.      LABS:  I reviewed the patient's new clinical lab test results.   Recent Labs   Lab Test 11/25/20  0600 11/24/20  1820 11/24/20  0600 11/23/20  0320 11/23/20  0320 11/18/20  0600 11/18/20  0600 11/12/20  1601 11/12/20  1601   WBC 56.5*  --  60.3*  --  58.2*   < > 31.4*   < > 0.1*   HGB 5.9* 8.4* 8.3*   < > 9.1*   < > 10.9*   < > 7.8*   MCV 97  --  96  --  97   < > 93   < > 91   *  --  465*  --  365   < > 79*   < > 105*   INR  --   --   --   --  1.12  --  1.13  --  1.00    < > = values in this interval not displayed.     Recent Labs   Lab Test 11/25/20  0600 11/24/20  0600 11/23/20  0320 11/22/20  0430   NA  --  140 144 149*   POTASSIUM 5.0 4.1 3.4 3.5   CHLORIDE  --  108 112* 118*   CO2  --  26 29 28   BUN  --  25 21 21   ANIONGAP  --  6 3 3   IVANIA  --  6.3* 7.0* 5.9*     Recent Labs   Lab Test 11/23/20  0320 11/19/20  1127 11/17/20  0456 11/12/20  1620 11/12/20  1620 10/26/20  2240 10/26/20  2240 10/26/20  1803 09/23/20  2112 09/23/20  2112   ALBUMIN 1.4* 1.6* 1.4*   < >  --    < >  --   --    < > 3.7   BILITOTAL 0.3 0.3 0.2   < >  --    < >  --   --    < > 0.2   ALT 24 19 23   < >  --    < >  --   --    < > 21   AST 48* 47* 18   < >  --    < >  --   --    < > 40   ALKPHOS 533* 385* 207*   < >  --    < >  --   --    < > 528*   PROTEIN  --   --   --   --  50*  --  20* 30*   < >  --    LIPASE  --   --   --   --   --   --   --   --   --  115    < > = values in this interval not displayed.        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 He has been on multiple antibiotics and antifungals but overall source unclear and he is being followed by infectious disease. Initial COVID testing has been negative (most recent 11/18). Developed melena 11/14 but EGD was deferred, now with recurrent bleeding maroon/bright red stool starting this am with drop in hemoglobin from 8.4 to 5.9 and requiring increased pressor support.     1. GI bleed. Unclear source. Has a history of gastric and duodenal ulcers in 3/2019 felt to be NSAID induced. Gastric biopsy negative for H pylori or dysplasia. Follow up EGD/colonoscopy 7/2019 showed healed ulcers after PPI therapy. Patient has been on IV PPI BID since initial signs of GI bleed earlier in stay. Receiving PRBC transfusion now.   --EGD in ICU today.   --If EGD is negative for source, then recommend tagged RBC scan to help localize bleeding and possibly CT angio/IR involvement for embolization if needed.   --Monitor HGB and transfuse prn.   --Continue IV PPI BID.    Reviewed with Dr. Wiley.     Thank you for asking us to participate in the care of this patient.    Yazmin Wilcox, [facility] ([facility])

## 2020-11-25 NOTE — SEDATION DOCUMENTATION
Patient arrived to OR 11 for interventional radiology procedure.  Accompanied by ICU RN, Resp Therapy, and CRNA.  Patient on ventilator and multiple drips.

## 2020-11-26 NOTE — PROVIDER NOTIFICATION
[facility] ICU VENTILATOR RESPIRATORY NOTE  Date of Admission: 11/12/2020  Date of Intubation (most recent): 11/21/2020  Reason for Mechanical Ventilation: Respiratory failure  Number of Days on Mechanical Ventilation: 6  Met Criteria for Pressure Support Trial: No  Reason for No Pressure Support Trial: High PEEP of 14  Significant Events Today: pt remains supine overnight  ABG Results: 7.39/ 38/ 119/ 23  ETT appearance on chest x-ray: 2 cm above berlin    Plan:  Continue to monitor and assess the pt's current respiratory status and needs.    Ventilation Mode: CMV/AC  (Continuous Mandatory Ventilation/ Assist Control)  FiO2 (%): 60 %  Rate Set (breaths/minute): 22 breaths/min  Tidal Volume Set (mL): 360 mL  PEEP (cm H2O): 14 cmH2O  Oxygen Concentration (%): 60 %  Resp: 22 11/26/20 0355   Ventilator - Patient    Patient Resp Rate  22 breaths/min   Inspiratory Pressure (cm H2O) 32 cmH2O   Mean Airway Pressure (cm H2O) 18 cmH2O   Expiratory Vt  mL 367   Minute Volume L/min 8.12 L/min   Additional Vent Settings   Plateau Pressure (cm H2O) 29 cmH2O   PEEPi (cm H2O) 0.7 cm           Juan Harrington, RT

## 2020-11-26 NOTE — PROGRESS NOTES
Oncology chart check:     Seminoma, currently on curative intent chemotherapy with BEP, last given on 11/9.   --Admitted with respiratory failure, currently vented, Covid negative.    --Bronchoscopy cultures so far positive for CMV. --Currently on antibiotics, antiviral, antifungals and steroids for possible bleomycin toxicity, but felt more likely to be due to infection.  --Due to hemoglobin drop to 5.1 and maroon stools on 11/25, PRBCs given -- Hgb now 8.4. S/p IR embolization of branch of GDA on 11/25.   --No new oncology recommendations.  Please call us with any questions.    Zoya Galeana MD  Hematology/Oncology  [facility]

## 2020-11-26 NOTE — PLAN OF CARE
ICU End of Shift Summary.  For vital signs and complete assessments, please see documentation flowsheets.     Pertinent assessments: RASS -5, Afebrile. CPOT 0. Tele SR/SB, MAPs >65 with Levophed and Vasopressin gtt. HR 50-80s. LS coarse, Crackles after 3rd unit of blood, lasix given with improvement. PEEP and FiO2 increased to 14 and 60% respectively. Minimal ETT and Oral secretions. Diuresed well with lasix dose. Multiple red/marroon liquid stools. TF stopped, feeding tube removed during EGD. L femoral access for coil placement, angioseal used.  Major Shift Events: EGD, IR for coil placement. NJ tube removed, rectal tube removed. 3U pRBCs given. Paralytic off.   Plan (Upcoming Events): Recheck Hgb in AM. Pt to stay supine overnight. Wean pressors as able. Maintain vent settings overnight.  Discharge/Transfer Needs: TBD    Bedside Shift Report Completed : Y  Bedside Safety Check Completed: MANNY

## 2020-11-26 NOTE — PROGRESS NOTES
ICU End of Shift Summary.  For vital signs and complete assessments, please see documentation flowsheets.     Pertinent assessments: RASS -4/-5, shoulder shrugs/mininmal coughing with suctioning. Fentanyl, Versed and Propofol for sedation. Lungs course, small creamy secretions. ET tube secure with cloth tape. Adequate urine output.   Major Shift Events:    - Large loose, maroon/bloody stool at 0630.   - Critical lab - Calcium <5.0, Calcium Gluc ordered. K+ 5.2.  Plan (Upcoming Events): ?possible going back to IR  Discharge/Transfer Needs:     Bedside Shift Report Completed : y  Bedside Safety Check Completed: y

## 2020-11-26 NOTE — PROGRESS NOTES
GASTROENTEROLOGY PROGRESS NOTE      ASSESSMENT   55-year-old male with metastatic seminoma status post chemotherapy presented with fevers white blood cell count respiratory failure who developed GI bleeding secondary to a duodenal ulcer that could not be controlled endoscopically and required IR intervention yesterday with coiling of a branch of the GDA.  Yesterday night developed per nursing and episode of maroon stools with associated soft her blood pressure which responded to ongoing pressors and blood transfusion.  This could have been further bleeding versus clearing of blood and his hemoglobin change could be secondary to large bleeding again or reequilibrating after GI bleeding.        PLAN  -Agree with current care  -Please call us if any concerns of further bleeding at that time we will reevaluate IR versus repeat endoscopy tube evaluate for bleeding and possible repeat hemospray  -GI will continue to follow      Shamir Bland MD  Gastroenterology  [facility]  -----------------------------------------------------------------  S  Intubated, IR embolization of a branch of the GDA yesterday and since developed 1 episode of maroon stools no more further bleeding since that last episode.      O  VS: reviewed  Gen: Intubated      LABS AND IMAGES  Reviewed and compared

## 2020-11-26 NOTE — PROGRESS NOTES
[facility]  Infectious Disease Progress Note          Assessment and Plan:   IMPRESSION:   1.  A 55-year-old male, well known to me from recent admission, now admitted with acute fever and profound neutropenia from chemotherapy, no clear focal infection, mild respiratory symptoms and recent urosepsis as possible sources, but unclear.   2.  Seminoma with recent chemotherapy, now profoundly neutropenic.   3.  Recent episode of urosepsis with Klebsiella and then subsequent episode of sepsis resolved with oral Cipro without clearcut infection being found.   4.  Latent tuberculosis, recent new diagnosis.  No evidence of active tuberculosis.   5.  COVID-19 rule out, negative times several.   6.  Slight hypoxia, minimal chest x-ray abnormality.   7.  Renal insufficiency.   8 Severe stomatitis, ? Chemo vs HSV  9 GI bleeding     RECOMMENDATIONS:   1.    Despite marrow recovery and white cells rising(very high with marrow recovery and growth factors not a marker of bacterial infection), major worsening primarily respiratory and fever.  CT scan  new worsening infiltrates.  Large differential diagnosis here, essentially neutropenic related potential pneumonia, conceivably underlying disease, multiple possibilities large differential diagnosis incl Bleo toxicity  BAL wo new info    2 On  meropenem, doxycycline,  vori already now ganciclovir Vori IV not viable as creat up and looks like longer wo enteral access so for now to micafungin  3  fungal serologies and labs neg incl gal neg, lung other things still concern for potential TB in the background, imaging not typical  4 repeat COVID-19 testing ,  this is unlikely to be the diagnosis, neg, still airborne for sl TB potential but neg BAL smear so likely not contagious or for that matter TB as dx  5 Now ganciclovir with BAL + CMV but very unlikiely this is a true lung infection or anything more than clinically insig reactivation no need acyclovir as  "ganciclovir covers HSV well    6  On  steroids as bleo toxicity is  in ddx,  BAL studies all so far neg incl cytology  Not likely PJP as fungitel neg an now neg  cytology             Interval History:   iICU high flow FIO2  60% not betterreviewed all BAL pending, fungitel 0, no + cxs except BAL CMV, by serology is reactivation GI bleeding              Medications:       [Held by provider] acetylcysteine  4 mL Nebulization Q6H     [Held by provider] albuterol  2.5 mg Nebulization Q6H     allopurinol  300 mg Per Feeding Tube Daily     artificial tears   Both Eyes Q8H     calcium carbonate  2,500 mg Per Feeding Tube BID     doxycycline (VIBRAMYCIN) IV  100 mg Intravenous Q12H     ganciclovir (CYTOVENE) intermittent infusion  2.5 mg/kg Intravenous Q24H     [Held by provider] heparin ANTICOAGULANT  5,000 Units Subcutaneous Q12H     insulin aspart  1-6 Units Subcutaneous Q4H     lidocaine  5 mL Topical Once     meropenem  1 g Intravenous Q12H     methylPREDNISolone  40 mg Intravenous Q24H     micafungin  100 mg Intravenous Q24H     sodium chloride (PF)  10 mL Intracatheter Q8H     sodium chloride (PF)  10 mL Intracatheter Q7 Days     sodium chloride (PF)  3 mL Intracatheter Q8H     sodium chloride (PF)  3 mL Intracatheter Q8H     sodium chloride (PF)  3 mL Intracatheter Q8H                  Physical Exam:   Blood pressure (!) 163/93, pulse 53, temperature 97  F (36.1  C), resp. rate (!) 0, height 1.549 m (5' 1\"), weight 54.7 kg (120 lb 9.5 oz), SpO2 100 %.  Wt Readings from Last 2 Encounters:   11/26/20 54.7 kg (120 lb 9.5 oz)   11/09/20 46.3 kg (102 lb)     Vital Signs with Ranges  Temp:  [96.3  F (35.7  C)-99.1  F (37.3  C)] 97  F (36.1  C)  Pulse:  [50-75] 53  Resp:  [0-40] 0  BP: ()/(44-97) 163/93  MAP:  [38 mmHg-119 mmHg] 68 mmHg  Arterial Line BP: ()/() 98/54  FiO2 (%):  [55 %-60 %] 55 %  SpO2:  [88 %-100 %] 100 %    Constitutional: Intubated sedated ICU   Lungs: Clear to auscultation bilaterally, " no crackles or wheezing   Cardiovascular: Regular rate and rhythm, normal S1 and S2, and no murmur noted   Abdomen: Normal bowel sounds, soft, non-distended, non-tender   Skin: No rashes, no cyanosis, no edema   Other:           Data:   All microbiology laboratory data reviewed.  Recent Labs   Lab Test 11/26/20  1000 11/26/20  0515 11/26/20  0230 11/25/20  0600 11/25/20  0600 11/24/20  0600 11/24/20  0600   WBC  --  38.9*  --   --  56.5*  --  60.3*   HGB 8.4* 7.0* 7.6*   < > 5.9*   < > 8.3*   HCT  --  21.3*  --   --  18.7*  --  25.4*   MCV  --  89  --   --  97  --  96   PLT  --  418  --   --  633*  --  465*    < > = values in this interval not displayed.     Recent Labs   Lab Test 11/26/20  0515 11/24/20  0600 11/23/20  0320   CR 1.84* 1.27* 1.57*     Recent Labs   Lab Test 11/19/20  0655   SED 18     Recent Labs   Lab Test 11/23/20  0555 11/23/20  0403 11/23/20  0326 11/22/20  1140 11/21/20  1259 11/21/20  0642 11/17/20  1108 11/17/20  1104 11/12/20  1631   CULT Culture received and in progress.  Positive AFB results are called as soon as detected.    Final report to follow in 7 to 8 weeks.    Assayed at [facility] [address] [place] [id number] [phone] No growth after 3 days No growth after 3 days Culture received and in progress.  Positive AFB results are called as soon as detected.    Final report to follow in 7 to 8 weeks.    Assayed at [facility] [address] [place] [address], [place] [address] [phone]  No growth Culture negative monitoring continues  No growth after 4 days  Culture negative after 4 days  Culture received and in progress.  Positive AFB results are called as soon as detected.    Final report to follow in 7 to 8 weeks.    Assayed at [facility] [address] [place] [address] [phone]  No growth  Culture negative monitoring continues  Canceled, Test credited  Duplicate request   Culture received and in progress.  Positive AFB results are called as  soon as detected.    Final report to follow in 7 to 8 weeks.    Assayed at [facility] [employer] [address] [place] [address] [phone] No growth No growth No growth

## 2020-11-26 NOTE — PROVIDER NOTIFICATION
Patient Condition Change    Pt had a large loose watery maroon/bloody stool. Pt already receiving 1unit PRBC for Hbg of 7.0. Tele Hub aware. Pt blood pressure dropped, Levophed increased. Pt very pale.     GI was paged. There is no IR team on call today, so Dr. Grande would need to come in if any procedure were to occur.     Intensivist was updated.

## 2020-11-26 NOTE — PLAN OF CARE
ICU End of Shift Summary.  For vital signs and complete assessments, please see documentation flowsheets.     Pertinent assessments: RASS -4 to -5. CPOT 0. Hypothermic, warming blanket utilized. Tele SB. MAPs >65 with Levophed gtt only. LS coarse. Minimal ETT, oral secretions. Sats high 90s FiO2 50% PEEP 10. UOP 50mL/hr avg for day. Bloody stools x4. Generalized edema +2/+3.  Major Shift Events: Multiple bloody stools, red/dark red. 1UpRBCs given. MAPs labile at times, especially with turns to L. 2g Calcium given.  Plan (Upcoming Events): Monitor Hgb, transfuse as needed.  IR tomorrow if GI bleed continues.  Discharge/Transfer Needs: TBD    Bedside Shift Report Completed : Y  Bedside Safety Check Completed: Y

## 2020-11-26 NOTE — PROVIDER NOTIFICATION
DATE:  11/26/2020   TIME OF RECEIPT FROM LAB:  0640  LAB TEST:  Calcium   LAB VALUE:  < 5  RESULTS GIVEN WITH READ-BACK TO (PROVIDER):  Magaly Adam LAB VALUE REPORTED TO PROVIDER:   0642

## 2020-11-26 NOTE — PROVIDER NOTIFICATION
Ventilation Mode: CMV/AC  (Continuous Mandatory Ventilation/ Assist Control)  FiO2 (%): 60 %  Rate Set (breaths/minute): 22 breaths/min  Tidal Volume Set (mL): 360 mL  PEEP (cm H2O): 14 cmH2O  Oxygen Concentration (%): 60 %  Resp: 22 11/25/20 2205   Ventilator - Patient    Patient Resp Rate  22 breaths/min   Inspiratory Pressure (cm H2O) 33 cmH2O   Mean Airway Pressure (cm H2O) 19 cmH2O   Expiratory Vt  mL 355   Minute Volume L/min 7.88 L/min   Additional Vent Settings   Plateau Pressure (cm H2O) 30 cmH2O   PEEPi (cm H2O) 0.6 cm         Juan Harrington, RT

## 2020-11-26 NOTE — PROGRESS NOTES
[facility] Critical Care Progress Note                          11/26/2020    Name: Luz Marina Diallo MRN#: [id number]   Age: 55 year old YOB: 1965     Hsptl Day# 14  ICU DAY #4    MV DAY #6             Problem List:   Active Problems:    FUO (fever of unknown origin)    HSV (herpes simplex virus) infection    History of latent tuberculosis    Urinary retention    Febrile neutropenia (H)    Acquired immunocompromised state (H)           Summary/Hospital Course:     55 year old male with a history of a metastatic seminoma s/p chemotherapy who presents with fevers, increased wbc ongoing concern for infection vs bleomycin associated respiratory dysfunction who was admitted to the ICU on 11/21/2020 for management of hypoxic respiratory failure requiring intubation.    11/23: Worsening hypoxemia overnight with worsening secretions.  PEEP increased to 16 and FiO2 now 100%, P/F ratio around 100.     11/24: Required initiation of prone ventilation overnight.  Now deeply sedated, paralyzed and on Veletri.     11/25: Noted to have bright red blood per rectum this morning.  EGD showed large duodenal vessel that was hemosprayed and clipped.    11/26: Status post EGD with hemospray and clip as well as IR coil embolization.  GI bleed appears to have slowed down.  Pressor needs are better.  Oxygenation mildly improved.      Assessment and plan :     Luz Marina Diallo IS a 55 year old male with a history of a metastatic seminoma s/p chemotherapy who presents with fevers, increased wbc ongoing concern for infection vs bleomycin associated respiratory dysfunction who was admitted to the ICU on 11/21/2020 for management of hypoxic respiratory failure requiring intubation.    I have personally reviewed the daily labs, imaging studies, cultures and discussed the case with referring physician and consulting physicians.     My assessment and plan by system for this patient is as follows:    CNS:    Sedation/ pain:   -RASS goal -4 to -5   -Continue versed, fentanyl, and propofol  -Paralytic off on 11/25    Pulmonary:   Hypoxemic respiratory failure: Requiring mechanical ventilation (day 6).  Had respiratory decompensation 11/23 which required significant increase in ventilator support as well as initiation of prone ventilation.  Remains heavily sedated and on Veletri.  Paralytic has been off greater than 24 hours.  P/F ratio this morning around 200.  Etiology not entirely clear but was initiated on steroids for concern of bleomycin toxicity on 11/23.  PEEP is now 14 and FiO2 is 45%.  -We will hold off on following ABGs, if sats remain reasonable with down titrate PEEP today  -Consider stopping Veletri in coming days  -Continue current antimicrobials (appreciate IDs assistance)  -Continue methylprednisolone 40 mg IV daily for bleomycin toxicity  -Follow bronchoscopy cultures, so far just CMV positive    CV:   Shock: Had worsening vasopressor needs in the setting of GI bleed.  This a.m., pressor needs have come down considerably.  -Now on Levophed and vasopressin (has improved considerably with control of GI bleed)  -Packed cells for resuscitation    Tachycardia - likely related to distress, work of breathing, anxiety and hypovolemia  - has now improved  increase sedation    FEN/GI:   GI bleed: Noted have significant drop in hemoglobin and bright red blood per rectum on 11/25.  Underwent EGD which showed a large bleeding vessel in the duodenum.  Vessel was hemosprayed and clipped.  Following that, had coil embolization of the GDA as well as a small branch off the SMA as prophylactic treatment for possible recurrent bleed.  This a.m. hemoglobin dropped slightly with a large dark stool passed per rectum.  -Follow hemoglobin  -If he has significant rebleed, will discuss case with GI and IR although I think her options are limited at this point.  I suspect he would not be a surgical candidate.  -PPI  gtt    Mucositis - can use magic mouthwash to wipe inside of mouth for comfort    : adequate uop, no concerns reguarding renal function  Hypernatremia: 140 this morning  -Continue 160 ml free water Q 4 hr    Heme/onc:   Hx of metastatic seminoma s/p BEP therapy. Patient now with concern of bleomycin related lung toxicity or other pulmonary infection. Patient did receive neupogen early on in his course.  -Continue methylprednisolone 40 mg IV daily (started 11/23)    Msk: no acute skin issues    Endo: Q4H BG checks    ID:   unclear cause of possible infection with very high WBC. c dif checked x2 with negative result.  Bronchoscopy showed positive CMV, thus far remaining studies are negative  - continue broad antibiotics with aid of ID.   - Quanterferon gold test resulted positive. AFP smear from BAL is pending  - Continue steroids  - Continue meropenem and voriconazole  - Continue ganicyclovir per ID    IV/Access:   1. Venous access - PICC  2. Arterial access - Right femoral eric     Plan  - central access required and necessary    ICU Prophylaxis:   1. DVT: Hep subcutaneous  2. VAP: HOB 30 degrees, chlorhexidine rinse  3. Stress Ulcer: PPI gtt  4. Restraints: Nonviolent soft two point restraints required and necessary for patient safety and continued cares and good effect as patient continues to pull at necessary lines, tubes despite education and distraction. Will readdress daily.   5. Wound care - per unit routine   6. Feeding - tube feeds   7. Family Update: Yes, sister by phone  8. Disposition - ICU    Key goals for next 24 hours:   1.  Monitor hemoglobin, getting 1 unit of packed cells this morning  2.  Continue antimicrobial per ID  3.  Continue methylprednisolone 40 mg IV daily  4.  We will attempt to down titrate PEEP today        Interim History/Overnight Events:     Underwent IR embolization for duodenal bleed.  Status post EGD yesterday as well, hemospray and clipping use.  Remained supine with  relatively stable oxygenation.         Key Medications:       [Held by provider] acetylcysteine  4 mL Nebulization Q6H     [Held by provider] albuterol  2.5 mg Nebulization Q6H     allopurinol  300 mg Per Feeding Tube Daily     artificial tears   Both Eyes Q8H     calcium carbonate  2,500 mg Per Feeding Tube BID     calcium gluconate  1 g Intravenous Once     doxycycline (VIBRAMYCIN) IV  100 mg Intravenous Q12H     fentaNYL  50 mcg Intravenous Once within 24 hrs     ganciclovir (CYTOVENE) intermittent infusion  2.5 mg/kg Intravenous Q24H     [Held by provider] heparin ANTICOAGULANT  5,000 Units Subcutaneous Q12H     insulin aspart  1-6 Units Subcutaneous Q4H     lidocaine  5 mL Topical Once     meropenem  1 g Intravenous Q12H     methylPREDNISolone  40 mg Intravenous Q24H     midazolam  2 mg Intravenous Once within 24 hrs     sodium chloride (PF)  10 mL Intracatheter Q8H     sodium chloride (PF)  10 mL Intracatheter Q7 Days     sodium chloride (PF)  3 mL Intracatheter Q8H     sodium chloride (PF)  3 mL Intracatheter Q8H     sodium chloride (PF)  3 mL Intracatheter Q8H     voriconazole  4 mg/kg Intravenous Q12H       dextrose       dextrose       epoprostenol (VELETRI) 20 mcg/mL in sterile water inhalation solution 20 ng/kg/min (11/26/20 0655)     fentaNYL 100 mcg/hr (11/26/20 1000)     - MEDICATION INSTRUCTIONS -       - MEDICATION INSTRUCTIONS -       - MEDICATION INSTRUCTIONS -       - MEDICATION INSTRUCTIONS -       midazolam 6 mg/hr (11/26/20 1000)     norepinephrine 0.05 mcg/kg/min (11/26/20 1000)     pantoprazole (PROTONIX) infusion ADULT/PEDS GREATER than or EQUAL to 45 kg 8 mg/hr (11/26/20 0955)     propofol (DIPRIVAN) infusion 45 mcg/kg/min (11/26/20 1000)     sodium chloride 100 mL/hr at 11/26/20 0800     vasopressin Stopped (11/26/20 0730)     vecuronium (NORCURON) infusion ADULT Stopped (11/25/20 0835)               Physical Examination:   Temp:  [96.3  F (35.7  C)-99.1  F (37.3  C)] 97.2  F (36.2   C)  Pulse:  [50-85] 54  Resp:  [0-40] 22  BP: ()/(38-97) 151/97  MAP:  [52 mmHg-119 mmHg] 80 mmHg  Arterial Line BP: ()/() 109/64  FiO2 (%):  [55 %-100 %] 55 %  SpO2:  [83 %-100 %] 100 %    Intake/Output Summary (Last 24 hours) at 11/23/2020 0701  Last data filed at 11/23/2020 0600  Gross per 24 hour   Intake 3808 ml   Output 1260 ml   Net 2548 ml     Wt Readings from Last 4 Encounters:   11/26/20 54.7 kg (120 lb 9.5 oz)   11/09/20 46.3 kg (102 lb)   11/06/20 51.2 kg (112 lb 14.4 oz)   11/02/20 46.3 kg (102 lb)     Arterial Line BP: ()/() 109/64  MAP:  [52 mmHg-119 mmHg] 80 mmHg  BP - Mean:  [] 112  Ventilation Mode: CMV/AC  (Continuous Mandatory Ventilation/ Assist Control)  FiO2 (%): 55 %  Rate Set (breaths/minute): 22 breaths/min  Tidal Volume Set (mL): 360 mL  PEEP (cm H2O): 12 cmH2O  Oxygen Concentration (%): 60 %  Resp: 22    Recent Labs   Lab 11/26/20  0515 11/25/20  1800 11/25/20  0935 11/25/20  0600   PH 7.39 7.39 7.43 7.38   PCO2 38 40 41 44   PO2 119* 90 51* 74*   HCO3 23 24 27 26   O2PER 60% 60 VENT 45% VENT VENT 45%       General:   Comfortable, no pain or respiratory distress   Neurologic:   Sedation: deeply sedated and paralyzed   HEENT:   Head is atraumatic  Endotrachael tube is present      Lungs:   Symmetrical and normal breath sounds, no wheeze, ronchi, crackles, rub or bronchial breath sounds   Cardiovascular:   Regular rate and rhythm  Normal S1,S2, and no gallop, rub or murmur   Abdomen:   Distended:  No.   Bowel sound present and normal: Yes .   Soft: Yes . Tender: No.   Rebound:tendeness or guarding present No   Extremities:   Clubbing present: No,   Edema present: No,   Acrocyanosis present: No,   Mottling present: No,   Normal capillary refill: Yes    Skin:   Warm, dry.  No rash on limited exam.    Lines/Drain:    Central line present: Yes   Arterial line present: Yes   External ventricular Drain present: No  Chest tube present: No  NAHEED present: No  PEG  present: No            Data:   All data and imaging reviewed.     ROUTINE ICU LABS (Last four results)  CMP  Recent Labs   Lab 11/26/20  0515 11/25/20  0600 11/24/20  0600 11/23/20  0320 11/22/20  0430 11/22/20  0430 11/19/20  1127 11/19/20  1127   *  --  140 144  --  149*   < >  --    POTASSIUM 5.2 5.0 4.1 3.4  --  3.5   < >  --    CHLORIDE 115*  --  108 112*  --  118*   < >  --    CO2 25  --  26 29  --  28   < >  --    ANIONGAP 5  --  6 3  --  3   < >  --    *  --  184* 129*  --  134*   < >  --    BUN 50*  --  25 21  --  21   < >  --    CR 1.84*  --  1.27* 1.57*  --  1.40*   < >  --    GFRESTIMATED 41*  --  64 49*  --  57*   < >  --    GFRESTBLACK 47*  --  74 57*  --  66   < >  --    IVANIA <5.0*  --  6.3* 7.0*  --  5.9*   < >  --    MAG 1.8 1.8 1.9 1.5*  --  1.9   < >  --    PHOS 5.1* 3.1 3.1 2.6   < > 2.4*   < >  --    PROTTOTAL  --   --   --  4.8*  --   --   --  5.4*   ALBUMIN  --   --   --  1.4*  --   --   --  1.6*   BILITOTAL  --   --   --  0.3  --   --   --  0.3   ALKPHOS  --   --   --  533*  --   --   --  385*   AST  --   --   --  48*  --   --   --  47*   ALT  --   --   --  24  --   --   --  19    < > = values in this interval not displayed.     CBC  Recent Labs   Lab 11/26/20  1000 11/26/20  0515 11/26/20  0230 11/25/20  2205 11/25/20  0600 11/25/20  0600 11/24/20 0600 11/24/20 0600 11/23/20 0320 11/23/20 0320   WBC  --  38.9*  --   --   --  56.5*  --  60.3*  --  58.2*   RBC  --  2.40*  --   --   --  1.93*  --  2.66*  --  2.99*   HGB 8.4* 7.0* 7.6* 7.9*   < > 5.9*   < > 8.3*   < > 9.1*   HCT  --  21.3*  --   --   --  18.7*  --  25.4*  --  28.9*   MCV  --  89  --   --   --  97  --  96  --  97   MCH  --  29.2  --   --   --  30.6  --  31.2  --  30.4   MCHC  --  32.9  --   --   --  31.6  --  32.7  --  31.5   RDW  --  18.6*  --   --   --  17.7*  --  17.5*  --  17.4*   PLT  --  418  --   --   --  633*  --  465*  --  365    < > = values in this interval not displayed.     INR  Recent Labs   Lab  11/23/20  0320   INR 1.12     Arterial Blood Gas  Recent Labs   Lab 11/26/20  0515 11/25/20  1800 11/25/20  0935 11/25/20  0600   PH 7.39 7.39 7.43 7.38   PCO2 38 40 41 44   PO2 119* 90 51* 74*   HCO3 23 24 27 26   O2PER 60% 60 VENT 45% VENT VENT 45%       All cultures:  Recent Labs   Lab 11/23/20  0555 11/23/20  0403 11/23/20  0326 11/22/20  1140 11/21/20  1259 11/21/20  0642   CULT Culture received and in progress.  Positive AFB results are called as soon as detected.    Final report to follow in 7 to 8 weeks.    Assayed at [facility] [address] [place] [address], [place] [address] [phone] No growth after 3 days No growth after 3 days Culture received and in progress.  Positive AFB results are called as soon as detected.    Final report to follow in 7 to 8 weeks.    Assayed at [facility] [address] [place] [address], [place] [address] [phone]  No growth Culture negative monitoring continues  No growth after 4 days  Culture negative after 4 days  Culture received and in progress.  Positive AFB results are called as soon as detected.    Final report to follow in 7 to 8 weeks.    Assayed at [facility] [address] [facility] [place] [address] [phone]  No growth  Culture negative monitoring continues  Canceled, Test credited  Duplicate request   Culture received and in progress.  Positive AFB results are called as soon as detected.    Final report to follow in 7 to 8 weeks.    Assayed at [facility] [address] [place] [address], [place] [address] [phone]     No results found for this or any previous visit (from the past 24 hour(s)).              Billing: This patient is critically ill: Yes. Total critical care time today 45 min. This does not include time spent on procedures or teaching.     Ralf Jacobs MD  Pulmonary & Critical Care Medicine  [facility]   Pager: [phone]

## 2020-11-26 NOTE — PROGRESS NOTES
Brief GI Note    GI aware of active bleeding, recommend repeat IR evaluation/treatment    Shamir Bland MD

## 2020-11-26 NOTE — PROGRESS NOTES
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     Initially on 11/12, patient was admitted for neutropenic fever.  He had received chemotherapy 3 days prior to admission with bleomycin, etoposide, and platinum.  He was started on broad-spectrum antibiotics with cefepime and vancomycin.  Infectious disease and oncology were consulted.  Was also treated for pancytopenia due to chemotherapy.  Received Neulasta x3 on 11/13 through 15.  He developed mucositis which was initially treated with topical medications but ultimately required TPN.  Also had hypocalcemia issues requiring replacement.  He developed bright red blood per rectum and GI was consulted.  Recommended Protonix but holding off endoscopy.  Acyclovir and fluconazole were added given the mucositis.  Patient required multiple additional blood transfusions.     11/18 he developed worsening cough and tachycardia.  CT of the chest showed patchy and confluent groundglass opacities suggestive of multifocal pneumonia.  COVID-19 test was repeated and negative again.  Patient has been having ongoing fevers but they seem to get worse around the time as well.  On 11/19 his WBC count was noted to increase to 52 whereas it has been very low before that.  Antibiotics were broadened to include doxycycline, meropenem and voriconazole.  Pulmonary consulted on 11/20.  Concern for bleomycin toxicity.  Recommended bronchoscopy if possible.  In the evening of 11/20-21, patient's respiratory status worsened despite BiPAP and he was endotracheally intubated. Patient underwent bronchoscopy in the ICU on  11/21. Bronchoscopy returned positive for CMV, though this is unlikely to explain all of his issues. On 11/23, he had worsening oxygenation and was paralyzed and placed in prone position.     Today, patient remains critically ill and continues on broad spectrum antimicrobials, norepinephrine and vasopressin for blood pressure, and on inhaled epoprostenol. He had GI bleed 11/25 and was transfused with 3 units PRBC's. EGD showed duodenal ulcer with adherent clot over the area of GDA, s/p hemospray and clipping.  S/p angio 11/25 with coil embolization off the GDA and branch off SMA. Appreciate ongoing ID and heme/onc involvement.      Bilateral patchy groundglass opacities  Patient was admitted on 11/12 without significant respiratory symptoms and CXR showed low lung volumes with mild bilateral basilar atelectasis versus infiltrate.  As outlined above, his respiratory status progressively worsened during the hospitalization and he required intubation on 11/21. CT angiogram of the chest did not show any PE 11/18.  Did show the bilateral groundglass multifocal opacities concerning for pneumonia.  - Earlier in hospital stay patient was on Acyclovir, fluconazole, Cefepime and vancomycin. On 11/18 started on doxycycline, meropenem and voriconazole.  - Bronchoscopy done on 11/21  - Workup so far includes negative aspergillus, beta D glucan, influenza antigen, RVP, sputum cultures  - Repeat CT scan on 11/22 showed no PE, confirmed severe GGO bilaterally  - Pt has + Quantiferon gold, has not been treated for this.  - Pulmonology, oncology, and ID consulted.  - Current etiology unclear. Oncology feels the timing does not fit with Bleomycin toxicity and that seminoma would be very unlikely to cause lymphangitic spread that would present like this.   - CMV was found in the bronch. Per ID, very rare for CMV to cause pneumonitis outside of BMT or transplant populations, but reasonable to treat this.   - Continue antimicrobials of               Acyclovir 11/14 (for mucositis)              Doxycycline 11/18 -               Meropenem 11/18              Gancyclovir 11/22 -               Voriconazole 11/18 -  - started empiric SoluMedrol 40 mg daily on 11/23     Acute hypoxic respiratory failure  ARDS  - Due to underlying process above, PE ruled out   - Continue lung protective ventilation per intensivist  - Intubated 11/21  - Inhaled epoprostenol started 11/23  - fentanyl/propofol for sedation  - Worsening oxygenation afternoon of 11/23, patient was paralyzed and pronated     Hypotension/septic shock  - Sedation vs infection related  - Norepinephrine available as needed to maintain MAP > 65.  - Continue norepinephrine and vasopressin     Hyperglycemia  - Due to steroids and infection, was started on insulin drip 11/23-24  - Switch to basal/bolus regimen with 40 of Lantus daily, medium dose SSi and 6 units aspart q4h     Leukocytosis  - White count has been very elevated in the 60-50 range after filgrastim doses.  Monitor daily.  - persistently elevated in 50-60 range  - Left shift seen on differential--reactive vs related to Filgrastim     Hypocalcemia  - Has been an ongoing issue during hospitalization  - Replace as needed.     Seminoma  - This was initially diagnosed in September 2020.  Started chemotherapy while hospitalized in October.  Has had issues with admissions for neutropenic fever and urosepsis since that time.  Per oncology, patient has locally advanced disease which has been responding to curative intent chemotherapy.  - Outpt chemo regimen was bleomycin, etoposide and carboplatin.  - As outlined above, oncology feels that his current lung filtrates more likely infectious and related to his cancer, which is improving, or his chemotherapy, which should not be at this point sufficient to cause the lung toxicity.     ABL anemia related to GI bleed.  Thrombocytopenia  - Hb has ranged anywhere from as low as 5 to as high 11 during the  hospitalization.  A combination of blood loss from GI tract which is likely related to mucositis as well as chemotherapy induced pancytopenia.  - Has received multiple blood transfusions during hospitalization  - Hb 11/25 5.9. will stop TF's, IV PPI and I have notified GI.  - s/p 3 units PRBC's 11/25.  - s/p EGD and angiogram 11/25 which showed duodenal ulcer in region of GDA and s/p coiling of branch of GDA and SMA.         Diet: NPO per Anesthesia Guidelines for Procedure/Surgery Except for: Meds    DVT Prophylaxis: Pneumatic Compression Devices  Palafox Catheter: in place, indication: Strict 1-2 Hour I&O  Code Status: Full Code           Disposition Plan   Expected discharge: 4 - 7 days, recommended to transitional care unit once hemoglobin stable and SIRS/Sepsis treated.  Entered: Regina Leos MD 11/26/2020, 8:31 AM       The patient's care was discussed with the Bedside Nurse.    Regina Leos MD  Hospitalist Service  [facility]  Contact information available via [facility] Paging/Directory    ______________________________________________________________________    Interval History     + bloody stools this am. No fever.    Data reviewed today: I reviewed all medications, new labs and imaging results over the last 24 hours. I personally reviewed no images or EKG's today.    Physical Exam   Vital Signs: Temp: 98.1  F (36.7  C) Temp src: Bladder BP: (!) 151/97 Pulse: 50   Resp: 12 SpO2: 100 % O2 Device: Mechanical Ventilator    Weight: 120 lbs 9.47 oz    Gen - Intubated and sedated.  Lungs - CTA B.  Heart - RR,S1+S2 nml, no m/g/r.  Abd - soft, NT, ND, + BS.  Ext - no edema.    Data   Recent Labs   Lab 11/26/20  0515 11/26/20  0230 11/25/20  2205 11/25/20  0600 11/25/20  0600 11/24/20  0600 11/24/20  0600 11/23/20  0320 11/23/20  0320 11/19/20  1127 11/19/20  1127   WBC 38.9*  --   --   --  56.5*  --  60.3*  --  58.2*   < > 52.4*   HGB 7.0* 7.6* 7.9*   < > 5.9*   < > 8.3*   < > 9.1*   < >  11.5*   MCV 89  --   --   --  97  --  96  --  97   < > 91     --   --   --  633*  --  465*  --  365   < > 88*   INR  --   --   --   --   --   --   --   --  1.12  --   --    *  --   --   --   --   --  140  --  144   < >  --    POTASSIUM 5.2  --   --   --  5.0  --  4.1  --  3.4   < >  --    CHLORIDE 115*  --   --   --   --   --  108  --  112*   < >  --    CO2 25  --   --   --   --   --  26  --  29   < >  --    BUN 50*  --   --   --   --   --  25  --  21   < >  --    CR 1.84*  --   --   --   --   --  1.27*  --  1.57*   < >  --    ANIONGAP 5  --   --   --   --   --  6  --  3   < >  --    IVANIA <5.0*  --   --   --   --   --  6.3*  --  7.0*   < >  --    *  --   --   --   --   --  184*  --  129*   < >  --    ALBUMIN  --   --   --   --   --   --   --   --  1.4*  --  1.6*   PROTTOTAL  --   --   --   --   --   --   --   --  4.8*  --  5.4*   BILITOTAL  --   --   --   --   --   --   --   --  0.3  --  0.3   ALKPHOS  --   --   --   --   --   --   --   --  533*  --  385*   ALT  --   --   --   --   --   --   --   --  24  --  19   AST  --   --   --   --   --   --   --   --  48*  --  47*    < > = values in this interval not displayed.     No results found for this or any previous visit (from the past 24 hour(s)).  Medications     dextrose       dextrose       epoprostenol (VELETRI) 20 mcg/mL in sterile water inhalation solution 20 ng/kg/min (11/26/20 0604)     fentaNYL 100 mcg/hr (11/26/20 0730)     - MEDICATION INSTRUCTIONS -       - MEDICATION INSTRUCTIONS -       - MEDICATION INSTRUCTIONS -       - MEDICATION INSTRUCTIONS -       midazolam 6 mg/hr (11/26/20 0730)     norepinephrine 0.05 mcg/kg/min (11/26/20 0733)     pantoprazole (PROTONIX) infusion ADULT/PEDS GREATER than or EQUAL to 45 kg 8 mg/hr (11/26/20 0730)     propofol (DIPRIVAN) infusion 45 mcg/kg/min (11/26/20 0730)     sodium chloride 100 mL/hr at 11/26/20 0800     vasopressin Stopped (11/26/20 0730)     vecuronium (NORCURON) infusion ADULT Stopped  (11/25/20 0835)       [Held by provider] acetylcysteine  4 mL Nebulization Q6H     [Held by provider] albuterol  2.5 mg Nebulization Q6H     allopurinol  300 mg Per Feeding Tube Daily     artificial tears   Both Eyes Q8H     calcium carbonate  2,500 mg Per Feeding Tube BID     doxycycline (VIBRAMYCIN) IV  100 mg Intravenous Q12H     fentaNYL  50 mcg Intravenous Once within 24 hrs     ganciclovir (CYTOVENE) intermittent infusion  2.5 mg/kg Intravenous Q24H     [Held by provider] heparin ANTICOAGULANT  5,000 Units Subcutaneous Q12H     insulin aspart  1-6 Units Subcutaneous Q4H     lidocaine  5 mL Topical Once     meropenem  1 g Intravenous Q12H     methylPREDNISolone  40 mg Intravenous Q24H     midazolam  2 mg Intravenous Once within 24 hrs     sodium chloride (PF)  10 mL Intracatheter Q8H     sodium chloride (PF)  10 mL Intracatheter Q7 Days     sodium chloride (PF)  3 mL Intracatheter Q8H     sodium chloride (PF)  3 mL Intracatheter Q8H     sodium chloride (PF)  3 mL Intracatheter Q8H     voriconazole  4 mg/kg Intravenous Q12H

## 2020-11-27 NOTE — PROGRESS NOTES
CLINICAL NUTRITION SERVICES - BRIEF NOTE     - Refer to previous RD notes.  - Per review of chart/orders and discussion with team during rounds, patient now comfort cares.  - RD sign-off per policy, will no longer follow.      Razia Aranda RDN, LD  Clinical Dietitian  3rd floor/ICU: [phone]  All other floors: [phone]  Weekend/holiday: [phone]

## 2020-11-27 NOTE — DISCHARGE SUMMARY
Admit Date:     2020   Discharge Date:           DEATH SUMMARY      DATE OF DEATH:  2020      DISCHARGE DIAGNOSES:   1.  Acute blood loss anemia related to upper gastrointestinal bleed from duodenal ulcer.   2.  Acute hypoxic respiratory failure with resultant acute respiratory distress syndrome.   3.  Septic shock.   4.  Seminoma, was on chemotherapy.      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  He was originally admitted on  for neutropenic fever.  During his hospital course he developed cough, tachycardia, worsening respiratory distress.  He was intubated on 2020.  He underwent a bronchoscopy.  He was placed on broad-spectrum antivirals and antibiotics to include voriconazole, ganciclovir, meropenem and doxycycline.  CMV was found in BAL.  During his hospital course, he continued to deteriorate, which culminated in an upper GI bleed on 2020.  An upper endoscopy showed a duodenal ulcer in the region of the gastroduodenal artery.  He had coiling of the branch of the gastroduodenal artery and superior mesenteric artery with Interventional Radiology.  Despite this, he continued to bleed.  See the intensivist progress note today's for details.  We had a discussion with the family and they opted for comfort care.  He was terminally extubated and  shortly thereafter.         ASHISH TOMAS MD             D: 2020   T: 2020   MT: GIORGIO      Name:     RAJWINDER OSULLIVAN   MRN:      [id number]        Account:        [id number]   :      1965           Admit Date:     2020                                  Discharge Date:       Document: [id number]

## 2020-11-27 NOTE — PROGRESS NOTES
[facility]  Infectious Disease Progress Note          Assessment and Plan:   IMPRESSION:   1.  A 55-year-old male, well known to me from recent admission, now admitted with acute fever and profound neutropenia from chemotherapy, no clear focal infection, mild respiratory symptoms and recent urosepsis as possible sources, but unclear.   2.  Seminoma with recent chemotherapy, now profoundly neutropenic.   3.  Recent episode of urosepsis with Klebsiella and then subsequent episode of sepsis resolved with oral Cipro without clearcut infection being found.   4.  Latent tuberculosis, recent new diagnosis.  No evidence of active tuberculosis.   5.  COVID-19 rule out, negative times several.   6.  Slight hypoxia, minimal chest x-ray abnormality.   7.  Renal insufficiency.   8 Severe stomatitis, ? Chemo vs HSV  9 GI bleeding     RECOMMENDATIONS:   1.    Despite marrow recovery and white cells rising(very high with marrow recovery and growth factors not a marker of bacterial infection), major worsening primarily respiratory and fever.  CT scan  new worsening infiltrates.  Large differential diagnosis here, essentially neutropenic related potential pneumonia, conceivably underlying disease, multiple possibilities large differential diagnosis incl Bleo toxicity  BAL wo new info    2 On  meropenem, doxycycline,  vori already now ganciclovir Vori IV not viable as creat up and looks like longer wo enteral access so for now to micafungin  3  fungal serologies and labs neg incl gal neg, lung other things still concern for potential TB in the background, imaging not typical  4 repeat COVID-19 testing ,  this is unlikely to be the diagnosis, neg, still airborne for sl TB potential but neg BAL smear so likely not contagious or for that matter TB as dx  5 Now ganciclovir with BAL + CMV but very unlikiely this is a true lung infection or anything more than clinically insig reactivation no need acyclovir as  "ganciclovir covers HSV well    6  On  steroids as bleo toxicity is  in ddx,  BAL studies all so far neg incl cytology  Not likely PJP as fungitel neg an now neg  cytology   Looks likem back off on care  He has a neg BAL SFB can discontinue airborne  Call if issues          Interval History:   iICU high flow FIO2  60% not better reviewed all BAL pending, fungitel 0, no + cxs except BAL CMV, by serology is reactivation GI bleeding now ongoing              Medications:       glycopyrrolate  0.2 mg Intravenous Once     lidocaine  5 mL Topical Once     sodium chloride (PF)  10 mL Intracatheter Q8H     sodium chloride (PF)  10 mL Intracatheter Q7 Days     sodium chloride (PF)  3 mL Intracatheter Q8H     sodium chloride (PF)  3 mL Intracatheter Q8H     sodium chloride (PF)  3 mL Intracatheter Q8H                  Physical Exam:   Blood pressure 90/62, pulse 62, temperature 99  F (37.2  C), temperature source Bladder, resp. rate 22, height 1.549 m (5' 1\"), weight 59.3 kg (130 lb 11.7 oz), SpO2 99 %.  Wt Readings from Last 2 Encounters:   11/27/20 59.3 kg (130 lb 11.7 oz)   11/09/20 46.3 kg (102 lb)     Vital Signs with Ranges  Temp:  [96.1  F (35.6  C)-99.9  F (37.7  C)] 99  F (37.2  C)  Pulse:  [52-90] 62  Resp:  [0-22] 22  BP: ()/(54-73) 90/62  MAP:  [42 mmHg-109 mmHg] 105 mmHg  Arterial Line BP: ()/(29-94) 133/94  FiO2 (%):  [50 %-55 %] 55 %  SpO2:  [38 %-100 %] 99 %    Constitutional: Intubated sedated ICU   Lungs: Clear to auscultation bilaterally, no crackles or wheezing   Cardiovascular: Regular rate and rhythm, normal S1 and S2, and no murmur noted   Abdomen: Normal bowel sounds, soft, non-distended, non-tender   Skin: No rashes, no cyanosis, no edema   Other:           Data:   All microbiology laboratory data reviewed.  Recent Labs   Lab Test 11/27/20  0952 11/27/20  0545 11/27/20  0150 11/26/20  0515 11/26/20  0515 11/25/20  0600 11/25/20  0600   WBC  --  36.4*  --   --  38.9*  --  56.5*   HGB 8.6* " 7.4* 5.1*   < > 7.0*   < > 5.9*   HCT  --  21.7*  --   --  21.3*  --  18.7*   MCV  --  95  --   --  89  --  97   PLT  --  176  --   --  418  --  633*    < > = values in this interval not displayed.     Recent Labs   Lab Test 11/27/20  0545 11/26/20  0515 11/24/20  0600   CR 1.88* 1.84* 1.27*     Recent Labs   Lab Test 11/19/20  0655   SED 18     Recent Labs   Lab Test 11/23/20  0555 11/23/20  0403 11/23/20  0326 11/22/20  1140 11/21/20  1259 11/21/20  0642 11/17/20  1108 11/17/20  1104 11/12/20  1631   CULT Culture received and in progress.  Positive AFB results are called as soon as detected.    Final report to follow in 7 to 8 weeks.    Assayed at [facility] [address] [place] [address] [phone] No growth after 4 days No growth after 4 days Culture received and in progress.  Positive AFB results are called as soon as detected.    Final report to follow in 7 to 8 weeks.    Assayed at [facility] [address] [place] [address] [phone]  No growth No growth after 5 days  Culture negative monitoring continues  Culture negative after 4 days  Culture received and in progress.  Positive AFB results are called as soon as detected.    Final report to follow in 7 to 8 weeks.    Assayed at [facility] [address] [place] [id number] [phone]  No growth  Culture negative monitoring continues  Canceled, Test credited  Duplicate request   Culture received and in progress.  Positive AFB results are called as soon as detected.    Final report to follow in 7 to 8 weeks.    Assayed at [facility] [address] [place] [address] [phone] No growth No growth No growth

## 2020-11-27 NOTE — PROGRESS NOTES
[facility] Critical Care Progress Note                          11/27/2020    Name: Luz Marina Diallo MRN#: [id number]   Age: 55 year old YOB: 1965     [facility]    MV DAY #6             Problem List:   Active Problems:    FUO (fever of unknown origin)    HSV (herpes simplex virus) infection    History of latent tuberculosis    Urinary retention    Febrile neutropenia (H)    Acquired immunocompromised state (H)           Summary/Hospital Course:     55 year old male with a history of a metastatic seminoma s/p chemotherapy who presents with fevers, increased wbc ongoing concern for infection vs bleomycin associated respiratory dysfunction who was admitted to the ICU on 11/21/2020 for management of hypoxic respiratory failure requiring intubation.    11/23: Worsening hypoxemia overnight with worsening secretions.  PEEP increased to 16 and FiO2 now 100%, P/F ratio around 100.     11/24: Required initiation of prone ventilation overnight.  Now deeply sedated, paralyzed and on Veletri.     11/25: Noted to have bright red blood per rectum this morning.  EGD showed large duodenal vessel that was hemosprayed and clipped.    11/26: Status post EGD with hemospray and clip as well as IR coil embolization.  GI bleed appears to have slowed down.  Pressor needs are better.  Oxygenation mildly improved.    11/27: Had recurrent GI bleed overnight with significant increase in pressors.  Family elected to go to comfort care this morning.      Assessment and plan :     Luz Marina Diallo IS a 55 year old male with a history of a metastatic seminoma s/p chemotherapy who presents with fevers, increased wbc ongoing concern for infection vs bleomycin associated respiratory dysfunction who was admitted to the ICU on 11/21/2020 for management of hypoxic respiratory failure requiring intubation.    I have personally reviewed the daily labs, imaging studies, cultures and discussed the  case with referring physician and consulting physicians.     My assessment and plan by system for this patient is as follows:    CNS:   Sedation/ pain:   -RASS goal -4 to -5   -Continue versed, fentanyl, and propofol  -Paralytic off on 11/25    Pulmonary:   Hypoxemic respiratory failure: Requiring mechanical ventilation (day 7).  Had respiratory decompensation 11/23 which required significant increase in ventilator support as well as initiation of prone ventilation.  Remains heavily sedated and on Veletri.  Paralytic has been off greater than 24 hours.  Etiology not entirely clear but was initiated on steroids for concern of bleomycin toxicity on 11/23.  Remains on fairly significant ventilator support.  -Continue current antimicrobials (appreciate IDs assistance)  -Continue methylprednisolone 40 mg IV daily for bleomycin toxicity  -Follow bronchoscopy cultures, so far just CMV positive  -Plan for comfort cares today, will terminally extubate later this afternoon    CV:   Shock: Significant increase in pressor needs overnight in setting of GI bleed.  -Now on Levophed and vasopressin   -Packed cells for resuscitation  -No further aggressive cares as will be moving to comfort cares today    Tachycardia - likely related to distress, work of breathing, anxiety and hypovolemia  - has now improved  increase sedation    FEN/GI:   GI bleed: Noted have significant drop in hemoglobin and bright red blood per rectum on 11/25.  Underwent EGD which showed a large bleeding vessel in the duodenum.  Vessel was hemosprayed and clipped.  Following that, had coil embolization of the GDA as well as a small branch off the SMA as prophylactic treatment for possible recurrent bleed.  Had rebleed overnight with 1.5 L of elmer blood from below.  Case discussed with IR who thought no further intervention was needed given how critically ill he was.  -Plan to move to comfort cares this afternoon    Mucositis - can use magic mouthwash to wipe  inside of mouth for comfort    : adequate uop, no concerns reguarding renal function  Hypernatremia: 140 this morning  -Continue 160 ml free water Q 4 hr    Heme/onc:   Hx of metastatic seminoma s/p BEP therapy. Patient now with concern of bleomycin related lung toxicity or other pulmonary infection. Patient did receive neupogen early on in his course.  -Continue methylprednisolone 40 mg IV daily (started 11/23)    Msk: no acute skin issues    Endo: Q4H BG checks    ID:   unclear cause of possible infection with very high WBC. c dif checked x2 with negative result.  Bronchoscopy showed positive CMV, thus far remaining studies are negative  - continue broad antibiotics with aid of ID.   - Quanterferon gold test resulted positive. AFP smear from BAL is pending  - Continue steroids  - Continue meropenem and voriconazole  - Continue ganicyclovir per ID    IV/Access:   1. Venous access - PICC  2. Arterial access - Right femoral eric     Plan  - central access required and necessary    ICU Prophylaxis:   1. DVT: Hep subcutaneous  2. VAP: HOB 30 degrees, chlorhexidine rinse  3. Stress Ulcer: PPI gtt  4. Restraints: Nonviolent soft two point restraints required and necessary for patient safety and continued cares and good effect as patient continues to pull at necessary lines, tubes despite education and distraction. Will readdress daily.   5. Wound care - per unit routine   6. Feeding - tube feeds   7. Family Update: Yes, sister by phone  8. Disposition - ICU, comfort cares later today    Key goals for next 24 hours:   1.  Comfort cares later today        Interim History/Overnight Events:     Rebleed overnight.  1.5 L per rectum.  Pressor needs increased significantly.  Family at bedside this morning and will moving to comfort cares.         Key Medications:       glycopyrrolate  0.2 mg Intravenous Once     lidocaine  5 mL Topical Once     sodium chloride (PF)  10 mL Intracatheter Q8H     sodium chloride (PF)  10 mL  Intracatheter Q7 Days     sodium chloride (PF)  3 mL Intracatheter Q8H     sodium chloride (PF)  3 mL Intracatheter Q8H     sodium chloride (PF)  3 mL Intracatheter Q8H       epoprostenol (VELETRI) 20 mcg/mL in sterile water inhalation solution 20 ng/kg/min (11/27/20 1220)     fentaNYL 50 mcg/hr (11/27/20 1220)     midazolam 3 mg/hr (11/27/20 1000)     norepinephrine 0.2 mcg/kg/min (11/27/20 1244)     propofol (DIPRIVAN) infusion 35 mcg/kg/min (11/27/20 1102)     vasopressin 2.4 Units/hr (11/27/20 1000)               Physical Examination:   Temp:  [96.1  F (35.6  C)-99.9  F (37.7  C)] 99.1  F (37.3  C)  Pulse:  [52-90] 75  Resp:  [0-22] 22  BP: ()/(54-73) 90/62  MAP:  [42 mmHg-114 mmHg] 61 mmHg  Arterial Line BP: ()/() 84/49  FiO2 (%):  [50 %-55 %] 55 %  SpO2:  [38 %-100 %] 92 %    Intake/Output Summary (Last 24 hours) at 11/23/2020 0701  Last data filed at 11/23/2020 0600  Gross per 24 hour   Intake 3808 ml   Output 1260 ml   Net 2548 ml     Wt Readings from Last 4 Encounters:   11/27/20 59.3 kg (130 lb 11.7 oz)   11/09/20 46.3 kg (102 lb)   11/06/20 51.2 kg (112 lb 14.4 oz)   11/02/20 46.3 kg (102 lb)     Arterial Line BP: ()/() 84/49  MAP:  [42 mmHg-114 mmHg] 61 mmHg  BP - Mean:  [71-87] 73  Ventilation Mode: CMV/AC  (Continuous Mandatory Ventilation/ Assist Control)  FiO2 (%): 55 %  Rate Set (breaths/minute): 22 breaths/min  Tidal Volume Set (mL): 360 mL  PEEP (cm H2O): 10 cmH2O  Oxygen Concentration (%): 55 %  Resp: 22    Recent Labs   Lab 11/26/20  0515 11/25/20  1800 11/25/20  0935 11/25/20  0600   PH 7.39 7.39 7.43 7.38   PCO2 38 40 41 44   PO2 119* 90 51* 74*   HCO3 23 24 27 26   O2PER 60% 60 VENT 45% VENT VENT 45%       General:   Comfortable, no pain or respiratory distress   Neurologic:   Sedation: deeply sedated and paralyzed   HEENT:   Head is atraumatic  Endotrachael tube is present      Lungs:   Symmetrical and normal breath sounds, no wheeze, ronchi, crackles, rub or  bronchial breath sounds   Cardiovascular:   Regular rate and rhythm  Normal S1,S2, and no gallop, rub or murmur   Abdomen:   Distended:  No.   Bowel sound present and normal: Yes .   Soft: Yes . Tender: No.   Rebound:tendeness or guarding present No   Extremities:   Clubbing present: No,   Edema present: No,   Acrocyanosis present: No,   Mottling present: No,   Normal capillary refill: Yes    Skin:   Warm, dry.  No rash on limited exam.    Lines/Drain:    Central line present: Yes   Arterial line present: Yes   External ventricular Drain present: No  Chest tube present: No  NAHEED present: No  PEG present: No            Data:   All data and imaging reviewed.     ROUTINE ICU LABS (Last four results)  CMP  Recent Labs   Lab 11/27/20  0545 11/26/20  0515 11/25/20  0600 11/24/20  0600 11/23/20  0320   * 145*  --  140 144   POTASSIUM 5.8* 5.2 5.0 4.1 3.4   CHLORIDE 120* 115*  --  108 112*   CO2 21 25  --  26 29   ANIONGAP 6 5  --  6 3   * 126*  --  184* 129*   BUN 66* 50*  --  25 21   CR 1.88* 1.84*  --  1.27* 1.57*   GFRESTIMATED 40* 41*  --  64 49*   GFRESTBLACK 46* 47*  --  74 57*   IVANIA 5.0* <5.0*  --  6.3* 7.0*   MAG 1.6 1.8 1.8 1.9 1.5*   PHOS 6.3* 5.1* 3.1 3.1 2.6   PROTTOTAL  --   --   --   --  4.8*   ALBUMIN  --   --   --   --  1.4*   BILITOTAL  --   --   --   --  0.3   ALKPHOS  --   --   --   --  533*   AST  --   --   --   --  48*   ALT  --   --   --   --  24     CBC  Recent Labs   Lab 11/27/20  0952 11/27/20  0545 11/27/20  0150 11/26/20  2350 11/26/20  0515 11/26/20  0515 11/25/20  0600 11/25/20  0600 11/24/20 0600 11/24/20 0600   WBC  --  36.4*  --   --   --  38.9*  --  56.5*  --  60.3*   RBC  --  2.29*  --   --   --  2.40*  --  1.93*  --  2.66*   HGB 8.6* 7.4* 5.1* 7.5*   < > 7.0*   < > 5.9*   < > 8.3*   HCT  --  21.7*  --   --   --  21.3*  --  18.7*  --  25.4*   MCV  --  95  --   --   --  89  --  97  --  96   MCH  --  32.3  --   --   --  29.2  --  30.6  --  31.2   MCHC  --  34.1  --   --   --   32.9  --  31.6  --  32.7   RDW  --  14.8  --   --   --  18.6*  --  17.7*  --  17.5*   PLT  --  176  --   --   --  418  --  633*  --  465*    < > = values in this interval not displayed.     INR  Recent Labs   Lab 11/23/20  0320   INR 1.12     Arterial Blood Gas  Recent Labs   Lab 11/26/20  0515 11/25/20  1800 11/25/20  0935 11/25/20  0600   PH 7.39 7.39 7.43 7.38   PCO2 38 40 41 44   PO2 119* 90 51* 74*   HCO3 23 24 27 26   O2PER 60% 60 VENT 45% VENT VENT 45%       All cultures:  Recent Labs   Lab 11/23/20  0555 11/23/20  0403 11/23/20  0326 11/22/20  1140 11/21/20  1259 11/21/20  0642   CULT Culture received and in progress.  Positive AFB results are called as soon as detected.    Final report to follow in 7 to 8 weeks.    Assayed at [facility] [address] [place] [id number] [phone] No growth after 4 days No growth after 4 days Culture received and in progress.  Positive AFB results are called as soon as detected.    Final report to follow in 7 to 8 weeks.    Assayed at [facility] [address] [place] [id number] [phone]  No growth No growth after 6 days  Culture negative monitoring continues  Culture negative after 4 days  Culture received and in progress.  Positive AFB results are called as soon as detected.    Final report to follow in 7 to 8 weeks.    Assayed at [facility] [address] [place] [id number] [phone]  No growth  Culture negative monitoring continues  Canceled, Test credited  Duplicate request   Culture received and in progress.  Positive AFB results are called as soon as detected.    Final report to follow in 7 to 8 weeks.    Assayed at [facility] [address] [place] [address] [phone]     No results found for this or any previous visit (from the past 24 hour(s)).              Billing: This patient is critically ill: Yes. Total critical care time today 45 min. This does not include time spent on procedures or  teaching.     Ralf Jacobs MD  Pulmonary & Critical Care Medicine  [facility]   Pager: [phone]

## 2020-11-27 NOTE — PROGRESS NOTES
ICU End of Shift Summary.  For vital signs and complete assessments, please see documentation flowsheets.     Pertinent assessments: RASS -4, shoulder shrugs with suctioning. Fentanyl, Versed and Propofol for sedation. Levo and Vaso to keep MAPs >65.  Lungs course, moderate thick secretions. +3 edema throughout, Lasix 40mg IV, Good urine output. 6 large loose bloody stools with clots.   Major Shift Events:    - 6 loose bloody stools with clots   - Critical Hgb 5.1, 2 units given, recheck Hgb 7.4, but blood had JUST finished. Additional 2 units have been ordered.    - Pressor needs have increased   - Lasix 40mg IV given x1  Plan (Upcoming Events): possible go to IR  Discharge/Transfer Needs:     Bedside Shift Report Completed : y  Bedside Safety Check Completed: y

## 2020-11-27 NOTE — PROGRESS NOTES
Received critical Hgb result of 5.1 from lab. Primary RN notified and tele RN notified who will notify tele MD. Awaiting blood orders per tele RN.

## 2020-11-27 NOTE — PLAN OF CARE
ICU End of Shift Summary.  For vital signs and complete assessments, please see documentation flowsheets.     Pertinent assessments: Unresponsive. BP labile with any movement. Requiring Levophed and Vasopressin to maintain MAP >65. Bloody stools x2. 1U pRBCs given. Family to bedside this AM to discuss plan of care.  Major Shift Events: Transition to comfort cares. Cares withdrawn. Time of Death 1430.  Donor Referral line contacted at [phone] with plan to terminally extubate. Referral number [id number]. Called with time of cardiac death. Pt is not candidate for tissue and organ donation. [facility] - pt is not a candidate for eye donation.  Plan (Upcoming Events): Family to contact [facility] with information regarding  home.  Discharge/Transfer Needs:

## 2020-11-27 NOTE — PROVIDER NOTIFICATION
DATE:  11/27/2020   TIME OF RECEIPT FROM LAB:  0650  LAB TEST:  Calcium  LAB VALUE:  <5, pt received a total of 3 Gm of Calcium Gluconate overnight  RESULTS GIVEN WITH READ-BACK TO Tele ICU AZUCENA Mckenzie.  TIME LAB VALUE REPORTED TO PROVIDER:   0652, AZUCENA Mckenzie will contact MD.

## 2020-11-27 NOTE — PROGRESS NOTES
"  SPIRITUAL HEALTH SERVICES: Tele-Encounter  Patient Location:  ICU  Spoke with: Patient sister via IPad.     Spiritual Health visit per consult order for end of life family support.  Pt is [employer].  Spoke with pt sister, Mamta, via IPad.  She and her brother, Analy, are at the bedside.  Mamta and Analy have two other brothers, Eulalio, in [place] and Chad, in [place].  Pt mother is in [place].  Pt father is in [place] with family \"stuck there since last Dec due to Covid\".  [place]amta [place] and also about their family farm in [place], [place].      Mamta shared that pt, Aureliano, has had \"a difficult life\" though the last year, he has lived with her and has \"had happy times\".   She and family are discerning about possible compassionate extubation and are making arrangements in advance of his death, to accompany his body to [place] by air.  She did not express spiritual/ritual needs at this time noting that \"it was not that big a part of my brother's life\".    Oriented to Spiritual Health Services.    PLAN:  Will continue to follow while on unit.  Spiritual Health remains available throughout hospital stay upon request.      Boris Daley  Staff   Pager [phone]  ____________________________     Type of service:  Telephone Visit      has received verbal consent for a TelephoneVisit from the patient? Yes     Distance Provider Location: designated [place] office or home office (secure setting)     Mode of Communication: telephone (via [employer] phone or [employer] tele-call-number ([phone])              "

## 2020-11-27 NOTE — PROGRESS NOTES
Called to pronounce patient as . On arrival pt has no spontaneous movement, no respirations and no heart tones. Pupils are fixed and dilated.    Time of death : 2-20 pm.

## 2020-11-27 NOTE — PROGRESS NOTES
Brief Tele-ICU note:    Called due to patient having large maroon stool with clots and increased pressor requirements.     Stat Hgb was 7.5, relatively stable from 7.8. Will continue to trend q4h. No need for blood at this point, but will follow.     As no oral access, will change scheduled calcium to 1 g CaGluconate BID, check ICa now, may need additional replacement  Fluid overload, give lasix 40 mg IV x1, may need albumin if doesn't tolerate.     Rico Dalton MD     Addendum:    Patient did become hypotensive, albumin bolus given.     Around 220 AM, Hgb returned at 5.1, two units PRBCs ordered.     Rico Dalton MD

## 2020-11-27 NOTE — PROGRESS NOTES
[facility] ICU VENTILATOR RESPIRATORY NOTE  Date of Admission: 11/12/2020  Date of Intubation (most recent): 11/21/2020  Reason for Mechanical Ventilation: Respiratory failure   Number of Days on Mechanical Ventilation: 6  Met Criteria for Pressure Support Trial: no  Reason for No Pressure Support Trial: peep of 12  Significant Events Today: weaned PEEP from 12 to 10       Plan:  Wean FiO2 as tolerated.

## 2020-11-27 NOTE — PROGRESS NOTES
GASTROENTEROLOGY CHART UPDATE ( patient not seen)        I have reviewed the patient's new clinical lab results:     Recent Labs   Lab Test 11/27/20  0545 11/27/20  0150 11/26/20  2350 11/26/20  0515 11/26/20  0515 11/25/20  0600 11/25/20  0600 11/23/20  0320 11/23/20  0320 11/18/20  0600 11/18/20  0600 11/12/20  1601 11/12/20  1601   WBC 36.4*  --   --   --  38.9*  --  56.5*   < > 58.2*   < > 31.4*   < > 0.1*   HGB 7.4* 5.1* 7.5*   < > 7.0*   < > 5.9*   < > 9.1*   < > 10.9*   < > 7.8*   MCV 95  --   --   --  89  --  97   < > 97   < > 93   < > 91     --   --   --  418  --  633*   < > 365   < > 79*   < > 105*   INR  --   --   --   --   --   --   --   --  1.12  --  1.13  --  1.00    < > = values in this interval not displayed.     Recent Labs   Lab Test 11/27/20  0545 11/26/20  0515 11/25/20  0600 11/24/20  0600   POTASSIUM 5.8* 5.2 5.0 4.1   CHLORIDE 120* 115*  --  108   CO2 21 25  --  26   BUN 66* 50*  --  25   ANIONGAP 6 5  --  6     Recent Labs   Lab Test 11/23/20  0320 11/19/20  1127 11/17/20  0456 11/12/20  1620 11/12/20  1620 10/26/20  2240 10/26/20  2240 10/26/20  1803 09/23/20 2112 09/23/20 2112   ALBUMIN 1.4* 1.6* 1.4*   < >  --    < >  --   --    < > 3.7   BILITOTAL 0.3 0.3 0.2   < >  --    < >  --   --    < > 0.2   ALT 24 19 23   < >  --    < >  --   --    < > 21   AST 48* 47* 18   < >  --    < >  --   --    < > 40   PROTEIN  --   --   --   --  50*  --  20* 30*   < >  --    LIPASE  --   --   --   --   --   --   --   --   --  115    < > = values in this interval not displayed.     ENDOSCOPY   11/25 EGD  Impression:         - Z-line regular, 37 cm from the incisors.                             - Normal stomach.                             - Non-obstructing oozing duodenal ulcer with                             adherent clot. There is no evidence of perforation.                             This is over the area of the gastroduodeal artery.                             S/P hemospray and clip x 3.                              - No specimens collected.   Recommendation:                             - If rebleeds, recommend IR evaluation with                             embolization. Likely that further endoscopic                             attempts to control bleeding would be futile.     ASSESSMENT/ PLAN  Luz Marina Diallo is a 55-year-old male with metastatic seminoma status post chemotherapy presented with fevers white blood cell count respiratory failure who developed GI bleeding secondary to a duodenal ulcer that could not be controlled endoscopically and required IR intervention 11/25 with coiling of a branch of the GDA and SMA. Currently intubated and sedated in the ICU with increasing vasopressor needs.     1. GI bleeding / Duodenal ulcer:  IR embolization of a branch of the GDA and SMA on11/25. HgB continues to drift with hematochezia, given 2 units of PRBCs this morning for HgB of 5.1. Ongoing management with intensive care MD and IR. Patient is critically ill.     2. Respiratory failure/ ARDS    3. Seminoma with recent chemotherapy      Discussed with Dr. Wiley. Please contact with questions or change in condition.   Kamille Pak PA-C  [facility] ( [facility])

## 2020-11-27 NOTE — PROGRESS NOTES
Ventilation Mode: CMV/AC  (Continuous Mandatory Ventilation/ Assist Control)  FiO2 (%): 55 %  Rate Set (breaths/minute): 22 breaths/min  Tidal Volume Set (mL): 360 mL  PEEP (cm H2O): 10 cmH2O  Oxygen Concentration (%): 55 %  Resp: 22    [facility] ICU VENTILATOR RESPIRATORY NOTE  Date of Admission: 11/12/2020  Date of Intubation (most recent): 11/21/2020  Reason for Mechanical Ventilation: Respiratory failure   Number of Days on Mechanical Ventilation: 7  Met Criteria for Pressure Support Trial: No  Reason for No Pressure Support Trial: PEEP >8  Significant Events Today: Large amounts of cloudy thick secretions were suctioned.        Plan:  Continue to monitor and assess for respiratory needs.

## 2020-11-27 NOTE — PROGRESS NOTES
[facility]  Palliative Care Progress Note  Text Page     Assessment & Plan   Attapeu SHAUNA Diallo is a 55 year old male who was admitted on 11/12/2020. I was asked to see the patient for goals of care.     Recommendations:   Please see assessments below for rationale.  1.Decisional Capacity -  Unreliable-intubated and sedated. Patient does not have an advance directive. Per  informed consent policy next of kin should be involved if patient becomes unable.  2. Dyspnea  -Full vent support  -Continue with fentanyl for pain/dyspnea post extubation  3. Pain- unable to assess  -On fentanyl for sedation will also cover pain- will cut in half due to extubation  4. Comfort focus  -terminal extubation when family is ready  -Added comfort medications and changed CODE status to DNR/DNI  -discontinue with all drips for blood pressures, IV  Fluids and feeding tube  5. Spiritual Care- Oriented to Spiritual Health as part of Palliative Care team.- Spiritism  6. Care Planning- SW to assist with discharge planning as able, support to family as able     Goals of Care: (POLST verbage) code status DNR/DNI comfort focus  Findings & plan of care discussed with: Bedside Nurse Comfort and Hospitalist Dr Leos, Dr Jacobs  Thank you for involving us in the patient's care. Please do not hesitate to call with any questions or concerns.     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 He was on the medical floor and went into respiratory distress and was intubated on 11/21. Patient is intubated and sedated at this time. Sister Mamta reports that she has talked to him about his goals of care and offered him an out if he thought the treatments has been too much and he told her that he was not ready to die and wanted to  continue with treatments. He remains full code and Mamta reports that they are always talking as a family about the next steps.     Serene SERRA CNP  Pain Management and Palliative Care  [facility]  Pgr: [phone]    Time Spent on this Encounter   Total unit/floor time 70  minutes, time consisted of the following, examination of the patient, reviewing the record and completing documentation. >50% of time spent in counseling and coordination of care.  Time spend counseling with family and medical team consisted of the following topics, goals of care, education about diagnosis, education about prognosis, care planning for discharge and symptom management.  Time spent in coordination of care with those listed above.     Interval History   Patient with worsening overall prognosis, increased bleeding, mottling of extremities. Family at bedside, transitioning to comfort cares    Course of Hospitalization Discussions Data   Discussed on November 27, 2020 with Serene SERRA CNP: Met with family at the bedside. Reviewed current condition, the last 24 hours, labs and current plan of care. Reviewed code status, discussed chest compressions in this situation of overall multi organ failure. Sister discussed that if there is no hope of getting better given the on going bleeding she would like to make him comfortable. She reports that they are ok with not escalating cares at this time and will take ventilator off once family says their good byes. Updated  on the status of comfort focus, they will contact the family via ipad. Medical team updated.     Discussed on November 25, 2020 with Serene SERRA CNP: Call placed to sister Mamta, introduced self and the palliative care team. Reviewed hospitalization as well as prior health before hospitalization. Asked about if patient and her talked about goals of care in the past and she said they recently talked about treatments and she said  he reported to her that he was not ready to die and was not thinking it was too much for him at this time. Discussed and reviewed code status. Discussed that he is very sick and there is not a clear diagnosis of the hypoxia. Discussed that CPR may not save his life. Discussed the side effects of CPR being broken ribs, pain and could mean worse harm. She verbalized understanding and stated that she will discuss this with her family. Discussed that we will continue to discuss this but her and her family should consider how far they would like to go and what that could potentially look like. She verbalized understanding. Questions asked and answered.     Review of Systems    Unable to assess due to intubation and sedated    Palliative Symptom Review (0=no symptom/no concern, 1=mild, 2=moderate, 3=severe):   Unable to assess     Physical Exam   Temp:  [96.1  F (35.6  C)-99.9  F (37.7  C)] 98.6  F (37  C)  Pulse:  [50-90] 60  Resp:  [0-25] 22  BP: ()/(44-96) 90/62  MAP:  [38 mmHg-109 mmHg] 98 mmHg  Arterial Line BP: ()/(24-84) 139/77  FiO2 (%):  [50 %-55 %] 55 %  SpO2:  [38 %-100 %] 100 %  130 lbs 11.72 oz  Exam:  GENERAL APPEARANCE:  intubated and sedated  RESP:  per ventilator  CV:  Palpation and auscultation of heart done , romelia at times  PSYCH:  intubated and sedated    Medications     dextrose       dextrose       epoprostenol (VELETRI) 20 mcg/mL in sterile water inhalation solution 20 ng/kg/min (11/27/20 0751)     fentaNYL 100 mcg/hr (11/27/20 0745)     - MEDICATION INSTRUCTIONS -       - MEDICATION INSTRUCTIONS -       - MEDICATION INSTRUCTIONS -       - MEDICATION INSTRUCTIONS -       midazolam 3 mg/hr (11/27/20 0745)     norepinephrine 0.27 mcg/kg/min (11/27/20 0949)     pantoprazole (PROTONIX) infusion ADULT/PEDS GREATER than or EQUAL to 45 kg 8 mg/hr (11/27/20 0745)     propofol (DIPRIVAN) infusion 35 mcg/kg/min (11/27/20 0840)     sodium chloride 100 mL/hr at 11/27/20 0900     vasopressin 2.4  Units/hr (11/27/20 0745)     vecuronium (NORCURON) infusion ADULT Stopped (11/25/20 0835)       [Held by provider] acetylcysteine  4 mL Nebulization Q6H     [Held by provider] albuterol  2.5 mg Nebulization Q6H     allopurinol  300 mg Per Feeding Tube Daily     artificial tears   Both Eyes Q8H     calcium gluconate  1 g Intravenous BID     doxycycline (VIBRAMYCIN) IV  100 mg Intravenous Q12H     ganciclovir (CYTOVENE) intermittent infusion  2.5 mg/kg Intravenous Q24H     [Held by provider] heparin ANTICOAGULANT  5,000 Units Subcutaneous Q12H     insulin aspart  1-6 Units Subcutaneous Q4H     lidocaine  5 mL Topical Once     meropenem  1 g Intravenous Q12H     methylPREDNISolone  40 mg Intravenous Q24H     micafungin  100 mg Intravenous Q24H     sodium chloride (PF)  10 mL Intracatheter Q8H     sodium chloride (PF)  10 mL Intracatheter Q7 Days     sodium chloride (PF)  3 mL Intracatheter Q8H     sodium chloride (PF)  3 mL Intracatheter Q8H     sodium chloride (PF)  3 mL Intracatheter Q8H       Data   Results for orders placed or performed during the hospital encounter of 11/12/20 (from the past 24 hour(s))   Glucose by meter   Result Value Ref Range    Glucose 128 (H) 70 - 99 mg/dL   Hemoglobin   Result Value Ref Range    Hemoglobin 7.8 (L) 13.3 - 17.7 g/dL   Glucose by meter   Result Value Ref Range    Glucose 103 (H) 70 - 99 mg/dL   Hemoglobin   Result Value Ref Range    Hemoglobin 7.5 (L) 13.3 - 17.7 g/dL   Glucose by meter   Result Value Ref Range    Glucose 124 (H) 70 - 99 mg/dL   Hemoglobin   Result Value Ref Range    Hemoglobin 8.5 (L) 13.3 - 17.7 g/dL   Hemoglobin   Result Value Ref Range    Hemoglobin 7.5 (L) 13.3 - 17.7 g/dL   Glucose by meter   Result Value Ref Range    Glucose 136 (H) 70 - 99 mg/dL   Calcium, ionized whole blood (AM Draw)   Result Value Ref Range    Calcium Ionized Whole Blood 3.2 (L) 4.4 - 5.2 mg/dL   Hemoglobin   Result Value Ref Range    Hemoglobin 5.1 (LL) 13.3 - 17.7 g/dL   Magnesium    Result Value Ref Range    Magnesium 1.6 1.6 - 2.3 mg/dL   Phosphorus   Result Value Ref Range    Phosphorus 6.3 (H) 2.5 - 4.5 mg/dL   CBC with platelets differential   Result Value Ref Range    WBC 36.4 (H) 4.0 - 11.0 10e9/L    RBC Count 2.29 (L) 4.4 - 5.9 10e12/L    Hemoglobin 7.4 (L) 13.3 - 17.7 g/dL    Hematocrit 21.7 (L) 40.0 - 53.0 %    MCV 95 78 - 100 fl    MCH 32.3 26.5 - 33.0 pg    MCHC 34.1 31.5 - 36.5 g/dL    RDW 14.8 10.0 - 15.0 %    Platelet Count 176 150 - 450 10e9/L    Diff Method Manual Differential     % Neutrophils 78.0 %    % Lymphocytes 7.0 %    % Monocytes 7.0 %    % Eosinophils 0.0 %    % Basophils 0.0 %    % Metamyelocytes 8.0 %    Absolute Neutrophil 28.5 (H) 1.6 - 8.3 10e9/L    Absolute Lymphocytes 2.5 0.8 - 5.3 10e9/L    Absolute Monocytes 2.5 (H) 0.0 - 1.3 10e9/L    Absolute Eosinophils 0.0 0.0 - 0.7 10e9/L    Absolute Basophils 0.0 0.0 - 0.2 10e9/L    Absolute Metamyelocytes 2.9 (H) 0 10e9/L    Anisocytosis Slight     Lizzie Cells Slight     Microcytes Present     Macrocytes Present     Smudge Cells Present    Basic metabolic panel   Result Value Ref Range    Sodium 147 (H) 133 - 144 mmol/L    Potassium 5.8 (H) 3.4 - 5.3 mmol/L    Chloride 120 (H) 94 - 109 mmol/L    Carbon Dioxide 21 20 - 32 mmol/L    Anion Gap 6 3 - 14 mmol/L    Glucose 194 (H) 70 - 99 mg/dL    Urea Nitrogen 66 (H) 7 - 30 mg/dL    Creatinine 1.88 (H) 0.66 - 1.25 mg/dL    GFR Estimate 40 (L) >60 mL/min/[1.73_m2]    GFR Estimate If Black 46 (L) >60 mL/min/[1.73_m2]    Calcium 5.0 (LL) 8.5 - 10.1 mg/dL   Glucose by meter   Result Value Ref Range    Glucose 155 (H) 70 - 99 mg/dL   Hemoglobin   Result Value Ref Range    Hemoglobin 8.6 (L) 13.3 - 17.7 g/dL

## 2020-11-27 NOTE — PROVIDER NOTIFICATION
Patient Condition Change    Pt had large loose maroon stool with clots. Pressure support needs increased, increased FiO2, +3 edema to BLE and +2 BUE.     Called Tele ICU, requested stat Hgb, consider Lasix use as pt is up almost 3L in fluid. Possible need for calcium and albumin. Advised no oral access for meds.     Tele ICU to talk to

## 2020-11-27 NOTE — PROGRESS NOTES
Oncology:     Chart check:     Seminoma, currently on curative intent chemotherapy with BEP, last given on 11/9.  -Admitted with respiratory failure, critically ill, currently vented, Covid negative.    -Bronchoscopy cultures so far positive for CMV. Currently on antibiotics, antiviral, antifungals and steroids for possible bleomycin toxicity, but felt more likely to be due to infection.  -Hemoglobin dropped, PRBCs given and hemoglobin improved to 8.6 today.  S/p IR embolization of branch of GDA on 11/25.   -No new oncology recommendations.  Please call us with any questions.    Araceli Pacheco PA-C  Hematology/Oncology  [facility]

## 2020-11-27 NOTE — PROGRESS NOTES
Patient terminally extubated without any complication pt family at bedside.    Angela Osuna, RT, RT  11/27/2020 2:15 PM

## 2020-11-28 LAB
P JIROVECII DNA SPEC QL NAA+PROBE: NOT DETECTED
SPECIMEN SOURCE: NORMAL

## 2020-11-29 LAB
BACTERIA SPEC CULT: NO GROWTH
BACTERIA SPEC CULT: NO GROWTH
BLD PROD TYP BPU: NORMAL
BLD UNIT ID BPU: 0
BLOOD PRODUCT CODE: NORMAL
BPU ID: NORMAL
SPECIMEN SOURCE: NORMAL
SPECIMEN SOURCE: NORMAL
TRANSFUSION STATUS PATIENT QL: NORMAL
TRANSFUSION STATUS PATIENT QL: NORMAL

## 2020-12-05 LAB
BACTERIA SPEC CULT: NORMAL
SPECIMEN SOURCE: NORMAL

## 2020-12-06 LAB
BACTERIA SPEC CULT: NORMAL
SPECIMEN SOURCE: NORMAL

## 2020-12-21 LAB
BACTERIA SPEC CULT: NORMAL
FUNGUS SPEC CULT: NORMAL
SPECIMEN SOURCE: NORMAL
SPECIMEN SOURCE: NORMAL

## 2021-01-18 LAB
MYCOBACTERIUM SPEC CULT: NORMAL
SPECIMEN SOURCE: NORMAL
SPECIMEN SOURCE: NORMAL

## 2021-01-19 LAB
MYCOBACTERIUM SPEC CULT: NORMAL
SPECIMEN SOURCE: NORMAL
SPECIMEN SOURCE: NORMAL

## 2021-06-09 NOTE — PROGRESS NOTES
[facility]  Infectious Disease Progress Note          Assessment and Plan:   IMPRESSION:   1.  A 55-year-old male, well known to me from recent admission, now admitted with acute fever and profound neutropenia from chemotherapy, no clear focal infection, mild respiratory symptoms and recent urosepsis as possible sources, but unclear.   2.  Seminoma with recent chemotherapy, now profoundly neutropenic.   3.  Recent episode of urosepsis with Klebsiella and then subsequent episode of sepsis resolved with oral Cipro without clearcut infection being found.   4.  Latent tuberculosis, recent new diagnosis.  No evidence of active tuberculosis.   5.  COVID-19 rule out, negative times several.   6.  Slight hypoxia, minimal chest x-ray abnormality.   7.  Renal insufficiency.   8 Severe stomatitis, ? Chemo vs HSV     RECOMMENDATIONS:   1.    Despite marrow recovery and white cells rising(very high with marrow recovery and growth factors not a marker of bacterial infection), major worsening primarily respiratory and fever.  CT scan shows new worsening infiltrates.  Large differential diagnosis here, essentially neutropenic related potential pneumonia, conceivably underlying disease, multiple possibilities large differential diagnosis incl Bleo toxicity  2 diarrhea, C. difficile neg, less diarrhea  discontinue  C. Difficile iso.  3 On  meropenem, doxycycline,  vori already  4  fungal serologies, lung other things still concern for potential TB in the background, imaging not typical  5 repeat COVID-19 testing but this is unlikely to be the diagnosis, neg, still airborne for sl TB potential  6 Now ganciclovir with BAL + CMV but very unlikiely this is a true lung infection or anything more than clinically insig reactivation    7  Major WE worsening 100% FiO2 in ICU, I do nopt object to steroids as bleo toxicity is high in ddx, await early BAL studies  Not likely PJP as fungitel neg but await cytology           [facility]ARC[facility] Progress Note    Date: 2021    Name: Tiffany Mlaik              MRN: [id number]              : 1955    Chemotherapy Cycle: C21D1 Durvalumab       Pt to [facility], ambulatory, at 1320. Mr. Mary Mercado was assessed and education was provided. Mr. Param Carvajal vitals were reviewed. Visit Vitals  /81 (BP 1 Location: Left upper arm, BP Patient Position: Sitting)   Pulse 71   Temp 97.8 °F (36.6 °C)   Resp 18   SpO2 96%       Right dual chest mediport lateral reservoir accessed with 20g 3/4inch montgomery needle. Port flushed easily and had brisk blood return. Lab results were obtained 2021 and reviewed. Lab results within ordered parameters to give chemo today. ANC: 3.5 , PLT:  306. Chemo dosages verified with today's BSA and found to be within 10% of ordered dosages. Durvalumab (Imfinzi) 700 mg/100 ml NS was infused at  124 ml/hr. VS stable at end of infusion and pt denied complaints. Line flushed with NS and blood return from port re-verified. Mr. Mary Mercado tolerated infusion, and had no complaints at this time. Mediport flushed with NS 20 ml and Heparin 500 units then de-accessed. No irritation or bleeding noted. Band-aid applied. Patient armband removed and shredded. Mr. Mary Mercado was discharged from [place] [address] in stable condition at 1450. He is to return on 2021 at 21  for his next pre-chemo lab appointment.     Marie Khan RN  2021  1450 "   Interval History:   iICU 100 % FIO2 reviewed all BAL pending, fungitel 0, no + cxs except BAL CMV, by serology is reactivation              Medications:       acetylcysteine  4 mL Nebulization Q6H     acyclovir (ZOVIRAX) IV  5 mg/kg Intravenous Q12H     albuterol  2.5 mg Nebulization Q6H     allopurinol  300 mg Per Feeding Tube Daily     calcium carbonate  2,500 mg Per Feeding Tube BID     doxycycline (VIBRAMYCIN) IV  100 mg Intravenous Q12H     ganciclovir (CYTOVENE) intermittent infusion  2.5 mg/kg Intravenous Q24H     heparin ANTICOAGULANT  5,000 Units Subcutaneous Q12H     lidocaine  5 mL Topical Once     meropenem  1 g Intravenous Q12H     pantoprazole  40 mg Per Feeding Tube BID     sodium chloride (PF)  10 mL Intracatheter Q7 Days     sodium chloride (PF)  3 mL Intracatheter Q8H     voriconazole  200 mg Per Feeding Tube Q12H DANK                  Physical Exam:   Blood pressure 110/77, pulse 117, temperature 100  F (37.8  C), resp. rate 24, height 1.549 m (5' 1\"), weight 49.7 kg (109 lb 9.1 oz), SpO2 100 %.  Wt Readings from Last 2 Encounters:   11/23/20 49.7 kg (109 lb 9.1 oz)   11/09/20 46.3 kg (102 lb)     Vital Signs with Ranges  Temp:  [99.8  F (37.7  C)-103.3  F (39.6  C)] 100  F (37.8  C)  Pulse:  [] 117  Resp:  [11-37] 24  BP: ()/(37-97) 110/77  MAP:  [60 mmHg-105 mmHg] 105 mmHg  Arterial Line BP: ()/(47-88) 126/80  FiO2 (%):  [50 %-100 %] 100 %  SpO2:  [68 %-100 %] 100 %    Constitutional: Intubated sedated ICU   Lungs: Clear to auscultation bilaterally, no crackles or wheezing   Cardiovascular: Regular rate and rhythm, normal S1 and S2, and no murmur noted   Abdomen: Normal bowel sounds, soft, non-distended, non-tender   Skin: No rashes, no cyanosis, no edema   Other:           Data:   All microbiology laboratory data reviewed.  Recent Labs   Lab Test 11/23/20  0320 11/22/20  1705 11/22/20  0430 11/21/20  0532 11/21/20  0532   WBC 58.2*  --  72.7*  --  62.1*   HGB 9.1* 10.0* 9.8* "   < > 9.0*   HCT 28.9*  --  30.3*  --  27.7*   MCV 97  --  94  --  94     --  234  --  136*    < > = values in this interval not displayed.     Recent Labs   Lab Test 11/23/20  0320 11/22/20  0430 11/21/20  0532   CR 1.57* 1.40* 1.37*     Recent Labs   Lab Test 11/19/20  0655   SED 18     Recent Labs   Lab Test 11/23/20  0403 11/23/20  0326 11/22/20  1140 11/21/20  1259 11/21/20  0642 11/17/20  1108 11/17/20  1104 11/12/20  1631 11/12/20  1620   CULT No growth after 2 hours No growth after 3 hours Culture negative monitoring continues No growth after 2 days  Culture negative after 2 days  No growth  Culture negative monitoring continues  Culture negative monitoring continues  Canceled, Test credited  Duplicate request   Culture received and in progress.  Positive AFB results are called as soon as detected.    Final report to follow in 7 to 8 weeks.    Assayed at [facility] [address] [place] [id number] [phone] No growth No growth No growth No growth

## 2023-03-11 NOTE — PLAN OF CARE
To Do:  End of Shift Summary  For vital signs and complete assessments, please see documentation flowsheets.     Pertinent assessments: VSS, denies pain. RA, afebrile. Anxious about current plan of care, new IV placed, bolus started. BLE edema slightly improving, penis remains swollen. Palafox patent. Uzbek speaking, sister interpreting.  Major Shift Events: Planning to start chemo on eves  Treatment Plan: Palafox, Heparin, Chemo   Statement Selected

## 2023-10-17 NOTE — PROGRESS NOTES
Luz Marina Diallo comes into clinic today at the request of Dr. Chan Ordering Provider for TOV (trial of void).    Patient presents to clinic for trial of void. An interpretor in patient's preferred language Setswana was used over the phone for this visit. Patient's catheter was disconnected from leg-bag and sterile cysto tubing was connected to open end of catheter. 300cc sterile water was gently instilled into bladder via gravity. Patient tolerated procedure well and indicated urge to urinate. Patient's catheter balloon was deflated and catheter was removed with ease. No complaints voiced by patient. Patient was successfully able to void 300 ml clear-yellow urine. Patient was instructed with help from interpretor to drink plenty of water. If unable to urinate during office hours, he should call our office. During non-office hours, instructed to go to ER. Patient expressed understanding from interpretor. Patient will follow-up as planned for next procedure.     This service provided today was under the supervising provider of the day Dr. Dowling, who was available if needed.    Patricia Alfred LPN  
Yes - the patient is able to be screened

## 2024-05-24 NOTE — ED NOTES
ECHO reviewed. I called the patient and went over the findings after he had left. Negative agitated saline test. EF 50-55%. Normal wall motion. Indeterminate diastolic function. No pericardial effusion. Again, patient instructed to follow up about length of DAPT and instructed usually 21 days followed by ASA monotherapy, and to follow up with PCP asbout lipid panel and lipitor.    [facility]  ED Nurse Handoff Report    Luz Marina Diallo is a 53 year old male   ED Chief complaint: Fatigue  . 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 Activity Level - Current:   Independent. Lift room needed: No. Bariatric: No   Needed: Yes- Laotian  Isolation: No. Infection: Not Applicable.     Vital Signs:   Vitals:    02/28/19 1610 02/28/19 1715   BP: 123/62 114/56   Pulse: 75 90   Resp:  23   Temp: 98.3  F (36.8  C) 99.5  F (37.5  C)   TempSrc: Oral    SpO2: 100%        Cardiac Rhythm:  ,   Cardiac  Cardiac Rhythm: Normal sinus rhythm  Pain level:    Patient confused: Yes. Patient Falls Risk: Yes.   Elimination Status: no void since ED admission   Patient Report - Initial Complaint: Fatigue, sent from clinic for HgB 4.1. Focused Assessment:   Cardiac - Cardiac WDL: WDL  Cardiac Monitoring - EKG Monitoring: Yes Cardiac Rhythm: NSR SV       16:37 Gastrointestinal Gastrointestinal - GI WDL: -WDL except Gastrointestinal Comment: pt states stool is white/yellow in color, not normal for him. SV    16:34 Neurological Cognitive - Cognitive/Neuro/Behavioral WDL: -WDL except (per sister, pt is mentally handicapped.) Level Of Consciousness: confused (per sister, has been confused since fatigue started a few days ago.) Arousal Level: opens eyes spontaneously Orientation: oriented x 4 (per sister, he is confused but answers most questions appropriately.) Follows Commands: yes Speech: clear; spontaneous (speaks laotian) Best Language: 0 - No aphasia Mood/Behavior: calm; cooperative  [place] Coma Scale - Best Eye Response: 4-->(E4) spontaneous Best Motor Response: 6-->(M6) obeys commands Best Verbal Response: 4-->(V4) confused [place] Coma Scale Score: 14         Tests Performed: Labs, EKG. Abnormal Results:   Labs Ordered and Resulted  from Time of ED Arrival Up to the Time of Departure from the ED   CBC WITH PLATELETS DIFFERENTIAL - Abnormal; Notable for the following components:       Result Value    RBC Count 1.54 (*)     Hemoglobin 4.1 (*)     Hematocrit 14.3 (*)     MCHC 28.7 (*)     RDW 17.5 (*)     Nucleated RBCs 1 (*)     All other components within normal limits   BASIC METABOLIC PANEL - Abnormal; Notable for the following components:    Chloride 111 (*)     Glucose 109 (*)     Calcium 7.5 (*)     All other components within normal limits   TROPONIN I   PERIPHERAL IV CATHETER   ABO/RH TYPE AND SCREEN   RED BLOOD CELL PREPARE ORDER UNIT   BLOOD COMPONENT   .   Treatments provided: Monitoring, blood administration, Protonix scheduled to be administered after blood administration.  Family Comments: Sister at bedside, makes decision for pt as he is mentally handicapped.  OBS brochure/video discussed/provided to patient:  Yes  ED Medications:   Medications   pantoprazole (PROTONIX) 40 mg IV push injection (not administered)   0.9% sodium chloride BOLUS (not administered)     Drips infusing:  Yes  For the majority of the shift, the patient's behavior Green. Interventions performed were N/A.     Severe Sepsis OR Septic Shock Diagnosis Present: No      ED Nurse Name/Phone Number: Celena Coto,   5:29 PM    RECEIVING UNIT ED HANDOFF REVIEW    Above ED Nurse Handoff Report was reviewed: Yes  Reviewed by: Bethany Ibrahim on February 28, 2019 at 5:48 PM

## (undated) DEVICE — PREP SKIN SCRUB TRAY 4461A

## (undated) DEVICE — GLOVE PROTEXIS POWDER FREE 6.5 ORTHOPEDIC 2D73ET65

## (undated) DEVICE — GUIDEWIRE ZIPWIRE ANG STIFF .035"X150CM M006630223B0

## (undated) DEVICE — DRSG KERLIX FLUFFS X5

## (undated) DEVICE — CAST PADDING 4" STERILE 9044S

## (undated) DEVICE — PREP SCRUB SOL EXIDINE 4% CHG 4OZ 29002-404

## (undated) DEVICE — PREP TECHNI-CARE CHLOROXYLENOL 3% 4OZ BOTTLE C222-4ZWO

## (undated) DEVICE — LINEN HALF SHEET 5512

## (undated) DEVICE — GLOVE PROTEXIS POWDER FREE 7.5 ORTHOPEDIC 2D73ET75

## (undated) DEVICE — ENDO BITE BLOCK ADULT OLYMPUS LATEX FREE MAJ-1632

## (undated) DEVICE — KIT ENDO TURNOVER/PROCEDURE W/CLEAN A SCOPE LINERS 103888

## (undated) DEVICE — LINEN FULL SHEET 5511

## (undated) DEVICE — DRSG STERI STRIP 1/2X4" R1547

## (undated) DEVICE — SUPPORTER ATHLETIC LG LATEX 202636

## (undated) DEVICE — GLOVE PROTEXIS W/NEU-THERA 7.5  2D73TE75

## (undated) DEVICE — DRAIN PENROSE 0.50"X18" LATEX FREE GR203

## (undated) DEVICE — TUBING FLUID WARMER RANGER 24750

## (undated) DEVICE — SOL NACL 0.9% IRRIG 1000ML BOTTLE 2F7124

## (undated) DEVICE — SYR EAR BULB 3OZ 0035830

## (undated) DEVICE — DECANTER VIAL 2006S

## (undated) DEVICE — SOL NACL 0.9% IRRIG 3000ML BAG 2B7477

## (undated) DEVICE — LINEN TOWEL PACK X10 5473

## (undated) DEVICE — DRAPE LAP W/ARMBOARD 29410

## (undated) DEVICE — GLOVE PROTEXIS POWDER FREE 7.0 ORTHOPEDIC 2D73ET70

## (undated) DEVICE — DRAPE X-RAY TUBE 00-901169-01-OEC

## (undated) DEVICE — PREP DURAPREP 26ML APL 8630

## (undated) DEVICE — BAG CLEAR TRASH 1.3M 39X33" P4040C

## (undated) DEVICE — SOL WATER IRRIG 3000ML BAG 2B7117

## (undated) DEVICE — LABEL MEDICATION SYSTEM 3303-P

## (undated) DEVICE — PACK CYSTO CUSTOM RIDGES

## (undated) DEVICE — PACK MINOR CUSTOM RIDGES SBA32RMRMA

## (undated) DEVICE — CLIP HEMOCLIP ENDOSCOPIC INSTINCT 2.8X230CM INSC-7-230-SS

## (undated) DEVICE — TUBING IRRIG CYSTO/BLADDER SET 81" LF 2C4040

## (undated) DEVICE — ESU GROUND PAD ADULT W/CORD E7507

## (undated) DEVICE — RAD RX ISOVUE 300 (50ML) 61% IOPAMIDOL CHARGE PER ML

## (undated) RX ORDER — ONDANSETRON 2 MG/ML
INJECTION INTRAMUSCULAR; INTRAVENOUS
Status: DISPENSED
Start: 2020-01-01

## (undated) RX ORDER — EPHEDRINE SULFATE 50 MG/ML
INJECTION, SOLUTION INTRAMUSCULAR; INTRAVENOUS; SUBCUTANEOUS
Status: DISPENSED
Start: 2020-01-01

## (undated) RX ORDER — FENTANYL CITRATE-0.9 % NACL/PF 10 MCG/ML
PLASTIC BAG, INJECTION (ML) INTRAVENOUS
Status: DISPENSED
Start: 2020-01-01

## (undated) RX ORDER — LIDOCAINE HYDROCHLORIDE 10 MG/ML
INJECTION, SOLUTION EPIDURAL; INFILTRATION; INTRACAUDAL; PERINEURAL
Status: DISPENSED
Start: 2020-01-01

## (undated) RX ORDER — ATROPA BELLADONNA AND OPIUM 16.2; 3 MG/1; MG/1
SUPPOSITORY RECTAL
Status: DISPENSED
Start: 2020-01-01

## (undated) RX ORDER — LIDOCAINE HYDROCHLORIDE 20 MG/ML
SOLUTION OROPHARYNGEAL
Status: DISPENSED
Start: 2020-01-01

## (undated) RX ORDER — BUPIVACAINE HYDROCHLORIDE 5 MG/ML
INJECTION, SOLUTION EPIDURAL; INTRACAUDAL
Status: DISPENSED
Start: 2020-01-01

## (undated) RX ORDER — CEFAZOLIN SODIUM 2 G/100ML
INJECTION, SOLUTION INTRAVENOUS
Status: DISPENSED
Start: 2020-01-01

## (undated) RX ORDER — LIDOCAINE HYDROCHLORIDE AND EPINEPHRINE 10; 10 MG/ML; UG/ML
INJECTION, SOLUTION INFILTRATION; PERINEURAL
Status: DISPENSED
Start: 2020-01-01

## (undated) RX ORDER — GLYCOPYRROLATE 0.2 MG/ML
INJECTION INTRAMUSCULAR; INTRAVENOUS
Status: DISPENSED
Start: 2020-01-01

## (undated) RX ORDER — DEXAMETHASONE SODIUM PHOSPHATE 4 MG/ML
INJECTION, SOLUTION INTRA-ARTICULAR; INTRALESIONAL; INTRAMUSCULAR; INTRAVENOUS; SOFT TISSUE
Status: DISPENSED
Start: 2020-01-01

## (undated) RX ORDER — FENTANYL CITRATE 50 UG/ML
INJECTION, SOLUTION INTRAMUSCULAR; INTRAVENOUS
Status: DISPENSED
Start: 2020-01-01

## (undated) RX ORDER — FENTANYL CITRATE 50 UG/ML
INJECTION, SOLUTION INTRAMUSCULAR; INTRAVENOUS
Status: DISPENSED
Start: 2019-03-01

## (undated) RX ORDER — PROPOFOL 10 MG/ML
INJECTION, EMULSION INTRAVENOUS
Status: DISPENSED
Start: 2020-01-01